# Patient Record
Sex: FEMALE | Race: WHITE | NOT HISPANIC OR LATINO | Employment: FULL TIME | URBAN - METROPOLITAN AREA
[De-identification: names, ages, dates, MRNs, and addresses within clinical notes are randomized per-mention and may not be internally consistent; named-entity substitution may affect disease eponyms.]

---

## 2017-01-05 RX ORDER — ATORVASTATIN CALCIUM 20 MG/1
TABLET, FILM COATED ORAL
Qty: 30 TABLET | Refills: 5 | Status: SHIPPED | OUTPATIENT
Start: 2017-01-05 | End: 2017-06-24 | Stop reason: SDUPTHER

## 2017-01-11 ENCOUNTER — PATIENT OUTREACH (OUTPATIENT)
Dept: ADMINISTRATIVE | Facility: HOSPITAL | Age: 57
End: 2017-01-11

## 2017-01-25 ENCOUNTER — OFFICE VISIT (OUTPATIENT)
Dept: FAMILY MEDICINE | Facility: CLINIC | Age: 57
End: 2017-01-25
Payer: COMMERCIAL

## 2017-01-25 VITALS
WEIGHT: 226.94 LBS | DIASTOLIC BLOOD PRESSURE: 70 MMHG | HEART RATE: 72 BPM | BODY MASS INDEX: 32.49 KG/M2 | TEMPERATURE: 99 F | SYSTOLIC BLOOD PRESSURE: 122 MMHG | HEIGHT: 70 IN

## 2017-01-25 DIAGNOSIS — E78.5 HYPERLIPIDEMIA, UNSPECIFIED HYPERLIPIDEMIA TYPE: ICD-10-CM

## 2017-01-25 DIAGNOSIS — F33.2 SEVERE EPISODE OF RECURRENT MAJOR DEPRESSIVE DISORDER, WITHOUT PSYCHOTIC FEATURES: ICD-10-CM

## 2017-01-25 DIAGNOSIS — F41.1 GAD (GENERALIZED ANXIETY DISORDER): Primary | ICD-10-CM

## 2017-01-25 PROCEDURE — 1159F MED LIST DOCD IN RCRD: CPT | Mod: S$GLB,,, | Performed by: FAMILY MEDICINE

## 2017-01-25 PROCEDURE — 99213 OFFICE O/P EST LOW 20 MIN: CPT | Mod: S$GLB,,, | Performed by: FAMILY MEDICINE

## 2017-01-25 PROCEDURE — 99999 PR PBB SHADOW E&M-EST. PATIENT-LVL III: CPT | Mod: PBBFAC,,, | Performed by: FAMILY MEDICINE

## 2017-01-25 RX ORDER — ESCITALOPRAM OXALATE 10 MG/1
10 TABLET ORAL DAILY
Qty: 30 TABLET | Refills: 1 | Status: SHIPPED | OUTPATIENT
Start: 2017-01-25 | End: 2017-03-03 | Stop reason: SDUPTHER

## 2017-01-25 NOTE — PROGRESS NOTES
"Subjective:       Patient ID: Malathi Martinez is a 56 y.o. female.    Chief Complaint: Knee Pain (pt is c/o knee pain x2 months. ) and medications (pt would like to discuss meds, zoloft and wellbutrin. Length of statin therapy need. )    HPI   Patient here today for review.  Doing ok on wellbutrin. Some tearfulness and anger since stopping the zoloft. Did have some discontinuation sx incl "zaps" which have largely resolved. Does feel as though she needs to be on something else for sx control. Still in pastoral counseling, open to SW visit, but no time right now. Sleep is preserved.  Weight gain has stabilized.  Cholesterol has improved on lipitor low dose.     Review of Systems   Constitutional: Positive for fatigue and unexpected weight change. Negative for activity change.   HENT: Negative for hearing loss, rhinorrhea and trouble swallowing.    Eyes: Negative for discharge and visual disturbance.   Respiratory: Negative for chest tightness and wheezing.    Cardiovascular: Negative for chest pain and palpitations.   Gastrointestinal: Negative for blood in stool, constipation, diarrhea and vomiting.   Endocrine: Positive for polyuria. Negative for polydipsia.   Genitourinary: Negative for difficulty urinating, dysuria, hematuria and menstrual problem.   Musculoskeletal: Positive for arthralgias (chronic R knee issues) and joint swelling.   Neurological: Negative for weakness and headaches.   Psychiatric/Behavioral: Positive for dysphoric mood. Negative for confusion.       Objective:      Physical Exam   Constitutional: She is oriented to person, place, and time. She appears well-developed and well-nourished. No distress.   HENT:   Head: Normocephalic and atraumatic.   Eyes: Conjunctivae are normal. Right eye exhibits no discharge. Left eye exhibits no discharge. No scleral icterus.   Neck: Normal range of motion. Neck supple.   Cardiovascular: Normal rate.    Pulmonary/Chest: Effort normal. No respiratory " distress.   Abdominal: Soft. She exhibits no distension.   Musculoskeletal: Normal range of motion. She exhibits no edema.   Neurological: She is alert and oriented to person, place, and time.   Skin: Skin is warm and dry. No rash noted.   Psychiatric: She has a normal mood and affect. Her behavior is normal.   Intermittently tearful.   Nursing note and vitals reviewed.        Hyperlipidemia, unspecified hyperlipidemia type  Cont lipitor.  JEAN CLAUDE (generalized anxiety disorder)  Severe episode of recurrent major depressive disorder, without psychotic features  Cont wellbutrin, add lexapro. Recheck 6 weeks.  -     escitalopram oxalate (LEXAPRO) 10 MG tablet; Take 1 tablet (10 mg total) by mouth once daily.  Dispense: 30 tablet; Refill: 1

## 2017-02-25 DIAGNOSIS — F43.21 GRIEF REACTION: ICD-10-CM

## 2017-02-25 DIAGNOSIS — F41.9 ANXIETY: ICD-10-CM

## 2017-02-25 DIAGNOSIS — F43.23 ADJUSTMENT DISORDER WITH MIXED ANXIETY AND DEPRESSED MOOD: ICD-10-CM

## 2017-03-01 RX ORDER — ALPRAZOLAM 0.25 MG/1
TABLET ORAL
Qty: 60 TABLET | Refills: 2 | Status: SHIPPED | OUTPATIENT
Start: 2017-03-01 | End: 2017-05-26 | Stop reason: SDUPTHER

## 2017-03-03 ENCOUNTER — PATIENT MESSAGE (OUTPATIENT)
Dept: FAMILY MEDICINE | Facility: CLINIC | Age: 57
End: 2017-03-03

## 2017-03-03 ENCOUNTER — OFFICE VISIT (OUTPATIENT)
Dept: FAMILY MEDICINE | Facility: CLINIC | Age: 57
End: 2017-03-03
Payer: COMMERCIAL

## 2017-03-03 VITALS
TEMPERATURE: 99 F | BODY MASS INDEX: 32.38 KG/M2 | WEIGHT: 226.19 LBS | HEIGHT: 70 IN | HEART RATE: 72 BPM | DIASTOLIC BLOOD PRESSURE: 70 MMHG | SYSTOLIC BLOOD PRESSURE: 122 MMHG

## 2017-03-03 DIAGNOSIS — M25.561 CHRONIC PAIN OF RIGHT KNEE: ICD-10-CM

## 2017-03-03 DIAGNOSIS — G89.29 CHRONIC PAIN OF RIGHT KNEE: ICD-10-CM

## 2017-03-03 DIAGNOSIS — F41.1 GAD (GENERALIZED ANXIETY DISORDER): Primary | ICD-10-CM

## 2017-03-03 PROCEDURE — 99999 PR PBB SHADOW E&M-EST. PATIENT-LVL III: CPT | Mod: PBBFAC,,, | Performed by: FAMILY MEDICINE

## 2017-03-03 PROCEDURE — 20610 DRAIN/INJ JOINT/BURSA W/O US: CPT | Mod: S$GLB,,, | Performed by: FAMILY MEDICINE

## 2017-03-03 PROCEDURE — 99213 OFFICE O/P EST LOW 20 MIN: CPT | Mod: 25,S$GLB,, | Performed by: FAMILY MEDICINE

## 2017-03-03 PROCEDURE — 1160F RVW MEDS BY RX/DR IN RCRD: CPT | Mod: S$GLB,,, | Performed by: FAMILY MEDICINE

## 2017-03-03 RX ORDER — TRIAMCINOLONE ACETONIDE 40 MG/ML
40 INJECTION, SUSPENSION INTRA-ARTICULAR; INTRAMUSCULAR
Status: DISCONTINUED | OUTPATIENT
Start: 2017-03-03 | End: 2017-03-03 | Stop reason: HOSPADM

## 2017-03-03 RX ORDER — ESCITALOPRAM OXALATE 10 MG/1
10 TABLET ORAL DAILY
Qty: 90 TABLET | Refills: 1 | Status: SHIPPED | OUTPATIENT
Start: 2017-03-03 | End: 2017-08-17 | Stop reason: SDUPTHER

## 2017-03-03 RX ADMIN — TRIAMCINOLONE ACETONIDE 40 MG: 40 INJECTION, SUSPENSION INTRA-ARTICULAR; INTRAMUSCULAR at 02:03

## 2017-03-03 NOTE — PROCEDURES
Large Joint Aspiration/Injection  Date/Time: 3/3/2017 2:09 PM  Performed by: SARAN LARA  Authorized by: SARAN LARA     Consent Done?:  Yes (Verbal)  Indications:  Pain and joint swelling  Procedure site marked: Yes    Timeout: Prior to procedure the correct patient, procedure, and site was verified    Prep: Patient was prepped and draped in usual sterile fashion    Needle size:  22 G  Medications:  40 mg triamcinolone acetonide 40 mg/mL  Aspirate amount (ml):  0  Patient tolerance:  Patient tolerated the procedure well with no immediate complications    Additional Comments: FERNANDA reviewed. Wound care post-procedure and alarm sx indicating risk for infection also discussed.

## 2017-03-03 NOTE — PROGRESS NOTES
Subjective:       Patient ID: Malathi Martinez is a 56 y.o. female.    Chief Complaint: Follow-up (lexapro, sweating and headaches and abdominal pain at first but have diminished.) and Knee Pain (right knee pain)    HPI   Patient here for med f/u.  Lexapro is doing ok. Initial se related to med use have improved. Not quite as effective as zoloft, but the weight gain has stopped! Sleep is hit or miss.   Ector a whisper in her ear that woke her from sleep. No paranoia or recurrence.  Ongoing issues with R knee pain. Intermittent swelling. Chronic pain. No imaging.    Review of Systems   Constitutional: Negative for activity change and unexpected weight change.   HENT: Negative for hearing loss, rhinorrhea and trouble swallowing.    Eyes: Negative for discharge and visual disturbance.   Respiratory: Negative for chest tightness and wheezing.    Cardiovascular: Negative for chest pain and palpitations.   Gastrointestinal: Negative for blood in stool, constipation, diarrhea and vomiting.   Endocrine: Negative for polydipsia and polyuria.   Genitourinary: Negative for difficulty urinating, dysuria, hematuria and menstrual problem.   Musculoskeletal: Positive for arthralgias and joint swelling.   Neurological: Negative for weakness and headaches.   Psychiatric/Behavioral: Negative for confusion and dysphoric mood.       Objective:      Physical Exam   Constitutional: She is oriented to person, place, and time. She appears well-developed and well-nourished. No distress.   HENT:   Head: Normocephalic and atraumatic.   Eyes: Conjunctivae are normal. Right eye exhibits no discharge. Left eye exhibits no discharge. No scleral icterus.   Neck: Normal range of motion. Neck supple.   Cardiovascular: Normal rate.    Pulmonary/Chest: Effort normal. No respiratory distress.   Abdominal: Soft. She exhibits no distension.   Musculoskeletal: Normal range of motion. She exhibits no edema.        Right knee: She exhibits no effusion  and no deformity. Tenderness found.   Neurological: She is alert and oriented to person, place, and time.   Skin: Skin is warm and dry. No rash noted.   Psychiatric: She has a normal mood and affect. Her behavior is normal.   Nursing note and vitals reviewed.        Chronic pain of right knee  -     X-ray Knee Ortho Right; Future; Expected date: 3/3/17  -     Ambulatory referral to Orthopedics  -     Large Joint Aspiration/Injection  -     triamcinolone acetonide injection 40 mg; Inject 40 mg into the articular space.  Update imaging. Likely will need MRI and ortho eval.  JEAN CLAUDE  -     escitalopram oxalate (LEXAPRO) 10 MG tablet; Take 1 tablet (10 mg total) by mouth once daily.  Dispense: 90 tablet; Refill: 1  Cont at current dose.

## 2017-03-03 NOTE — MR AVS SNAPSHOT
[facility]  [address]  [place] [address]  Phone: [phone]  Fax: [phone]                  Malathi Martinez   3/3/2017 1:20 PM   Office Visit    Description:  Female : 1960   Provider:  Comfort Dave MD   Department:  [facility]           Reason for Visit     Follow-up     Knee Pain           Diagnoses this Visit        Comments    Chronic pain of right knee    -  Primary            To Do List           Future Appointments        Provider Department Dept Phone    3/4/2017 8:30 AM [facility] XR2 [facility] [phone]    2017 1:20 PM Comfort Dave MD [facility] [phone]      Goals (5 Years of Data)     None       These Medications        Disp Refills Start End    escitalopram oxalate (LEXAPRO) 10 MG tablet 90 tablet 1 3/3/2017 3/3/2018    Take 1 tablet (10 mg total) by mouth once daily. - Oral    Pharmacy: [facility]/pharmacy #[id number] - [place], [place]  [address]  #: [phone]         [facility]     [facility] On Call Nurse Care Line -  Assistance  Registered nurses in the [facility] On Call Center provide clinical advisement, health education, appointment booking, and other advisory services.  Call for this free service at [phone].             Medications           Message regarding Medications     Verify the changes and/or additions to your medication regime listed below are the same as discussed with your clinician today.  If any of these changes or additions are incorrect, please notify your healthcare provider.        These medications were administered today        Dose Freq    triamcinolone acetonide injection 40 mg 40 mg     Sig: Inject 40 mg into the articular space.    Class: Normal    Route: Intra-articular           Verify that the below list of medications is an accurate representation of the medications you are currently taking.  If none reported, the list  "may be blank. If incorrect, please contact your healthcare provider. Carry this list with you in case of emergency.           Current Medications     albuterol 90 mcg/actuation inhaler Inhale 2 puffs into the lungs every 4 (four) hours as needed for Wheezing.    alprazolam (XANAX) 0.25 MG tablet TAKE 1 TABLET (0.25 MG TOTAL) BY MOUTH 2 (TWO) TIMES DAILY AS NEEDED FOR ANXIETY.    atorvastatin (LIPITOR) 20 MG tablet TAKE 1 TABLET (20 MG TOTAL) BY MOUTH ONCE DAILY.    buPROPion (WELLBUTRIN XL) 300 MG 24 hr tablet Take 1 tablet (300 mg total) by mouth once daily.    cholecalciferol, vitamin D3, 2,000 unit Cap Take 1 capsule by mouth once daily.    cyanocobalamin (VITAMIN B-12) 250 MCG tablet Take 250 mcg by mouth once daily.    escitalopram oxalate (LEXAPRO) 10 MG tablet Take 1 tablet (10 mg total) by mouth once daily.    furosemide (LASIX) 20 MG tablet Take 1 tablet (20 mg total) by mouth once daily.    indomethacin (INDOCIN) 25 MG capsule TAKE 1 CAPSULE (25 MG TOTAL) BY MOUTH 2 (TWO) TIMES DAILY WITH MEALS.    loratadine (CLARITIN) 10 mg tablet Take 10 mg by mouth once daily.           Clinical Reference Information           Your Vitals Were     BP Pulse Temp Height Weight Last Period    122/70 72 98.8 °F (37.1 °C) 5' 10" (1.778 m) 102.6 kg (226 lb 3.1 oz) 09/10/2012    BMI                32.46 kg/m2          Blood Pressure          Most Recent Value    BP  122/70      Allergies as of 3/3/2017     Penicillins      Immunizations Administered on Date of Encounter - 3/3/2017     None      Orders Placed During Today's Visit      Normal Orders This Visit    Ambulatory referral to Orthopedics     Large Joint Aspiration/Injection     Future Labs/Procedures Expected by Expires    X-ray Knee Ortho Right  3/3/2017 3/3/2018      Administrations This Visit     triamcinolone acetonide injection 40 mg     Admin Date Action Dose Route Administered By             03/03/2017 Given 40 mg Intra-articular Comfort Dave MD           "            Smoking Cessation     If you would like to quit smoking:   You may be eligible for free services if you are a [place] resident and started smoking cigarettes before September 1, 1988.  Call the Smoking Cessation Trust (SCT) toll free at (306) 441-4001 or (773) 200-9407.   Call 1-800-QUIT-NOW if you do not meet the above criteria.            Language Assistance Services     ATTENTION: Language assistance services are available, free of charge. Please call [phone].      ATENCIÓN: Si habla marla, tiene a britt disposición servicios gratuitos de asistencia lingüística. Llame al [phone].     CHÚ Ý: N?u b?n nói Ti?ng Vi?t, có các d?ch v? h? tr? ngôn ng? mi?n phí dành cho b?n. G?i s? [phone].         [facility] complies with applicable Federal civil rights laws and does not discriminate on the basis of race, color, national origin, age, disability, or sex.

## 2017-03-04 ENCOUNTER — HOSPITAL ENCOUNTER (OUTPATIENT)
Dept: RADIOLOGY | Facility: HOSPITAL | Age: 57
Discharge: HOME | End: 2017-03-04
Attending: FAMILY MEDICINE
Payer: COMMERCIAL

## 2017-03-04 DIAGNOSIS — M25.561 CHRONIC PAIN OF RIGHT KNEE: ICD-10-CM

## 2017-03-04 DIAGNOSIS — G89.29 CHRONIC PAIN OF RIGHT KNEE: ICD-10-CM

## 2017-03-04 PROCEDURE — 73562 X-RAY EXAM OF KNEE 3: CPT | Mod: 26,RT,, | Performed by: RADIOLOGY

## 2017-03-04 PROCEDURE — 73560 X-RAY EXAM OF KNEE 1 OR 2: CPT | Mod: TC,59,PO,LT

## 2017-03-04 PROCEDURE — 73560 X-RAY EXAM OF KNEE 1 OR 2: CPT | Mod: 26,59,LT, | Performed by: RADIOLOGY

## 2017-03-06 ENCOUNTER — PATIENT MESSAGE (OUTPATIENT)
Dept: FAMILY MEDICINE | Facility: CLINIC | Age: 57
End: 2017-03-06

## 2017-04-06 DIAGNOSIS — J40 BRONCHITIS: ICD-10-CM

## 2017-04-06 DIAGNOSIS — J44.1 CHRONIC OBSTRUCTIVE PULMONARY DISEASE WITH ACUTE EXACERBATION: ICD-10-CM

## 2017-04-06 RX ORDER — ALBUTEROL SULFATE 90 UG/1
AEROSOL, METERED RESPIRATORY (INHALATION)
Qty: 8.5 INHALER | Refills: 1 | Status: SHIPPED | OUTPATIENT
Start: 2017-04-06 | End: 2017-11-24 | Stop reason: SDUPTHER

## 2017-04-27 ENCOUNTER — PATIENT MESSAGE (OUTPATIENT)
Dept: FAMILY MEDICINE | Facility: CLINIC | Age: 57
End: 2017-04-27

## 2017-04-27 DIAGNOSIS — Z00.00 ROUTINE GENERAL MEDICAL EXAMINATION AT A HEALTH CARE FACILITY: Primary | ICD-10-CM

## 2017-05-08 ENCOUNTER — PATIENT MESSAGE (OUTPATIENT)
Dept: FAMILY MEDICINE | Facility: CLINIC | Age: 57
End: 2017-05-08

## 2017-05-11 ENCOUNTER — PATIENT MESSAGE (OUTPATIENT)
Dept: FAMILY MEDICINE | Facility: CLINIC | Age: 57
End: 2017-05-11

## 2017-05-12 ENCOUNTER — PATIENT OUTREACH (OUTPATIENT)
Dept: ADMINISTRATIVE | Facility: HOSPITAL | Age: 57
End: 2017-05-12

## 2017-05-12 ENCOUNTER — PATIENT MESSAGE (OUTPATIENT)
Dept: FAMILY MEDICINE | Facility: CLINIC | Age: 57
End: 2017-05-12

## 2017-05-13 ENCOUNTER — LAB VISIT (OUTPATIENT)
Dept: LAB | Facility: HOSPITAL | Age: 57
End: 2017-05-13
Attending: FAMILY MEDICINE
Payer: COMMERCIAL

## 2017-05-13 DIAGNOSIS — Z00.00 ROUTINE GENERAL MEDICAL EXAMINATION AT A HEALTH CARE FACILITY: ICD-10-CM

## 2017-05-13 LAB
ALBUMIN SERPL BCP-MCNC: 3.4 G/DL
ALP SERPL-CCNC: 83 U/L
ALT SERPL W/O P-5'-P-CCNC: 24 U/L
ANION GAP SERPL CALC-SCNC: 10 MMOL/L
AST SERPL-CCNC: 15 U/L
BILIRUB SERPL-MCNC: 0.4 MG/DL
BUN SERPL-MCNC: 7 MG/DL
CALCIUM SERPL-MCNC: 8.8 MG/DL
CHLORIDE SERPL-SCNC: 105 MMOL/L
CHOLEST/HDLC SERPL: 3.1 {RATIO}
CO2 SERPL-SCNC: 26 MMOL/L
CREAT SERPL-MCNC: 0.8 MG/DL
EST. GFR  (AFRICAN AMERICAN): >60 ML/MIN/1.73 M^2
EST. GFR  (NON AFRICAN AMERICAN): >60 ML/MIN/1.73 M^2
GLUCOSE SERPL-MCNC: 101 MG/DL
HDL/CHOLESTEROL RATIO: 32.4 %
HDLC SERPL-MCNC: 145 MG/DL
HDLC SERPL-MCNC: 47 MG/DL
LDLC SERPL CALC-MCNC: 80.4 MG/DL
NONHDLC SERPL-MCNC: 98 MG/DL
POTASSIUM SERPL-SCNC: 3.7 MMOL/L
PROT SERPL-MCNC: 6.9 G/DL
SODIUM SERPL-SCNC: 141 MMOL/L
TRIGL SERPL-MCNC: 88 MG/DL

## 2017-05-13 PROCEDURE — 80053 COMPREHEN METABOLIC PANEL: CPT

## 2017-05-13 PROCEDURE — 80061 LIPID PANEL: CPT

## 2017-05-13 PROCEDURE — 36415 COLL VENOUS BLD VENIPUNCTURE: CPT | Mod: PO

## 2017-05-13 PROCEDURE — 83036 HEMOGLOBIN GLYCOSYLATED A1C: CPT

## 2017-05-15 LAB
ESTIMATED AVG GLUCOSE: 143 MG/DL
HBA1C MFR BLD HPLC: 6.6 %

## 2017-05-22 DIAGNOSIS — M79.89 LEG SWELLING: ICD-10-CM

## 2017-05-22 RX ORDER — FUROSEMIDE 20 MG/1
20 TABLET ORAL DAILY
Qty: 30 TABLET | Refills: 5 | Status: SHIPPED | OUTPATIENT
Start: 2017-05-22 | End: 2017-11-12 | Stop reason: SDUPTHER

## 2017-05-23 DIAGNOSIS — F43.21 GRIEF REACTION: ICD-10-CM

## 2017-05-23 DIAGNOSIS — F43.23 ADJUSTMENT DISORDER WITH MIXED ANXIETY AND DEPRESSED MOOD: ICD-10-CM

## 2017-05-23 DIAGNOSIS — F41.9 ANXIETY: ICD-10-CM

## 2017-05-23 RX ORDER — BUPROPION HYDROCHLORIDE 300 MG/1
300 TABLET ORAL DAILY
Qty: 90 TABLET | Refills: 1 | Status: SHIPPED | OUTPATIENT
Start: 2017-05-23 | End: 2017-06-09

## 2017-05-26 ENCOUNTER — OFFICE VISIT (OUTPATIENT)
Dept: FAMILY MEDICINE | Facility: CLINIC | Age: 57
End: 2017-05-26
Payer: COMMERCIAL

## 2017-05-26 VITALS
WEIGHT: 229.81 LBS | SYSTOLIC BLOOD PRESSURE: 128 MMHG | BODY MASS INDEX: 32.9 KG/M2 | HEART RATE: 72 BPM | DIASTOLIC BLOOD PRESSURE: 70 MMHG | TEMPERATURE: 99 F | HEIGHT: 70 IN

## 2017-05-26 DIAGNOSIS — F41.9 ANXIETY: ICD-10-CM

## 2017-05-26 DIAGNOSIS — G89.29 CHRONIC PAIN OF RIGHT KNEE: ICD-10-CM

## 2017-05-26 DIAGNOSIS — E11.9 CONTROLLED TYPE 2 DIABETES MELLITUS WITHOUT COMPLICATION, WITHOUT LONG-TERM CURRENT USE OF INSULIN: Primary | ICD-10-CM

## 2017-05-26 DIAGNOSIS — F43.21 GRIEF REACTION: ICD-10-CM

## 2017-05-26 DIAGNOSIS — E66.9 OBESITY, UNSPECIFIED OBESITY SEVERITY, UNSPECIFIED OBESITY TYPE: ICD-10-CM

## 2017-05-26 DIAGNOSIS — M25.561 CHRONIC PAIN OF RIGHT KNEE: ICD-10-CM

## 2017-05-26 DIAGNOSIS — R35.0 URINARY FREQUENCY: ICD-10-CM

## 2017-05-26 DIAGNOSIS — F43.23 ADJUSTMENT DISORDER WITH MIXED ANXIETY AND DEPRESSED MOOD: ICD-10-CM

## 2017-05-26 LAB
BILIRUB UR QL STRIP: NEGATIVE
CLARITY UR REFRACT.AUTO: CLEAR
COLOR UR AUTO: COLORLESS
GLUCOSE UR QL STRIP: NEGATIVE
HGB UR QL STRIP: NEGATIVE
KETONES UR QL STRIP: NEGATIVE
LEUKOCYTE ESTERASE UR QL STRIP: NEGATIVE
NITRITE UR QL STRIP: NEGATIVE
PH UR STRIP: 6 [PH] (ref 5–8)
PROT UR QL STRIP: NEGATIVE
SP GR UR STRIP: 1 (ref 1–1.03)
URN SPEC COLLECT METH UR: ABNORMAL
UROBILINOGEN UR STRIP-ACNC: NEGATIVE EU/DL

## 2017-05-26 PROCEDURE — 3044F HG A1C LEVEL LT 7.0%: CPT | Mod: S$GLB,,, | Performed by: FAMILY MEDICINE

## 2017-05-26 PROCEDURE — 4010F ACE/ARB THERAPY RXD/TAKEN: CPT | Mod: S$GLB,,, | Performed by: FAMILY MEDICINE

## 2017-05-26 PROCEDURE — 81003 URINALYSIS AUTO W/O SCOPE: CPT

## 2017-05-26 PROCEDURE — 99214 OFFICE O/P EST MOD 30 MIN: CPT | Mod: S$GLB,,, | Performed by: FAMILY MEDICINE

## 2017-05-26 PROCEDURE — 99999 PR PBB SHADOW E&M-EST. PATIENT-LVL IV: CPT | Mod: PBBFAC,,, | Performed by: FAMILY MEDICINE

## 2017-05-26 RX ORDER — ALPRAZOLAM 0.25 MG/1
TABLET ORAL
Qty: 60 TABLET | Refills: 2 | Status: SHIPPED | OUTPATIENT
Start: 2017-05-26 | End: 2017-07-17 | Stop reason: SDUPTHER

## 2017-05-26 RX ORDER — LISINOPRIL 2.5 MG/1
2.5 TABLET ORAL DAILY
Qty: 90 TABLET | Refills: 1 | Status: SHIPPED | OUTPATIENT
Start: 2017-05-26 | End: 2017-11-18 | Stop reason: SDUPTHER

## 2017-05-26 RX ORDER — METFORMIN HYDROCHLORIDE 500 MG/1
500 TABLET, EXTENDED RELEASE ORAL 2 TIMES DAILY WITH MEALS
Qty: 180 TABLET | Refills: 0 | Status: SHIPPED | OUTPATIENT
Start: 2017-05-26 | End: 2017-08-16 | Stop reason: SDUPTHER

## 2017-05-26 NOTE — PROGRESS NOTES
Subjective:       Patient ID: Malathi Martinez is a 56 y.o. female.    Chief Complaint: Follow-up (pt is here for f/u on wellbutrin and lexapro. Pt states she is having confusion and tinnitus, unable to empty her bladder, causing accidents. constant yawning, itching. )    HPI   Patient here today for f/u.  She is having some issues she relates to the medications. Feels that she sees things in the corner of her eyes. Less focus/concentration. No OCD traits.   Feels exhausted, yawns constantly, feels that she is itching a lot.   Labs 5/2017. +DM2 new dx.  Weight gain continues.  Difficulty emptying bladder fully, some post-void incont.  R knee - continued pain - some weakness ass'd.    Review of Systems   Constitutional: Positive for unexpected weight change. Negative for activity change.   HENT: Negative for hearing loss, rhinorrhea and trouble swallowing.    Eyes: Negative for discharge and visual disturbance.   Respiratory: Negative for chest tightness and wheezing.    Cardiovascular: Negative for chest pain and palpitations.   Gastrointestinal: Positive for constipation. Negative for blood in stool, diarrhea and vomiting.   Endocrine: Positive for polyuria. Negative for polydipsia.   Genitourinary: Negative for difficulty urinating, dysuria, hematuria and menstrual problem.   Musculoskeletal: Positive for arthralgias (R knee pain). Negative for joint swelling.   Neurological: Positive for weakness and headaches.   Psychiatric/Behavioral: Positive for confusion and dysphoric mood.       Objective:      Physical Exam   Constitutional: She is oriented to person, place, and time. She appears well-developed and well-nourished. No distress.   HENT:   Head: Normocephalic and atraumatic.   Eyes: Conjunctivae are normal. Right eye exhibits no discharge. Left eye exhibits no discharge. No scleral icterus.   Neck: Normal range of motion. Neck supple.   Cardiovascular: Normal rate and regular rhythm.    Pulmonary/Chest:  Effort normal. No respiratory distress.   Abdominal: Soft. She exhibits no distension.   Musculoskeletal: Normal range of motion. She exhibits no edema.   Neurological: She is alert and oriented to person, place, and time.   Skin: Skin is warm and dry. No rash noted.   Psychiatric: She has a normal mood and affect. Her behavior is normal.   Worried about new DM2 dx.   Nursing note and vitals reviewed.        Controlled type 2 diabetes mellitus without complication, without long-term current use of insulin  -     Ambulatory referral to Endocrinology  -     Comprehensive metabolic panel; Future; Expected date: 05/26/2017  -     Hemoglobin A1c; Future; Expected date: 05/26/2017  Start metormin 1-2 daily. Dietary mods reviewed. Lisinopril at low dose. Offered session with Karina for teaching. Patient declined glucometer - has her dad's old one at home. Recheck lab 3 mos.  Anxiety  -     alprazolam (XANAX) 0.25 MG tablet; TAKE 1 TABLET (0.25 MG TOTAL) BY MOUTH 2 (TWO) TIMES DAILY AS NEEDED FOR ANXIETY.  Dispense: 60 tablet; Refill: 2  Adjustment disorder with mixed anxiety and depressed mood  -     alprazolam (XANAX) 0.25 MG tablet; TAKE 1 TABLET (0.25 MG TOTAL) BY MOUTH 2 (TWO) TIMES DAILY AS NEEDED FOR ANXIETY.  Dispense: 60 tablet; Refill: 2  Decrease wellbutrin to 150/day. Discussed establishing with psychiatry - she agrees - as we've struggled to find a good med regimen for her.  Grief reaction  -     alprazolam (XANAX) 0.25 MG tablet; TAKE 1 TABLET (0.25 MG TOTAL) BY MOUTH 2 (TWO) TIMES DAILY AS NEEDED FOR ANXIETY.  Dispense: 60 tablet; Refill: 2  Obesity, unspecified obesity severity, unspecified obesity type  -     Ambulatory consult to Bariatric Surgery  Goal weight reviewed as well as need for lifestyle modifications to incl caloric restriction, high protein/low carb, increased water intake, muscle-building exercises as well as cardio.    Urinary frequency  -     Urinalysis  If ua neg, we'll need to look at a  post-void and uro-gyn eval.  Chronic pain of right knee  -     MRI Lower Extremity Joint WO Cont Right; Future; Expected date: 05/26/2017  Update imaging, anticipate ortho f/u.  Other orders  -     metformin (GLUCOPHAGE-XR) 500 MG 24 hr tablet; Take 1 tablet (500 mg total) by mouth 2 (two) times daily with meals.  Dispense: 180 tablet; Refill: 0  -     lisinopril (PRINIVIL,ZESTRIL) 2.5 MG tablet; Take 1 tablet (2.5 mg total) by mouth once daily.  Dispense: 90 tablet; Refill: 1

## 2017-05-30 DIAGNOSIS — F41.9 ANXIETY: ICD-10-CM

## 2017-05-30 DIAGNOSIS — F43.21 GRIEF REACTION: ICD-10-CM

## 2017-05-30 DIAGNOSIS — F43.23 ADJUSTMENT DISORDER WITH MIXED ANXIETY AND DEPRESSED MOOD: ICD-10-CM

## 2017-05-30 RX ORDER — BUPROPION HYDROCHLORIDE 150 MG/1
150 TABLET ORAL DAILY
Qty: 30 TABLET | Refills: 5 | Status: SHIPPED | OUTPATIENT
Start: 2017-05-30 | End: 2017-11-17 | Stop reason: SDUPTHER

## 2017-06-01 ENCOUNTER — PATIENT MESSAGE (OUTPATIENT)
Dept: FAMILY MEDICINE | Facility: CLINIC | Age: 57
End: 2017-06-01

## 2017-06-03 ENCOUNTER — HOSPITAL ENCOUNTER (OUTPATIENT)
Dept: RADIOLOGY | Facility: HOSPITAL | Age: 57
Discharge: HOME | End: 2017-06-03
Attending: FAMILY MEDICINE
Payer: COMMERCIAL

## 2017-06-03 DIAGNOSIS — M25.561 CHRONIC PAIN OF RIGHT KNEE: ICD-10-CM

## 2017-06-03 DIAGNOSIS — G89.29 CHRONIC PAIN OF RIGHT KNEE: ICD-10-CM

## 2017-06-03 PROCEDURE — 73721 MRI JNT OF LWR EXTRE W/O DYE: CPT | Mod: 26,RT,, | Performed by: RADIOLOGY

## 2017-06-03 PROCEDURE — 73721 MRI JNT OF LWR EXTRE W/O DYE: CPT | Mod: TC,PO,RT

## 2017-06-05 ENCOUNTER — PATIENT MESSAGE (OUTPATIENT)
Dept: FAMILY MEDICINE | Facility: CLINIC | Age: 57
End: 2017-06-05

## 2017-06-09 ENCOUNTER — OFFICE VISIT (OUTPATIENT)
Dept: ENDOCRINOLOGY | Facility: CLINIC | Age: 57
End: 2017-06-09
Payer: COMMERCIAL

## 2017-06-09 ENCOUNTER — OFFICE VISIT (OUTPATIENT)
Dept: ORTHOPEDICS | Facility: CLINIC | Age: 57
End: 2017-06-09
Payer: COMMERCIAL

## 2017-06-09 VITALS — BODY MASS INDEX: 32.78 KG/M2 | WEIGHT: 229 LBS | HEIGHT: 70 IN

## 2017-06-09 VITALS
BODY MASS INDEX: 31.49 KG/M2 | HEART RATE: 72 BPM | WEIGHT: 219.94 LBS | HEIGHT: 70 IN | DIASTOLIC BLOOD PRESSURE: 62 MMHG | SYSTOLIC BLOOD PRESSURE: 140 MMHG

## 2017-06-09 DIAGNOSIS — F41.1 GAD (GENERALIZED ANXIETY DISORDER): ICD-10-CM

## 2017-06-09 DIAGNOSIS — J44.1 CHRONIC OBSTRUCTIVE PULMONARY DISEASE WITH ACUTE EXACERBATION: ICD-10-CM

## 2017-06-09 DIAGNOSIS — E78.5 HYPERLIPIDEMIA, UNSPECIFIED HYPERLIPIDEMIA TYPE: ICD-10-CM

## 2017-06-09 DIAGNOSIS — F33.2 SEVERE EPISODE OF RECURRENT MAJOR DEPRESSIVE DISORDER, WITHOUT PSYCHOTIC FEATURES: ICD-10-CM

## 2017-06-09 DIAGNOSIS — M25.561 RIGHT KNEE PAIN, UNSPECIFIED CHRONICITY: Primary | ICD-10-CM

## 2017-06-09 DIAGNOSIS — E11.9 TYPE 2 DIABETES MELLITUS WITHOUT COMPLICATION, WITHOUT LONG-TERM CURRENT USE OF INSULIN: Primary | ICD-10-CM

## 2017-06-09 DIAGNOSIS — E66.3 OVERWEIGHT: ICD-10-CM

## 2017-06-09 LAB — GLUCOSE SERPL-MCNC: 72 MG/DL (ref 70–110)

## 2017-06-09 PROCEDURE — 99204 OFFICE O/P NEW MOD 45 MIN: CPT | Mod: S$GLB,,, | Performed by: ORTHOPAEDIC SURGERY

## 2017-06-09 PROCEDURE — 99999 PR PBB SHADOW E&M-EST. PATIENT-LVL IV: CPT | Mod: PBBFAC,,, | Performed by: NURSE PRACTITIONER

## 2017-06-09 PROCEDURE — 99203 OFFICE O/P NEW LOW 30 MIN: CPT | Mod: S$GLB,,, | Performed by: NURSE PRACTITIONER

## 2017-06-09 PROCEDURE — 4010F ACE/ARB THERAPY RXD/TAKEN: CPT | Mod: S$GLB,,, | Performed by: NURSE PRACTITIONER

## 2017-06-09 PROCEDURE — 82948 REAGENT STRIP/BLOOD GLUCOSE: CPT | Mod: S$GLB,,, | Performed by: NURSE PRACTITIONER

## 2017-06-09 PROCEDURE — 99999 PR PBB SHADOW E&M-EST. PATIENT-LVL II: CPT | Mod: PBBFAC,,, | Performed by: ORTHOPAEDIC SURGERY

## 2017-06-09 PROCEDURE — 3044F HG A1C LEVEL LT 7.0%: CPT | Mod: S$GLB,,, | Performed by: NURSE PRACTITIONER

## 2017-06-09 RX ORDER — INSULIN PUMP SYRINGE, 3 ML
EACH MISCELLANEOUS
Qty: 1 EACH | Refills: 0 | Status: SHIPPED | OUTPATIENT
Start: 2017-06-09 | End: 2022-12-19

## 2017-06-09 RX ORDER — LANCETS
1 EACH MISCELLANEOUS DAILY
Qty: 50 EACH | Refills: 6 | Status: SHIPPED | OUTPATIENT
Start: 2017-06-09 | End: 2019-08-09 | Stop reason: SDUPTHER

## 2017-06-09 NOTE — LETTER
June 9, 2017      Comfort Dave MD  [address]  [facility] [address]           [facility]  [address]  [place] [address]  Phone: [phone]  Fax: [phone]          Patient: Malathi Martinez   MR Number: [id number]   YOB: 1960   Date of Visit: 6/9/2017       Dear Dr. Comfort Dave:    Thank you for referring Malathi Martinez to me for evaluation. Attached you will find relevant portions of my assessment and plan of care.    If you have questions, please do not hesitate to call me. I look forward to following Malathi Martinez along with you.    Sincerely,    ABI Bah,LEONORP-C    Enclosure  CC:  No Recipients    If you would like to receive this communication electronically, please contact [email or web address] or [phone] to request more information on [email or web address] Link access.    For providers and/or their staff who would like to refer a patient to [facility], please contact us through our one-stop-shop provider referral line, [facility], at [phone].    If you feel you have received this communication in error or would no longer like to receive these types of communications, please e-mail [email or web address]

## 2017-06-09 NOTE — PROGRESS NOTES
"CC: Ms. Malathi Martinez arrives today for management of Type 2 DM and review of chronic medical conditions, as listed in the Visit Diagnosis section of this encounter.       HPI: Ms. Malathi Martinez was diagnosed with Type 2 DM in May 2017 with A1c of 6.6%.  Initial treatment consisted of metformin. + FH of DM in father. Denies hospitalizations due to DM.     This is her first time being seen in endocrine.     She does not have a glucometer so is not checking BG readings.    Hypoglycemia: No    Missing Insulin/PO medication doses: No    Exercise: No, due to knee pain    Dietary Habits: Eats 3 meals/day. Snacks on peanuts. Recently cut out sodas. . She has made changes in her diet since being diagnosed with diabetes.      Last DM education appointment: never    She reports that her brother and father passed away around the same time and she is now caring for her mother. She has depression, for which she takes Lexapro and Wellbutrin.       CURRENT DIABETIC MEDS: metformin  mg BID  Glucometer type: n/a      Last Eye Exam: 2015  Last Podiatry Exam: n/a    REVIEW OF SYSTEMS  Constitutional: no c/o fatigue, weakness. + intentional 10 # weight loss.   Eyes: denies visual disturbances.  ENT: denies dysphagia, hearing loss  Cardiac: no palpitations or chest pain.  Respiratory: no dyspnea. + cough that she attributes to allergies  GI: no c/o abdominal pain or nausea. Denies h/o pancreatitis   : denies urinary frequency, burning, discharge, frequent UTIs  Skin: no lesions or rashes.   Musculoskeletal: denies difficulty mobilizing. + R knee pain  Neuro: no numbness, tingling, or parasthesias.  Endocrine: denies polyphagia, polydipsia, polyuria.      Personally reviewed Past Medical, Surgical, Social History.    Vital Signs  BP (!) 140/62   Pulse 72   Ht 5' 10" (1.778 m)   Wt 99.7 kg (219 lb 14.5 oz)   LMP 09/10/2012   BMI 31.55 kg/m²     Personally reviewed the below labs:    Hemoglobin A1C   Date " Value Ref Range Status   05/13/2017 6.6 (H) 4.5 - 6.2 % Final     Comment:     According to ADA guidelines, hemoglobin A1C <7.0% represents  optimal control in non-pregnant diabetic patients.  Different  metrics may apply to specific populations.   Standards of Medical Care in Diabetes - 2016.  For the purpose of screening for the presence of diabetes:  <5.7%     Consistent with the absence of diabetes  5.7-6.4%  Consistent with increasing risk for diabetes   (prediabetes)  >or=6.5%  Consistent with diabetes  Currently no consensus exists for use of hemoglobin A1C  for diagnosis of diabetes for children.     05/21/2016 6.1 4.5 - 6.2 % Final   09/05/2015 6.1 4.5 - 6.2 % Final       Chemistry        Component Value Date/Time     05/13/2017 0847    K 3.7 05/13/2017 0847     05/13/2017 0847    CO2 26 05/13/2017 0847    BUN 7 05/13/2017 0847    CREATININE 0.8 05/13/2017 0847     05/13/2017 0847        Component Value Date/Time    CALCIUM 8.8 05/13/2017 0847    ALKPHOS 83 05/13/2017 0847    AST 15 05/13/2017 0847    ALT 24 05/13/2017 0847    BILITOT 0.4 05/13/2017 0847          Lab Results   Component Value Date    CHOL 145 05/13/2017    CHOL 146 05/21/2016    CHOL 149 12/05/2015     Lab Results   Component Value Date    HDL 47 05/13/2017    HDL 46 05/21/2016    HDL 43 12/05/2015     Lab Results   Component Value Date    LDLCALC 80.4 05/13/2017    LDLCALC 81.4 05/21/2016    LDLCALC 81.6 12/05/2015     Lab Results   Component Value Date    TRIG 88 05/13/2017    TRIG 93 05/21/2016    TRIG 122 12/05/2015     Lab Results   Component Value Date    CHOLHDL 32.4 05/13/2017    CHOLHDL 31.5 05/21/2016    CHOLHDL 28.9 12/05/2015       No results found for: MICALBCREAT  Lab Results   Component Value Date    TSH 1.760 05/21/2016       CrCl cannot be calculated (Patient's most recent sCr result is older than the maximum 14 days allowed.).    Vit D, 25-Hydroxy   Date Value Ref Range Status   07/23/2016 27 (L) 30 - 96  ng/mL Final     Comment:     Vitamin D deficiency.........<10 ng/mL                              Vitamin D insufficiency......10-29 ng/mL       Vitamin D sufficiency........> or equal to 30 ng/mL  Vitamin D toxicity............>100 ng/mL           PHYSICAL EXAMINATION  Constitutional: Appears well, no distress  Neck: Supple, trachea midline; no thyromegaly or nodules.   Respiratory: CTA, even and unlabored.  Cardiovascular: RRR, no murmurs, no carotid bruits. DP pulses  2+ bilaterally; no edema.    GI: bowel sounds active, no hernia noted  Lymph: no cervical or supraclavicular lymphadenopathy  Skin: warm and dry; no lipohypertrophy, or acanthosis nigracans observed.  Psych: normal affect, pleasant mood, conversant  Musculoskeletal: steady gait, no clubbing, full ROM to all extremities  Neuro: DTR 2+ BUE/2+BLE.  Feet: appropriate footwear.      Assessment/Plan  1. Type 2 diabetes mellitus without complication, without long-term current use of insulin  -- Newly diagnosed DM  -- Tolerating metformin well. She is making positive changes to her diet.   -- continue metformin. This dose may be able to be decreased in the future.   -- recommended that she start checking her blood glucose once daily or every other day. Rx sent to pharmacy.     -- Discussed diagnosis of DM, A1c goals, progression of disease, long term complications and tx options.  Advised patient to check BG before activities, such as driving or exercise.   2. Hyperlipidemia, unspecified hyperlipidemia type  -- controlled, continue atorvastatin   3. Severe episode of recurrent major depressive disorder, without psychotic features  -- may have an effect on glucose levels  -- continue current meds   4. Overweight  -- increases insulin resistance  Body mass index is 31.55 kg/m².       FOLLOW UP  Return if symptoms worsen or fail to improve. May follow with her PCP and is welcome back at any time if diabetes becomes uncontrolled.   Patient instructed to bring BG  logs to each follow up   Patient encouraged to call for any BG/medication issues, concerns, or questions.

## 2017-06-09 NOTE — PROGRESS NOTES
Malathi Martinez is 56 years old, complaining of pain in her knee for about four   years' time.  Tylenol seem to help slightly.  She had a cortisone shot.  Last   injection November 2016.  The initial cortisone shot seemed to help.  The most   recent not so much.  Pain is 6/10 on the pain scale, aching pain.    Exam shows tenderness at the joint line.  No signs of infection.  No   instability.  Skin is intact.  Compartments are soft.    MRI showed degenerative changes.    ASSESSMENT:  Degenerative disease of the knee.    PLAN:  We will schedule her for Euflexxa series.            /ls [id number] blank(s)        PBB/PN  dd: 06/09/2017 11:45:33 (CDT)  td: 06/09/2017 14:56:24 (CDT)  Doc ID   #[id number]  Job ID #[id number]    CC:     Further History  Aching pain  Worse with activity  Relieved with rest  No other associated symptoms  No other radiation    Further Exam  Alert and oriented  Pleasant  Contralateral limb has appropriate range of motion for age and condition  Contralateral limb has appropriate strength for age and condition  Contralateral limb has appropriate stability  for age and condition  No adenopathy  Pulses are appropriate for current condition  Skin is intact        Chief Complaint    Chief Complaint   Patient presents with    Right Knee - Pain       HPI  Malathi Martinez is a 56 y.o.  female who presents with       Past Medical History  Past Medical History:   Diagnosis Date    Abnormal stress test     HLD (hyperlipidemia)     Mental disorder     SOB (shortness of breath)     Tobacco abuse 12/26/2012       Past Surgical History  Past Surgical History:   Procedure Laterality Date    APPENDECTOMY      TONSILLECTOMY         Medications  Current Outpatient Prescriptions   Medication Sig    alprazolam (XANAX) 0.25 MG tablet TAKE 1 TABLET (0.25 MG TOTAL) BY MOUTH 2 (TWO) TIMES DAILY AS NEEDED FOR ANXIETY.    atorvastatin (LIPITOR) 20 MG tablet TAKE 1 TABLET (20 MG TOTAL) BY MOUTH ONCE DAILY.     buPROPion (WELLBUTRIN XL) 150 MG TB24 tablet TAKE 1 TABLET (150 MG TOTAL) BY MOUTH ONCE DAILY.    buPROPion (WELLBUTRIN XL) 300 MG 24 hr tablet TAKE 1 TABLET (300 MG TOTAL) BY MOUTH ONCE DAILY.    cholecalciferol, vitamin D3, 2,000 unit Cap Take 1 capsule by mouth once daily.    cyanocobalamin (VITAMIN B-12) 250 MCG tablet Take 250 mcg by mouth once daily.    escitalopram oxalate (LEXAPRO) 10 MG tablet Take 1 tablet (10 mg total) by mouth once daily.    furosemide (LASIX) 20 MG tablet TAKE 1 TABLET (20 MG TOTAL) BY MOUTH ONCE DAILY.    indomethacin (INDOCIN) 25 MG capsule TAKE 1 CAPSULE (25 MG TOTAL) BY MOUTH 2 (TWO) TIMES DAILY WITH MEALS.    lisinopril (PRINIVIL,ZESTRIL) 2.5 MG tablet Take 1 tablet (2.5 mg total) by mouth once daily.    loratadine (CLARITIN) 10 mg tablet Take 10 mg by mouth once daily.    metformin (GLUCOPHAGE-XR) 500 MG 24 hr tablet Take 1 tablet (500 mg total) by mouth 2 (two) times daily with meals.    PROAIR HFA 90 mcg/actuation inhaler INHALE 2 PUFFS INTO THE LUNGS EVERY 4 (FOUR) HOURS AS NEEDED FOR WHEEZING.     No current facility-administered medications for this visit.        Allergies  Review of patient's allergies indicates:   Allergen Reactions    Penicillins        Family History  Family History   Problem Relation Age of Onset    Heart disease Mother      MVP    Cataracts Mother     Heart disease Father 65     MI and PVD    Heart disease Sister      mvp    Esophageal cancer Brother     No Known Problems Sister     No Known Problems Sister     Hyperlipidemia Paternal Grandmother        Social History  Social History     Social History    Marital status:      Spouse name: N/A    Number of children: N/A    Years of education: N/A     Occupational History    Not on file.     Social History Main Topics    Smoking status: Current Every Day Smoker     Packs/day: 1.00    Smokeless tobacco: Not on file    Alcohol use No    Drug use: No    Sexual activity: Yes      Partners: Male     Birth control/ protection: Post-menopausal     Other Topics Concern    Not on file     Social History Narrative    No narrative on file               Review of Systems     Constitutional: Negative    HENT: Negative  Eyes: Negative  Respiratory: Negative  Cardiovascular: Negative  Musculoskeletal: HPI  Skin: Negative  Neurological: Negative  Hematological: Negative  Endocrine: Negative                 Physical Exam    There were no vitals filed for this visit.  Body mass index is 32.86 kg/m².  Physical Examination:     General appearance -  well appearing, and in no distress  Mental status - awake  Neck - supple  Chest -  symmetric air entry  Heart - normal rate   Abdomen - soft      Assessment     1. Right knee pain, unspecified chronicity    2. Chronic obstructive pulmonary disease with acute exacerbation    3. Severe episode of recurrent major depressive disorder, without psychotic features    4. JEAN CLAUDE (generalized anxiety disorder)          Plan

## 2017-06-16 ENCOUNTER — PATIENT MESSAGE (OUTPATIENT)
Dept: FAMILY MEDICINE | Facility: CLINIC | Age: 57
End: 2017-06-16

## 2017-06-26 RX ORDER — ATORVASTATIN CALCIUM 20 MG/1
TABLET, FILM COATED ORAL
Qty: 30 TABLET | Refills: 5 | Status: SHIPPED | OUTPATIENT
Start: 2017-06-26 | End: 2017-12-11 | Stop reason: SDUPTHER

## 2017-06-30 ENCOUNTER — OFFICE VISIT (OUTPATIENT)
Dept: ORTHOPEDICS | Facility: CLINIC | Age: 57
End: 2017-06-30
Payer: COMMERCIAL

## 2017-06-30 VITALS — HEIGHT: 70 IN | BODY MASS INDEX: 31.35 KG/M2 | WEIGHT: 219 LBS

## 2017-06-30 DIAGNOSIS — M25.561 CHRONIC PAIN OF RIGHT KNEE: Primary | ICD-10-CM

## 2017-06-30 DIAGNOSIS — G89.29 CHRONIC PAIN OF RIGHT KNEE: Primary | ICD-10-CM

## 2017-06-30 DIAGNOSIS — M17.11 PRIMARY OSTEOARTHRITIS OF RIGHT KNEE: ICD-10-CM

## 2017-06-30 PROCEDURE — 99999 PR PBB SHADOW E&M-EST. PATIENT-LVL III: CPT | Mod: PBBFAC,,, | Performed by: ORTHOPAEDIC SURGERY

## 2017-06-30 PROCEDURE — 20610 DRAIN/INJ JOINT/BURSA W/O US: CPT | Mod: RT,S$GLB,, | Performed by: ORTHOPAEDIC SURGERY

## 2017-06-30 PROCEDURE — 99499 UNLISTED E&M SERVICE: CPT | Mod: S$GLB,,, | Performed by: ORTHOPAEDIC SURGERY

## 2017-06-30 RX ORDER — HYALURONATE SODIUM 20 MG/2 ML
20 SYRINGE (ML) INTRAARTICULAR
Status: DISCONTINUED | OUTPATIENT
Start: 2017-06-30 | End: 2017-06-30 | Stop reason: HOSPADM

## 2017-06-30 RX ADMIN — Medication 20 MG: at 01:06

## 2017-06-30 NOTE — PROCEDURES
Large Joint Aspiration/Injection  Date/Time: 6/30/2017 1:47 PM  Performed by: ANGEL VERNON  Authorized by: ANGEL VERNON.     Consent Done?:  Yes (Verbal)  Indications:  Pain  Timeout: Prior to procedure the correct patient, procedure, and site was verified      Location:  Knee  Site:  R knee  Prep: Patient was prepped and draped in usual sterile fashion    Ultrasonic Guidance for needle placement: No  Needle size:  22 G  Approach:  Anterolateral  Medications:  20 mg EUFLEXXA 10 mg/mL(mw 2.4 -3.6 million)  Patient tolerance:  Patient tolerated the procedure well with no immediate complications

## 2017-07-07 ENCOUNTER — OFFICE VISIT (OUTPATIENT)
Dept: ORTHOPEDICS | Facility: CLINIC | Age: 57
End: 2017-07-07
Payer: COMMERCIAL

## 2017-07-07 VITALS — HEIGHT: 70 IN | BODY MASS INDEX: 31.35 KG/M2 | WEIGHT: 219 LBS

## 2017-07-07 DIAGNOSIS — M17.11 OSTEOARTHRITIS OF RIGHT KNEE, UNSPECIFIED OSTEOARTHRITIS TYPE: Primary | ICD-10-CM

## 2017-07-07 PROCEDURE — 20610 DRAIN/INJ JOINT/BURSA W/O US: CPT | Mod: RT,S$GLB,, | Performed by: ORTHOPAEDIC SURGERY

## 2017-07-07 PROCEDURE — 99499 UNLISTED E&M SERVICE: CPT | Mod: S$GLB,,, | Performed by: ORTHOPAEDIC SURGERY

## 2017-07-07 PROCEDURE — 99999 PR PBB SHADOW E&M-EST. PATIENT-LVL III: CPT | Mod: PBBFAC,,, | Performed by: ORTHOPAEDIC SURGERY

## 2017-07-07 RX ORDER — HYALURONATE SODIUM 20 MG/2 ML
20 SYRINGE (ML) INTRAARTICULAR
Status: DISCONTINUED | OUTPATIENT
Start: 2017-07-07 | End: 2017-07-07 | Stop reason: HOSPADM

## 2017-07-07 RX ADMIN — Medication 20 MG: at 11:07

## 2017-07-07 NOTE — PROCEDURES
Large Joint Aspiration/Injection  Date/Time: 7/7/2017 11:35 AM  Performed by: ANGEL VERNON  Authorized by: ANGEL VERNON.     Consent Done?:  Yes (Verbal)  Indications:  Pain  Timeout: Prior to procedure the correct patient, procedure, and site was verified      Location:  Knee  Site:  R knee  Prep: Patient was prepped and draped in usual sterile fashion    Ultrasonic Guidance for needle placement: No  Needle size:  21 G  Approach:  Anterolateral  Medications:  20 mg EUFLEXXA 10 mg/mL(mw 2.4 -3.6 million)  Patient tolerance:  Patient tolerated the procedure well with no immediate complications

## 2017-07-11 ENCOUNTER — PATIENT MESSAGE (OUTPATIENT)
Dept: FAMILY MEDICINE | Facility: CLINIC | Age: 57
End: 2017-07-11

## 2017-07-14 ENCOUNTER — OFFICE VISIT (OUTPATIENT)
Dept: ORTHOPEDICS | Facility: CLINIC | Age: 57
End: 2017-07-14
Payer: COMMERCIAL

## 2017-07-14 VITALS — HEIGHT: 70 IN | BODY MASS INDEX: 31.35 KG/M2 | WEIGHT: 219 LBS

## 2017-07-14 DIAGNOSIS — M25.561 RIGHT KNEE PAIN, UNSPECIFIED CHRONICITY: Primary | ICD-10-CM

## 2017-07-14 DIAGNOSIS — M17.11 PRIMARY OSTEOARTHRITIS OF RIGHT KNEE: ICD-10-CM

## 2017-07-14 PROCEDURE — 20610 DRAIN/INJ JOINT/BURSA W/O US: CPT | Mod: RT,S$GLB,, | Performed by: ORTHOPAEDIC SURGERY

## 2017-07-14 PROCEDURE — 99499 UNLISTED E&M SERVICE: CPT | Mod: S$GLB,,, | Performed by: ORTHOPAEDIC SURGERY

## 2017-07-14 PROCEDURE — 99999 PR PBB SHADOW E&M-EST. PATIENT-LVL II: CPT | Mod: PBBFAC,,, | Performed by: ORTHOPAEDIC SURGERY

## 2017-07-14 RX ORDER — HYALURONATE SODIUM 20 MG/2 ML
20 SYRINGE (ML) INTRAARTICULAR
Status: DISCONTINUED | OUTPATIENT
Start: 2017-07-14 | End: 2017-07-14 | Stop reason: HOSPADM

## 2017-07-14 RX ADMIN — Medication 20 MG: at 01:07

## 2017-07-14 NOTE — PROCEDURES
Large Joint Aspiration/Injection  Date/Time: 7/14/2017 1:00 PM  Performed by: ANGEL VERNON  Authorized by: ANGEL VERNON.     Consent Done?:  Yes (Verbal)  Indications:  Pain  Timeout: Prior to procedure the correct patient, procedure, and site was verified      Location:  Knee  Site:  R knee  Prep: Patient was prepped and draped in usual sterile fashion    Ultrasonic Guidance for needle placement: No  Needle size:  21 G  Approach:  Anterolateral  Medications:  20 mg EUFLEXXA 10 mg/mL(mw 2.4 -3.6 million)  Patient tolerance:  Patient tolerated the procedure well with no immediate complications

## 2017-07-17 ENCOUNTER — OFFICE VISIT (OUTPATIENT)
Dept: FAMILY MEDICINE | Facility: CLINIC | Age: 57
End: 2017-07-17
Payer: COMMERCIAL

## 2017-07-17 VITALS
HEART RATE: 60 BPM | WEIGHT: 206.44 LBS | DIASTOLIC BLOOD PRESSURE: 68 MMHG | BODY MASS INDEX: 29.55 KG/M2 | TEMPERATURE: 99 F | SYSTOLIC BLOOD PRESSURE: 114 MMHG | HEIGHT: 70 IN

## 2017-07-17 DIAGNOSIS — F43.21 GRIEF REACTION: ICD-10-CM

## 2017-07-17 DIAGNOSIS — R53.83 FATIGUE, UNSPECIFIED TYPE: ICD-10-CM

## 2017-07-17 DIAGNOSIS — E11.9 DIABETES MELLITUS WITHOUT COMPLICATION: ICD-10-CM

## 2017-07-17 DIAGNOSIS — F41.9 ANXIETY: Primary | ICD-10-CM

## 2017-07-17 DIAGNOSIS — F43.23 ADJUSTMENT DISORDER WITH MIXED ANXIETY AND DEPRESSED MOOD: ICD-10-CM

## 2017-07-17 DIAGNOSIS — J06.9 VIRAL URI WITH COUGH: ICD-10-CM

## 2017-07-17 PROCEDURE — 99999 PR PBB SHADOW E&M-EST. PATIENT-LVL IV: CPT | Mod: PBBFAC,,, | Performed by: NURSE PRACTITIONER

## 2017-07-17 PROCEDURE — 3044F HG A1C LEVEL LT 7.0%: CPT | Mod: S$GLB,,, | Performed by: NURSE PRACTITIONER

## 2017-07-17 PROCEDURE — 4010F ACE/ARB THERAPY RXD/TAKEN: CPT | Mod: S$GLB,,, | Performed by: NURSE PRACTITIONER

## 2017-07-17 PROCEDURE — 99214 OFFICE O/P EST MOD 30 MIN: CPT | Mod: S$GLB,,, | Performed by: NURSE PRACTITIONER

## 2017-07-17 RX ORDER — ALPRAZOLAM 0.25 MG/1
TABLET ORAL
Qty: 60 TABLET | Refills: 2 | Status: SHIPPED | OUTPATIENT
Start: 2017-07-17 | End: 2017-11-24 | Stop reason: SDUPTHER

## 2017-07-17 NOTE — PROGRESS NOTES
Subjective:       Patient ID: Malathi Martinez is a 56 y.o. female.    Chief Complaint: follow-up    HPI     Patient presents to clinic for routine follow-up.   She is maintained on xanax 0.25 BID prn, Lexapro 10 mg daily, and Wellbutrin 150 mg daily.  was reviewed and is consistent.   DM - she is maintained on metformin 500 mg BID. She is tolerating well.     Review of Systems   Constitutional: Positive for fatigue. Negative for chills and fever.   HENT: Positive for sore throat. Negative for rhinorrhea and sinus pressure.    Respiratory: Positive for cough (dry). Negative for shortness of breath.    Cardiovascular: Negative for chest pain and palpitations.   Gastrointestinal: Negative for nausea and vomiting.   Endocrine: Positive for polyuria. Negative for polydipsia and polyphagia.   Skin: Negative for pallor and wound.   Neurological: Negative for dizziness, tremors, seizures, speech difficulty, weakness and headaches.   Psychiatric/Behavioral: Negative for confusion. The patient is nervous/anxious.        Objective:      Physical Exam   Constitutional: She is oriented to person, place, and time. She appears well-developed and well-nourished.   HENT:   Head: Normocephalic and atraumatic.   Right Ear: External ear normal.   Left Ear: External ear normal.   Nose: Nose normal.   Mouth/Throat: Posterior oropharyngeal erythema present.   Eyes: Pupils are equal, round, and reactive to light. Right eye exhibits no discharge. Left eye exhibits no discharge.   Neck: Normal range of motion. Neck supple.   Cardiovascular: Normal rate, regular rhythm and normal heart sounds.    No murmur heard.  Pulmonary/Chest: Effort normal and breath sounds normal. No respiratory distress. She has no wheezes. She has no rales.   Musculoskeletal: Normal range of motion. She exhibits no edema, tenderness or deformity.   Neurological: She is alert and oriented to person, place, and time. No cranial nerve deficit.   Skin: Skin is  warm and dry.   Psychiatric: She has a normal mood and affect. Her behavior is normal.   Nursing note and vitals reviewed.      Assessment:       1. Anxiety    2. Viral URI with cough    3. Adjustment disorder with mixed anxiety and depressed mood    4. Grief reaction    5. Diabetes mellitus without complication    6. Fatigue, unspecified type        Plan:   Malathi was seen today for annual exam.    Diagnoses and all orders for this visit:    Anxiety  -     alprazolam (XANAX) 0.25 MG tablet; TAKE 1 TABLET (0.25 MG TOTAL) BY MOUTH 2 (TWO) TIMES DAILY AS NEEDED FOR ANXIETY.  Side effects and precautions of medication use reviewed with patient, expressed understanding. No questions or concerns.  Self-care, sx monitoring, and counseling reviewed. Patient receptive to discussion.     Viral URI with cough  Expectant management reviewed: increase fluid intake, otc analgesics prn, mucinex and antihistamines for sx relief. Call if sx persist or worsen.     Adjustment disorder with mixed anxiety and depressed mood  -     alprazolam (XANAX) 0.25 MG tablet; TAKE 1 TABLET (0.25 MG TOTAL) BY MOUTH 2 (TWO) TIMES DAILY AS NEEDED FOR ANXIETY.    Grief reaction  -     alprazolam (XANAX) 0.25 MG tablet; TAKE 1 TABLET (0.25 MG TOTAL) BY MOUTH 2 (TWO) TIMES DAILY AS NEEDED FOR ANXIETY.    Diabetes mellitus without complication  -     Comprehensive metabolic panel; Future  -     Hemoglobin A1c; Future  Repeat labs in August. RTC in 3 months.     Fatigue, unspecified type  -     CBC auto differential; Future  -     TSH; Future  -     Vitamin D; Future  Update labs. Consider sleep study.

## 2017-07-18 ENCOUNTER — TELEPHONE (OUTPATIENT)
Dept: ADMINISTRATIVE | Facility: HOSPITAL | Age: 57
End: 2017-07-18

## 2017-07-18 NOTE — TELEPHONE ENCOUNTER
Member states her apt with her PCP has been cancelled and she is scheduled to see the NP on 7/17/17 because the DrDayron will be out of the office that day.  Member states she was dx in 5/2017 with diabetes and started on rx of Metformin.  Member states she is compliant with her medication and was asked to monitor her BG once daily & reports BG readings b/w .  Member encouraged to bring a log of her readings to her next o/v.  Member states she has lost 15 lbs. since her o/v in 5/2017 by improving her diet.  Member has rec'd 2 injections in her R knee and is scheduled for a 3rd injection on 7/14/17.  Member hopes this final injection will help resolve her knee pain so she can start walking.  Member is a smoker and is trying to quit (7/11)

## 2017-07-25 ENCOUNTER — PATIENT MESSAGE (OUTPATIENT)
Dept: FAMILY MEDICINE | Facility: CLINIC | Age: 57
End: 2017-07-25

## 2017-07-25 DIAGNOSIS — E11.9 TYPE 2 DIABETES MELLITUS WITHOUT COMPLICATION, WITHOUT LONG-TERM CURRENT USE OF INSULIN: Primary | ICD-10-CM

## 2017-07-25 NOTE — TELEPHONE ENCOUNTER
Order entered as clinic collect, should be home collect. Resubmitted correctly. Msg sent to pt that this has been scheduled.

## 2017-08-03 ENCOUNTER — PATIENT MESSAGE (OUTPATIENT)
Dept: FAMILY MEDICINE | Facility: CLINIC | Age: 57
End: 2017-08-03

## 2017-08-11 ENCOUNTER — HOSPITAL ENCOUNTER (OUTPATIENT)
Dept: RADIOLOGY | Facility: HOSPITAL | Age: 57
Discharge: HOME | End: 2017-08-11
Attending: FAMILY MEDICINE
Payer: COMMERCIAL

## 2017-08-11 VITALS — HEIGHT: 70 IN | BODY MASS INDEX: 29.54 KG/M2 | WEIGHT: 206.38 LBS

## 2017-08-11 DIAGNOSIS — Z12.31 OTHER SCREENING MAMMOGRAM: ICD-10-CM

## 2017-08-11 PROCEDURE — 77063 BREAST TOMOSYNTHESIS BI: CPT | Mod: 26,,, | Performed by: RADIOLOGY

## 2017-08-11 PROCEDURE — 77067 SCR MAMMO BI INCL CAD: CPT | Mod: 26,,, | Performed by: RADIOLOGY

## 2017-08-11 PROCEDURE — 77067 SCR MAMMO BI INCL CAD: CPT | Mod: TC

## 2017-08-16 RX ORDER — METFORMIN HYDROCHLORIDE 500 MG/1
TABLET, EXTENDED RELEASE ORAL
Qty: 180 TABLET | Refills: 0 | Status: SHIPPED | OUTPATIENT
Start: 2017-08-16 | End: 2017-11-24 | Stop reason: SDUPTHER

## 2017-08-18 ENCOUNTER — PATIENT MESSAGE (OUTPATIENT)
Dept: FAMILY MEDICINE | Facility: CLINIC | Age: 57
End: 2017-08-18

## 2017-08-18 DIAGNOSIS — Z12.11 COLON CANCER SCREENING: ICD-10-CM

## 2017-08-18 RX ORDER — ESCITALOPRAM OXALATE 10 MG/1
10 TABLET ORAL DAILY
Qty: 90 TABLET | Refills: 1 | Status: SHIPPED | OUTPATIENT
Start: 2017-08-18 | End: 2018-03-01 | Stop reason: SDUPTHER

## 2017-08-19 ENCOUNTER — LAB VISIT (OUTPATIENT)
Dept: LAB | Facility: HOSPITAL | Age: 57
End: 2017-08-19
Attending: FAMILY MEDICINE
Payer: COMMERCIAL

## 2017-08-19 DIAGNOSIS — E11.9 TYPE 2 DIABETES MELLITUS WITHOUT COMPLICATION, WITHOUT LONG-TERM CURRENT USE OF INSULIN: ICD-10-CM

## 2017-08-19 LAB
CREAT UR-MCNC: 59 MG/DL
MICROALBUMIN UR DL<=1MG/L-MCNC: <2.5 UG/ML
MICROALBUMIN/CREATININE RATIO: NORMAL UG/MG

## 2017-08-19 PROCEDURE — 82570 ASSAY OF URINE CREATININE: CPT

## 2017-08-25 ENCOUNTER — PATIENT MESSAGE (OUTPATIENT)
Dept: FAMILY MEDICINE | Facility: CLINIC | Age: 57
End: 2017-08-25

## 2017-10-10 ENCOUNTER — TELEPHONE (OUTPATIENT)
Dept: FAMILY MEDICINE | Facility: CLINIC | Age: 57
End: 2017-10-10

## 2017-10-10 NOTE — TELEPHONE ENCOUNTER
----- Message from Betty Odonnell sent at 10/10/2017  2:52 PM CDT -----  Contact: self  Patient called regarding missed call. Wants to keep her appt on 11/24 if possible. Please contact [phone] (home)

## 2017-10-11 ENCOUNTER — OFFICE VISIT (OUTPATIENT)
Dept: PODIATRY | Facility: CLINIC | Age: 57
End: 2017-10-11
Payer: COMMERCIAL

## 2017-10-11 VITALS — BODY MASS INDEX: 26.48 KG/M2 | HEIGHT: 70 IN | WEIGHT: 185 LBS

## 2017-10-11 DIAGNOSIS — R20.2 PARESTHESIA OF FOOT, BILATERAL: ICD-10-CM

## 2017-10-11 DIAGNOSIS — E11.49 TYPE II DIABETES MELLITUS WITH NEUROLOGICAL MANIFESTATIONS: Primary | ICD-10-CM

## 2017-10-11 DIAGNOSIS — L60.0 INGROWN NAIL: ICD-10-CM

## 2017-10-11 PROCEDURE — 99204 OFFICE O/P NEW MOD 45 MIN: CPT | Mod: S$GLB,,, | Performed by: PODIATRIST

## 2017-10-11 PROCEDURE — 99999 PR PBB SHADOW E&M-EST. PATIENT-LVL II: CPT | Mod: PBBFAC,,, | Performed by: PODIATRIST

## 2017-10-12 NOTE — PROGRESS NOTES
"Subjective:      Patient ID: Malathi Martinez is a 57 y.o. female.    Chief Complaint: Diabetes Mellitus ([place] 7/17/17 A1c 8/19/17 5.5); Diabetic Foot Exam; Callouses; and Nail Problem (possible ingrown rt great toe)    Malathi is a 57 y.o. female who presents to the clinic for routine evaluation and treatment of diabetic feet. Malathi has a past medical history of Abnormal stress test; HLD (hyperlipidemia); Mental disorder; SOB (shortness of breath); and Tobacco abuse (12/26/2012). Patient's chief complaint consists of "electric shocks" that occur within bilateral foot.  Symptoms have been present > 1 month.  Relates being sporadic in nature.  Currently rates pain as a 0/10.  Has not attempted to self treat.  Also, relates having a possible ingrown toenail involving the Rt. Hallux.  Has not attempted to self treat.  Denies any additional pedal complaints.       PCP: Comfort Dave MD    Date Last Seen by PCP: 7/17/17    Hemoglobin A1C   Date Value Ref Range Status   08/19/2017 5.5 4.0 - 5.6 % Final     Comment:     According to ADA guidelines, hemoglobin A1c <7.0% represents  optimal control in non-pregnant diabetic patients. Different  metrics may apply to specific patient populations.   Standards of Medical Care in Diabetes-2016.  For the purpose of screening for the presence of diabetes:  <5.7%     Consistent with the absence of diabetes  5.7-6.4%  Consistent with increasing risk for diabetes   (prediabetes)  >or=6.5%  Consistent with diabetes  Currently, no consensus exists for use of hemoglobin A1c  for diagnosis of diabetes for children.  This Hemoglobin A1c assay has significant interference with fetal   hemoglobin   (HbF). The results are invalid for patients with abnormal amounts of   HbF,   including those with known Hereditary Persistence   of Fetal Hemoglobin. Heterozygous hemoglobin variants (HbAS, HbAC,   HbAD, HbAE, HbA2) do not significantly interfere with this assay;   however, presence of " multiple variants in a sample may impact the %   interference.     05/13/2017 6.6 (H) 4.5 - 6.2 % Final     Comment:     According to ADA guidelines, hemoglobin A1C <7.0% represents  optimal control in non-pregnant diabetic patients.  Different  metrics may apply to specific populations.   Standards of Medical Care in Diabetes - 2016.  For the purpose of screening for the presence of diabetes:  <5.7%     Consistent with the absence of diabetes  5.7-6.4%  Consistent with increasing risk for diabetes   (prediabetes)  >or=6.5%  Consistent with diabetes  Currently no consensus exists for use of hemoglobin A1C  for diagnosis of diabetes for children.     05/21/2016 6.1 4.5 - 6.2 % Final           Past Medical History:   Diagnosis Date    Abnormal stress test     HLD (hyperlipidemia)     Mental disorder     SOB (shortness of breath)     Tobacco abuse 12/26/2012       Past Surgical History:   Procedure Laterality Date    APPENDECTOMY      TONSILLECTOMY         Family History   Problem Relation Age of Onset    Heart disease Mother      MVP    Cataracts Mother     Heart disease Father 65     MI and PVD    Heart disease Sister      mvp    Esophageal cancer Brother     No Known Problems Sister     No Known Problems Sister     Hyperlipidemia Paternal Grandmother        Social History     Social History    Marital status:      Spouse name: N/A    Number of children: N/A    Years of education: N/A     Social History Main Topics    Smoking status: Current Every Day Smoker     Packs/day: 1.00    Smokeless tobacco: Never Used    Alcohol use No    Drug use: No    Sexual activity: Yes     Partners: Male     Birth control/ protection: Post-menopausal     Other Topics Concern    None     Social History Narrative    None       Current Outpatient Prescriptions   Medication Sig Dispense Refill    alprazolam (XANAX) 0.25 MG tablet TAKE 1 TABLET (0.25 MG TOTAL) BY MOUTH 2 (TWO) TIMES DAILY AS NEEDED FOR  ANXIETY. 60 tablet 2    atorvastatin (LIPITOR) 20 MG tablet TAKE 1 TABLET (20 MG TOTAL) BY MOUTH ONCE DAILY. 30 tablet 5    buPROPion (WELLBUTRIN XL) 150 MG TB24 tablet TAKE 1 TABLET (150 MG TOTAL) BY MOUTH ONCE DAILY. 30 tablet 5    cholecalciferol, vitamin D3, 2,000 unit Cap Take 1 capsule by mouth once daily.      cyanocobalamin (VITAMIN B-12) 250 MCG tablet Take 250 mcg by mouth once daily.      escitalopram oxalate (LEXAPRO) 10 MG tablet TAKE 1 TABLET (10 MG TOTAL) BY MOUTH ONCE DAILY. 90 tablet 1    furosemide (LASIX) 20 MG tablet TAKE 1 TABLET (20 MG TOTAL) BY MOUTH ONCE DAILY. 30 tablet 5    indomethacin (INDOCIN) 25 MG capsule TAKE 1 CAPSULE (25 MG TOTAL) BY MOUTH 2 (TWO) TIMES DAILY WITH MEALS. 60 capsule 0    lisinopril (PRINIVIL,ZESTRIL) 2.5 MG tablet Take 1 tablet (2.5 mg total) by mouth once daily. 90 tablet 1    loratadine (CLARITIN) 10 mg tablet Take 10 mg by mouth once daily.      metformin (GLUCOPHAGE-XR) 500 MG 24 hr tablet TAKE 1 TABLET BY MOUTH 2 TIMES DAILY WITH MEALS. 180 tablet 0    PROAIR HFA 90 mcg/actuation inhaler INHALE 2 PUFFS INTO THE LUNGS EVERY 4 (FOUR) HOURS AS NEEDED FOR WHEEZING. 8.5 Inhaler 1    blood sugar diagnostic Strp 1 strip by Misc.(Non-Drug; Combo Route) route once daily. 50 each 6    blood-glucose meter kit Use as instructed 1 each 0    lancets Misc 1 each by Misc.(Non-Drug; Combo Route) route once daily. 50 each 6     No current facility-administered medications for this visit.        Review of patient's allergies indicates:   Allergen Reactions    Penicillins          Review of Systems   Constitution: Negative for chills and fever.   Cardiovascular: Negative for claudication and leg swelling.   Skin: Positive for nail changes.   Musculoskeletal: Negative for joint pain and joint swelling.           Objective:      Physical Exam   Constitutional: She is oriented to person, place, and time. She appears well-developed and well-nourished. No distress.    Cardiovascular:   Pulses:       Dorsalis pedis pulses are 2+ on the right side, and 2+ on the left side.        Posterior tibial pulses are 2+ on the right side, and 2+ on the left side.   CFT <3 seconds bilateral.  Pedal hair growth present bilateral.   No varicosities noted bilateral.  No bilateral lower extremity edema.  Toes are warm to touch bilateral.     Musculoskeletal: She exhibits no edema or tenderness.   Muscle strength 5/5 in all muscle groups bilateral.  No tenderness nor crepitation with ROM of foot/ankle joints bilateral.  No tenderness with palpation of bilateral foot and ankle.  Bilateral pes planus foot type.  Bilateral semi-reducible contracture of toes 2-5.   Neurological: She is alert and oriented to person, place, and time. She has normal strength. No sensory deficit.   Protective sensation per Mora-Gregorio monofilament intact bilateral.    Vibratory sensation intact bilateral.    Light touch intact bilateral.    (-) Tinel sign bilateral.     Skin: Skin is warm, dry and intact. Capillary refill takes less than 2 seconds. No abrasion, no burn, no ecchymosis, no laceration, no lesion and no rash noted. She is not diaphoretic. No erythema. No pallor.   Pedal skin has normal turgor, temperature, and texture bilateral.  Incurvation noted to the medial and lateral border of the Rt. Hallux nail plate.  No adjacent sign of infection noted.  Remaining toenails x 9 appear normotrophic. Examination of the skin reveals no evidence of significant maceration, rashes, open lesions, suspicious appearing nevi or other concerning lesions.              Assessment:       Encounter Diagnoses   Name Primary?    Type II diabetes mellitus with neurological manifestations Yes    Ingrown nail     Paresthesia of foot, bilateral          Plan:       Malathi was seen today for diabetes mellitus, diabetic foot exam, callouses and nail problem.    Diagnoses and all orders for this visit:    Type II diabetes mellitus  with neurological manifestations    Ingrown nail    Paresthesia of foot, bilateral      I counseled the patient on her conditions, their implications and medical management.    Given both verbal and written information regarding alpha lipoic acid and B-complex vitamins.  Take as instructed in clinic.    Shoe inspection. Diabetic Foot Education. Patient reminded of the importance of good nutrition and blood sugar control to help prevent podiatric complications of diabetes. Patient instructed on proper foot hygeine. We discussed wearing proper shoe gear, daily foot inspections, never walking without protective shoe gear, never putting sharp instruments to feet    With patient's permission, a sterile nipper was used to trim a wedge of of the offending borders of the Rt. Hallux nail plate without incident.  Patient relates relief following the procedure. She will continue to monitor the areas daily, inspect her feet, wear protective shoe gear when ambulatory, moisturizer to maintain skin integrity and follow in this office in approximately 6 months, sooner p.r.n.      Return in about 6 months (around 4/11/2018).    Tonio Melo DPM

## 2017-10-20 ENCOUNTER — PATIENT MESSAGE (OUTPATIENT)
Dept: FAMILY MEDICINE | Facility: CLINIC | Age: 57
End: 2017-10-20

## 2017-11-09 ENCOUNTER — PATIENT MESSAGE (OUTPATIENT)
Dept: ADMINISTRATIVE | Facility: HOSPITAL | Age: 57
End: 2017-11-09

## 2017-11-09 ENCOUNTER — PATIENT OUTREACH (OUTPATIENT)
Dept: ADMINISTRATIVE | Facility: HOSPITAL | Age: 57
End: 2017-11-09

## 2017-11-09 ENCOUNTER — TELEPHONE (OUTPATIENT)
Dept: ADMINISTRATIVE | Facility: HOSPITAL | Age: 57
End: 2017-11-09

## 2017-11-09 DIAGNOSIS — E11.00 TYPE 2 DIABETES MELLITUS WITH HYPEROSMOLARITY WITHOUT COMA, UNSPECIFIED LONG TERM INSULIN USE STATUS: Primary | ICD-10-CM

## 2017-11-09 NOTE — LETTER
November 9, 2017    [facility]  [place] [facility]  [address]  [facility] [address]  Phone: [phone] November 9, 2017     Patient: Malathi Martinez    YOB: 1960   Date of Visit: 11/9/2017       To Whom It May Concern:    We are seeing Malathi Martinez in the clinic at [facility].  Comfort Dave MD is their PCP.  She has an outstanding lab/procedure at the time we reviewed their chart.  To help with our Health Maintenance records will you please supply the following report(s):     []  Mammogram                                                []  Colonoscopy   []  Pap Smear                                                 []  Outside Lab Results   []  Dexa scans                                                      [x]  Eye Exam  (Done in the past year - any diagnosis of retinopathy?)   []  Foot Exam                                                     [] Other___________   []  Outside Immunizations                             Please Fax to [facility] at .    Thank you for your help.  If my Care Coordinator can be of any assistance, you can call ROMERO Estrada at [phone].     If you have any questions or concerns, please don't hesitate to call.    Sincerely,        Comfort Dave MD

## 2017-11-09 NOTE — PROGRESS NOTES
Pre-visit Health Maintenance Review    Patient due for diabetic eye exam.  Called to schedule retinal camera appointment in [place] if not already done outside of [facility].    Upcoming Primary Care visit: Elder 11/24 7:20am

## 2017-11-09 NOTE — LETTER
November 14, 2017    [facility]  [place] [facility]  [address]  [facility] [address]  Phone: [phone] November 14, 2017     Patient: Malathi Martinez    YOB: 1960   Date of Visit: 11/9/2017       To Whom It May Concern:    We are seeing Malathi Martinez in the clinic at [facility].  Comfort Dave MD is their PCP.  She has an outstanding lab/procedure at the time we reviewed their chart.  To help with our Health Maintenance records will you please supply the following report(s):     []  Mammogram                                                []  Colonoscopy   []  Pap Smear                                                 []  Outside Lab Results   []  Dexa scans                                                      [x]  Eye Exam  (Done in the past year - any diagnosis of retinopathy?)   []  Foot Exam                                                     [] Other___________   []  Outside Immunizations                             Please Fax to [facility] at .    Thank you for your help.  If my Care Coordinator can be of any assistance, you can call ROMERO Estrada at [phone].     This is the second attempt to acquire these records.    If you have any questions or concerns, please don't hesitate to call.    Sincerely,        Comfort Dave MD

## 2017-11-12 DIAGNOSIS — M79.89 LEG SWELLING: ICD-10-CM

## 2017-11-13 RX ORDER — FUROSEMIDE 20 MG/1
20 TABLET ORAL DAILY
Qty: 30 TABLET | Refills: 5 | Status: SHIPPED | OUTPATIENT
Start: 2017-11-13 | End: 2018-07-14 | Stop reason: SDUPTHER

## 2017-11-17 DIAGNOSIS — F43.21 GRIEF REACTION: ICD-10-CM

## 2017-11-17 DIAGNOSIS — F41.9 ANXIETY: ICD-10-CM

## 2017-11-17 DIAGNOSIS — F43.23 ADJUSTMENT DISORDER WITH MIXED ANXIETY AND DEPRESSED MOOD: ICD-10-CM

## 2017-11-17 RX ORDER — BUPROPION HYDROCHLORIDE 150 MG/1
150 TABLET ORAL DAILY
Qty: 30 TABLET | Refills: 5 | Status: SHIPPED | OUTPATIENT
Start: 2017-11-17 | End: 2018-05-05 | Stop reason: SDUPTHER

## 2017-11-19 ENCOUNTER — PATIENT MESSAGE (OUTPATIENT)
Dept: FAMILY MEDICINE | Facility: CLINIC | Age: 57
End: 2017-11-19

## 2017-11-20 RX ORDER — LISINOPRIL 2.5 MG/1
TABLET ORAL
Qty: 90 TABLET | Refills: 1 | Status: SHIPPED | OUTPATIENT
Start: 2017-11-20 | End: 2018-05-13 | Stop reason: SDUPTHER

## 2017-11-24 ENCOUNTER — TELEPHONE (OUTPATIENT)
Dept: DERMATOLOGY | Facility: CLINIC | Age: 57
End: 2017-11-24

## 2017-11-24 ENCOUNTER — OFFICE VISIT (OUTPATIENT)
Dept: FAMILY MEDICINE | Facility: CLINIC | Age: 57
End: 2017-11-24
Payer: COMMERCIAL

## 2017-11-24 ENCOUNTER — LAB VISIT (OUTPATIENT)
Dept: LAB | Facility: HOSPITAL | Age: 57
End: 2017-11-24
Attending: FAMILY MEDICINE
Payer: COMMERCIAL

## 2017-11-24 VITALS
HEART RATE: 71 BPM | RESPIRATION RATE: 16 BRPM | TEMPERATURE: 98 F | HEIGHT: 70 IN | DIASTOLIC BLOOD PRESSURE: 66 MMHG | WEIGHT: 182.44 LBS | SYSTOLIC BLOOD PRESSURE: 128 MMHG | OXYGEN SATURATION: 97 % | BODY MASS INDEX: 26.12 KG/M2

## 2017-11-24 DIAGNOSIS — R53.83 FATIGUE, UNSPECIFIED TYPE: Primary | ICD-10-CM

## 2017-11-24 DIAGNOSIS — Z12.11 SPECIAL SCREENING FOR MALIGNANT NEOPLASMS, COLON: ICD-10-CM

## 2017-11-24 DIAGNOSIS — R53.83 FATIGUE, UNSPECIFIED TYPE: ICD-10-CM

## 2017-11-24 DIAGNOSIS — D48.9 NEOPLASM, UNCERTAIN WHETHER BENIGN OR MALIGNANT: ICD-10-CM

## 2017-11-24 DIAGNOSIS — Z72.0 TOBACCO ABUSE: ICD-10-CM

## 2017-11-24 DIAGNOSIS — F41.9 ANXIETY: ICD-10-CM

## 2017-11-24 DIAGNOSIS — J44.9 CHRONIC OBSTRUCTIVE PULMONARY DISEASE, UNSPECIFIED COPD TYPE: ICD-10-CM

## 2017-11-24 DIAGNOSIS — F33.41 RECURRENT MAJOR DEPRESSIVE DISORDER, IN PARTIAL REMISSION: ICD-10-CM

## 2017-11-24 DIAGNOSIS — R73.09 ELEVATED HEMOGLOBIN A1C: ICD-10-CM

## 2017-11-24 LAB — A1AT SERPL-MCNC: 155 MG/DL

## 2017-11-24 PROCEDURE — 99999 PR PBB SHADOW E&M-EST. PATIENT-LVL IV: CPT | Mod: PBBFAC,,, | Performed by: FAMILY MEDICINE

## 2017-11-24 PROCEDURE — 99214 OFFICE O/P EST MOD 30 MIN: CPT | Mod: 25,S$GLB,, | Performed by: FAMILY MEDICINE

## 2017-11-24 PROCEDURE — 82103 ALPHA-1-ANTITRYPSIN TOTAL: CPT

## 2017-11-24 PROCEDURE — 90471 IMMUNIZATION ADMIN: CPT | Mod: S$GLB,,, | Performed by: FAMILY MEDICINE

## 2017-11-24 PROCEDURE — 90686 IIV4 VACC NO PRSV 0.5 ML IM: CPT | Mod: S$GLB,,, | Performed by: FAMILY MEDICINE

## 2017-11-24 PROCEDURE — 36415 COLL VENOUS BLD VENIPUNCTURE: CPT | Mod: PN

## 2017-11-24 RX ORDER — ALPRAZOLAM 0.25 MG/1
TABLET ORAL
Qty: 60 TABLET | Refills: 2 | Status: SHIPPED | OUTPATIENT
Start: 2017-11-24 | End: 2018-02-16 | Stop reason: SDUPTHER

## 2017-11-24 RX ORDER — METFORMIN HYDROCHLORIDE 500 MG/1
500 TABLET, EXTENDED RELEASE ORAL 2 TIMES DAILY WITH MEALS
Qty: 180 TABLET | Refills: 1 | Status: SHIPPED | OUTPATIENT
Start: 2017-11-24 | End: 2018-05-14 | Stop reason: SDUPTHER

## 2017-11-24 RX ORDER — ALBUTEROL SULFATE 90 UG/1
AEROSOL, METERED RESPIRATORY (INHALATION)
Qty: 8.5 INHALER | Refills: 3 | Status: SHIPPED | OUTPATIENT
Start: 2017-11-24 | End: 2019-12-30 | Stop reason: SDUPTHER

## 2017-11-24 NOTE — TELEPHONE ENCOUNTER
Patient requesting appt with derm in [facility].  Informed no derm at this time.  Offered main campus or [place].  Will call to schedule.

## 2017-11-24 NOTE — PROGRESS NOTES
Subjective:       Patient ID: Malathi Martinez is a 57 y.o. female.    Chief Complaint: Ear Injury (left ear pain and fullness); Diabetes; Follow-up (skin issues); and Medication Refill    HPI   Patient here today for f/u.  Weight loss 40# since 5/2017 thru dietary modifications. Very regimented. Discussed goal weight loss to ~170lbs.   Reports significant fatigue. Sleeping ~7h, very early morning awakening. Nods off occ during the day. No snoring. Wakes up to go to the bathroom.   CBC with nl h/h. MCV dipping slightly over time.   FBS on glucometer <120 with 1 at 143. No reported hypoglycemia.  Labs 8/2017 rev. Eye exam 9/2017 neg retinopathy.  Hand used in smoking with mild tremor, discussed mechanics, nerve compression. Sensation intact.  Occ notices difficulty in word-finding. They are bilingual in the home, and can occur with either english or Pashto. Causes some anxiety.    Review of Systems   Constitutional: Positive for fatigue.   HENT: Negative for congestion and postnasal drip.    Respiratory: Negative for cough and shortness of breath.    Cardiovascular: Negative for leg swelling.   Gastrointestinal: Negative for abdominal pain and constipation.   Endocrine: Negative for polydipsia and polyphagia.   Genitourinary: Negative for difficulty urinating and dysuria.   Skin: Negative for pallor.   Neurological: Positive for tremors (R hand dominant, smoking hand, mild), speech difficulty and headaches. Negative for dizziness, seizures and weakness.   Psychiatric/Behavioral: Positive for confusion. The patient is nervous/anxious.        Objective:      Physical Exam   Constitutional: She is oriented to person, place, and time. She appears well-developed and well-nourished. No distress.   HENT:   Head: Normocephalic and atraumatic.       Eyes: Conjunctivae are normal. Right eye exhibits no discharge. Left eye exhibits no discharge. No scleral icterus.   Neck: Normal range of motion. Neck supple.    Cardiovascular: Normal rate and regular rhythm.    Pulmonary/Chest: Effort normal and breath sounds normal. No respiratory distress.   Abdominal: Soft. She exhibits no distension.   Musculoskeletal: Normal range of motion. She exhibits no edema.   Neurological: She is alert and oriented to person, place, and time.   Skin: Skin is warm and dry. No rash noted.   Psychiatric: She has a normal mood and affect. Her behavior is normal.   Nursing note and vitals reviewed.        Fatigue, unspecified type  -     CBC auto differential; Future; Expected date: 11/24/2017  -     Iron and TIBC; Future; Expected date: 01/05/2018  -     Vitamin D; Future; Expected date: 11/24/2017  -     Vitamin B12; Future; Expected date: 11/24/2017  -     ALPHA-1-ANTITRYPSIN; Future; Expected date: 11/24/2017  -     Ambulatory consult to Sleep Disorders  Update labs in 3 months.   Sleep consult for review.  Neoplasm, uncertain whether benign or malignant  -     Ambulatory referral to Dermatology  Needs review. Overdue for skin check and has had several bccs removed from her face over the years.  Elevated hemoglobin A1c  -     Basic metabolic panel; Future; Expected date: 11/24/2017  -     Hemoglobin A1c; Future; Expected date: 11/24/2017  -     Lipid panel; Future; Expected date: 11/24/2017  Continue diet and lifestyle mods, metformin.  Tobacco abuse  -     ALPHA-1-ANTITRYPSIN; Future; Expected date: 11/24/2017  Counseling of tobacco cessation in office. Patient aware of risks of tobacco abuse and benefits of cessation. Review of goals, quit date, behavior modification. We reviewed adjuncts to cessation incl gum, patch and SNRI's such as wellbutrin and chantix.   Special screening for malignant neoplasms, colon  -     Fecal Immunochemical Test (iFOBT); Future; Expected date: 11/24/2017  Chronic obstructive pulmonary disease, unspecified COPD type  -     albuterol (PROAIR HFA) 90 mcg/actuation inhaler; INHALE 2 PUFFS INTO THE LUNGS EVERY 4  (FOUR) HOURS AS NEEDED FOR WHEEZING.  Dispense: 8.5 Inhaler; Refill: 3  Stable.  Anxiety  -     ALPRAZolam (XANAX) 0.25 MG tablet; TAKE 1 TABLET (0.25 MG TOTAL) BY MOUTH 2 (TWO) TIMES DAILY AS NEEDED FOR ANXIETY.  Dispense: 60 tablet; Refill: 2  Side effects and precautions of medication use reviewed with patient, expressed understanding. No questions or concerns.   Recurrent major depressive disorder, in partial remission  Stable, cont meds.  Other orders  -     metFORMIN (GLUCOPHAGE-XR) 500 MG 24 hr tablet; Take 1 tablet (500 mg total) by mouth 2 (two) times daily with meals.  Dispense: 180 tablet; Refill: 1  -     Influenza - Quadrivalent (3 years & older) (PF)

## 2017-11-24 NOTE — TELEPHONE ENCOUNTER
----- Message from Jahaira Wilson sent at 11/24/2017 10:55 AM CST -----  Contact: Self  Pt is calling to be scheduled for an appt for tumors on her arm/face from a referral in [facility].   was unable to make the appt due to an exhausted template.    She can be reached at [phone].    Thank you.

## 2017-11-26 ENCOUNTER — PATIENT MESSAGE (OUTPATIENT)
Dept: FAMILY MEDICINE | Facility: CLINIC | Age: 57
End: 2017-11-26

## 2017-11-27 ENCOUNTER — CLINICAL SUPPORT (OUTPATIENT)
Dept: FAMILY MEDICINE | Facility: CLINIC | Age: 57
End: 2017-11-27
Payer: COMMERCIAL

## 2017-11-27 PROCEDURE — 99499 UNLISTED E&M SERVICE: CPT | Mod: S$GLB,,, | Performed by: FAMILY MEDICINE

## 2017-11-27 NOTE — PROGRESS NOTES
2 pt identifiers used.  Pt came in to clinic c/o pain to left deltoid, warmness, and swelling.  This nurse noted no swelling to site, nor warmness to touch.  There was a a small area of bruising to site.  Advised pt to use warm compress, OTC pain relievers such as tylenol or advil as directed for pain.  advised pt to call or come back to clinc if s/s worsened or returned.  Pt verbalized understanding. Pt states it feels better than it did on Friday but she was just concerned and wanted to have it looked at.  She stated it feels like its improving everyday.

## 2017-12-01 RX ORDER — INDOMETHACIN 25 MG/1
CAPSULE ORAL
Qty: 60 CAPSULE | Refills: 0 | Status: SHIPPED | OUTPATIENT
Start: 2017-12-01 | End: 2018-01-21

## 2017-12-05 ENCOUNTER — OFFICE VISIT (OUTPATIENT)
Dept: DERMATOLOGY | Facility: CLINIC | Age: 57
End: 2017-12-05
Payer: COMMERCIAL

## 2017-12-05 VITALS — WEIGHT: 182 LBS | BODY MASS INDEX: 26.11 KG/M2

## 2017-12-05 DIAGNOSIS — D22.9 NEVUS OF MULTIPLE SITES: ICD-10-CM

## 2017-12-05 DIAGNOSIS — H61.002 CHONDRODERMATITIS NODULARIS HELICIS OF LEFT EAR: Primary | ICD-10-CM

## 2017-12-05 DIAGNOSIS — D18.00 ANGIOMA: ICD-10-CM

## 2017-12-05 DIAGNOSIS — L82.1 SEBORRHEIC KERATOSES: ICD-10-CM

## 2017-12-05 DIAGNOSIS — I78.1 TELANGIECTASIAS: ICD-10-CM

## 2017-12-05 PROCEDURE — 99202 OFFICE O/P NEW SF 15 MIN: CPT | Mod: S$GLB,,, | Performed by: DERMATOLOGY

## 2017-12-05 PROCEDURE — 99999 PR PBB SHADOW E&M-EST. PATIENT-LVL III: CPT | Mod: PBBFAC,,, | Performed by: DERMATOLOGY

## 2017-12-05 NOTE — LETTER
December 5, 2017      Comfort Dave MD  [address]  [place] [address]           [place] [facility]  [address]  [place] [address]  Phone: [phone]  Fax: [phone]          Patient: Malathi Martinez   MR Number: [id number]   YOB: 1960   Date of Visit: 12/5/2017       Dear Dr. Comfort Dave:    Thank you for referring Malathi Martinez to me for evaluation. Attached you will find relevant portions of my assessment and plan of care.    If you have questions, please do not hesitate to call me. I look forward to following Malathi Martinez along with you.    Sincerely,    Anne Marie Glover MD    Enclosure  CC:  No Recipients    If you would like to receive this communication electronically, please contact [email or web address] or [phone] to request more information on [employer] Link access.    For providers and/or their staff who would like to refer a patient to [facility], please contact us through our one-stop-shop provider referral line, [facility], at [phone].    If you feel you have received this communication in error or would no longer like to receive these types of communications, please e-mail [email or web address]

## 2017-12-05 NOTE — PROGRESS NOTES
Subjective:       Patient ID:  Malathi Martinez is a 57 y.o. female who presents for   Chief Complaint   Patient presents with    Spot     nose, left ear      History of Present Illness: The patient presents with chief complaint of spot.  Location: left ear  Duration: months  Signs/Symptoms: sore     Prior treatments: none          Review of Systems   Constitutional: Negative for fever.   Skin: Negative for itching and rash.   Hematologic/Lymphatic: Does not bruise/bleed easily.        Objective:    Physical Exam   Constitutional: She appears well-developed and well-nourished. No distress.   Neurological: She is alert and oriented to person, place, and time. She is not disoriented.   Psychiatric: She has a normal mood and affect.   Skin:   Areas Examined (abnormalities noted in diagram):   Head / Face Inspection Performed  Neck Inspection Performed  Chest / Axilla Inspection Performed  Back Inspection Performed  RUE Inspected  LUE Inspection Performed                   Diagram Legend     Erythematous scaling macule/papule c/w actinic keratosis       Vascular papule c/w angioma      Pigmented verrucoid papule/plaque c/w seborrheic keratosis      Yellow umbilicated papule c/w sebaceous hyperplasia      Irregularly shaped tan macule c/w lentigo     1-2 mm smooth white papules consistent with Milia      Movable subcutaneous cyst with punctum c/w epidermal inclusion cyst      Subcutaneous movable cyst c/w pilar cyst      Firm pink to brown papule c/w dermatofibroma      Pedunculated fleshy papule(s) c/w skin tag(s)      Evenly pigmented macule c/w junctional nevus     Mildly variegated pigmented, slightly irregular-bordered macule c/w mildly atypical nevus      Flesh colored to evenly pigmented papule c/w intradermal nevus       Pink pearly papule/plaque c/w basal cell carcinoma      Erythematous hyperkeratotic cursted plaque c/w SCC      Surgical scar with no sign of skin cancer recurrence      Open and closed  "comedones      Inflammatory papules and pustules      Verrucoid papule consistent consistent with wart     Erythematous eczematous patches and plaques     Dystrophic onycholytic nail with subungual debris c/w onychomycosis     Umbilicated papule    Erythematous-base heme-crusted tan verrucoid plaque consistent with inflamed seborrheic keratosis     Erythematous Silvery Scaling Plaque c/w Psoriasis     See annotation      Assessment / Plan:        Chondrodermatitis nodularis helicis of left ear  Keep pressure off of ear    Seborrheic keratoses  The "ABCD" rules to observe pigmented lesions were reviewed.  Brochure provided      Nevus of multiple sites  The "ABCD" rules to observe pigmented lesions were reviewed.      Angioma  reassurance      Telangiectasias  reassurance               No Follow-up on file.  "

## 2017-12-09 ENCOUNTER — OFFICE VISIT (OUTPATIENT)
Dept: URGENT CARE | Facility: CLINIC | Age: 57
End: 2017-12-09
Payer: COMMERCIAL

## 2017-12-09 VITALS
WEIGHT: 185 LBS | RESPIRATION RATE: 16 BRPM | HEIGHT: 70 IN | OXYGEN SATURATION: 98 % | DIASTOLIC BLOOD PRESSURE: 62 MMHG | BODY MASS INDEX: 26.48 KG/M2 | TEMPERATURE: 98 F | HEART RATE: 65 BPM | SYSTOLIC BLOOD PRESSURE: 118 MMHG

## 2017-12-09 DIAGNOSIS — J32.4 PANSINUSITIS, UNSPECIFIED CHRONICITY: Primary | ICD-10-CM

## 2017-12-09 DIAGNOSIS — J20.9 BRONCHITIS, ACUTE, WITH BRONCHOSPASM: ICD-10-CM

## 2017-12-09 DIAGNOSIS — R68.83 CHILLS: ICD-10-CM

## 2017-12-09 LAB
CTP QC/QA: YES
FLUAV AG NPH QL: NEGATIVE
FLUBV AG NPH QL: NEGATIVE

## 2017-12-09 PROCEDURE — 87804 INFLUENZA ASSAY W/OPTIC: CPT | Mod: QW,S$GLB,, | Performed by: INTERNAL MEDICINE

## 2017-12-09 PROCEDURE — 96372 THER/PROPH/DIAG INJ SC/IM: CPT | Mod: S$GLB,,, | Performed by: INTERNAL MEDICINE

## 2017-12-09 PROCEDURE — 99213 OFFICE O/P EST LOW 20 MIN: CPT | Mod: 25,S$GLB,, | Performed by: INTERNAL MEDICINE

## 2017-12-09 RX ORDER — AZITHROMYCIN 500 MG/1
500 TABLET, FILM COATED ORAL DAILY
Qty: 3 TABLET | Refills: 0 | Status: SHIPPED | OUTPATIENT
Start: 2017-12-09 | End: 2017-12-12

## 2017-12-09 RX ORDER — DEXAMETHASONE SODIUM PHOSPHATE 100 MG/10ML
10 INJECTION INTRAMUSCULAR; INTRAVENOUS
Status: COMPLETED | OUTPATIENT
Start: 2017-12-09 | End: 2017-12-09

## 2017-12-09 RX ORDER — MOMETASONE FUROATE 50 UG/1
1 SPRAY, METERED NASAL 2 TIMES DAILY
Qty: 17 G | Refills: 1 | Status: SHIPPED | OUTPATIENT
Start: 2017-12-09 | End: 2019-01-09

## 2017-12-09 RX ORDER — METHYLPREDNISOLONE 4 MG/1
TABLET ORAL
Qty: 1 PACKAGE | Refills: 0 | Status: SHIPPED | OUTPATIENT
Start: 2017-12-09 | End: 2018-01-21

## 2017-12-09 RX ADMIN — DEXAMETHASONE SODIUM PHOSPHATE 10 MG: 100 INJECTION INTRAMUSCULAR; INTRAVENOUS at 01:12

## 2017-12-09 NOTE — PATIENT INSTRUCTIONS
Bronchitis, Antibiotic Treatment (Adult)    Bronchitis is an infection of the air passages (bronchial tubes) in your lungs. It often occurs when you have a cold. This illness is contagious during the first few days and is spread through the air by coughing and sneezing, or by direct contact (touching the sick person and then touching your own eyes, nose, or mouth).  Symptoms of bronchitis include cough with mucus (phlegm) and low-grade fever. Bronchitis usually lasts 7 to 14 days. Mild cases can be treated with simple home remedies. More severe infection is treated with an antibiotic.  Home care  Follow these guidelines when caring for yourself at home:  · If your symptoms are severe, rest at home for the first 2 to 3 days. When you go back to your usual activities, don't let yourself get too tired.  · Do not smoke. Also avoid being exposed to secondhand smoke.  · You may use over-the-counter medicines to control fever or pain, unless another medicine was prescribed. (Note: If you have chronic liver or kidney disease or have ever had a stomach ulcer or gastrointestinal bleeding, talk with your healthcare provider before using these medicines. Also talk to your provider if you are taking medicine to prevent blood clots.) Aspirin should never be given to anyone younger than 18 years of age who is ill with a viral infection or fever. It may cause severe liver or brain damage.  · Your appetite may be poor, so a light diet is fine. Avoid dehydration by drinking 6 to 8 glasses of fluids per day (such as water, soft drinks, sports drinks, juices, tea, or soup). Extra fluids will help loosen secretions in the nose and lungs.  · Over-the-counter cough, cold, and sore-throat medicines will not shorten the length of the illness, but they may be helpful to reduce symptoms. (Note: Do not use decongestants if you have high blood pressure.)  · Finish all antibiotic medicine. Do this even if you are feeling better after only a  few days.  Follow-up care  Follow up with your healthcare provider, or as advised. If you had an X-ray or ECG (electrocardiogram), a specialist will review it. You will be notified of any new findings that may affect your care.  Note: If you are age 65 or older, or if you have a chronic lung disease or condition that affects your immune system, or you smoke, talk to your healthcare provider about having pneumococcal vaccinations and a yearly influenza vaccination (flu shot).  When to seek medical advice  Call your healthcare provider right away if any of these occur:  · Fever of 100.4°F (38°C) or higher  · Coughing up increased amounts of colored sputum  · Weakness, drowsiness, headache, facial pain, ear pain, or a stiff neck  Call 911, or get immediate medical care  Contact emergency services right away if any of these occur.  · Coughing up blood  · Worsening weakness, drowsiness, headache, or stiff neck  · Trouble breathing, wheezing, or pain with breathing        Treating Chronic Sinusitis    The sinuses are hollow areas formed by the bones of the face. Sinuses make and drain mucus. This keeps the nasal passages clean and moist. When the sinuses become swollen (inflamed) or infected, the condition is called sinusitis. Symptoms may include:  · Thick, discolored drainage from the nose  · Nasal congestion  · Pain and pressure around the eyes, nose, cheeks, or forehead  · Headache  · Cough  · Thick mucus draining down the back of the throat (postnasal drainage)  · Fever  · Loss of smell  With chronic sinusitis, the symptoms last more than 12 weeks.     Ongoing prevention  Its important to treat the cause of a sinus problem. If you have allergies, talk with your doctor about treatment. Or ask about getting an evaluation by an allergy specialist. If youre exposed to nasal irritants such as sawdust, use a filter mask. If you smoke, ask your doctor for help with quitting. Smoke irritates the sinuses and can make your  sinus problem worse. If you live with smokers, ask them to consider quitting or only smoking outdoors.   Medicine  Medicines for sinusitis may include:  · Antibiotic medicines. You may need to take antibiotic medicine for a longer period. If bacteria aren't the cause, antibiotics won't help.  · Inhaled corticosteroid medicine. Nasal sprays or drops with steroids are often prescribed.  · Other medicines. You may need to take nasal sprays with antihistamines and decongestants, or saltwater (saline) sprays or drops. Your provider may also prescribe mucolytics or expectorants to loosen and clear mucus.  · Allergy shots (immunotherapy). If you have nasal allergies, shots may help reduce your sensitivity to allergens such as pollen, dust mites, or mold.   If your symptoms still do not get better, you may need more testing. This may include a CT scan of the sinuses.  Surgery  If other treatments dont solve the problem, you may need surgery. The type of surgery depends on what is causing your sinusitis. It also depends on which sinuses are involved. Your doctor will tell you more about your options. The types of surgery include:  · Endoscopic surgery. This is often used to clear blockages. The sinuses can then heal on their own. During the surgery, the doctor uses a thin, lighted tube (endoscope). The doctor puts the endoscope into your nose to see into the sinuses. This surgery can be done without incisions on the face.  · Open surgery. This is often used to clean out a sinus lining that is very damaged. It lets the doctor reach areas that an endoscope may not reach.        Start the Medrol dose pack tomorrow

## 2017-12-09 NOTE — PROGRESS NOTES
"Subjective:       Patient ID: Malathi Martinez is a 57 y.o. female.    Vitals:  height is 5' 10" (1.778 m) and weight is 83.9 kg (185 lb). Her temperature is 97.8 °F (36.6 °C). Her blood pressure is 118/62 and her pulse is 65. Her respiration is 16 and oxygen saturation is 98%.     Chief Complaint: Chest Congestion (burning in chest/cough since thursday )    Sinus Problem   This is a new problem. The current episode started in the past 7 days. The problem has been gradually worsening since onset. There has been no fever. Associated symptoms include chills, congestion, coughing, ear pain, shortness of breath, sinus pressure, sneezing and a sore throat. Past treatments include nothing. The treatment provided no relief.     Review of Systems   Constitution: Positive for chills. Negative for fever.   HENT: Positive for congestion, ear pain, sinus pressure, sneezing and sore throat.    Respiratory: Positive for cough, shortness of breath and sputum production.    Skin: Negative for rash.   Musculoskeletal: Negative for joint pain and joint swelling.       Objective:      Physical Exam   Constitutional: She appears well-developed and well-nourished. No distress.   HENT:   Left Ear: A middle ear effusion is present.   Nose: Right sinus exhibits maxillary sinus tenderness and frontal sinus tenderness. Left sinus exhibits maxillary sinus tenderness and frontal sinus tenderness.   Mouth/Throat: Posterior oropharyngeal erythema present.   Eyes: Conjunctivae are normal. Pupils are equal, round, and reactive to light.   Neck: Normal range of motion.   Cardiovascular: Normal rate, regular rhythm and normal heart sounds.    Pulmonary/Chest: Effort normal. She has wheezes.   Abdominal: Soft.   Lymphadenopathy:     She has no cervical adenopathy.   Skin: No rash noted.   Nursing note and vitals reviewed.      Assessment:       1. Pansinusitis, unspecified chronicity    2. Bronchitis, acute, with bronchospasm    3. Chills      "   Plan:         Pansinusitis, unspecified chronicity  -     azithromycin (ZITHROMAX) 500 MG tablet; Take 1 tablet (500 mg total) by mouth once daily. Take 1 tablet daily for 3 days.  Dispense: 3 tablet; Refill: 0  -     methylPREDNISolone (MEDROL DOSEPACK) 4 mg tablet; Take all pills on each row once daily  Dispense: 1 Package; Refill: 0  -     mometasone (NASONEX) 50 mcg/actuation nasal spray; 1 spray by Nasal route 2 (two) times daily.  Dispense: 17 g; Refill: 1    Bronchitis, acute, with bronchospasm  -     dexamethasone injection 10 mg; Inject 1 mL (10 mg total) into the muscle one time.  -     azithromycin (ZITHROMAX) 500 MG tablet; Take 1 tablet (500 mg total) by mouth once daily. Take 1 tablet daily for 3 days.  Dispense: 3 tablet; Refill: 0    Chills  -     POCT Influenza A/B

## 2017-12-10 ENCOUNTER — PATIENT MESSAGE (OUTPATIENT)
Dept: FAMILY MEDICINE | Facility: CLINIC | Age: 57
End: 2017-12-10

## 2017-12-11 RX ORDER — ATORVASTATIN CALCIUM 20 MG/1
TABLET, FILM COATED ORAL
Qty: 90 TABLET | Refills: 1 | Status: SHIPPED | OUTPATIENT
Start: 2017-12-11 | End: 2018-06-11 | Stop reason: SDUPTHER

## 2017-12-12 ENCOUNTER — TELEPHONE (OUTPATIENT)
Dept: URGENT CARE | Facility: CLINIC | Age: 57
End: 2017-12-12

## 2017-12-24 ENCOUNTER — PATIENT MESSAGE (OUTPATIENT)
Dept: FAMILY MEDICINE | Facility: CLINIC | Age: 57
End: 2017-12-24

## 2018-01-21 ENCOUNTER — OFFICE VISIT (OUTPATIENT)
Dept: URGENT CARE | Facility: CLINIC | Age: 58
End: 2018-01-21
Payer: COMMERCIAL

## 2018-01-21 VITALS
TEMPERATURE: 98 F | SYSTOLIC BLOOD PRESSURE: 136 MMHG | RESPIRATION RATE: 18 BRPM | HEART RATE: 61 BPM | BODY MASS INDEX: 25.91 KG/M2 | DIASTOLIC BLOOD PRESSURE: 66 MMHG | HEIGHT: 70 IN | WEIGHT: 181 LBS | OXYGEN SATURATION: 98 %

## 2018-01-21 DIAGNOSIS — M54.5 ACUTE BILATERAL LOW BACK PAIN, WITH SCIATICA PRESENCE UNSPECIFIED: Primary | ICD-10-CM

## 2018-01-21 PROCEDURE — 96372 THER/PROPH/DIAG INJ SC/IM: CPT | Mod: S$GLB,,, | Performed by: INTERNAL MEDICINE

## 2018-01-21 PROCEDURE — 99213 OFFICE O/P EST LOW 20 MIN: CPT | Mod: 25,S$GLB,, | Performed by: INTERNAL MEDICINE

## 2018-01-21 RX ORDER — METHYLPREDNISOLONE 4 MG/1
TABLET ORAL
Qty: 1 PACKAGE | Refills: 0 | Status: SHIPPED | OUTPATIENT
Start: 2018-01-21 | End: 2018-01-21

## 2018-01-21 RX ORDER — KETOROLAC TROMETHAMINE 30 MG/ML
60 INJECTION, SOLUTION INTRAMUSCULAR; INTRAVENOUS
Status: COMPLETED | OUTPATIENT
Start: 2018-01-21 | End: 2018-01-21

## 2018-01-21 RX ORDER — DEXAMETHASONE SODIUM PHOSPHATE 100 MG/10ML
10 INJECTION INTRAMUSCULAR; INTRAVENOUS
Status: DISCONTINUED | OUTPATIENT
Start: 2018-01-21 | End: 2018-01-21

## 2018-01-21 RX ORDER — CYCLOBENZAPRINE HCL 5 MG
5 TABLET ORAL 3 TIMES DAILY PRN
Qty: 30 TABLET | Refills: 0 | Status: SHIPPED | OUTPATIENT
Start: 2018-01-21 | End: 2018-01-31

## 2018-01-21 RX ORDER — DEXAMETHASONE SODIUM PHOSPHATE 100 MG/10ML
10 INJECTION INTRAMUSCULAR; INTRAVENOUS
Status: COMPLETED | OUTPATIENT
Start: 2018-01-21 | End: 2018-01-21

## 2018-01-21 RX ORDER — KETOROLAC TROMETHAMINE 10 MG/1
10 TABLET, FILM COATED ORAL EVERY 6 HOURS
Qty: 20 TABLET | Refills: 0 | Status: SHIPPED | OUTPATIENT
Start: 2018-01-21 | End: 2018-03-13

## 2018-01-21 RX ADMIN — DEXAMETHASONE SODIUM PHOSPHATE 10 MG: 100 INJECTION INTRAMUSCULAR; INTRAVENOUS at 09:01

## 2018-01-21 RX ADMIN — KETOROLAC TROMETHAMINE 60 MG: 30 INJECTION, SOLUTION INTRAMUSCULAR; INTRAVENOUS at 09:01

## 2018-01-21 NOTE — PROGRESS NOTES
"Subjective:       Patient ID: Malathi Martinez is a 57 y.o. female.    Vitals:  height is 5' 10" (1.778 m) and weight is 82.1 kg (181 lb). Her oral temperature is 97.9 °F (36.6 °C). Her blood pressure is 136/66 and her pulse is 61. Her respiration is 18 and oxygen saturation is 98%.     Chief Complaint: Back Pain (Constant right lower back pain radiating to the left side for 2 weeks )    She has been under a lot of stress; her mother  3 weeks ago.  LBP, initially left side now extending to the right.  No relief with tylenol and heat.  She has a past history of back pain but not for many years.      Back Pain   This is a new problem. The current episode started 1 to 4 weeks ago. The problem occurs constantly. The problem has been gradually worsening since onset. The pain is present in the lumbar spine. The quality of the pain is described as stabbing. The pain is at a severity of 8/10. The pain is moderate. The pain is the same all the time. The symptoms are aggravated by sitting and standing. Pertinent negatives include no abdominal pain, bladder incontinence, bowel incontinence, dysuria or numbness. She has tried analgesics for the symptoms. The treatment provided no relief.     Review of Systems   Constitution: Negative for malaise/fatigue.   Skin: Negative for color change and rash.   Musculoskeletal: Positive for back pain and muscle cramps. Negative for joint pain, muscle weakness and stiffness.   Gastrointestinal: Negative for abdominal pain and bowel incontinence.   Genitourinary: Negative for bladder incontinence, dysuria, hematuria and urgency.   Neurological: Negative for disturbances in coordination and numbness.       Objective:      Physical Exam   Constitutional: She is oriented to person, place, and time. Vital signs are normal. She appears well-developed and well-nourished. She is active and cooperative. She appears distressed.   HENT:   Head: Normocephalic.   Mouth/Throat: Mucous membranes " are normal.   Eyes: Conjunctivae and lids are normal. Pupils are equal, round, and reactive to light.   Neck: Trachea normal, normal range of motion, full passive range of motion without pain and phonation normal. Neck supple.   Cardiovascular: Normal rate, regular rhythm, normal heart sounds, intact distal pulses and normal pulses.    Pulmonary/Chest: Effort normal and breath sounds normal.   Abdominal: Soft. Normal appearance and bowel sounds are normal. She exhibits no abdominal bruit, no pulsatile midline mass and no mass.   Musculoskeletal: She exhibits tenderness. She exhibits no edema or deformity.   L spine:  Mod TTP at left para lumbar muscles, SI joint and buttock; mod left muscle spasm.  Neg SLR   Neurological: She is alert and oriented to person, place, and time. She has normal strength and normal reflexes. No cranial nerve deficit or sensory deficit.   Skin: Skin is warm, dry and intact.   Psychiatric: She has a normal mood and affect. Her speech is normal. Cognition and memory are normal.   Nursing note and vitals reviewed.      Assessment:       1. Acute bilateral low back pain, with sciatica presence unspecified        Plan:         Acute bilateral low back pain, with sciatica presence unspecified  -     ketorolac injection 60 mg; Inject 2 mLs (60 mg total) into the muscle one time.  -     Discontinue: methylPREDNISolone (MEDROL DOSEPACK) 4 mg tablet; Take all pills on each row once daily  Dispense: 1 Package; Refill: 0  -     ketorolac (TORADOL) 10 mg tablet; Take 1 tablet (10 mg total) by mouth every 6 (six) hours.  Dispense: 20 tablet; Refill: 0  -     cyclobenzaprine (FLEXERIL) 5 MG tablet; Take 1 tablet (5 mg total) by mouth 3 (three) times daily as needed for Muscle spasms.  Dispense: 30 tablet; Refill: 0  -     dexamethasone injection 10 mg; Inject 1 mL (10 mg total) into the muscle one time.    Other orders  -     Discontinue: dexamethasone injection 10 mg; Inject 1 mL (10 mg total) into the  muscle one time.  -     Discontinue: methylPREDNISolone (MEDROL DOSEPACK) 4 mg tablet; Take all pills on each row once daily  Dispense: 1 Package; Refill: 0  -     Discontinue: methylPREDNISolone (MEDROL DOSEPACK) 4 mg tablet; Take all pills on each row once daily  Dispense: 1 Package; Refill: 0

## 2018-01-21 NOTE — PATIENT INSTRUCTIONS
[facility]  Back Pain (Acute or Chronic)    Back pain is one of the most common problems. The good news is that most people feel better in 1 to 2 weeks, and most of the rest in 1 to 2 months. Most people can remain active.  People experience and describe pain differently; not everyone is the same.  · The pain can be sharp, stabbing, shooting, aching, cramping or burning.  · Movement, standing, bending, lifting, sitting, or walking may worsen pain.  · It can be localized to one spot or area, or it can be more generalized.  · It can spread or radiate upwards, to the front, or go down your arms or legs (sciatica).  · It can cause muscle spasm.  Most of the time, mechanical problems with the muscles or spine cause the pain. Mechanical problems are usually caused by an injury to the muscles or ligaments. While illness can cause back pain, it is usually not caused by a serious illness. Mechanical problems include:   · Physical activity such as sports, exercise, work, or normal activity  · Overexertion, lifting, pushing, pulling incorrectly or too aggressively  · Sudden twisting, bending, or stretching from an accident, or accidental movement  · Poor posture  · Stretching or moving wrong, without noticing pain at the time  · Poor coordination, lack of regular exercise (check with your doctor about this)  · Spinal disc disease or arthritis  · Stress  Pain can also be related to pregnancy, or illness like appendicitis, bladder or kidney infections, pelvic infections, and many other things.  Acute back pain usually gets better in 1 to 2 weeks. Back pain related to disk disease, arthritis in the spinal joints or spinal stenosis (narrowing of the spinal canal) can become chronic and last for months or years.  Unless you had a physical injury (for example, a car accident or fall) X-rays are usually not needed for the initial evaluation of back pain. If pain continues and does not respond to medical treatment, X-rays and other tests may  be needed.  Home care  Try these home care recommendations:  · When in bed, try to find a position of comfort. A firm mattress is best. Try lying flat on your back with pillows under your knees. You can also try lying on your side with your knees bent up towards your chest and a pillow between your knees.  · At first, do not try to stretch out the sore spots. If there is a strain, it is not like the good soreness you get after exercising without an injury. In this case, stretching may make it worse.  · Avoid prolong sitting, long car rides, or travel. This puts more stress on the lower back than standing or walking.  · During the first 24 to 72 hours after an acute injury or flare up of chronic back pain, apply an ice pack to the painful area for 20 minutes and then remove it for 20 minutes. Do this over a period of 60 to 90 minutes or several times a day. This will reduce swelling and pain. Wrap the ice pack in a thin towel or plastic to protect your skin.  · You can start with ice, then switch to heat. Heat (hot shower, hot bath, or heating pad) reduces pain and works well for muscle spasms. Heat can be applied to the painful area for 20 minutes then remove it for 20 minutes. Do this over a period of 60 to 90 minutes or several times a day. Do not sleep on a heating pad. It can lead to skin burns or tissue damage.  · You can alternate ice and heat therapy. Talk with your doctor about the best treatment for your back pain.  · Therapeutic massage can help relax the back muscles without stretching them.  · Be aware of safe lifting methods and do not lift anything without stretching first.  Medicines  Talk to your doctor before using medicine, especially if you have other medical problems or are taking other medicines.  · You may use over-the-counter medicine as directed on the bottle to control pain, unless another pain medicine was prescribed. If you have chronic conditions like diabetes, liver or kidney disease,  stomach ulcers, or gastrointestinal bleeding, or are taking blood thinners, talk to your doctor before taking any medicine.  · Be careful if you are given a prescription medicines, narcotics, or medicine for muscle spasms. They can cause drowsiness, affect your coordination, reflexes, and judgement. Do not drive or operate heavy machinery.  Follow-up care  Follow up with your healthcare provider, or as advised.   A radiologist will review any X-rays that were taken. Your provide will notify you of any new findings that may affect your care.  Call 911  Call emergency services if any of the following occur:  · Trouble breathing  · Confusion  · Very drowsy or trouble awakening  · Fainting or loss of consciousness  · Rapid or very slow heart rate  · Loss of bowel or bladder control  When to seek medical advice  Call your healthcare provider right away if any of these occur:   · Pain becomes worse or spreads to your legs  · Weakness or numbness in one or both legs  · Numbness in the groin or genital area

## 2018-02-10 ENCOUNTER — LAB VISIT (OUTPATIENT)
Dept: LAB | Facility: HOSPITAL | Age: 58
End: 2018-02-10
Attending: FAMILY MEDICINE
Payer: COMMERCIAL

## 2018-02-10 ENCOUNTER — PATIENT MESSAGE (OUTPATIENT)
Dept: FAMILY MEDICINE | Facility: CLINIC | Age: 58
End: 2018-02-10

## 2018-02-10 DIAGNOSIS — R53.83 FATIGUE, UNSPECIFIED TYPE: ICD-10-CM

## 2018-02-10 DIAGNOSIS — R73.09 ELEVATED HEMOGLOBIN A1C: ICD-10-CM

## 2018-02-10 LAB
25(OH)D3+25(OH)D2 SERPL-MCNC: 38 NG/ML
ANION GAP SERPL CALC-SCNC: 9 MMOL/L
BASOPHILS # BLD AUTO: 0.01 K/UL
BASOPHILS NFR BLD: 0.2 %
BUN SERPL-MCNC: 13 MG/DL
CALCIUM SERPL-MCNC: 9.9 MG/DL
CHLORIDE SERPL-SCNC: 101 MMOL/L
CHOLEST SERPL-MCNC: 155 MG/DL
CHOLEST/HDLC SERPL: 3.2 {RATIO}
CO2 SERPL-SCNC: 27 MMOL/L
CREAT SERPL-MCNC: 0.8 MG/DL
DIFFERENTIAL METHOD: NORMAL
EOSINOPHIL # BLD AUTO: 0.1 K/UL
EOSINOPHIL NFR BLD: 0.9 %
ERYTHROCYTE [DISTWIDTH] IN BLOOD BY AUTOMATED COUNT: 13.9 %
EST. GFR  (AFRICAN AMERICAN): >60 ML/MIN/1.73 M^2
EST. GFR  (NON AFRICAN AMERICAN): >60 ML/MIN/1.73 M^2
ESTIMATED AVG GLUCOSE: 105 MG/DL
GLUCOSE SERPL-MCNC: 86 MG/DL
HBA1C MFR BLD HPLC: 5.3 %
HCT VFR BLD AUTO: 43.4 %
HDLC SERPL-MCNC: 49 MG/DL
HDLC SERPL: 31.6 %
HGB BLD-MCNC: 14 G/DL
IMM GRANULOCYTES # BLD AUTO: 0.01 K/UL
IMM GRANULOCYTES NFR BLD AUTO: 0.2 %
IRON SERPL-MCNC: 85 UG/DL
LDLC SERPL CALC-MCNC: 87.8 MG/DL
LYMPHOCYTES # BLD AUTO: 2.2 K/UL
LYMPHOCYTES NFR BLD: 33.1 %
MCH RBC QN AUTO: 28.2 PG
MCHC RBC AUTO-ENTMCNC: 32.3 G/DL
MCV RBC AUTO: 87 FL
MONOCYTES # BLD AUTO: 0.5 K/UL
MONOCYTES NFR BLD: 7.8 %
NEUTROPHILS # BLD AUTO: 3.8 K/UL
NEUTROPHILS NFR BLD: 57.8 %
NONHDLC SERPL-MCNC: 106 MG/DL
NRBC BLD-RTO: 0 /100 WBC
PLATELET # BLD AUTO: 282 K/UL
PMV BLD AUTO: 11 FL
POTASSIUM SERPL-SCNC: 4.3 MMOL/L
RBC # BLD AUTO: 4.97 M/UL
SATURATED IRON: 19 %
SODIUM SERPL-SCNC: 137 MMOL/L
TOTAL IRON BINDING CAPACITY: 450 UG/DL
TRANSFERRIN SERPL-MCNC: 304 MG/DL
TRIGL SERPL-MCNC: 91 MG/DL
VIT B12 SERPL-MCNC: 1797 PG/ML
WBC # BLD AUTO: 6.55 K/UL

## 2018-02-10 PROCEDURE — 82306 VITAMIN D 25 HYDROXY: CPT

## 2018-02-10 PROCEDURE — 36415 COLL VENOUS BLD VENIPUNCTURE: CPT | Mod: PO

## 2018-02-10 PROCEDURE — 80061 LIPID PANEL: CPT

## 2018-02-10 PROCEDURE — 83540 ASSAY OF IRON: CPT

## 2018-02-10 PROCEDURE — 85025 COMPLETE CBC W/AUTO DIFF WBC: CPT

## 2018-02-10 PROCEDURE — 83036 HEMOGLOBIN GLYCOSYLATED A1C: CPT

## 2018-02-10 PROCEDURE — 80048 BASIC METABOLIC PNL TOTAL CA: CPT

## 2018-02-10 PROCEDURE — 82607 VITAMIN B-12: CPT

## 2018-02-15 DIAGNOSIS — F43.21 GRIEF REACTION: ICD-10-CM

## 2018-02-15 DIAGNOSIS — F41.9 ANXIETY: ICD-10-CM

## 2018-02-15 DIAGNOSIS — F43.23 ADJUSTMENT DISORDER WITH MIXED ANXIETY AND DEPRESSED MOOD: ICD-10-CM

## 2018-02-16 ENCOUNTER — LAB VISIT (OUTPATIENT)
Dept: LAB | Facility: HOSPITAL | Age: 58
End: 2018-02-16
Attending: FAMILY MEDICINE
Payer: COMMERCIAL

## 2018-02-16 ENCOUNTER — PATIENT OUTREACH (OUTPATIENT)
Dept: ADMINISTRATIVE | Facility: HOSPITAL | Age: 58
End: 2018-02-16

## 2018-02-16 DIAGNOSIS — Z12.11 SPECIAL SCREENING FOR MALIGNANT NEOPLASMS, COLON: ICD-10-CM

## 2018-02-16 LAB — HEMOCCULT STL QL IA: NEGATIVE

## 2018-02-16 PROCEDURE — 82274 ASSAY TEST FOR BLOOD FECAL: CPT

## 2018-02-16 RX ORDER — ALPRAZOLAM 0.25 MG/1
TABLET ORAL
Qty: 60 TABLET | Refills: 0 | Status: SHIPPED | OUTPATIENT
Start: 2018-02-16 | End: 2018-03-05 | Stop reason: SDUPTHER

## 2018-03-01 RX ORDER — ESCITALOPRAM OXALATE 10 MG/1
10 TABLET ORAL DAILY
Qty: 90 TABLET | Refills: 1 | Status: SHIPPED | OUTPATIENT
Start: 2018-03-01 | End: 2018-03-02 | Stop reason: SDUPTHER

## 2018-03-02 ENCOUNTER — OFFICE VISIT (OUTPATIENT)
Dept: FAMILY MEDICINE | Facility: CLINIC | Age: 58
End: 2018-03-02
Payer: COMMERCIAL

## 2018-03-02 VITALS
HEIGHT: 70 IN | WEIGHT: 177.25 LBS | DIASTOLIC BLOOD PRESSURE: 68 MMHG | SYSTOLIC BLOOD PRESSURE: 104 MMHG | HEART RATE: 60 BPM | TEMPERATURE: 99 F | RESPIRATION RATE: 16 BRPM | BODY MASS INDEX: 25.38 KG/M2

## 2018-03-02 DIAGNOSIS — F33.41 RECURRENT MAJOR DEPRESSIVE DISORDER, IN PARTIAL REMISSION: Primary | ICD-10-CM

## 2018-03-02 DIAGNOSIS — Z12.9 SCREENING FOR CANCER: ICD-10-CM

## 2018-03-02 DIAGNOSIS — R13.10 DYSPHAGIA, UNSPECIFIED TYPE: ICD-10-CM

## 2018-03-02 DIAGNOSIS — E11.9 TYPE 2 DIABETES MELLITUS WITHOUT COMPLICATION, WITHOUT LONG-TERM CURRENT USE OF INSULIN: ICD-10-CM

## 2018-03-02 DIAGNOSIS — F43.21 GRIEF REACTION: ICD-10-CM

## 2018-03-02 DIAGNOSIS — R74.8 ELEVATED VITAMIN B12 LEVEL: ICD-10-CM

## 2018-03-02 PROCEDURE — 99999 PR PBB SHADOW E&M-EST. PATIENT-LVL III: CPT | Mod: PBBFAC,,, | Performed by: FAMILY MEDICINE

## 2018-03-02 PROCEDURE — 99214 OFFICE O/P EST MOD 30 MIN: CPT | Mod: S$GLB,,, | Performed by: FAMILY MEDICINE

## 2018-03-02 RX ORDER — CYCLOBENZAPRINE HCL 5 MG
5 TABLET ORAL 3 TIMES DAILY PRN
Qty: 30 TABLET | Refills: 0 | Status: SHIPPED | OUTPATIENT
Start: 2018-03-02 | End: 2018-03-13

## 2018-03-02 RX ORDER — ESCITALOPRAM OXALATE 20 MG/1
20 TABLET ORAL DAILY
Qty: 90 TABLET | Refills: 1 | Status: SHIPPED | OUTPATIENT
Start: 2018-03-02 | End: 2018-04-09 | Stop reason: SDUPTHER

## 2018-03-02 NOTE — PROGRESS NOTES
Subjective:       Patient ID: Malathi Martinez is a 57 y.o. female.    Chief Complaint: Follow-up (lab review ) and Follow-up (review meds)    HPI   Patient in the office for f/u.  Since our LOV, her mom passed away unexpectedly. Although there were chronic health issues, she had been doing relatively well prior.   Pt unfortunately, was transporting her mom when she lost consciousness, had to make advanced care decisions, and saw her mom being resuscitated.  She is naturally going thru the grief process. Feels she is ok. Wants to be left alone, feels lonely. Has not gone back to counseling with her . Taking xanax twice daily to get thru her day and rest - it works well. Appetite is ok, lost some weight.   Labs 2/2018 rev. She was not on consistent b12 suppl at the time.    Review of Systems   Constitutional: Negative for activity change and unexpected weight change.   HENT: Positive for hearing loss and trouble swallowing (a few episodes in the past year). Negative for rhinorrhea.    Eyes: Positive for discharge and visual disturbance.   Respiratory: Negative for chest tightness and wheezing.    Cardiovascular: Positive for palpitations. Negative for chest pain.   Gastrointestinal: Negative for blood in stool, constipation, diarrhea and vomiting.   Endocrine: Negative for polydipsia and polyuria.   Genitourinary: Negative for difficulty urinating, dysuria, hematuria and menstrual problem.   Musculoskeletal: Positive for back pain (spasms, saw urgent care, improved with muscle relaxer) and neck pain. Negative for arthralgias and joint swelling.   Neurological: Positive for weakness and headaches.   Psychiatric/Behavioral: Positive for confusion and dysphoric mood.       Objective:      Physical Exam   Constitutional: She is oriented to person, place, and time. She appears well-developed and well-nourished. No distress.   HENT:   Head: Normocephalic and atraumatic.   Eyes: Conjunctivae are normal. Right  eye exhibits no discharge. Left eye exhibits no discharge. No scleral icterus.   Neck: Normal range of motion. Neck supple.   Cardiovascular: Normal rate.    Pulmonary/Chest: Effort normal. No respiratory distress.   Abdominal: Soft. She exhibits no distension.   Musculoskeletal: Normal range of motion. She exhibits no edema.   Neurological: She is alert and oriented to person, place, and time.   Skin: Skin is warm and dry. No rash noted.   Psychiatric: Her speech is normal. Judgment and thought content normal. She is withdrawn. Cognition and memory are normal. She exhibits a depressed mood.   Nursing note and vitals reviewed.        Recurrent major depressive disorder, in partial remission  -     escitalopram oxalate (LEXAPRO) 20 MG tablet; Take 1 tablet (20 mg total) by mouth once daily.  Dispense: 90 tablet; Refill: 1  Increase to 20mg daily. Recheck 1 mo. Encouraged her to resume counseling with her .  Type 2 diabetes mellitus without complication, without long-term current use of insulin  BS significantly improved. Doing well.  Screening for cancer  -     CT Chest Lung Screening Low Dose; Future; Expected date: 03/02/2018  Dysphagia, unspecified type  -     Case request GI: ESOPHAGOGASTRODUODENOSCOPY (EGD)  For review.  Grief reaction  -     escitalopram oxalate (LEXAPRO) 20 MG tablet; Take 1 tablet (20 mg total) by mouth once daily.  Dispense: 90 tablet; Refill: 1  As above.  Elevated vitamin B12 level  -     Vitamin B12; Future; Expected date: 03/02/2018  Repeat lab 1mo.  Other orders  -     cyclobenzaprine (FLEXERIL) 5 MG tablet; Take 1 tablet (5 mg total) by mouth 3 (three) times daily as needed for Muscle spasms.  Dispense: 30 tablet; Refill: 0

## 2018-03-05 DIAGNOSIS — F43.23 ADJUSTMENT DISORDER WITH MIXED ANXIETY AND DEPRESSED MOOD: ICD-10-CM

## 2018-03-05 DIAGNOSIS — F41.9 ANXIETY: ICD-10-CM

## 2018-03-05 DIAGNOSIS — F43.21 GRIEF REACTION: ICD-10-CM

## 2018-03-06 RX ORDER — ALPRAZOLAM 0.25 MG/1
TABLET ORAL
Qty: 60 TABLET | Refills: 2 | Status: SHIPPED | OUTPATIENT
Start: 2018-03-06 | End: 2018-05-25 | Stop reason: SDUPTHER

## 2018-03-12 ENCOUNTER — PATIENT MESSAGE (OUTPATIENT)
Dept: FAMILY MEDICINE | Facility: CLINIC | Age: 58
End: 2018-03-12

## 2018-03-12 ENCOUNTER — TELEPHONE (OUTPATIENT)
Dept: FAMILY MEDICINE | Facility: CLINIC | Age: 58
End: 2018-03-12

## 2018-03-12 ENCOUNTER — LAB VISIT (OUTPATIENT)
Dept: LAB | Facility: HOSPITAL | Age: 58
End: 2018-03-12
Attending: FAMILY MEDICINE
Payer: COMMERCIAL

## 2018-03-12 ENCOUNTER — HOSPITAL ENCOUNTER (OUTPATIENT)
Dept: RADIOLOGY | Facility: HOSPITAL | Age: 58
Discharge: HOME | End: 2018-03-12
Attending: FAMILY MEDICINE

## 2018-03-12 DIAGNOSIS — R74.8 ELEVATED VITAMIN B12 LEVEL: ICD-10-CM

## 2018-03-12 DIAGNOSIS — Z72.0 TOBACCO ABUSE: ICD-10-CM

## 2018-03-12 DIAGNOSIS — Z12.9 SCREENING FOR CANCER: ICD-10-CM

## 2018-03-12 DIAGNOSIS — R91.1 LUNG NODULE: Primary | ICD-10-CM

## 2018-03-12 LAB — VIT B12 SERPL-MCNC: 1431 PG/ML

## 2018-03-12 PROCEDURE — 82607 VITAMIN B-12: CPT

## 2018-03-12 PROCEDURE — 36415 COLL VENOUS BLD VENIPUNCTURE: CPT | Mod: PO

## 2018-03-12 PROCEDURE — 76497 UNLISTED CT PROCEDURE: CPT | Mod: TC,PO

## 2018-03-12 NOTE — TELEPHONE ENCOUNTER
Spoke to pt and advised of ct results.Pt verbalized understanding. Please revise referral for Dr Villeda at [facility].

## 2018-03-12 NOTE — TELEPHONE ENCOUNTER
Lung nodule noted on imaging. Further information not available based on imaging. Needs f/u with pulm to discuss monitoring schedule.

## 2018-03-13 ENCOUNTER — TELEPHONE (OUTPATIENT)
Dept: GASTROENTEROLOGY | Facility: CLINIC | Age: 58
End: 2018-03-13

## 2018-03-13 NOTE — TELEPHONE ENCOUNTER
----- Message from Jacqueline Lemon sent at 3/12/2018  4:52 PM CDT -----  Contact: [phone]  Patient is requesting a call back from the nurse.    Please call the patient upon request at phone number [phone].

## 2018-03-13 NOTE — TELEPHONE ENCOUNTER
----- Message from Yin Tirado sent at 3/13/2018 12:04 PM CDT -----    Calling  To  Speak to  Leana // please call  Back [phone]  Until 1.00

## 2018-03-13 NOTE — TELEPHONE ENCOUNTER
----- Message from Bishop La sent at 3/13/2018 12:19 PM CDT -----  Contact: self  [phone]  Patient returning the nurse phone call.. Thanks!

## 2018-03-13 NOTE — TELEPHONE ENCOUNTER
Returned pt call left message to call back.  Also sent Em[facility] message. Will address if/when pt responds.

## 2018-03-13 NOTE — TELEPHONE ENCOUNTER
----- Message from Sabrina Zelaya sent at 3/13/2018  1:10 PM CDT -----  Contact: self  Patient is returning Nimisha's call regarding appointment for a procedure.  Please call patient at [phone]  Ext [phone].  Thanks!

## 2018-03-13 NOTE — TELEPHONE ENCOUNTER
Pt has been scheduled for EGD on 3/14. Reviewed instructions in detail with pt. She verbalized understanding.

## 2018-03-14 ENCOUNTER — ANESTHESIA (OUTPATIENT)
Dept: ENDOSCOPY | Facility: HOSPITAL | Age: 58
End: 2018-03-14
Payer: COMMERCIAL

## 2018-03-14 ENCOUNTER — SURGERY (OUTPATIENT)
Age: 58
End: 2018-03-14

## 2018-03-14 ENCOUNTER — ANESTHESIA EVENT (OUTPATIENT)
Dept: ENDOSCOPY | Facility: HOSPITAL | Age: 58
End: 2018-03-14
Payer: COMMERCIAL

## 2018-03-14 ENCOUNTER — HOSPITAL ENCOUNTER (OUTPATIENT)
Facility: HOSPITAL | Age: 58
Discharge: HOME | End: 2018-03-14
Attending: INTERNAL MEDICINE | Admitting: INTERNAL MEDICINE
Payer: COMMERCIAL

## 2018-03-14 VITALS
WEIGHT: 177 LBS | TEMPERATURE: 98 F | RESPIRATION RATE: 16 BRPM | OXYGEN SATURATION: 98 % | HEIGHT: 70 IN | SYSTOLIC BLOOD PRESSURE: 120 MMHG | HEART RATE: 66 BPM | BODY MASS INDEX: 25.34 KG/M2 | DIASTOLIC BLOOD PRESSURE: 67 MMHG

## 2018-03-14 DIAGNOSIS — R13.10 DYSPHAGIA: ICD-10-CM

## 2018-03-14 LAB — GLUCOSE SERPL-MCNC: 100 MG/DL (ref 70–110)

## 2018-03-14 PROCEDURE — 82962 GLUCOSE BLOOD TEST: CPT | Mod: PO | Performed by: INTERNAL MEDICINE

## 2018-03-14 PROCEDURE — 37000009 HC ANESTHESIA EA ADD 15 MINS: Mod: PO | Performed by: INTERNAL MEDICINE

## 2018-03-14 PROCEDURE — 43248 EGD GUIDE WIRE INSERTION: CPT | Mod: ,,, | Performed by: INTERNAL MEDICINE

## 2018-03-14 PROCEDURE — 43248 EGD GUIDE WIRE INSERTION: CPT | Mod: PO | Performed by: INTERNAL MEDICINE

## 2018-03-14 PROCEDURE — 25000003 PHARM REV CODE 250: Mod: PO | Performed by: NURSE ANESTHETIST, CERTIFIED REGISTERED

## 2018-03-14 PROCEDURE — D9220A PRA ANESTHESIA: Mod: CRNA,,, | Performed by: NURSE ANESTHETIST, CERTIFIED REGISTERED

## 2018-03-14 PROCEDURE — 63600175 PHARM REV CODE 636 W HCPCS: Mod: PO | Performed by: NURSE ANESTHETIST, CERTIFIED REGISTERED

## 2018-03-14 PROCEDURE — D9220A PRA ANESTHESIA: Mod: ANES,,, | Performed by: ANESTHESIOLOGY

## 2018-03-14 PROCEDURE — 37000008 HC ANESTHESIA 1ST 15 MINUTES: Mod: PO | Performed by: INTERNAL MEDICINE

## 2018-03-14 RX ORDER — LIDOCAINE HCL/PF 100 MG/5ML
SYRINGE (ML) INTRAVENOUS
Status: DISCONTINUED | OUTPATIENT
Start: 2018-03-14 | End: 2018-03-14

## 2018-03-14 RX ORDER — FENTANYL CITRATE 50 UG/ML
INJECTION, SOLUTION INTRAMUSCULAR; INTRAVENOUS
Status: DISCONTINUED | OUTPATIENT
Start: 2018-03-14 | End: 2018-03-14

## 2018-03-14 RX ORDER — GLYCOPYRROLATE 0.2 MG/ML
INJECTION INTRAMUSCULAR; INTRAVENOUS
Status: DISCONTINUED | OUTPATIENT
Start: 2018-03-14 | End: 2018-03-14

## 2018-03-14 RX ORDER — SODIUM CHLORIDE, SODIUM LACTATE, POTASSIUM CHLORIDE, CALCIUM CHLORIDE 600; 310; 30; 20 MG/100ML; MG/100ML; MG/100ML; MG/100ML
INJECTION, SOLUTION INTRAVENOUS CONTINUOUS
Status: DISCONTINUED | OUTPATIENT
Start: 2018-03-14 | End: 2018-03-14 | Stop reason: HOSPADM

## 2018-03-14 RX ORDER — PROPOFOL 10 MG/ML
VIAL (ML) INTRAVENOUS
Status: DISCONTINUED | OUTPATIENT
Start: 2018-03-14 | End: 2018-03-14

## 2018-03-14 RX ADMIN — GLYCOPYRROLATE 0.2 MG: 0.2 INJECTION, SOLUTION INTRAMUSCULAR; INTRAVENOUS at 08:03

## 2018-03-14 RX ADMIN — PROPOFOL 20 MG: 10 INJECTION, EMULSION INTRAVENOUS at 08:03

## 2018-03-14 RX ADMIN — LIDOCAINE HYDROCHLORIDE 50 MG: 20 INJECTION, SOLUTION INTRAVENOUS at 08:03

## 2018-03-14 RX ADMIN — FENTANYL CITRATE 50 MCG: 50 INJECTION, SOLUTION INTRAMUSCULAR; INTRAVENOUS at 08:03

## 2018-03-14 RX ADMIN — SODIUM CHLORIDE, SODIUM LACTATE, POTASSIUM CHLORIDE, CALCIUM CHLORIDE: 600; 310; 30; 20 INJECTION, SOLUTION INTRAVENOUS at 08:03

## 2018-03-14 RX ADMIN — PROPOFOL 100 MG: 10 INJECTION, EMULSION INTRAVENOUS at 08:03

## 2018-03-14 NOTE — ANESTHESIA POSTPROCEDURE EVALUATION
"Anesthesia Post Evaluation    Patient: Malathi Martinez    Procedure(s) Performed: Procedure(s) (LRB):  ESOPHAGOGASTRODUODENOSCOPY (EGD) (N/A)    Final Anesthesia Type: general  Patient location during evaluation: PACU  Patient participation: Yes- Able to Participate  Level of consciousness: awake and alert  Post-procedure vital signs: reviewed and stable  Pain management: adequate  Airway patency: patent  PONV status at discharge: No PONV  Anesthetic complications: no      Cardiovascular status: blood pressure returned to baseline and hemodynamically stable  Respiratory status: unassisted  Hydration status: euvolemic  Follow-up not needed.        Visit Vitals  BP (!) 103/55 (BP Location: Left arm, Patient Position: Lying)   Pulse 63   Temp 36.6 °C (97.8 °F) (Skin)   Resp 16   Ht 5' 10" (1.778 m)   Wt 80.3 kg (177 lb)   LMP 09/10/2012   SpO2 98%   Breastfeeding? No   BMI 25.40 kg/m²       Pain/Paulo Score: Pain Assessment Performed: Yes (3/14/2018  8:33 AM)  Presence of Pain: non-verbal indicators absent (3/14/2018  8:33 AM)  Paulo Score: 6 (3/14/2018  8:33 AM)      "

## 2018-03-14 NOTE — DISCHARGE SUMMARY
Discharge Note  Short Stay      SUMMARY     Admit Date: 3/14/2018    Attending Physician: Lai Cardoso MD     Discharge Physician: Lai Cardoso MD    Discharge Date: 3/14/2018 8:33 AM    Final Diagnosis: Dysphagia, unspecified type [R13.10]    Disposition: HOME OR SELF CARE    Patient Instructions:   Current Discharge Medication List      START taking these medications    Details   ranitidine (ZANTAC) 300 MG tablet Take 1 tablet (300 mg total) by mouth every evening.  Qty: 30 tablet, Refills: 2         CONTINUE these medications which have NOT CHANGED    Details   ALPRAZolam (XANAX) 0.25 MG tablet TAKE 1 TABLET TWICE A DAY AS NEEDED FOR ANXIETY  Qty: 60 tablet, Refills: 2    Comments: Not to exceed 3 additional fills before 05/24/2018  Associated Diagnoses: Anxiety; Adjustment disorder with mixed anxiety and depressed mood; Grief reaction      atorvastatin (LIPITOR) 20 MG tablet TAKE 1 TABLET (20 MG TOTAL) BY MOUTH ONCE DAILY.  Qty: 90 tablet, Refills: 1      buPROPion (WELLBUTRIN XL) 150 MG TB24 tablet TAKE 1 TABLET (150 MG TOTAL) BY MOUTH ONCE DAILY.  Qty: 30 tablet, Refills: 5    Associated Diagnoses: Anxiety; Adjustment disorder with mixed anxiety and depressed mood; Grief reaction      cholecalciferol, vitamin D3, 2,000 unit Cap Take 1 capsule by mouth once daily.      escitalopram oxalate (LEXAPRO) 20 MG tablet Take 1 tablet (20 mg total) by mouth once daily.  Qty: 90 tablet, Refills: 1    Associated Diagnoses: Recurrent major depressive disorder, in partial remission; Grief reaction      furosemide (LASIX) 20 MG tablet TAKE 1 TABLET (20 MG TOTAL) BY MOUTH ONCE DAILY.  Qty: 30 tablet, Refills: 5    Associated Diagnoses: Leg swelling      lisinopril (PRINIVIL,ZESTRIL) 2.5 MG tablet TAKE 1 TABLET BY MOUTH ONCE DAILY.  Qty: 90 tablet, Refills: 1      loratadine (CLARITIN) 10 mg tablet Take 10 mg by mouth once daily.      metFORMIN (GLUCOPHAGE-XR) 500 MG 24 hr tablet Take 1 tablet (500 mg total) by  mouth 2 (two) times daily with meals.  Qty: 180 tablet, Refills: 1      albuterol (PROAIR HFA) 90 mcg/actuation inhaler INHALE 2 PUFFS INTO THE LUNGS EVERY 4 (FOUR) HOURS AS NEEDED FOR WHEEZING.  Qty: 8.5 Inhaler, Refills: 3    Associated Diagnoses: Chronic obstructive pulmonary disease, unspecified COPD type      blood sugar diagnostic Strp 1 strip by Misc.(Non-Drug; Combo Route) route once daily.  Qty: 50 each, Refills: 6    Associated Diagnoses: Type 2 diabetes mellitus without complication, without long-term current use of insulin      blood-glucose meter kit Use as instructed  Qty: 1 each, Refills: 0    Associated Diagnoses: Type 2 diabetes mellitus without complication, without long-term current use of insulin      lancets Misc 1 each by Misc.(Non-Drug; Combo Route) route once daily.  Qty: 50 each, Refills: 6    Associated Diagnoses: Type 2 diabetes mellitus without complication, without long-term current use of insulin      mometasone (NASONEX) 50 mcg/actuation nasal spray 1 spray by Nasal route 2 (two) times daily.  Qty: 17 g, Refills: 1    Associated Diagnoses: Pansinusitis, unspecified chronicity         STOP taking these medications       cyclobenzaprine (FLEXERIL) 5 MG tablet Comments:   Reason for Stopping:               Discharge Procedure Orders (must include Diet, Follow-up, Activity)    Follow Up:  Follow up with PCP as previously scheduled  Resume routine diet.  Activity as tolerated.    No driving day of procedure.

## 2018-03-14 NOTE — TRANSFER OF CARE
"Anesthesia Transfer of Care Note    Patient: Malathi Martinez    Procedure(s) Performed: Procedure(s) (LRB):  ESOPHAGOGASTRODUODENOSCOPY (EGD) (N/A)    Patient location: PACU    Anesthesia Type: general    Transport from OR: Transported from OR on room air with adequate spontaneous ventilation    Post pain: adequate analgesia    Post assessment: no apparent anesthetic complications and tolerated procedure well    Post vital signs: stable    Level of consciousness: awake and sedated    Nausea/Vomiting: no nausea/vomiting    Complications: none    Transfer of care protocol was followed      Last vitals:   Visit Vitals  BP (!) 146/67 (BP Location: Right arm, Patient Position: Lying)   Pulse 60   Temp 36.4 °C (97.5 °F) (Skin)   Resp 16   Ht 5' 10" (1.778 m)   Wt 80.3 kg (177 lb)   LMP 09/10/2012   SpO2 99%   Breastfeeding? No   BMI 25.40 kg/m²     "

## 2018-03-14 NOTE — DISCHARGE INSTRUCTIONS
Recovery After Procedural Sedation (Adult)  You have been given medicine by vein to make you sleep during your surgery. This may have included both a pain medicine and sleeping medicine. Most of the effects have worn off. But you may still have some drowsiness for the next 6 to 8 hours.  Home care  Follow these guidelines when you get home:  · For the next 8 hours, you should be watched by a responsible adult. This person should make sure your condition is not getting worse.  · Don't drink any alcohol for the next 24 hours.  · Don't drive, operate dangerous machinery, or make important business or personal decisions during the next 24 hours.  Note: Your healthcare provider may tell you not to take any medicine by mouth for pain or sleep in the next 4 hours. These medicines may react with the medicines you were given in the hospital. This could cause a much stronger response than usual.  Follow-up care  Follow up with your healthcare provider if you are not alert and back to your usual level of activity within 12 hours.  When to seek medical advice  Call your healthcare provider right away if any of these occur:  · Drowsiness gets worse  · Weakness or dizziness gets worse  · Repeated vomiting  · You can't be awakened   Date Last Reviewed: 10/18/2016  © 2091-6254 [employer]. [address] [place] [address], [place] [address]. All rights reserved. This information is not intended as a substitute for professional medical care. Always follow your healthcare professional's instructions.        Esophageal Dilation     A balloon dilator may be used to widen a stricture in the esophagus.   An esophageal dilation is a procedure used to widen a narrowed section of your esophagus. This is the tube that leads from your throat to your stomach. Narrowing (stricture) of the esophagus can cause problems. These include trouble swallowing. This sheet explains what to expect with esophageal dilation.  Why esophageal dilation is  needed  Several problems can be treated with esophageal dilation. They include:  · Peptic stricture. This is caused by reflux esophagitis. With this problem, the esophagus is irritated by acid reflux (heartburn). This occurs when acid from your stomach flows back up into the esophagus. Stomach acid damages the lining of the esophagus. This leads to a buildup of scar tissue. As a result, the esophagus is narrowed.  · Schatzkis ring. This is an abnormal ring of tissue. It forms where the esophagus meets the stomach. It can cause trouble swallowing. It can also cause food to get stuck in the esophagus. The cause of this condition is not known.  · Achalasia. This condition stops food and liquids from moving into your stomach from the esophagus. It affects the lower esophageal sphincter (LES). The LES is a muscular ring that opens (relaxes) when you swallow. With achalasia, the LES does not relax. This condition can also cause problems with peristalsis. This is the normal muscular action of the esophagus that moves food into the stomach.  · Eosinophilic esophagitis. This is a redness and swelling (inflammation) in the esophagus. It is caused by an environmental trigger such as a food allergy. It can lead to pain, trouble swallowing, and strictures.  · Less common causes of stricture. Other causes of stricture include radiation treatment and cancer.  Before you have esophageal dilation  · Tell your provider about any medicines you take. This includes prescription medicines, over-the-counter medicines, herbs, vitamins, and other supplements. Be sure to mention aspirin or any blood thinners youre taking.  · Let your provider know if you need to take antibiotics before dental procedures. You may need to take them before esophageal dilation as well.  · Tell your provider about any health conditions you have, such as heart or lung disease. Also mention any allergies to medicines.  · Youll need to have an empty stomach for  the procedure. Follow your providers instructions for not eating and drinking before the procedure.  · Arrange to have a family member or friend drive you home after the procedure.  During the procedure  · You may be given local anesthesia to numb your throat. Youll also likely be given medicine to relax you. The procedure takes about 15 minutes. It does not cause trouble breathing.  · A tube called an endoscope (scope) is used. This is a narrow tube with a tiny light and camera at the end. The scope is inserted through your mouth and into your esophagus. It lets your provider see inside your esophagus. To help guide your provider, an imaging method called fluoroscopy may also be used. This creates a moving X-ray image on a computer screen.   · Next, special tiny tools are carefully guided through your mouth and down into the esophagus. They widen the stricture and are then removed. Different types of instruments are used. The type used depends on the size and cause of the stricture. Types include:  ¨ Balloon dilator. A tiny empty balloon is put into the stricture using an endoscope. The balloon is slowly filled with air. The air is removed from the balloon when the stricture is widened to the right size. Balloon dilators are used to treat many types of strictures.  ¨ Guided wire dilator. A thin wire is eased down the esophagus. A small tube thats wider on one end is guided down the wire. It is put into the stricture to stretch it. These dilators are used to treat all kinds of strictures.  ¨ Bougies. These are weighted, cone-shaped tubes. Starting with smaller cones, your provider uses increasingly larger cones until the stricture is stretched the right amount. Bougies are often used to treat strictures that are simple (short, straight, and not very narrow).  After the procedure  · Youll be watched closely until your provider says youre ready to go home. Youll need to have a friend or family member drive you  home.  · You may have a sore throat for the rest of the day.  · You may have pain behind your breastbone for a short time afterwards.  · You can start drinking fluids again after the numbness in your throat goes away. You can resume eating the same day or the next day.  Risks and possible complications  Risks and possible complications for esophageal dilation include:  · Infection  · A tear or hole in the esophagus lining, causing bleeding and possibly needing surgery to fix  · Risks of anesthesia  Follow-up  You may need to have the procedure repeated one or more times. This depends on the cause and extent of the narrowing. Repeat procedures can allow the dilation to take place more slowly. This reduces the risks of the procedure.  If your stricture was caused by reflux esophagitis, youll likely need to take medicine to treat that condition. Your provider will tell you more.  When to call your provider  Call your healthcare provider right away if you have any of the following after the procedure:  · Fever of 100.4°F (38.0°C)  · Chest pain  · Trouble swallowing  · Vomiting blood or material that looks like coffee grounds  · Bleeding  · Black, tarry, or bloody stools   Date Last Reviewed: 7/1/2016  © 7445-0734 [employer]. [address] [place] [address], [place] [address]. All rights reserved. This information is not intended as a substitute for professional medical care. Always follow your healthcare professional's instructions.

## 2018-03-14 NOTE — ANESTHESIA PREPROCEDURE EVALUATION
03/14/2018  Malathi Martinez is a 57 y.o., female.    Anesthesia Evaluation    I have reviewed the Patient Summary Reports.    I have reviewed the Nursing Notes.      Review of Systems  Anesthesia Hx:  No problems with previous Anesthesia    Cardiovascular:   Hypertension, well controlled    Pulmonary:   COPD, mild    Endocrine:   Diabetes, well controlled        Physical Exam  General:  Well nourished                 Anesthesia Plan  Type of Anesthesia, risks & benefits discussed:  Anesthesia Type:  general  Patient's Preference:   Intra-op Monitoring Plan:   Intra-op Monitoring Plan Comments:   Post Op Pain Control Plan:   Post Op Pain Control Plan Comments:   Induction:   IV  Beta Blocker:  Patient is not currently on a Beta-Blocker (No further documentation required).       Informed Consent: Patient understands risks and agrees with Anesthesia plan.  Questions answered. Anesthesia consent signed with patient.  ASA Score: 2     Day of Surgery Review of History & Physical:    H&P update referred to the surgeon.         Ready For Surgery From Anesthesia Perspective.

## 2018-03-14 NOTE — H&P
History & Physical - Short Stay  Gastroenterology      SUBJECTIVE:     Procedure: EGD    Chief Complaint/Indication for Procedure: Dysphagia    PTA Medications   Medication Sig    ALPRAZolam (XANAX) 0.25 MG tablet TAKE 1 TABLET TWICE A DAY AS NEEDED FOR ANXIETY    atorvastatin (LIPITOR) 20 MG tablet TAKE 1 TABLET (20 MG TOTAL) BY MOUTH ONCE DAILY.    buPROPion (WELLBUTRIN XL) 150 MG TB24 tablet TAKE 1 TABLET (150 MG TOTAL) BY MOUTH ONCE DAILY.    cholecalciferol, vitamin D3, 2,000 unit Cap Take 1 capsule by mouth once daily.    [] cyclobenzaprine (FLEXERIL) 5 MG tablet Take 1 tablet (5 mg total) by mouth 3 (three) times daily as needed for Muscle spasms.    escitalopram oxalate (LEXAPRO) 20 MG tablet Take 1 tablet (20 mg total) by mouth once daily.    furosemide (LASIX) 20 MG tablet TAKE 1 TABLET (20 MG TOTAL) BY MOUTH ONCE DAILY.    lisinopril (PRINIVIL,ZESTRIL) 2.5 MG tablet TAKE 1 TABLET BY MOUTH ONCE DAILY.    loratadine (CLARITIN) 10 mg tablet Take 10 mg by mouth once daily.    metFORMIN (GLUCOPHAGE-XR) 500 MG 24 hr tablet Take 1 tablet (500 mg total) by mouth 2 (two) times daily with meals.    albuterol (PROAIR HFA) 90 mcg/actuation inhaler INHALE 2 PUFFS INTO THE LUNGS EVERY 4 (FOUR) HOURS AS NEEDED FOR WHEEZING.    blood sugar diagnostic Strp 1 strip by Misc.(Non-Drug; Combo Route) route once daily.    blood-glucose meter kit Use as instructed    lancets Misc 1 each by Misc.(Non-Drug; Combo Route) route once daily.    mometasone (NASONEX) 50 mcg/actuation nasal spray 1 spray by Nasal route 2 (two) times daily.       Review of patient's allergies indicates:   Allergen Reactions    Penicillins Hives        Past Medical History:   Diagnosis Date    Abnormal stress test     Bronchitis     Diabetes mellitus, type 2     HLD (hyperlipidemia)     Hypertension     Mental disorder     SOB (shortness of breath)     resolved    Tobacco abuse 2012     Past Surgical History:    Procedure Laterality Date    APPENDECTOMY      TONSILLECTOMY       Family History   Problem Relation Age of Onset    Heart disease Mother      MVP    Cataracts Mother     Heart disease Father 65     MI and PVD    Throat cancer Father     Heart disease Sister      mvp    Esophageal cancer Brother     No Known Problems Sister     No Known Problems Sister     Hyperlipidemia Paternal Grandmother      Social History   Substance Use Topics    Smoking status: Current Every Day Smoker     Packs/day: 1.00    Smokeless tobacco: Never Used    Alcohol use No         OBJECTIVE:     Vital Signs (Most Recent)       Physical Exam:                                                       GENERAL:  Comfortable, in no acute distress.                                 HEENT EXAM:  Nonicteric.  No adenopathy.  Oropharynx is clear.               NECK:  Supple.                                                               LUNGS:  Clear.                                                               CARDIAC:  Regular rate and rhythm.  S1, S2.  No murmur.                      ABDOMEN:  Soft, positive bowel sounds, nontender.  No hepatosplenomegaly or masses.  No rebound or guarding.                                             EXTREMITIES:  No edema.     MENTAL STATUS:  Normal, alert and oriented.      ASSESSMENT/PLAN:     Assessment: Dysphagia    Plan: EGD    Anesthesia Plan: General    ASA Grade: ASA 2 - Patient with mild systemic disease with no functional limitations    MALLAMPATI SCORE:  I (soft palate, uvula, fauces, and tonsillar pillars visible)

## 2018-04-09 ENCOUNTER — OFFICE VISIT (OUTPATIENT)
Dept: FAMILY MEDICINE | Facility: CLINIC | Age: 58
End: 2018-04-09
Payer: COMMERCIAL

## 2018-04-09 VITALS — BODY MASS INDEX: 25.62 KG/M2 | HEIGHT: 70 IN | WEIGHT: 179 LBS | RESPIRATION RATE: 16 BRPM

## 2018-04-09 DIAGNOSIS — F43.21 GRIEF REACTION: ICD-10-CM

## 2018-04-09 DIAGNOSIS — F33.41 RECURRENT MAJOR DEPRESSIVE DISORDER, IN PARTIAL REMISSION: ICD-10-CM

## 2018-04-09 DIAGNOSIS — R91.1 PULMONARY NODULE: Primary | ICD-10-CM

## 2018-04-09 DIAGNOSIS — K21.9 GASTROESOPHAGEAL REFLUX DISEASE, ESOPHAGITIS PRESENCE NOT SPECIFIED: ICD-10-CM

## 2018-04-09 PROCEDURE — 99999 PR PBB SHADOW E&M-EST. PATIENT-LVL II: CPT | Mod: PBBFAC,,, | Performed by: FAMILY MEDICINE

## 2018-04-09 PROCEDURE — 99214 OFFICE O/P EST MOD 30 MIN: CPT | Mod: S$GLB,,, | Performed by: FAMILY MEDICINE

## 2018-04-09 RX ORDER — OMEPRAZOLE 20 MG/1
20 CAPSULE, DELAYED RELEASE ORAL DAILY
Qty: 90 CAPSULE | Refills: 1 | Status: SHIPPED | OUTPATIENT
Start: 2018-04-09 | End: 2018-08-31 | Stop reason: SDUPTHER

## 2018-04-09 RX ORDER — ESCITALOPRAM OXALATE 10 MG/1
10 TABLET ORAL DAILY
Qty: 90 TABLET | Refills: 1 | Status: SHIPPED | OUTPATIENT
Start: 2018-04-09 | End: 2018-08-31 | Stop reason: SDUPTHER

## 2018-04-09 NOTE — PROGRESS NOTES
Subjective:       Patient ID: Malathi Martinez is a 57 y.o. female.    Chief Complaint: Follow-up (1 mo f/u depression) and Medication Problem (pt states she cannot take Lexapro 20mg )    HPI  Patient in the office for f/u.  PMH sig for DM2 controlled,  Copd, pulm nodule, mdd/miky, tob abuse.  At LOV, we tried to increase lexapro dose to 20mg daily for maintenance of her sadness and grieving, frequent tearfulness after the passing of her mother earlier this year. She did not tolerate the 20mg dose - it made her angry - and thus resumed the 10mg. Doing ok at this point.  Prev back discomfort has resolved. Saw UC at the time for muscle relaxer and nsaids.  pulm nodule reviewed in pulm/[facility]. He was not worried about the lesion, and scheduled a repeat scan at 3mos.  Requests refill of prilosec for continuing gerd sx not controlled on zantac.    Review of Systems   Constitutional: Negative for activity change and unexpected weight change.   HENT: Positive for trouble swallowing. Negative for hearing loss and rhinorrhea.    Eyes: Negative for discharge and visual disturbance.   Respiratory: Negative for chest tightness and wheezing.    Cardiovascular: Negative for chest pain and palpitations.   Gastrointestinal: Negative for blood in stool, constipation, diarrhea and vomiting.   Endocrine: Negative for polydipsia and polyuria.   Genitourinary: Negative for difficulty urinating, dysuria, hematuria and menstrual problem.   Musculoskeletal: Negative for arthralgias, joint swelling and neck pain.   Neurological: Positive for headaches (improved). Negative for weakness.   Psychiatric/Behavioral: Positive for dysphoric mood. Negative for confusion and sleep disturbance (doing ok).       Objective:      Physical Exam   Constitutional: She is oriented to person, place, and time. She appears well-developed and well-nourished. No distress.   HENT:   Head: Normocephalic and atraumatic.   Eyes: Conjunctivae are normal. Right  eye exhibits no discharge. Left eye exhibits no discharge. No scleral icterus.   Neck: Normal range of motion. Neck supple.   Cardiovascular: Normal rate.    Pulmonary/Chest: Effort normal. No respiratory distress.   Abdominal: Soft. She exhibits no distension.   Musculoskeletal: Normal range of motion. She exhibits no edema.   Neurological: She is alert and oriented to person, place, and time.   Skin: Skin is warm and dry. No rash noted.   Psychiatric: She has a normal mood and affect. Her behavior is normal.   Nursing note and vitals reviewed.        Recurrent major depressive disorder, in partial remission  -     escitalopram oxalate (LEXAPRO) 10 MG tablet; Take 1 tablet (10 mg total) by mouth once daily.  Dispense: 90 tablet; Refill: 1  Cont current dose.   Recommend counseling with her .   Discussed possible appt with psychiatry if we can't get a good regimen for her.  Grief reaction  -     escitalopram oxalate (LEXAPRO) 10 MG tablet; Take 1 tablet (10 mg total) by mouth once daily.  Dispense: 90 tablet; Refill: 1  GERD  -     omeprazole (PRILOSEC) 20 MG capsule; Take 1 capsule (20 mg total) by mouth once daily.  Dispense: 90 capsule; Refill: 1  Cont ppi/h2.

## 2018-04-10 ENCOUNTER — TELEPHONE (OUTPATIENT)
Dept: FAMILY MEDICINE | Facility: CLINIC | Age: 58
End: 2018-04-10

## 2018-04-28 ENCOUNTER — OFFICE VISIT (OUTPATIENT)
Dept: URGENT CARE | Facility: CLINIC | Age: 58
End: 2018-04-28
Payer: COMMERCIAL

## 2018-04-28 VITALS
HEIGHT: 70 IN | BODY MASS INDEX: 25.62 KG/M2 | OXYGEN SATURATION: 97 % | SYSTOLIC BLOOD PRESSURE: 124 MMHG | DIASTOLIC BLOOD PRESSURE: 73 MMHG | WEIGHT: 179 LBS | HEART RATE: 66 BPM | TEMPERATURE: 98 F

## 2018-04-28 DIAGNOSIS — R05.9 COUGH: Primary | ICD-10-CM

## 2018-04-28 PROCEDURE — 99214 OFFICE O/P EST MOD 30 MIN: CPT | Mod: S$GLB,,, | Performed by: PHYSICIAN ASSISTANT

## 2018-04-28 RX ORDER — INDOMETHACIN 25 MG/1
CAPSULE ORAL
COMMUNITY
End: 2018-05-25 | Stop reason: SDUPTHER

## 2018-04-28 RX ORDER — CYCLOBENZAPRINE HCL 5 MG
TABLET ORAL
COMMUNITY
End: 2018-05-25 | Stop reason: SDUPTHER

## 2018-04-28 RX ORDER — BENZONATATE 100 MG/1
100 CAPSULE ORAL EVERY 6 HOURS PRN
Qty: 30 CAPSULE | Refills: 1 | Status: SHIPPED | OUTPATIENT
Start: 2018-04-28 | End: 2018-05-25

## 2018-04-28 NOTE — PROGRESS NOTES
"Subjective:       Patient ID: Malathi Martinez is a 57 y.o. female.    Vitals:  height is 5' 10" (1.778 m) and weight is 81.2 kg (179 lb). Her oral temperature is 98.2 °F (36.8 °C). Her blood pressure is 124/73 and her pulse is 66. Her oxygen saturation is 97%.     Chief Complaint: Nasal Congestion    Cough   This is a new problem. The current episode started yesterday. The problem has been gradually worsening. The problem occurs every few minutes. Associated symptoms include chills, ear pain (feel clogged for 1 month) and nasal congestion. Pertinent negatives include no chest pain, eye redness, fever, headaches, myalgias, postnasal drip, sore throat, shortness of breath or wheezing. She has tried nothing for the symptoms. The treatment provided no relief.     Review of Systems   Constitution: Positive for chills and diaphoresis. Negative for fever and malaise/fatigue.   HENT: Positive for ear pain (feel clogged for 1 month). Negative for congestion, hoarse voice, postnasal drip and sore throat.    Eyes: Negative for discharge and redness.   Cardiovascular: Negative for chest pain, dyspnea on exertion and leg swelling.   Respiratory: Positive for cough (hurts to cough, wet). Negative for shortness of breath, sputum production and wheezing.    Musculoskeletal: Negative for myalgias.   Gastrointestinal: Negative for abdominal pain and nausea.   Neurological: Negative for headaches.       Objective:      Physical Exam   Constitutional: She is oriented to person, place, and time. She appears well-developed and well-nourished. She is cooperative.  Non-toxic appearance. She does not appear ill. No distress.   HENT:   Head: Normocephalic and atraumatic.   Right Ear: Hearing, tympanic membrane, external ear and ear canal normal.   Left Ear: Hearing, external ear and ear canal normal. Tympanic membrane is not erythematous and not bulging. A middle ear effusion is present.   Nose: Nose normal. No mucosal edema, " rhinorrhea or nasal deformity. No epistaxis. Right sinus exhibits no maxillary sinus tenderness and no frontal sinus tenderness. Left sinus exhibits no maxillary sinus tenderness and no frontal sinus tenderness.   Mouth/Throat: Uvula is midline, oropharynx is clear and moist and mucous membranes are normal. No trismus in the jaw. Normal dentition. No uvula swelling. No posterior oropharyngeal erythema.   Eyes: Conjunctivae and lids are normal. No scleral icterus.   Sclera clear bilat   Neck: Trachea normal, full passive range of motion without pain and phonation normal. Neck supple.   Cardiovascular: Normal rate, regular rhythm, normal heart sounds, intact distal pulses and normal pulses.    Pulmonary/Chest: Effort normal. No respiratory distress. She has wheezes in the right middle field and the right lower field.   Abdominal: Soft. Normal appearance and bowel sounds are normal. She exhibits no distension. There is no tenderness.   Musculoskeletal: Normal range of motion. She exhibits no edema or deformity.   Neurological: She is alert and oriented to person, place, and time. She exhibits normal muscle tone. Coordination normal.   Skin: Skin is warm, dry and intact. She is not diaphoretic. No pallor.   Psychiatric: She has a normal mood and affect. Her speech is normal and behavior is normal. Judgment and thought content normal. Cognition and memory are normal.   Nursing note and vitals reviewed.      Assessment:       1. Cough        Plan:         Cough  -     X-Ray Chest PA And Lateral; Future; Expected date: 04/28/2018    Other orders  -     benzonatate (TESSALON PERLES) 100 MG capsule; Take 1 capsule (100 mg total) by mouth every 6 (six) hours as needed for Cough.  Dispense: 30 capsule; Refill: 1      Patient has inhaler and Flonase at home she is not using. Chest xray negative for pneumonia. Cough present two days. Discussed differential. Discussed smoking cessation and that symptoms will not improved with  continued use of cigarettes.  I discussed with the patient the importance of follow up if their symptoms have not resolved in 1-2 week's time. Patient verbalized understanding and will RTC or go to the nearest ER if symptoms persist or worsen.

## 2018-04-30 ENCOUNTER — TELEPHONE (OUTPATIENT)
Dept: URGENT CARE | Facility: CLINIC | Age: 58
End: 2018-04-30

## 2018-05-02 ENCOUNTER — PATIENT MESSAGE (OUTPATIENT)
Dept: FAMILY MEDICINE | Facility: CLINIC | Age: 58
End: 2018-05-02

## 2018-05-04 ENCOUNTER — OFFICE VISIT (OUTPATIENT)
Dept: URGENT CARE | Facility: CLINIC | Age: 58
End: 2018-05-04
Payer: COMMERCIAL

## 2018-05-04 VITALS
RESPIRATION RATE: 14 BRPM | TEMPERATURE: 98 F | HEART RATE: 62 BPM | OXYGEN SATURATION: 99 % | DIASTOLIC BLOOD PRESSURE: 71 MMHG | SYSTOLIC BLOOD PRESSURE: 133 MMHG

## 2018-05-04 DIAGNOSIS — J20.9 ACUTE BRONCHITIS, UNSPECIFIED ORGANISM: Primary | ICD-10-CM

## 2018-05-04 DIAGNOSIS — J32.4 PANSINUSITIS, UNSPECIFIED CHRONICITY: ICD-10-CM

## 2018-05-04 PROCEDURE — 99213 OFFICE O/P EST LOW 20 MIN: CPT | Mod: 25,S$GLB,, | Performed by: INTERNAL MEDICINE

## 2018-05-04 PROCEDURE — 96372 THER/PROPH/DIAG INJ SC/IM: CPT | Mod: S$GLB,,, | Performed by: INTERNAL MEDICINE

## 2018-05-04 RX ORDER — METHYLPREDNISOLONE 4 MG/1
TABLET ORAL
Qty: 1 PACKAGE | Refills: 0 | Status: SHIPPED | OUTPATIENT
Start: 2018-05-04 | End: 2018-08-31

## 2018-05-04 RX ORDER — METFORMIN HYDROCHLORIDE 500 MG/1
TABLET ORAL
COMMUNITY
End: 2018-08-31 | Stop reason: SDUPTHER

## 2018-05-04 RX ORDER — ALBUTEROL SULFATE 90 UG/1
AEROSOL, METERED RESPIRATORY (INHALATION)
COMMUNITY
End: 2018-08-31 | Stop reason: SDUPTHER

## 2018-05-04 RX ORDER — AZITHROMYCIN 500 MG/1
500 TABLET, FILM COATED ORAL DAILY
Qty: 5 TABLET | Refills: 0 | Status: SHIPPED | OUTPATIENT
Start: 2018-05-04 | End: 2018-05-09

## 2018-05-04 RX ORDER — DEXAMETHASONE SODIUM PHOSPHATE 100 MG/10ML
10 INJECTION INTRAMUSCULAR; INTRAVENOUS
Status: COMPLETED | OUTPATIENT
Start: 2018-05-04 | End: 2018-05-04

## 2018-05-04 RX ORDER — HYDROCODONE POLISTIREX AND CHLORPHENIRAMINE POLISTIREX 10; 8 MG/5ML; MG/5ML
5 SUSPENSION, EXTENDED RELEASE ORAL EVERY 12 HOURS PRN
Qty: 50 ML | Refills: 0 | Status: SHIPPED | OUTPATIENT
Start: 2018-05-04 | End: 2018-05-25 | Stop reason: SDUPTHER

## 2018-05-04 RX ADMIN — DEXAMETHASONE SODIUM PHOSPHATE 10 MG: 100 INJECTION INTRAMUSCULAR; INTRAVENOUS at 09:05

## 2018-05-04 NOTE — PROGRESS NOTES
Subjective:       Patient ID: Malathi Martinez is a 57 y.o. female.    Vitals:  oral temperature is 97.7 °F (36.5 °C). Her blood pressure is 133/71 and her pulse is 62. Her respiration is 14 and oxygen saturation is 99%.     Chief Complaint: Cough    Pt c/o productive cough, 1 week, nasal congestion, post nasal drip, sore throat, right ear pain, stated she was seen last week, prescribed tessalon pearls with no relief,       Cough   This is a new problem. The current episode started in the past 7 days. The problem has been unchanged. The problem occurs constantly. The cough is productive of sputum. Associated symptoms include ear pain, nasal congestion, postnasal drip and a sore throat. Pertinent negatives include no chest pain, chills, eye redness, fever, headaches, myalgias, shortness of breath or wheezing.     Review of Systems   Constitution: Negative for chills, fever and malaise/fatigue.   HENT: Positive for congestion, ear pain, postnasal drip and sore throat. Negative for hoarse voice.    Eyes: Negative for discharge and redness.   Cardiovascular: Negative for chest pain, dyspnea on exertion and leg swelling.   Respiratory: Positive for cough and sputum production. Negative for shortness of breath and wheezing.    Musculoskeletal: Negative for myalgias.   Gastrointestinal: Negative for abdominal pain and nausea.   Neurological: Negative for headaches.       Objective:      Physical Exam   Constitutional: She is oriented to person, place, and time. She appears well-developed and well-nourished. She is cooperative.  Non-toxic appearance. She does not appear ill. No distress.   HENT:   Head: Normocephalic.   Right Ear: Hearing, external ear and ear canal normal. A middle ear effusion is present.   Left Ear: Hearing and ear canal normal. A middle ear effusion is present.   Nose: No mucosal edema or rhinorrhea. Right sinus exhibits maxillary sinus tenderness and frontal sinus tenderness. Left sinus exhibits  maxillary sinus tenderness and frontal sinus tenderness.   Mouth/Throat: Uvula is midline and mucous membranes are normal. Normal dentition. No posterior oropharyngeal erythema.   Eyes: Conjunctivae and lids are normal.   Neck: Trachea normal, full passive range of motion without pain and phonation normal. Neck supple.   Cardiovascular: Normal rate, regular rhythm, normal heart sounds and normal pulses.    Pulmonary/Chest: Effort normal and breath sounds normal. No respiratory distress. She has no wheezes.   Abdominal: Soft. Normal appearance.   Lymphadenopathy:     She has no cervical adenopathy.   Neurological: She is alert and oriented to person, place, and time. She exhibits normal muscle tone. Coordination normal.   Skin: Skin is warm, dry and intact.   Psychiatric: She has a normal mood and affect. Her speech is normal. Cognition and memory are normal.   Nursing note and vitals reviewed.      Assessment:       1. Acute bronchitis, unspecified organism    2. Pansinusitis, unspecified chronicity        Plan:         Acute bronchitis, unspecified organism  -     dexamethasone injection 10 mg; Inject 1 mL (10 mg total) into the muscle one time.  -     methylPREDNISolone (MEDROL DOSEPACK) 4 mg tablet; Take all pills on each row once daily  Dispense: 1 Package; Refill: 0  -     azithromycin (ZITHROMAX) 500 MG tablet; Take 1 tablet (500 mg total) by mouth once daily.  Dispense: 5 tablet; Refill: 0  -     hydrocodone-chlorpheniramine (TUSSIONEX) 10-8 mg/5 mL suspension; Take 5 mLs by mouth every 12 (twelve) hours as needed for Cough.  Dispense: 50 mL; Refill: 0    Pansinusitis, unspecified chronicity  -     methylPREDNISolone (MEDROL DOSEPACK) 4 mg tablet; Take all pills on each row once daily  Dispense: 1 Package; Refill: 0  -     azithromycin (ZITHROMAX) 500 MG tablet; Take 1 tablet (500 mg total) by mouth once daily.  Dispense: 5 tablet; Refill: 0

## 2018-05-04 NOTE — PATIENT INSTRUCTIONS
Bronchitis, Antibiotic Treatment (Adult)    Bronchitis is an infection of the air passages (bronchial tubes) in your lungs. It often occurs when you have a cold. This illness is contagious during the first few days and is spread through the air by coughing and sneezing, or by direct contact (touching the sick person and then touching your own eyes, nose, or mouth).  Symptoms of bronchitis include cough with mucus (phlegm) and low-grade fever. Bronchitis usually lasts 7 to 14 days. Mild cases can be treated with simple home remedies. More severe infection is treated with an antibiotic.  Home care  Follow these guidelines when caring for yourself at home:  · If your symptoms are severe, rest at home for the first 2 to 3 days. When you go back to your usual activities, don't let yourself get too tired.  · Do not smoke. Also avoid being exposed to secondhand smoke.  · You may use over-the-counter medicines to control fever or pain, unless another medicine was prescribed. (Note: If you have chronic liver or kidney disease or have ever had a stomach ulcer or gastrointestinal bleeding, talk with your healthcare provider before using these medicines. Also talk to your provider if you are taking medicine to prevent blood clots.) Aspirin should never be given to anyone younger than 18 years of age who is ill with a viral infection or fever. It may cause severe liver or brain damage.  · Your appetite may be poor, so a light diet is fine. Avoid dehydration by drinking 6 to 8 glasses of fluids per day (such as water, soft drinks, sports drinks, juices, tea, or soup). Extra fluids will help loosen secretions in the nose and lungs.  · Over-the-counter cough, cold, and sore-throat medicines will not shorten the length of the illness, but they may be helpful to reduce symptoms. (Note: Do not use decongestants if you have high blood pressure.)  · Finish all antibiotic medicine. Do this even if you are feeling better after only a  few days.  Follow-up care  Follow up with your healthcare provider, or as advised. If you had an X-ray or ECG (electrocardiogram), a specialist will review it. You will be notified of any new findings that may affect your care.  Note: If you are age 65 or older, or if you have a chronic lung disease or condition that affects your immune system, or you smoke, talk to your healthcare provider about having pneumococcal vaccinations and a yearly influenza vaccination (flu shot).  When to seek medical advice  Call your healthcare provider right away if any of these occur:  · Fever of 100.4°F (38°C) or higher  · Coughing up increased amounts of colored sputum  · Weakness, drowsiness, headache, facial pain, ear pain, or a stiff neck  Call 911, or get immediate medical care  Contact emergency services right away if any of these occur.  · Coughing up blood  · Worsening weakness, drowsiness, headache, or stiff neck  · Trouble breathing, wheezing, or pain with breathing

## 2018-05-05 DIAGNOSIS — F41.9 ANXIETY: ICD-10-CM

## 2018-05-05 DIAGNOSIS — F43.23 ADJUSTMENT DISORDER WITH MIXED ANXIETY AND DEPRESSED MOOD: ICD-10-CM

## 2018-05-05 DIAGNOSIS — F43.21 GRIEF REACTION: ICD-10-CM

## 2018-05-07 ENCOUNTER — TELEPHONE (OUTPATIENT)
Dept: URGENT CARE | Facility: CLINIC | Age: 58
End: 2018-05-07

## 2018-05-07 RX ORDER — BUPROPION HYDROCHLORIDE 150 MG/1
150 TABLET ORAL DAILY
Qty: 30 TABLET | Refills: 5 | Status: SHIPPED | OUTPATIENT
Start: 2018-05-07 | End: 2018-08-31 | Stop reason: SDUPTHER

## 2018-05-09 ENCOUNTER — TELEPHONE (OUTPATIENT)
Dept: FAMILY MEDICINE | Facility: CLINIC | Age: 58
End: 2018-05-09

## 2018-05-09 NOTE — TELEPHONE ENCOUNTER
LXEII    Recd fax from [facility] re: pt cancelled appt referred by Dr Dave for abnormal CTon 05/09/2018.  She has not rescheduled.

## 2018-05-14 RX ORDER — METFORMIN HYDROCHLORIDE 500 MG/1
TABLET, EXTENDED RELEASE ORAL
Qty: 180 TABLET | Refills: 1 | Status: SHIPPED | OUTPATIENT
Start: 2018-05-14 | End: 2018-10-19 | Stop reason: SDUPTHER

## 2018-05-14 RX ORDER — LISINOPRIL 2.5 MG/1
TABLET ORAL
Qty: 90 TABLET | Refills: 1 | Status: SHIPPED | OUTPATIENT
Start: 2018-05-14 | End: 2018-10-19 | Stop reason: SDUPTHER

## 2018-05-25 ENCOUNTER — OFFICE VISIT (OUTPATIENT)
Dept: FAMILY MEDICINE | Facility: CLINIC | Age: 58
End: 2018-05-25
Payer: COMMERCIAL

## 2018-05-25 VITALS
DIASTOLIC BLOOD PRESSURE: 64 MMHG | OXYGEN SATURATION: 98 % | HEART RATE: 60 BPM | SYSTOLIC BLOOD PRESSURE: 128 MMHG | WEIGHT: 174.19 LBS | HEIGHT: 70 IN | BODY MASS INDEX: 24.94 KG/M2 | RESPIRATION RATE: 16 BRPM | TEMPERATURE: 99 F

## 2018-05-25 DIAGNOSIS — F43.23 ADJUSTMENT DISORDER WITH MIXED ANXIETY AND DEPRESSED MOOD: ICD-10-CM

## 2018-05-25 DIAGNOSIS — F33.41 RECURRENT MAJOR DEPRESSIVE DISORDER, IN PARTIAL REMISSION: Primary | ICD-10-CM

## 2018-05-25 DIAGNOSIS — J20.9 ACUTE BRONCHITIS, UNSPECIFIED ORGANISM: ICD-10-CM

## 2018-05-25 DIAGNOSIS — M25.511 RIGHT SHOULDER PAIN, UNSPECIFIED CHRONICITY: ICD-10-CM

## 2018-05-25 DIAGNOSIS — F43.21 GRIEF REACTION: ICD-10-CM

## 2018-05-25 DIAGNOSIS — F41.9 ANXIETY: ICD-10-CM

## 2018-05-25 PROCEDURE — 3008F BODY MASS INDEX DOCD: CPT | Mod: CPTII,S$GLB,, | Performed by: FAMILY MEDICINE

## 2018-05-25 PROCEDURE — 99214 OFFICE O/P EST MOD 30 MIN: CPT | Mod: S$GLB,,, | Performed by: FAMILY MEDICINE

## 2018-05-25 PROCEDURE — 99999 PR PBB SHADOW E&M-EST. PATIENT-LVL IV: CPT | Mod: PBBFAC,,, | Performed by: FAMILY MEDICINE

## 2018-05-25 RX ORDER — HYDROCODONE POLISTIREX AND CHLORPHENIRAMINE POLISTIREX 10; 8 MG/5ML; MG/5ML
5 SUSPENSION, EXTENDED RELEASE ORAL EVERY 12 HOURS PRN
Qty: 115 ML | Refills: 0 | Status: SHIPPED | OUTPATIENT
Start: 2018-05-25 | End: 2018-06-07 | Stop reason: SDUPTHER

## 2018-05-25 RX ORDER — ALPRAZOLAM 0.25 MG/1
TABLET ORAL
Qty: 60 TABLET | Refills: 2 | Status: SHIPPED | OUTPATIENT
Start: 2018-05-25 | End: 2018-08-31 | Stop reason: SDUPTHER

## 2018-05-25 RX ORDER — INDOMETHACIN 25 MG/1
25 CAPSULE ORAL 2 TIMES DAILY WITH MEALS
Qty: 30 CAPSULE | Refills: 2 | Status: SHIPPED | OUTPATIENT
Start: 2018-05-25 | End: 2018-12-20 | Stop reason: SDUPTHER

## 2018-05-25 RX ORDER — CYCLOBENZAPRINE HCL 5 MG
5 TABLET ORAL NIGHTLY PRN
Qty: 30 TABLET | Refills: 2 | Status: SHIPPED | OUTPATIENT
Start: 2018-05-25 | End: 2018-10-24 | Stop reason: SDUPTHER

## 2018-05-25 NOTE — PROGRESS NOTES
Subjective:       Patient ID: Malathi Martinez is a 57 y.o. female.    Chief Complaint: Follow-up    HPI  Patient in the office for f/u.  Past Medical History:   Diagnosis Date    Abnormal stress test     Bronchitis     Diabetes mellitus, type 2     HLD (hyperlipidemia)     Hx of endoscopy     Hypertension     Mental disorder     SOB (shortness of breath)     resolved    Tobacco abuse 12/26/2012     Was seen in  4/28 and 5/4 for bronchitis. Notes continued cough from her sx. At 2nd visit, was given abx, steroid inj, prednisone rx.   5# weight loss since LOV. Working very hard to maintain her diet, ensure BS control.   Notes good and bad days. Some stress that they are trying to sell her mom's house. Still doing counseling with the . Finds that her mood is slightly better from 4/2018.     Review of Systems   Constitutional: Positive for chills. Negative for fever.   HENT: Positive for postnasal drip and rhinorrhea. Negative for sore throat.    Respiratory: Positive for cough, shortness of breath and wheezing.    Cardiovascular: Negative for chest pain.   Musculoskeletal: Negative for myalgias.   Neurological: Positive for headaches (attributed to coughing fits).   Psychiatric/Behavioral: Positive for dysphoric mood. Negative for sleep disturbance.       Objective:      Physical Exam   Constitutional: She is oriented to person, place, and time. She appears well-developed and well-nourished. She is cooperative.  Non-toxic appearance. She does not appear ill. No distress.   HENT:   Head: Normocephalic.   Right Ear: Hearing, external ear and ear canal normal. A middle ear effusion is present.   Left Ear: Hearing and ear canal normal. A middle ear effusion is present.   Nose: No mucosal edema or rhinorrhea. Right sinus exhibits no maxillary sinus tenderness and no frontal sinus tenderness. Left sinus exhibits no maxillary sinus tenderness and no frontal sinus tenderness.   Mouth/Throat: Uvula is  midline and mucous membranes are normal. Normal dentition. No posterior oropharyngeal erythema.   Eyes: Conjunctivae and lids are normal.   Neck: Trachea normal, full passive range of motion without pain and phonation normal. Neck supple.   Cardiovascular: Normal rate, regular rhythm, normal heart sounds and normal pulses.    Pulmonary/Chest: Effort normal and breath sounds normal. No respiratory distress. She has no wheezes.   Abdominal: Soft. Normal appearance.   Lymphadenopathy:     She has no cervical adenopathy.   Neurological: She is alert and oriented to person, place, and time. She exhibits normal muscle tone. Coordination normal.   Skin: Skin is warm, dry and intact.   Psychiatric: She has a normal mood and affect. Her speech is normal. Cognition and memory are normal.   Nursing note and vitals reviewed.        Bronchitis  Improved. Add mucinex otc. Albuterol prn.   Recurrent major depressive disorder, in partial remission  Stable overall. Cont current regimen.  Acute bronchitis, unspecified organism  -     hydrocodone-chlorpheniramine (TUSSIONEX) 10-8 mg/5 mL suspension; Take 5 mLs by mouth every 12 (twelve) hours as needed for Cough.  Dispense: 115 mL; Refill: 0  Prn.  Anxiety  -     ALPRAZolam (XANAX) 0.25 MG tablet; TAKE 1 TABLET TWICE A DAY AS NEEDED FOR ANXIETY  Dispense: 60 tablet; Refill: 2  Controlled, cont.  Adjustment disorder with mixed anxiety and depressed mood  -     ALPRAZolam (XANAX) 0.25 MG tablet; TAKE 1 TABLET TWICE A DAY AS NEEDED FOR ANXIETY  Dispense: 60 tablet; Refill: 2  Grief reaction  -     ALPRAZolam (XANAX) 0.25 MG tablet; TAKE 1 TABLET TWICE A DAY AS NEEDED FOR ANXIETY  Dispense: 60 tablet; Refill: 2  Shoulder pain, R  -     cyclobenzaprine (FLEXERIL) 5 MG tablet; Take 1 tablet (5 mg total) by mouth nightly as needed for Muscle spasms.  Dispense: 30 tablet; Refill: 2  -     indomethacin (INDOCIN) 25 MG capsule; Take 1 capsule (25 mg total) by mouth 2 (two) times daily with  meals.  Dispense: 30 capsule; Refill: 2  Side effects and precautions of medication use reviewed with patient, expressed understanding. No questions or concerns.

## 2018-06-07 DIAGNOSIS — J20.9 ACUTE BRONCHITIS, UNSPECIFIED ORGANISM: ICD-10-CM

## 2018-06-07 RX ORDER — HYDROCODONE POLISTIREX AND CHLORPHENIRAMINE POLISTIREX 10; 8 MG/5ML; MG/5ML
5 SUSPENSION, EXTENDED RELEASE ORAL EVERY 12 HOURS PRN
Qty: 115 ML | Refills: 0 | Status: SHIPPED | OUTPATIENT
Start: 2018-06-07 | End: 2018-10-19 | Stop reason: ALTCHOICE

## 2018-06-07 NOTE — TELEPHONE ENCOUNTER
Attempted to contact pt, left message to call office. Wanted to inform that rx was approved and Dr Beltran will review CT scan tomorrow.

## 2018-06-07 NOTE — TELEPHONE ENCOUNTER
Returned pt call back, no answer, left voicemail to call our office. Want to inform her medication was approved.

## 2018-06-07 NOTE — TELEPHONE ENCOUNTER
----- Message from Malathi Gonzalez sent at 6/7/2018  2:46 PM CDT -----  Type:  Patient Returning Call    Who Called:  Patient   Who Left Message for Patient:  Brenna Villar  Does the patient know what this is regarding?:  ?  Best Call Back Number:  [phone]  Additional Information:      Thank you

## 2018-06-08 ENCOUNTER — HOSPITAL ENCOUNTER (OUTPATIENT)
Dept: RADIOLOGY | Facility: HOSPITAL | Age: 58
Discharge: HOME | End: 2018-06-08
Attending: INTERNAL MEDICINE
Payer: COMMERCIAL

## 2018-06-08 DIAGNOSIS — R93.89 ABNORMAL RADIOLOGICAL FINDINGS IN SKIN AND SUBCUTANEOUS TISSUE: ICD-10-CM

## 2018-06-08 PROCEDURE — 71250 CT THORAX DX C-: CPT | Mod: TC,PO

## 2018-06-08 PROCEDURE — 71250 CT THORAX DX C-: CPT | Mod: 26,,, | Performed by: RADIOLOGY

## 2018-06-10 ENCOUNTER — PATIENT MESSAGE (OUTPATIENT)
Dept: FAMILY MEDICINE | Facility: CLINIC | Age: 58
End: 2018-06-10

## 2018-06-11 RX ORDER — ATORVASTATIN CALCIUM 20 MG/1
TABLET, FILM COATED ORAL
Qty: 90 TABLET | Refills: 1 | Status: SHIPPED | OUTPATIENT
Start: 2018-06-11 | End: 2018-11-22 | Stop reason: SDUPTHER

## 2018-07-03 ENCOUNTER — PATIENT MESSAGE (OUTPATIENT)
Dept: FAMILY MEDICINE | Facility: CLINIC | Age: 58
End: 2018-07-03

## 2018-07-03 RX ORDER — VARENICLINE TARTRATE 0.5 (11)-1
KIT ORAL
Qty: 1 PACKAGE | Refills: 0 | Status: SHIPPED | OUTPATIENT
Start: 2018-07-03 | End: 2018-08-02 | Stop reason: ALTCHOICE

## 2018-07-14 DIAGNOSIS — M79.89 LEG SWELLING: ICD-10-CM

## 2018-07-16 RX ORDER — FUROSEMIDE 20 MG/1
20 TABLET ORAL DAILY
Qty: 90 TABLET | Refills: 1 | Status: SHIPPED | OUTPATIENT
Start: 2018-07-16 | End: 2019-01-08 | Stop reason: SDUPTHER

## 2018-07-19 ENCOUNTER — TELEPHONE (OUTPATIENT)
Dept: FAMILY MEDICINE | Facility: CLINIC | Age: 58
End: 2018-07-19

## 2018-07-19 ENCOUNTER — PATIENT MESSAGE (OUTPATIENT)
Dept: FAMILY MEDICINE | Facility: CLINIC | Age: 58
End: 2018-07-19

## 2018-07-19 DIAGNOSIS — Z00.00 ROUTINE HEALTH MAINTENANCE: Primary | ICD-10-CM

## 2018-07-19 NOTE — TELEPHONE ENCOUNTER
Please see my chart message and advise if any labs are needed for next ov for 8/31/18.    Hemoglobin A1C 2/10/18  Microalbumin/creatinine 8/19/17

## 2018-08-02 RX ORDER — VARENICLINE TARTRATE 0.5 (11)-1
KIT ORAL
Refills: 0 | OUTPATIENT
Start: 2018-08-02

## 2018-08-02 RX ORDER — VARENICLINE TARTRATE 1 MG/1
1 TABLET, FILM COATED ORAL 2 TIMES DAILY
Qty: 60 TABLET | Refills: 3 | Status: SHIPPED | OUTPATIENT
Start: 2018-08-02 | End: 2018-10-16

## 2018-08-02 NOTE — TELEPHONE ENCOUNTER
----- Message from Jacqueline Lemon sent at 8/2/2018 11:28 AM CDT -----  Contact: Vicki collado/ corry ph#[phone]  Vicki collado/ corry ph#[phone]    Patient requesting a refill on chantix.    Patient will be using   [id number] - [place] - [address] [address]  [address] [place] [address]  Phone: [phone] Fax: [phone]

## 2018-08-04 ENCOUNTER — LAB VISIT (OUTPATIENT)
Dept: LAB | Facility: HOSPITAL | Age: 58
End: 2018-08-04
Attending: FAMILY MEDICINE
Payer: COMMERCIAL

## 2018-08-04 DIAGNOSIS — Z00.00 ROUTINE HEALTH MAINTENANCE: ICD-10-CM

## 2018-08-04 LAB
ALBUMIN SERPL BCP-MCNC: 4 G/DL
ALP SERPL-CCNC: 67 U/L
ALT SERPL W/O P-5'-P-CCNC: 14 U/L
ANION GAP SERPL CALC-SCNC: 9 MMOL/L
AST SERPL-CCNC: 13 U/L
BILIRUB SERPL-MCNC: 0.4 MG/DL
BUN SERPL-MCNC: 13 MG/DL
CALCIUM SERPL-MCNC: 9.5 MG/DL
CHLORIDE SERPL-SCNC: 101 MMOL/L
CO2 SERPL-SCNC: 28 MMOL/L
CREAT SERPL-MCNC: 0.8 MG/DL
EST. GFR  (AFRICAN AMERICAN): >60 ML/MIN/1.73 M^2
EST. GFR  (NON AFRICAN AMERICAN): >60 ML/MIN/1.73 M^2
ESTIMATED AVG GLUCOSE: 108 MG/DL
GLUCOSE SERPL-MCNC: 91 MG/DL
HBA1C MFR BLD HPLC: 5.4 %
MAGNESIUM SERPL-MCNC: 2 MG/DL
POTASSIUM SERPL-SCNC: 3.7 MMOL/L
PROT SERPL-MCNC: 7.3 G/DL
SODIUM SERPL-SCNC: 138 MMOL/L

## 2018-08-04 PROCEDURE — 80053 COMPREHEN METABOLIC PANEL: CPT

## 2018-08-04 PROCEDURE — 83735 ASSAY OF MAGNESIUM: CPT

## 2018-08-04 PROCEDURE — 36415 COLL VENOUS BLD VENIPUNCTURE: CPT | Mod: PO

## 2018-08-04 PROCEDURE — 83036 HEMOGLOBIN GLYCOSYLATED A1C: CPT

## 2018-08-17 ENCOUNTER — PATIENT OUTREACH (OUTPATIENT)
Dept: ADMINISTRATIVE | Facility: HOSPITAL | Age: 58
End: 2018-08-17

## 2018-08-17 DIAGNOSIS — F43.21 GRIEF REACTION: ICD-10-CM

## 2018-08-17 DIAGNOSIS — F33.41 RECURRENT MAJOR DEPRESSIVE DISORDER, IN PARTIAL REMISSION: ICD-10-CM

## 2018-08-17 RX ORDER — ESCITALOPRAM OXALATE 20 MG/1
20 TABLET ORAL DAILY
Qty: 90 TABLET | Refills: 1 | Status: SHIPPED | OUTPATIENT
Start: 2018-08-17 | End: 2018-10-16

## 2018-08-18 ENCOUNTER — PATIENT MESSAGE (OUTPATIENT)
Dept: FAMILY MEDICINE | Facility: CLINIC | Age: 58
End: 2018-08-18

## 2018-08-22 ENCOUNTER — PATIENT MESSAGE (OUTPATIENT)
Dept: FAMILY MEDICINE | Facility: CLINIC | Age: 58
End: 2018-08-22

## 2018-08-31 ENCOUNTER — LAB VISIT (OUTPATIENT)
Dept: LAB | Facility: HOSPITAL | Age: 58
End: 2018-08-31
Attending: FAMILY MEDICINE
Payer: COMMERCIAL

## 2018-08-31 ENCOUNTER — OFFICE VISIT (OUTPATIENT)
Dept: FAMILY MEDICINE | Facility: CLINIC | Age: 58
End: 2018-08-31
Payer: COMMERCIAL

## 2018-08-31 VITALS
HEART RATE: 64 BPM | TEMPERATURE: 99 F | WEIGHT: 177.81 LBS | BODY MASS INDEX: 25.45 KG/M2 | HEIGHT: 70 IN | DIASTOLIC BLOOD PRESSURE: 58 MMHG | RESPIRATION RATE: 16 BRPM | SYSTOLIC BLOOD PRESSURE: 110 MMHG

## 2018-08-31 DIAGNOSIS — K59.00 CONSTIPATION, UNSPECIFIED CONSTIPATION TYPE: ICD-10-CM

## 2018-08-31 DIAGNOSIS — F43.23 ADJUSTMENT DISORDER WITH MIXED ANXIETY AND DEPRESSED MOOD: ICD-10-CM

## 2018-08-31 DIAGNOSIS — F43.21 GRIEF REACTION: ICD-10-CM

## 2018-08-31 DIAGNOSIS — R10.30 ABDOMINAL PAIN, LOWER: Primary | ICD-10-CM

## 2018-08-31 DIAGNOSIS — Z72.0 TOBACCO ABUSE: ICD-10-CM

## 2018-08-31 DIAGNOSIS — R20.2 PARESTHESIAS: ICD-10-CM

## 2018-08-31 DIAGNOSIS — F33.41 RECURRENT MAJOR DEPRESSIVE DISORDER, IN PARTIAL REMISSION: ICD-10-CM

## 2018-08-31 DIAGNOSIS — R07.9 CHEST PAIN, UNSPECIFIED TYPE: ICD-10-CM

## 2018-08-31 LAB
BILIRUB UR QL STRIP: NEGATIVE
CLARITY UR REFRACT.AUTO: CLEAR
COLOR UR AUTO: NORMAL
GLUCOSE UR QL STRIP: NEGATIVE
HGB UR QL STRIP: NEGATIVE
KETONES UR QL STRIP: NEGATIVE
LEUKOCYTE ESTERASE UR QL STRIP: NEGATIVE
NITRITE UR QL STRIP: NEGATIVE
PH UR STRIP: 7 [PH] (ref 5–8)
PROT UR QL STRIP: NEGATIVE
SP GR UR STRIP: 1 (ref 1–1.03)
URN SPEC COLLECT METH UR: NORMAL
UROBILINOGEN UR STRIP-ACNC: NEGATIVE EU/DL

## 2018-08-31 PROCEDURE — 93005 ELECTROCARDIOGRAM TRACING: CPT | Mod: S$GLB,,, | Performed by: FAMILY MEDICINE

## 2018-08-31 PROCEDURE — 99999 PR PBB SHADOW E&M-EST. PATIENT-LVL V: CPT | Mod: PBBFAC,,, | Performed by: FAMILY MEDICINE

## 2018-08-31 PROCEDURE — 99214 OFFICE O/P EST MOD 30 MIN: CPT | Mod: S$GLB,,, | Performed by: FAMILY MEDICINE

## 2018-08-31 PROCEDURE — 93010 ELECTROCARDIOGRAM REPORT: CPT | Mod: S$GLB,,, | Performed by: INTERNAL MEDICINE

## 2018-08-31 PROCEDURE — 3008F BODY MASS INDEX DOCD: CPT | Mod: CPTII,S$GLB,, | Performed by: FAMILY MEDICINE

## 2018-08-31 PROCEDURE — 81003 URINALYSIS AUTO W/O SCOPE: CPT

## 2018-08-31 RX ORDER — ALPRAZOLAM 0.25 MG/1
TABLET ORAL
Qty: 60 TABLET | Refills: 2 | Status: SHIPPED | OUTPATIENT
Start: 2018-08-31 | End: 2018-10-19 | Stop reason: SDUPTHER

## 2018-08-31 RX ORDER — ESCITALOPRAM OXALATE 10 MG/1
10 TABLET ORAL DAILY
Qty: 90 TABLET | Refills: 1 | Status: SHIPPED | OUTPATIENT
Start: 2018-08-31 | End: 2018-10-19 | Stop reason: SDUPTHER

## 2018-08-31 RX ORDER — BUPROPION HYDROCHLORIDE 150 MG/1
150 TABLET ORAL DAILY
Qty: 90 TABLET | Refills: 1 | Status: SHIPPED | OUTPATIENT
Start: 2018-08-31 | End: 2019-04-12 | Stop reason: SDUPTHER

## 2018-08-31 RX ORDER — OMEPRAZOLE 40 MG/1
40 CAPSULE, DELAYED RELEASE ORAL DAILY
Qty: 90 CAPSULE | Refills: 1 | Status: SHIPPED | OUTPATIENT
Start: 2018-08-31 | End: 2019-02-17 | Stop reason: SDUPTHER

## 2018-08-31 NOTE — PROGRESS NOTES
Subjective:       Patient ID: Malathi Martinez is a 58 y.o. female.    Chief Complaint: Follow-up (3 mo)    HPI  Patient in the office for f/u.  She notes that last month, she had some am chills that resolves after 2 weeks.  Also, had a week constant pins and needles sensation in her arms (down to back of hands) and face that also resolved after a week.   Will need new pulmonologist next year for f/u appt as she got a letter that [facility] is leaving.  Still emotionally struggling after her mom's passing. They just sold her house this past week. Feels as though the medications are certainly helping, and concerned that increased dose would increase side effects rather than sx improvement.   Open to psych/sw.  chantix not very effective as yet, on ~1mo.    Review of Systems   Constitutional: Negative for activity change and unexpected weight change.   HENT: Positive for rhinorrhea and trouble swallowing (sporadic). Negative for hearing loss.    Eyes: Positive for discharge. Negative for visual disturbance.   Respiratory: Positive for wheezing (resolved of late). Negative for chest tightness.    Cardiovascular: Positive for chest pain (sharp, intermittent to L chest wall) and palpitations (during cp).        Occ episodes exclusive to panic/stress   Gastrointestinal: Positive for constipation. Negative for blood in stool, diarrhea and vomiting.   Endocrine: Negative for polydipsia and polyuria.   Genitourinary: Negative for difficulty urinating, dysuria, hematuria and menstrual problem.   Musculoskeletal: Positive for arthralgias and neck pain. Negative for joint swelling.   Neurological: Negative for weakness and headaches.   Psychiatric/Behavioral: Positive for dysphoric mood. Negative for confusion.       Objective:      Physical Exam   Constitutional: She is oriented to person, place, and time. She appears well-developed and well-nourished. No distress.   Smells of tobacco.   HENT:   Head: Normocephalic and  atraumatic.   Right Ear: External ear normal.   Left Ear: External ear normal.   Nose: Nose normal.   Mouth/Throat: Oropharynx is clear and moist. No oropharyngeal exudate.   Eyes: Conjunctivae and EOM are normal. Pupils are equal, round, and reactive to light.   Neck: Normal range of motion. Neck supple. No thyromegaly present.   Cardiovascular: Normal rate and regular rhythm.   Pulmonary/Chest: Effort normal and breath sounds normal. No respiratory distress. She has no wheezes.   Abdominal: Soft. She exhibits no distension.   Musculoskeletal: Normal range of motion. She exhibits no edema.   Lymphadenopathy:     She has no cervical adenopathy.   Neurological: She is alert and oriented to person, place, and time. No cranial nerve deficit.   Skin: Skin is warm and dry.   Psychiatric: Her speech is normal and behavior is normal. She exhibits a depressed mood.   Tearful, good eye contact, calm and cooperative   Nursing note and vitals reviewed.        Abdominal pain, lower  -     US Abdomen Complete; Future; Expected date: 08/31/2018  -     US Pelvis Limited Non OB; Future; Expected date: 08/31/2018  Likely related to uncontrolled gerd and change in bowel habits.  Fit kit 2/2018 neg.  Reviewed miralax daily.  Increase ppi dose.  Adjustment disorder with mixed anxiety and depressed mood  -     ALPRAZolam (XANAX) 0.25 MG tablet; TAKE 1 TABLET TWICE A DAY AS NEEDED FOR ANXIETY  Dispense: 60 tablet; Refill: 2  -     buPROPion (WELLBUTRIN XL) 150 MG TB24 tablet; Take 1 tablet (150 mg total) by mouth once daily.  Dispense: 90 tablet; Refill: 1  Cont regimen. Referral to psych/sw.  Grief reaction  -     ALPRAZolam (XANAX) 0.25 MG tablet; TAKE 1 TABLET TWICE A DAY AS NEEDED FOR ANXIETY  Dispense: 60 tablet; Refill: 2  -     escitalopram oxalate (LEXAPRO) 10 MG tablet; Take 1 tablet (10 mg total) by mouth once daily.  Dispense: 90 tablet; Refill: 1  -     buPROPion (WELLBUTRIN XL) 150 MG TB24 tablet; Take 1 tablet (150 mg  total) by mouth once daily.  Dispense: 90 tablet; Refill: 1  Recurrent major depressive disorder, in partial remission  -     escitalopram oxalate (LEXAPRO) 10 MG tablet; Take 1 tablet (10 mg total) by mouth once daily.  Dispense: 90 tablet; Refill: 1  Constipation, unspecified constipation type  -     Urinalysis; Future  As above.  Chest pain, unspecified type  -     IN OFFICE EKG 12-LEAD (to Muse)  Update EKG and pending report will schedule f/u in cards.   Tobacco abuse  Cont chantix.  Paresthesias  Likely related to cervical radiculopathy. To let me know if it recurs for imaging/f/u. Reviewed no stroke signs at time of sx.   DM2  Controlled. Pt interested in digital BS meter. Reviewed.  -     Diabetes Digital Medicine (DDMP) Enrollment Order  -     omeprazole (PRILOSEC) 40 MG capsule; Take 1 capsule (40 mg total) by mouth once daily.  Dispense: 90 capsule; Refill: 1

## 2018-09-11 ENCOUNTER — PATIENT MESSAGE (OUTPATIENT)
Dept: ADMINISTRATIVE | Facility: OTHER | Age: 58
End: 2018-09-11

## 2018-09-11 DIAGNOSIS — E11.9 TYPE 2 DIABETES MELLITUS: ICD-10-CM

## 2018-09-14 ENCOUNTER — HOSPITAL ENCOUNTER (OUTPATIENT)
Dept: RADIOLOGY | Facility: HOSPITAL | Age: 58
Discharge: HOME | End: 2018-09-14
Attending: FAMILY MEDICINE
Payer: COMMERCIAL

## 2018-09-14 ENCOUNTER — TELEPHONE (OUTPATIENT)
Dept: FAMILY MEDICINE | Facility: CLINIC | Age: 58
End: 2018-09-14

## 2018-09-14 DIAGNOSIS — R10.30 ABDOMINAL PAIN, LOWER: ICD-10-CM

## 2018-09-14 DIAGNOSIS — R93.89 ABNORMAL PELVIC ULTRASOUND: ICD-10-CM

## 2018-09-14 DIAGNOSIS — R93.5 ABNORMAL US (ULTRASOUND) OF ABDOMEN: Primary | ICD-10-CM

## 2018-09-14 PROCEDURE — 76830 TRANSVAGINAL US NON-OB: CPT | Mod: TC,PO

## 2018-09-14 PROCEDURE — 76830 TRANSVAGINAL US NON-OB: CPT | Mod: 26,,, | Performed by: RADIOLOGY

## 2018-09-14 PROCEDURE — 76700 US EXAM ABDOM COMPLETE: CPT | Mod: TC,PO

## 2018-09-14 PROCEDURE — 76700 US EXAM ABDOM COMPLETE: CPT | Mod: 26,,, | Performed by: RADIOLOGY

## 2018-09-14 PROCEDURE — 76856 US EXAM PELVIC COMPLETE: CPT | Mod: 26,,, | Performed by: RADIOLOGY

## 2018-09-14 PROCEDURE — 76856 US EXAM PELVIC COMPLETE: CPT | Mod: TC,PO

## 2018-09-14 NOTE — TELEPHONE ENCOUNTER
Imaging reviewed with radiology.  Call placed to patient for results review.  Possible mass on left kidney, right ovary. Additional imaging indicated.   Pt expressed understanding.  Please schedule at convenience.

## 2018-09-18 DIAGNOSIS — R93.5 ABNORMAL US (ULTRASOUND) OF ABDOMEN: Primary | ICD-10-CM

## 2018-09-19 ENCOUNTER — PATIENT MESSAGE (OUTPATIENT)
Dept: GASTROENTEROLOGY | Facility: CLINIC | Age: 58
End: 2018-09-19

## 2018-09-19 ENCOUNTER — PATIENT MESSAGE (OUTPATIENT)
Dept: FAMILY MEDICINE | Facility: CLINIC | Age: 58
End: 2018-09-19

## 2018-09-19 ENCOUNTER — HOSPITAL ENCOUNTER (OUTPATIENT)
Dept: RADIOLOGY | Facility: HOSPITAL | Age: 58
Discharge: HOME | End: 2018-09-19
Attending: FAMILY MEDICINE
Payer: COMMERCIAL

## 2018-09-19 DIAGNOSIS — R93.89 ABNORMAL PELVIC ULTRASOUND: ICD-10-CM

## 2018-09-19 DIAGNOSIS — K29.70 GASTRITIS, PRESENCE OF BLEEDING UNSPECIFIED, UNSPECIFIED CHRONICITY, UNSPECIFIED GASTRITIS TYPE: ICD-10-CM

## 2018-09-19 DIAGNOSIS — D25.9 UTERINE LEIOMYOMA, UNSPECIFIED LOCATION: Primary | ICD-10-CM

## 2018-09-19 DIAGNOSIS — K56.1 SMALL BOWEL INTUSSUSCEPTION: ICD-10-CM

## 2018-09-19 DIAGNOSIS — K76.89 HEPATIC CYST: ICD-10-CM

## 2018-09-19 DIAGNOSIS — R93.5 ABNORMAL US (ULTRASOUND) OF ABDOMEN: ICD-10-CM

## 2018-09-19 PROCEDURE — 25500020 PHARM REV CODE 255: Mod: PO | Performed by: FAMILY MEDICINE

## 2018-09-19 PROCEDURE — 74178 CT ABD&PLV WO CNTR FLWD CNTR: CPT | Mod: 26,,, | Performed by: RADIOLOGY

## 2018-09-19 PROCEDURE — 74178 CT ABD&PLV WO CNTR FLWD CNTR: CPT | Mod: TC,PO

## 2018-09-19 RX ADMIN — IOHEXOL 75 ML: 350 INJECTION, SOLUTION INTRAVENOUS at 10:09

## 2018-09-19 RX ADMIN — IOHEXOL 30 ML: 350 INJECTION, SOLUTION INTRAVENOUS at 10:09

## 2018-09-19 NOTE — TELEPHONE ENCOUNTER
Results reviewed with pt by phone. She expressed understanding.  Will plan to f/u with GI/[facility] regarding stomach and small bowel findings.  Will plan to f/u with gyn/mokry regarding the uterine fibroid.  Referrals updated.  Repeat us in 1 year.

## 2018-09-20 ENCOUNTER — PATIENT MESSAGE (OUTPATIENT)
Dept: FAMILY MEDICINE | Facility: CLINIC | Age: 58
End: 2018-09-20

## 2018-09-25 ENCOUNTER — PATIENT OUTREACH (OUTPATIENT)
Dept: OTHER | Facility: OTHER | Age: 58
End: 2018-09-25

## 2018-09-25 ENCOUNTER — TELEPHONE (OUTPATIENT)
Dept: ADMINISTRATIVE | Facility: HOSPITAL | Age: 58
End: 2018-09-25

## 2018-09-25 NOTE — LETTER
Janel Ledbetter, PharmD  [address] [facility] [place] [address]     Dear Malathi Martinez,    Welcome to [facility] Digital Medicine [facility]!           My name is Janel Ledbetter, PharmD, and I am your dedicated Diabetes Digital Medicine clinician. As an expert in medication management, I will help ensure that the medications you are taking continue to provide the intended benefits and help you reach your diabetes goals.        I am Tonio Chu, and I will be your health  for the duration of the program. My job is to help you identify lifestyle changes to improve your diabetes control. We will talk about nutrition, exercise, and other ways you may be able to adjust your current habits to better your health.     Additionally, we will help ensure you are completing the tests and screenings that are necessary to help manage your diabetes.    Together, we will work to improve your overall health and encourage you to meet your goals for a healthier lifestyle.     What we expect from YOU:    If you test your blood sugar at home, you should take home measurements according to the frequency your physician and Digital Medicine care team specifies.   Be available to receive phone calls or [facility] messages when appropriate from your care team.   Complete routine tests and screenings (such as Hemoglobin A1c testing, eye exams, and Nephropathy assessment). Dont worry, we will help keep you on track!    What you should expect from your Digital Medicine Care Team:   We will provide you with education about diabetes, including lifestyle changes that could help you manage your diabetes.   We will adjust your current medications, if needed, and continue monitoring your progress in the long term.   We will provide you and your physician with monthly progress reports after you have been in the program for more than 30 days.   We will send you reminders through [facility] and text messages to help  ensure you do not miss any testing deadlines.    We want to make you get important tests and screenings done regularly to assure that your health needs are met. We have put a new system in place, called CareTouch that will help us improve how we monitor and reach out to you about the following lab tests that you will need to help manage your diabetes.    Hemoglobin A1c testing (Frequency: Every 3 to 6 months, dependent on A1c goal).   Nephropathy Assessment, generally urine micro albumin testing (Frequency: Yearly)   Eye exam through a quick 30-minute Eye Photo Exam (Frequency: 1-2 Years, depending on result)  When necessary you can come in to one of the labs on the attached page any weekday between 10:30 am and 4:00 pm to have your tests done prior to their due date. Tell the  you received a CareTouch letter, or just look for the CareTouch sign.    You will be able to reach me by phone at [phone] or through your [facility] account by clicking my name under Care Team on the right side of the home screen.    I look forward to working with you to help manage your Diabetes!    Sincerely,    Janel Ledbetter, PharmD  Your personal clinician    Please visit [email or web address] to learn more about diabetes, especially ways to take control of your health.                                                    Malathi Martinez  [address] [place] [id number]

## 2018-09-25 NOTE — PROGRESS NOTES
Last 6 Patient Entered Readings                                          Most Recent A1c: 5.4% on 8/4/2018  (Goal: 7%)     Recent Readings 9/25/2018 9/22/2018 9/21/2018 9/20/2018 9/19/2018    Blood Glucose (mg/dL) 60 86 99 95 107          Digital Medicine: Health  Introduction Call     9/25: Left voicemail and requested call back in order to complete digital medicine health  introduction call.   Will address low BG readings, <70 mg/dl, on 9/25 (60 mg/dl) and 9/17 (69 mg/dl)

## 2018-09-28 ENCOUNTER — HOSPITAL ENCOUNTER (OUTPATIENT)
Dept: RADIOLOGY | Facility: HOSPITAL | Age: 58
Discharge: HOME | End: 2018-09-28
Attending: FAMILY MEDICINE
Payer: COMMERCIAL

## 2018-09-28 ENCOUNTER — PATIENT OUTREACH (OUTPATIENT)
Dept: OTHER | Facility: OTHER | Age: 58
End: 2018-09-28

## 2018-09-28 ENCOUNTER — OFFICE VISIT (OUTPATIENT)
Dept: OBSTETRICS AND GYNECOLOGY | Facility: CLINIC | Age: 58
End: 2018-09-28
Payer: COMMERCIAL

## 2018-09-28 VITALS — WEIGHT: 181 LBS | DIASTOLIC BLOOD PRESSURE: 66 MMHG | BODY MASS INDEX: 25.97 KG/M2 | SYSTOLIC BLOOD PRESSURE: 130 MMHG

## 2018-09-28 DIAGNOSIS — D25.9 UTERINE LEIOMYOMA, UNSPECIFIED LOCATION: Primary | ICD-10-CM

## 2018-09-28 DIAGNOSIS — R93.5 ABNORMAL CT SCAN, PELVIS: ICD-10-CM

## 2018-09-28 DIAGNOSIS — E11.9 TYPE 2 DIABETES MELLITUS WITHOUT COMPLICATION, WITHOUT LONG-TERM CURRENT USE OF INSULIN: Primary | ICD-10-CM

## 2018-09-28 DIAGNOSIS — K76.89 HEPATIC CYST: ICD-10-CM

## 2018-09-28 PROCEDURE — 99999 PR PBB SHADOW E&M-EST. PATIENT-LVL IV: CPT | Mod: PBBFAC,,, | Performed by: OBSTETRICS & GYNECOLOGY

## 2018-09-28 PROCEDURE — 76700 US EXAM ABDOM COMPLETE: CPT | Mod: 26,,, | Performed by: RADIOLOGY

## 2018-09-28 PROCEDURE — 99213 OFFICE O/P EST LOW 20 MIN: CPT | Mod: S$GLB,,, | Performed by: OBSTETRICS & GYNECOLOGY

## 2018-09-28 PROCEDURE — 76700 US EXAM ABDOM COMPLETE: CPT | Mod: TC,PO

## 2018-09-28 PROCEDURE — 3008F BODY MASS INDEX DOCD: CPT | Mod: CPTII,S$GLB,, | Performed by: OBSTETRICS & GYNECOLOGY

## 2018-09-28 NOTE — PROGRESS NOTES
Last 6 Patient Entered Readings                                          Most Recent A1c: 5.4% on 8/4/2018  (Goal: 7%)     Recent Readings 9/28/2018 9/27/2018 9/26/2018 9/25/2018 9/22/2018    Blood Glucose (mg/dL) 82 115 67 60 86        Digital Medicine: Health  Introduction Call     9/28: Left voicemail and requested call back in order to complete digital medicine health  introduction call.   Will address low BG readings, <70 mg/dl, on 9/26 (67 mg/dl), 9/25 (60 mg/dl), and 9/17 (69 mg/dl).  Will also have Digital Medicine Pharmacist try to reach out.  Will send [facility] message.

## 2018-09-28 NOTE — PROGRESS NOTES
Last 6 Patient Entered Readings                                        Most Recent A1c: 5.4% on 8/4/2018  (Goal: 7%)     Recent Readings 9/28/2018 9/27/2018 9/26/2018 9/25/2018 9/22/2018    Blood Glucose (mg/dL) 82 115 67 60 86          Chart reviewed for patient recently enrolled in Diabetes [facility]. Health  has made multiple attempts to contact patient. Pertinent PMH includes Type 2 DM, COPD, and tobacco abuse. While metformin itself does not cause hypoglycemia, if low readings are accurate, would consider decreasing dose if patient has made significant lifestyle changes. Plan to review depression questionnaire.

## 2018-10-01 ENCOUNTER — PATIENT MESSAGE (OUTPATIENT)
Dept: FAMILY MEDICINE | Facility: CLINIC | Age: 58
End: 2018-10-01

## 2018-10-01 DIAGNOSIS — K82.4 GALLBLADDER POLYP: ICD-10-CM

## 2018-10-01 DIAGNOSIS — K76.89 HEPATIC CYST: Primary | ICD-10-CM

## 2018-10-01 DIAGNOSIS — Q63.9 RENAL STRUCTURAL ABNORMALITY: ICD-10-CM

## 2018-10-01 NOTE — PROGRESS NOTES
"Last 6 Patient Entered Readings                                          Most Recent A1c: 5.4% on 8/4/2018  (Goal: 7%)     Recent Readings 10/1/2018 9/28/2018 9/27/2018 9/26/2018 9/25/2018    Blood Glucose (mg/dL) 105 82 115 67 60        10/1: Patient called back and left VM.  I returned phone call, left VM.  Pt call back.      Digital Medicine: Health  Introduction    Ms. Malathi Martinez is a 58 y.o. female who is newly enrolled in the Digital Medicine Diabetes Program.     Patient prefers a 90 days supply      DIABETES:  Explained the patients A1C goal is less than or equal to 7%.   Patient is meeting the A1C goal already.   Instructed patient not to allow anyone else to use phone and glucometer.     Reviewed general SMBG goals (FPG <  mg/dL, 1-h ppg < 180 mg/dL, 2-h ppg < 160 mg/dL, Bedtime < 150 mg/dL).   Expected SMBG monitoring schedule: Pt states "she tests first thing in the morning."    Addressed low BG readings, <70 md/dl, on 9/26 (67 mg/dl), 9/25 (60 mg/dl), and 9/17 (69 md/dl) and pt reports she did experience s/s of Hypoglycemia.  I reviewed proper treatment for s/s of reading below <70 mg/dl and pt verbally understood.  Pt states that she normally does not have low readings like that, I advised pt she may need to snack on 15g carbs before going to bed. Pt acknowledged.      Lifestyle Assessment:    Current Dietary Habits(i.e. low sodium, food labels, dining out): Pt reports trying to eat more fresh proteins, veggies, and fruits.  I advised pt not to skip meals and also advised pt to make sure she is getting enough carbs in through out the whole day, 30-45g for meals and 15g for snacks. Will send resources via e-mail.     Exercise: Pt reports "walking her dog, and walking around her neighborhood."  Pt states she wants to start walking more and getting back into biking.     Alcohol/Tobacco: Pt reports smoking. Pt states "she would like to quit and chantix is not working." Pt was " interested in smoking cessation program materials, will get those together and send to her e-mail.     Medication Adherence: has been compliant with the medicaiton regimen  Other goals: Will discuss next call.    Reviewed the importance of self-monitoring, medication adherence, and that the health  can be used as a resource for lifestyle modifications to help reduce or maintain a healthy lifestyle.  Reviewed that the Digital Medicine team is not available for emergencies and instructed the patient to call 911 or [facility] ([phone] or [phone]) if one arises.  Patient and I agreed that she will continue to monitor blood glucose and sodium intake and continue to remain adherent to medications.   I will plan to follow-up with the patient in 4 weeks.     Tonio MURILLO Digital Medicine Health   [phone]

## 2018-10-03 ENCOUNTER — PATIENT MESSAGE (OUTPATIENT)
Dept: FAMILY MEDICINE | Facility: CLINIC | Age: 58
End: 2018-10-03

## 2018-10-04 RX ORDER — OMEPRAZOLE 20 MG/1
20 CAPSULE, DELAYED RELEASE ORAL DAILY
Qty: 90 CAPSULE | Refills: 1 | Status: SHIPPED | OUTPATIENT
Start: 2018-10-04 | End: 2018-10-19 | Stop reason: SDUPTHER

## 2018-10-04 NOTE — PROGRESS NOTES
Last 6 Patient Entered Readings                                          Most Recent A1c: 5.4% on 8/4/2018  (Goal: 7%)     Recent Readings 10/4/2018 10/3/2018 10/2/2018 10/1/2018 9/28/2018    Blood Glucose (mg/dL) 60 131 121 105 82          Attempted to contact patient, who was unavailalble. Message left. Plan to decrease metformin dose from 500 BID to 500 QD due to hypoglycemia early in the morning. Patient's A1c is well controlled at 5.4%. Patient checks fasting daily, can recommend reducing daily checks - can try checking 2 fastings per week, and 2 post prandials per week in order to get info on prandial levels if patient want's to reduce frequency of checking.    10/9/18: attempted to contact patient, left message.

## 2018-10-09 NOTE — PROGRESS NOTES
Last 6 Patient Entered Readings                                          Most Recent A1c: 5.4% on 8/4/2018  (Goal: 7%)     Recent Readings 10/8/2018 10/5/2018 10/4/2018 10/3/2018 10/2/2018    Blood Glucose (mg/dL) 137 107 60 131 121          Attempted to contact patient, who was unavailalble. Message left. Plan to decrease metformin dose from 500 BID to 500 QD due to hypoglycemia early in the morning. Patient's A1c is well controlled at 5.4%. Patient checks fasting daily, can recommend reducing daily checks - can try checking 2 fastings per week, and 2 post prandials per week in order to get info on prandial levels if patient want's to reduce frequency of checking. Follow up in 1 week.

## 2018-10-16 NOTE — PROGRESS NOTES
Last 6 Patient Entered Readings                                          Most Recent A1c: 5.4% on 8/4/2018  (Goal: 7%)     Recent Readings 10/16/2018 10/15/2018 10/13/2018 10/12/2018 10/11/2018    Blood Glucose (mg/dL) 128 150 100 105 96          Ms. Malathi Martinez is a 58 y.o. female who is newly enrolled in the [facility].     The following information was reviewed/updated:  Preferred pharmacy   [id number] - [place] - [address]  [address]  [place] [address]  Phone: [phone] Fax: [phone]      Patient prefers a 90 days supply    Review of patient's allergies indicates:   Allergen Reactions    Penicillins Hives     Current Outpatient Medications on File Prior to Visit   Medication Sig Dispense Refill    albuterol (PROAIR HFA) 90 mcg/actuation inhaler INHALE 2 PUFFS INTO THE LUNGS EVERY 4 (FOUR) HOURS AS NEEDED FOR WHEEZING. 8.5 Inhaler 3    ALPRAZolam (XANAX) 0.25 MG tablet TAKE 1 TABLET TWICE A DAY AS NEEDED FOR ANXIETY 60 tablet 2    atorvastatin (LIPITOR) 20 MG tablet TAKE 1 TABLET BY MOUTH ONCE DAILY. 90 tablet 1    blood sugar diagnostic (CONTOUR TEST STRIPS MISC) Contour Test Strips      blood sugar diagnostic Strp 1 strip by Misc.(Non-Drug; Combo Route) route once daily. 50 each 6    blood-glucose meter kit Use as instructed 1 each 0    buPROPion (WELLBUTRIN XL) 150 MG TB24 tablet Take 1 tablet (150 mg total) by mouth once daily. 90 tablet 1    cholecalciferol, vitamin D3, 2,000 unit Cap Take 1 capsule by mouth once daily.      cyclobenzaprine (FLEXERIL) 5 MG tablet Take 1 tablet (5 mg total) by mouth nightly as needed for Muscle spasms. 30 tablet 2    escitalopram oxalate (LEXAPRO) 10 MG tablet Take 1 tablet (10 mg total) by mouth once daily. 90 tablet 1    escitalopram oxalate (LEXAPRO) 20 MG tablet TAKE 1 TABLET (20 MG TOTAL) BY MOUTH ONCE DAILY. 90 tablet 1    furosemide (LASIX) 20 MG tablet TAKE 1 TABLET (20 MG TOTAL) BY  MOUTH ONCE DAILY. 90 tablet 1    hydrocodone-chlorpheniramine (TUSSIONEX) 10-8 mg/5 mL suspension Take 5 mLs by mouth every 12 (twelve) hours as needed for Cough. 115 mL 0    indomethacin (INDOCIN) 25 MG capsule Take 1 capsule (25 mg total) by mouth 2 (two) times daily with meals. 30 capsule 2    lancets (MICROLET LANCET MISC) Microlet Lancet      lancets Misc 1 each by Misc.(Non-Drug; Combo Route) route once daily. 50 each 6    lisinopril (PRINIVIL,ZESTRIL) 2.5 MG tablet TAKE 1 TABLET BY MOUTH ONCE DAILY. 90 tablet 1    loratadine (CLARITIN) 10 mg tablet Take 10 mg by mouth once daily.      metFORMIN (GLUCOPHAGE-XR) 500 MG 24 hr tablet TAKE 1 TABLET BY MOUTH 2 TIMES DAILY WITH MEALS. 180 tablet 1    mometasone (NASONEX) 50 mcg/actuation nasal spray 1 spray by Nasal route 2 (two) times daily. 17 g 1    omeprazole (PRILOSEC) 40 MG capsule Take 1 capsule (40 mg total) by mouth once daily. 90 capsule 1    ranitidine (ZANTAC) 300 MG tablet TAKE 1 TABLET BY MOUTH IN THE EVENING 30 tablet 2    varenicline (CHANTIX) 1 mg Tab Take 1 tablet (1 mg total) by mouth 2 (two) times daily. 60 tablet 3     No current facility-administered medications on file prior to visit.        IF DEPRESSED    Responses to the depression screening suggest patient is having depressive symptoms. Patient is followed for this and is not interested in referral.       IF APRIL screen positive - not indicated      DIABETES  Reviewed non-pharmacologic therapies recommended for diabetes patients:  1. Nutrition - Encouraged patient to maintain optimal weight, implement calorie restriction (if BMI is increased), and benefits of plant-based diet.  2. Physical Activity - Recommended patient engage in strength training and 150 minutes per week of moderate exertion as tolerated.  3. Sleep - Encouraged patient to implement basic sleep hygiene and strive for about 7 hours of sleep per night.  4. Alcohol and tobacco use - Discussed with patient the  maximum recommended number of 1 drink(s) per day for men/women and no tobacco products.    Explained the patients A1C goal is less than or equal to 7%    Patient is meeting the A1C goal already.   When asked what the patient thinks is causing BG to be elevated, she states: stress (sister recently broke her leg)    Reviewed general SMBG goals (FPG <  mg/dL, 1-h ppg < 180 mg/dL, 2-h ppg < 160 mg/dL, Bedtime < 150 mg/dL).   Expected SMBG monitoring schedule:    Health maintenance is not up to date. Pt states that she will get flu shot this week.    Is the patient on ACE inhibitor or angiotensin II receptor blocker? Yes lisinopril (Prinivil)     Is the patient on a STATIN? Yes    Diabetes Medications             metFORMIN (GLUCOPHAGE-XR) 500 MG 24 hr tablet TAKE 1 TABLET BY MOUTH 2 TIMES DAILY WITH MEALS.          Patient denies s/s or episodes of hypoglycemia (weakness, dizziness, hunger, shakiness, nausea, headache, heart palpitations, sweating, fatigue, anxiety, etc.)  Reviewed treatment of hypoglycemia (15/15 rule).  History of hypoglycemia:     Patient denies s/s of hyperglycemia (headache, increased thirst, increased urination, fatigue, blurred vision)     Last 6 Patient Entered Readings                                          Most Recent A1c: 5.4% on 8/4/2018  (Goal: 7%)     Recent Readings 10/16/2018 10/15/2018 10/13/2018 10/12/2018 10/11/2018    Blood Glucose (mg/dL) 128 150 100 105 96           Instructed patient not to allow anyone else to use phone and BP cuff or glucometer.   I'm not available for emergencies. Patient will call [facility] on[facility] ([phone] or [phone]) or [phone] if needed.     Patient and I agreed that he will continue to monitor blood pressure, blood glucose, and sodium intake and continue to remain adherent to medications.       58 yof w/ DM. Last A1c 5.4% in 8/2018. A1c prior 5.3% in 2/2018. UACR = 19 in 8/2018. Pt's FBG are mostly in goal. However, pt does have several  instances of FBG being in the 60s without any inciting reason. Discussed possibility of decreasing metformin XR dose from 500 BID to 500 QD, although it does not typically cause hypoglycemia. Pt denies symptoms of hypoglycemia and wishes to remain on usual metformin dose. Educated on s/sx of hypoglycemia and appropriate actions to take. Patient currently checks FBG daily. Suggested that she could potentially check less frequently since she is controlled, and to consider alternating days where she checks between FBG and PPG.  I will plan to follow-up with the patient in 3 weeks.   Emailed patient link to [facility] webpages and my contact information in case she has any questions.

## 2018-10-18 NOTE — PROGRESS NOTES
Last 6 Patient Entered Readings                                        Most Recent A1c: 5.4% on 8/4/2018  (Goal: 7%)     Recent Readings 10/17/2018 10/16/2018 10/15/2018 10/13/2018 10/12/2018    Blood Glucose (mg/dL) 74 128 150 100 105          I agree with the resident's assessment and plan for this patient. Monitor closely for hypoglycemia.

## 2018-10-19 ENCOUNTER — OFFICE VISIT (OUTPATIENT)
Dept: FAMILY MEDICINE | Facility: CLINIC | Age: 58
End: 2018-10-19
Payer: COMMERCIAL

## 2018-10-19 VITALS
SYSTOLIC BLOOD PRESSURE: 112 MMHG | OXYGEN SATURATION: 98 % | WEIGHT: 181.75 LBS | DIASTOLIC BLOOD PRESSURE: 60 MMHG | BODY MASS INDEX: 26.02 KG/M2 | HEART RATE: 68 BPM | TEMPERATURE: 98 F | HEIGHT: 70 IN

## 2018-10-19 DIAGNOSIS — F33.41 RECURRENT MAJOR DEPRESSIVE DISORDER, IN PARTIAL REMISSION: ICD-10-CM

## 2018-10-19 DIAGNOSIS — R93.89 ABNORMAL PELVIC ULTRASOUND: ICD-10-CM

## 2018-10-19 DIAGNOSIS — F43.23 ADJUSTMENT DISORDER WITH MIXED ANXIETY AND DEPRESSED MOOD: ICD-10-CM

## 2018-10-19 DIAGNOSIS — E11.9 TYPE 2 DIABETES MELLITUS WITHOUT COMPLICATION, WITHOUT LONG-TERM CURRENT USE OF INSULIN: ICD-10-CM

## 2018-10-19 DIAGNOSIS — F41.1 GAD (GENERALIZED ANXIETY DISORDER): Primary | ICD-10-CM

## 2018-10-19 DIAGNOSIS — R93.5 ABNORMAL CT OF THE ABDOMEN: ICD-10-CM

## 2018-10-19 DIAGNOSIS — F43.21 GRIEF REACTION: ICD-10-CM

## 2018-10-19 PROCEDURE — 90471 IMMUNIZATION ADMIN: CPT | Mod: S$GLB,,, | Performed by: FAMILY MEDICINE

## 2018-10-19 PROCEDURE — 90686 IIV4 VACC NO PRSV 0.5 ML IM: CPT | Mod: S$GLB,,, | Performed by: FAMILY MEDICINE

## 2018-10-19 PROCEDURE — 99999 PR PBB SHADOW E&M-EST. PATIENT-LVL IV: CPT | Mod: PBBFAC,,, | Performed by: FAMILY MEDICINE

## 2018-10-19 PROCEDURE — 3044F HG A1C LEVEL LT 7.0%: CPT | Mod: CPTII,S$GLB,, | Performed by: FAMILY MEDICINE

## 2018-10-19 PROCEDURE — 99214 OFFICE O/P EST MOD 30 MIN: CPT | Mod: 25,S$GLB,, | Performed by: FAMILY MEDICINE

## 2018-10-19 PROCEDURE — 3008F BODY MASS INDEX DOCD: CPT | Mod: CPTII,S$GLB,, | Performed by: FAMILY MEDICINE

## 2018-10-19 RX ORDER — ESCITALOPRAM OXALATE 10 MG/1
15 TABLET ORAL DAILY
Qty: 135 TABLET | Refills: 1 | Status: SHIPPED | OUTPATIENT
Start: 2018-10-19 | End: 2019-08-09 | Stop reason: DRUGHIGH

## 2018-10-19 RX ORDER — DESLORATADINE 5 MG/1
5 TABLET ORAL DAILY
Qty: 90 TABLET | Refills: 3 | Status: SHIPPED | OUTPATIENT
Start: 2018-10-19 | End: 2019-11-11 | Stop reason: SDUPTHER

## 2018-10-19 RX ORDER — LISINOPRIL 2.5 MG/1
2.5 TABLET ORAL DAILY
Qty: 90 TABLET | Refills: 1 | Status: SHIPPED | OUTPATIENT
Start: 2018-10-19 | End: 2019-11-08 | Stop reason: SDUPTHER

## 2018-10-19 RX ORDER — METFORMIN HYDROCHLORIDE 500 MG/1
500 TABLET, EXTENDED RELEASE ORAL 2 TIMES DAILY WITH MEALS
Qty: 180 TABLET | Refills: 1 | Status: SHIPPED | OUTPATIENT
Start: 2018-10-19 | End: 2019-05-03 | Stop reason: SDUPTHER

## 2018-10-19 RX ORDER — ALPRAZOLAM 0.25 MG/1
TABLET ORAL
Qty: 60 TABLET | Refills: 2 | Status: SHIPPED | OUTPATIENT
Start: 2018-10-19 | End: 2019-01-25 | Stop reason: SDUPTHER

## 2018-10-19 NOTE — PROGRESS NOTES
Subjective:       Patient ID: Malathi Martinez is a 58 y.o. female.    Chief Complaint: Results and Medication Refill      Malathi Martinez is in the office for results and med refill.    HPI  No updates to medical hx.  Past Medical History:   Diagnosis Date    Abnormal stress test     Bronchitis     Diabetes mellitus, type 2     HLD (hyperlipidemia)     Hx of endoscopy     Hypertension     Mental disorder     Pulmonary nodule 4/9/2018    SOB (shortness of breath)     resolved    Tobacco abuse 12/26/2012     Overall, mood is a little better than LOV. Generalized anxiety is doing ok. She reports occ panic episodes with sensation of palpitations and feeling the need to run. She does take 1/2 xanax prn which seems to help. Occurs 2x/month. No change in severity. Slight increase in frequency over the last 3mos. In part, attributed to worries re: recent imaging.  She is not sad/crying all the time anymore. She is finding she can enjoy things, laugh/smile. Sleep is preserved with xanax, otherwise she wakes up every 5 mins.   Has a final f/u with pulm/knower later this month.   US abd/pelvis 2018 rev. CT abd/pelvis 2018.    Had f/u with gyn/mokry in interim. Watchful waiting.  Reflux type sx and increased belching. Food occ gets stuck with meals. occ bloating with BMs. Maintained on prilosec 40am with [id number]. egd and cscope up to date. appt with gi/guarisco this month.   Taking indomethacin once daily for her knees. It does keep pain at bay. She finds that she is noticing more discomfort    Current Outpatient Medications:     albuterol (PROAIR HFA) 90 mcg/actuation inhaler, INHALE 2 PUFFS INTO THE LUNGS EVERY 4 (FOUR) HOURS AS NEEDED FOR WHEEZING., Disp: 8.5 Inhaler, Rfl: 3    ALPRAZolam (XANAX) 0.25 MG tablet, TAKE 1 TABLET TWICE A DAY AS NEEDED FOR ANXIETY, Disp: 60 tablet, Rfl: 2    atorvastatin (LIPITOR) 20 MG tablet, TAKE 1 TABLET BY MOUTH ONCE DAILY., Disp: 90 tablet, Rfl: 1    blood  sugar diagnostic (CONTOUR TEST STRIPS MISC), Contour Test Strips, Disp: , Rfl:     blood sugar diagnostic Strp, 1 strip by Misc.(Non-Drug; Combo Route) route once daily., Disp: 50 each, Rfl: 6    blood-glucose meter kit, Use as instructed, Disp: 1 each, Rfl: 0    buPROPion (WELLBUTRIN XL) 150 MG TB24 tablet, Take 1 tablet (150 mg total) by mouth once daily., Disp: 90 tablet, Rfl: 1    cholecalciferol, vitamin D3, 2,000 unit Cap, Take 1 capsule by mouth once daily., Disp: , Rfl:     cyclobenzaprine (FLEXERIL) 5 MG tablet, Take 1 tablet (5 mg total) by mouth nightly as needed for Muscle spasms., Disp: 30 tablet, Rfl: 2    escitalopram oxalate (LEXAPRO) 10 MG tablet, Take 1 tablet (10 mg total) by mouth once daily., Disp: 90 tablet, Rfl: 1    furosemide (LASIX) 20 MG tablet, TAKE 1 TABLET (20 MG TOTAL) BY MOUTH ONCE DAILY., Disp: 90 tablet, Rfl: 1    indomethacin (INDOCIN) 25 MG capsule, Take 1 capsule (25 mg total) by mouth 2 (two) times daily with meals., Disp: 30 capsule, Rfl: 2    lancets (MICROLET LANCET MISC), Microlet Lancet, Disp: , Rfl:     lancets Misc, 1 each by Misc.(Non-Drug; Combo Route) route once daily., Disp: 50 each, Rfl: 6    lisinopril (PRINIVIL,ZESTRIL) 2.5 MG tablet, TAKE 1 TABLET BY MOUTH ONCE DAILY., Disp: 90 tablet, Rfl: 1    loratadine (CLARITIN) 10 mg tablet, Take 10 mg by mouth once daily., Disp: , Rfl:     metFORMIN (GLUCOPHAGE-XR) 500 MG 24 hr tablet, TAKE 1 TABLET BY MOUTH 2 TIMES DAILY WITH MEALS., Disp: 180 tablet, Rfl: 1    omeprazole (PRILOSEC) 40 MG capsule, Take 1 capsule (40 mg total) by mouth once daily., Disp: 90 capsule, Rfl: 1    ranitidine (ZANTAC) 300 MG tablet, TAKE 1 TABLET BY MOUTH IN THE EVENING, Disp: 30 tablet, Rfl: 2    mometasone (NASONEX) 50 mcg/actuation nasal spray, 1 spray by Nasal route 2 (two) times daily., Disp: 17 g, Rfl: 1    The 10-year ASCVD risk score (Beckialycia ANAYA Jr., et al., 2013) is: 8.3%    Values used to calculate the score:      Age: 58  years      Sex: Female      Is Non- : No      Diabetic: Yes      Tobacco smoker: Yes      Systolic Blood Pressure: 112 mmHg      Is BP treated: No      HDL Cholesterol: 49 mg/dL      Total Cholesterol: 155 mg/dL     Lab Results   Component Value Date    HGBA1C 5.4 08/04/2018    HGBA1C 5.3 02/10/2018    HGBA1C 5.5 08/19/2017     Lab Results   Component Value Date    LDLCALC 87.8 02/10/2018    CREATININE 0.8 09/19/2018   Labs 8/2018 rev.    Review of Systems   Constitutional: Positive for unexpected weight change (increased 3.5# since LOV). Negative for activity change.   HENT: Positive for rhinorrhea. Negative for hearing loss and trouble swallowing.    Eyes: Negative for discharge and visual disturbance.   Respiratory: Negative for chest tightness and wheezing.    Cardiovascular: Positive for palpitations. Negative for chest pain.   Gastrointestinal: Positive for constipation. Negative for blood in stool, diarrhea and vomiting.   Endocrine: Negative for polydipsia and polyuria.   Genitourinary: Negative for difficulty urinating, dysuria, hematuria and menstrual problem.   Musculoskeletal: Negative for arthralgias, joint swelling and neck pain.   Neurological: Positive for headaches. Negative for weakness.   Psychiatric/Behavioral: Positive for dysphoric mood. Negative for confusion and sleep disturbance.       Objective:      Physical Exam   Constitutional: She is oriented to person, place, and time. She appears well-developed and well-nourished. No distress.   HENT:   Head: Normocephalic and atraumatic.   Right Ear: External ear normal.   Left Ear: External ear normal.   Nose: Nose normal.   Mouth/Throat: Oropharynx is clear and moist. No oropharyngeal exudate.   Eyes: Conjunctivae and EOM are normal. Pupils are equal, round, and reactive to light.   Neck: Normal range of motion. Neck supple. No thyromegaly present.   Cardiovascular: Normal rate and regular rhythm.   Pulmonary/Chest: Effort  normal and breath sounds normal. No respiratory distress. She has no wheezes.   Abdominal: Soft. She exhibits no distension. There is tenderness (rlq).   Musculoskeletal: Normal range of motion. She exhibits no edema.   Lymphadenopathy:     She has no cervical adenopathy.   Neurological: She is alert and oriented to person, place, and time. No cranial nerve deficit.   Skin: Skin is warm and dry.   Psychiatric: Her speech is normal and behavior is normal. Her mood appears not anxious. She does not exhibit a depressed mood.   Tearful, good eye contact, calm and cooperative   Nursing note and vitals reviewed.      Screening recommendations appropriate to age and health status were reviewed.    Abnormal pelvic ultrasound  -     US Abdomen Complete; Future; Expected date: 02/19/2019  -     US Pelvis Complete Non OB; Future; Expected date: 02/19/2019  Pt was seen by GI. Currently just watching. Will update imaging in 4-6mos. Pt aware to call gyn for any new sx, bleeding.  Recurrent major depressive disorder, in partial remission  -     escitalopram oxalate (LEXAPRO) 10 MG tablet; Take 1.5 tablets (15 mg total) by mouth once daily.  Dispense: 135 tablet; Refill: 1  Increase dose slightly to 15mg daily. rx updated.  We have previously discussed psychiatry f/u if she would like to pursue this, but she feels that she is improving, and doing ok currently.  Grief reaction  -     escitalopram oxalate (LEXAPRO) 10 MG tablet; Take 1.5 tablets (15 mg total) by mouth once daily.  Dispense: 135 tablet; Refill: 1  -     ALPRAZolam (XANAX) 0.25 MG tablet; TAKE 1 TABLET TWICE A DAY AS NEEDED FOR ANXIETY  Dispense: 60 tablet; Refill: 2  Adjustment disorder with mixed anxiety and depressed mood  -     ALPRAZolam (XANAX) 0.25 MG tablet; TAKE 1 TABLET TWICE A DAY AS NEEDED FOR ANXIETY  Dispense: 60 tablet; Refill: 2  Side effects and precautions of medication use reviewed with patient, expressed understanding. No questions or concerns.    Abnormal CT of the abdomen  -     US Abdomen Complete; Future; Expected date: 02/19/2019  -     US Pelvis Complete Non OB; Future; Expected date: 02/19/2019  Update us in 4-6mos for monitoring of ectasia.  AR  -     desloratadine (CLARINEX) 5 mg tablet; Take 1 tablet (5 mg total) by mouth once daily.  Dispense: 90 tablet; Refill: 3  Cont nasal spray.  DM2  -     metFORMIN (GLUCOPHAGE-XR) 500 MG 24 hr tablet; Take 1 tablet (500 mg total) by mouth 2 (two) times daily with meals.  Dispense: 180 tablet; Refill: 1  Controlled, cont regimen.  -     lisinopril (PRINIVIL,ZESTRIL) 2.5 MG tablet; Take 1 tablet (2.5 mg total) by mouth once daily.  Dispense: 90 tablet; Refill: 1  -     Influenza - Quadrivalent (3 years & older) (PF)

## 2018-10-24 ENCOUNTER — OFFICE VISIT (OUTPATIENT)
Dept: GASTROENTEROLOGY | Facility: CLINIC | Age: 58
End: 2018-10-24
Payer: COMMERCIAL

## 2018-10-24 VITALS — HEIGHT: 70 IN | BODY MASS INDEX: 26.51 KG/M2 | WEIGHT: 185.19 LBS

## 2018-10-24 DIAGNOSIS — K58.2 IRRITABLE BOWEL SYNDROME WITH BOTH CONSTIPATION AND DIARRHEA: Primary | ICD-10-CM

## 2018-10-24 DIAGNOSIS — K76.89 HEPATIC CYST: ICD-10-CM

## 2018-10-24 DIAGNOSIS — K82.4 GALLBLADDER POLYP: ICD-10-CM

## 2018-10-24 DIAGNOSIS — K21.9 GASTROESOPHAGEAL REFLUX DISEASE, ESOPHAGITIS PRESENCE NOT SPECIFIED: ICD-10-CM

## 2018-10-24 PROCEDURE — 99214 OFFICE O/P EST MOD 30 MIN: CPT | Mod: S$GLB,,, | Performed by: INTERNAL MEDICINE

## 2018-10-24 PROCEDURE — 3008F BODY MASS INDEX DOCD: CPT | Mod: CPTII,S$GLB,, | Performed by: INTERNAL MEDICINE

## 2018-10-24 PROCEDURE — 99999 PR PBB SHADOW E&M-EST. PATIENT-LVL II: CPT | Mod: PBBFAC,,, | Performed by: INTERNAL MEDICINE

## 2018-10-24 RX ORDER — CYCLOBENZAPRINE HCL 5 MG
5 TABLET ORAL NIGHTLY PRN
Qty: 30 TABLET | Refills: 0 | Status: SHIPPED | OUTPATIENT
Start: 2018-10-24 | End: 2018-11-22 | Stop reason: SDUPTHER

## 2018-10-24 NOTE — PROGRESS NOTES
Pt presents for evaluation abnormal CT scan. Pt c/o abd bloating, chronic alternating diarrhea and constipation. No bleeding. No N/V. No fever or jaundice. Appetite good, weight increasing (lost weight prior on diabetic diet). No significant abd pain. Cologuard negative. EGD in 3/2018 dilation LE ring (normal stomach). CT scan notable for suspected liver cysts, GB polyp, thickening stomach wall, and transient small bowel intussuseption without obstruction. U/S liver cysts, small GB polyp. Recent LFT's normal. No prior C-scope.    REVIEW OF SYSTEMS:   Constitutional: Negative for fever, appetite change and unexpected weight change.  HENT: Negative for sore throat and trouble swallowing.  Eyes: Negative for visual disturbance.  Respiratory: Negative for chest tightness, shortness of breath and wheezing.  Cardiovascular: Negative for chest pain.  Gastrointestinal:  as per HPI  Genitourinary: Negative for dysuria, frequency and hematuria.  Musculoskeletal: Negative for myalgias, joint swelling and arthralgias.  Skin: Negative for color change and rash.   Neurological: Negative for syncope, weakness and headaches.    PHYSICAL EXAMINATION:                                                        GENERAL:  Comfortable, in no acute distress.      SKIN: Non-jaundiced.                             HEENT EXAM:  Nonicteric.  No adenopathy.  Oropharynx is clear.               NECK:  Supple.                                                               LUNGS:  Clear.                                                               CARDIAC:  Regular rate and rhythm.  S1, S2.  No murmur.                      ABDOMEN:  Soft, positive bowel sounds, nontender.  No hepatosplenomegaly or masses.  No rebound or guarding.                                             EXTREMITIES:  No edema.       IMP: 1. IBS          2. Hepatic cysts - asymptomatic          3. GB polyp - asymptomatic          4. Intussusception - aysmptomatic (appears transient  without obstruction)          5. GERD          6. Gastric wall thickening - normal recent EGD (suspect related to under distension during CT).          PLAN: 1. Repeat U/S as scheduled, then yearly.             2. C-scope

## 2018-10-29 RX ORDER — LISINOPRIL 2.5 MG/1
TABLET ORAL
Qty: 90 TABLET | Refills: 1 | Status: SHIPPED | OUTPATIENT
Start: 2018-10-29 | End: 2019-01-17 | Stop reason: SDUPTHER

## 2018-10-30 ENCOUNTER — PATIENT OUTREACH (OUTPATIENT)
Dept: OTHER | Facility: OTHER | Age: 58
End: 2018-10-30

## 2018-10-30 NOTE — PROGRESS NOTES
Last 6 Patient Entered Readings                                          Most Recent A1c: 5.4% on 8/4/2018  (Goal: 7%)     Recent Readings 10/24/2018 10/17/2018 10/16/2018 10/15/2018 10/13/2018    Blood Glucose (mg/dL) 118 74 128 150 100            Digital Medicine: Health  Follow Up    10/30: Left voicemail to follow up with Ms. Malathi Martinez.

## 2018-11-05 NOTE — PROGRESS NOTES
Last 6 Patient Entered Readings                                          Most Recent A1c: 5.4% on 8/4/2018  (Goal: 7%)     Recent Readings 11/2/2018 10/24/2018 10/17/2018 10/16/2018 10/15/2018    Blood Glucose (mg/dL) 165 118 74 128 150            Digital Medicine: Health  Follow Up    11/5: Left voicemail to follow up with Ms. Malathi Martinez.  Will send [employer] message.

## 2018-11-08 RX ORDER — VARENICLINE TARTRATE 0.5 (11)-1
KIT ORAL
Qty: 60 TABLET | Refills: 1 | Status: SHIPPED | OUTPATIENT
Start: 2018-11-08 | End: 2019-01-17 | Stop reason: SDUPTHER

## 2018-11-12 NOTE — PROGRESS NOTES
"Last 6 Patient Entered Readings                                          Most Recent A1c: 5.4% on 8/4/2018  (Goal: 7%)     Recent Readings 11/12/2018 11/6/2018 11/2/2018 10/24/2018 10/17/2018    Blood Glucose (mg/dL) 98 105 165 118 74        Digital Medicine: Health  Follow Up    Lifestyle Modifications:    1.Dietary Modifications (Sodium intake <2,000mg/day, food labels, dining out):   Pt reports no changes in diet and "trying to still eat the good stuff". Pt states it will be "harder" with Thanksgiving and [place] coming up.  I encouraged pt that she controls what she eats and no one else.  I encouraged pt that she could enjoy some food in moderation.  Pt verbally understood.     2.Physical Activity:   Pt states "she is walking more".  Pt states she needs something to track her steps.  I instructed pt that her phone could track them and just make sure that it is on her person.  Pt verbally understood. Will follow up.     3.Medication Therapy:   Patient has been compliant with the medication regimen.    4.Patient has the following medication side effects/concerns: None  (Frequency/Alleviating factors/Precipitating factors, etc.)     Follow up with Ms. Servin Vinod Martinez completed. No further questions or concerns. Will continue to follow up to achieve health goals.        "

## 2018-11-14 NOTE — PROGRESS NOTES
Last 6 Patient Entered Readings                                          Most Recent A1c: 5.4% on 8/4/2018  (Goal: 7%)     Recent Readings 11/14/2018 11/12/2018 11/6/2018 11/2/2018 10/24/2018    Blood Glucose (mg/dL) 157 98 105 165 118          Health  spoke with patient this week. Discuss occasional elevated early morning BG on follow up.

## 2018-11-23 ENCOUNTER — OFFICE VISIT (OUTPATIENT)
Dept: PODIATRY | Facility: CLINIC | Age: 58
End: 2018-11-23
Payer: COMMERCIAL

## 2018-11-23 VITALS
DIASTOLIC BLOOD PRESSURE: 77 MMHG | WEIGHT: 185.19 LBS | BODY MASS INDEX: 26.51 KG/M2 | HEART RATE: 70 BPM | SYSTOLIC BLOOD PRESSURE: 126 MMHG | HEIGHT: 70 IN

## 2018-11-23 DIAGNOSIS — E11.9 TYPE 2 DIABETES MELLITUS WITHOUT COMPLICATION, WITHOUT LONG-TERM CURRENT USE OF INSULIN: Primary | ICD-10-CM

## 2018-11-23 DIAGNOSIS — L84 CORN OR CALLUS: ICD-10-CM

## 2018-11-23 PROCEDURE — 99999 PR PBB SHADOW E&M-EST. PATIENT-LVL V: CPT | Mod: PBBFAC,,, | Performed by: PODIATRIST

## 2018-11-23 PROCEDURE — 3044F HG A1C LEVEL LT 7.0%: CPT | Mod: CPTII,S$GLB,, | Performed by: PODIATRIST

## 2018-11-23 PROCEDURE — 99213 OFFICE O/P EST LOW 20 MIN: CPT | Mod: S$GLB,,, | Performed by: PODIATRIST

## 2018-11-23 PROCEDURE — 3008F BODY MASS INDEX DOCD: CPT | Mod: CPTII,S$GLB,, | Performed by: PODIATRIST

## 2018-11-23 RX ORDER — ATORVASTATIN CALCIUM 20 MG/1
TABLET, FILM COATED ORAL
Qty: 90 TABLET | Refills: 1 | Status: SHIPPED | OUTPATIENT
Start: 2018-11-23 | End: 2019-05-03 | Stop reason: SDUPTHER

## 2018-11-23 RX ORDER — CYCLOBENZAPRINE HCL 5 MG
5 TABLET ORAL NIGHTLY PRN
Qty: 30 TABLET | Refills: 0 | Status: SHIPPED | OUTPATIENT
Start: 2018-11-23 | End: 2018-12-20 | Stop reason: SDUPTHER

## 2018-11-23 NOTE — PROGRESS NOTES
Subjective:      Patient ID: Malathi Martinez is a 58 y.o. female.    Chief Complaint: Diabetic Foot Exam (PCP-Dr.Elder-10/19/2018) and Diabetes Mellitus (A1c-5.4-08/04/2018)    Malathi is a 58 y.o. female who presents to the clinic for routine evaluation and treatment of diabetic feet. Malathi has a past medical history of Abnormal stress test, Bronchitis, Diabetes mellitus, type 2, HLD (hyperlipidemia), endoscopy, Hypertension, Mental disorder, Pulmonary nodule (4/9/2018), SOB (shortness of breath), and Tobacco abuse (12/26/2012). Patient denies any major complaints with today's exam.  Relates that prior neuropathy pain has resolved since our last encounter.  Notes tight control over her blood glucose.   Denies any additional pedal complaints.       PCP: Comfort Dave MD    Date Last Seen by PCP: 10/19/18    Hemoglobin A1C   Date Value Ref Range Status   08/04/2018 5.4 4.0 - 5.6 % Final     Comment:     ADA Screening Guidelines:  5.7-6.4%  Consistent with prediabetes  >or=6.5%  Consistent with diabetes  High levels of fetal hemoglobin interfere with the HbA1C  assay. Heterozygous hemoglobin variants (HbS, HgC, etc)do  not significantly interfere with this assay.   However, presence of multiple variants may affect accuracy.     02/10/2018 5.3 4.0 - 5.6 % Final     Comment:     According to ADA guidelines, hemoglobin A1c <7.0% represents  optimal control in non-pregnant diabetic patients. Different  metrics may apply to specific patient populations.   Standards of Medical Care in Diabetes-2016.  For the purpose of screening for the presence of diabetes:  <5.7%     Consistent with the absence of diabetes  5.7-6.4%  Consistent with increasing risk for diabetes   (prediabetes)  >or=6.5%  Consistent with diabetes  Currently, no consensus exists for use of hemoglobin A1c  for diagnosis of diabetes for children.  This Hemoglobin A1c assay has significant interference with fetal   hemoglobin   (HbF). The results are  invalid for patients with abnormal amounts of   HbF,   including those with known Hereditary Persistence   of Fetal Hemoglobin. Heterozygous hemoglobin variants (HbAS, HbAC,   HbAD, HbAE, HbA2) do not significantly interfere with this assay;   however, presence of multiple variants in a sample may impact the %   interference.     08/19/2017 5.5 4.0 - 5.6 % Final     Comment:     According to ADA guidelines, hemoglobin A1c <7.0% represents  optimal control in non-pregnant diabetic patients. Different  metrics may apply to specific patient populations.   Standards of Medical Care in Diabetes-2016.  For the purpose of screening for the presence of diabetes:  <5.7%     Consistent with the absence of diabetes  5.7-6.4%  Consistent with increasing risk for diabetes   (prediabetes)  >or=6.5%  Consistent with diabetes  Currently, no consensus exists for use of hemoglobin A1c  for diagnosis of diabetes for children.  This Hemoglobin A1c assay has significant interference with fetal   hemoglobin   (HbF). The results are invalid for patients with abnormal amounts of   HbF,   including those with known Hereditary Persistence   of Fetal Hemoglobin. Heterozygous hemoglobin variants (HbAS, HbAC,   HbAD, HbAE, HbA2) do not significantly interfere with this assay;   however, presence of multiple variants in a sample may impact the %   interference.             Past Medical History:   Diagnosis Date    Abnormal stress test     Bronchitis     Diabetes mellitus, type 2     HLD (hyperlipidemia)     Hx of endoscopy     Hypertension     Mental disorder     Pulmonary nodule 4/9/2018    SOB (shortness of breath)     resolved    Tobacco abuse 12/26/2012       Past Surgical History:   Procedure Laterality Date    APPENDECTOMY      ESOPHAGOGASTRODUODENOSCOPY (EGD) N/A 3/14/2018    Per[facility]oso [facility] ENDO    HEART CATH-LEFT N/A 7/1/2016    [facility]     TONSILLECTOMY         Family History   Problem Relation Age of Onset    Heart disease Mother         MVP    Cataracts Mother     Heart disease Father 65        MI and PVD    Throat cancer Father     Heart disease Sister         mvp    Esophageal cancer Brother     No Known Problems Sister     No Known Problems Sister     Hyperlipidemia Paternal Grandmother        Social History     Socioeconomic History    Marital status:      Spouse name: None    Number of children: None    Years of education: None    Highest education level: None   Social Needs    Financial resource strain: None    Food insecurity - worry: None    Food insecurity - inability: None    Transportation needs - medical: None    Transportation needs - non-medical: None   Occupational History    None   Tobacco Use    Smoking status: Current Every Day Smoker     Packs/day: 1.00    Smokeless tobacco: Never Used   Substance and Sexual Activity    Alcohol use: No    Drug use: No    Sexual activity: Yes     Partners: Male     Birth control/protection: Post-menopausal   Other Topics Concern    Are you pregnant or think you may be? Not Asked    Breast-feeding Not Asked   Social History Narrative    None       Current Outpatient Medications   Medication Sig Dispense Refill    albuterol (PROAIR HFA) 90 mcg/actuation inhaler INHALE 2 PUFFS INTO THE LUNGS EVERY 4 (FOUR) HOURS AS NEEDED FOR WHEEZING. 8.5 Inhaler 3    ALPRAZolam (XANAX) 0.25 MG tablet TAKE 1 TABLET TWICE A DAY AS NEEDED FOR ANXIETY 60 tablet 2    blood sugar diagnostic (CONTOUR TEST STRIPS MISC) Contour Test Strips      blood sugar diagnostic Strp 1 strip by Misc.(Non-Drug; Combo Route) route once daily. 50 each 6    blood-glucose meter kit Use as instructed 1 each 0    buPROPion (WELLBUTRIN XL) 150 MG TB24 tablet Take 1 tablet (150 mg total) by mouth once daily. 90 tablet 1    cholecalciferol, vitamin D3, 2,000 unit Cap Take 1 capsule by mouth once daily.       cyclobenzaprine (FLEXERIL) 5 MG tablet TAKE 1 TABLET (5 MG TOTAL) BY MOUTH NIGHTLY AS NEEDED FOR MUSCLE SPASMS. 30 tablet 0    desloratadine (CLARINEX) 5 mg tablet Take 1 tablet (5 mg total) by mouth once daily. 90 tablet 3    escitalopram oxalate (LEXAPRO) 10 MG tablet Take 1.5 tablets (15 mg total) by mouth once daily. 135 tablet 1    furosemide (LASIX) 20 MG tablet TAKE 1 TABLET (20 MG TOTAL) BY MOUTH ONCE DAILY. 90 tablet 1    indomethacin (INDOCIN) 25 MG capsule Take 1 capsule (25 mg total) by mouth 2 (two) times daily with meals. 30 capsule 2    lancets (MICROLET LANCET MISC) Microlet Lancet      lancets Misc 1 each by Misc.(Non-Drug; Combo Route) route once daily. 50 each 6    lisinopril (PRINIVIL,ZESTRIL) 2.5 MG tablet Take 1 tablet (2.5 mg total) by mouth once daily. 90 tablet 1    lisinopril (PRINIVIL,ZESTRIL) 2.5 MG tablet TAKE 1 TABLET BY MOUTH ONCE DAILY. 90 tablet 1    metFORMIN (GLUCOPHAGE-XR) 500 MG 24 hr tablet Take 1 tablet (500 mg total) by mouth 2 (two) times daily with meals. 180 tablet 1    mometasone (NASONEX) 50 mcg/actuation nasal spray 1 spray by Nasal route 2 (two) times daily. 17 g 1    omeprazole (PRILOSEC) 40 MG capsule Take 1 capsule (40 mg total) by mouth once daily. 90 capsule 1    ranitidine (ZANTAC) 300 MG tablet TAKE 1 TABLET BY MOUTH IN THE EVENING 30 tablet 2    varenicline (CHANTIX STARTING MONTH BOX) 0.5 mg (11)- 1 mg (42) tablet TAKE AS DIRECTED ON PACKAGE 60 tablet 1    atorvastatin (LIPITOR) 20 MG tablet TAKE 1 TABLET BY MOUTH ONCE DAILY. 90 tablet 1     No current facility-administered medications for this visit.        Review of patient's allergies indicates:   Allergen Reactions    Penicillins          Review of Systems   Constitution: Negative for chills and fever.   Cardiovascular: Negative for claudication and leg swelling.   Skin: Negative for nail changes.   Musculoskeletal: Negative for joint pain and joint swelling.   Neurological: Negative for  numbness and paresthesias.   Psychiatric/Behavioral: Negative for altered mental status.           Objective:      Physical Exam   Constitutional: She is oriented to person, place, and time. She appears well-developed and well-nourished. No distress.   Cardiovascular:   Pulses:       Dorsalis pedis pulses are 2+ on the right side, and 2+ on the left side.        Posterior tibial pulses are 2+ on the right side, and 2+ on the left side.   CFT <3 seconds bilateral.  Pedal hair growth present bilateral.   No varicosities noted bilateral.  No bilateral lower extremity edema.  Toes are warm to touch bilateral.     Musculoskeletal: She exhibits no edema or tenderness.   Muscle strength 5/5 in all muscle groups bilateral.  No tenderness nor crepitation with ROM of foot/ankle joints bilateral.  No tenderness with palpation of bilateral foot and ankle.  Bilateral pes planus foot type.  Bilateral semi-reducible contracture of toes 2-5.   Neurological: She is alert and oriented to person, place, and time. She has normal strength. No sensory deficit.   Protective sensation per [place] monofilament intact bilateral.    Vibratory sensation intact bilateral.    Light touch intact bilateral.       Skin: Skin is warm, dry and intact. Capillary refill takes less than 2 seconds. Lesion noted. No abrasion, no burn, no ecchymosis, no laceration and no rash noted. She is not diaphoretic. No erythema. No pallor.   Pedal skin has normal turgor, temperature, and texture bilateral. Toenails x 10 appear normotrophic.  Light hyperkeratotic build up noted to the Rt. Sub 5th met head.  Examination of the skin reveals no evidence of significant maceration, rashes, open lesions, suspicious appearing nevi or other concerning lesions.              Assessment:       Encounter Diagnoses   Name Primary?    Type 2 diabetes mellitus without complication, without long-term current use of insulin Yes    Corn or callus          Plan:       Malathi  was seen today for diabetic foot exam and diabetes mellitus.    Diagnoses and all orders for this visit:    Type 2 diabetes mellitus without complication, without long-term current use of insulin    Corn or callus      I counseled the patient on her conditions, their implications and medical management.    Shoe inspection. Diabetic Foot Education. Patient reminded of the importance of good nutrition and blood sugar control to help prevent podiatric complications of diabetes. Patient instructed on proper foot hygeine. We discussed wearing proper shoe gear, daily foot inspections, never walking without protective shoe gear, never putting sharp instruments to feet    Advised to regularly apply moisturizer to the site of callus build up daily.    Patient instructed to inspect her feet, wear protective shoe gear when ambulatory, moisturizer to maintain skin integrity and follow in this office in approximately 12 months, sooner p.r.n.    Follow-up in about 1 year (around 11/23/2019).    Tonio Melo DPM

## 2018-11-27 ENCOUNTER — PATIENT OUTREACH (OUTPATIENT)
Dept: OTHER | Facility: OTHER | Age: 58
End: 2018-11-27

## 2018-11-27 DIAGNOSIS — E11.9 TYPE 2 DIABETES MELLITUS WITHOUT COMPLICATION, WITHOUT LONG-TERM CURRENT USE OF INSULIN: Primary | ICD-10-CM

## 2018-12-10 ENCOUNTER — PATIENT OUTREACH (OUTPATIENT)
Dept: OTHER | Facility: OTHER | Age: 58
End: 2018-12-10

## 2018-12-10 NOTE — PROGRESS NOTES
Last 6 Patient Entered Readings                                          Most Recent A1c: 5.4% on 8/4/2018  (Goal: 7%)     Recent Readings 12/5/2018 11/14/2018 11/12/2018 11/6/2018 11/2/2018    Blood Glucose (mg/dL) 185 157 98 105 165          Digital Medicine: Health  Follow Up    12/10: Left voicemail to follow up with Ms. Malathi Martinez.

## 2018-12-17 NOTE — PROGRESS NOTES
Last 6 Patient Entered Readings                                          Most Recent A1c: 5.4% on 8/4/2018  (Goal: 7%)     Recent Readings 12/5/2018 11/14/2018 11/12/2018 11/6/2018 11/2/2018    Blood Glucose (mg/dL) 185 157 98 105 165            Digital Medicine: Health  Follow Up    12/17: Left voicemail to follow up with Ms. Malathi Martinez.  Asked about lack of BG readings.   Sending [place] message.

## 2018-12-20 ENCOUNTER — TELEPHONE (OUTPATIENT)
Dept: FAMILY MEDICINE | Facility: CLINIC | Age: 58
End: 2018-12-20

## 2018-12-20 RX ORDER — INDOMETHACIN 25 MG/1
25 CAPSULE ORAL 2 TIMES DAILY WITH MEALS
Qty: 30 CAPSULE | Refills: 2 | Status: SHIPPED | OUTPATIENT
Start: 2018-12-20 | End: 2019-05-03 | Stop reason: SDUPTHER

## 2018-12-20 RX ORDER — CYCLOBENZAPRINE HCL 5 MG
5 TABLET ORAL NIGHTLY PRN
Qty: 90 TABLET | Refills: 1 | Status: SHIPPED | OUTPATIENT
Start: 2018-12-20 | End: 2019-05-03 | Stop reason: SDUPTHER

## 2018-12-21 NOTE — TELEPHONE ENCOUNTER
----- Message from Nataly Uriarte sent at 12/21/2018  1:39 PM CST -----  Contact: Patient  Type:  Patient Returning Call    Who Called:  Patient  Who Left Message for Patient:  Dom  Does the patient know what this is regarding?:    Best Call Back Number:    Additional Information:

## 2018-12-21 NOTE — TELEPHONE ENCOUNTER
----- Message from Bishop La sent at 12/21/2018 12:48 PM CST -----  Type:  Patient Returning Call    Who Called:  Self   Who Left Message for Patient:  Sunni   Does the patient know what this is regarding?:  NA   Best Call Back Number:  [phone]  Additional Information:

## 2018-12-27 RX ORDER — VARENICLINE TARTRATE 1 MG/1
1 TABLET, FILM COATED ORAL 2 TIMES DAILY
Qty: 60 TABLET | Refills: 1 | Status: SHIPPED | OUTPATIENT
Start: 2018-12-27 | End: 2019-05-03

## 2018-12-27 NOTE — PROGRESS NOTES
"Last 6 Patient Entered Readings                                          Most Recent A1c: 5.4% on 8/4/2018  (Goal: 7%)     Recent Readings 12/26/2018 12/25/2018 12/23/2018 12/21/2018 12/19/2018    Blood Glucose (mg/dL) 112 115 107 104 100        Digital Medicine: Health  Follow Up    Encounter was brief  BG readings are controlled.  Pt states "she has been working hard". Patient denies needing any help with diet or exercise at this time.     Lifestyle Modifications:    1.Dietary Modifications (Sodium intake <2,000mg/day, food labels, dining out):   Deferred    2.Physical Activity:   Patient reports getting a FitBit and is trying to figure out how to get the karli working.  Patient states "give her a few days" and she will be up and running tracking her steps.      3.Medication Therapy:   Patient has been compliant with the medication regimen.    4.Patient has the following medication side effects/concerns: None  (Frequency/Alleviating factors/Precipitating factors, etc.)     Follow up with Ms. Malathi Martinez completed. No further questions or concerns. Will continue to follow up to achieve health goals.      "

## 2019-01-08 ENCOUNTER — PATIENT OUTREACH (OUTPATIENT)
Dept: OTHER | Facility: OTHER | Age: 59
End: 2019-01-08

## 2019-01-08 DIAGNOSIS — M79.89 LEG SWELLING: ICD-10-CM

## 2019-01-08 NOTE — PROGRESS NOTES
Last 6 Patient Entered Readings                                       Most Recent A1c: 5.4% on 8/4/2018  (Goal: 7%)     Recent Readings 1/6/2019 1/6/2019 1/4/2019 1/3/2019 1/2/2019    Blood Glucose (mg/dL) 101 22 115 109 94          Left voicemail requesting call back. Confirm if 1/6 hypoglycemic reading accurate or an error. She has PCP appointment 1/25. Recommend obtaining a1c at that time.

## 2019-01-09 ENCOUNTER — OFFICE VISIT (OUTPATIENT)
Dept: URGENT CARE | Facility: CLINIC | Age: 59
End: 2019-01-09
Payer: COMMERCIAL

## 2019-01-09 VITALS
BODY MASS INDEX: 26.48 KG/M2 | DIASTOLIC BLOOD PRESSURE: 64 MMHG | OXYGEN SATURATION: 98 % | WEIGHT: 185 LBS | SYSTOLIC BLOOD PRESSURE: 145 MMHG | TEMPERATURE: 98 F | HEART RATE: 74 BPM | RESPIRATION RATE: 16 BRPM | HEIGHT: 70 IN

## 2019-01-09 DIAGNOSIS — J06.9 VIRAL URI WITH COUGH: Primary | ICD-10-CM

## 2019-01-09 PROCEDURE — 3008F PR BODY MASS INDEX (BMI) DOCUMENTED: ICD-10-PCS | Mod: CPTII,S$GLB,, | Performed by: PHYSICIAN ASSISTANT

## 2019-01-09 PROCEDURE — 99214 PR OFFICE/OUTPT VISIT, EST, LEVL IV, 30-39 MIN: ICD-10-PCS | Mod: S$GLB,,, | Performed by: PHYSICIAN ASSISTANT

## 2019-01-09 PROCEDURE — 3008F BODY MASS INDEX DOCD: CPT | Mod: CPTII,S$GLB,, | Performed by: PHYSICIAN ASSISTANT

## 2019-01-09 PROCEDURE — 99214 OFFICE O/P EST MOD 30 MIN: CPT | Mod: S$GLB,,, | Performed by: PHYSICIAN ASSISTANT

## 2019-01-09 RX ORDER — CODEINE PHOSPHATE AND GUAIFENESIN 10; 100 MG/5ML; MG/5ML
5 SOLUTION ORAL 3 TIMES DAILY PRN
Qty: 236 ML | Refills: 0 | Status: SHIPPED | OUTPATIENT
Start: 2019-01-09 | End: 2019-01-19

## 2019-01-09 RX ORDER — FUROSEMIDE 20 MG/1
TABLET ORAL
Qty: 90 TABLET | Refills: 1 | Status: SHIPPED | OUTPATIENT
Start: 2019-01-09 | End: 2019-08-09 | Stop reason: SDUPTHER

## 2019-01-09 RX ORDER — BENZONATATE 100 MG/1
100 CAPSULE ORAL EVERY 6 HOURS PRN
Qty: 60 CAPSULE | Refills: 1 | Status: SHIPPED | OUTPATIENT
Start: 2019-01-09 | End: 2019-05-03

## 2019-01-09 RX ORDER — FLUTICASONE PROPIONATE 50 MCG
2 SPRAY, SUSPENSION (ML) NASAL DAILY
Qty: 1 BOTTLE | Refills: 0 | Status: SHIPPED | OUTPATIENT
Start: 2019-01-09 | End: 2019-05-23

## 2019-01-09 NOTE — PROGRESS NOTES
"Subjective:       Patient ID: Malathi Martinez is a 58 y.o. female.    Vitals:  height is 5' 10" (1.778 m) and weight is 83.9 kg (185 lb). Her temperature is 98 °F (36.7 °C). Her blood pressure is 145/64 (abnormal) and her pulse is 74. Her respiration is 16 and oxygen saturation is 98%.     Chief Complaint: Sinus Problem    Sinus Problem   This is a new problem. The current episode started in the past 7 days. The problem is unchanged. Associated symptoms include congestion, coughing and sinus pressure. Pertinent negatives include no chills, diaphoresis, ear pain, shortness of breath or sore throat.       Constitution: Negative for chills, sweating, fatigue and fever.   HENT: Positive for congestion, postnasal drip and sinus pressure. Negative for ear pain, sinus pain, sore throat and voice change.    Neck: Negative for painful lymph nodes.   Eyes: Negative for eye redness.   Respiratory: Positive for cough and sputum production. Negative for chest tightness, bloody sputum, COPD, shortness of breath, stridor, wheezing and asthma.    Gastrointestinal: Negative for nausea and vomiting.   Musculoskeletal: Negative for muscle ache.   Skin: Negative for rash.   Allergic/Immunologic: Positive for seasonal allergies. Negative for asthma.   Hematologic/Lymphatic: Negative for swollen lymph nodes.       Objective:      Physical Exam   Constitutional: She is oriented to person, place, and time. She appears well-developed and well-nourished. She is cooperative.  Non-toxic appearance. She does not appear ill. No distress.   HENT:   Head: Normocephalic and atraumatic.   Right Ear: Hearing, external ear and ear canal normal. A middle ear effusion (Clear) is present.   Left Ear: Hearing, external ear and ear canal normal. A middle ear effusion (Clear) is present.   Nose: Nose normal. No mucosal edema, rhinorrhea or nasal deformity. No epistaxis. Right sinus exhibits no maxillary sinus tenderness and no frontal sinus " tenderness. Left sinus exhibits no maxillary sinus tenderness and no frontal sinus tenderness.   Mouth/Throat: Uvula is midline and mucous membranes are normal. No trismus in the jaw. Normal dentition. No uvula swelling. Posterior oropharyngeal erythema (Cobblestoning) present.   Eyes: Conjunctivae and lids are normal. No scleral icterus.   Sclera clear bilat   Neck: Trachea normal, full passive range of motion without pain and phonation normal. Neck supple.   Cardiovascular: Normal rate, regular rhythm, normal heart sounds, intact distal pulses and normal pulses.   Pulmonary/Chest: Effort normal and breath sounds normal. No respiratory distress.   Abdominal: Soft. Normal appearance and bowel sounds are normal. She exhibits no distension. There is no tenderness.   Musculoskeletal: Normal range of motion. She exhibits no edema or deformity.   Neurological: She is alert and oriented to person, place, and time. She exhibits normal muscle tone. Coordination normal.   Skin: Skin is warm, dry and intact. She is not diaphoretic. No pallor.   Psychiatric: She has a normal mood and affect. Her speech is normal and behavior is normal. Judgment and thought content normal. Cognition and memory are normal.   Nursing note and vitals reviewed.      Assessment:       1. Viral URI with cough        Plan:         Viral URI with cough    Other orders  -     benzonatate (TESSALON PERLES) 100 MG capsule; Take 1 capsule (100 mg total) by mouth every 6 (six) hours as needed.  Dispense: 60 capsule; Refill: 1  -     fluticasone (FLONASE ALLERGY RELIEF) 50 mcg/actuation nasal spray; 2 sprays (100 mcg total) by Each Nare route once daily.  Dispense: 1 Bottle; Refill: 0  -     guaifenesin-codeine 100-10 mg/5 ml (TUSSI-ORGANIDIN NR)  mg/5 mL syrup; Take 5 mLs by mouth 3 (three) times daily as needed for Cough.  Dispense: 236 mL; Refill: 0    Symptomatic treatment:    Alternate Tylenol and Ibuprofen every 3 hrs  salt water gargles to  soothe throat  Honey/lemon water to soothe throat  Cold-eeze helps to reduce the duration of URI symptoms  Elderberry to reduce duration of URI symptoms  Cepachol helps to numb the discomfort in throat  Nasal saline spray reduces inflammation and dryness  Warm face compresses/hot showers as often as you can to open sinuses   Vicks vapor rub at night  Flonase OTC or Nasacort OTC  Simple foods like chicken noodle soup help hydrate  Delsym helps with coughing at night  Zyrtec/Claritin during the day and Benadryl at night may help if allergy component   Zantac will help if there is reflux from the post nasal drip  Rest as much as you can  Your symptoms will likely last 7-10 days, maybe longer depending on how it affects your body.  You are contagious 7-10, so minimize contact with others to reduce the spread to others and stay home from work or school as we discussed. Dehydration is preventable but is one of the main reasons why you will feel so badly. Drink pedialyte, gatorade or propel. Stay hydrated.  Antibiotics are not needed unless a complication(such as Otitis Media, Bacterial sinus infection or pneumonia). Taking antibiotics for Flu/Cold is not supported by evidencebased medicine and can expose you to unnecessary side effects of the medication, such as anaphylaxis.   If you experience any:  Chest pain, shortness of breath, wheezing or difficulty breathing  Severe headache, face, neck or ear pain  New rash  Fever over 101.5º F (38.6 C) for more than three days  Confusion, behavior change or seizure  Severe weakness or dizziness  Go to ER

## 2019-01-10 ENCOUNTER — PATIENT MESSAGE (OUTPATIENT)
Dept: FAMILY MEDICINE | Facility: CLINIC | Age: 59
End: 2019-01-10

## 2019-01-10 DIAGNOSIS — M25.561 ACUTE PAIN OF RIGHT KNEE: Primary | ICD-10-CM

## 2019-01-11 ENCOUNTER — HOSPITAL ENCOUNTER (OUTPATIENT)
Dept: RADIOLOGY | Facility: HOSPITAL | Age: 59
Discharge: HOME | End: 2019-01-11
Attending: FAMILY MEDICINE
Payer: COMMERCIAL

## 2019-01-11 DIAGNOSIS — R93.89 ABNORMAL PELVIC ULTRASOUND: ICD-10-CM

## 2019-01-11 DIAGNOSIS — K82.4 GALLBLADDER POLYP: ICD-10-CM

## 2019-01-11 DIAGNOSIS — K76.89 HEPATIC CYST: ICD-10-CM

## 2019-01-11 DIAGNOSIS — R93.5 ABNORMAL CT OF THE ABDOMEN: ICD-10-CM

## 2019-01-11 DIAGNOSIS — Q63.9 RENAL STRUCTURAL ABNORMALITY: ICD-10-CM

## 2019-01-11 DIAGNOSIS — M25.561 ACUTE PAIN OF RIGHT KNEE: ICD-10-CM

## 2019-01-11 DIAGNOSIS — R93.89 ABNORMAL PELVIC ULTRASOUND: Primary | ICD-10-CM

## 2019-01-11 PROCEDURE — 73562 X-RAY EXAM OF KNEE 3: CPT | Mod: 26,RT,, | Performed by: RADIOLOGY

## 2019-01-11 PROCEDURE — 76856 US EXAM PELVIC COMPLETE: CPT | Mod: 26,,, | Performed by: RADIOLOGY

## 2019-01-11 PROCEDURE — 73560 XR KNEE ORTHO RIGHT: ICD-10-PCS | Mod: 26,59,LT, | Performed by: RADIOLOGY

## 2019-01-11 PROCEDURE — 76830 US PELVIS COMP WITH TRANSVAG NON-OB (XPD): ICD-10-PCS | Mod: 26,,, | Performed by: RADIOLOGY

## 2019-01-11 PROCEDURE — 76700 US ABDOMEN COMPLETE: ICD-10-PCS | Mod: 26,,, | Performed by: RADIOLOGY

## 2019-01-11 PROCEDURE — 73562 XR KNEE ORTHO RIGHT: ICD-10-PCS | Mod: 26,RT,, | Performed by: RADIOLOGY

## 2019-01-11 PROCEDURE — 76700 US EXAM ABDOM COMPLETE: CPT | Mod: 26,,, | Performed by: RADIOLOGY

## 2019-01-11 PROCEDURE — 76830 TRANSVAGINAL US NON-OB: CPT | Mod: 26,,, | Performed by: RADIOLOGY

## 2019-01-11 PROCEDURE — 76856 US PELVIS COMP WITH TRANSVAG NON-OB (XPD): ICD-10-PCS | Mod: 26,,, | Performed by: RADIOLOGY

## 2019-01-11 PROCEDURE — 73560 X-RAY EXAM OF KNEE 1 OR 2: CPT | Mod: 26,59,LT, | Performed by: RADIOLOGY

## 2019-01-11 PROCEDURE — 76830 TRANSVAGINAL US NON-OB: CPT | Mod: TC,PO

## 2019-01-11 PROCEDURE — 73560 X-RAY EXAM OF KNEE 1 OR 2: CPT | Mod: TC,FY,PO,RT

## 2019-01-11 PROCEDURE — 76700 US EXAM ABDOM COMPLETE: CPT | Mod: TC,PO

## 2019-01-18 ENCOUNTER — ANESTHESIA (OUTPATIENT)
Dept: ENDOSCOPY | Facility: HOSPITAL | Age: 59
End: 2019-01-18
Payer: COMMERCIAL

## 2019-01-18 ENCOUNTER — ANESTHESIA EVENT (OUTPATIENT)
Dept: ENDOSCOPY | Facility: HOSPITAL | Age: 59
End: 2019-01-18
Payer: COMMERCIAL

## 2019-01-18 ENCOUNTER — HOSPITAL ENCOUNTER (OUTPATIENT)
Facility: HOSPITAL | Age: 59
Discharge: HOME | End: 2019-01-18
Attending: INTERNAL MEDICINE | Admitting: INTERNAL MEDICINE
Payer: COMMERCIAL

## 2019-01-18 VITALS
HEIGHT: 70 IN | RESPIRATION RATE: 16 BRPM | WEIGHT: 185 LBS | SYSTOLIC BLOOD PRESSURE: 128 MMHG | BODY MASS INDEX: 26.48 KG/M2 | TEMPERATURE: 98 F | DIASTOLIC BLOOD PRESSURE: 74 MMHG | HEART RATE: 70 BPM | OXYGEN SATURATION: 99 %

## 2019-01-18 DIAGNOSIS — Z12.11 COLON CANCER SCREENING: ICD-10-CM

## 2019-01-18 LAB — GLUCOSE SERPL-MCNC: 98 MG/DL (ref 70–110)

## 2019-01-18 PROCEDURE — D9220A PRA ANESTHESIA: ICD-10-PCS | Mod: 33,ANES,, | Performed by: ANESTHESIOLOGY

## 2019-01-18 PROCEDURE — 45385 COLONOSCOPY W/LESION REMOVAL: CPT | Mod: 33,,, | Performed by: INTERNAL MEDICINE

## 2019-01-18 PROCEDURE — 45385 PR COLONOSCOPY,REMV LESN,SNARE: ICD-10-PCS | Mod: 33,,, | Performed by: INTERNAL MEDICINE

## 2019-01-18 PROCEDURE — D9220A PRA ANESTHESIA: ICD-10-PCS | Mod: 33,CRNA,, | Performed by: NURSE ANESTHETIST, CERTIFIED REGISTERED

## 2019-01-18 PROCEDURE — 45385 COLONOSCOPY W/LESION REMOVAL: CPT | Mod: PO | Performed by: INTERNAL MEDICINE

## 2019-01-18 PROCEDURE — D9220A PRA ANESTHESIA: Mod: 33,CRNA,, | Performed by: NURSE ANESTHETIST, CERTIFIED REGISTERED

## 2019-01-18 PROCEDURE — 88305 TISSUE EXAM BY PATHOLOGIST: CPT | Performed by: PATHOLOGY

## 2019-01-18 PROCEDURE — 88305 TISSUE SPECIMEN TO PATHOLOGY - SURGERY: ICD-10-PCS | Mod: 26,,, | Performed by: PATHOLOGY

## 2019-01-18 PROCEDURE — 37000008 HC ANESTHESIA 1ST 15 MINUTES: Mod: PO | Performed by: INTERNAL MEDICINE

## 2019-01-18 PROCEDURE — 27201089 HC SNARE, DISP (ANY): Mod: PO | Performed by: INTERNAL MEDICINE

## 2019-01-18 PROCEDURE — 25000003 PHARM REV CODE 250: Mod: PO | Performed by: INTERNAL MEDICINE

## 2019-01-18 PROCEDURE — 88305 TISSUE EXAM BY PATHOLOGIST: CPT | Mod: 26,,, | Performed by: PATHOLOGY

## 2019-01-18 PROCEDURE — 63600175 PHARM REV CODE 636 W HCPCS: Mod: PO | Performed by: NURSE ANESTHETIST, CERTIFIED REGISTERED

## 2019-01-18 PROCEDURE — D9220A PRA ANESTHESIA: Mod: 33,ANES,, | Performed by: ANESTHESIOLOGY

## 2019-01-18 PROCEDURE — 37000009 HC ANESTHESIA EA ADD 15 MINS: Mod: PO | Performed by: INTERNAL MEDICINE

## 2019-01-18 RX ORDER — PROPOFOL 10 MG/ML
VIAL (ML) INTRAVENOUS
Status: DISCONTINUED | OUTPATIENT
Start: 2019-01-18 | End: 2019-01-18

## 2019-01-18 RX ORDER — LIDOCAINE HCL/PF 100 MG/5ML
SYRINGE (ML) INTRAVENOUS
Status: DISCONTINUED | OUTPATIENT
Start: 2019-01-18 | End: 2019-01-18

## 2019-01-18 RX ORDER — SODIUM CHLORIDE 0.9 % (FLUSH) 0.9 %
3 SYRINGE (ML) INJECTION
Status: DISCONTINUED | OUTPATIENT
Start: 2019-01-18 | End: 2019-01-18

## 2019-01-18 RX ORDER — SODIUM CHLORIDE, SODIUM LACTATE, POTASSIUM CHLORIDE, CALCIUM CHLORIDE 600; 310; 30; 20 MG/100ML; MG/100ML; MG/100ML; MG/100ML
INJECTION, SOLUTION INTRAVENOUS CONTINUOUS
Status: DISCONTINUED | OUTPATIENT
Start: 2019-01-18 | End: 2019-01-18 | Stop reason: HOSPADM

## 2019-01-18 RX ADMIN — SODIUM CHLORIDE, SODIUM LACTATE, POTASSIUM CHLORIDE, AND CALCIUM CHLORIDE: .6; .31; .03; .02 INJECTION, SOLUTION INTRAVENOUS at 07:01

## 2019-01-18 RX ADMIN — LIDOCAINE HYDROCHLORIDE 75 MG: 20 INJECTION, SOLUTION INTRAVENOUS at 07:01

## 2019-01-18 RX ADMIN — PROPOFOL 30 MG: 10 INJECTION, EMULSION INTRAVENOUS at 07:01

## 2019-01-18 RX ADMIN — PROPOFOL 120 MG: 10 INJECTION, EMULSION INTRAVENOUS at 07:01

## 2019-01-18 RX ADMIN — PROPOFOL 20 MG: 10 INJECTION, EMULSION INTRAVENOUS at 07:01

## 2019-01-18 RX ADMIN — PROPOFOL 40 MG: 10 INJECTION, EMULSION INTRAVENOUS at 07:01

## 2019-01-18 NOTE — [employer] PATIENT INSTRUCTIONS
Discharge Summary/Instructions after an Endoscopic Procedure  Patient Name: Malathi Martinez  Patient MRN: [id number]  Patient YOB: 1960 Friday, January 18, 2019  Lai Cardoso MD  RESTRICTIONS:  During your procedure today, you received medications for sedation.  These   medications may affect your judgment, balance and coordination.  Therefore,   for 24 hours, you have the following restrictions:   - DO NOT drive a car, operate machinery, make legal/financial decisions,   sign important papers or drink alcohol.    ACTIVITY:  Today: no heavy lifting, straining or running due to procedural   sedation/anesthesia.  The following day: return to full activity including work.  DIET:  Eat and drink normally unless instructed otherwise.     TREATMENT FOR COMMON SIDE EFFECTS:  - Mild abdominal pain, nausea, belching, bloating or excessive gas:  rest,   eat lightly and use a heating pad.  - Sore Throat: treat with throat lozenges and/or gargle with warm salt   water.  - Because air was used during the procedure, expelling large amounts of air   from your rectum or belching is normal.  - If a bowel prep was taken, you may not have a bowel movement for 1-3 days.    This is normal.  SYMPTOMS TO WATCH FOR AND REPORT TO YOUR PHYSICIAN:  1. Abdominal pain or bloating, other than gas cramps.  2. Chest pain.  3. Back pain.  4. Signs of infection such as: chills or fever occurring within 24 hours   after the procedure.  5. Rectal bleeding, which would show as bright red, maroon, or black stools.   (A tablespoon of blood from the rectum is not serious, especially if   hemorrhoids are present.)  6. Vomiting.  7. Weakness or dizziness.  GO DIRECTLY TO THE NEAREST EMERGENCY ROOM IF YOU HAVE ANY OF THE FOLLOWING:      Difficulty breathing              Chills and/or fever over 101 F   Persistent vomiting and/or vomiting blood   Severe abdominal pain   Severe chest pain   Black, tarry stools   Bleeding- more than one  tablespoon   Any other symptom or condition that you feel may need urgent attention  Your doctor recommends these additional instructions:  If any biopsies were taken, your doctors clinic will contact you in 1 to 2   weeks with any results.  We are waiting for your pathology results.   Your physician has recommended a repeat colonoscopy in five years for   surveillance based on pathology results.   You are being discharged to home.  For questions, problems or results please call your physician - Lai Cardoso MD at Work: [phone].  EMERGENCY PHONE NUMBER: [phone], LAB RESULTS: [phone]  IF A COMPLICATION OR EMERGENCY SITUATION ARISES AND YOU ARE UNABLE TO REACH   YOUR PHYSICIAN - GO DIRECTLY TO THE EMERGENCY ROOM.  ___________________________________________  Nurse Signature  ___________________________________________  Patient/Designated Responsible Party Signature  aLi Cardoso MD  1/18/2019 7:59:23 AM  This report has been verified and signed electronically.  [employer]

## 2019-01-18 NOTE — ANESTHESIA PREPROCEDURE EVALUATION
01/18/2019  Malathi Martinez is a 58 y.o., female.    Anesthesia Evaluation    I have reviewed the Patient Summary Reports.    I have reviewed the Nursing Notes.      Review of Systems  Anesthesia Hx:  No problems with previous Anesthesia    Cardiovascular:   Hypertension, well controlled    Pulmonary:   COPD, mild Asthma asymptomatic    Hepatic/GI:   GERD, well controlled    Endocrine:   Diabetes, well controlled, type 2        Physical Exam  General:  Obesity    Airway/Jaw/Neck:  Airway Findings: Mallampati: II                Anesthesia Plan  Type of Anesthesia, risks & benefits discussed:  Anesthesia Type:  general  Patient's Preference:   Intra-op Monitoring Plan:   Intra-op Monitoring Plan Comments:   Post Op Pain Control Plan:   Post Op Pain Control Plan Comments:   Induction:   IV  Beta Blocker:  Patient is not currently on a Beta-Blocker (No further documentation required).       Informed Consent: Patient understands risks and agrees with Anesthesia plan.  Questions answered. Anesthesia consent signed with patient.  ASA Score: 2     Day of Surgery Review of History & Physical:    H&P update referred to the surgeon.         Ready For Surgery From Anesthesia Perspective.

## 2019-01-18 NOTE — TRANSFER OF CARE
"Anesthesia Transfer of Care Note    Patient: Malathi Martinez    Procedure(s) Performed: Procedure(s) (LRB):  COLONOSCOPY (N/A)    Patient location: PACU    Anesthesia Type: general    Transport from OR: Transported from OR on room air with adequate spontaneous ventilation    Post pain: adequate analgesia    Post assessment: no apparent anesthetic complications    Post vital signs: stable    Level of consciousness: awake and sedated    Nausea/Vomiting: no nausea/vomiting    Complications: none    Transfer of care protocol was followed      Last vitals:   Visit Vitals  BP (!) 145/66 (BP Location: Right arm, Patient Position: Lying)   Pulse 64   Temp 36.5 °C (97.7 °F) (Skin)   Resp 15   Ht 5' 10" (1.778 m)   Wt 83.9 kg (185 lb)   LMP 09/10/2012   SpO2 99%   Breastfeeding? No   BMI 26.54 kg/m²     "

## 2019-01-18 NOTE — DISCHARGE SUMMARY
Discharge Note  Short Stay      SUMMARY     Admit Date: 1/18/2019    Attending Physician: Lai Cardoso MD     Discharge Physician: Lai Cardoso MD    Discharge Date: 1/18/2019 8:00 AM    Final Diagnosis: Screen for colon cancer [Z12.11]    Disposition: HOME OR SELF CARE    Patient Instructions:   Current Discharge Medication List      CONTINUE these medications which have NOT CHANGED    Details   ALPRAZolam (XANAX) 0.25 MG tablet TAKE 1 TABLET TWICE A DAY AS NEEDED FOR ANXIETY  Qty: 60 tablet, Refills: 2    Comments: Not to exceed 3 additional fills before 05/24/2018  Associated Diagnoses: Adjustment disorder with mixed anxiety and depressed mood; Grief reaction      atorvastatin (LIPITOR) 20 MG tablet TAKE 1 TABLET BY MOUTH ONCE DAILY.  Qty: 90 tablet, Refills: 1      benzonatate (TESSALON PERLES) 100 MG capsule Take 1 capsule (100 mg total) by mouth every 6 (six) hours as needed.  Qty: 60 capsule, Refills: 1      !! blood sugar diagnostic (CONTOUR TEST STRIPS MISC) Contour Test Strips      !! blood sugar diagnostic Strp 1 strip by Misc.(Non-Drug; Combo Route) route once daily.  Qty: 50 each, Refills: 6    Associated Diagnoses: Type 2 diabetes mellitus without complication, without long-term current use of insulin      blood-glucose meter kit Use as instructed  Qty: 1 each, Refills: 0    Associated Diagnoses: Type 2 diabetes mellitus without complication, without long-term current use of insulin      buPROPion (WELLBUTRIN XL) 150 MG TB24 tablet Take 1 tablet (150 mg total) by mouth once daily.  Qty: 90 tablet, Refills: 1    Associated Diagnoses: Adjustment disorder with mixed anxiety and depressed mood; Grief reaction      cholecalciferol, vitamin D3, 2,000 unit Cap Take 1 capsule by mouth once daily.      cyclobenzaprine (FLEXERIL) 5 MG tablet TAKE 1 TABLET (5 MG TOTAL) BY MOUTH NIGHTLY AS NEEDED FOR MUSCLE SPASMS.  Qty: 90 tablet, Refills: 1      desloratadine (CLARINEX) 5 mg tablet Take 1 tablet (5  mg total) by mouth once daily.  Qty: 90 tablet, Refills: 3      escitalopram oxalate (LEXAPRO) 10 MG tablet Take 1.5 tablets (15 mg total) by mouth once daily.  Qty: 135 tablet, Refills: 1    Associated Diagnoses: Recurrent major depressive disorder, in partial remission; Grief reaction      fluticasone (FLONASE ALLERGY RELIEF) 50 mcg/actuation nasal spray 2 sprays (100 mcg total) by Each Nare route once daily.  Qty: 1 Bottle, Refills: 0      furosemide (LASIX) 20 MG tablet TAKE 1 TABLET BY MOUTH EVERY DAY  Qty: 90 tablet, Refills: 1    Associated Diagnoses: Leg swelling      guaifenesin-codeine 100-10 mg/5 ml (TUSSI-ORGANIDIN NR)  mg/5 mL syrup Take 5 mLs by mouth 3 (three) times daily as needed for Cough.  Qty: 236 mL, Refills: 0      indomethacin (INDOCIN) 25 MG capsule TAKE 1 CAPSULE (25 MG TOTAL) BY MOUTH 2 (TWO) TIMES DAILY WITH MEALS.  Qty: 30 capsule, Refills: 2      !! lancets (MICROLET LANCET MISC) Microlet Lancet      lisinopril (PRINIVIL,ZESTRIL) 2.5 MG tablet Take 1 tablet (2.5 mg total) by mouth once daily.  Qty: 90 tablet, Refills: 1      metFORMIN (GLUCOPHAGE-XR) 500 MG 24 hr tablet Take 1 tablet (500 mg total) by mouth 2 (two) times daily with meals.  Qty: 180 tablet, Refills: 1      omeprazole (PRILOSEC) 40 MG capsule Take 1 capsule (40 mg total) by mouth once daily.  Qty: 90 capsule, Refills: 1      ranitidine (ZANTAC) 300 MG tablet TAKE 1 TABLET BY MOUTH IN THE EVENING  Qty: 30 tablet, Refills: 2      albuterol (PROAIR HFA) 90 mcg/actuation inhaler INHALE 2 PUFFS INTO THE LUNGS EVERY 4 (FOUR) HOURS AS NEEDED FOR WHEEZING.  Qty: 8.5 Inhaler, Refills: 3    Associated Diagnoses: Chronic obstructive pulmonary disease, unspecified COPD type      !! lancets Misc 1 each by Misc.(Non-Drug; Combo Route) route once daily.  Qty: 50 each, Refills: 6    Associated Diagnoses: Type 2 diabetes mellitus without complication, without long-term current use of insulin      varenicline (CHANTIX CONTINUING MONTH  BOX) 1 mg Tab Take 1 tablet (1 mg total) by mouth 2 (two) times daily.  Qty: 60 tablet, Refills: 1       !! - Potential duplicate medications found. Please discuss with provider.          Discharge Procedure Orders (must include Diet, Follow-up, Activity)    Follow Up:  Follow up with PCP as previously scheduled  Resume routine diet.  Activity as tolerated.    No driving day of procedure.

## 2019-01-18 NOTE — ANESTHESIA POSTPROCEDURE EVALUATION
"Anesthesia Post Evaluation    Patient: Malathi Martinez    Procedure(s) Performed: Procedure(s) (LRB):  COLONOSCOPY (N/A)    Final Anesthesia Type: general  Patient location during evaluation: PACU  Patient participation: Yes- Able to Participate  Level of consciousness: awake and alert  Post-procedure vital signs: reviewed and stable  Pain management: adequate  Airway patency: patent  PONV status at discharge: No PONV  Anesthetic complications: no      Cardiovascular status: blood pressure returned to baseline and hemodynamically stable  Respiratory status: unassisted  Hydration status: euvolemic  Follow-up not needed.        Visit Vitals  /74 (BP Location: Left arm, Patient Position: Lying)   Pulse 68   Temp 36.4 °C (97.5 °F) (Skin)   Resp 16   Ht 5' 10" (1.778 m)   Wt 83.9 kg (185 lb)   LMP 09/10/2012   SpO2 98%   Breastfeeding? No   BMI 26.54 kg/m²       Pain/Paulo Score: Paulo Score: 9 (1/18/2019  7:58 AM)        "

## 2019-01-18 NOTE — H&P
History & Physical - Short Stay  Gastroenterology      SUBJECTIVE:     Procedure: Colonoscopy    Chief Complaint/Indication for Procedure: Screening    PTA Medications   Medication Sig    ALPRAZolam (XANAX) 0.25 MG tablet TAKE 1 TABLET TWICE A DAY AS NEEDED FOR ANXIETY    atorvastatin (LIPITOR) 20 MG tablet TAKE 1 TABLET BY MOUTH ONCE DAILY.    benzonatate (TESSALON PERLES) 100 MG capsule Take 1 capsule (100 mg total) by mouth every 6 (six) hours as needed.    blood sugar diagnostic (CONTOUR TEST STRIPS MISC) Contour Test Strips    blood sugar diagnostic Strp 1 strip by Misc.(Non-Drug; Combo Route) route once daily.    blood-glucose meter kit Use as instructed    buPROPion (WELLBUTRIN XL) 150 MG TB24 tablet Take 1 tablet (150 mg total) by mouth once daily.    cholecalciferol, vitamin D3, 2,000 unit Cap Take 1 capsule by mouth once daily.    cyclobenzaprine (FLEXERIL) 5 MG tablet TAKE 1 TABLET (5 MG TOTAL) BY MOUTH NIGHTLY AS NEEDED FOR MUSCLE SPASMS.    desloratadine (CLARINEX) 5 mg tablet Take 1 tablet (5 mg total) by mouth once daily.    escitalopram oxalate (LEXAPRO) 10 MG tablet Take 1.5 tablets (15 mg total) by mouth once daily.    fluticasone (FLONASE ALLERGY RELIEF) 50 mcg/actuation nasal spray 2 sprays (100 mcg total) by Each Nare route once daily.    furosemide (LASIX) 20 MG tablet TAKE 1 TABLET BY MOUTH EVERY DAY    guaifenesin-codeine 100-10 mg/5 ml (TUSSI-ORGANIDIN NR)  mg/5 mL syrup Take 5 mLs by mouth 3 (three) times daily as needed for Cough.    indomethacin (INDOCIN) 25 MG capsule TAKE 1 CAPSULE (25 MG TOTAL) BY MOUTH 2 (TWO) TIMES DAILY WITH MEALS.    lancets (MICROLET LANCET MISC) Microlet Lancet    lisinopril (PRINIVIL,ZESTRIL) 2.5 MG tablet Take 1 tablet (2.5 mg total) by mouth once daily.    metFORMIN (GLUCOPHAGE-XR) 500 MG 24 hr tablet Take 1 tablet (500 mg total) by mouth 2 (two) times daily with meals.    omeprazole (PRILOSEC) 40 MG capsule Take 1 capsule (40 mg  total) by mouth once daily.    ranitidine (ZANTAC) 300 MG tablet TAKE 1 TABLET BY MOUTH IN THE EVENING    albuterol (PROAIR HFA) 90 mcg/actuation inhaler INHALE 2 PUFFS INTO THE LUNGS EVERY 4 (FOUR) HOURS AS NEEDED FOR WHEEZING.    lancets Misc 1 each by Misc.(Non-Drug; Combo Route) route once daily.    varenicline (CHANTIX CONTINUING MONTH BOX) 1 mg Tab Take 1 tablet (1 mg total) by mouth 2 (two) times daily.       Review of patient's allergies indicates:   Allergen Reactions    Penicillins Hives        Past Medical History:   Diagnosis Date    Abnormal stress test     Bronchitis     COPD (chronic obstructive pulmonary disease)     Diabetes mellitus, type 2     HLD (hyperlipidemia)     Hx of endoscopy     Hypertension     Mental disorder     Pulmonary nodule 4/9/2018    SOB (shortness of breath)     resolved    Tobacco abuse 12/26/2012     Past Surgical History:   Procedure Laterality Date    APPENDECTOMY      ESOPHAGOGASTRODUODENOSCOPY (EGD) N/A 3/14/2018    [facility]    HEART CATH-LEFT N/A 7/1/2016    [facility]    TONSILLECTOMY       Family History   Problem Relation Age of Onset    Heart disease Mother         MVP    Cataracts Mother     Heart disease Father 65        MI and PVD    Throat cancer Father     Heart disease Sister         mvp    Esophageal cancer Brother     No Known Problems Sister     No Known Problems Sister     Hyperlipidemia Paternal Grandmother      Social History     Tobacco Use    Smoking status: Current Every Day Smoker     Packs/day: 1.00    Smokeless tobacco: Never Used   Substance Use Topics    Alcohol use: No    Drug use: No         OBJECTIVE:     Vital Signs (Most Recent)  Temp: 97.7 °F (36.5 °C) (01/18/19 0709)  Pulse: 64 (01/18/19 0709)  Resp: 15 (01/18/19 0709)  BP: (!) 145/66 (01/18/19 0709)  SpO2: 99 % (01/18/19 0709)    Physical Exam                                                         GENERAL:  Comfortable, in no acute distress.                                 HEENT EXAM:  Nonicteric.  No adenopathy.  Oropharynx is clear.               NECK:  Supple.                                                               LUNGS:  Clear.                                                               CARDIAC:  Regular rate and rhythm.  S1, S2.  No murmur.                      ABDOMEN:  Soft, positive bowel sounds, nontender.  No hepatosplenomegaly or masses.  No rebound or guarding.                                             EXTREMITIES:  No edema.     MENTAL STATUS:  Normal, alert and oriented.      ASSESSMENT/PLAN:     Assessment: Colorectal cancer screening    Plan: Colonoscopy    Anesthesia Plan: General    ASA Grade: ASA 2 - Patient with mild systemic disease with no functional limitations    MALLAMPATI SCORE:  I (soft palate, uvula, fauces, and tonsillar pillars visible)

## 2019-01-18 NOTE — DISCHARGE INSTRUCTIONS
Recovery After Procedural Sedation (Adult)  You have been given medicine by vein to make you sleep during your surgery. This may have included both a pain medicine and sleeping medicine. Most of the effects have worn off. But you may still have some drowsiness for the next 6 to 8 hours.  Home care  Follow these guidelines when you get home:  · For the next 8 hours, you should be watched by a responsible adult. This person should make sure your condition is not getting worse.  · Don't drink any alcohol for the next 24 hours.  · Don't drive, operate dangerous machinery, or make important business or personal decisions during the next 24 hours.  Note: Your healthcare provider may tell you not to take any medicine by mouth for pain or sleep in the next 4 hours. These medicines may react with the medicines you were given in the hospital. This could cause a much stronger response than usual.  Follow-up care  Follow up with your healthcare provider if you are not alert and back to your usual level of activity within 12 hours.  When to seek medical advice  Call your healthcare provider right away if any of these occur:  · Drowsiness gets worse  · Weakness or dizziness gets worse  · Repeated vomiting  · You can't be awakened   Date Last Reviewed: 10/18/2016  © 9818-3326 [employer]. [address] [place] [address], [place] [address]. All rights reserved. This information is not intended as a substitute for professional medical care. Always follow your healthcare professional's instructions.      Understanding Colon and Rectal Polyps    The colon (also called the large intestine) is a muscular tube that forms the last part of the digestive tract. It absorbs water and stores food waste. The colon is about 4 to 6 feet long. The rectum is the last 6 inches of the colon. The colon and rectum have a smooth lining composed of millions of cells. Changes in these cells can lead to growths in the colon that can become cancerous  and should be removed. Multiple tests are available to screen for colon cancer, but the colonoscopy is the most recommended test. During colonoscopy, these polyps can be removed. How often you need this test depends on many things including your condition, your family history, symptoms, and what the findings were at the previous colonoscopy.   When the colon lining changes  Changes that happen in the cells that line the colon or rectum can lead to growths called polyps. Over a period of years, polyps can turn cancerous. Removing polyps early may prevent cancer from ever forming.  Polyps  Polyps are fleshy clumps of tissue that form on the lining of the colon or rectum. Small polyps are usually benign (not cancerous). However, over time, cells in a polyp can change and become cancerous. Certain types of polyps known as adenomatous polyps are premalignant. The risk for invasive cancer increases with the size of the polyp and certain cell and gene features. This means that they can become cancerous if they're not removed. Hyperplastic polyps are benign. They can grow quite large and not turn cancerous.   Cancer  Almost all colorectal cancers start when polyp cells begin growing abnormally. As a cancerous tumor grows, it may involve more and more of the colon or rectum. In time, cancer can also grow beyond the colon or rectum and spread to nearby organs or to glands called lymph nodes. The cells can also travel to other parts of the body. This is known as metastasis. The earlier a cancerous tumor is removed, the better the chance of preventing its spread.    Date Last Reviewed: 8/1/2016  © 3276-6418 [employer]. [address] [place] [address], [place] [address]. All rights reserved. This information is not intended as a substitute for professional medical care. Always follow your healthcare professional's instructions.

## 2019-01-22 NOTE — PROGRESS NOTES
Last 6 Patient Entered Readings                                       Most Recent A1c: 5.4% on 8/4/2018  (Goal: 8%)     Recent Readings 1/22/2019 1/21/2019 1/21/2019 1/16/2019 1/16/2019    Blood Glucose (mg/dL) 98 98 115 132 124          Left voicemail. Sent [facility] message about 1/6 BG reading and a1c scheduled 1/25.

## 2019-01-23 NOTE — PROGRESS NOTES
Last 6 Patient Entered Readings                                      Most Recent A1c: 5.4% on 8/4/2018  (Goal: 8%)     Recent Readings 1/23/2019 1/22/2019 1/21/2019 1/21/2019 1/16/2019    Blood Glucose (mg/dL) 63 98 98 115 132          Patient states BG of 22 mg/dL 1/6 was an error. Removed reading.  She states she will have A1c done 1/25.    Sent follow up message to assess BG of 63 mg/dL 1/23.  Patient responded, states low BG 1/23 was in error due to sample size. Removed reading from chart.

## 2019-01-25 ENCOUNTER — LAB VISIT (OUTPATIENT)
Dept: LAB | Facility: HOSPITAL | Age: 59
End: 2019-01-25
Attending: FAMILY MEDICINE
Payer: COMMERCIAL

## 2019-01-25 ENCOUNTER — OFFICE VISIT (OUTPATIENT)
Dept: FAMILY MEDICINE | Facility: CLINIC | Age: 59
End: 2019-01-25
Payer: COMMERCIAL

## 2019-01-25 VITALS
SYSTOLIC BLOOD PRESSURE: 118 MMHG | OXYGEN SATURATION: 98 % | BODY MASS INDEX: 26.45 KG/M2 | DIASTOLIC BLOOD PRESSURE: 60 MMHG | WEIGHT: 184.75 LBS | HEIGHT: 70 IN | RESPIRATION RATE: 18 BRPM | HEART RATE: 63 BPM | TEMPERATURE: 98 F

## 2019-01-25 DIAGNOSIS — J44.9 CHRONIC OBSTRUCTIVE PULMONARY DISEASE, UNSPECIFIED COPD TYPE: ICD-10-CM

## 2019-01-25 DIAGNOSIS — E11.9 TYPE 2 DIABETES MELLITUS WITHOUT COMPLICATION, WITHOUT LONG-TERM CURRENT USE OF INSULIN: ICD-10-CM

## 2019-01-25 DIAGNOSIS — F43.21 GRIEF REACTION: ICD-10-CM

## 2019-01-25 DIAGNOSIS — R91.1 PULMONARY NODULE: ICD-10-CM

## 2019-01-25 DIAGNOSIS — E66.3 OVERWEIGHT: ICD-10-CM

## 2019-01-25 DIAGNOSIS — K21.9 GASTROESOPHAGEAL REFLUX DISEASE, ESOPHAGITIS PRESENCE NOT SPECIFIED: ICD-10-CM

## 2019-01-25 DIAGNOSIS — F33.41 RECURRENT MAJOR DEPRESSIVE DISORDER, IN PARTIAL REMISSION: Primary | ICD-10-CM

## 2019-01-25 DIAGNOSIS — D21.9 FIBROID: ICD-10-CM

## 2019-01-25 PROBLEM — R13.10 DYSPHAGIA: Status: RESOLVED | Noted: 2018-03-14 | Resolved: 2019-01-25

## 2019-01-25 LAB
ESTIMATED AVG GLUCOSE: 117 MG/DL
HBA1C MFR BLD HPLC: 5.7 %

## 2019-01-25 PROCEDURE — 3008F PR BODY MASS INDEX (BMI) DOCUMENTED: ICD-10-PCS | Mod: CPTII,S$GLB,, | Performed by: FAMILY MEDICINE

## 2019-01-25 PROCEDURE — 99999 PR PBB SHADOW E&M-EST. PATIENT-LVL IV: ICD-10-PCS | Mod: PBBFAC,,, | Performed by: FAMILY MEDICINE

## 2019-01-25 PROCEDURE — 3044F HG A1C LEVEL LT 7.0%: CPT | Mod: CPTII,S$GLB,, | Performed by: FAMILY MEDICINE

## 2019-01-25 PROCEDURE — 3044F PR MOST RECENT HEMOGLOBIN A1C LEVEL <7.0%: ICD-10-PCS | Mod: CPTII,S$GLB,, | Performed by: FAMILY MEDICINE

## 2019-01-25 PROCEDURE — 3008F BODY MASS INDEX DOCD: CPT | Mod: CPTII,S$GLB,, | Performed by: FAMILY MEDICINE

## 2019-01-25 PROCEDURE — 83036 HEMOGLOBIN GLYCOSYLATED A1C: CPT

## 2019-01-25 PROCEDURE — 99999 PR PBB SHADOW E&M-EST. PATIENT-LVL IV: CPT | Mod: PBBFAC,,, | Performed by: FAMILY MEDICINE

## 2019-01-25 PROCEDURE — 36415 COLL VENOUS BLD VENIPUNCTURE: CPT | Mod: PN

## 2019-01-25 PROCEDURE — 99214 OFFICE O/P EST MOD 30 MIN: CPT | Mod: S$GLB,,, | Performed by: FAMILY MEDICINE

## 2019-01-25 PROCEDURE — 99214 PR OFFICE/OUTPT VISIT, EST, LEVL IV, 30-39 MIN: ICD-10-PCS | Mod: S$GLB,,, | Performed by: FAMILY MEDICINE

## 2019-01-25 RX ORDER — ALPRAZOLAM 0.25 MG/1
TABLET ORAL
Qty: 60 TABLET | Refills: 2 | Status: SHIPPED | OUTPATIENT
Start: 2019-01-25 | End: 2019-05-03 | Stop reason: SDUPTHER

## 2019-01-25 NOTE — PROGRESS NOTES
Subjective:       Patient ID: Malathi Martinez is a 58 y.o. female.    Chief Complaint: Follow-up (3-month follow up )    HPI  Patient in the office for f/u and review.    Overall, mood is a little better than LOV. Maintained on lexapro 15, wellbutrin 150, xanax prn. Finds that she's not constantly upset. Xanax prn, typically to let her rest. No new sx.    Sleep is generally preserved, may take 1/2 tablet of xanax to let her rest if having issues.   Tripped over the dog and landed on her L shoulder and chest on the nighttable. Landed on the bad knee. Was not checked out at the time. No chest pain with deep breathing. L shoulder is doing ok.    Knee issues are ongoing. meloxicam prn helps. She wants to watch it for now, declines pt and ortho.   Plans to f/u with new pulm this spring.  Intermittent reflux sx. prilosec daily with zantac.    Review of Systems:  Review of Systems   Constitutional: Positive for activity change (more active, walking), fatigue (not out of the ordinary) and unexpected weight change.   HENT: Positive for rhinorrhea. Negative for hearing loss and trouble swallowing.    Eyes: Positive for discharge. Negative for visual disturbance.   Respiratory: Positive for cough (post-uri) and wheezing (with lingering cough). Negative for chest tightness.    Cardiovascular: Positive for chest pain and palpitations.        More related to panic, less frequent   Gastrointestinal: Positive for constipation, diarrhea and vomiting (1 episode 3 weeks ago, no known triggers, did not recur). Negative for blood in stool.   Endocrine: Negative for polydipsia and polyuria.   Genitourinary: Negative for difficulty urinating, dysuria, hematuria and menstrual problem.   Musculoskeletal: Positive for arthralgias, joint swelling and neck pain.        R knee, post fall - see hpi.  R elbow, post fall. occ pain, no limit to activities or ROM. No weakness or sensory change.   Neurological: Positive for headaches (rare).  "Negative for weakness.   Psychiatric/Behavioral: Positive for dysphoric mood (see hpi). Negative for sleep disturbance.       Objective:     Vitals:    01/25/19 1234   BP: 118/60   Pulse: 63   Resp: 18   Temp: 98.1 °F (36.7 °C)   TempSrc: Oral   SpO2: 98%   Weight: 83.8 kg (184 lb 11.9 oz)   Height: 5' 10" (1.778 m)   weight up 3#       Physical Exam   Constitutional: She is oriented to person, place, and time. She appears well-developed and well-nourished. No distress.   HENT:   Head: Normocephalic and atraumatic.   Eyes: Conjunctivae are normal. Right eye exhibits no discharge. Left eye exhibits no discharge. No scleral icterus.   Neck: Normal range of motion. Neck supple.   Cardiovascular: Normal rate.   Pulmonary/Chest: Effort normal and breath sounds normal. No respiratory distress. She has no wheezes.       Abdominal: Soft. She exhibits no distension.   Musculoskeletal: She exhibits no edema.        Right knee: She exhibits swelling. She exhibits normal range of motion (pop sensation on extension) and no effusion. Tenderness found. MCL, LCL and patellar tendon tenderness noted.   Lymphadenopathy:     She has no cervical adenopathy.   Neurological: She is alert and oriented to person, place, and time.   Skin: Skin is warm and dry. No rash noted.   Psychiatric: She has a normal mood and affect. Her behavior is normal.   Nursing note and vitals reviewed.        Assessment & Plan:  Recurrent major depressive disorder, in partial remission  Cont wellbutrin/lexapro with xanax prn. Side effects and precautions of medication use reviewed with patient, expressed understanding. No questions or concerns.   Grief reaction  -     ALPRAZolam (XANAX) 0.25 MG tablet; TAKE 1 TABLET TWICE A DAY AS NEEDED FOR ANXIETY  Dispense: 60 tablet; Refill: 2  Pt doing relatively well. Cont regimen.  Overweight  Encouraged continued efforts at weight loss to goal (160-170lbs) with exercise, dietary mods.  Chronic obstructive pulmonary " disease, unspecified COPD type  Re-establish with pulm.  Chest ct repeat due later this year. Cont albuterol prn.   Pulmonary nodule  As above, chest ct repeat due later this year.  Gastroesophageal reflux disease, esophagitis presence not specified  Suboptimal. Cont prilosec 40, zantac 150-300. egd 2018 neg acute findings. Gi consult for persistent sx.  Type 2 diabetes mellitus without complication, without long-term current use of insulin  Prev controlled. Update a1c today.  Fibroid  Imaging reassuring. Repeat due late 2019.

## 2019-01-28 NOTE — PROGRESS NOTES
Last 6 Patient Entered Readings                                       Most Recent A1c: 5.7% on 1/25/2019  (Goal: 7%)     Recent Readings 1/25/2019 1/23/2019 1/22/2019 1/21/2019 1/21/2019    Blood Glucose (mg/dL) 93 (No Data)  98 98 115          A1c remains at goal.

## 2019-02-01 ENCOUNTER — TELEPHONE (OUTPATIENT)
Dept: ENDOSCOPY | Facility: HOSPITAL | Age: 59
End: 2019-02-01

## 2019-02-04 ENCOUNTER — OFFICE VISIT (OUTPATIENT)
Dept: OPTOMETRY | Facility: CLINIC | Age: 59
End: 2019-02-04
Payer: COMMERCIAL

## 2019-02-04 DIAGNOSIS — Z13.5 GLAUCOMA SCREENING: ICD-10-CM

## 2019-02-04 DIAGNOSIS — H11.153 PINGUECULA, BILATERAL: ICD-10-CM

## 2019-02-04 DIAGNOSIS — H43.393 VITREOUS FLOATERS, BILATERAL: ICD-10-CM

## 2019-02-04 DIAGNOSIS — E11.9 DIABETES MELLITUS TYPE 2 WITHOUT RETINOPATHY: Primary | ICD-10-CM

## 2019-02-04 PROCEDURE — 92004 COMPRE OPH EXAM NEW PT 1/>: CPT | Mod: S$GLB,,, | Performed by: OPTOMETRIST

## 2019-02-04 PROCEDURE — 99999 PR PBB SHADOW E&M-EST. PATIENT-LVL III: CPT | Mod: PBBFAC,,, | Performed by: OPTOMETRIST

## 2019-02-04 PROCEDURE — 92004 PR EYE EXAM, NEW PATIENT,COMPREHESV: ICD-10-PCS | Mod: S$GLB,,, | Performed by: OPTOMETRIST

## 2019-02-04 PROCEDURE — 99999 PR PBB SHADOW E&M-EST. PATIENT-LVL III: ICD-10-PCS | Mod: PBBFAC,,, | Performed by: OPTOMETRIST

## 2019-02-04 NOTE — PROGRESS NOTES
HPI     Diabetic Eye Exam      Additional comments: BSL controlled  -- pt ref by Dr. Dave for DM exam   --- DFE / IOP only // pt had vision exam x 3 mos ago ([facility])              Comments     Hemoglobin A1C       Date                     Value               Ref Range             Status                01/25/2019               5.7 (H)             4.0 - 5.6 %           Final              Comment:    ADA Screening Guidelines:  5.7-6.4%  Consistent with   prediabetes  >or=6.5%  Consistent with diabetes  High levels of fetal   hemoglobin interfere with the HbA1C  assay. Heterozygous hemoglobin   variants (HbS, HgC, etc)do  not significantly interfere with this assay.     However, presence of multiple variants may affect accuracy.         08/04/2018               5.4                 4.0 - 5.6 %           Final              Comment:    ADA Screening Guidelines:  5.7-6.4%  Consistent with   prediabetes  >or=6.5%  Consistent with diabetes  High levels of fetal   hemoglobin interfere with the HbA1C  assay. Heterozygous hemoglobin   variants (HbS, HgC, etc)do  not significantly interfere with this assay.     However, presence of multiple variants may affect accuracy.         02/10/2018               5.3                 4.0 - 5.6 %           Final              Comment:    According to ADA guidelines, hemoglobin A1c <7.0% represents  optimal   control in non-pregnant diabetic patients. Different  metrics may apply to   specific patient populations.   Standards of Medical Care in   Diabetes-2016.  For the purpose of screening for the presence of   diabetes:  <5.7%     Consistent with the absence of diabetes  5.7-6.4%    Consistent with increasing risk for diabetes   (prediabetes)  >or=6.5%    Consistent with diabetes  Currently, no consensus exists for use of   hemoglobin A1c  for diagnosis of diabetes for children.  This Hemoglobin   A1c assay has significant interference with fetal   hemoglobin   (HbF).   The results  are invalid for patients with abnormal amounts of   HbF,     including those with known Hereditary Persistence   of Fetal Hemoglobin.   Heterozygous hemoglobin variants (HbAS, HbAC,   HbAD, HbAE, HbA2) do not   significantly interfere with this assay;   however, presence of multiple   variants in a sample may impact the %   interference.    ----------            Last edited by Genet Cortes on 2/4/2019  3:34 PM. (History)        ROS     Positive for: Eyes    Negative for: Constitutional, Gastrointestinal, Neurological, Skin,   Genitourinary, Musculoskeletal, HENT, Endocrine, Cardiovascular,   Respiratory, Psychiatric, Allergic/Imm, Heme/Lymph    Last edited by WILD Raymond, OD on 2/4/2019  4:02 PM. (History)        Assessment /Plan     For exam results, see Encounter Report.    Diabetes mellitus type 2 without retinopathy    Vitreous floaters, bilateral    Pinguecula, bilateral    Glaucoma screening      1. No ret/ no csme, gave info, control glucose, annual DFE  2. RD precautions given  3. Mild OU, not elevated or inflammed  Discussed and reassured, can consider mild steroid if needed  Sun protection / ATs suggestions given  4. Not suspect    No refraction    Discussed and educated patient on current findings /plan.  RTC 1 year, prn if any changes / issues

## 2019-02-07 ENCOUNTER — PATIENT OUTREACH (OUTPATIENT)
Dept: OTHER | Facility: OTHER | Age: 59
End: 2019-02-07

## 2019-02-07 NOTE — PROGRESS NOTES
Last 6 Patient Entered Readings                                          Most Recent A1c: 5.7% on 1/25/2019  (Goal: 7%)     Recent Readings 2/7/2019 2/7/2019 2/5/2019 2/2/2019 1/31/2019    Blood Glucose (mg/dL) 188 167 146 105 114          Digital Medicine: Health  Follow Up    2/7: Left voicemail to follow up with Ms. Malathi Martinez.

## 2019-02-08 NOTE — PROGRESS NOTES
Last 6 Patient Entered Readings                                          Most Recent A1c: 5.7% on 1/25/2019  (Goal: 7%)     Recent Readings 2/8/2019 2/7/2019 2/7/2019 2/5/2019 2/2/2019    Blood Glucose (mg/dL) 132 188 167 146 105          Digital Medicine: Health  Follow Up    2/8: Patient called back and left VM.  Returned phone call and left VM.   Patient called back      Digital Medicine: Health  Follow Up    Patient states she will start taking some reading 2 hour PPG.   Most BG readings are at all goal.      Lifestyle Modifications:    1.Dietary Modifications (Sodium intake <2,000mg/day, food labels, dining out):   Reviewed carbohydrate timing and portioning with patient.  Patient verbally understood.     2.Physical Activity:   Patient states she is actively walking more through out the day.  Patient states she is making sure she walks more.     3.Medication Therapy:   Patient has been compliant with the medication regimen.    4.Patient has the following medication side effects/concerns: None  (Frequency/Alleviating factors/Precipitating factors, etc.)     Follow up with Ms. Malathi Martinez completed. No further questions or concerns. Will continue to follow up to achieve health goals.

## 2019-02-14 ENCOUNTER — PATIENT MESSAGE (OUTPATIENT)
Dept: FAMILY MEDICINE | Facility: CLINIC | Age: 59
End: 2019-02-14

## 2019-02-14 DIAGNOSIS — E11.9 TYPE 2 DIABETES MELLITUS WITHOUT COMPLICATION, WITHOUT LONG-TERM CURRENT USE OF INSULIN: ICD-10-CM

## 2019-02-14 DIAGNOSIS — E78.5 HYPERLIPIDEMIA, UNSPECIFIED HYPERLIPIDEMIA TYPE: Primary | ICD-10-CM

## 2019-02-14 DIAGNOSIS — E66.3 OVERWEIGHT: ICD-10-CM

## 2019-02-17 DIAGNOSIS — K21.9 GASTROESOPHAGEAL REFLUX DISEASE, ESOPHAGITIS PRESENCE NOT SPECIFIED: Primary | ICD-10-CM

## 2019-02-18 RX ORDER — OMEPRAZOLE 40 MG/1
CAPSULE, DELAYED RELEASE ORAL
Qty: 90 CAPSULE | Refills: 1 | Status: SHIPPED | OUTPATIENT
Start: 2019-02-18 | End: 2019-08-09 | Stop reason: SDUPTHER

## 2019-02-18 NOTE — PROGRESS NOTES
Refill Authorization Note     is requesting a refill authorization.    Brief assessment and rationale for refill: ROUTE; op  Name and strength of medication: omeprazole (PRILOSEC) 40 MG capsule            Medication reconciliation completed: No                         Comments: APPOINTMENTS (past 12 m or future  authorizing provider)  LAST VISIT DATE  Comfort Dave MD 1/25/2019         NEXT VISIT DATE  Comfort Dave MD 5/3/2019

## 2019-02-19 NOTE — PROGRESS NOTES
Last 6 Patient Entered Readings                                     Most Recent A1c: 5.7% on 1/25/2019  (Goal: 7%)     Recent Readings 2/17/2019 2/16/2019 2/15/2019 2/14/2019 2/12/2019    Blood Glucose (mg/dL) 96 167 195 190 106          HPI:  Called patient to follow up regarding the DDMP (Diabetes Digital Medicine Program). She relates higher BG readings 2/14-15 to increased stress and diet changes.  Patient endorses adherence to medication regimen. Patient denies hypoglycemic s/sx (dizziness, extreme hunger, headaches, confusion, trouble concentrating, sweating, shaking, blurred vision, personality changes); patient denies hyperglycemic s/sx (increased thirst, increased urination, headaches, trouble concentrating, blurred vision, fatigue).      Assessment:  Reviewed recent readings. Per AACE/ACE guidelines (goal A1C < 7%), DM is well controlled. Majority of SMBGs at goal.     Plan:  Continue current medication regimen. Encouraged patient to incorporate more 2hpp readings into monitoring.  I will continue to monitor regularly and will follow-up in 6 to 8  weeks, sooner if blood sugar begins to trend upward or downward.     Current Medication Regimen:  Diabetes Medications             metFORMIN (GLUCOPHAGE-XR) 500 MG 24 hr tablet Take 1 tablet (500 mg total) by mouth 2 (two) times daily with meals.                             Patient has my contact information and knows to call with any concerns or clinical changes.

## 2019-03-21 ENCOUNTER — HOSPITAL ENCOUNTER (OUTPATIENT)
Dept: RADIOLOGY | Facility: HOSPITAL | Age: 59
Discharge: HOME | End: 2019-03-21
Attending: INTERNAL MEDICINE
Payer: COMMERCIAL

## 2019-03-21 DIAGNOSIS — J44.89 OBSTRUCTIVE CHRONIC BRONCHITIS WITHOUT EXACERBATION: ICD-10-CM

## 2019-03-21 PROCEDURE — 71046 XR CHEST PA AND LATERAL: ICD-10-PCS | Mod: 26,,, | Performed by: RADIOLOGY

## 2019-03-21 PROCEDURE — 71046 X-RAY EXAM CHEST 2 VIEWS: CPT | Mod: 26,,, | Performed by: RADIOLOGY

## 2019-03-21 PROCEDURE — 71046 X-RAY EXAM CHEST 2 VIEWS: CPT | Mod: TC,FY,PO

## 2019-03-22 ENCOUNTER — PATIENT OUTREACH (OUTPATIENT)
Dept: OTHER | Facility: OTHER | Age: 59
End: 2019-03-22

## 2019-03-22 NOTE — PROGRESS NOTES
Last 6 Patient Entered Readings                                          Most Recent A1c: 5.7% on 1/25/2019  (Goal: 7%)     Recent Readings 3/15/2019 3/11/2019 3/7/2019 3/7/2019 3/6/2019    Blood Glucose (mg/dL) 126 77 80 81 104          Digital Medicine: Health  Follow Up    3/22: Left voicemail to follow up with Ms. Malathi Martinez.

## 2019-03-23 ENCOUNTER — PATIENT MESSAGE (OUTPATIENT)
Dept: ADMINISTRATIVE | Facility: OTHER | Age: 59
End: 2019-03-23

## 2019-03-28 ENCOUNTER — HOSPITAL ENCOUNTER (OUTPATIENT)
Dept: RADIOLOGY | Facility: HOSPITAL | Age: 59
Discharge: HOME | End: 2019-03-28
Attending: INTERNAL MEDICINE
Payer: COMMERCIAL

## 2019-03-28 DIAGNOSIS — F17.200 TOBACCO USE DISORDER: ICD-10-CM

## 2019-03-28 DIAGNOSIS — J44.89 OBSTRUCTIVE CHRONIC BRONCHITIS WITHOUT EXACERBATION: ICD-10-CM

## 2019-03-28 DIAGNOSIS — R91.8 LUNG MASS: ICD-10-CM

## 2019-03-28 PROCEDURE — 71250 CT THORAX DX C-: CPT | Mod: 26,,, | Performed by: RADIOLOGY

## 2019-03-28 PROCEDURE — 71250 CT CHEST WITHOUT CONTRAST: ICD-10-PCS | Mod: 26,,, | Performed by: RADIOLOGY

## 2019-03-28 PROCEDURE — 71250 CT THORAX DX C-: CPT | Mod: TC,PO

## 2019-03-30 ENCOUNTER — PATIENT MESSAGE (OUTPATIENT)
Dept: FAMILY MEDICINE | Facility: CLINIC | Age: 59
End: 2019-03-30

## 2019-03-30 DIAGNOSIS — I72.2 RENAL ARTERIAL ANEURYSM: Primary | ICD-10-CM

## 2019-04-01 PROBLEM — I72.2 RENAL ARTERIAL ANEURYSM: Status: ACTIVE | Noted: 2019-04-01

## 2019-04-03 ENCOUNTER — PATIENT MESSAGE (OUTPATIENT)
Dept: FAMILY MEDICINE | Facility: CLINIC | Age: 59
End: 2019-04-03

## 2019-04-04 NOTE — PROGRESS NOTES
Last 6 Patient Entered Readings                                          Most Recent A1c: 5.7% on 1/25/2019  (Goal: 7%)     Recent Readings 3/24/2019 3/15/2019 3/11/2019 3/7/2019 3/7/2019    Blood Glucose (mg/dL) 152 126 77 80 81            Digital Medicine: Health  Follow Up    4/4: Left voicemail to follow up with Ms. Malathi Martinez.  Will address lack of regularly submitted readings.  No readings since 3/24.  Sending [employer] message.

## 2019-04-05 NOTE — PROGRESS NOTES
Last 6 Patient Entered Readings                                          Most Recent A1c: 5.7% on 2019  (Goal: 7%)     Recent Readings 3/24/2019 3/15/2019 3/11/2019 3/7/2019 3/7/2019    Blood Glucose (mg/dL) 152 126 77 80 81        : Patient returned call.  Patient reports she has not been taking reading because her strips  and has to get more.  Patient reports she is picking some up and will start submitting readings once again.  Patient denies needing anything at this time.  Will follow up in 3-4 weeks to discuss readings.

## 2019-04-08 ENCOUNTER — PATIENT MESSAGE (OUTPATIENT)
Dept: OTHER | Facility: OTHER | Age: 59
End: 2019-04-08

## 2019-04-12 DIAGNOSIS — F43.21 GRIEF REACTION: ICD-10-CM

## 2019-04-12 DIAGNOSIS — F43.23 ADJUSTMENT DISORDER WITH MIXED ANXIETY AND DEPRESSED MOOD: ICD-10-CM

## 2019-04-12 RX ORDER — BUPROPION HYDROCHLORIDE 150 MG/1
TABLET ORAL
Qty: 90 TABLET | Refills: 1 | Status: SHIPPED | OUTPATIENT
Start: 2019-04-12 | End: 2019-11-08 | Stop reason: SDUPTHER

## 2019-04-24 ENCOUNTER — PATIENT OUTREACH (OUTPATIENT)
Dept: OTHER | Facility: OTHER | Age: 59
End: 2019-04-24

## 2019-04-24 NOTE — PROGRESS NOTES
Last 6 Patient Entered Readings                                          Most Recent A1c: 5.7% on 1/25/2019  (Goal: 7%)     Recent Readings 4/24/2019 4/23/2019 4/22/2019 4/20/2019 4/18/2019    Blood Glucose (mg/dL) 59 98 126 114 90        4/24: Called patient to address low BG reading of 59 mg/dl.  Left VM.  Patient called back and left VM.  Returned call and left VM.

## 2019-04-27 ENCOUNTER — LAB VISIT (OUTPATIENT)
Dept: LAB | Facility: HOSPITAL | Age: 59
End: 2019-04-27
Attending: FAMILY MEDICINE
Payer: COMMERCIAL

## 2019-04-27 DIAGNOSIS — E78.5 HYPERLIPIDEMIA, UNSPECIFIED HYPERLIPIDEMIA TYPE: ICD-10-CM

## 2019-04-27 DIAGNOSIS — E66.3 OVERWEIGHT: ICD-10-CM

## 2019-04-27 DIAGNOSIS — E11.9 TYPE 2 DIABETES MELLITUS WITHOUT COMPLICATION, WITHOUT LONG-TERM CURRENT USE OF INSULIN: ICD-10-CM

## 2019-04-27 LAB
25(OH)D3+25(OH)D2 SERPL-MCNC: 45 NG/ML (ref 30–96)
ALBUMIN SERPL BCP-MCNC: 3.7 G/DL (ref 3.5–5.2)
ALP SERPL-CCNC: 76 U/L (ref 55–135)
ALT SERPL W/O P-5'-P-CCNC: 16 U/L (ref 10–44)
ANION GAP SERPL CALC-SCNC: 9 MMOL/L (ref 8–16)
AST SERPL-CCNC: 13 U/L (ref 10–40)
BASOPHILS # BLD AUTO: 0.02 K/UL (ref 0–0.2)
BASOPHILS NFR BLD: 0.3 % (ref 0–1.9)
BILIRUB SERPL-MCNC: 0.4 MG/DL (ref 0.1–1)
BUN SERPL-MCNC: 9 MG/DL (ref 6–20)
CALCIUM SERPL-MCNC: 9.6 MG/DL (ref 8.7–10.5)
CHLORIDE SERPL-SCNC: 103 MMOL/L (ref 95–110)
CHOLEST SERPL-MCNC: 156 MG/DL (ref 120–199)
CHOLEST/HDLC SERPL: 3.3 {RATIO} (ref 2–5)
CO2 SERPL-SCNC: 27 MMOL/L (ref 23–29)
CREAT SERPL-MCNC: 0.7 MG/DL (ref 0.5–1.4)
DIFFERENTIAL METHOD: NORMAL
EOSINOPHIL # BLD AUTO: 0.1 K/UL (ref 0–0.5)
EOSINOPHIL NFR BLD: 0.9 % (ref 0–8)
ERYTHROCYTE [DISTWIDTH] IN BLOOD BY AUTOMATED COUNT: 13.6 % (ref 11.5–14.5)
EST. GFR  (AFRICAN AMERICAN): >60 ML/MIN/1.73 M^2
EST. GFR  (NON AFRICAN AMERICAN): >60 ML/MIN/1.73 M^2
ESTIMATED AVG GLUCOSE: 114 MG/DL (ref 68–131)
GLUCOSE SERPL-MCNC: 91 MG/DL (ref 70–110)
HBA1C MFR BLD HPLC: 5.6 % (ref 4–5.6)
HCT VFR BLD AUTO: 43.5 % (ref 37–48.5)
HDLC SERPL-MCNC: 47 MG/DL (ref 40–75)
HDLC SERPL: 30.1 % (ref 20–50)
HGB BLD-MCNC: 14 G/DL (ref 12–16)
IMM GRANULOCYTES # BLD AUTO: 0.01 K/UL (ref 0–0.04)
IMM GRANULOCYTES NFR BLD AUTO: 0.2 % (ref 0–0.5)
LDLC SERPL CALC-MCNC: 90.2 MG/DL (ref 63–159)
LYMPHOCYTES # BLD AUTO: 2.2 K/UL (ref 1–4.8)
LYMPHOCYTES NFR BLD: 34.4 % (ref 18–48)
MCH RBC QN AUTO: 28.2 PG (ref 27–31)
MCHC RBC AUTO-ENTMCNC: 32.2 G/DL (ref 32–36)
MCV RBC AUTO: 88 FL (ref 82–98)
MONOCYTES # BLD AUTO: 0.5 K/UL (ref 0.3–1)
MONOCYTES NFR BLD: 8 % (ref 4–15)
NEUTROPHILS # BLD AUTO: 3.7 K/UL (ref 1.8–7.7)
NEUTROPHILS NFR BLD: 56.2 % (ref 38–73)
NONHDLC SERPL-MCNC: 109 MG/DL
NRBC BLD-RTO: 0 /100 WBC
PLATELET # BLD AUTO: 274 K/UL (ref 150–350)
PMV BLD AUTO: 11.2 FL (ref 9.2–12.9)
POTASSIUM SERPL-SCNC: 3.8 MMOL/L (ref 3.5–5.1)
PROT SERPL-MCNC: 7 G/DL (ref 6–8.4)
RBC # BLD AUTO: 4.96 M/UL (ref 4–5.4)
SODIUM SERPL-SCNC: 139 MMOL/L (ref 136–145)
TRIGL SERPL-MCNC: 94 MG/DL (ref 30–150)
TSH SERPL DL<=0.005 MIU/L-ACNC: 1.63 UIU/ML (ref 0.4–4)
WBC # BLD AUTO: 6.49 K/UL (ref 3.9–12.7)

## 2019-04-27 PROCEDURE — 80053 COMPREHEN METABOLIC PANEL: CPT

## 2019-04-27 PROCEDURE — 82306 VITAMIN D 25 HYDROXY: CPT

## 2019-04-27 PROCEDURE — 85025 COMPLETE CBC W/AUTO DIFF WBC: CPT

## 2019-04-27 PROCEDURE — 84443 ASSAY THYROID STIM HORMONE: CPT

## 2019-04-27 PROCEDURE — 83036 HEMOGLOBIN GLYCOSYLATED A1C: CPT

## 2019-04-27 PROCEDURE — 36415 COLL VENOUS BLD VENIPUNCTURE: CPT | Mod: PO

## 2019-04-27 PROCEDURE — 80061 LIPID PANEL: CPT

## 2019-05-03 ENCOUNTER — PATIENT MESSAGE (OUTPATIENT)
Dept: FAMILY MEDICINE | Facility: CLINIC | Age: 59
End: 2019-05-03

## 2019-05-03 ENCOUNTER — OFFICE VISIT (OUTPATIENT)
Dept: FAMILY MEDICINE | Facility: CLINIC | Age: 59
End: 2019-05-03
Payer: COMMERCIAL

## 2019-05-03 VITALS
SYSTOLIC BLOOD PRESSURE: 116 MMHG | HEART RATE: 70 BPM | OXYGEN SATURATION: 97 % | RESPIRATION RATE: 18 BRPM | HEIGHT: 70 IN | WEIGHT: 188.63 LBS | DIASTOLIC BLOOD PRESSURE: 68 MMHG | BODY MASS INDEX: 27 KG/M2 | TEMPERATURE: 99 F

## 2019-05-03 DIAGNOSIS — I72.2 RENAL ARTERIAL ANEURYSM: ICD-10-CM

## 2019-05-03 DIAGNOSIS — R94.39 ABNORMAL STRESS TEST: ICD-10-CM

## 2019-05-03 DIAGNOSIS — S43.82XA: ICD-10-CM

## 2019-05-03 DIAGNOSIS — E11.9 TYPE 2 DIABETES MELLITUS WITHOUT COMPLICATION, WITHOUT LONG-TERM CURRENT USE OF INSULIN: ICD-10-CM

## 2019-05-03 DIAGNOSIS — F33.41 RECURRENT MAJOR DEPRESSIVE DISORDER, IN PARTIAL REMISSION: ICD-10-CM

## 2019-05-03 DIAGNOSIS — R91.1 PULMONARY NODULE: ICD-10-CM

## 2019-05-03 DIAGNOSIS — F43.21 GRIEF REACTION: ICD-10-CM

## 2019-05-03 DIAGNOSIS — Z12.11 SPECIAL SCREENING FOR MALIGNANT NEOPLASMS, COLON: ICD-10-CM

## 2019-05-03 DIAGNOSIS — K59.00 CONSTIPATION, UNSPECIFIED CONSTIPATION TYPE: ICD-10-CM

## 2019-05-03 DIAGNOSIS — Z23 IMMUNIZATION DUE: Primary | ICD-10-CM

## 2019-05-03 DIAGNOSIS — Z72.0 TOBACCO ABUSE: ICD-10-CM

## 2019-05-03 DIAGNOSIS — R07.2 PRECORDIAL PAIN: ICD-10-CM

## 2019-05-03 PROCEDURE — 90670 PNEUMOCOCCAL CONJUGATE VACCINE 13-VALENT LESS THAN 5YO & GREATER THAN: ICD-10-PCS | Mod: S$GLB,,, | Performed by: FAMILY MEDICINE

## 2019-05-03 PROCEDURE — 99214 OFFICE O/P EST MOD 30 MIN: CPT | Mod: 25,S$GLB,, | Performed by: FAMILY MEDICINE

## 2019-05-03 PROCEDURE — 90670 PCV13 VACCINE IM: CPT | Mod: S$GLB,,, | Performed by: FAMILY MEDICINE

## 2019-05-03 PROCEDURE — 3044F HG A1C LEVEL LT 7.0%: CPT | Mod: CPTII,S$GLB,, | Performed by: FAMILY MEDICINE

## 2019-05-03 PROCEDURE — 99999 PR PBB SHADOW E&M-EST. PATIENT-LVL V: ICD-10-PCS | Mod: PBBFAC,,, | Performed by: FAMILY MEDICINE

## 2019-05-03 PROCEDURE — 99214 PR OFFICE/OUTPT VISIT, EST, LEVL IV, 30-39 MIN: ICD-10-PCS | Mod: 25,S$GLB,, | Performed by: FAMILY MEDICINE

## 2019-05-03 PROCEDURE — 3008F PR BODY MASS INDEX (BMI) DOCUMENTED: ICD-10-PCS | Mod: CPTII,S$GLB,, | Performed by: FAMILY MEDICINE

## 2019-05-03 PROCEDURE — 90471 IMMUNIZATION ADMIN: CPT | Mod: S$GLB,,, | Performed by: FAMILY MEDICINE

## 2019-05-03 PROCEDURE — 3044F PR MOST RECENT HEMOGLOBIN A1C LEVEL <7.0%: ICD-10-PCS | Mod: CPTII,S$GLB,, | Performed by: FAMILY MEDICINE

## 2019-05-03 PROCEDURE — 90471 PNEUMOCOCCAL CONJUGATE VACCINE 13-VALENT LESS THAN 5YO & GREATER THAN: ICD-10-PCS | Mod: S$GLB,,, | Performed by: FAMILY MEDICINE

## 2019-05-03 PROCEDURE — 99999 PR PBB SHADOW E&M-EST. PATIENT-LVL V: CPT | Mod: PBBFAC,,, | Performed by: FAMILY MEDICINE

## 2019-05-03 PROCEDURE — 3008F BODY MASS INDEX DOCD: CPT | Mod: CPTII,S$GLB,, | Performed by: FAMILY MEDICINE

## 2019-05-03 RX ORDER — ATORVASTATIN CALCIUM 20 MG/1
20 TABLET, FILM COATED ORAL DAILY
Qty: 90 TABLET | Refills: 3 | Status: SHIPPED | OUTPATIENT
Start: 2019-05-03 | End: 2020-05-11 | Stop reason: SDUPTHER

## 2019-05-03 RX ORDER — CYCLOBENZAPRINE HCL 5 MG
5 TABLET ORAL NIGHTLY PRN
Qty: 90 TABLET | Refills: 1 | Status: SHIPPED | OUTPATIENT
Start: 2019-05-03 | End: 2019-08-09 | Stop reason: SDUPTHER

## 2019-05-03 RX ORDER — ALPRAZOLAM 0.25 MG/1
TABLET ORAL
Qty: 60 TABLET | Refills: 2 | Status: SHIPPED | OUTPATIENT
Start: 2019-05-03 | End: 2019-08-09 | Stop reason: SDUPTHER

## 2019-05-03 RX ORDER — METFORMIN HYDROCHLORIDE 500 MG/1
500 TABLET, EXTENDED RELEASE ORAL 2 TIMES DAILY WITH MEALS
Qty: 180 TABLET | Refills: 1 | Status: SHIPPED | OUTPATIENT
Start: 2019-05-03 | End: 2019-10-29 | Stop reason: SDUPTHER

## 2019-05-03 RX ORDER — ESCITALOPRAM OXALATE 10 MG/1
15 TABLET ORAL DAILY
Qty: 135 TABLET | Refills: 1 | Status: CANCELLED | OUTPATIENT
Start: 2019-05-03 | End: 2020-05-02

## 2019-05-03 RX ORDER — INDOMETHACIN 25 MG/1
25 CAPSULE ORAL 2 TIMES DAILY WITH MEALS
Qty: 30 CAPSULE | Refills: 2 | Status: SHIPPED | OUTPATIENT
Start: 2019-05-03 | End: 2020-05-11 | Stop reason: SDUPTHER

## 2019-05-03 NOTE — PATIENT INSTRUCTIONS
1. Water  2. Miralax: 1/2-1 capful EVERY DAY with goal of soft, formed stool daily.   3. Stool softeners - 1-3 tablets of docusate as needed.   Increase fruits and vegetables  4. Walking

## 2019-05-03 NOTE — PROGRESS NOTES
"Subjective:       Patient ID: Malathi Martinez is a 58 y.o. female.    Chief Complaint: Follow-up    HPI  Patient in the office for f/u and review.   Saw pulm/leelee yesterday. Decided against bx for now, but will plan to repeat scanning in 6mos. Still smoking.  Ongoing mood concerns. She misses her parents a lot. Prior to Easter, she did feel like she was doing better. She notes that at work, they feel like she is much better. She is able to enjoy conversations and has lighthearted moments.   Discussed importance and need for tobacco cessation.     Has occ palpitations. Had an episode where she was sitting, and felt short of breath, felt like she was falling forward. She was morro by the motion, and seemed to "wake up" fully. No precipitating factors, no recent illnesses. Regular mealtimes.    Recalls 1.5mos ago, had episode of near-panic that she was able to stop. No recurrence. No significant palpitations to report either.    Walking 1.5mi daily. No cp or exertional sx.  Reviewed prev cardiac studies. Had +stress prev with f/u cath which showed normal coronary circulation.     Tweaked a muscle in her L shoulder, 1mo. No injuries preceding. Not improving with indocin and flexeril.      Review of Systems:  Review of Systems   Constitutional: Positive for activity change and unexpected weight change (gaining).   HENT: Positive for rhinorrhea. Negative for congestion.    Eyes: Positive for discharge.   Respiratory: Positive for chest tightness. Negative for wheezing.    Cardiovascular: Positive for chest pain and palpitations.   Gastrointestinal: Positive for constipation (on/off). Negative for blood in stool, diarrhea and vomiting.   Endocrine: Negative for polydipsia and polyuria.   Genitourinary: Negative for difficulty urinating, dysuria, hematuria and menstrual problem.   Musculoskeletal: Positive for arthralgias and neck pain. Negative for joint swelling.   Skin: Negative for color change and rash. " "  Neurological: Positive for headaches (occ, but not very often). Negative for weakness.   Psychiatric/Behavioral: Positive for dysphoric mood. Negative for confusion and sleep disturbance. The patient is nervous/anxious.        Objective:     Vitals:    05/03/19 1224   BP: 116/68   BP Location: Left arm   Patient Position: Sitting   BP Method: X-Large (Manual)   Pulse: 70   Resp: 18   Temp: 98.7 °F (37.1 °C)   TempSrc: Oral   SpO2: 97%   Weight: 85.6 kg (188 lb 9.7 oz)   Height: 5' 10" (1.778 m)          Physical Exam   Constitutional: She is oriented to person, place, and time. She appears well-developed and well-nourished. She is cooperative.  Non-toxic appearance. She does not appear ill. No distress.   HENT:   Head: Normocephalic and atraumatic.   Right Ear: Hearing, external ear and ear canal normal. A middle ear effusion is present.   Left Ear: Hearing and ear canal normal. A middle ear effusion is present.   Nose: No mucosal edema or rhinorrhea. Right sinus exhibits no maxillary sinus tenderness and no frontal sinus tenderness. Left sinus exhibits no maxillary sinus tenderness and no frontal sinus tenderness.   Mouth/Throat: Uvula is midline, oropharynx is clear and moist and mucous membranes are normal. Normal dentition. No posterior oropharyngeal erythema.   Eyes: Conjunctivae and lids are normal. Right eye exhibits no discharge. Left eye exhibits no discharge. No scleral icterus.   Neck: Trachea normal, normal range of motion, full passive range of motion without pain and phonation normal. Neck supple.   Cardiovascular: Normal rate, regular rhythm, normal heart sounds and normal pulses.   Pulmonary/Chest: Effort normal and breath sounds normal. No respiratory distress. She has no wheezes.   Abdominal: Soft. Normal appearance. She exhibits no distension.   Musculoskeletal: Normal range of motion. She exhibits no edema.   Lymphadenopathy:     She has no cervical adenopathy.   Neurological: She is alert and " oriented to person, place, and time. She exhibits normal muscle tone. Coordination normal.   Skin: Skin is warm, dry and intact. No rash noted.   Psychiatric: She has a normal mood and affect. Her speech is normal and behavior is normal. Cognition and memory are normal.   Nursing note and vitals reviewed.        Assessment & Plan:  Immunization due  -     (In Office Administered) Pneumococcal Conjugate Vaccine (13 Valent) (IM)    Grief reaction  -     ALPRAZolam (XANAX) 0.25 MG tablet; TAKE 1 TABLET TWICE A DAY AS NEEDED FOR ANXIETY  Dispense: 60 tablet; Refill: 2  Side effects and precautions of medication use reviewed with patient, expressed understanding. No questions or concerns.   Recurrent major depressive disorder, in partial remission  Doing ok. Better before late parent's bdays and holiday. She feels that overall she is improved from previous. Would like to do better with re: to tobacco cessation and self-care/weight loss.   Precordial pain  -     Ambulatory referral to Cardiology  Prev stress test abn, but subsequent cath neg.  Tobacco abuse  -     Ambulatory referral to Smoking Cessation Program  Counseling of tobacco cessation in office. Patient aware of risks of tobacco abuse and benefits of cessation. Review of goals, quit date, behavior modification. We reviewed adjuncts to cessation incl gum, patch and SNRI's such as wellbutrin and chantix.   Left scapulocostal sprain, initial encounter  -     cyclobenzaprine (FLEXERIL) 5 MG tablet; Take 1 tablet (5 mg total) by mouth nightly as needed for Muscle spasms.  Dispense: 90 tablet; Refill: 1  -     indomethacin (INDOCIN) 25 MG capsule; Take 1 capsule (25 mg total) by mouth 2 (two) times daily with meals.  Dispense: 30 capsule; Refill: 2  -     Ambulatory referral to Physical Medicine Rehab  Flexeril and indocin prn. D/w pmr if continuing. Pt open to PT, trigger inj if appropriate.  Renal arterial aneurysm  -     Ambulatory referral to Cardiology  For  review in cards. Discussed vascular eval as well.  Special screening for malignant neoplasms, colon  Pt did cscope earlier this year.  Type 2 diabetes mellitus without complication, without long-term current use of insulin  -     atorvastatin (LIPITOR) 20 MG tablet; Take 1 tablet (20 mg total) by mouth once daily.  Dispense: 90 tablet; Refill: 3  -     metFORMIN (GLUCOPHAGE-XR) 500 MG 24 hr tablet; Take 1 tablet (500 mg total) by mouth 2 (two) times daily with meals.  Dispense: 180 tablet; Refill: 1  Controlled. Cont regimen.  Hx abn stress test  Cath neg. Schedule cards f/u.   pulm nodule  Reviewed ct chest, pulm recs.  Constipation  Discussed miralax, water intake.

## 2019-05-07 ENCOUNTER — INITIAL CONSULT (OUTPATIENT)
Dept: PHYSICAL MEDICINE AND REHAB | Facility: CLINIC | Age: 59
End: 2019-05-07
Payer: COMMERCIAL

## 2019-05-07 VITALS
HEIGHT: 70 IN | HEART RATE: 70 BPM | SYSTOLIC BLOOD PRESSURE: 129 MMHG | DIASTOLIC BLOOD PRESSURE: 60 MMHG | WEIGHT: 188 LBS | BODY MASS INDEX: 26.92 KG/M2

## 2019-05-07 DIAGNOSIS — M79.18 MYOFASCIAL PAIN ON LEFT SIDE: ICD-10-CM

## 2019-05-07 DIAGNOSIS — M25.512 CHRONIC LEFT SHOULDER PAIN: Primary | ICD-10-CM

## 2019-05-07 DIAGNOSIS — G89.29 CHRONIC LEFT SHOULDER PAIN: Primary | ICD-10-CM

## 2019-05-07 PROCEDURE — 99999 PR PBB SHADOW E&M-EST. PATIENT-LVL III: CPT | Mod: PBBFAC,,, | Performed by: PHYSICAL MEDICINE & REHABILITATION

## 2019-05-07 PROCEDURE — 99243 OFF/OP CNSLTJ NEW/EST LOW 30: CPT | Mod: 25,S$GLB,, | Performed by: PHYSICAL MEDICINE & REHABILITATION

## 2019-05-07 PROCEDURE — 20553 PR INJECT TRIGGER POINTS, > 3: ICD-10-PCS | Mod: S$GLB,,, | Performed by: PHYSICAL MEDICINE & REHABILITATION

## 2019-05-07 PROCEDURE — 20553 NJX 1/MLT TRIGGER POINTS 3/>: CPT | Mod: S$GLB,,, | Performed by: PHYSICAL MEDICINE & REHABILITATION

## 2019-05-07 PROCEDURE — 99999 PR PBB SHADOW E&M-EST. PATIENT-LVL III: ICD-10-PCS | Mod: PBBFAC,,, | Performed by: PHYSICAL MEDICINE & REHABILITATION

## 2019-05-07 PROCEDURE — 99243 PR OFFICE CONSULTATION,LEVEL III: ICD-10-PCS | Mod: 25,S$GLB,, | Performed by: PHYSICAL MEDICINE & REHABILITATION

## 2019-05-07 NOTE — PROGRESS NOTES
[facility]    Consulting Provider: Comfort Dave MD    CHIEF COMPLAINT:   Chief Complaint   Patient presents with    Shoulder Pain     left shoulder pain     HISTORY OF PRESENT ILLNESS: Malathi Martinez is a 58 y.o. female who presents to me in consultation for evaluation of left shoulder pain.    Patient reports pain started about three weeks ago after lifting a case of water. She denies a pop or a click. She is taking flexeril and indocin without significant relief. She has also tried tylenol without relief. Pain has improved minimally. She is right handed. Pain is not worse with overhead activity or reaching behind her back. Pain is worse at night. Describes posterior pain about the scapula, today rated 8/10. Denies pain on the right, neck pain, and paresthesias in the arms.  She has never had PT or injections for this issue.    Review of Systems   Constitutional: Negative for fever.   HENT: Negative for drooling.    Eyes: Negative for discharge.   Respiratory: Negative for choking.    Cardiovascular: Negative for chest pain.   Genitourinary: Negative for flank pain.   Skin: Negative for wound.   Allergic/Immunologic: Negative for immunocompromised state.   Neurological: Negative for tremors and syncope.   Psychiatric/Behavioral: Negative for behavioral problems.     Past Medical History:   Past Medical History:   Diagnosis Date    Abnormal stress test     Bronchitis     COPD (chronic obstructive pulmonary disease)     Diabetes mellitus, type 2     HLD (hyperlipidemia)     Hx of endoscopy     Hypertension     Mental disorder     Pulmonary nodule 4/9/2018    SOB (shortness of breath)     resolved    Tobacco abuse 12/26/2012       Past Surgical History:   Past Surgical History:   Procedure Laterality Date    APPENDECTOMY      COLONOSCOPY N/A 1/18/2019    [facility]    ESOPHAGOGASTRODUODENOSCOPY (EGD) N/A 3/14/2018    [facility]    HEART CATH-LEFT N/A 7/1/2016    [facility]    TONSILLECTOMY         Family History:   Family History   Problem Relation Age of Onset    Heart disease Mother         MVP    Cataracts Mother     Heart disease Father 65        MI and PVD    Throat cancer Father     Heart disease Sister         mvp    Esophageal cancer Brother     No Known Problems Sister     No Known Problems Sister     Hyperlipidemia Paternal Grandmother        Medications:   Current Outpatient Medications on File Prior to Visit   Medication Sig Dispense Refill    albuterol (PROAIR HFA) 90 mcg/actuation inhaler INHALE 2 PUFFS INTO THE LUNGS EVERY 4 (FOUR) HOURS AS NEEDED FOR WHEEZING. 8.5 Inhaler 3    ALPRAZolam (XANAX) 0.25 MG tablet TAKE 1 TABLET TWICE A DAY AS NEEDED FOR ANXIETY 60 tablet 2    atorvastatin (LIPITOR) 20 MG tablet Take 1 tablet (20 mg total) by mouth once daily. 90 tablet 3    blood sugar diagnostic (CONTOUR TEST STRIPS MISC) Contour Test Strips      blood sugar diagnostic Strp 1 strip by Misc.(Non-Drug; Combo Route) route once daily. 50 each 6    blood-glucose meter kit Use as instructed 1 each 0    buPROPion (WELLBUTRIN XL) 150 MG TB24 tablet TAKE 1 TABLET BY MOUTH EVERY DAY 90 tablet 1    cholecalciferol, vitamin D3, 2,000 unit Cap Take 1 capsule by mouth once daily.      cyclobenzaprine (FLEXERIL) 5 MG tablet Take 1 tablet (5 mg total) by mouth nightly as needed for Muscle spasms. 90 tablet 1    desloratadine (CLARINEX) 5 mg tablet Take 1 tablet (5 mg total) by mouth once daily. 90 tablet 3    escitalopram oxalate (LEXAPRO) 10 MG tablet Take 1.5 tablets (15 mg total) by mouth once daily. 135 tablet 1    fluticasone (FLONASE ALLERGY RELIEF) 50 mcg/actuation nasal spray 2 sprays (100 mcg total) by Each Nare route once daily. 1 Bottle 0    furosemide (LASIX) 20 MG tablet TAKE 1 TABLET BY MOUTH EVERY DAY 90 tablet 1     indomethacin (INDOCIN) 25 MG capsule Take 1 capsule (25 mg total) by mouth 2 (two) times daily with meals. 30 capsule 2    lancets (MICROLET LANCET MISC) Microlet Lancet      lancets Misc 1 each by Misc.(Non-Drug; Combo Route) route once daily. 50 each 6    lisinopril (PRINIVIL,ZESTRIL) 2.5 MG tablet Take 1 tablet (2.5 mg total) by mouth once daily. 90 tablet 1    metFORMIN (GLUCOPHAGE-XR) 500 MG 24 hr tablet Take 1 tablet (500 mg total) by mouth 2 (two) times daily with meals. 180 tablet 1    omeprazole (PRILOSEC) 40 MG capsule TAKE 1 CAPSULE BY MOUTH EVERY DAY 90 capsule 1    ranitidine (ZANTAC) 300 MG tablet TAKE 1 TABLET BY MOUTH IN THE EVENING 30 tablet 2     No current facility-administered medications on file prior to visit.        Allergies:   Review of patient's allergies indicates:   Allergen Reactions    Penicillins Hives       Social History:   Social History     Socioeconomic History    Marital status:      Spouse name: Not on file    Number of children: Not on file    Years of education: Not on file    Highest education level: Not on file   Occupational History    Not on file   Social Needs    Financial resource strain: Not very hard    Food insecurity:     Worry: Never true     Inability: Never true    Transportation needs:     Medical: No     Non-medical: No   Tobacco Use    Smoking status: Current Every Day Smoker     Packs/day: 1.00    Smokeless tobacco: Never Used   Substance and Sexual Activity    Alcohol use: No     Frequency: Never     Drinks per session: Patient refused     Binge frequency: Never    Drug use: No    Sexual activity: Yes     Partners: Male     Birth control/protection: Post-menopausal   Lifestyle    Physical activity:     Days per week: 5 days     Minutes per session: 40 min    Stress: To some extent   Relationships    Social connections:     Talks on phone: More than three times a week     Gets together: Once a week     Attends [employer] service:  "Not on file     Active member of club or organization: No     Attends meetings of clubs or organizations: Never     Relationship status:    Other Topics Concern    Are you pregnant or think you may be? Not Asked    Breast-feeding Not Asked   Social History Narrative    Not on file     PHYSICAL EXAMINATION:   General    Vitals:    05/07/19 1312   BP: 129/60   Pulse: 70   Weight: 85.3 kg (188 lb)   Height: 5' 10" (1.778 m)     Constitutional: Oriented to person, place, and time. No apparent distress. Appears well-developed and well-nourished. Pleasant.  HENT:   Head: Normocephalic and atraumatic.   Eyes: Right eye exhibits no discharge. Left eye exhibits no discharge. No scleral icterus.   Pulmonary/Chest: Effort normal. No respiratory distress.   Abdominal: There is no guarding.   Neurological: Alert and oriented to person, place, and time.   Psychiatric: Behavior is normal.   Right Shoulder Exam     Tenderness   The patient is experiencing no tenderness.    Range of Motion   Active abduction: 150   Passive abduction: 170   Extension: 50   External rotation: 70   Forward flexion: 180   Internal rotation 90 degrees: 90     Muscle Strength   Abduction: 5/5   Internal rotation: 5/5   External rotation: 5/5   Supraspinatus: 5/5   Subscapularis: 5/5   Biceps: 5/5     Tests   Lau test: negative  Cross arm: negative  Impingement: negative  Sulcus: absent    Other   Erythema: absent  Scars: absent  Sensation: normal  Pulse: present      Left Shoulder Exam     Tenderness   Left shoulder tenderness location: rhomboid and levator scapulae mm. (+ jump sign)    Range of Motion   Active abduction: 150   Passive abduction: 170   Extension: 50   External rotation: 70   Forward flexion: 180   Internal rotation 90 degrees: 90     Muscle Strength   Abduction: 5/5   Internal rotation: 5/5   External rotation: 5/5   Supraspinatus: 5/5   Subscapularis: 5/5   Biceps: 5/5     Tests   Lau test: negative  Cross arm: " "negative  Impingement: negative  Sulcus: absent    Other   Erythema: absent  Scars: absent  Sensation: normal  Pulse: present         INSPECTION: There is no swelling, ecchymoses, erythema or gross deformity.    ASSESSMENT:   1. Chronic left shoulder pain    2. Myofascial pain on left side      PLAN:     1. Time was spent reviewing the diagnosis of myofascial pain in depth with Malathi today, including acute management and rehabilitation.     2. Left rhomboid and thoracic paraspinal trigger point injections today. See procedure note below.    3. Discussed importance of routine stretching, good posture, and HEP.  We discussed formal physical therapy, however she wishes to try home stretches and may consider physical therapy going forward.    4. May consider botox IM in the future if insufficient relief with above.    5.  She may continue Flexeril p.r.n..    5. RTC in 6 weeks if symptoms persist or worsen.    Procedure Note: Trigger point injections  Time: 13:30  Indications: Pain  Description: After 3 maximal tender points were identified in the left rhomboid and levator scapulae musculature, the overlying skin was cleaned with etoh swabs. Each point was injected with 1 cc of 1% Lidocaine after negative aspiration using a 30 g 1" needle. A stellate pattern was then used to needle the surrounding area. Needle was removed and areas cleaned. Blood loss minimal.  Complications: None  Disposition: Pt left in good condition with no complaints.    This is a consult from Dr. Comfort Dave. Please see the "Communications" section of Epic to see how the consulting physician received the report of today's findings and recommendations. If it's an [facility] physician, it will be forwarded to his/her "in basket".    The above note was completed, in part, with the aid of Dragon dictation software/hardware. Translation errors may be present.    "

## 2019-05-07 NOTE — LETTER
May 7, 2019      Comfort Dave MD  [address]  [place] [address]           [facility]  [address] [facility] [address] [place] [address]  Phone: [phone]  Fax: [phone]          Patient: Malathi Martinez   MR Number: [id number]   YOB: 1960   Date of Visit: 5/7/2019       Dear Dr. Comfort Dave:    Thank you for referring Malathi Martinez to me for evaluation. Attached you will find relevant portions of my assessment and plan of care.    If you have questions, please do not hesitate to call me. I look forward to following Malathi Martinez along with you.    Sincerely,    Dale Foote MD    Enclosure  CC:  No Recipients    If you would like to receive this communication electronically, please contact [email or web address] or [phone] to request more information on [employer] access.    For providers and/or their staff who would like to refer a patient to [facility], please contact us through our one-stop-shop provider referral line, [facility], at [phone].    If you feel you have received this communication in error or would no longer like to receive these types of communications, please e-mail [email or web address]

## 2019-05-09 ENCOUNTER — CLINICAL SUPPORT (OUTPATIENT)
Dept: SMOKING CESSATION | Facility: CLINIC | Age: 59
End: 2019-05-09
Payer: COMMERCIAL

## 2019-05-09 DIAGNOSIS — F17.210 MODERATE SMOKER (20 OR LESS PER DAY): Primary | ICD-10-CM

## 2019-05-09 PROCEDURE — 99999 PR PBB SHADOW E&M-EST. PATIENT-LVL III: ICD-10-PCS | Mod: PBBFAC,,,

## 2019-05-09 PROCEDURE — 99404 PREV MED CNSL INDIV APPRX 60: CPT | Mod: S$GLB,,, | Performed by: GENERAL PRACTICE

## 2019-05-09 PROCEDURE — 99999 PR PBB SHADOW E&M-EST. PATIENT-LVL III: CPT | Mod: PBBFAC,,,

## 2019-05-09 PROCEDURE — 99404 PR PREVENT COUNSEL,INDIV,60 MIN: ICD-10-PCS | Mod: S$GLB,,, | Performed by: GENERAL PRACTICE

## 2019-05-09 RX ORDER — IBUPROFEN 200 MG
1 TABLET ORAL DAILY
Qty: 14 PATCH | Refills: 0 | Status: SHIPPED | OUTPATIENT
Start: 2019-05-09 | End: 2019-08-09

## 2019-05-09 RX ORDER — DIPHENHYDRAMINE HCL 25 MG
CAPSULE ORAL
Qty: 100 EACH | Refills: 0 | Status: SHIPPED | OUTPATIENT
Start: 2019-05-09 | End: 2019-08-09

## 2019-05-09 NOTE — PROGRESS NOTES
Last 6 Patient Entered Readings                                          Most Recent A1c: 5.6% on 4/27/2019  (Goal: 7%)     Recent Readings 5/6/2019 5/1/2019 4/25/2019 4/24/2019 4/24/2019    Blood Glucose (mg/dL) 114 118 89 80 119        Digital Medicine: Health  Follow Up    Addressed low BG reading of 59 mg/dl on 4/24.  Patient reports reading was false and she did not put enough blood on the strip.  Patient denies s/sx of hypoglycemia.    Lifestyle Modifications:    1.Dietary Modifications (Sodium intake <2,000mg/day, food labels, dining out):   Deferred    2.Physical Activity:   Patient reports she has been walking more.  Encouraged patient to continue increasing activity.     3.Medication Therapy:   Patient has been compliant with the medication regimen.    4.Patient has the following medication side effects/concerns: None  (Frequency/Alleviating factors/Precipitating factors, etc.)     Follow up with MsDayron Malathigeovanny Martinez completed. No further questions or concerns. Will continue to follow up to achieve health goals.  Most BG readings are at goal.

## 2019-05-09 NOTE — Clinical Note
Patient will be participating in weekly tobacco cessation meetings and will begin the prescribed tobacco cessation medication regime of the 21 mg patch and 4 mg gum. Patient has used Chantix before.

## 2019-05-14 ENCOUNTER — TELEPHONE (OUTPATIENT)
Dept: SMOKING CESSATION | Facility: CLINIC | Age: 59
End: 2019-05-14

## 2019-05-14 ENCOUNTER — CLINICAL SUPPORT (OUTPATIENT)
Dept: SMOKING CESSATION | Facility: CLINIC | Age: 59
End: 2019-05-14
Payer: COMMERCIAL

## 2019-05-14 DIAGNOSIS — F17.210 MODERATE SMOKER (20 OR LESS PER DAY): Primary | ICD-10-CM

## 2019-05-14 PROCEDURE — 99999 PR PBB SHADOW E&M-EST. PATIENT-LVL III: CPT | Mod: PBBFAC,,,

## 2019-05-14 PROCEDURE — 99406 BEHAV CHNG SMOKING 3-10 MIN: CPT | Mod: S$GLB,,, | Performed by: GENERAL PRACTICE

## 2019-05-14 PROCEDURE — 99406 PR TOBACCO USE CESSATION INTERMEDIATE 3-10 MINUTES: ICD-10-PCS | Mod: S$GLB,,, | Performed by: GENERAL PRACTICE

## 2019-05-14 PROCEDURE — 99999 PR PBB SHADOW E&M-EST. PATIENT-LVL III: ICD-10-PCS | Mod: PBBFAC,,,

## 2019-05-23 ENCOUNTER — TELEPHONE (OUTPATIENT)
Dept: CARDIOLOGY | Facility: CLINIC | Age: 59
End: 2019-05-23

## 2019-05-23 ENCOUNTER — OFFICE VISIT (OUTPATIENT)
Dept: CARDIOLOGY | Facility: CLINIC | Age: 59
End: 2019-05-23
Payer: COMMERCIAL

## 2019-05-23 VITALS
DIASTOLIC BLOOD PRESSURE: 76 MMHG | HEART RATE: 72 BPM | WEIGHT: 190.69 LBS | BODY MASS INDEX: 27.3 KG/M2 | HEIGHT: 70 IN | SYSTOLIC BLOOD PRESSURE: 125 MMHG

## 2019-05-23 DIAGNOSIS — E78.5 HYPERLIPIDEMIA, UNSPECIFIED HYPERLIPIDEMIA TYPE: ICD-10-CM

## 2019-05-23 DIAGNOSIS — R07.2 PRECORDIAL PAIN: ICD-10-CM

## 2019-05-23 DIAGNOSIS — F41.1 GAD (GENERALIZED ANXIETY DISORDER): ICD-10-CM

## 2019-05-23 DIAGNOSIS — R94.39 ABNORMAL STRESS TEST: Primary | ICD-10-CM

## 2019-05-23 DIAGNOSIS — Z72.0 TOBACCO ABUSE: ICD-10-CM

## 2019-05-23 DIAGNOSIS — J44.9 CHRONIC OBSTRUCTIVE PULMONARY DISEASE, UNSPECIFIED COPD TYPE: ICD-10-CM

## 2019-05-23 PROCEDURE — 3008F BODY MASS INDEX DOCD: CPT | Mod: CPTII,S$GLB,, | Performed by: INTERNAL MEDICINE

## 2019-05-23 PROCEDURE — 99214 OFFICE O/P EST MOD 30 MIN: CPT | Mod: S$GLB,,, | Performed by: INTERNAL MEDICINE

## 2019-05-23 PROCEDURE — 99999 PR PBB SHADOW E&M-EST. PATIENT-LVL III: ICD-10-PCS | Mod: PBBFAC,,, | Performed by: INTERNAL MEDICINE

## 2019-05-23 PROCEDURE — 3008F PR BODY MASS INDEX (BMI) DOCUMENTED: ICD-10-PCS | Mod: CPTII,S$GLB,, | Performed by: INTERNAL MEDICINE

## 2019-05-23 PROCEDURE — 99214 PR OFFICE/OUTPT VISIT, EST, LEVL IV, 30-39 MIN: ICD-10-PCS | Mod: S$GLB,,, | Performed by: INTERNAL MEDICINE

## 2019-05-23 PROCEDURE — 99999 PR PBB SHADOW E&M-EST. PATIENT-LVL III: CPT | Mod: PBBFAC,,, | Performed by: INTERNAL MEDICINE

## 2019-05-23 NOTE — LETTER
May 23, 2019      Comfort Dave MD  [address]  [facility] [address]           [facility]  [address] [facility] [address] [place] [address]  Phone: [phone]          Patient: Malathi Martinez   MR Number: [id number]   YOB: 1960   Date of Visit: 5/23/2019       Dear Dr. Comfort Dave:    Thank you for referring Malathi Martinez to me for evaluation. Attached you will find relevant portions of my assessment and plan of care.    If you have questions, please do not hesitate to call me. I look forward to following Malathi Martinez along with you.    Sincerely,    Hardeep Patiño MD    Enclosure  CC:  No Recipients    If you would like to receive this communication electronically, please contact [email or web address] or [phone] to request more information on [employer] Link access.    For providers and/or their staff who would like to refer a patient to [facility], please contact us through our one-stop-shop provider referral line, [facility], at [phone].    If you feel you have received this communication in error or would no longer like to receive these types of communications, please e-mail [email or web address]

## 2019-05-23 NOTE — PROGRESS NOTES
Subjective:    Patient ID:  Mlaathi Martinez is a 58 y.o. female who presents for evaluation of chest pain    HPI  She comes with episodes of atypical chest pain with episodes of lightheadedness      Review of Systems   Constitution: Negative for decreased appetite, malaise/fatigue, weight gain and weight loss.   Cardiovascular: Negative for chest pain, dyspnea on exertion, leg swelling, palpitations and syncope.   Respiratory: Negative for cough and shortness of breath.    Gastrointestinal: Negative.    Neurological: Negative for weakness.   All other systems reviewed and are negative.       Objective:      Physical Exam   Constitutional: She is oriented to person, place, and time. She appears well-developed and well-nourished.   HENT:   Head: Normocephalic.   Eyes: Pupils are equal, round, and reactive to light.   Neck: Normal range of motion. Neck supple. No JVD present. Carotid bruit is not present. No thyromegaly present.   Cardiovascular: Normal rate, regular rhythm, normal heart sounds, intact distal pulses and normal pulses. PMI is not displaced. Exam reveals no gallop.   No murmur heard.  Pulmonary/Chest: Effort normal and breath sounds normal.   Abdominal: Soft. Normal appearance. She exhibits no mass. There is no hepatosplenomegaly. There is no tenderness.   Musculoskeletal: Normal range of motion. She exhibits no edema.   Neurological: She is alert and oriented to person, place, and time. She has normal strength and normal reflexes. No sensory deficit.   Skin: Skin is warm and intact.   Psychiatric: She has a normal mood and affect.   Nursing note and vitals reviewed.        Assessment:       1. Abnormal stress test    2. Hyperlipidemia, unspecified hyperlipidemia type    3. Chronic obstructive pulmonary disease, unspecified COPD type    4. JEAN CLAUDE (generalized anxiety disorder)    5. Precordial pain    6. Tobacco abuse         Plan:     Stress darren, holter

## 2019-05-23 NOTE — TELEPHONE ENCOUNTER
----- Message from Sol Neil sent at 5/23/2019  3:58 PM CDT -----  Type:  Patient Returning Call    Who Called:  Patient  Who Left Message for Patient:  Terriflaco  Does the patient know what this is regarding?:  Halter Monitor  Best Call Back Number:  [phone] (home)

## 2019-05-23 NOTE — TELEPHONE ENCOUNTER
----- Message from Malathi Black sent at 5/23/2019  1:13 PM CDT -----  Type: Needs Medical Advice    Who Called:  Patient  Best Call Back Number: [phone]  Additional Information: Patient would like to speak with nurse concerning Holter monitor appointment/has question/please call patient back to advise.

## 2019-06-11 ENCOUNTER — PATIENT OUTREACH (OUTPATIENT)
Dept: OTHER | Facility: OTHER | Age: 59
End: 2019-06-11

## 2019-06-11 NOTE — PROGRESS NOTES
Last 6 Patient Entered Readings                                      Most Recent A1c: 5.6% on 4/27/2019  (Goal: 7%)     Recent Readings 6/11/2019 5/16/2019 5/10/2019 5/6/2019 5/1/2019    Blood Glucose (mg/dL) 125 120 122 114 118          Left voicemail  A1C remains at goal  eGFR > 60  Encourage patient to incorporate 2hpp or AC-lunch or supper into testing schedule

## 2019-06-12 ENCOUNTER — CLINICAL SUPPORT (OUTPATIENT)
Dept: CARDIOLOGY | Facility: CLINIC | Age: 59
End: 2019-06-12
Attending: INTERNAL MEDICINE
Payer: COMMERCIAL

## 2019-06-12 DIAGNOSIS — E78.5 HYPERLIPIDEMIA, UNSPECIFIED HYPERLIPIDEMIA TYPE: ICD-10-CM

## 2019-06-12 DIAGNOSIS — F41.1 GAD (GENERALIZED ANXIETY DISORDER): ICD-10-CM

## 2019-06-12 DIAGNOSIS — R94.39 ABNORMAL STRESS TEST: ICD-10-CM

## 2019-06-12 DIAGNOSIS — R07.2 PRECORDIAL PAIN: ICD-10-CM

## 2019-06-12 DIAGNOSIS — J44.9 CHRONIC OBSTRUCTIVE PULMONARY DISEASE, UNSPECIFIED COPD TYPE: ICD-10-CM

## 2019-06-12 DIAGNOSIS — Z72.0 TOBACCO ABUSE: ICD-10-CM

## 2019-06-12 PROCEDURE — 93224 HOLTER MONITOR - 48 HOUR (CUPID ONLY): ICD-10-PCS | Mod: S$GLB,,, | Performed by: INTERNAL MEDICINE

## 2019-06-12 PROCEDURE — 93224 XTRNL ECG REC UP TO 48 HRS: CPT | Mod: S$GLB,,, | Performed by: INTERNAL MEDICINE

## 2019-06-14 ENCOUNTER — HOSPITAL ENCOUNTER (OUTPATIENT)
Dept: RADIOLOGY | Facility: HOSPITAL | Age: 59
Discharge: HOME | End: 2019-06-14
Attending: INTERNAL MEDICINE
Payer: COMMERCIAL

## 2019-06-14 DIAGNOSIS — R07.2 PRECORDIAL PAIN: ICD-10-CM

## 2019-06-14 DIAGNOSIS — R94.39 ABNORMAL STRESS TEST: ICD-10-CM

## 2019-06-14 DIAGNOSIS — J44.9 CHRONIC OBSTRUCTIVE PULMONARY DISEASE, UNSPECIFIED COPD TYPE: ICD-10-CM

## 2019-06-14 DIAGNOSIS — E78.5 HYPERLIPIDEMIA, UNSPECIFIED HYPERLIPIDEMIA TYPE: ICD-10-CM

## 2019-06-14 DIAGNOSIS — Z72.0 TOBACCO ABUSE: ICD-10-CM

## 2019-06-14 DIAGNOSIS — F41.1 GAD (GENERALIZED ANXIETY DISORDER): ICD-10-CM

## 2019-06-14 LAB
CV STRESS BASE HR: 59 BPM
DIASTOLIC BLOOD PRESSURE: 61 MMHG
NUC REST EJECTION FRACTION: 66
OHS CV CPX 1 MINUTE RECOVERY HEART RATE: 83 BPM
OHS CV CPX 85 PERCENT MAX PREDICTED HEART RATE MALE: 131
OHS CV CPX MAX PREDICTED HEART RATE: 154
OHS CV CPX PATIENT IS FEMALE: 1
OHS CV CPX PATIENT IS MALE: 0
OHS CV CPX PEAK DIASTOLIC BLOOD PRESSURE: 60 MMHG
OHS CV CPX PEAK HEAR RATE: 86 BPM
OHS CV CPX PEAK RATE PRESSURE PRODUCT: NORMAL
OHS CV CPX PEAK SYSTOLIC BLOOD PRESSURE: 138 MMHG
OHS CV CPX PERCENT MAX PREDICTED HEART RATE ACHIEVED: 56
OHS CV CPX RATE PRESSURE PRODUCT PRESENTING: 8024
STRESS ECHO TARGET HR: 136.85 BPM
SYSTOLIC BLOOD PRESSURE: 136 MMHG

## 2019-06-14 PROCEDURE — 93018 CV STRESS TEST I&R ONLY: CPT | Mod: ,,, | Performed by: INTERNAL MEDICINE

## 2019-06-14 PROCEDURE — 93018 PR CARDIAC STRESS TST,INTERP/REPT ONLY: ICD-10-PCS | Mod: ,,, | Performed by: INTERNAL MEDICINE

## 2019-06-14 PROCEDURE — 78452 HT MUSCLE IMAGE SPECT MULT: CPT | Mod: 26,,, | Performed by: INTERNAL MEDICINE

## 2019-06-14 PROCEDURE — 78452 STRESS TEST WITH MYOCARDIAL PERFUSION (CUPID ONLY): ICD-10-PCS | Mod: 26,,, | Performed by: INTERNAL MEDICINE

## 2019-06-14 PROCEDURE — 93016 STRESS TEST WITH MYOCARDIAL PERFUSION (CUPID ONLY): ICD-10-PCS | Mod: ,,, | Performed by: INTERNAL MEDICINE

## 2019-06-14 PROCEDURE — 93016 CV STRESS TEST SUPVJ ONLY: CPT | Mod: ,,, | Performed by: INTERNAL MEDICINE

## 2019-06-21 ENCOUNTER — TELEPHONE (OUTPATIENT)
Dept: CARDIOLOGY | Facility: CLINIC | Age: 59
End: 2019-06-21

## 2019-06-21 LAB
OHS CV EVENT MONITOR DAY: 0
OHS CV HOLTER LENGTH DECIMAL HOURS: 48
OHS CV HOLTER LENGTH HOURS: 48
OHS CV HOLTER LENGTH MINUTES: 0

## 2019-06-21 NOTE — TELEPHONE ENCOUNTER
----- Message from Tello Scott sent at 6/21/2019  2:22 PM CDT -----  Contact: pt  Type:  Patient Returning Call    Who Called:  pt  Who Left Message for Patient:  unknown  Does the patient know what this is regarding?:    Best Call Back Number:  [phone]  Additional Information:

## 2019-07-11 ENCOUNTER — PATIENT OUTREACH (OUTPATIENT)
Dept: OTHER | Facility: OTHER | Age: 59
End: 2019-07-11

## 2019-07-11 NOTE — PROGRESS NOTES
Last 6 Patient Entered Readings                                          Most Recent A1c: 5.6% on 4/27/2019  (Goal: 7%)     Recent Readings 7/2/2019 7/2/2019 6/26/2019 6/12/2019 6/12/2019    Blood Glucose (mg/dL) 113 120 101 86 116          Digital Medicine: Health  Follow Up    7/11: Left voicemail to follow up with Ms. Malathi Martinez.  No readings since 7/2, will send [employer] message.

## 2019-07-14 ENCOUNTER — OFFICE VISIT (OUTPATIENT)
Dept: URGENT CARE | Facility: CLINIC | Age: 59
End: 2019-07-14
Payer: COMMERCIAL

## 2019-07-14 VITALS
HEART RATE: 69 BPM | OXYGEN SATURATION: 98 % | DIASTOLIC BLOOD PRESSURE: 78 MMHG | RESPIRATION RATE: 16 BRPM | WEIGHT: 190 LBS | SYSTOLIC BLOOD PRESSURE: 149 MMHG | TEMPERATURE: 99 F | BODY MASS INDEX: 27.2 KG/M2 | HEIGHT: 70 IN

## 2019-07-14 DIAGNOSIS — H00.011 HORDEOLUM EXTERNUM OF RIGHT UPPER EYELID: Primary | ICD-10-CM

## 2019-07-14 PROCEDURE — 99213 OFFICE O/P EST LOW 20 MIN: CPT | Mod: S$GLB,,, | Performed by: INTERNAL MEDICINE

## 2019-07-14 PROCEDURE — 3008F PR BODY MASS INDEX (BMI) DOCUMENTED: ICD-10-PCS | Mod: CPTII,S$GLB,, | Performed by: INTERNAL MEDICINE

## 2019-07-14 PROCEDURE — 99213 PR OFFICE/OUTPT VISIT, EST, LEVL III, 20-29 MIN: ICD-10-PCS | Mod: S$GLB,,, | Performed by: INTERNAL MEDICINE

## 2019-07-14 PROCEDURE — 3008F BODY MASS INDEX DOCD: CPT | Mod: CPTII,S$GLB,, | Performed by: INTERNAL MEDICINE

## 2019-07-14 RX ORDER — ERYTHROMYCIN 5 MG/G
OINTMENT OPHTHALMIC EVERY 6 HOURS
Qty: 1 TUBE | Refills: 1 | Status: SHIPPED | OUTPATIENT
Start: 2019-07-14 | End: 2019-07-21

## 2019-07-14 NOTE — PATIENT INSTRUCTIONS
Sty (or Stye)  A sty is an infection of the oil gland of the eyelid. It may develop into a small pocket of pus (abscess). This can cause pain, redness, and swelling. In early stages, styes are treated with antibiotic cream, eye drops, or warm packs (small towels soaked in warm water). More severe cases may need to be opened and drained by a health care provider.  Home care  · Eye drops or ointment are usually prescribed to treat the infection. Use these as directed.   ¨ Artificial tears may also be used to lubricate the eye and make it more comfortable. These may be purchased without a prescription.   ¨ Talk to your health care provider before using any over-the-counter treatment for a sty.  · Apply a warm, damp towel to the affected eye for at least 5 minutes, 3 to 4 times a day for a week. Warm compresses open the pores and speed the healing. If the compresses are too hot, they may burn your eyelid.  · Sometimes the sty will drain with this treatment alone. If this happens, continue the antibiotic until all the redness and swelling are gone.  · Wash your hands before and after touching the infected eye to avoid spreading the infection.  · Do not squeeze or try to puncture the sty.  Follow-up care  Follow up with your health care provider, or as advised.   When to seek medical advice  Call your health care provider right away if you have:  · Increase in swelling or redness around the eyelid after 48 to 72 hours  · Increase in eye pain or the eyelid blisters  · Increase in warmth--the eyelid feels hot  · Drainage of blood or thick pus from the sty  · Blister on the eyelid  · Inability to open the eyelid due to swelling  · Fever  ¨ 1 degree above your normal temperature lasting for 24 to 48 hours, or  ¨ Whatever your health care provider told you to report based on your medical condition  · Vision changes  · Headache or stiff neck  · Recurrence of the sty

## 2019-07-14 NOTE — PROGRESS NOTES
"Subjective:       Patient ID: Malathi Martinez is a 58 y.o. female.    Vitals:  height is 5' 10" (1.778 m) and weight is 86.2 kg (190 lb). Her temperature is 98.7 °F (37.1 °C). Her blood pressure is 149/78 (abnormal) and her pulse is 69. Her respiration is 16 and oxygen saturation is 98%.     Chief Complaint: Eye Problem    She reports that she developed a stye on right upper eyelid about 2 weeks ago.  It is not resolving with warm compresses.     Eye Problem    The right eye is affected. This is a new problem. The current episode started 1 to 4 weeks ago. The problem occurs constantly. The problem has been unchanged. There was no injury mechanism. The pain is at a severity of 6/10. The pain is mild. There is no known exposure to pink eye. She does not wear contacts. Associated symptoms include eye redness. Pertinent negatives include no blurred vision, eye discharge, fever, nausea, photophobia or vomiting.       Constitution: Negative for chills and fever.   HENT: Negative for congestion and sinus pain.    Eyes: Positive for eye redness and eyelid swelling. Negative for eye trauma, foreign body in eye, eye discharge, eye pain, photophobia and blurred vision.   Gastrointestinal: Negative for nausea and vomiting.   Skin: Negative for rash.   Allergic/Immunologic: Negative for seasonal allergies and itching.   Neurological: Negative for headaches.       Objective:      Physical Exam   Constitutional: She is oriented to person, place, and time. She appears well-developed and well-nourished. No distress.   HENT:   Head: Normocephalic and atraumatic.   Right Ear: External ear normal.   Left Ear: External ear normal.   Nose: Nose normal.   Mouth/Throat: Oropharynx is clear and moist.   Eyes: Pupils are equal, round, and reactive to light. EOM and lids are normal. Right eye exhibits hordeolum ( Small on upper eyelid, no exudate). Right conjunctiva is injected. Left conjunctiva is injected.   Neck: Trachea normal, " full passive range of motion without pain and phonation normal. Neck supple.   Neurological: She is alert and oriented to person, place, and time.   Skin: Skin is warm, dry and intact. No rash noted.   Psychiatric: She has a normal mood and affect. Her speech is normal and behavior is normal. Cognition and memory are normal.   Nursing note and vitals reviewed.      Assessment:       1. Hordeolum externum of right upper eyelid        Plan:         Hordeolum externum of right upper eyelid  -     erythromycin (ROMYCIN) ophthalmic ointment; Place into the right eye every 6 (six) hours. for 7 days  Dispense: 1 Tube; Refill: 1

## 2019-07-16 ENCOUNTER — TELEPHONE (OUTPATIENT)
Dept: URGENT CARE | Facility: CLINIC | Age: 59
End: 2019-07-16

## 2019-07-25 NOTE — PROGRESS NOTES
Last 6 Patient Entered Readings                                          Most Recent A1c: 5.6% on 4/27/2019  (Goal: 7%)     Recent Readings 7/25/2019 7/15/2019 7/2/2019 7/2/2019 6/26/2019    Blood Glucose (mg/dL) 128 121 113 120 101          Digital Medicine: Health  Follow Up    7/25: Left voicemail to follow up with Ms. Malathigeovanny Martinez.  BG readings are below goal.  Will send [employer] message at next unsuccessful encounter.

## 2019-07-26 ENCOUNTER — HOSPITAL ENCOUNTER (OUTPATIENT)
Dept: RADIOLOGY | Facility: HOSPITAL | Age: 59
Discharge: HOME | End: 2019-07-26
Attending: FAMILY MEDICINE
Payer: COMMERCIAL

## 2019-07-26 ENCOUNTER — PATIENT OUTREACH (OUTPATIENT)
Dept: ADMINISTRATIVE | Facility: HOSPITAL | Age: 59
End: 2019-07-26

## 2019-07-26 ENCOUNTER — OFFICE VISIT (OUTPATIENT)
Dept: CARDIOLOGY | Facility: CLINIC | Age: 59
End: 2019-07-26
Payer: COMMERCIAL

## 2019-07-26 VITALS
WEIGHT: 192.88 LBS | SYSTOLIC BLOOD PRESSURE: 117 MMHG | BODY MASS INDEX: 27.61 KG/M2 | DIASTOLIC BLOOD PRESSURE: 66 MMHG | HEIGHT: 70 IN | HEART RATE: 73 BPM

## 2019-07-26 DIAGNOSIS — E78.5 HYPERLIPIDEMIA, UNSPECIFIED HYPERLIPIDEMIA TYPE: Primary | ICD-10-CM

## 2019-07-26 DIAGNOSIS — R93.89 ABNORMAL PELVIC ULTRASOUND: ICD-10-CM

## 2019-07-26 DIAGNOSIS — Z12.39 ENCOUNTER FOR SPECIAL SCREENING EXAMINATION FOR NEOPLASM OF BREAST: ICD-10-CM

## 2019-07-26 DIAGNOSIS — E11.9 TYPE 2 DIABETES MELLITUS WITHOUT COMPLICATION, WITHOUT LONG-TERM CURRENT USE OF INSULIN: ICD-10-CM

## 2019-07-26 DIAGNOSIS — F41.1 GAD (GENERALIZED ANXIETY DISORDER): ICD-10-CM

## 2019-07-26 DIAGNOSIS — Z72.0 TOBACCO ABUSE: ICD-10-CM

## 2019-07-26 PROCEDURE — 76830 TRANSVAGINAL US NON-OB: CPT | Mod: 26,,, | Performed by: RADIOLOGY

## 2019-07-26 PROCEDURE — 99213 PR OFFICE/OUTPT VISIT, EST, LEVL III, 20-29 MIN: ICD-10-PCS | Mod: S$GLB,,, | Performed by: INTERNAL MEDICINE

## 2019-07-26 PROCEDURE — 3044F PR MOST RECENT HEMOGLOBIN A1C LEVEL <7.0%: ICD-10-PCS | Mod: CPTII,S$GLB,, | Performed by: INTERNAL MEDICINE

## 2019-07-26 PROCEDURE — 3008F PR BODY MASS INDEX (BMI) DOCUMENTED: ICD-10-PCS | Mod: CPTII,S$GLB,, | Performed by: INTERNAL MEDICINE

## 2019-07-26 PROCEDURE — 77063 BREAST TOMOSYNTHESIS BI: CPT | Mod: 26,,, | Performed by: RADIOLOGY

## 2019-07-26 PROCEDURE — 99999 PR PBB SHADOW E&M-EST. PATIENT-LVL III: ICD-10-PCS | Mod: PBBFAC,,, | Performed by: INTERNAL MEDICINE

## 2019-07-26 PROCEDURE — 77067 SCR MAMMO BI INCL CAD: CPT | Mod: TC,PO

## 2019-07-26 PROCEDURE — 76856 US PELVIS COMP WITH TRANSVAG NON-OB (XPD): ICD-10-PCS | Mod: 26,,, | Performed by: RADIOLOGY

## 2019-07-26 PROCEDURE — 3008F BODY MASS INDEX DOCD: CPT | Mod: CPTII,S$GLB,, | Performed by: INTERNAL MEDICINE

## 2019-07-26 PROCEDURE — 77067 MAMMO DIGITAL SCREENING BILAT WITH TOMOSYNTHESIS_CAD: ICD-10-PCS | Mod: 26,,, | Performed by: RADIOLOGY

## 2019-07-26 PROCEDURE — 76830 TRANSVAGINAL US NON-OB: CPT | Mod: TC,PO

## 2019-07-26 PROCEDURE — 77067 SCR MAMMO BI INCL CAD: CPT | Mod: 26,,, | Performed by: RADIOLOGY

## 2019-07-26 PROCEDURE — 76856 US EXAM PELVIC COMPLETE: CPT | Mod: 26,,, | Performed by: RADIOLOGY

## 2019-07-26 PROCEDURE — 77063 MAMMO DIGITAL SCREENING BILAT WITH TOMOSYNTHESIS_CAD: ICD-10-PCS | Mod: 26,,, | Performed by: RADIOLOGY

## 2019-07-26 PROCEDURE — 99999 PR PBB SHADOW E&M-EST. PATIENT-LVL III: CPT | Mod: PBBFAC,,, | Performed by: INTERNAL MEDICINE

## 2019-07-26 PROCEDURE — 3044F HG A1C LEVEL LT 7.0%: CPT | Mod: CPTII,S$GLB,, | Performed by: INTERNAL MEDICINE

## 2019-07-26 PROCEDURE — 76830 US PELVIS COMP WITH TRANSVAG NON-OB (XPD): ICD-10-PCS | Mod: 26,,, | Performed by: RADIOLOGY

## 2019-07-26 PROCEDURE — 99213 OFFICE O/P EST LOW 20 MIN: CPT | Mod: S$GLB,,, | Performed by: INTERNAL MEDICINE

## 2019-07-26 NOTE — PROGRESS NOTES
Subjective:    Patient ID:  Malathi Martinez is a 58 y.o. female who presents for follow-up of chest pain    HPI  She comes with no complaints, no chest pain, no shortness of breath  Had normal nuke, normal holter  Occasional dizziness, unrelated to any activity    Review of Systems   Constitution: Negative for decreased appetite, malaise/fatigue, weight gain and weight loss.   Cardiovascular: Negative for chest pain, dyspnea on exertion, leg swelling, palpitations and syncope.   Respiratory: Negative for cough and shortness of breath.    Gastrointestinal: Negative.    Neurological: Negative for weakness.   All other systems reviewed and are negative.       Objective:      Physical Exam   Constitutional: She is oriented to person, place, and time. She appears well-developed and well-nourished.   HENT:   Head: Normocephalic.   Eyes: Pupils are equal, round, and reactive to light.   Neck: Normal range of motion. Neck supple. No JVD present. Carotid bruit is not present. No thyromegaly present.   Cardiovascular: Normal rate, regular rhythm, normal heart sounds, intact distal pulses and normal pulses. PMI is not displaced. Exam reveals no gallop.   No murmur heard.  Pulmonary/Chest: Effort normal and breath sounds normal.   Abdominal: Soft. Normal appearance. She exhibits no mass. There is no hepatosplenomegaly. There is no tenderness.   Musculoskeletal: Normal range of motion. She exhibits no edema.   Neurological: She is alert and oriented to person, place, and time. She has normal strength and normal reflexes. No sensory deficit.   Skin: Skin is warm and intact.   Psychiatric: She has a normal mood and affect.   Nursing note and vitals reviewed.        Assessment:       1. Hyperlipidemia, unspecified hyperlipidemia type    2. JEAN CLAUDE (generalized anxiety disorder)    3. Type 2 diabetes mellitus without complication, without long-term current use of insulin    4. Tobacco abuse         Plan:     Reassurance  Follow  up with pcp

## 2019-08-03 DIAGNOSIS — F33.41 RECURRENT MAJOR DEPRESSIVE DISORDER, IN PARTIAL REMISSION: ICD-10-CM

## 2019-08-03 DIAGNOSIS — F43.21 GRIEF REACTION: ICD-10-CM

## 2019-08-05 RX ORDER — ESCITALOPRAM OXALATE 10 MG/1
TABLET ORAL
Qty: 90 TABLET | Refills: 0 | Status: SHIPPED | OUTPATIENT
Start: 2019-08-05 | End: 2019-08-09 | Stop reason: DRUGHIGH

## 2019-08-05 NOTE — PROGRESS NOTES
Last 6 Patient Entered Readings                                      Most Recent A1c: 5.6% on 4/27/2019  (Goal: 7%)     Recent Readings 8/5/2019 8/1/2019 7/29/2019 7/25/2019 7/15/2019    Blood Glucose (mg/dL) 126 105 112 128 121          HPI:  Called patient to follow up regarding the DDMP (Diabetes Digital Medicine Program).  Patient endorses adherence to medication regimen. Patient denies hypoglycemic s/sx (dizziness, extreme hunger, headaches, confusion, trouble concentrating, sweating, shaking, blurred vision, personality changes); patient denies hyperglycemic s/sx (increased thirst, increased urination, headaches, trouble concentrating, blurred vision, fatigue).    Assessment:  Reviewed recent readings. Per ADA guidelines (goal A1C < 7%), DM is well controlled. SMBGs at goal. eGFR > 60 4/2019.    Plan:  Continue current medication regimen. Include AC-lunch and supper into testing schedule. Reviewed ADA SMBG goals. Discussed labeling readings as after-snack or before-meal appropriately.  I will continue to monitor regularly and will follow-up in 4 to 6 weeks, sooner if blood sugar begins to trend upward or downward.     Current Medication Regimen:  Diabetes Medications             metFORMIN (GLUCOPHAGE-XR) 500 MG 24 hr tablet Take 1 tablet (500 mg total) by mouth 2 (two) times daily with meals.            Patient has my contact information and knows to call with any concerns or clinical changes.

## 2019-08-08 NOTE — PROGRESS NOTES
Last 6 Patient Entered Readings                                          Most Recent A1c: 5.6% on 4/27/2019  (Goal: 7%)     Recent Readings 8/6/2019 8/5/2019 8/1/2019 7/29/2019 7/25/2019    Blood Glucose (mg/dL) 105 126 105 112 128        8/8: PharmD spoke with patient on 8/5.  Most BG readings are below goal.  Will push outreach 2 weeks.

## 2019-08-09 ENCOUNTER — OFFICE VISIT (OUTPATIENT)
Dept: FAMILY MEDICINE | Facility: CLINIC | Age: 59
End: 2019-08-09
Payer: COMMERCIAL

## 2019-08-09 ENCOUNTER — OFFICE VISIT (OUTPATIENT)
Dept: OBSTETRICS AND GYNECOLOGY | Facility: CLINIC | Age: 59
End: 2019-08-09
Payer: COMMERCIAL

## 2019-08-09 VITALS
HEIGHT: 70 IN | OXYGEN SATURATION: 96 % | BODY MASS INDEX: 27.75 KG/M2 | WEIGHT: 193.81 LBS | DIASTOLIC BLOOD PRESSURE: 74 MMHG | RESPIRATION RATE: 16 BRPM | TEMPERATURE: 99 F | HEART RATE: 65 BPM | SYSTOLIC BLOOD PRESSURE: 118 MMHG

## 2019-08-09 VITALS
BODY MASS INDEX: 27.71 KG/M2 | DIASTOLIC BLOOD PRESSURE: 74 MMHG | WEIGHT: 193.56 LBS | HEIGHT: 70 IN | RESPIRATION RATE: 18 BRPM | SYSTOLIC BLOOD PRESSURE: 118 MMHG

## 2019-08-09 DIAGNOSIS — S43.82XA: ICD-10-CM

## 2019-08-09 DIAGNOSIS — M79.89 LEG SWELLING: ICD-10-CM

## 2019-08-09 DIAGNOSIS — K21.9 GASTROESOPHAGEAL REFLUX DISEASE, ESOPHAGITIS PRESENCE NOT SPECIFIED: ICD-10-CM

## 2019-08-09 DIAGNOSIS — Z12.4 PAP SMEAR FOR CERVICAL CANCER SCREENING: Primary | ICD-10-CM

## 2019-08-09 DIAGNOSIS — E66.3 OVERWEIGHT: ICD-10-CM

## 2019-08-09 DIAGNOSIS — F43.21 GRIEF REACTION: ICD-10-CM

## 2019-08-09 DIAGNOSIS — F33.41 RECURRENT MAJOR DEPRESSIVE DISORDER, IN PARTIAL REMISSION: Primary | ICD-10-CM

## 2019-08-09 DIAGNOSIS — J44.9 CHRONIC OBSTRUCTIVE PULMONARY DISEASE, UNSPECIFIED COPD TYPE: ICD-10-CM

## 2019-08-09 PROCEDURE — 99999 PR PBB SHADOW E&M-EST. PATIENT-LVL III: CPT | Mod: PBBFAC,,, | Performed by: OBSTETRICS & GYNECOLOGY

## 2019-08-09 PROCEDURE — 99396 PREV VISIT EST AGE 40-64: CPT | Mod: S$GLB,,, | Performed by: OBSTETRICS & GYNECOLOGY

## 2019-08-09 PROCEDURE — 88175 CYTOPATH C/V AUTO FLUID REDO: CPT

## 2019-08-09 PROCEDURE — 99396 PR PREVENTIVE VISIT,EST,40-64: ICD-10-PCS | Mod: S$GLB,,, | Performed by: OBSTETRICS & GYNECOLOGY

## 2019-08-09 PROCEDURE — 99214 PR OFFICE/OUTPT VISIT, EST, LEVL IV, 30-39 MIN: ICD-10-PCS | Mod: S$GLB,,, | Performed by: FAMILY MEDICINE

## 2019-08-09 PROCEDURE — 99999 PR PBB SHADOW E&M-EST. PATIENT-LVL III: ICD-10-PCS | Mod: PBBFAC,,, | Performed by: OBSTETRICS & GYNECOLOGY

## 2019-08-09 PROCEDURE — 99999 PR PBB SHADOW E&M-EST. PATIENT-LVL III: CPT | Mod: PBBFAC,,, | Performed by: FAMILY MEDICINE

## 2019-08-09 PROCEDURE — 99999 PR PBB SHADOW E&M-EST. PATIENT-LVL III: ICD-10-PCS | Mod: PBBFAC,,, | Performed by: FAMILY MEDICINE

## 2019-08-09 PROCEDURE — 3008F BODY MASS INDEX DOCD: CPT | Mod: CPTII,S$GLB,, | Performed by: FAMILY MEDICINE

## 2019-08-09 PROCEDURE — 99214 OFFICE O/P EST MOD 30 MIN: CPT | Mod: S$GLB,,, | Performed by: FAMILY MEDICINE

## 2019-08-09 PROCEDURE — 3008F PR BODY MASS INDEX (BMI) DOCUMENTED: ICD-10-PCS | Mod: CPTII,S$GLB,, | Performed by: FAMILY MEDICINE

## 2019-08-09 RX ORDER — FUROSEMIDE 20 MG/1
20 TABLET ORAL DAILY
Qty: 90 TABLET | Refills: 1 | Status: SHIPPED | OUTPATIENT
Start: 2019-08-09 | End: 2020-01-30

## 2019-08-09 RX ORDER — OMEPRAZOLE 40 MG/1
40 CAPSULE, DELAYED RELEASE ORAL DAILY
Qty: 90 CAPSULE | Refills: 1 | Status: SHIPPED | OUTPATIENT
Start: 2019-08-09 | End: 2020-04-13

## 2019-08-09 RX ORDER — CYCLOBENZAPRINE HCL 5 MG
5 TABLET ORAL NIGHTLY PRN
Qty: 90 TABLET | Refills: 1 | Status: SHIPPED | OUTPATIENT
Start: 2019-08-09 | End: 2020-05-11 | Stop reason: SDUPTHER

## 2019-08-09 RX ORDER — HYDROCODONE BITARTRATE AND ACETAMINOPHEN 7.5; 325 MG/1; MG/1
1 TABLET ORAL NIGHTLY
COMMUNITY
Start: 2019-08-08 | End: 2019-11-08 | Stop reason: SDUPTHER

## 2019-08-09 RX ORDER — CLINDAMYCIN HYDROCHLORIDE 150 MG/1
150 CAPSULE ORAL EVERY 8 HOURS
COMMUNITY
Start: 2019-08-08 | End: 2019-11-08

## 2019-08-09 RX ORDER — ESCITALOPRAM OXALATE 10 MG/1
10 TABLET ORAL DAILY
COMMUNITY
End: 2019-11-08 | Stop reason: SDUPTHER

## 2019-08-09 RX ORDER — ALPRAZOLAM 0.25 MG/1
TABLET ORAL
Qty: 60 TABLET | Refills: 2 | Status: SHIPPED | OUTPATIENT
Start: 2019-08-09 | End: 2019-11-10 | Stop reason: SDUPTHER

## 2019-08-09 NOTE — PROGRESS NOTES
"Subjective:       Patient ID: Malathi Martinez is a 58 y.o. female.    Chief Complaint: Follow-up (test results)    HPI  Patient in the office for f/u and review.  Stye on R eye. Saw UC.  Now on oral clinda from dentist for gum infection.   Coming up on anniversary of her dad's passing.   Had updated pelvic us for fibroid vs cyst. Has gyn appt this afternoon.  Planning repeat ct chest this fall with pulm/mckoy. No new resp issues.  Discussed weight management, tracking. She feels her goal weight should be around 170#.  Ongoing, periodic cp/palp. She did see cards/alex. Had stress test 2019 wnl. She attributes any continued sx to her stress/anxiety.     Review of Systems:  Review of Systems   Constitutional: Positive for unexpected weight change. Negative for activity change.   HENT: Positive for rhinorrhea. Negative for hearing loss and trouble swallowing.    Eyes: Positive for discharge. Negative for visual disturbance.   Respiratory: Negative for chest tightness and wheezing.    Cardiovascular: Positive for chest pain and palpitations.   Gastrointestinal: Positive for constipation. Negative for blood in stool, diarrhea and vomiting.   Endocrine: Negative for polydipsia and polyuria.   Genitourinary: Negative for difficulty urinating, dysuria, hematuria and menstrual problem.   Musculoskeletal: Negative for arthralgias, joint swelling and neck pain.   Neurological: Negative for weakness and headaches.   Psychiatric/Behavioral: Positive for dysphoric mood. Negative for confusion.       Objective:     Vitals:    08/09/19 1100   BP: 118/74   Pulse: 65   Resp: 16   Temp: 98.8 °F (37.1 °C)   TempSrc: Oral   SpO2: 96%   Weight: 87.9 kg (193 lb 12.6 oz)   Height: 5' 10" (1.778 m)          Physical Exam   Constitutional: She is oriented to person, place, and time. She appears well-developed and well-nourished. No distress.   HENT:   Head: Normocephalic and atraumatic.   Eyes: Conjunctivae are normal. Right eye " exhibits no discharge. Left eye exhibits no discharge. No scleral icterus.   Neck: Normal range of motion. Neck supple.   Cardiovascular: Normal rate and regular rhythm.   Pulmonary/Chest: Effort normal and breath sounds normal. No respiratory distress.   Abdominal: Soft. She exhibits no distension.   Musculoskeletal: Normal range of motion. She exhibits no edema.   Neurological: She is alert and oriented to person, place, and time.   Skin: Skin is warm and dry. No rash noted.   Psychiatric: She has a normal mood and affect. Her behavior is normal.   Nursing note and vitals reviewed.        Assessment & Plan:  Recurrent major depressive disorder, in partial remission  Stable, cont regimen.  Chronic obstructive pulmonary disease, unspecified COPD type  Per pulm, stable.   Grief reaction  -     ALPRAZolam (XANAX) 0.25 MG tablet; TAKE 1 TABLET TWICE A DAY AS NEEDED FOR ANXIETY  Dispense: 60 tablet; Refill: 2  Side effects and precautions of medication use reviewed with patient, expressed understanding. No questions or concerns.   Left scapulocostal sprain, initial encounter  -     cyclobenzaprine (FLEXERIL) 5 MG tablet; Take 1 tablet (5 mg total) by mouth nightly as needed for Muscle spasms.  Dispense: 90 tablet; Refill: 1  Stable, cont.  Gastroesophageal reflux disease, esophagitis presence not specified  -     omeprazole (PRILOSEC) 40 MG capsule; Take 1 capsule (40 mg total) by mouth once daily.  Dispense: 90 capsule; Refill: 1  Stable, cont.  Leg swelling  -     furosemide (LASIX) 20 MG tablet; Take 1 tablet (20 mg total) by mouth once daily.  Dispense: 90 tablet; Refill: 1  Stable. Cont.  Overweight  Goal weight reviewed as well as need for lifestyle modifications to incl caloric restriction, high protein/low carb, increased water intake, muscle-building exercises as well as cardio.

## 2019-08-09 NOTE — PROGRESS NOTES
Chief Complaint   Patient presents with    Well Woman    Results     discuss us results       History and Physical:  Patient's last menstrual period was 09/10/2012.       Malathi Martinez is a 58 y.o.  female who presents today for her routine annual GYN exam. The patient has no Gynecology complaints today. No bowel or bladder complaints.       Allergies:   Review of patient's allergies indicates:   Allergen Reactions    Penicillins Hives       Past Medical History:   Diagnosis Date    Abnormal stress test     Bronchitis     COPD (chronic obstructive pulmonary disease)     Diabetes mellitus, type 2     HLD (hyperlipidemia)     Hx of endoscopy     Hypertension     Mental disorder     Pulmonary nodule 4/9/2018    SOB (shortness of breath)     resolved    Tobacco abuse 12/26/2012       Past Surgical History:   Procedure Laterality Date    APPENDECTOMY      COLONOSCOPY N/A 1/18/2019    Performed by [facility] at [facility]    ESOPHAGOGASTRODUODENOSCOPY (EGD) N/A 3/14/2018    Performed by [facility] at [facility] ENDO    HEART CATH-LEFT N/A 7/1/2016    Performed by Hardeep Patiño [facility] at [facility] CATH    TONSILLECTOMY         MEDS:   Current Outpatient Medications on File Prior to Visit   Medication Sig Dispense Refill    albuterol (PROAIR HFA) 90 mcg/actuation inhaler INHALE 2 PUFFS INTO THE LUNGS EVERY 4 (FOUR) HOURS AS NEEDED FOR WHEEZING. 8.5 Inhaler 3    ALPRAZolam (XANAX) 0.25 MG tablet TAKE 1 TABLET TWICE A DAY AS NEEDED FOR ANXIETY 60 tablet 2    atorvastatin (LIPITOR) 20 MG tablet Take 1 tablet (20 mg total) by mouth once daily. 90 tablet 3    blood sugar diagnostic (CONTOUR TEST STRIPS MISC) Contour Test Strips      blood sugar diagnostic Strp 1 strip by Misc.(Non-Drug; Combo Route) route once daily. 50 each 6    blood-glucose meter kit Use as instructed 1 each 0    buPROPion (WELLBUTRIN XL) 150 MG TB24 tablet TAKE 1 TABLET BY MOUTH EVERY DAY 90 tablet  1    cholecalciferol, vitamin D3, 2,000 unit Cap Take 1 capsule by mouth once daily.      clindamycin (CLEOCIN) 150 MG capsule Take 150 mg by mouth every 8 (eight) hours.       cyclobenzaprine (FLEXERIL) 5 MG tablet Take 1 tablet (5 mg total) by mouth nightly as needed for Muscle spasms. 90 tablet 1    desloratadine (CLARINEX) 5 mg tablet Take 1 tablet (5 mg total) by mouth once daily. 90 tablet 3    escitalopram oxalate (LEXAPRO) 10 MG tablet Take 10 mg by mouth once daily.      furosemide (LASIX) 20 MG tablet Take 1 tablet (20 mg total) by mouth once daily. 90 tablet 1    HYDROcodone-acetaminophen (NORCO) 7.5-325 mg per tablet Take 1 tablet by mouth every evening.       indomethacin (INDOCIN) 25 MG capsule Take 1 capsule (25 mg total) by mouth 2 (two) times daily with meals. 30 capsule 2    lancets (MICROLET LANCET MISC) Microlet Lancet      lisinopril (PRINIVIL,ZESTRIL) 2.5 MG tablet Take 1 tablet (2.5 mg total) by mouth once daily. 90 tablet 1    metFORMIN (GLUCOPHAGE-XR) 500 MG 24 hr tablet Take 1 tablet (500 mg total) by mouth 2 (two) times daily with meals. 180 tablet 1    omeprazole (PRILOSEC) 40 MG capsule Take 1 capsule (40 mg total) by mouth once daily. 90 capsule 1    ranitidine (ZANTAC) 300 MG tablet TAKE 1 TABLET BY MOUTH IN THE EVENING 30 tablet 2    [DISCONTINUED] ALPRAZolam (XANAX) 0.25 MG tablet TAKE 1 TABLET TWICE A DAY AS NEEDED FOR ANXIETY 60 tablet 2    [DISCONTINUED] cyclobenzaprine (FLEXERIL) 5 MG tablet Take 1 tablet (5 mg total) by mouth nightly as needed for Muscle spasms. 90 tablet 1    [DISCONTINUED] escitalopram oxalate (LEXAPRO) 10 MG tablet Take 1.5 tablets (15 mg total) by mouth once daily. 135 tablet 1    [DISCONTINUED] escitalopram oxalate (LEXAPRO) 10 MG tablet TAKE 1 TABLET (10 MG TOTAL) BY MOUTH ONCE DAILY.NEXT FILL 11/16 (Patient taking differently: TAKE 1 TABLET (10 MG TOTAL) BY MOUTH ONCE DAILY.) 90 tablet 0    [DISCONTINUED] furosemide (LASIX) 20 MG tablet  TAKE 1 TABLET BY MOUTH EVERY DAY 90 tablet 1    [DISCONTINUED] lancets Misc 1 each by Misc.(Non-Drug; Combo Route) route once daily. 50 each 6    [DISCONTINUED] nicotine (NICODERM CQ) 21 mg/24 hr Place 1 patch onto the skin once daily. 14 patch 0    [DISCONTINUED] nicotine polacrilex (NICORETTE) 4 MG Gum Take up to 6 pieces daily as needed 100 each 0    [DISCONTINUED] omeprazole (PRILOSEC) 40 MG capsule TAKE 1 CAPSULE BY MOUTH EVERY DAY 90 capsule 1     No current facility-administered medications on file prior to visit.        OB History        0    Para   0    Term   0       0    AB   0    Living   0       SAB   0    TAB   0    Ectopic   0    Multiple   0    Live Births                     Social History     Socioeconomic History    Marital status:      Spouse name: Not on file    Number of children: Not on file    Years of education: Not on file    Highest education level: Not on file   Occupational History    Not on file   Social Needs    Financial resource strain: Not very hard    Food insecurity:     Worry: Never true     Inability: Never true    Transportation needs:     Medical: No     Non-medical: No   Tobacco Use    Smoking status: Current Every Day Smoker     Packs/day: 1.00     Years: 41.00     Pack years: 41.00    Smokeless tobacco: Never Used   Substance and Sexual Activity    Alcohol use: No     Frequency: Never     Drinks per session: Patient refused     Binge frequency: Never    Drug use: No    Sexual activity: Yes     Partners: Male     Birth control/protection: Post-menopausal   Lifestyle    Physical activity:     Days per week: 5 days     Minutes per session: 40 min    Stress: To some extent   Relationships    Social connections:     Talks on phone: More than three times a week     Gets together: Once a week     Attends Yazidi service: Not on file     Active member of club or organization: No     Attends meetings of clubs or organizations: Never      "Relationship status:    Other Topics Concern    Are you pregnant or think you may be? Not Asked    Breast-feeding Not Asked   Social History Narrative    Not on file       Family History   Problem Relation Age of Onset    Heart disease Mother         MVP    Cataracts Mother     Heart disease Father 65        MI and PVD    Throat cancer Father     Heart disease Sister         mvp    Esophageal cancer Brother     No Known Problems Sister     No Known Problems Sister     Hyperlipidemia Paternal Grandmother          Past medical and surgical history reviewed.   I have reviewed the patient's medical history in detail and updated the computerized patient record.        Review of System:  General: no chills, fever, night sweats, weight gain or weight loss  Psychological: no depression or suicidal ideation  Breasts: no new or changing breast lumps, nipple discharge or masses.  Respiratory: no cough, shortness of breath, or wheezing  Cardiovascular: no chest pain or dyspnea on exertion  Gastrointestinal: no abdominal pain, change in bowel habits, or black or bloody stools  Genito-Urinary: no incontinence, urinary frequency/urgency or vulvar/vaginal symptoms, pelvic pain or abnormal vaginal bleeding.  Musculoskeletal: no gait disturbance or muscular weakness      Physical Exam:   /74   Resp 18   Ht 5' 10" (1.778 m)   Wt 87.8 kg (193 lb 9 oz)   LMP 09/10/2012   BMI 27.77 kg/m²   Constitutional: She appears alert and responsive. She appears well-developed, well-groomed, and well-nourished. No distress. OverWeight   HENT:   Head: Normocephalic and atraumatic.   Eyes: Conjunctivae and EOM are normal. No scleral icterus.   Neck: Symmetrical. Normal range of motion. Neck supple. No tracheal deviation present. THYROID: without masses or tenderness.  Cardiovascular: Normal rate, no rhythm abnormality noted. Extremities without swelling or edema, warm.    Pulmonary/Chest: Normal respiratory Effort. No " distress or retractions. She exhibits no tenderness.  Breasts: are symmetrical.   Right breast exhibits no inverted nipple, no mass, no nipple discharge, no skin change and no tenderness.   Left breast exhibits no inverted nipple, no mass, no nipple discharge, no skin change and no tenderness.  Abdominal: Soft. She exhibits no distension, hernias or masses. There is no tenderness. No enlargement of liver edge or spleen.  There is no rebound and no guarding.   Genitourinary:    External rectal exam shows no thrombosed external hemorrhoids, no lesions.     Pelvic exam was performed with patient supine.   No labial fusion, and symmetrical.    There is no rash, lesion or injury on the right labia.   There is no rash, lesion or injury on the left labia.   No bleeding and no signs of injury around the vaginal introitus, urethral meatus is normal size and without prolapse or lesions, urethra well supported. The cervix is visualized with no discharge, lesions or friability.   No vaginal discharge found.   No significant Cystocele, Enterocele or rectocele, and cervix and uterus well supported.   Bimanual exam:   The urethra is normal to palpation and there are no palpable vaginal wall masses.   Uterus is not deviated, not enlarged, not fixed, normal shape and not tender.   Cervix exhibits no motion tenderness.    Right adnexum displays no mass or nodularity and no tenderness.   Left adnexum displays no mass or nodularity and no tenderness.  Musculoskeletal: Normal range of motion.   Lymphadenopathy: No inguinal adenopathy present.   Neurological: She is alert and oriented to person, place, and time. Coordination normal.   Skin: Skin is warm and dry. She is not diaphoretic. No rashes, lesions or ulcers.   Psychiatric: She has a normal mood and affect, oriented to person, place, and time.      Assessment:   Normal annual GYN exam  1. Pap smear for cervical cancer screening  Liquid-based pap smear, screening   tobacco abuse,  counseled  Fibroids/ stable ultrasound, reassured, no follow up needed    Plan:   PAP  Mammogram  Follow up in 1 year.  Patient informed will be contacted with results within 2 weeks. Encouraged to please call back or email if she has not heard from us by then.

## 2019-08-21 NOTE — PROGRESS NOTES
Last 6 Patient Entered Readings                                          Most Recent A1c: 5.6% on 4/27/2019  (Goal: 7%)     Recent Readings 8/21/2019 8/21/2019 8/16/2019 8/11/2019 8/6/2019    Blood Glucose (mg/dL) 106 156 106 100 105          Digital Medicine: Health  Follow Up    8/21: Left voicemail to follow up with MsDayron Malathi Brock Juan.  Most BG readings are below goal.  Patient spoke with PharmD on 8/5, will continue to follow up.   Patient called back and left VM stating she was doing well.  WCB in 2-3 weeks.

## 2019-08-27 ENCOUNTER — PATIENT MESSAGE (OUTPATIENT)
Dept: FAMILY MEDICINE | Facility: CLINIC | Age: 59
End: 2019-08-27

## 2019-08-27 ENCOUNTER — TELEPHONE (OUTPATIENT)
Dept: OPTOMETRY | Facility: CLINIC | Age: 59
End: 2019-08-27

## 2019-08-27 DIAGNOSIS — H00.019 HORDEOLUM EXTERNUM, UNSPECIFIED LATERALITY: Primary | ICD-10-CM

## 2019-08-27 NOTE — TELEPHONE ENCOUNTER
----- Message from Ángela Chu sent at 8/27/2019 12:17 PM CDT -----  Contact: Malathi Vázquez returned phone call for scheduling. [phone] ext [phone] work

## 2019-08-27 NOTE — TELEPHONE ENCOUNTER
----- Message from Ibeth Odell sent at 8/27/2019 11:02 AM CDT -----  Contact: self  Type: Sooner Appointment Request    Caller is requesting a sooner  appointment.  Caller declined first available appointment listed below.      Name of Caller:  Malathi Martinez  When is the first available appointment?  10/10 through out [place] location ,decline slidell   Symptoms:  Stye ,inflammation on eye per patient   Best Call Back Number:  [phone] patient cell   Additional Information:   Patient does have referral in epic but want sooner appt

## 2019-09-05 ENCOUNTER — PATIENT MESSAGE (OUTPATIENT)
Dept: FAMILY MEDICINE | Facility: CLINIC | Age: 59
End: 2019-09-05

## 2019-09-13 ENCOUNTER — HOSPITAL ENCOUNTER (OUTPATIENT)
Dept: RADIOLOGY | Facility: HOSPITAL | Age: 59
Discharge: HOME | End: 2019-09-13
Attending: INTERNAL MEDICINE
Payer: COMMERCIAL

## 2019-09-13 ENCOUNTER — IMMUNIZATION (OUTPATIENT)
Dept: FAMILY MEDICINE | Facility: CLINIC | Age: 59
End: 2019-09-13
Payer: COMMERCIAL

## 2019-09-13 ENCOUNTER — PATIENT OUTREACH (OUTPATIENT)
Dept: OTHER | Facility: OTHER | Age: 59
End: 2019-09-13

## 2019-09-13 DIAGNOSIS — R91.8 LUNG MASS: ICD-10-CM

## 2019-09-13 DIAGNOSIS — J44.89 OBSTRUCTIVE CHRONIC BRONCHITIS WITHOUT EXACERBATION: ICD-10-CM

## 2019-09-13 PROCEDURE — 90686 IIV4 VACC NO PRSV 0.5 ML IM: CPT | Mod: S$GLB,,, | Performed by: INTERNAL MEDICINE

## 2019-09-13 PROCEDURE — 90471 FLU VACCINE (QUAD) GREATER THAN OR EQUAL TO 3YO PRESERVATIVE FREE IM: ICD-10-PCS | Mod: S$GLB,,, | Performed by: INTERNAL MEDICINE

## 2019-09-13 PROCEDURE — 90686 FLU VACCINE (QUAD) GREATER THAN OR EQUAL TO 3YO PRESERVATIVE FREE IM: ICD-10-PCS | Mod: S$GLB,,, | Performed by: INTERNAL MEDICINE

## 2019-09-13 PROCEDURE — 71250 CT CHEST WITHOUT CONTRAST: ICD-10-PCS | Mod: 26,,, | Performed by: RADIOLOGY

## 2019-09-13 PROCEDURE — 90471 IMMUNIZATION ADMIN: CPT | Mod: S$GLB,,, | Performed by: INTERNAL MEDICINE

## 2019-09-13 PROCEDURE — 71250 CT THORAX DX C-: CPT | Mod: 26,,, | Performed by: RADIOLOGY

## 2019-09-13 PROCEDURE — 71250 CT THORAX DX C-: CPT | Mod: TC,PO

## 2019-09-13 NOTE — PROGRESS NOTES
Left voicemail requesting call back  Address recent elevation in BG readings  Encourage patient to follow up with health

## 2019-09-15 ENCOUNTER — OFFICE VISIT (OUTPATIENT)
Dept: URGENT CARE | Facility: CLINIC | Age: 59
End: 2019-09-15
Payer: COMMERCIAL

## 2019-09-15 VITALS
TEMPERATURE: 99 F | DIASTOLIC BLOOD PRESSURE: 73 MMHG | BODY MASS INDEX: 27.63 KG/M2 | HEART RATE: 79 BPM | WEIGHT: 193 LBS | HEIGHT: 70 IN | SYSTOLIC BLOOD PRESSURE: 129 MMHG | OXYGEN SATURATION: 97 %

## 2019-09-15 DIAGNOSIS — J32.9 BACTERIAL SINUSITIS: Primary | ICD-10-CM

## 2019-09-15 DIAGNOSIS — B96.89 BACTERIAL SINUSITIS: Primary | ICD-10-CM

## 2019-09-15 PROCEDURE — 99214 OFFICE O/P EST MOD 30 MIN: CPT | Mod: S$GLB,,, | Performed by: PHYSICIAN ASSISTANT

## 2019-09-15 PROCEDURE — 3008F BODY MASS INDEX DOCD: CPT | Mod: CPTII,S$GLB,, | Performed by: PHYSICIAN ASSISTANT

## 2019-09-15 PROCEDURE — 3008F PR BODY MASS INDEX (BMI) DOCUMENTED: ICD-10-PCS | Mod: CPTII,S$GLB,, | Performed by: PHYSICIAN ASSISTANT

## 2019-09-15 PROCEDURE — 99214 PR OFFICE/OUTPT VISIT, EST, LEVL IV, 30-39 MIN: ICD-10-PCS | Mod: S$GLB,,, | Performed by: PHYSICIAN ASSISTANT

## 2019-09-15 RX ORDER — AZELASTINE 1 MG/ML
1 SPRAY, METERED NASAL 2 TIMES DAILY
Qty: 30 ML | Refills: 0 | Status: SHIPPED | OUTPATIENT
Start: 2019-09-15 | End: 2021-02-26 | Stop reason: ALTCHOICE

## 2019-09-15 RX ORDER — DOXYCYCLINE 100 MG/1
100 CAPSULE ORAL 2 TIMES DAILY
Qty: 14 CAPSULE | Refills: 0 | Status: SHIPPED | OUTPATIENT
Start: 2019-09-15 | End: 2019-09-22

## 2019-09-15 NOTE — PROGRESS NOTES
"Subjective:       Patient ID: Malathi Martinez is a 59 y.o. female.    Vitals:  height is 5' 10" (1.778 m) and weight is 87.5 kg (193 lb). Her oral temperature is 99 °F (37.2 °C). Her blood pressure is 129/73 and her pulse is 79. Her oxygen saturation is 97%.     Chief Complaint: Sinus Problem    Pt received flu shot 3 days ago.  Taking Claritin daily.    Sinus Problem   This is a new problem. The current episode started in the past 7 days. The problem is unchanged. There has been no fever. She is experiencing no pain. Associated symptoms include congestion (6 days, stuffy nose), coughing, ear pain, sinus pressure and sneezing. Pertinent negatives include no chills, diaphoresis, shortness of breath or sore throat. Past treatments include nothing. The treatment provided no relief.       Constitution: Negative for chills, sweating, fatigue and fever.   HENT: Positive for ear pain, congestion (6 days, stuffy nose), postnasal drip, sinus pain and sinus pressure. Negative for sore throat and voice change.    Neck: Negative for painful lymph nodes.   Eyes: Positive for eye discharge (watery) and eye itching. Negative for eye redness.   Respiratory: Positive for cough. Negative for chest tightness, sputum production, bloody sputum, COPD, shortness of breath, stridor, wheezing and asthma.    Gastrointestinal: Negative for nausea and vomiting.   Musculoskeletal: Negative for muscle ache.   Skin: Negative for rash.   Allergic/Immunologic: Positive for seasonal allergies and sneezing. Negative for asthma.   Hematologic/Lymphatic: Negative for swollen lymph nodes.       Objective:      Physical Exam   Constitutional: She is oriented to person, place, and time. She appears well-developed and well-nourished. She is cooperative.  Non-toxic appearance. She does not appear ill. No distress.   HENT:   Head: Normocephalic and atraumatic.   Right Ear: Hearing, tympanic membrane, external ear and ear canal normal.   Left Ear: " Hearing, tympanic membrane, external ear and ear canal normal.   Nose: No mucosal edema, rhinorrhea or nasal deformity. No epistaxis. Right sinus exhibits maxillary sinus tenderness. Right sinus exhibits no frontal sinus tenderness. Left sinus exhibits maxillary sinus tenderness. Left sinus exhibits no frontal sinus tenderness.   Mouth/Throat: Uvula is midline and mucous membranes are normal. No trismus in the jaw. Normal dentition. No uvula swelling. Posterior oropharyngeal erythema present.   Eyes: Conjunctivae and lids are normal. No scleral icterus.   Sclera clear bilat   Neck: Trachea normal, full passive range of motion without pain and phonation normal. Neck supple.   Cardiovascular: Normal rate, regular rhythm, normal heart sounds, intact distal pulses and normal pulses.   Pulmonary/Chest: Effort normal and breath sounds normal. No respiratory distress.   Abdominal: Soft. Normal appearance and bowel sounds are normal. She exhibits no distension. There is no tenderness.   Musculoskeletal: Normal range of motion. She exhibits no edema or deformity.   Neurological: She is alert and oriented to person, place, and time. She exhibits normal muscle tone. Coordination normal.   Skin: Skin is warm, dry and intact. She is not diaphoretic. No pallor.   Psychiatric: She has a normal mood and affect. Her speech is normal and behavior is normal. Judgment and thought content normal. Cognition and memory are normal.   Nursing note and vitals reviewed.      Assessment:       1. Bacterial sinusitis        Plan:         Bacterial sinusitis    Other orders  -     azelastine (ASTELIN) 137 mcg (0.1 %) nasal spray; 1 spray (137 mcg total) by Nasal route 2 (two) times daily.  Dispense: 30 mL; Refill: 0  -     doxycycline (VIBRAMYCIN) 100 MG Cap; Take 1 capsule (100 mg total) by mouth 2 (two) times daily. for 7 days  Dispense: 14 capsule; Refill: 0     Discussed probiotic with antibiotic.    You must understand that you've received  an Urgent Care treatment only and that you may be released before all your medical problems are known or treated. You, the patient, will arrange for follow up care as instructed.  Follow up with your PCP or specialty clinic as directed in the next 1-2 weeks if not improved or as needed.  You can call [phone] to schedule an appointment with the appropriate provider.  If your condition worsens we recommend that you receive another evaluation at the emergency room immediately or contact your primary medical clinics after hours call service to discuss your concerns.  Please return here or go to the Emergency Department for any concerns or worsening of condition.

## 2019-09-15 NOTE — PATIENT INSTRUCTIONS

## 2019-09-18 ENCOUNTER — TELEPHONE (OUTPATIENT)
Dept: URGENT CARE | Facility: CLINIC | Age: 59
End: 2019-09-18

## 2019-09-19 NOTE — PROGRESS NOTES
"Digital Medicine: Health  Follow-Up    Patient reports she has bronchitis.  Patient reports she has been feeling bad "for about two weeks".  Patient states "she has been so tired".  Patient was prescribed antibiotics and steroids. Will notify PharmD.         Follow Up  Follow-up reason(s): reading review      Readings are trending up due to patient was diagnosed with bronchitis.            Diet:       Encouraged patient to stay well hydrated and focus on picking healthier options even though she isn't feeling well.     Physical Activity:   When asked if exercising, patient responded: no    She identified the following barriers to physical activity: Patient has not been feeling well due to bronchitis.       [facility]    INTERVENTION(S)  encouragement/support      There are no preventive care reminders to display for this patient.      Last 6 Patient Entered Readings                                          Most Recent A1c: 5.6% on 4/27/2019  (Goal: 7%)     Recent Readings 9/9/2019 9/5/2019 9/1/2019 8/29/2019 8/28/2019    Blood Glucose (mg/dL) 177 154 163 143 136              "

## 2019-09-20 NOTE — PROGRESS NOTES
Chief Complaint   Patient presents with    Discuss CT Scan       History of Present Illness   58 y.o.  female patient presents today for review and discussion of recent pelvic CT, follow up from pelvic ultrasound.   Benign stable appearing uterine fibroids - no symptoms. Discussed benign nature of fibroids, no treatment recommended unless symptoms of pain or bleeding arise.       Ultrasound(9/14/2018):  FINDINGS:  The uterus measures 7.5 x 3.3 x 3.5 cm in dimension.  There is a 2.4 x 2.1 x 1.5 cm pedunculated subserosal uterine fibroid arising from the right posterolateral aspect of the uterine fundus.  There is also a 3.6 x 3.1 x 2.3 cm intramural uterine fibroid present within the posterior wall of the uterine fundus.  The endometrial stripe is not thickened measuring 3 mm.  There are cervical nabothian cysts present.    The right ovary measures approximately 3.7 x 3.2 x 2.4 cm in dimension and appears enlarged.  There are multiple shadowing echogenic foci present within the right ovary, possibly calcifications.  While this could be artifactual and represent a fluid filled loop of bowel, a partially calcified mass completely replacing the right ovary cannot be excluded.  Consider additional evaluation with MRI of the female pelvis for additional characterization as a dermoid cyst, benign or malignant ovarian tumor cannot be excluded.  The left ovary measures 2.9 x 1.9 x 1.9 cm in dimension and shows 2 simple cysts, the larger of which measures 10 mm.  There is arterial and venous color flow in both ovaries.  No free fluid in the pelvis.      Impression       3.7 x 3.2 x 2.4 cm right adnexal mass with calcifications, possibly the right ovary is replaced by a partially calcified solid right ovarian mass.  Differential considerations are provided above.  Additional evaluation with MRI of the female pelvis could provide additional diagnostic information and could be performed as deemed clinically  Patient came in for a blood pressure check because it was high at pain management   appropriate.    Uterine fibroids with 2.4 x 2.1 x 1.5 cm pedunculated subserosal fibroid arising from the right posterolateral aspect of the uterine fundus.           CT results: 9/19/2018  FINDINGS:  A 5 mm nodule is noted in the right lower lobe stable since 06/08/2018.  Apparent wall thickening is noted involving the stomach which could relate to a gastritis, inflammatory, infectious, and infiltrative processes are on the differential.  Please clinically correlate if necessary correlation with endoscopy could be performed.    A nonspecific hypodensity is noted within the right lobe of the liver near the inferior tip of 7 mm nonspecific on this examination but statistically favored relate to a cyst.  A similar more inferior hypodensity of 6 mm is noted within the right lobe tip.  A similar hypodensity is noted within the left lobe of the liver near the dome without obvious evidence of enhancement of 1.5 cm.  If confirmation of cysts is desired ultrasound could be performed to exclude solid components. The kidneys appear lobular and irregular particularly on the right suggestive of prior injury. Neither kidney demonstrates evidence of obstruction.    Intervertebral disc height loss is noted at the L5-S1 level.    The ovaries are suboptimally evaluated on CT.  A heterogeneous mass is noted along the posterior border of the uterus however suggestive a partially calcified exophytic fibroid of 3.4 by 3.5 cm axially which may relate to the ultrasound finding on 09/14/2018.    Infrarenal aortic dilatation to 2.2 cm is noted, this is ectatic.    The colon demonstrates multiple colonic diverticulum without obvious inflammation.    Within the left lower quadrant of the abdomen a transient intussusception appears to be present in a loop of small bowel with a targetoid appearance noted on image 78 sequence 5 and on image 124 sequence 602.  This report was flagged in Epic as abnormal.      Impression       1.  A partially  calcified mass appears to be present along the posterior border of the uterus with multiple small calcifications.  This mass is of similar size to the region of concern on 09/14/2018. This most likely relates to an exophytic partially calcified fibroid. MRI female pelvis evaluation could better evaluate the ovaries and confirm that this is connected to the uterus as is suspected.  This has a relatively benign appearance with coarse benign-appearing calcifications suspected  2. Apparent wall thickening involving the stomach,, this could relate to an infiltrative or inflammatory process as well as gastritis.  Correlation with endoscopy and/or clinical correlation is necessary.  3. Transient small bowel intussusception in the left lower quadrant without evidence of obstruction.  4. Hepatic hypodensities nonspecific on this examination but statistically favored relate to cysts.  If confirmation is desired to exclude solid components ultrasound could be performed.  5. Lobular contour to the right kidney, please correlate for a history of injury.  6. Moderate quantity of stool in the colon, please correlate for constipation.  Multiple colonic diverticula are noted.  7. 5 mm pulmonary nodule, in a low risk patient no further follow-up is necessary.  In a high-risk patient follow-up in 1 year could be performed.  8. Infrarenal aortic ectasia         Past medical and surgical history reviewed.   I have reviewed the patient's medical history in detail and updated the computerized patient record.    Review of patient's allergies indicates:   Allergen Reactions    Penicillins Hives         Review of Systems - Negative except HPI  GEN ROS: negative for - chills or fever  Breast ROS: negative for breast lumps  Genito-Urinary ROS: no dysuria, trouble voiding, or hematuria      Physical Examination:  /66   Wt 82.1 kg (180 lb 16 oz)   LMP 09/10/2012   BMI 25.97 kg/m²    Deferred      15 minutes spent with patient with >  1/2 time in counseling.          Assessment:  57yo, WF - benign fibroids    Plan:  Reassured, watchful waiting  Patient informed will be contacted with results within 2 weeks. Encouraged to please call back or email if she has not heard from us by then.

## 2019-10-02 NOTE — PROGRESS NOTES
Digital Medicine: Clinician Follow-Up    Reports she has been sick with bronchitis for about 3 weeks  She used a steroid nasal spray and antibiotics. She has been completed with both for about 1 week.    The history is provided by the patient.     Follow Up  Follow-up reason(s): reading review              Sleep Apnea  Patient not previously diagnosed with APRIL and     Medication Affordability  Patient is currently not having problems affording medications    Medication Adherence:     reports infrequent missed metformin dose  She does not wonder if medications are working.  She knows purpose of medications.      Adherence tools used: pill box      INTERVENTION(S)  reviewed appropriate dose schedule and recommended physical activity    PLAN  patient verbalizes understanding    Counseled that can take XR formulation of metformin together if desired  Increase physical activity as tolerated  Expect BG will continue to trend back down as she recovers  Discussed continuing to vary BG testing schedule and to use meal-relationship labels in phone karli  Consider increasing metformin to 1500 mg daily if BG do not continue to improve      There are no preventive care reminders to display for this patient.      Last 6 Patient Entered Readings                                      Most Recent A1c: 5.6% on 4/27/2019  (Goal: 7%)     Recent Readings 10/2/2019 10/1/2019 9/30/2019 9/25/2019 9/20/2019    Blood Glucose (mg/dL) 130 172 166 128 113             Diabetes Medications             metFORMIN (GLUCOPHAGE-XR) 500 MG 24 hr tablet Take 1 tablet (500 mg total) by mouth 2 (two) times daily with meals.

## 2019-10-15 ENCOUNTER — PATIENT MESSAGE (OUTPATIENT)
Dept: FAMILY MEDICINE | Facility: CLINIC | Age: 59
End: 2019-10-15

## 2019-10-15 NOTE — TELEPHONE ENCOUNTER
The blood tests used for certain cancers are not recommended for screening bc of the risk of false positive. If she has a specific concern, we can go over it at her next visit, sooner if needed.

## 2019-10-15 NOTE — PROGRESS NOTES
Limited SMBGs since last outreach but at goal  Patient working with Dr. Dave to schedule labs, including A1C, for 10/26  Continue to monitor

## 2019-10-25 ENCOUNTER — PATIENT OUTREACH (OUTPATIENT)
Dept: ADMINISTRATIVE | Facility: HOSPITAL | Age: 59
End: 2019-10-25

## 2019-10-27 DIAGNOSIS — F33.41 RECURRENT MAJOR DEPRESSIVE DISORDER, IN PARTIAL REMISSION: ICD-10-CM

## 2019-10-27 DIAGNOSIS — F43.21 GRIEF REACTION: ICD-10-CM

## 2019-10-28 ENCOUNTER — TELEPHONE (OUTPATIENT)
Dept: FAMILY MEDICINE | Facility: CLINIC | Age: 59
End: 2019-10-28

## 2019-10-28 DIAGNOSIS — E11.9 TYPE 2 DIABETES MELLITUS WITHOUT COMPLICATION, WITHOUT LONG-TERM CURRENT USE OF INSULIN: Primary | ICD-10-CM

## 2019-10-28 RX ORDER — ESCITALOPRAM OXALATE 10 MG/1
10 TABLET ORAL DAILY
Qty: 90 TABLET | Refills: 1 | Status: SHIPPED | OUTPATIENT
Start: 2019-10-28 | End: 2020-04-20 | Stop reason: SDUPTHER

## 2019-10-28 NOTE — TELEPHONE ENCOUNTER
----- Message from Alfred Adame sent at 10/26/2019  8:12 AM CDT -----  Contact: Patient  Type: Needs Medical Advice    Who Called:  Patient  Best Call Back Number: [phone]  Additional Information: Patient would like to schedule lab orders. No orders in Epic. Please call to advise. Thanks!

## 2019-10-28 NOTE — TELEPHONE ENCOUNTER
----- Message from Cherise Vasquez sent at 10/28/2019  9:42 AM CDT -----  Contact: Malathi  Type:  Patient Returning Call    Who Called:  patient  Who Left Message for Patient:  Cristiana  Does the patient know what this is regarding?:  labs  Best Call Back Number:  [phone]--LM  Additional Information:  Requesting A1C orders--would like to come to [facility] location on Saturday 11/2 or 11/9--please advise--thank you

## 2019-10-28 NOTE — TELEPHONE ENCOUNTER
----- Message from Alfred Adame sent at 10/26/2019  8:12 AM CDT -----  Contact: Patient  Type: Needs Medical Advice    Who Called:  Patient  Best Call Back Number: [phone]  Additional Information: Patient would like to schedule lab orders. No orders in Epic. Please call to advise. Thanks!

## 2019-10-29 DIAGNOSIS — E11.9 TYPE 2 DIABETES MELLITUS WITHOUT COMPLICATION, WITHOUT LONG-TERM CURRENT USE OF INSULIN: ICD-10-CM

## 2019-10-29 RX ORDER — METFORMIN HYDROCHLORIDE 500 MG/1
TABLET, EXTENDED RELEASE ORAL
Qty: 180 TABLET | Refills: 1 | Status: SHIPPED | OUTPATIENT
Start: 2019-10-29 | End: 2020-05-11 | Stop reason: SDUPTHER

## 2019-10-29 RX ORDER — LISINOPRIL 2.5 MG/1
TABLET ORAL
Qty: 90 TABLET | Refills: 1 | Status: SHIPPED | OUTPATIENT
Start: 2019-10-29 | End: 2020-05-11 | Stop reason: SDUPTHER

## 2019-11-02 ENCOUNTER — LAB VISIT (OUTPATIENT)
Dept: LAB | Facility: HOSPITAL | Age: 59
End: 2019-11-02
Attending: FAMILY MEDICINE
Payer: COMMERCIAL

## 2019-11-02 DIAGNOSIS — E11.9 TYPE 2 DIABETES MELLITUS WITHOUT COMPLICATION, WITHOUT LONG-TERM CURRENT USE OF INSULIN: ICD-10-CM

## 2019-11-02 LAB
ALBUMIN SERPL BCP-MCNC: 4.1 G/DL (ref 3.5–5.2)
ALP SERPL-CCNC: 75 U/L (ref 55–135)
ALT SERPL W/O P-5'-P-CCNC: 18 U/L (ref 10–44)
ANION GAP SERPL CALC-SCNC: 11 MMOL/L (ref 8–16)
AST SERPL-CCNC: 19 U/L (ref 10–40)
BILIRUB SERPL-MCNC: 0.4 MG/DL (ref 0.1–1)
BUN SERPL-MCNC: 9 MG/DL (ref 6–20)
CALCIUM SERPL-MCNC: 9.7 MG/DL (ref 8.7–10.5)
CHLORIDE SERPL-SCNC: 103 MMOL/L (ref 95–110)
CO2 SERPL-SCNC: 24 MMOL/L (ref 23–29)
CREAT SERPL-MCNC: 0.8 MG/DL (ref 0.5–1.4)
EST. GFR  (AFRICAN AMERICAN): >60 ML/MIN/1.73 M^2
EST. GFR  (NON AFRICAN AMERICAN): >60 ML/MIN/1.73 M^2
ESTIMATED AVG GLUCOSE: 117 MG/DL (ref 68–131)
GLUCOSE SERPL-MCNC: 90 MG/DL (ref 70–110)
HBA1C MFR BLD HPLC: 5.7 % (ref 4–5.6)
POTASSIUM SERPL-SCNC: 4.2 MMOL/L (ref 3.5–5.1)
PROT SERPL-MCNC: 7.6 G/DL (ref 6–8.4)
SODIUM SERPL-SCNC: 138 MMOL/L (ref 136–145)

## 2019-11-02 PROCEDURE — 80053 COMPREHEN METABOLIC PANEL: CPT

## 2019-11-02 PROCEDURE — 83036 HEMOGLOBIN GLYCOSYLATED A1C: CPT

## 2019-11-02 PROCEDURE — 36415 COLL VENOUS BLD VENIPUNCTURE: CPT | Mod: PO

## 2019-11-04 ENCOUNTER — TELEPHONE (OUTPATIENT)
Dept: PODIATRY | Facility: CLINIC | Age: 59
End: 2019-11-04

## 2019-11-05 ENCOUNTER — PATIENT OUTREACH (OUTPATIENT)
Dept: OTHER | Facility: OTHER | Age: 59
End: 2019-11-05

## 2019-11-05 NOTE — PROGRESS NOTES
Left voicemail requesting call back  A1C remains at goal, 5.7%. eGFR > 60  Assess for s/s of hypoglycemia  Limited SMBGs to review

## 2019-11-08 ENCOUNTER — OFFICE VISIT (OUTPATIENT)
Dept: FAMILY MEDICINE | Facility: CLINIC | Age: 59
End: 2019-11-08
Payer: COMMERCIAL

## 2019-11-08 VITALS
TEMPERATURE: 99 F | WEIGHT: 193.31 LBS | OXYGEN SATURATION: 98 % | BODY MASS INDEX: 27.67 KG/M2 | HEIGHT: 70 IN | DIASTOLIC BLOOD PRESSURE: 70 MMHG | HEART RATE: 68 BPM | SYSTOLIC BLOOD PRESSURE: 122 MMHG

## 2019-11-08 DIAGNOSIS — F33.41 RECURRENT MAJOR DEPRESSIVE DISORDER, IN PARTIAL REMISSION: ICD-10-CM

## 2019-11-08 DIAGNOSIS — F43.21 GRIEF REACTION: ICD-10-CM

## 2019-11-08 DIAGNOSIS — F43.23 ADJUSTMENT DISORDER WITH MIXED ANXIETY AND DEPRESSED MOOD: ICD-10-CM

## 2019-11-08 DIAGNOSIS — E11.9 TYPE 2 DIABETES MELLITUS WITHOUT COMPLICATION, WITHOUT LONG-TERM CURRENT USE OF INSULIN: Primary | ICD-10-CM

## 2019-11-08 PROCEDURE — 99214 PR OFFICE/OUTPT VISIT, EST, LEVL IV, 30-39 MIN: ICD-10-PCS | Mod: S$GLB,,, | Performed by: FAMILY MEDICINE

## 2019-11-08 PROCEDURE — 99214 OFFICE O/P EST MOD 30 MIN: CPT | Mod: S$GLB,,, | Performed by: FAMILY MEDICINE

## 2019-11-08 PROCEDURE — 3044F PR MOST RECENT HEMOGLOBIN A1C LEVEL <7.0%: ICD-10-PCS | Mod: CPTII,S$GLB,, | Performed by: FAMILY MEDICINE

## 2019-11-08 PROCEDURE — 3008F PR BODY MASS INDEX (BMI) DOCUMENTED: ICD-10-PCS | Mod: CPTII,S$GLB,, | Performed by: FAMILY MEDICINE

## 2019-11-08 PROCEDURE — 99999 PR PBB SHADOW E&M-EST. PATIENT-LVL III: ICD-10-PCS | Mod: PBBFAC,,, | Performed by: FAMILY MEDICINE

## 2019-11-08 PROCEDURE — 99999 PR PBB SHADOW E&M-EST. PATIENT-LVL III: CPT | Mod: PBBFAC,,, | Performed by: FAMILY MEDICINE

## 2019-11-08 PROCEDURE — 3044F HG A1C LEVEL LT 7.0%: CPT | Mod: CPTII,S$GLB,, | Performed by: FAMILY MEDICINE

## 2019-11-08 PROCEDURE — 3008F BODY MASS INDEX DOCD: CPT | Mod: CPTII,S$GLB,, | Performed by: FAMILY MEDICINE

## 2019-11-08 RX ORDER — IBUPROFEN 800 MG/1
800 TABLET ORAL 3 TIMES DAILY PRN
Refills: 0 | COMMUNITY
Start: 2019-10-23 | End: 2021-08-20

## 2019-11-08 RX ORDER — BUPROPION HYDROCHLORIDE 150 MG/1
150 TABLET ORAL DAILY
Qty: 90 TABLET | Refills: 1 | Status: SHIPPED | OUTPATIENT
Start: 2019-11-08 | End: 2020-04-20 | Stop reason: SDUPTHER

## 2019-11-08 NOTE — PROGRESS NOTES
Subjective:       Patient ID: Malathi Martinez is a 59 y.o. female.    Chief Complaint: Follow-up and Medication Refill      Malathi Martinez is in the office for f/u.    HPI  No major health updates.   She notes that her cough tends to linger and cuts can be slower to heal.  Past Medical History:   Diagnosis Date    Abnormal stress test     Bronchitis     COPD (chronic obstructive pulmonary disease)     Diabetes mellitus, type 2     HLD (hyperlipidemia)     Hx of endoscopy     Hypertension     Mental disorder     Pulmonary nodule 4/9/2018    SOB (shortness of breath)     resolved    Tobacco abuse 12/26/2012         Current Outpatient Medications:     albuterol (PROAIR HFA) 90 mcg/actuation inhaler, INHALE 2 PUFFS INTO THE LUNGS EVERY 4 (FOUR) HOURS AS NEEDED FOR WHEEZING., Disp: 8.5 Inhaler, Rfl: 3    ALPRAZolam (XANAX) 0.25 MG tablet, TAKE 1 TABLET TWICE A DAY AS NEEDED FOR ANXIETY, Disp: 60 tablet, Rfl: 2    atorvastatin (LIPITOR) 20 MG tablet, Take 1 tablet (20 mg total) by mouth once daily., Disp: 90 tablet, Rfl: 3    azelastine (ASTELIN) 137 mcg (0.1 %) nasal spray, 1 spray (137 mcg total) by Nasal route 2 (two) times daily., Disp: 30 mL, Rfl: 0    blood sugar diagnostic (CONTOUR TEST STRIPS MISC), Contour Test Strips, Disp: , Rfl:     blood sugar diagnostic Strp, 1 strip by Misc.(Non-Drug; Combo Route) route once daily., Disp: 50 each, Rfl: 6    blood-glucose meter kit, Use as instructed, Disp: 1 each, Rfl: 0    buPROPion (WELLBUTRIN XL) 150 MG TB24 tablet, TAKE 1 TABLET BY MOUTH EVERY DAY, Disp: 90 tablet, Rfl: 1    cholecalciferol, vitamin D3, 2,000 unit Cap, Take 1 capsule by mouth once daily., Disp: , Rfl:     clindamycin (CLEOCIN) 150 MG capsule, Take 150 mg by mouth every 8 (eight) hours. , Disp: , Rfl:     cyclobenzaprine (FLEXERIL) 5 MG tablet, Take 1 tablet (5 mg total) by mouth nightly as needed for Muscle spasms., Disp: 90 tablet, Rfl: 1    escitalopram oxalate  (LEXAPRO) 10 MG tablet, Take 10 mg by mouth once daily., Disp: , Rfl:     escitalopram oxalate (LEXAPRO) 10 MG tablet, Take 1 tablet (10 mg total) by mouth once daily., Disp: 90 tablet, Rfl: 1    furosemide (LASIX) 20 MG tablet, Take 1 tablet (20 mg total) by mouth once daily., Disp: 90 tablet, Rfl: 1    HYDROcodone-acetaminophen (NORCO) 7.5-325 mg per tablet, Take 1 tablet by mouth every evening. , Disp: , Rfl:     ibuprofen (ADVIL,MOTRIN) 800 MG tablet, Take 800 mg by mouth 3 (three) times daily as needed., Disp: , Rfl: 0    indomethacin (INDOCIN) 25 MG capsule, Take 1 capsule (25 mg total) by mouth 2 (two) times daily with meals., Disp: 30 capsule, Rfl: 2    lancets (MICROLET LANCET MISC), Microlet Lancet, Disp: , Rfl:     lisinopril (PRINIVIL,ZESTRIL) 2.5 MG tablet, Take 1 tablet (2.5 mg total) by mouth once daily., Disp: 90 tablet, Rfl: 1    lisinopril (PRINIVIL,ZESTRIL) 2.5 MG tablet, TAKE 1 TABLET BY MOUTH ONCE DAILY., Disp: 90 tablet, Rfl: 1    metFORMIN (GLUCOPHAGE-XR) 500 MG 24 hr tablet, TAKE 1 TABLET BY MOUTH TWICE A DAY WITH MEALS, Disp: 180 tablet, Rfl: 1    omeprazole (PRILOSEC) 40 MG capsule, Take 1 capsule (40 mg total) by mouth once daily., Disp: 90 capsule, Rfl: 1    ranitidine (ZANTAC) 300 MG tablet, TAKE 1 TABLET BY MOUTH IN THE EVENING, Disp: 30 tablet, Rfl: 2    desloratadine (CLARINEX) 5 mg tablet, Take 1 tablet (5 mg total) by mouth once daily., Disp: 90 tablet, Rfl: 3    The 10-year ASCVD risk score (Becki JACLYN Fuentes., et al., 2013) is: 10.9%    Values used to calculate the score:      Age: 59 years      Sex: Female      Is Non- : No      Diabetic: Yes      Tobacco smoker: Yes      Systolic Blood Pressure: 122 mmHg      Is BP treated: No      HDL Cholesterol: 47 mg/dL      Total Cholesterol: 156 mg/dL     Lab Results   Component Value Date    HGBA1C 5.7 (H) 11/02/2019    HGBA1C 5.6 04/27/2019    HGBA1C 5.7 (H) 01/25/2019     Lab Results   Component Value Date     LDLCALC 90.2 04/27/2019    CREATININE 0.8 11/02/2019   labs 2019 rev.    Review of Systems   Constitutional: Positive for unexpected weight change. Negative for activity change.   HENT: Negative for hearing loss, rhinorrhea and trouble swallowing.    Eyes: Negative for discharge and visual disturbance.   Respiratory: Negative for chest tightness and wheezing.    Cardiovascular: Positive for palpitations. Negative for chest pain.   Gastrointestinal: Negative for blood in stool, constipation, diarrhea and vomiting.   Endocrine: Negative for polydipsia and polyuria.   Genitourinary: Negative for difficulty urinating, dysuria, hematuria and menstrual problem.   Musculoskeletal: Negative for arthralgias, joint swelling and neck pain.   Neurological: Negative for weakness and headaches.   Psychiatric/Behavioral: Positive for dysphoric mood. Negative for confusion.       Objective:      Physical Exam   Constitutional: She is oriented to person, place, and time. She appears well-developed and well-nourished. No distress.   HENT:   Head: Normocephalic and atraumatic.   Eyes: Conjunctivae are normal. Right eye exhibits no discharge. Left eye exhibits no discharge. No scleral icterus.   Neck: Normal range of motion. Neck supple.   Cardiovascular: Normal rate and regular rhythm.   Pulses:       Dorsalis pedis pulses are 1+ on the right side, and 1+ on the left side.        Posterior tibial pulses are 1+ on the right side, and 1+ on the left side.   Pulmonary/Chest: Effort normal and breath sounds normal. No respiratory distress.   Abdominal: Soft. She exhibits no distension.   Musculoskeletal: Normal range of motion. She exhibits no edema.        Right foot: There is normal range of motion and no deformity.        Left foot: There is normal range of motion and no deformity.   Feet:   Right Foot:   Protective Sensation: 5 sites tested. 5 sites sensed.   Skin Integrity: Negative for ulcer, blister, skin breakdown, erythema,  warmth, callus or dry skin.   Left Foot:   Protective Sensation: 5 sites tested. 5 sites sensed.   Skin Integrity: Negative for ulcer, blister, skin breakdown, erythema, warmth, callus or dry skin.   Neurological: She is alert and oriented to person, place, and time.   Skin: Skin is warm and dry. No rash noted.   Psychiatric: She has a normal mood and affect. Her behavior is normal.   Nursing note and vitals reviewed.          Screening recommendations appropriate to age and health status were reviewed.    Assessment & Plan:    Type 2 diabetes mellitus without complication, without long-term current use of insulin  Well-controlled with a1c <6%. Consider for complete remission. No complications related to her diabetes.   Adjustment disorder with mixed anxiety and depressed mood  -     buPROPion (WELLBUTRIN XL) 150 MG TB24 tablet; Take 1 tablet (150 mg total) by mouth once daily.  Dispense: 90 tablet; Refill: 1  Stable. Self-care, sx monitoring, and counseling reviewed. Patient receptive to discussion.    Grief reaction  -     buPROPion (WELLBUTRIN XL) 150 MG TB24 tablet; Take 1 tablet (150 mg total) by mouth once daily.  Dispense: 90 tablet; Refill: 1  Recurrent major depressive disorder, in partial remission

## 2019-11-10 DIAGNOSIS — F43.21 GRIEF REACTION: ICD-10-CM

## 2019-11-11 RX ORDER — ALPRAZOLAM 0.25 MG/1
TABLET ORAL
Qty: 60 TABLET | Refills: 2 | Status: SHIPPED | OUTPATIENT
Start: 2019-11-11 | End: 2020-02-17

## 2019-11-11 RX ORDER — DESLORATADINE 5 MG/1
5 TABLET ORAL DAILY
Qty: 90 TABLET | Refills: 3 | Status: SHIPPED | OUTPATIENT
Start: 2019-11-11 | End: 2020-08-21 | Stop reason: ALTCHOICE

## 2019-11-13 ENCOUNTER — OFFICE VISIT (OUTPATIENT)
Dept: PODIATRY | Facility: CLINIC | Age: 59
End: 2019-11-13
Payer: COMMERCIAL

## 2019-11-13 VITALS
HEIGHT: 70 IN | DIASTOLIC BLOOD PRESSURE: 64 MMHG | WEIGHT: 193 LBS | SYSTOLIC BLOOD PRESSURE: 128 MMHG | HEART RATE: 73 BPM | BODY MASS INDEX: 27.63 KG/M2

## 2019-11-13 DIAGNOSIS — Q82.8 POROKERATOSIS: ICD-10-CM

## 2019-11-13 DIAGNOSIS — E11.9 TYPE 2 DIABETES MELLITUS WITHOUT COMPLICATION, WITHOUT LONG-TERM CURRENT USE OF INSULIN: Primary | ICD-10-CM

## 2019-11-13 PROCEDURE — 3044F PR MOST RECENT HEMOGLOBIN A1C LEVEL <7.0%: ICD-10-PCS | Mod: CPTII,S$GLB,, | Performed by: PODIATRIST

## 2019-11-13 PROCEDURE — 99213 OFFICE O/P EST LOW 20 MIN: CPT | Mod: S$GLB,,, | Performed by: PODIATRIST

## 2019-11-13 PROCEDURE — 3008F BODY MASS INDEX DOCD: CPT | Mod: CPTII,S$GLB,, | Performed by: PODIATRIST

## 2019-11-13 PROCEDURE — 3044F HG A1C LEVEL LT 7.0%: CPT | Mod: CPTII,S$GLB,, | Performed by: PODIATRIST

## 2019-11-13 PROCEDURE — 99213 PR OFFICE/OUTPT VISIT, EST, LEVL III, 20-29 MIN: ICD-10-PCS | Mod: S$GLB,,, | Performed by: PODIATRIST

## 2019-11-13 PROCEDURE — 99999 PR PBB SHADOW E&M-EST. PATIENT-LVL IV: ICD-10-PCS | Mod: PBBFAC,,, | Performed by: PODIATRIST

## 2019-11-13 PROCEDURE — 3008F PR BODY MASS INDEX (BMI) DOCUMENTED: ICD-10-PCS | Mod: CPTII,S$GLB,, | Performed by: PODIATRIST

## 2019-11-13 PROCEDURE — 99999 PR PBB SHADOW E&M-EST. PATIENT-LVL IV: CPT | Mod: PBBFAC,,, | Performed by: PODIATRIST

## 2019-11-13 NOTE — PROGRESS NOTES
Subjective:      Patient ID: Malathi Martinez is a 59 y.o. female.    Chief Complaint: Follow-up (DR Dave 5.7 11/2019 ); Diabetes Mellitus; and Diabetic Foot Exam    Malathi is a 59 y.o. female who presents to the clinic for routine evaluation and treatment of diabetic feet. Malathi has a past medical history of Abnormal stress test, Bronchitis, COPD (chronic obstructive pulmonary disease), Diabetes mellitus, type 2, HLD (hyperlipidemia), endoscopy, Hypertension, Mental disorder, Pulmonary nodule (4/9/2018), SOB (shortness of breath), and Tobacco abuse (12/26/2012). Patient denies any major complaints with today's exam.  Notes occasional neuropathy pain in her feet, however, states this resolves quickly and happens infrequently.  Also, continues to note callus build up to bilateral foot.  Denies this being a source of pain.  Occasionally applies moisturizer to these areas.  Continues with tight control over her blood glucose.   Denies any additional pedal complaints.       PCP: Comfort Dave MD    Date Last Seen by PCP: 11/19    Hemoglobin A1C   Date Value Ref Range Status   11/02/2019 5.7 (H) 4.0 - 5.6 % Final     Comment:     ADA Screening Guidelines:  5.7-6.4%  Consistent with prediabetes  >or=6.5%  Consistent with diabetes  High levels of fetal hemoglobin interfere with the HbA1C  assay. Heterozygous hemoglobin variants (HbS, HgC, etc)do  not significantly interfere with this assay.   However, presence of multiple variants may affect accuracy.     04/27/2019 5.6 4.0 - 5.6 % Final     Comment:     ADA Screening Guidelines:  5.7-6.4%  Consistent with prediabetes  >or=6.5%  Consistent with diabetes  High levels of fetal hemoglobin interfere with the HbA1C  assay. Heterozygous hemoglobin variants (HbS, HgC, etc)do  not significantly interfere with this assay.   However, presence of multiple variants may affect accuracy.     01/25/2019 5.7 (H) 4.0 - 5.6 % Final     Comment:     ADA Screening  Guidelines:  5.7-6.4%  Consistent with prediabetes  >or=6.5%  Consistent with diabetes  High levels of fetal hemoglobin interfere with the HbA1C  assay. Heterozygous hemoglobin variants (HbS, HgC, etc)do  not significantly interfere with this assay.   However, presence of multiple variants may affect accuracy.             Past Medical History:   Diagnosis Date    Abnormal stress test     Bronchitis     COPD (chronic obstructive pulmonary disease)     Diabetes mellitus, type 2     HLD (hyperlipidemia)     Hx of endoscopy     Hypertension     Mental disorder     Pulmonary nodule 4/9/2018    SOB (shortness of breath)     resolved    Tobacco abuse 12/26/2012       Past Surgical History:   Procedure Laterality Date    APPENDECTOMY      COLONOSCOPY N/A 1/18/2019    Procedure: COLONOSCOPY;  Surgeon: Lai Cardoso MD;  Location: [facility];  Service: Endoscopy;  Laterality: N/A;    TONSILLECTOMY         Family History   Problem Relation Age of Onset    Heart disease Mother         MVP    Cataracts Mother     Heart disease Father 65        MI and PVD    Throat cancer Father     Heart disease Sister         mvp    Esophageal cancer Brother     No Known Problems Sister     No Known Problems Sister     Hyperlipidemia Paternal Grandmother        Social History     Socioeconomic History    Marital status:      Spouse name: Not on file    Number of children: Not on file    Years of education: Not on file    Highest education level: Not on file   Occupational History    Not on file   Social Needs    Financial resource strain: Not very hard    Food insecurity:     Worry: Never true     Inability: Never true    Transportation needs:     Medical: No     Non-medical: No   Tobacco Use    Smoking status: Current Every Day Smoker     Packs/day: 1.00     Years: 41.00     Pack years: 41.00    Smokeless tobacco: Never Used   Substance and Sexual Activity    Alcohol use: No     Frequency: Never      Drinks per session: Patient refused     Binge frequency: Never    Drug use: No    Sexual activity: Yes     Partners: Male     Birth control/protection: Post-menopausal   Lifestyle    Physical activity:     Days per week: 5 days     Minutes per session: 40 min    Stress: To some extent   Relationships    Social connections:     Talks on phone: More than three times a week     Gets together: Once a week     Attends Synagogue service: Not on file     Active member of club or organization: No     Attends meetings of clubs or organizations: Never     Relationship status:    Other Topics Concern    Are you pregnant or think you may be? Not Asked    Breast-feeding Not Asked   Social History Narrative    Not on file       Current Outpatient Medications   Medication Sig Dispense Refill    albuterol (PROAIR HFA) 90 mcg/actuation inhaler INHALE 2 PUFFS INTO THE LUNGS EVERY 4 (FOUR) HOURS AS NEEDED FOR WHEEZING. 8.5 Inhaler 3    ALPRAZolam (XANAX) 0.25 MG tablet TAKE 1 TABLET BY MOUTH TWICE A DAY AS NEEDED FOR ANXIETY 60 tablet 2    atorvastatin (LIPITOR) 20 MG tablet Take 1 tablet (20 mg total) by mouth once daily. 90 tablet 3    azelastine (ASTELIN) 137 mcg (0.1 %) nasal spray 1 spray (137 mcg total) by Nasal route 2 (two) times daily. 30 mL 0    blood sugar diagnostic (CONTOUR TEST STRIPS MISC) Contour Test Strips      blood sugar diagnostic Strp 1 strip by Misc.(Non-Drug; Combo Route) route once daily. 50 each 6    blood-glucose meter kit Use as instructed 1 each 0    buPROPion (WELLBUTRIN XL) 150 MG TB24 tablet Take 1 tablet (150 mg total) by mouth once daily. 90 tablet 1    cholecalciferol, vitamin D3, 2,000 unit Cap Take 1 capsule by mouth once daily.      cyclobenzaprine (FLEXERIL) 5 MG tablet Take 1 tablet (5 mg total) by mouth nightly as needed for Muscle spasms. 90 tablet 1    desloratadine (CLARINEX) 5 mg tablet Take 1 tablet (5 mg total) by mouth once daily. 90 tablet 3    escitalopram  oxalate (LEXAPRO) 10 MG tablet Take 1 tablet (10 mg total) by mouth once daily. 90 tablet 1    furosemide (LASIX) 20 MG tablet Take 1 tablet (20 mg total) by mouth once daily. 90 tablet 1    ibuprofen (ADVIL,MOTRIN) 800 MG tablet Take 800 mg by mouth 3 (three) times daily as needed.  0    indomethacin (INDOCIN) 25 MG capsule Take 1 capsule (25 mg total) by mouth 2 (two) times daily with meals. 30 capsule 2    lancets (MICROLET LANCET MISC) Microlet Lancet      lisinopril (PRINIVIL,ZESTRIL) 2.5 MG tablet TAKE 1 TABLET BY MOUTH ONCE DAILY. 90 tablet 1    metFORMIN (GLUCOPHAGE-XR) 500 MG 24 hr tablet TAKE 1 TABLET BY MOUTH TWICE A DAY WITH MEALS 180 tablet 1    omeprazole (PRILOSEC) 40 MG capsule Take 1 capsule (40 mg total) by mouth once daily. 90 capsule 1    ranitidine (ZANTAC) 300 MG tablet TAKE 1 TABLET BY MOUTH IN THE EVENING 30 tablet 2     No current facility-administered medications for this visit.        Review of patient's allergies indicates:   Allergen Reactions    Penicillins          Review of Systems   Constitution: Negative for chills and fever.   Cardiovascular: Negative for claudication and leg swelling.   Skin: Negative for nail changes.   Musculoskeletal: Negative for joint pain and joint swelling.   Neurological: Positive for paresthesias. Negative for numbness.   Psychiatric/Behavioral: Negative for altered mental status.           Objective:      Physical Exam   Constitutional: She is oriented to person, place, and time. She appears well-developed and well-nourished. No distress.   Cardiovascular:   Pulses:       Dorsalis pedis pulses are 2+ on the right side, and 2+ on the left side.        Posterior tibial pulses are 2+ on the right side, and 2+ on the left side.   CFT <3 seconds bilateral.  Pedal hair growth present bilateral.   No varicosities noted bilateral.  No bilateral lower extremity edema.  Toes are warm to touch bilateral.     Musculoskeletal: She exhibits no edema or  tenderness.   Muscle strength 5/5 in all muscle groups bilateral.  No tenderness nor crepitation with ROM of foot/ankle joints bilateral.  No tenderness with palpation of bilateral foot and ankle.  Bilateral pes planus foot type.  Bilateral semi-reducible contracture of toes 2-5.   Neurological: She is alert and oriented to person, place, and time. She has normal strength. No sensory deficit.   Protective sensation per [place] monofilament intact bilateral.    Vibratory sensation intact bilateral.    Light touch intact bilateral.       Skin: Skin is warm, dry and intact. Capillary refill takes less than 2 seconds. Lesion noted. No abrasion, no burn, no ecchymosis, no laceration and no rash noted. She is not diaphoretic. No erythema. No pallor.   Pedal skin has normal turgor, temperature, and texture bilateral. Toenails x 10 appear normotrophic.  Porokeratoses noted to the plantar aspect of bilateral styloid process.   Examination of the skin reveals no evidence of significant maceration, rashes, open lesions, suspicious appearing nevi or other concerning lesions.              Assessment:       Encounter Diagnoses   Name Primary?    Type 2 diabetes mellitus without complication, without long-term current use of insulin Yes    Porokeratosis          Plan:       Malathi was seen today for follow-up, diabetes mellitus and diabetic foot exam.    Diagnoses and all orders for this visit:    Type 2 diabetes mellitus without complication, without long-term current use of insulin    Porokeratosis      I counseled the patient on her conditions, their implications and medical management.    Shoe inspection. Diabetic Foot Education. Patient reminded of the importance of good nutrition and blood sugar control to help prevent podiatric complications of diabetes. Patient instructed on proper foot hygeine. We discussed wearing proper shoe gear, daily foot inspections, never walking without protective shoe gear, never  putting sharp instruments to feet    Advised to regularly apply Amlactin to the site of callus build up daily.    Patient instructed to inspect her feet, wear protective shoe gear when ambulatory, moisturizer to maintain skin integrity and follow in this office in approximately 12 months, sooner p.r.n.    Follow up in about 1 year (around 11/13/2020).    Tonio Melo DPM

## 2019-11-14 ENCOUNTER — PATIENT MESSAGE (OUTPATIENT)
Dept: FAMILY MEDICINE | Facility: CLINIC | Age: 59
End: 2019-11-14

## 2019-11-15 ENCOUNTER — TELEPHONE (OUTPATIENT)
Dept: SMOKING CESSATION | Facility: CLINIC | Age: 59
End: 2019-11-15

## 2019-11-15 NOTE — TELEPHONE ENCOUNTER
Push fluids like gatorade. Ok to use immodium to slow stooling. Needs to be seen if febrile (>100.4) or if unable to maintain po intake.

## 2019-11-17 ENCOUNTER — OFFICE VISIT (OUTPATIENT)
Dept: URGENT CARE | Facility: CLINIC | Age: 59
End: 2019-11-17
Payer: COMMERCIAL

## 2019-11-17 VITALS
HEIGHT: 70 IN | OXYGEN SATURATION: 99 % | TEMPERATURE: 97 F | SYSTOLIC BLOOD PRESSURE: 143 MMHG | WEIGHT: 193 LBS | HEART RATE: 68 BPM | BODY MASS INDEX: 27.63 KG/M2 | RESPIRATION RATE: 16 BRPM | DIASTOLIC BLOOD PRESSURE: 78 MMHG

## 2019-11-17 DIAGNOSIS — R11.0 NAUSEA: ICD-10-CM

## 2019-11-17 DIAGNOSIS — R10.9 ABDOMINAL CRAMPING: ICD-10-CM

## 2019-11-17 DIAGNOSIS — R19.7 DIARRHEA, UNSPECIFIED TYPE: Primary | ICD-10-CM

## 2019-11-17 PROCEDURE — 99214 OFFICE O/P EST MOD 30 MIN: CPT | Mod: S$GLB,,, | Performed by: NURSE PRACTITIONER

## 2019-11-17 PROCEDURE — 3008F BODY MASS INDEX DOCD: CPT | Mod: CPTII,S$GLB,, | Performed by: NURSE PRACTITIONER

## 2019-11-17 PROCEDURE — 3008F PR BODY MASS INDEX (BMI) DOCUMENTED: ICD-10-PCS | Mod: CPTII,S$GLB,, | Performed by: NURSE PRACTITIONER

## 2019-11-17 PROCEDURE — 99214 PR OFFICE/OUTPT VISIT, EST, LEVL IV, 30-39 MIN: ICD-10-PCS | Mod: S$GLB,,, | Performed by: NURSE PRACTITIONER

## 2019-11-17 RX ORDER — DICYCLOMINE HYDROCHLORIDE 20 MG/1
20 TABLET ORAL EVERY 6 HOURS
Qty: 120 TABLET | Refills: 0 | Status: SHIPPED | OUTPATIENT
Start: 2019-11-17 | End: 2019-12-17

## 2019-11-17 RX ORDER — PROMETHAZINE HYDROCHLORIDE 25 MG/1
25 TABLET ORAL EVERY 6 HOURS PRN
Qty: 10 TABLET | Refills: 0 | Status: SHIPPED | OUTPATIENT
Start: 2019-11-17 | End: 2020-05-29

## 2019-11-17 RX ORDER — ONDANSETRON 4 MG/1
4 TABLET, ORALLY DISINTEGRATING ORAL EVERY 8 HOURS PRN
Qty: 30 TABLET | Refills: 0 | Status: SHIPPED | OUTPATIENT
Start: 2019-11-17 | End: 2019-11-27

## 2019-11-17 NOTE — LETTER
November 17, 2019      [facility] [place] [address], [address]  [facility] [address]  Phone: [phone]  Fax: [phone]       Patient: Malathi Martinez   YOB: 1960  Date of Visit: 11/17/2019    To Whom It May Concern:    Dwight Martinez  was at [facility] on 11/17/2019. She may return to work/school on 11/20/2019 with no restrictions. If you have any questions or concerns, or if I can be of further assistance, please do not hesitate to contact me.    Sincerely,        Kim Cortez NP

## 2019-11-17 NOTE — PROGRESS NOTES
"Subjective:       Patient ID: Malathi Martinez is a 59 y.o. female.    Vitals:  height is 5' 10" (1.778 m) and weight is 87.5 kg (193 lb). Her temperature is 97.1 °F (36.2 °C). Her blood pressure is 143/78 (abnormal) and her pulse is 68. Her respiration is 16 and oxygen saturation is 99%.     Chief Complaint: Nausea    smh dm2, hld, tobacco abuse, gerd, miky, pul nodule. Zantac, prilosec for gerd.  Nausea/vomiting on day 1, now with nausea and diarrhea x 4 days. Reports non bloody. Denies sharp pain or epigastric pain. Denies recent travel or raw/suspicious food prior to symptoms starting. Denies lightheaded, dizzy. Denies recent abx use.     Nausea   This is a new problem. The current episode started in the past 7 days. The problem occurs constantly. The problem has been unchanged. Associated symptoms include nausea. Pertinent negatives include no chills, congestion, coughing, diaphoresis, fatigue, fever, myalgias, rash, sore throat or vomiting. Treatments tried: pepto bismol. The treatment provided mild relief.       Constitution: Negative for chills, sweating, fatigue and fever.   HENT: Negative for ear pain, congestion, sinus pain, sinus pressure, sore throat and voice change.    Neck: Negative for painful lymph nodes.   Eyes: Negative for eye redness.   Respiratory: Negative for chest tightness, cough, sputum production, bloody sputum, COPD, shortness of breath, stridor, wheezing and asthma.    Gastrointestinal: Positive for nausea and diarrhea. Negative for vomiting.   Musculoskeletal: Negative for muscle ache.   Skin: Negative for rash.   Allergic/Immunologic: Negative for seasonal allergies and asthma.   Hematologic/Lymphatic: Negative for swollen lymph nodes.       Objective:      Physical Exam   Constitutional: She is oriented to person, place, and time. She appears well-developed and well-nourished.  Non-toxic appearance. She does not have a sickly appearance. She does not appear ill. No distress. "   HENT:   Head: Normocephalic and atraumatic.   Right Ear: External ear normal.   Left Ear: External ear normal.   Nose: Nose normal.   Mouth/Throat: Mucous membranes are normal.   Eyes: Conjunctivae and lids are normal.   Neck: Trachea normal and full passive range of motion without pain. Neck supple.   Cardiovascular: Normal rate, regular rhythm and normal heart sounds.   Pulmonary/Chest: Effort normal and breath sounds normal. No respiratory distress.   Abdominal: Soft. Normal appearance and bowel sounds are normal. She exhibits no distension, no abdominal bruit, no pulsatile midline mass and no mass. There is generalized tenderness (mild). There is no rigidity, no rebound, no guarding, no CVA tenderness, no tenderness at McBurney's point and negative Monique's sign.   Musculoskeletal: Normal range of motion. She exhibits no edema.   Neurological: She is alert and oriented to person, place, and time. She has normal strength.   Skin: Skin is warm, dry, intact, not diaphoretic and not pale.   Psychiatric: She has a normal mood and affect. Her speech is normal and behavior is normal. Judgment and thought content normal. Cognition and memory are normal.   Nursing note and vitals reviewed.        Assessment:       1. Diarrhea, unspecified type    2. Nausea    3. Abdominal cramping        Plan:     [facility]u to [facility] or [facility]cp if diarrhea persistent >7 days for possible stool studies.     Diarrhea, unspecified type    Nausea  -     ondansetron (ZOFRAN-ODT) 4 MG TbDL; Take 1 tablet (4 mg total) by mouth every 8 (eight) hours as needed.  Dispense: 30 tablet; Refill: 0  -     promethazine (PHENERGAN) 25 MG tablet; Take 1 tablet (25 mg total) by mouth every 6 (six) hours as needed for Nausea.  Dispense: 10 tablet; Refill: 0    Abdominal cramping  -     dicyclomine (BENTYL) 20 mg tablet; Take 1 tablet (20 mg total) by mouth every 6 (six) hours.  Dispense: 120 tablet; Refill: 0      Patient Instructions   Follow up with your doctor in a  few days.  Return to the urgent care or go to the ER if symptoms get worse.    Continue probiotics.  Oral rehydration with electrolytes.  Imodium over the counter as directed for 1-2 days.  Follow up by Tuesday-wed with primary care or urgent care if diarrhea persists for possible stool studies.    zofran or phenergan for nausea- not both. Phenergan will cause drowsiness.  Bentyl for stomach cramping.      Uncertain Causes of Diarrhea (Adult)    Diarrhea is when stools are loose and watery. This can be caused by:  · Viral infections  · Bacterial infections  · Food poisoning  · Parasites  · Irritable bowel syndrome (IBS)  · Inflammatory bowel diseases such as ulcerative colitis, Crohn's disease, and celiac disease  · Food intolerance, such as to lactose, the sugar found in milk and milk products  · Reaction to medicines like antibiotics, laxatives, cancer drugs, and antacids  Along with diarrhea, you may also have:  · Abdominal pain and cramping  · Nausea and vomiting  · Loss of bowel control  · Fever and chills  · Bloody stools  In some cases, antibiotics may help to treat diarrhea. You may have a stool sample test. This is done to see what is causing your diarrhea, and if antibiotics will help treat it. The results of a stool sample test may take up to 2 days. The healthcare provider may not give you antibiotics until he or she has the stool test results.  Diarrhea can cause dehydration. This is the loss of too much water and other fluids from the body. When this occurs, body fluid must be replaced. This can be done with oral rehydration solutions. Oral rehydration solutions are available at drugstores and grocery stores without a prescription.  Home care  Follow all instructions given by your healthcare provider. Rest at home for the next 24 hours, or until you feel better. Avoid caffeine, tobacco, and alcohol. These can make diarrhea, cramping, and pain worse.  If taking medicines:  · Dont take over-the-counter  diarrhea or nausea medicines unless your healthcare provider tells you to.  · You may use acetaminophen or NSAID medicines like ibuprofen or naproxen to reduce pain and fever. Dont use these if you have chronic liver or kidney disease, or ever had a stomach ulcer or gastrointestinal bleeding. Don't use NSAID medicines if you are already taking one for another condition (like arthritis) or are on daily aspirin therapy (such as for heart disease or after a stroke). Talk with your healthcare provider first.  · If antibiotics were prescribed, be sure you take them until they are finished. Dont stop taking them even when you feel better. Antibiotics must be taken as a full course.  To prevent the spread of illness:  · Remember that washing with soap and water and using alcohol-based  is the best way to prevent the spread of infection.  · Clean the toilet after each use.  · Wash your hands before eating.  · Wash your hands before and after preparing food. Keep in mind that people with diarrhea or vomiting should not prepare food for others.  · Wash your hands after using cutting boards, countertops, and knives that have been in contact with raw foods.  · Wash and then peel fruits and vegetables.  · Keep uncooked meats away from cooked and ready-to-eat foods.  · Use a food thermometer when cooking. Cook poultry to at least 165°F (74°C). Cook ground meat (beef, veal, pork, lamb) to at least 160°F (71°C). Cook fresh beef, veal, lamb, and pork to at least 145°F (63°C).  · Dont eat raw or undercooked eggs (poached or hilary side up), poultry, meat, or unpasteurized milk and juices.  Food and drinks  The main goal while treating vomiting or diarrhea is to prevent dehydration. This is done by taking small amounts of liquids often.  · Keep in mind that liquids are more important than food right now.  · Drink only small amounts of liquids at a time.  · Dont force yourself to eat, especially if you are having cramping,  vomiting, or diarrhea. Dont eat large amounts at a time, even if you are hungry.  · If you eat, avoid fatty, greasy, spicy, or fried foods.  · Dont eat dairy foods or drink milk if you have diarrhea. These can make diarrhea worse.  During the first 24 hours you can try:  · Oral rehydration solutions. Do not use sports drinks. They have too much sugar and not enough electrolytes.  · Soft drinks without caffeine  · Ginger ale  · Water (plain or flavored)  · Decaf tea or coffee  · Clear broth, consommé, or bouillon  · Gelatin, popsicles, or frozen fruit juice bars  The second 24 hours, if you are feeling better, you can add:  · Hot cereal, plain toast, bread, rolls, or crackers  · Plain noodles, rice, mashed potatoes, chicken noodle soup, or rice soup  · Unsweetened canned fruit (no pineapple)  · Bananas  As you recover:  · Limit fat intake to less than 15 grams per day. Dont eat margarine, butter, oils, mayonnaise, sauces, gravies, fried foods, peanut butter, meat, poultry, or fish.  · Limit fiber. Dont eat raw or cooked vegetables, fresh fruits except bananas, or bran cereals.  · Limit caffeine and chocolate.  · Limit dairy.  · Dont use spices or seasonings except salt.  · Go back to your normal diet over time, as you feel better and your symptoms improve.  · If the symptoms come back, go back to a simple diet or clear liquids.  Follow-up care  Follow up with your healthcare provider, or as advised. If a stool sample was taken or cultures were done, call the healthcare provider for the results as instructed.  Call 911  Call 911 if you have any of these symptoms:  · Trouble breathing  · Confusion  · Extreme drowsiness or trouble walking  · Loss of consciousness  · Rapid heart rate  · Chest pain  · Stiff neck  · Seizure  When to seek medical advice  Call your healthcare provider right away if any of these occur:  · Abdominal pain that gets worse  · Constant lower right abdominal pain  · Continued vomiting and  inability to keep liquids down  · Diarrhea more than 5 times a day  · Blood in vomit or stool  · Dark urine or no urine for 8 hours, dry mouth and tongue, tiredness, weakness, or dizziness  · Drowsiness  · New rash  · You dont get better in 2 to 3 days  · Fever of 100.4°F (38°C) or higher that doesnt get lower with medicine  Date Last Reviewed: 1/3/2016  © 9222-4701 [employer]. [address] [place] [address], [place] [address]. All rights reserved. This information is not intended as a substitute for professional medical care. Always follow your healthcare professional's instructions.

## 2019-11-17 NOTE — PATIENT INSTRUCTIONS
Follow up with your doctor in a few days.  Return to the urgent care or go to the ER if symptoms get worse.    Continue probiotics.  Oral rehydration with electrolytes.  Imodium over the counter as directed for 1-2 days.  Follow up by Tuesday-wed with primary care or urgent care if diarrhea persists for possible stool studies.    zofran or phenergan for nausea- not both. Phenergan will cause drowsiness.  Bentyl for stomach cramping.      Uncertain Causes of Diarrhea (Adult)    Diarrhea is when stools are loose and watery. This can be caused by:  · Viral infections  · Bacterial infections  · Food poisoning  · Parasites  · Irritable bowel syndrome (IBS)  · Inflammatory bowel diseases such as ulcerative colitis, Crohn's disease, and celiac disease  · Food intolerance, such as to lactose, the sugar found in milk and milk products  · Reaction to medicines like antibiotics, laxatives, cancer drugs, and antacids  Along with diarrhea, you may also have:  · Abdominal pain and cramping  · Nausea and vomiting  · Loss of bowel control  · Fever and chills  · Bloody stools  In some cases, antibiotics may help to treat diarrhea. You may have a stool sample test. This is done to see what is causing your diarrhea, and if antibiotics will help treat it. The results of a stool sample test may take up to 2 days. The healthcare provider may not give you antibiotics until he or she has the stool test results.  Diarrhea can cause dehydration. This is the loss of too much water and other fluids from the body. When this occurs, body fluid must be replaced. This can be done with oral rehydration solutions. Oral rehydration solutions are available at drugstores and grocery stores without a prescription.  Home care  Follow all instructions given by your healthcare provider. Rest at home for the next 24 hours, or until you feel better. Avoid caffeine, tobacco, and alcohol. These can make diarrhea, cramping, and pain worse.  If taking  medicines:  · Dont take over-the-counter diarrhea or nausea medicines unless your healthcare provider tells you to.  · You may use acetaminophen or NSAID medicines like ibuprofen or naproxen to reduce pain and fever. Dont use these if you have chronic liver or kidney disease, or ever had a stomach ulcer or gastrointestinal bleeding. Don't use NSAID medicines if you are already taking one for another condition (like arthritis) or are on daily aspirin therapy (such as for heart disease or after a stroke). Talk with your healthcare provider first.  · If antibiotics were prescribed, be sure you take them until they are finished. Dont stop taking them even when you feel better. Antibiotics must be taken as a full course.  To prevent the spread of illness:  · Remember that washing with soap and water and using alcohol-based  is the best way to prevent the spread of infection.  · Clean the toilet after each use.  · Wash your hands before eating.  · Wash your hands before and after preparing food. Keep in mind that people with diarrhea or vomiting should not prepare food for others.  · Wash your hands after using cutting boards, countertops, and knives that have been in contact with raw foods.  · Wash and then peel fruits and vegetables.  · Keep uncooked meats away from cooked and ready-to-eat foods.  · Use a food thermometer when cooking. Cook poultry to at least 165°F (74°C). Cook ground meat (beef, veal, pork, lamb) to at least 160°F (71°C). Cook fresh beef, veal, lamb, and pork to at least 145°F (63°C).  · Dont eat raw or undercooked eggs (poached or hilary side up), poultry, meat, or unpasteurized milk and juices.  Food and drinks  The main goal while treating vomiting or diarrhea is to prevent dehydration. This is done by taking small amounts of liquids often.  · Keep in mind that liquids are more important than food right now.  · Drink only small amounts of liquids at a time.  · Dont force yourself to  eat, especially if you are having cramping, vomiting, or diarrhea. Dont eat large amounts at a time, even if you are hungry.  · If you eat, avoid fatty, greasy, spicy, or fried foods.  · Dont eat dairy foods or drink milk if you have diarrhea. These can make diarrhea worse.  During the first 24 hours you can try:  · Oral rehydration solutions. Do not use sports drinks. They have too much sugar and not enough electrolytes.  · Soft drinks without caffeine  · Ginger ale  · Water (plain or flavored)  · Decaf tea or coffee  · Clear broth, consommé, or bouillon  · Gelatin, popsicles, or frozen fruit juice bars  The second 24 hours, if you are feeling better, you can add:  · Hot cereal, plain toast, bread, rolls, or crackers  · Plain noodles, rice, mashed potatoes, chicken noodle soup, or rice soup  · Unsweetened canned fruit (no pineapple)  · Bananas  As you recover:  · Limit fat intake to less than 15 grams per day. Dont eat margarine, butter, oils, mayonnaise, sauces, gravies, fried foods, peanut butter, meat, poultry, or fish.  · Limit fiber. Dont eat raw or cooked vegetables, fresh fruits except bananas, or bran cereals.  · Limit caffeine and chocolate.  · Limit dairy.  · Dont use spices or seasonings except salt.  · Go back to your normal diet over time, as you feel better and your symptoms improve.  · If the symptoms come back, go back to a simple diet or clear liquids.  Follow-up care  Follow up with your healthcare provider, or as advised. If a stool sample was taken or cultures were done, call the healthcare provider for the results as instructed.  Call 911  Call 911 if you have any of these symptoms:  · Trouble breathing  · Confusion  · Extreme drowsiness or trouble walking  · Loss of consciousness  · Rapid heart rate  · Chest pain  · Stiff neck  · Seizure  When to seek medical advice  Call your healthcare provider right away if any of these occur:  · Abdominal pain that gets worse  · Constant lower right  abdominal pain  · Continued vomiting and inability to keep liquids down  · Diarrhea more than 5 times a day  · Blood in vomit or stool  · Dark urine or no urine for 8 hours, dry mouth and tongue, tiredness, weakness, or dizziness  · Drowsiness  · New rash  · You dont get better in 2 to 3 days  · Fever of 100.4°F (38°C) or higher that doesnt get lower with medicine  Date Last Reviewed: 1/3/2016  © 5921-7707 [employer]. [address] [place] [address], [place] [address]. All rights reserved. This information is not intended as a substitute for professional medical care. Always follow your healthcare professional's instructions.

## 2019-11-20 ENCOUNTER — TELEPHONE (OUTPATIENT)
Dept: URGENT CARE | Facility: CLINIC | Age: 59
End: 2019-11-20

## 2019-11-21 ENCOUNTER — PATIENT OUTREACH (OUTPATIENT)
Dept: OTHER | Facility: OTHER | Age: 59
End: 2019-11-21

## 2019-12-05 ENCOUNTER — TELEPHONE (OUTPATIENT)
Dept: SMOKING CESSATION | Facility: CLINIC | Age: 59
End: 2019-12-05

## 2019-12-23 ENCOUNTER — OFFICE VISIT (OUTPATIENT)
Dept: URGENT CARE | Facility: CLINIC | Age: 59
End: 2019-12-23
Payer: COMMERCIAL

## 2019-12-23 VITALS
WEIGHT: 193 LBS | HEART RATE: 90 BPM | OXYGEN SATURATION: 97 % | RESPIRATION RATE: 16 BRPM | HEIGHT: 70 IN | BODY MASS INDEX: 27.63 KG/M2 | SYSTOLIC BLOOD PRESSURE: 142 MMHG | DIASTOLIC BLOOD PRESSURE: 77 MMHG | TEMPERATURE: 99 F

## 2019-12-23 DIAGNOSIS — J06.9 VIRAL URI WITH COUGH: Primary | ICD-10-CM

## 2019-12-23 PROCEDURE — 99214 OFFICE O/P EST MOD 30 MIN: CPT | Mod: S$GLB,,, | Performed by: PHYSICIAN ASSISTANT

## 2019-12-23 PROCEDURE — 99214 PR OFFICE/OUTPT VISIT, EST, LEVL IV, 30-39 MIN: ICD-10-PCS | Mod: S$GLB,,, | Performed by: PHYSICIAN ASSISTANT

## 2019-12-23 RX ORDER — PROMETHAZINE HYDROCHLORIDE AND DEXTROMETHORPHAN HYDROBROMIDE 6.25; 15 MG/5ML; MG/5ML
5 SYRUP ORAL NIGHTLY PRN
Qty: 118 ML | Refills: 0 | Status: SHIPPED | OUTPATIENT
Start: 2019-12-23 | End: 2020-01-02

## 2019-12-23 RX ORDER — AZELASTINE 1 MG/ML
1 SPRAY, METERED NASAL 2 TIMES DAILY
Qty: 30 ML | Refills: 0 | Status: SHIPPED | OUTPATIENT
Start: 2019-12-23 | End: 2020-01-30

## 2019-12-23 NOTE — PROGRESS NOTES
"Subjective:       Patient ID: Malathi Martinez is a 59 y.o. female.    Vitals:  height is 5' 10" (1.778 m) and weight is 87.5 kg (193 lb). Her temperature is 99.1 °F (37.3 °C). Her blood pressure is 142/77 (abnormal) and her pulse is 90. Her respiration is 16 and oxygen saturation is 97%.     Chief Complaint: Cough    Pt c/o sinus symptoms x 2 days.     Cough   This is a new problem. The current episode started yesterday. The problem has been unchanged. The cough is non-productive. Pertinent negatives include no chills, ear pain, eye redness, fever, hemoptysis, myalgias, rash, sore throat, shortness of breath or wheezing. She has tried OTC cough suppressant for the symptoms. The treatment provided mild relief.       Constitution: Negative for chills, sweating, fatigue and fever.   HENT: Positive for congestion. Negative for ear pain, sinus pain, sinus pressure, sore throat and voice change.    Neck: Negative for painful lymph nodes.   Eyes: Negative for eye redness.   Respiratory: Positive for cough and sputum production. Negative for chest tightness, bloody sputum, COPD, shortness of breath, stridor, wheezing and asthma.    Gastrointestinal: Negative for nausea and vomiting.   Musculoskeletal: Negative for muscle ache.   Skin: Negative for rash.   Allergic/Immunologic: Negative for seasonal allergies and asthma.   Hematologic/Lymphatic: Negative for swollen lymph nodes.       Objective:      Physical Exam   Constitutional: She is oriented to person, place, and time. She appears well-developed and well-nourished. She is cooperative.  Non-toxic appearance. She does not have a sickly appearance. She does not appear ill. No distress.   HENT:   Head: Normocephalic and atraumatic.   Right Ear: Hearing, tympanic membrane, external ear and ear canal normal.   Left Ear: Hearing, tympanic membrane, external ear and ear canal normal.   Nose: Rhinorrhea present. No mucosal edema or nasal deformity. No epistaxis. Right " sinus exhibits no maxillary sinus tenderness and no frontal sinus tenderness. Left sinus exhibits no maxillary sinus tenderness and no frontal sinus tenderness.   Mouth/Throat: Uvula is midline, oropharynx is clear and moist and mucous membranes are normal. No trismus in the jaw. Normal dentition. No uvula swelling. Cobblestoning present. No oropharyngeal exudate, posterior oropharyngeal edema or posterior oropharyngeal erythema.   Eyes: Conjunctivae and lids are normal. No scleral icterus.   Neck: Trachea normal, full passive range of motion without pain and phonation normal. Neck supple. No neck rigidity. No edema and no erythema present.   Cardiovascular: Normal rate, regular rhythm, normal heart sounds, intact distal pulses and normal pulses.   Pulmonary/Chest: Effort normal and breath sounds normal. No respiratory distress. She has no decreased breath sounds. She has no rhonchi.   Abdominal: Normal appearance.   Musculoskeletal: Normal range of motion. She exhibits no edema or deformity.   Neurological: She is alert and oriented to person, place, and time. She exhibits normal muscle tone. Coordination normal.   Skin: Skin is warm, dry, intact, not diaphoretic and not pale.   Psychiatric: She has a normal mood and affect. Her speech is normal and behavior is normal. Judgment and thought content normal. Cognition and memory are normal.   Nursing note and vitals reviewed.        Assessment:       1. Viral URI with cough        Plan:         Viral URI with cough    Other orders  -     azelastine (ASTELIN) 137 mcg (0.1 %) nasal spray; 1 spray (137 mcg total) by Nasal route 2 (two) times daily.  Dispense: 30 mL; Refill: 0  -     promethazine-dextromethorphan (PROMETHAZINE-DM) 6.25-15 mg/5 mL Syrp; Take 5 mLs by mouth nightly as needed.  Dispense: 118 mL; Refill: 0     Patient has inhaler at home.  She has not been using this.  Discussed may use this if she hears wheezing or has any difficulty breathing.  She states  Tessalon Perles do not work for her.  Will prescribed promethazine.  Advised to use this with caution as a base cause sedation.  She cannot use Flonase, will try Astelin nasal spray and advised copious use of saline nasal spray.    Symptomatic treatment:    Alternate Tylenol and Ibuprofen every 3 hrs  salt water gargles to soothe throat  Honey/lemon water to soothe throat  Cold-eeze helps to reduce the duration of URI symptoms  Elderberry to reduce duration of URI symptoms  Cepachol helps to numb the discomfort in throat  Nasal saline spray reduces inflammation and dryness  Warm face compresses/hot showers as often as you can to open sinuses   Vicks vapor rub at night  Simple foods like chicken noodle soup help hydrate  Zyrtec/Claritin during the day and Benadryl at night may help if allergy component   Zantac will help if there is reflux from the post nasal drip  Rest as much as you can  Your symptoms will likely last 7-10 days, maybe longer depending on how it affects your body.  You are contagious 7-10, so minimize contact with others to reduce the spread to others and stay home from work or school as we discussed. Dehydration is preventable but is one of the main reasons why you will feel so badly. Drink pedialyte, gatorade or propel. Stay hydrated.  Antibiotics are not needed unless a complication(such as Otitis Media, Bacterial sinus infection or pneumonia). Taking antibiotics for Flu/Cold is not supported by evidencebased medicine and can expose you to unnecessary side effects of the medication, such as anaphylaxis.   If you experience any:  Chest pain, shortness of breath, wheezing or difficulty breathing  Severe headache, face, neck or ear pain  New rash  Fever over 101.5º F (38.6 C) for more than three days  Confusion, behavior change or seizure  Severe weakness or dizziness  Go to ER

## 2019-12-23 NOTE — PATIENT INSTRUCTIONS
Symptomatic treatment:    Alternate Tylenol and Ibuprofen every 3 hrs  salt water gargles to soothe throat  Honey/lemon water to soothe throat  Cold-eeze helps to reduce the duration of URI symptoms  Elderberry to reduce duration of URI symptoms  Cepachol helps to numb the discomfort in throat  Nasal saline spray reduces inflammation and dryness  Warm face compresses/hot showers as often as you can to open sinuses   Vicks vapor rub at night  Simple foods like chicken noodle soup help hydrate  Zyrtec/Claritin during the day and Benadryl at night may help if allergy component   Zantac will help if there is reflux from the post nasal drip  Rest as much as you can  Your symptoms will likely last 7-10 days, maybe longer depending on how it affects your body.  You are contagious 7-10, so minimize contact with others to reduce the spread to others and stay home from work or school as we discussed. Dehydration is preventable but is one of the main reasons why you will feel so badly. Drink pedialyte, gatorade or propel. Stay hydrated.  Antibiotics are not needed unless a complication(such as Otitis Media, Bacterial sinus infection or pneumonia). Taking antibiotics for Flu/Cold is not supported by evidencebased medicine and can expose you to unnecessary side effects of the medication, such as anaphylaxis.   If you experience any:  Chest pain, shortness of breath, wheezing or difficulty breathing  Severe headache, face, neck or ear pain  New rash  Fever over 101.5º F (38.6 C) for more than three days  Confusion, behavior change or seizure  Severe weakness or dizziness  Go to ER

## 2019-12-24 NOTE — PROGRESS NOTES
Limited SMBGs, health  addressing  A1C at goal 5.7%    Diabetes Medications             metFORMIN (GLUCOPHAGE-XR) 500 MG 24 hr tablet TAKE 1 TABLET BY MOUTH TWICE A DAY WITH MEALS

## 2019-12-26 ENCOUNTER — TELEPHONE (OUTPATIENT)
Dept: SMOKING CESSATION | Facility: CLINIC | Age: 59
End: 2019-12-26

## 2019-12-29 ENCOUNTER — OFFICE VISIT (OUTPATIENT)
Dept: URGENT CARE | Facility: CLINIC | Age: 59
End: 2019-12-29
Payer: COMMERCIAL

## 2019-12-29 VITALS
OXYGEN SATURATION: 94 % | DIASTOLIC BLOOD PRESSURE: 72 MMHG | RESPIRATION RATE: 16 BRPM | HEIGHT: 70 IN | TEMPERATURE: 98 F | HEART RATE: 76 BPM | SYSTOLIC BLOOD PRESSURE: 154 MMHG | WEIGHT: 193 LBS | BODY MASS INDEX: 27.63 KG/M2

## 2019-12-29 DIAGNOSIS — J06.9 BACTERIAL URI: Primary | ICD-10-CM

## 2019-12-29 DIAGNOSIS — B96.89 BACTERIAL URI: Primary | ICD-10-CM

## 2019-12-29 PROCEDURE — 99214 OFFICE O/P EST MOD 30 MIN: CPT | Mod: S$GLB,,, | Performed by: PHYSICIAN ASSISTANT

## 2019-12-29 PROCEDURE — 99214 PR OFFICE/OUTPT VISIT, EST, LEVL IV, 30-39 MIN: ICD-10-PCS | Mod: S$GLB,,, | Performed by: PHYSICIAN ASSISTANT

## 2019-12-29 RX ORDER — DOXYCYCLINE 100 MG/1
100 CAPSULE ORAL 2 TIMES DAILY
Qty: 14 CAPSULE | Refills: 0 | Status: SHIPPED | OUTPATIENT
Start: 2019-12-29 | End: 2020-01-05

## 2019-12-29 NOTE — LETTER
December 29, 2019      [facility] [place] [address], [address]  [facility] [address]  Phone: [phone]  Fax: [phone]       Patient: Malathi Martinez   YOB: 1960  Date of Visit: 12/29/2019    To Whom It May Concern:    Dwight Martinez  was at [facility] on 12/29/2019. She may return to work/school on 12/31/19 with no restrictions. If you have any questions or concerns, or if I can be of further assistance, please do not hesitate to contact me.    Sincerely,    ALEXEY Key

## 2019-12-29 NOTE — PROGRESS NOTES
"Subjective:       Patient ID: Malathi Martinez is a 59 y.o. female.    Vitals:  height is 5' 10" (1.778 m) and weight is 87.5 kg (193 lb). Her tympanic temperature is 97.7 °F (36.5 °C). Her blood pressure is 154/72 (abnormal) and her pulse is 76. Her respiration is 16 and oxygen saturation is 94% (abnormal).     Chief Complaint: Cough    Patient was seen last week for same complaint.  She states she is worse and cough feels deeper in her chest.  She states she can hear a wheeze and she has SOB of when coughing    Cough   This is a new problem. The current episode started 1 to 4 weeks ago. The problem has been gradually worsening. The problem occurs every few minutes. Associated symptoms include chills, postnasal drip, shortness of breath and wheezing. Pertinent negatives include no ear pain, eye redness, fever, hemoptysis, myalgias, rash or sore throat. She has tried prescription cough suppressant for the symptoms. The treatment provided no relief.       Constitution: Positive for chills and fatigue. Negative for sweating and fever.   HENT: Positive for congestion and postnasal drip. Negative for ear pain, sinus pain, sinus pressure, sore throat and voice change.    Neck: Negative for painful lymph nodes.   Eyes: Negative for eye redness.   Respiratory: Positive for chest tightness, cough, shortness of breath and wheezing. Negative for sputum production, bloody sputum, COPD, stridor and asthma.    Gastrointestinal: Negative for nausea and vomiting.   Musculoskeletal: Negative for muscle ache.   Skin: Negative for rash.   Allergic/Immunologic: Negative for seasonal allergies and asthma.   Hematologic/Lymphatic: Negative for swollen lymph nodes.       Objective:      Physical Exam   Constitutional: She is oriented to person, place, and time. She appears well-developed and well-nourished. She is cooperative.  Non-toxic appearance. She does not have a sickly appearance. She does not appear ill. No distress. "   HENT:   Head: Normocephalic and atraumatic.   Right Ear: Hearing, tympanic membrane, external ear and ear canal normal.   Left Ear: Hearing, tympanic membrane, external ear and ear canal normal.   Nose: Nose normal. No mucosal edema, rhinorrhea or nasal deformity. No epistaxis. Right sinus exhibits no maxillary sinus tenderness and no frontal sinus tenderness. Left sinus exhibits no maxillary sinus tenderness and no frontal sinus tenderness.   Mouth/Throat: Uvula is midline, oropharynx is clear and moist and mucous membranes are normal. No trismus in the jaw. Normal dentition. No uvula swelling. No oropharyngeal exudate, posterior oropharyngeal edema or posterior oropharyngeal erythema.   Eyes: Conjunctivae and lids are normal. No scleral icterus.   Neck: Trachea normal, full passive range of motion without pain and phonation normal. Neck supple. No neck rigidity. No edema and no erythema present.   Cardiovascular: Normal rate, regular rhythm, normal heart sounds, intact distal pulses and normal pulses.   Pulmonary/Chest: Effort normal. No respiratory distress. She has no decreased breath sounds. She has wheezes (Diffuse). She has rhonchi (Diffuse).   Abdominal: Normal appearance.   Musculoskeletal: Normal range of motion. She exhibits no edema or deformity.   Neurological: She is alert and oriented to person, place, and time. She exhibits normal muscle tone. Coordination normal.   Skin: Skin is warm, dry, intact, not diaphoretic and not pale.   Psychiatric: She has a normal mood and affect. Her speech is normal and behavior is normal. Judgment and thought content normal. Cognition and memory are normal.   Nursing note and vitals reviewed.        Assessment:       1. Bacterial URI        Plan:         Bacterial URI    Other orders  -     doxycycline (VIBRAMYCIN) 100 MG Cap; Take 1 capsule (100 mg total) by mouth 2 (two) times daily. for 7 days  Dispense: 14 capsule; Refill: 0     The patient I had a long  discussion that smoking contributed to her decline.  She needs to quit smoking as soon as possible.  Declines smoking cessation program.  Discussed if symptoms have not improved in 2 days will need follow-up with PCP or go to ED.  If she has any worsening of symptoms should go directly to ED.  Patient states understanding and agrees with plan.    Follow up with PCP next week  Any worsening of symptoms go to ED    You must understand that you've received an Urgent Care treatment only and that you may be released before all your medical problems are known or treated. You, the patient, will arrange for follow up care as instructed.  Follow up with your PCP or specialty clinic as directed in the next 1-2 weeks if not improved or as needed.  You can call [phone] to schedule an appointment with the appropriate provider.  If your condition worsens we recommend that you receive another evaluation at the emergency room immediately or contact your primary medical clinics after hours call service to discuss your concerns.  Please return here or go to the Emergency Department for any concerns or worsening of condition.

## 2019-12-29 NOTE — PATIENT INSTRUCTIONS
Follow up with PCP next week  Any worsening of symptoms go to ED    You must understand that you've received an Urgent Care treatment only and that you may be released before all your medical problems are known or treated. You, the patient, will arrange for follow up care as instructed.  Follow up with your PCP or specialty clinic as directed in the next 1-2 weeks if not improved or as needed.  You can call [phone] to schedule an appointment with the appropriate provider.  If your condition worsens we recommend that you receive another evaluation at the emergency room immediately or contact your primary medical clinics after hours call service to discuss your concerns.  Please return here or go to the Emergency Department for any concerns or worsening of condition.

## 2019-12-30 ENCOUNTER — OFFICE VISIT (OUTPATIENT)
Dept: FAMILY MEDICINE | Facility: CLINIC | Age: 59
End: 2019-12-30
Payer: COMMERCIAL

## 2019-12-30 VITALS
SYSTOLIC BLOOD PRESSURE: 114 MMHG | TEMPERATURE: 99 F | HEIGHT: 70 IN | HEART RATE: 84 BPM | RESPIRATION RATE: 19 BRPM | OXYGEN SATURATION: 93 % | WEIGHT: 196 LBS | DIASTOLIC BLOOD PRESSURE: 68 MMHG | BODY MASS INDEX: 28.06 KG/M2

## 2019-12-30 DIAGNOSIS — J44.9 CHRONIC OBSTRUCTIVE PULMONARY DISEASE, UNSPECIFIED COPD TYPE: ICD-10-CM

## 2019-12-30 DIAGNOSIS — E11.49 TYPE II DIABETES MELLITUS WITH NEUROLOGICAL MANIFESTATIONS: ICD-10-CM

## 2019-12-30 DIAGNOSIS — Z72.0 TOBACCO ABUSE: ICD-10-CM

## 2019-12-30 DIAGNOSIS — J44.1 COPD WITH EXACERBATION: Primary | ICD-10-CM

## 2019-12-30 DIAGNOSIS — D21.9 FIBROID: ICD-10-CM

## 2019-12-30 PROCEDURE — 94640 PR INHAL RX, AIRWAY OBST/DX SPUTUM INDUCT: ICD-10-PCS | Mod: 59,S$GLB,, | Performed by: FAMILY MEDICINE

## 2019-12-30 PROCEDURE — 99999 PR PBB SHADOW E&M-EST. PATIENT-LVL IV: CPT | Mod: PBBFAC,,, | Performed by: FAMILY MEDICINE

## 2019-12-30 PROCEDURE — 99999 PR PBB SHADOW E&M-EST. PATIENT-LVL IV: ICD-10-PCS | Mod: PBBFAC,,, | Performed by: FAMILY MEDICINE

## 2019-12-30 PROCEDURE — 99214 PR OFFICE/OUTPT VISIT, EST, LEVL IV, 30-39 MIN: ICD-10-PCS | Mod: 25,S$GLB,, | Performed by: FAMILY MEDICINE

## 2019-12-30 PROCEDURE — 3044F PR MOST RECENT HEMOGLOBIN A1C LEVEL <7.0%: ICD-10-PCS | Mod: CPTII,S$GLB,, | Performed by: FAMILY MEDICINE

## 2019-12-30 PROCEDURE — 94640 AIRWAY INHALATION TREATMENT: CPT | Mod: 59,S$GLB,, | Performed by: FAMILY MEDICINE

## 2019-12-30 PROCEDURE — 3008F BODY MASS INDEX DOCD: CPT | Mod: CPTII,S$GLB,, | Performed by: FAMILY MEDICINE

## 2019-12-30 PROCEDURE — 3044F HG A1C LEVEL LT 7.0%: CPT | Mod: CPTII,S$GLB,, | Performed by: FAMILY MEDICINE

## 2019-12-30 PROCEDURE — 3008F PR BODY MASS INDEX (BMI) DOCUMENTED: ICD-10-PCS | Mod: CPTII,S$GLB,, | Performed by: FAMILY MEDICINE

## 2019-12-30 PROCEDURE — 96372 THER/PROPH/DIAG INJ SC/IM: CPT | Mod: S$GLB,,, | Performed by: FAMILY MEDICINE

## 2019-12-30 PROCEDURE — 96372 PR INJECTION,THERAP/PROPH/DIAG2ST, IM OR SUBCUT: ICD-10-PCS | Mod: S$GLB,,, | Performed by: FAMILY MEDICINE

## 2019-12-30 PROCEDURE — 99214 OFFICE O/P EST MOD 30 MIN: CPT | Mod: 25,S$GLB,, | Performed by: FAMILY MEDICINE

## 2019-12-30 RX ORDER — BETAMETHASONE SODIUM PHOSPHATE AND BETAMETHASONE ACETATE 3; 3 MG/ML; MG/ML
6 INJECTION, SUSPENSION INTRA-ARTICULAR; INTRALESIONAL; INTRAMUSCULAR; SOFT TISSUE
Status: COMPLETED | OUTPATIENT
Start: 2019-12-30 | End: 2019-12-30

## 2019-12-30 RX ORDER — IPRATROPIUM BROMIDE AND ALBUTEROL SULFATE 2.5; .5 MG/3ML; MG/3ML
3 SOLUTION RESPIRATORY (INHALATION)
Status: COMPLETED | OUTPATIENT
Start: 2019-12-30 | End: 2019-12-30

## 2019-12-30 RX ORDER — IPRATROPIUM BROMIDE AND ALBUTEROL SULFATE 2.5; .5 MG/3ML; MG/3ML
3 SOLUTION RESPIRATORY (INHALATION) EVERY 6 HOURS PRN
Qty: 1 BOX | Refills: 11 | Status: SHIPPED | OUTPATIENT
Start: 2019-12-30 | End: 2022-02-25 | Stop reason: SDUPTHER

## 2019-12-30 RX ORDER — PREDNISONE 20 MG/1
TABLET ORAL
Qty: 9 TABLET | Refills: 0 | Status: SHIPPED | OUTPATIENT
Start: 2019-12-30 | End: 2020-01-06

## 2019-12-30 RX ORDER — CEFTRIAXONE 1 G/1
1 INJECTION, POWDER, FOR SOLUTION INTRAMUSCULAR; INTRAVENOUS
Status: COMPLETED | OUTPATIENT
Start: 2019-12-30 | End: 2019-12-30

## 2019-12-30 RX ORDER — HYDROCODONE POLISTIREX AND CHLORPHENIRAMINE POLISTIREX 10; 8 MG/5ML; MG/5ML
5 SUSPENSION, EXTENDED RELEASE ORAL EVERY 12 HOURS PRN
Qty: 120 ML | Refills: 0 | Status: SHIPPED | OUTPATIENT
Start: 2019-12-30 | End: 2020-01-30

## 2019-12-30 RX ADMIN — CEFTRIAXONE 1 G: 1 INJECTION, POWDER, FOR SOLUTION INTRAMUSCULAR; INTRAVENOUS at 11:12

## 2019-12-30 RX ADMIN — BETAMETHASONE SODIUM PHOSPHATE AND BETAMETHASONE ACETATE 6 MG: 3; 3 INJECTION, SUSPENSION INTRA-ARTICULAR; INTRALESIONAL; INTRAMUSCULAR; SOFT TISSUE at 11:12

## 2019-12-30 RX ADMIN — IPRATROPIUM BROMIDE AND ALBUTEROL SULFATE 3 ML: 2.5; .5 SOLUTION RESPIRATORY (INHALATION) at 11:12

## 2019-12-30 NOTE — PROGRESS NOTES
"Subjective:       Patient ID: Malathi Martinez is a 59 y.o. female.    Chief Complaint: Cough    HPI  Patient in clinic with c/o cough x 2 weeks. Afebrile throughout, although she reports subjective temps. Has seen UC twice since onset. Initial dx of viral URI. Second visit with bacterial URI. Given rx for doxy, but pharmacy didn't fill before closing yesterday. She notes significant SHAH and persistent cough. Current meds incl mucinex and promethazine-dm ineffective.     Review of Systems:  Review of Systems   Constitutional: Positive for chills and fatigue. Negative for fever.   HENT: Positive for postnasal drip and rhinorrhea. Negative for ear pain and sore throat.    Respiratory: Positive for cough, shortness of breath and wheezing.    Cardiovascular: Positive for chest pain.   Gastrointestinal: Negative for diarrhea and nausea.   Musculoskeletal: Negative for gait problem and myalgias.   Skin: Negative for rash.   Allergic/Immunologic: Negative for environmental allergies.   Neurological: Positive for headaches. Negative for dizziness.   Psychiatric/Behavioral: Positive for sleep disturbance. Negative for confusion.       Objective:     Vitals:    12/30/19 1047   BP: 114/68   BP Location: Right arm   Patient Position: Sitting   BP Method: Large (Manual)   Pulse: 84   Resp: 19   Temp: 98.9 °F (37.2 °C)   TempSrc: Oral   SpO2: (!) 93%   Weight: 88.9 kg (195 lb 15.8 oz)   Height: 5' 10" (1.778 m)          Physical Exam   Constitutional: She is oriented to person, place, and time. She appears well-developed and well-nourished. No distress.   HENT:   Head: Normocephalic and atraumatic.   Right Ear: External ear normal. Tympanic membrane is not erythematous. A middle ear effusion is present.   Left Ear: External ear normal. Tympanic membrane is not erythematous. A middle ear effusion is present.   Nose: Mucosal edema and rhinorrhea present.   Mouth/Throat: Posterior oropharyngeal erythema present. No " oropharyngeal exudate.   Eyes: Pupils are equal, round, and reactive to light. Conjunctivae and EOM are normal. Right eye exhibits no discharge. Left eye exhibits no discharge.   Neck: Normal range of motion. Neck supple.   Cardiovascular: Normal rate and regular rhythm.   Pulmonary/Chest: No respiratory distress. She has decreased breath sounds. She has wheezes. She has no rales. She exhibits no tenderness.   Bronchial breath sounds   Abdominal: Soft. Bowel sounds are normal. She exhibits no distension. There is no tenderness.   Musculoskeletal: She exhibits no edema or deformity.   Lymphadenopathy:     She has no cervical adenopathy.   Neurological: She is alert and oriented to person, place, and time.   Skin: Skin is warm and dry.   Nursing note and vitals reviewed.         Improvement in ease of respiration, decrease in cough, and decrease in auscultated wheeze/rhonchi noted post-neb.   Post-neb pOx = 98%       Improvement in ease of respiration, decrease in cough, and decrease in auscultated wheeze/rhonchi noted post-neb.     Assessment & Plan:  COPD with exacerbation  -     Discontinue: albuterol sulfate (PROAIR RESPICLICK) 90 mcg/actuation AePB; Inhale 2 puffs into the lungs every 4 (four) hours as needed. Rescue  Dispense: 1 each; Refill: 11  -     cefTRIAXone injection 1 g  -     betamethasone acetate-betamethasone sodium phosphate injection 6 mg  -     albuterol-ipratropium 2.5 mg-0.5 mg/3 mL nebulizer solution 3 mL  -     hydrocodone-chlorpheniramine (TUSSIONEX) 10-8 mg/5 mL suspension; Take 5 mLs by mouth every 12 (twelve) hours as needed for Cough.  Dispense: 120 mL; Refill: 0  -     albuterol-ipratropium 2.5 mg-0.5 mg/3 mL nebulizer solution 3 mL  -     albuterol sulfate (PROAIR RESPICLICK) 90 mcg/actuation AePB; Inhale 2 puffs into the lungs every 4 (four) hours as needed. Rescue  Dispense: 1 each; Refill: 11  -     predniSONE (DELTASONE) 20 MG tablet; Take 2 tablets (40 mg total) by mouth once daily  for 2 days, THEN 1 tablet (20 mg total) once daily for 5 days.  Dispense: 9 tablet; Refill: 0  -     NEBULIZER FOR HOME USE  -     albuterol-ipratropium (DUO-NEB) 2.5 mg-0.5 mg/3 mL nebulizer solution; Take 3 mLs by nebulization every 6 (six) hours as needed for Wheezing or Shortness of Breath. Rescue  Dispense: 1 Box; Refill: 11  Side effects and precautions of medication use reviewed with patient, expressed understanding. No questions or concerns. Expected course of illness and sx tx incl otc med use reviewed. Notify MD if sx persist or worsen.   Type II diabetes mellitus with neurological manifestations  Stable per previous.  Tobacco abuse    Fibroid  -     US Pelvis Comp with Transvag NON-OB (xpd; Future; Expected date: 12/30/2019  Due for f/u.  Chronic obstructive pulmonary disease, unspecified COPD type  -     albuterol sulfate (PROAIR RESPICLICK) 90 mcg/actuation AePB; Inhale 2 puffs into the lungs every 4 (four) hours as needed. Rescue  Dispense: 1 each; Refill: 11  -     predniSONE (DELTASONE) 20 MG tablet; Take 2 tablets (40 mg total) by mouth once daily for 2 days, THEN 1 tablet (20 mg total) once daily for 5 days.  Dispense: 9 tablet; Refill: 0  -     NEBULIZER FOR HOME USE  -     albuterol-ipratropium (DUO-NEB) 2.5 mg-0.5 mg/3 mL nebulizer solution; Take 3 mLs by nebulization every 6 (six) hours as needed for Wheezing or Shortness of Breath. Rescue  Dispense: 1 Box; Refill: 11

## 2019-12-30 NOTE — LETTER
December 30, 2019                 [facility]  Family Medicine  [address]  [place] [address]  Phone: [phone]  Fax: [phone]   December 30, 2019     Patient: Malathi Martinez   YOB: 1960   Date of Visit: 12/30/2019       To Whom it May Concern:    Malathi Martinez was seen in my clinic on 12/30/2019. She may return to work on 1/2/2019.    Please excuse her from any classes or work missed.    If you have any questions or concerns, please don't hesitate to call.    Sincerely,         Comfort Dave MD

## 2019-12-31 ENCOUNTER — TELEPHONE (OUTPATIENT)
Dept: URGENT CARE | Facility: CLINIC | Age: 59
End: 2019-12-31

## 2020-01-02 ENCOUNTER — PATIENT MESSAGE (OUTPATIENT)
Dept: FAMILY MEDICINE | Facility: CLINIC | Age: 60
End: 2020-01-02

## 2020-01-02 NOTE — PROGRESS NOTES
Digital Medicine: Health  Follow-Up    Patient reports she has pneumonia.   Patient denies any other concerns at this time.           Follow Up  Follow-up reason(s): reading review      Readings are missing.   Patient reports she has pneumonia and is on prednisone.  Patient reports she has not been taking readings because of being on prednisone.  Encouraged patient to take readings so we can continue to monitor her.         INTERVENTION(S)  encouragement/support    PLAN  additional monitoring needed and continue monitoring      There are no preventive care reminders to display for this patient.      Last 6 Patient Entered Readings                                          Most Recent A1c: 5.7% on 11/2/2019  (Goal: 7%)     Recent Readings 12/21/2019 12/17/2019 11/14/2019 11/5/2019 10/30/2019    Blood Glucose (mg/dL) 140 136 119 144 129

## 2020-01-13 ENCOUNTER — OFFICE VISIT (OUTPATIENT)
Dept: FAMILY MEDICINE | Facility: CLINIC | Age: 60
End: 2020-01-13
Payer: COMMERCIAL

## 2020-01-13 VITALS
HEART RATE: 81 BPM | DIASTOLIC BLOOD PRESSURE: 80 MMHG | BODY MASS INDEX: 28.44 KG/M2 | WEIGHT: 198.63 LBS | SYSTOLIC BLOOD PRESSURE: 138 MMHG | OXYGEN SATURATION: 98 % | HEIGHT: 70 IN | TEMPERATURE: 98 F

## 2020-01-13 DIAGNOSIS — J40 BRONCHITIS: Primary | ICD-10-CM

## 2020-01-13 DIAGNOSIS — F33.41 RECURRENT MAJOR DEPRESSIVE DISORDER, IN PARTIAL REMISSION: ICD-10-CM

## 2020-01-13 PROCEDURE — 99999 PR PBB SHADOW E&M-EST. PATIENT-LVL III: CPT | Mod: PBBFAC,,, | Performed by: FAMILY MEDICINE

## 2020-01-13 PROCEDURE — 3008F BODY MASS INDEX DOCD: CPT | Mod: CPTII,S$GLB,, | Performed by: FAMILY MEDICINE

## 2020-01-13 PROCEDURE — 99213 OFFICE O/P EST LOW 20 MIN: CPT | Mod: S$GLB,,, | Performed by: FAMILY MEDICINE

## 2020-01-13 PROCEDURE — 99213 PR OFFICE/OUTPT VISIT, EST, LEVL III, 20-29 MIN: ICD-10-PCS | Mod: S$GLB,,, | Performed by: FAMILY MEDICINE

## 2020-01-13 PROCEDURE — 3008F PR BODY MASS INDEX (BMI) DOCUMENTED: ICD-10-PCS | Mod: CPTII,S$GLB,, | Performed by: FAMILY MEDICINE

## 2020-01-13 PROCEDURE — 99999 PR PBB SHADOW E&M-EST. PATIENT-LVL III: ICD-10-PCS | Mod: PBBFAC,,, | Performed by: FAMILY MEDICINE

## 2020-01-13 NOTE — PROGRESS NOTES
"Subjective:       Patient ID: Malathi Martinez is a 59 y.o. female.    Chief Complaint: Follow-up (2 week follow up )    HPI  Patient in the office for f/u and review.  afebrile throughout. Mild cough persists with some chest congestion, but not productive. For the most part, cough allows her to rest at night.   No abd issues with med regimen. N/v/d.  Has not required her inhaler in several days. +nebulizer at home, and found that it helped.     Review of Systems:  Review of Systems   Constitutional: Negative for chills and fever.   HENT: Positive for postnasal drip and rhinorrhea. Negative for congestion, ear pain and sore throat.    Respiratory: Positive for cough and shortness of breath (improved).    Cardiovascular: Negative for chest pain.   Gastrointestinal: Negative for diarrhea and nausea.   Musculoskeletal: Negative for myalgias.   Skin: Negative for rash.   Allergic/Immunologic: Negative for environmental allergies.   Neurological: Negative for dizziness and headaches.       Objective:     Vitals:    01/13/20 1401   BP: 138/80   Pulse: 81   Temp: 98.2 °F (36.8 °C)   TempSrc: Oral   SpO2: 98%   Weight: 90.1 kg (198 lb 10.2 oz)   Height: 5' 10" (1.778 m)          Physical Exam   Constitutional: She is oriented to person, place, and time. She appears well-developed and well-nourished. No distress.   HENT:   Head: Normocephalic and atraumatic.   Mouth/Throat: Oropharynx is clear and moist.   Eyes: Conjunctivae are normal. Right eye exhibits no discharge. Left eye exhibits no discharge. No scleral icterus.   Neck: Normal range of motion. Neck supple.   Cardiovascular: Normal rate and regular rhythm.   Pulmonary/Chest: Effort normal and breath sounds normal. No respiratory distress. She has no wheezes.   Abdominal: Soft. She exhibits no distension.   Musculoskeletal: Normal range of motion. She exhibits no edema.   Neurological: She is alert and oriented to person, place, and time.   Skin: Skin is warm and " dry. No rash noted.   Psychiatric: She has a normal mood and affect. Her behavior is normal.   Nursing note and vitals reviewed.        Assessment & Plan:  Bronchitis  Copd with exacerbation resolved. Recommend to continue nebs/albuterol prn for chest tightness. Early intervention for any sx recurrence.   Recurrent major depressive disorder, in partial remission  Patient started counseling prior to above. Recalls that she was feeling better, had started walking for exercise. Recommend she resume on schedule and we can discuss psychiatry referral for med review if needed for sx continuance.

## 2020-01-22 ENCOUNTER — TELEPHONE (OUTPATIENT)
Dept: PODIATRY | Facility: CLINIC | Age: 60
End: 2020-01-22

## 2020-01-22 NOTE — PROGRESS NOTES
Digital Medicine: Clinician Follow-Up    Completed course of prednisone 1/11/20  Confirms meal-relationship labels are correct. Post-prandial readings are 2 hours after meal  Denies s/s of hypoglycemia  eGFR 11/2019 > 60    The history is provided by the patient.     Follow Up  Follow-up reason(s): reading review          INTERVENTION(S)  reviewed appropriate dose schedule, encouragement/support and denied questions    PLAN  patient verbalizes understanding and continue monitoring    A1C and SMBGs at goal without hypoglycemia  Continue current regimen  Requested 2-3 BG readings per week, vary timing and use meal-relationship label          Topic    Eye Exam          Last 6 Patient Entered Readings                                     Most Recent A1c: 5.7% on 11/2/2019  (Goal: 7%)     Recent Readings 1/17/2020 1/14/2020 1/9/2020 1/6/2020 1/3/2020    Blood Glucose (mg/dL) 107 123 119 86 118             Diabetes Medications             metFORMIN (GLUCOPHAGE-XR) 500 MG 24 hr tablet TAKE 1 TABLET BY MOUTH TWICE A DAY WITH MEALS                           Medication Adherence Screening     She does not wonder if medications are working.  She knows purpose of medications.

## 2020-01-22 NOTE — TELEPHONE ENCOUNTER
----- Message from Kiara Lewis sent at 1/22/2020  8:24 AM CST -----  Contact: Patient  Type: Needs Medical Advice  Who Called:  patient  Best Call Back Number: [phone]  Additional Information: Patient states that she needs to speak with Dr. Melo about a personal matter.  Please call to advise.  Thanks!

## 2020-01-27 ENCOUNTER — TELEPHONE (OUTPATIENT)
Dept: FAMILY MEDICINE | Facility: CLINIC | Age: 60
End: 2020-01-27

## 2020-01-27 ENCOUNTER — PATIENT MESSAGE (OUTPATIENT)
Dept: FAMILY MEDICINE | Facility: CLINIC | Age: 60
End: 2020-01-27

## 2020-01-27 DIAGNOSIS — R05.9 COUGH: Primary | ICD-10-CM

## 2020-01-27 NOTE — TELEPHONE ENCOUNTER
----- Message from Milly Viramontes sent at 1/27/2020  4:29 PM CST -----  Contact: patient  Type:  Patient Returning Call    Who Called:  Patient  Who Left Message for Patient:  Roxi  Does the patient know what this is regarding?:  No  Best Call Back Number:   Additional Information:  Please advise-thank you

## 2020-01-28 ENCOUNTER — HOSPITAL ENCOUNTER (OUTPATIENT)
Dept: RADIOLOGY | Facility: HOSPITAL | Age: 60
Discharge: HOME | End: 2020-01-28
Attending: FAMILY MEDICINE
Payer: COMMERCIAL

## 2020-01-28 DIAGNOSIS — R05.9 COUGH: ICD-10-CM

## 2020-01-28 PROCEDURE — 71046 X-RAY EXAM CHEST 2 VIEWS: CPT | Mod: 26,,, | Performed by: RADIOLOGY

## 2020-01-28 PROCEDURE — 71046 XR CHEST PA AND LATERAL: ICD-10-PCS | Mod: 26,,, | Performed by: RADIOLOGY

## 2020-01-28 PROCEDURE — 71046 X-RAY EXAM CHEST 2 VIEWS: CPT | Mod: TC,FY,PO

## 2020-01-29 ENCOUNTER — PATIENT MESSAGE (OUTPATIENT)
Dept: FAMILY MEDICINE | Facility: CLINIC | Age: 60
End: 2020-01-29

## 2020-01-30 ENCOUNTER — OFFICE VISIT (OUTPATIENT)
Dept: FAMILY MEDICINE | Facility: CLINIC | Age: 60
End: 2020-01-30
Payer: COMMERCIAL

## 2020-01-30 VITALS
HEART RATE: 78 BPM | TEMPERATURE: 98 F | OXYGEN SATURATION: 97 % | HEIGHT: 70 IN | DIASTOLIC BLOOD PRESSURE: 80 MMHG | WEIGHT: 189.38 LBS | SYSTOLIC BLOOD PRESSURE: 138 MMHG | BODY MASS INDEX: 27.11 KG/M2

## 2020-01-30 DIAGNOSIS — M79.89 LEG SWELLING: ICD-10-CM

## 2020-01-30 DIAGNOSIS — J20.8 ACUTE BACTERIAL BRONCHITIS: Primary | ICD-10-CM

## 2020-01-30 DIAGNOSIS — B96.89 ACUTE BACTERIAL BRONCHITIS: Primary | ICD-10-CM

## 2020-01-30 PROCEDURE — 96372 THER/PROPH/DIAG INJ SC/IM: CPT | Mod: S$GLB,,, | Performed by: FAMILY MEDICINE

## 2020-01-30 PROCEDURE — 96372 PR INJECTION,THERAP/PROPH/DIAG2ST, IM OR SUBCUT: ICD-10-PCS | Mod: S$GLB,,, | Performed by: FAMILY MEDICINE

## 2020-01-30 PROCEDURE — 99213 PR OFFICE/OUTPT VISIT, EST, LEVL III, 20-29 MIN: ICD-10-PCS | Mod: 25,S$GLB,, | Performed by: FAMILY MEDICINE

## 2020-01-30 PROCEDURE — 3008F PR BODY MASS INDEX (BMI) DOCUMENTED: ICD-10-PCS | Mod: CPTII,S$GLB,, | Performed by: FAMILY MEDICINE

## 2020-01-30 PROCEDURE — 3008F BODY MASS INDEX DOCD: CPT | Mod: CPTII,S$GLB,, | Performed by: FAMILY MEDICINE

## 2020-01-30 PROCEDURE — 99999 PR PBB SHADOW E&M-EST. PATIENT-LVL III: ICD-10-PCS | Mod: PBBFAC,,, | Performed by: FAMILY MEDICINE

## 2020-01-30 PROCEDURE — 99999 PR PBB SHADOW E&M-EST. PATIENT-LVL III: CPT | Mod: PBBFAC,,, | Performed by: FAMILY MEDICINE

## 2020-01-30 PROCEDURE — 99213 OFFICE O/P EST LOW 20 MIN: CPT | Mod: 25,S$GLB,, | Performed by: FAMILY MEDICINE

## 2020-01-30 RX ORDER — FUROSEMIDE 20 MG/1
TABLET ORAL
Qty: 90 TABLET | Refills: 1 | Status: SHIPPED | OUTPATIENT
Start: 2020-01-30 | End: 2020-05-11 | Stop reason: SDUPTHER

## 2020-01-30 RX ORDER — HYDROCODONE POLISTIREX AND CHLORPHENIRAMINE POLISTIREX 10; 8 MG/5ML; MG/5ML
5 SUSPENSION, EXTENDED RELEASE ORAL EVERY 12 HOURS PRN
Qty: 120 ML | Refills: 0 | Status: SHIPPED | OUTPATIENT
Start: 2020-01-30 | End: 2020-05-29

## 2020-01-30 RX ORDER — LEVOFLOXACIN 500 MG/1
500 TABLET, FILM COATED ORAL DAILY
Qty: 7 TABLET | Refills: 0 | Status: SHIPPED | OUTPATIENT
Start: 2020-01-30 | End: 2020-08-21 | Stop reason: ALTCHOICE

## 2020-01-30 RX ORDER — BUDESONIDE 0.5 MG/2ML
0.5 INHALANT ORAL DAILY
Qty: 100 ML | Refills: 1 | Status: SHIPPED | OUTPATIENT
Start: 2020-01-30 | End: 2020-02-21

## 2020-01-30 RX ORDER — BETAMETHASONE SODIUM PHOSPHATE AND BETAMETHASONE ACETATE 3; 3 MG/ML; MG/ML
6 INJECTION, SUSPENSION INTRA-ARTICULAR; INTRALESIONAL; INTRAMUSCULAR; SOFT TISSUE
Status: COMPLETED | OUTPATIENT
Start: 2020-01-30 | End: 2020-01-30

## 2020-01-30 RX ADMIN — BETAMETHASONE SODIUM PHOSPHATE AND BETAMETHASONE ACETATE 6 MG: 3; 3 INJECTION, SUSPENSION INTRA-ARTICULAR; INTRALESIONAL; INTRAMUSCULAR; SOFT TISSUE at 01:01

## 2020-01-30 NOTE — LETTER
January 30, 2020                 [facility]  Family Medicine  [address]  [place] [address]  Phone: [phone]  Fax: [phone]   January 30, 2020     Patient: Malathi Martinez   YOB: 1960   Date of Visit: 1/30/2020       To Whom it May Concern:    Malathi Martinez was seen in my clinic on 1/30/2020 for severe bronchitis. She may return to work on 2/1/2020.    Please excuse her from any classes or work missed.    If you have any questions or concerns, please don't hesitate to call.    Sincerely,         Comfort Dave MD

## 2020-01-30 NOTE — PROGRESS NOTES
"Subjective:       Patient ID: Malathi Martinez is a 59 y.o. female.    Chief Complaint: Cough; Nasal Congestion; upset stomach; Back Pain (pain from coughing); and Headache    HPI  Patient in clinic for continued cough and nasal congestion. Slight improvement following oral steroids and abx from , but then recurred. No fever, but cough is hacking, persistent and worse at night. +chest tightness with wheezing that improves with neb use (currently BID).     Review of Systems:  Review of Systems   Constitutional: Positive for chills. Negative for fever.   HENT: Positive for postnasal drip and rhinorrhea. Negative for ear pain and sore throat.    Respiratory: Positive for cough and wheezing. Negative for shortness of breath.    Cardiovascular: Negative for chest pain.   Musculoskeletal: Negative for myalgias.   Skin: Negative for rash.   Allergic/Immunologic: Positive for environmental allergies. Negative for immunocompromised state.   Neurological: Positive for headaches. Negative for dizziness and light-headedness.       Objective:     Vitals:    01/30/20 1245   BP: 138/80   BP Location: Left arm   Patient Position: Sitting   BP Method: Medium (Manual)   Pulse: 78   Temp: 98.4 °F (36.9 °C)   TempSrc: Oral   SpO2: 97%   Weight: 85.9 kg (189 lb 5.7 oz)   Height: 5' 10" (1.778 m)          Physical Exam   Constitutional: She is oriented to person, place, and time. She appears well-developed and well-nourished. No distress.   HENT:   Head: Normocephalic and atraumatic.   Eyes: Conjunctivae are normal. Right eye exhibits no discharge. Left eye exhibits no discharge. No scleral icterus.   Neck: Normal range of motion. Neck supple.   Cardiovascular: Normal rate and regular rhythm.   Pulmonary/Chest: Effort normal. No respiratory distress. She has decreased breath sounds. She has wheezes.   Abdominal: Soft. She exhibits no distension.   Musculoskeletal: Normal range of motion. She exhibits no edema.   Neurological: She " is alert and oriented to person, place, and time.   Skin: Skin is warm and dry. No rash noted.   Psychiatric: She has a normal mood and affect. Her behavior is normal.   Nursing note and vitals reviewed.        Assessment & Plan:  Acute bacterial bronchitis  -     levoFLOXacin (LEVAQUIN) 500 MG tablet; Take 1 tablet (500 mg total) by mouth once daily.  Dispense: 7 tablet; Refill: 0  -     budesonide (PULMICORT) 0.5 mg/2 mL nebulizer solution; Take 2 mLs (0.5 mg total) by nebulization once daily. Controller  Dispense: 100 mL; Refill: 1  -     hydrocodone-chlorpheniramine (TUSSIONEX) 10-8 mg/5 mL suspension; Take 5 mLs by mouth every 12 (twelve) hours as needed for Cough.  Dispense: 120 mL; Refill: 0  -     fluticasone-umeclidin-vilanter (TRELEGY ELLIPTA) 100-62.5-25 mcg DsDv; Inhale 1 puff into the lungs once daily.  Dispense: 60 each; Refill: 2  -     betamethasone acetate-betamethasone sodium phosphate injection 6 mg    Side effects and precautions of medication use reviewed with patient, expressed understanding. No questions or concerns. Expected course of illness and sx tx incl otc med use reviewed. Notify MD if sx persist or worsen.

## 2020-02-05 DIAGNOSIS — B96.89 ACUTE BACTERIAL BRONCHITIS: ICD-10-CM

## 2020-02-05 DIAGNOSIS — J20.8 ACUTE BACTERIAL BRONCHITIS: ICD-10-CM

## 2020-02-05 RX ORDER — HYDROCODONE POLISTIREX AND CHLORPHENIRAMINE POLISTIREX 10; 8 MG/5ML; MG/5ML
5 SUSPENSION, EXTENDED RELEASE ORAL EVERY 12 HOURS PRN
Qty: 120 ML | Refills: 0 | OUTPATIENT
Start: 2020-02-05

## 2020-02-05 NOTE — TELEPHONE ENCOUNTER
----- Message from Tawanna Lazaro sent at 2/5/2020  9:40 AM CST -----  Contact: pt [phone]  Call placed to pod. Patient is returning a call back to the office.  This is for a refill

## 2020-02-05 NOTE — TELEPHONE ENCOUNTER
Pt states she only has one does left. States still coughing and it is preventing her from sleeping

## 2020-02-15 DIAGNOSIS — F43.21 GRIEF REACTION: ICD-10-CM

## 2020-02-17 RX ORDER — ALPRAZOLAM 0.25 MG/1
TABLET ORAL
Qty: 60 TABLET | Refills: 2 | Status: SHIPPED | OUTPATIENT
Start: 2020-02-17 | End: 2020-05-11 | Stop reason: SDUPTHER

## 2020-02-21 DIAGNOSIS — B96.89 ACUTE BACTERIAL BRONCHITIS: ICD-10-CM

## 2020-02-21 DIAGNOSIS — J20.8 ACUTE BACTERIAL BRONCHITIS: ICD-10-CM

## 2020-02-21 RX ORDER — BUDESONIDE 0.5 MG/2ML
0.5 INHALANT ORAL DAILY
Qty: 120 ML | Refills: 1 | Status: SHIPPED | OUTPATIENT
Start: 2020-02-21 | End: 2020-03-03 | Stop reason: SDUPTHER

## 2020-03-02 ENCOUNTER — PATIENT MESSAGE (OUTPATIENT)
Dept: ADMINISTRATIVE | Facility: OTHER | Age: 60
End: 2020-03-02

## 2020-03-03 DIAGNOSIS — B96.89 ACUTE BACTERIAL BRONCHITIS: ICD-10-CM

## 2020-03-03 DIAGNOSIS — J20.8 ACUTE BACTERIAL BRONCHITIS: ICD-10-CM

## 2020-03-03 RX ORDER — BUDESONIDE 0.5 MG/2ML
0.5 INHALANT ORAL DAILY
Qty: 360 ML | Refills: 1 | Status: SHIPPED | OUTPATIENT
Start: 2020-03-03 | End: 2021-02-26 | Stop reason: ALTCHOICE

## 2020-03-10 NOTE — PROGRESS NOTES
Majority of SMBGs at goal  Continue current regimen  Repeat A1C due ~ 4/30/20  A1C at goal, 5.7%    Diabetes Medications             metFORMIN (GLUCOPHAGE-XR) 500 MG 24 hr tablet TAKE 1 TABLET BY MOUTH TWICE A DAY WITH MEALS

## 2020-03-16 ENCOUNTER — PATIENT MESSAGE (OUTPATIENT)
Dept: ADMINISTRATIVE | Facility: OTHER | Age: 60
End: 2020-03-16

## 2020-03-18 ENCOUNTER — PATIENT OUTREACH (OUTPATIENT)
Dept: OTHER | Facility: OTHER | Age: 60
End: 2020-03-18

## 2020-03-18 NOTE — PROGRESS NOTES
Digital Medicine: Health  Follow-Up    Patient denies any other concerns at this time.           Follow Up  Follow-up reason(s): reading review      Patient reports she is doing well.       INTERVENTION(S)  encouragement/support and denied further coaching    PLAN  continue monitoring          Topic    Eye Exam     Urine Protein Check          Last 6 Patient Entered Readings                                          Most Recent A1c: 5.7% on 11/2/2019  (Goal: 7%)     Recent Readings 3/18/2020 3/13/2020 3/12/2020 3/10/2020 3/10/2020    Blood Glucose (mg/dL) 133 126 120 105 152            Diet Screening   No change to diet.      Physical Activity Screening   No change to exercise routine.

## 2020-04-04 ENCOUNTER — PATIENT MESSAGE (OUTPATIENT)
Dept: FAMILY MEDICINE | Facility: CLINIC | Age: 60
End: 2020-04-04

## 2020-04-08 ENCOUNTER — PATIENT OUTREACH (OUTPATIENT)
Dept: OTHER | Facility: OTHER | Age: 60
End: 2020-04-08

## 2020-04-08 NOTE — PROGRESS NOTES
"Digital Medicine: Health  Follow-Up    Patient reports "she has two strips left and ordered her refill a week ago".  Notified patient that mail, overall, is running a little slower.  Notified patient that [facility] does sell [employer] strips if she did not want to wait on her shipment.  Instructed patient to use curbside pickup if she does order strips from [facility] due to covid-19.         Follow Up  Follow-up reason(s): reading review      Alert received.   Care Team received low BG alert.  Patient is not experiencing symptoms.  Reading was invalid because not enough blodd on the strip.  Addressed low BG alert of 3 mg/dl on 4/3.  Patient reports it was false due to lack of blood on the strip.  Patient re-tested and reading was 113 /mg/dl.  Gave praises to patient for re-testing.  Will delete false low reading from the digital medicine record.     INTERVENTION(S)  encouragement/support    PLAN  continue monitoring          Topic    Eye Exam     Urine Protein Check          Last 6 Patient Entered Readings                                          Most Recent A1c: 5.7% on 11/2/2019  (Goal: 8%)     Recent Readings 4/6/2020 4/3/2020 4/3/2020 4/1/2020 3/30/2020    Blood Glucose (mg/dL) 120 113 3 108 83        "

## 2020-04-13 DIAGNOSIS — K21.9 GASTROESOPHAGEAL REFLUX DISEASE, ESOPHAGITIS PRESENCE NOT SPECIFIED: ICD-10-CM

## 2020-04-13 RX ORDER — OMEPRAZOLE 40 MG/1
CAPSULE, DELAYED RELEASE ORAL
Qty: 90 CAPSULE | Refills: 1 | Status: SHIPPED | OUTPATIENT
Start: 2020-04-13 | End: 2020-09-14

## 2020-04-19 DIAGNOSIS — F43.23 ADJUSTMENT DISORDER WITH MIXED ANXIETY AND DEPRESSED MOOD: ICD-10-CM

## 2020-04-19 DIAGNOSIS — F43.21 GRIEF REACTION: ICD-10-CM

## 2020-04-19 DIAGNOSIS — F33.41 RECURRENT MAJOR DEPRESSIVE DISORDER, IN PARTIAL REMISSION: ICD-10-CM

## 2020-04-20 RX ORDER — BUPROPION HYDROCHLORIDE 150 MG/1
TABLET ORAL
Qty: 90 TABLET | Refills: 1 | Status: SHIPPED | OUTPATIENT
Start: 2020-04-20 | End: 2020-10-03

## 2020-04-20 RX ORDER — ESCITALOPRAM OXALATE 10 MG/1
TABLET ORAL
Qty: 90 TABLET | Refills: 1 | Status: SHIPPED | OUTPATIENT
Start: 2020-04-20 | End: 2020-10-26 | Stop reason: SDUPTHER

## 2020-04-28 ENCOUNTER — TELEPHONE (OUTPATIENT)
Dept: FAMILY MEDICINE | Facility: CLINIC | Age: 60
End: 2020-04-28

## 2020-04-28 NOTE — PROGRESS NOTES
Majority of BGs readings remain at goal without hypoglycemia  A1C 5.7% - repeat one COVID exposure risk decreases    Diabetes Medications             metFORMIN (GLUCOPHAGE-XR) 500 MG 24 hr tablet TAKE 1 TABLET BY MOUTH TWICE A DAY WITH MEALS

## 2020-04-28 NOTE — TELEPHONE ENCOUNTER
----- Message from Tello Scott sent at 4/28/2020 11:08 AM CDT -----  Contact: pt  Type:  Patient Returning Call    Who Called:  pt  Who Left Message for Patient:  Dom  Does the patient know what this is regarding?:    Best Call Back Number:  [phone]  Additional Information:

## 2020-04-28 NOTE — TELEPHONE ENCOUNTER
----- Message from Malathi Black sent at 4/28/2020  9:13 AM CDT -----  Contact: [phone]  Type: Needs Medical Advice    Who Called:  Patient  Best Call Back Number: [phone]  Additional Information: Patient needs to speak with nurse concerning lab work/orders not showing in system/please call patient back to advise.

## 2020-05-06 ENCOUNTER — PATIENT MESSAGE (OUTPATIENT)
Dept: ADMINISTRATIVE | Facility: HOSPITAL | Age: 60
End: 2020-05-06

## 2020-05-07 DIAGNOSIS — E11.9 TYPE 2 DIABETES MELLITUS WITHOUT COMPLICATION, WITHOUT LONG-TERM CURRENT USE OF INSULIN: Primary | ICD-10-CM

## 2020-05-08 ENCOUNTER — OFFICE VISIT (OUTPATIENT)
Dept: URGENT CARE | Facility: CLINIC | Age: 60
End: 2020-05-08
Payer: COMMERCIAL

## 2020-05-08 VITALS
OXYGEN SATURATION: 97 % | HEIGHT: 70 IN | HEART RATE: 80 BPM | SYSTOLIC BLOOD PRESSURE: 151 MMHG | WEIGHT: 189 LBS | BODY MASS INDEX: 27.06 KG/M2 | TEMPERATURE: 98 F | DIASTOLIC BLOOD PRESSURE: 90 MMHG

## 2020-05-08 DIAGNOSIS — R05.9 COUGH: Primary | ICD-10-CM

## 2020-05-08 LAB — SARS-COV-2 RNA RESP QL NAA+PROBE: NOT DETECTED

## 2020-05-08 PROCEDURE — 99214 OFFICE O/P EST MOD 30 MIN: CPT | Mod: S$GLB,,, | Performed by: FAMILY MEDICINE

## 2020-05-08 PROCEDURE — U0002 COVID-19 LAB TEST NON-CDC: HCPCS

## 2020-05-08 PROCEDURE — 99214 PR OFFICE/OUTPT VISIT, EST, LEVL IV, 30-39 MIN: ICD-10-PCS | Mod: S$GLB,,, | Performed by: FAMILY MEDICINE

## 2020-05-08 NOTE — PROGRESS NOTES
"Subjective:       Patient ID: Malathi Martinez is a 59 y.o. female.    Vitals:  height is 5' 10" (1.778 m) and weight is 85.7 kg (189 lb). Her temperature is 98.2 °F (36.8 °C). Her blood pressure is 151/90 (abnormal) and her pulse is 80. Her oxygen saturation is 97%.     Chief Complaint: Fever    Pt presents with cough and chest tightness for the past 3 days ago. Pt said she is having trouble breathing after walking more than a few steps. Pt had pneumonia in January. Pts cough is not productive. Pt had 99.1 temperature this morning. Pt has had chills every day since symptoms started but temperature was unmeasured. Pt denies sore throat, diarrhea and loss of sense of taste and smell. Pt says she feels bilateral ear pressure. Pt says she had some sinus pressure. Pt says that her lungs feel like they are burning. No OTC medications have been taken.     Pt parked in the back of the building and is in a white [employer].     Pt would like covid    Fever    This is a new problem. The current episode started in the past 7 days. The problem has been gradually worsening. The maximum temperature noted was 99 to 99.9 F. Associated symptoms include chest pain, coughing and headaches. Pertinent negatives include no congestion, diarrhea, muscle aches, nausea, rash, sore throat, urinary pain or vomiting. She has tried nothing for the symptoms. The treatment provided no relief.       Constitution: Negative for chills, fatigue and fever.   HENT: Negative for congestion and sore throat.    Neck: Negative for painful lymph nodes.   Cardiovascular: Positive for chest pain. Negative for leg swelling.   Eyes: Negative for double vision and blurred vision.   Respiratory: Positive for cough. Negative for shortness of breath.    Gastrointestinal: Negative for nausea, vomiting and diarrhea.   Genitourinary: Negative for dysuria, frequency, urgency and history of kidney stones.   Musculoskeletal: Negative for joint pain, joint swelling, muscle " cramps and muscle ache.   Skin: Negative for color change, pale, rash and bruising.   Allergic/Immunologic: Negative for seasonal allergies.   Neurological: Positive for headaches. Negative for dizziness, history of vertigo, light-headedness and passing out.   Hematologic/Lymphatic: Negative for swollen lymph nodes.   Psychiatric/Behavioral: Negative for nervous/anxious, sleep disturbance and depression. The patient is not nervous/anxious.        Objective:      Physical Exam    Physical Exam   Constitutional: She is oriented to person, place, and time. She appears well-developed and well-nourished.   HENT:   Head: Normocephalic and atraumatic.   Nose: Nose normal.   Eyes: Conjunctivae, EOM and lids are normal.   Neck: Trachea normal, full passive range of motion without pain and phonation normal. Neck supple.   Cardiovascular: Normal rate.   Pulmonary/Chest: Effort normal and breath sounds normal.   Musculoskeletal: Normal range of motion.   Neurological: She is alert and oriented to person, place, and time.   Skin: Skin is intact and no rash.   Psychiatric: She has a normal mood and affect. Her speech is normal and behavior is normal. Judgment and thought content normal. Cognition and memory are normal.   Nursing note and vitals reviewed.  .  Assessment:       1. Cough        Plan:         Cough  -     COVID-19 Routine Screening       pt hasn't been taking her rescue inhaler- advised to start taking medication 3-4x per day- return if no improvement with sx.

## 2020-05-11 ENCOUNTER — OFFICE VISIT (OUTPATIENT)
Dept: FAMILY MEDICINE | Facility: CLINIC | Age: 60
End: 2020-05-11
Payer: COMMERCIAL

## 2020-05-11 VITALS — HEIGHT: 70 IN | TEMPERATURE: 99 F | BODY MASS INDEX: 27.12 KG/M2

## 2020-05-11 DIAGNOSIS — B96.89 ACUTE BACTERIAL SINUSITIS: Primary | ICD-10-CM

## 2020-05-11 DIAGNOSIS — F43.21 GRIEF REACTION: ICD-10-CM

## 2020-05-11 DIAGNOSIS — E11.9 TYPE 2 DIABETES MELLITUS WITHOUT COMPLICATION, WITHOUT LONG-TERM CURRENT USE OF INSULIN: ICD-10-CM

## 2020-05-11 DIAGNOSIS — M79.89 LEG SWELLING: ICD-10-CM

## 2020-05-11 DIAGNOSIS — J01.90 ACUTE BACTERIAL SINUSITIS: Primary | ICD-10-CM

## 2020-05-11 DIAGNOSIS — S43.82XA: ICD-10-CM

## 2020-05-11 PROCEDURE — 3008F BODY MASS INDEX DOCD: CPT | Mod: CPTII,,, | Performed by: FAMILY MEDICINE

## 2020-05-11 PROCEDURE — 3044F PR MOST RECENT HEMOGLOBIN A1C LEVEL <7.0%: ICD-10-PCS | Mod: CPTII,,, | Performed by: FAMILY MEDICINE

## 2020-05-11 PROCEDURE — 99213 PR OFFICE/OUTPT VISIT, EST, LEVL III, 20-29 MIN: ICD-10-PCS | Mod: 95,,, | Performed by: FAMILY MEDICINE

## 2020-05-11 PROCEDURE — 99213 OFFICE O/P EST LOW 20 MIN: CPT | Mod: 95,,, | Performed by: FAMILY MEDICINE

## 2020-05-11 PROCEDURE — 3044F HG A1C LEVEL LT 7.0%: CPT | Mod: CPTII,,, | Performed by: FAMILY MEDICINE

## 2020-05-11 PROCEDURE — 3008F PR BODY MASS INDEX (BMI) DOCUMENTED: ICD-10-PCS | Mod: CPTII,,, | Performed by: FAMILY MEDICINE

## 2020-05-11 RX ORDER — FUROSEMIDE 20 MG/1
20 TABLET ORAL DAILY
Qty: 90 TABLET | Refills: 1 | Status: SHIPPED | OUTPATIENT
Start: 2020-05-11 | End: 2021-01-13 | Stop reason: SDUPTHER

## 2020-05-11 RX ORDER — ALPRAZOLAM 0.25 MG/1
0.25 TABLET ORAL 2 TIMES DAILY
Qty: 60 TABLET | Refills: 2 | Status: SHIPPED | OUTPATIENT
Start: 2020-05-11 | End: 2020-08-17

## 2020-05-11 RX ORDER — INDOMETHACIN 25 MG/1
25 CAPSULE ORAL 2 TIMES DAILY WITH MEALS
Qty: 30 CAPSULE | Refills: 2 | Status: SHIPPED | OUTPATIENT
Start: 2020-05-11 | End: 2021-12-03

## 2020-05-11 RX ORDER — METFORMIN HYDROCHLORIDE 500 MG/1
500 TABLET, EXTENDED RELEASE ORAL 2 TIMES DAILY WITH MEALS
Qty: 180 TABLET | Refills: 1 | Status: SHIPPED | OUTPATIENT
Start: 2020-05-11 | End: 2021-01-13 | Stop reason: SDUPTHER

## 2020-05-11 RX ORDER — PREDNISONE 20 MG/1
20 TABLET ORAL DAILY
Qty: 6 TABLET | Refills: 0 | Status: SHIPPED | OUTPATIENT
Start: 2020-05-11 | End: 2020-05-29

## 2020-05-11 RX ORDER — CYCLOBENZAPRINE HCL 5 MG
5 TABLET ORAL NIGHTLY PRN
Qty: 90 TABLET | Refills: 1 | Status: SHIPPED | OUTPATIENT
Start: 2020-05-11 | End: 2020-11-23 | Stop reason: SDUPTHER

## 2020-05-11 RX ORDER — ATORVASTATIN CALCIUM 20 MG/1
20 TABLET, FILM COATED ORAL DAILY
Qty: 90 TABLET | Refills: 3 | Status: SHIPPED | OUTPATIENT
Start: 2020-05-11 | End: 2021-02-26 | Stop reason: SDUPTHER

## 2020-05-11 RX ORDER — LISINOPRIL 2.5 MG/1
2.5 TABLET ORAL DAILY
Qty: 90 TABLET | Refills: 1 | Status: SHIPPED | OUTPATIENT
Start: 2020-05-11 | End: 2020-11-23 | Stop reason: SDUPTHER

## 2020-05-11 RX ORDER — DOXYCYCLINE 100 MG/1
100 CAPSULE ORAL 2 TIMES DAILY
Qty: 20 CAPSULE | Refills: 0 | Status: SHIPPED | OUTPATIENT
Start: 2020-05-11 | End: 2020-05-29

## 2020-05-11 NOTE — PROGRESS NOTES
Subjective:       Patient ID: Malathi Martinez is a 59 y.o. female.    Chief Complaint: Fever      The patient location is: home  The chief complaint leading to consultation is: URI sx  Visit type: Virtual visit with synchronous audio and video  Total time spent with patient: 12mins  Each patient to whom he or she provides medical services by telemedicine is:  (1) informed of the relationship between the physician and patient and the respective role of any other health care provider with respect to management of the patient; and (2) notified that he or she may decline to receive medical services by telemedicine and may withdraw from such care at any time.    Notes: 4-5 days of upper respiratory sx. Started with increased sinus congestion. Low-grade temps with chills.   covid test negative this weekend. Cough is now productive with yellow sputum. +wheezing at night, some nights. +improvement with albuterol use. +ear pressure. +sinus pressure. Denies n/v/d.   Also, needs refill of indocin and flexeril as she pulled her L shoulder doing projects around the house.  Working from home.       Review of Systems   Constitutional: Positive for chills, fatigue and fever.   HENT: Positive for congestion and postnasal drip. Negative for sore throat.    Respiratory: Positive for cough, shortness of breath and wheezing.    Gastrointestinal: Negative for diarrhea and nausea.   Allergic/Immunologic: Positive for environmental allergies.   Neurological: Negative for dizziness and headaches.       Objective:        Physical Exam   Constitutional: She is oriented to person, place, and time. She appears well-developed and well-nourished. No distress.   HENT:   Head: Normocephalic and atraumatic.   Left Ear: Decreased hearing (pressure sensation) is noted.   Nose: Right sinus exhibits maxillary sinus tenderness. Left sinus exhibits maxillary sinus tenderness.   Eyes: Conjunctivae are normal. Right eye exhibits no discharge. Left  eye exhibits no discharge. No scleral icterus.   Pulmonary/Chest: Effort normal. No respiratory distress.   Neurological: She is alert and oriented to person, place, and time.   Skin: Skin is warm and dry.   Psychiatric: She has a normal mood and affect. Her behavior is normal.   Nursing note and vitals reviewed.          Assessment:   Leg swelling  -     furosemide (LASIX) 20 MG tablet; Take 1 tablet (20 mg total) by mouth once daily.  Dispense: 90 tablet; Refill: 1    Type 2 diabetes mellitus without complication, without long-term current use of insulin  -     lisinopriL (PRINIVIL,ZESTRIL) 2.5 MG tablet; Take 1 tablet (2.5 mg total) by mouth once daily.  Dispense: 90 tablet; Refill: 1  -     atorvastatin (LIPITOR) 20 MG tablet; Take 1 tablet (20 mg total) by mouth once daily.  Dispense: 90 tablet; Refill: 3  -     metFORMIN (GLUCOPHAGE-XR) 500 MG XR 24hr tablet; Take 1 tablet (500 mg total) by mouth 2 (two) times daily with meals.  Dispense: 180 tablet; Refill: 1    Grief reaction  -     ALPRAZolam (XANAX) 0.25 MG tablet; Take 1 tablet (0.25 mg total) by mouth 2 (two) times daily.  Dispense: 60 tablet; Refill: 2    Left scapulocostal sprain, initial encounter  -     indomethacin (INDOCIN) 25 MG capsule; Take 1 capsule (25 mg total) by mouth 2 (two) times daily with meals.  Dispense: 30 capsule; Refill: 2  -     cyclobenzaprine (FLEXERIL) 5 MG tablet; Take 1 tablet (5 mg total) by mouth nightly as needed for Muscle spasms.  Dispense: 90 tablet; Refill: 1    Other orders  -     predniSONE (DELTASONE) 20 MG tablet; Take 1 tablet (20 mg total) by mouth once daily. Take 2 tablets on day 1.  Dispense: 6 tablet; Refill: 0  -     doxycycline (VIBRAMYCIN) 100 MG Cap; Take 1 capsule (100 mg total) by mouth 2 (two) times daily.  Dispense: 20 capsule; Refill: 0         Patient aware of limitations to assessment and exam of telemedicine. Deemed appropriate at this time given current covid-19 concerns.   Answers for HPI/ROS  submitted by the patient on 5/8/2020   Cough  Chronicity: recurrent  Onset: in the past 7 days  Progression since onset: gradually worsening  Frequency: every few hours  Cough characteristics: non-productive  ear congestion: Yes  nasal congestion: Yes  sweats: Yes  Aggravated by: nothing  Risk factors for lung disease: smoking/tobacco exposure  asthma: No  bronchiectasis: No  bronchitis: Yes  COPD: Yes  emphysema: No  pneumonia: Yes  Treatments tried: nothing

## 2020-05-13 ENCOUNTER — CLINICAL SUPPORT (OUTPATIENT)
Dept: SMOKING CESSATION | Facility: CLINIC | Age: 60
End: 2020-05-13
Payer: COMMERCIAL

## 2020-05-13 DIAGNOSIS — F17.200 NICOTINE DEPENDENCE: Primary | ICD-10-CM

## 2020-05-13 PROCEDURE — 99407 BEHAV CHNG SMOKING > 10 MIN: CPT | Mod: S$GLB,,,

## 2020-05-13 PROCEDURE — 99407 PR TOBACCO USE CESSATION INTENSIVE >10 MINUTES: ICD-10-PCS | Mod: S$GLB,,,

## 2020-05-13 NOTE — PROGRESS NOTES
Called pt to f/u on her 12 month smoking cessation quit status. Pt stated she is still smoking. Informed her she has benefits available and is able to rejoin. Pt not interested. Informed her of benefit period, phone follow ups, and contact information. Will complete smart form and resolve quit #1 episode.

## 2020-05-14 ENCOUNTER — PATIENT MESSAGE (OUTPATIENT)
Dept: FAMILY MEDICINE | Facility: CLINIC | Age: 60
End: 2020-05-14

## 2020-05-14 NOTE — TELEPHONE ENCOUNTER
Good morning, our lab has made a few changes to their times due to COVID concern and social distancing. They only want 3 patients at the lab every 10 minutes and they also want patients to come in for their scheduled time. You are scheduled for blood work this coming Saturday 5/16/20 for 8:40am. If you do not plan to come in, you can cancel your appointment on your portal or give the phone room a call at [phone] to get rescheduled. Have a good day!

## 2020-05-16 ENCOUNTER — LAB VISIT (OUTPATIENT)
Dept: LAB | Facility: HOSPITAL | Age: 60
End: 2020-05-16
Attending: FAMILY MEDICINE
Payer: COMMERCIAL

## 2020-05-16 DIAGNOSIS — E11.9 TYPE 2 DIABETES MELLITUS WITHOUT COMPLICATION, WITHOUT LONG-TERM CURRENT USE OF INSULIN: ICD-10-CM

## 2020-05-16 LAB
ALBUMIN/CREAT UR: ABNORMAL UG/MG (ref 0–30)
CHOLEST SERPL-MCNC: 172 MG/DL (ref 120–199)
CHOLEST/HDLC SERPL: 3.1 {RATIO} (ref 2–5)
CREAT UR-MCNC: 14 MG/DL (ref 15–325)
ESTIMATED AVG GLUCOSE: 117 MG/DL (ref 68–131)
HBA1C MFR BLD HPLC: 5.7 % (ref 4–5.6)
HDLC SERPL-MCNC: 55 MG/DL (ref 40–75)
HDLC SERPL: 32 % (ref 20–50)
LDLC SERPL CALC-MCNC: 91 MG/DL (ref 63–159)
MICROALBUMIN UR DL<=1MG/L-MCNC: <2.5 UG/ML
NONHDLC SERPL-MCNC: 117 MG/DL
TRIGL SERPL-MCNC: 130 MG/DL (ref 30–150)

## 2020-05-16 PROCEDURE — 36415 COLL VENOUS BLD VENIPUNCTURE: CPT | Mod: PO

## 2020-05-16 PROCEDURE — 83036 HEMOGLOBIN GLYCOSYLATED A1C: CPT

## 2020-05-16 PROCEDURE — 80061 LIPID PANEL: CPT

## 2020-05-16 PROCEDURE — 82043 UR ALBUMIN QUANTITATIVE: CPT

## 2020-05-23 LAB
LEFT EYE DM RETINOPATHY: NEGATIVE
RIGHT EYE DM RETINOPATHY: NEGATIVE

## 2020-05-26 ENCOUNTER — PATIENT MESSAGE (OUTPATIENT)
Dept: FAMILY MEDICINE | Facility: CLINIC | Age: 60
End: 2020-05-26

## 2020-05-27 ENCOUNTER — TELEPHONE (OUTPATIENT)
Dept: ADMINISTRATIVE | Facility: HOSPITAL | Age: 60
End: 2020-05-27

## 2020-05-27 NOTE — LETTER
AUTHORIZATION FOR RELEASE OF   CONFIDENTIAL INFORMATION    Dear [facility],    We are seeing Malathi Martinez, date of birth 1960, in the clinic at [facility]. Comfort Dave MD is the patient's PCP. Malathi Martinez has an outstanding lab/procedure at the time we reviewed her chart. In order to help keep her health information updated, she has authorized us to request the following medical record(s):                                                 ( X )  EYE EXAM                  Please fax records to [facility], Lauren S Elder, MD, [phone]     If you have any questions, please contact Jose Luis Wright LPN Clinical Care Coordinator at [phone].           Patient Name: Malathi Martinez  : 1960  Patient Phone #: [phone]

## 2020-05-28 NOTE — TELEPHONE ENCOUNTER
----- Message from Aislinn Yates sent at 5/28/2020  2:49 PM CDT -----  Type: Patient Call Back    Who called: pt     What is the request in detail: pt returned Genia's call. Pt states she would like tomorrow's appt to be in person. Declined VV     Can the clinic reply by [facility]? No     Would the patient rather a call back or a response via [facility]? Call back     Best call back number: [phone]    Additional Information: She states she cannot answer the phone until 4pm if needed to call back.

## 2020-05-29 ENCOUNTER — OFFICE VISIT (OUTPATIENT)
Dept: FAMILY MEDICINE | Facility: CLINIC | Age: 60
End: 2020-05-29
Payer: COMMERCIAL

## 2020-05-29 ENCOUNTER — LAB VISIT (OUTPATIENT)
Dept: LAB | Facility: HOSPITAL | Age: 60
End: 2020-05-29
Attending: FAMILY MEDICINE
Payer: COMMERCIAL

## 2020-05-29 VITALS
OXYGEN SATURATION: 98 % | HEART RATE: 67 BPM | BODY MASS INDEX: 28.21 KG/M2 | TEMPERATURE: 99 F | DIASTOLIC BLOOD PRESSURE: 82 MMHG | WEIGHT: 197.06 LBS | HEIGHT: 70 IN | SYSTOLIC BLOOD PRESSURE: 138 MMHG

## 2020-05-29 DIAGNOSIS — Z20.822 CLOSE EXPOSURE TO COVID-19 VIRUS: ICD-10-CM

## 2020-05-29 DIAGNOSIS — B96.89 ACUTE BACTERIAL SINUSITIS: ICD-10-CM

## 2020-05-29 DIAGNOSIS — Z20.822 CLOSE EXPOSURE TO COVID-19 VIRUS: Primary | ICD-10-CM

## 2020-05-29 DIAGNOSIS — J01.90 ACUTE BACTERIAL SINUSITIS: ICD-10-CM

## 2020-05-29 LAB — SARS-COV-2 IGG SERPLBLD QL IA.RAPID: NEGATIVE

## 2020-05-29 PROCEDURE — 99999 PR PBB SHADOW E&M-EST. PATIENT-LVL III: ICD-10-PCS | Mod: PBBFAC,,, | Performed by: FAMILY MEDICINE

## 2020-05-29 PROCEDURE — 99213 PR OFFICE/OUTPT VISIT, EST, LEVL III, 20-29 MIN: ICD-10-PCS | Mod: S$GLB,,, | Performed by: FAMILY MEDICINE

## 2020-05-29 PROCEDURE — 86769 SARS-COV-2 COVID-19 ANTIBODY: CPT

## 2020-05-29 PROCEDURE — 99999 PR PBB SHADOW E&M-EST. PATIENT-LVL III: CPT | Mod: PBBFAC,,, | Performed by: FAMILY MEDICINE

## 2020-05-29 PROCEDURE — 36415 COLL VENOUS BLD VENIPUNCTURE: CPT | Mod: PN

## 2020-05-29 PROCEDURE — 99213 OFFICE O/P EST LOW 20 MIN: CPT | Mod: S$GLB,,, | Performed by: FAMILY MEDICINE

## 2020-05-29 PROCEDURE — 3008F BODY MASS INDEX DOCD: CPT | Mod: CPTII,S$GLB,, | Performed by: FAMILY MEDICINE

## 2020-05-29 PROCEDURE — 3008F PR BODY MASS INDEX (BMI) DOCUMENTED: ICD-10-PCS | Mod: CPTII,S$GLB,, | Performed by: FAMILY MEDICINE

## 2020-05-29 RX ORDER — PREDNISONE 20 MG/1
20 TABLET ORAL DAILY
Qty: 5 TABLET | Refills: 0 | Status: SHIPPED | OUTPATIENT
Start: 2020-05-29 | End: 2020-08-21 | Stop reason: ALTCHOICE

## 2020-05-29 RX ORDER — AZITHROMYCIN 250 MG/1
TABLET, FILM COATED ORAL
Qty: 6 TABLET | Refills: 0 | Status: SHIPPED | OUTPATIENT
Start: 2020-05-29 | End: 2020-06-03

## 2020-05-29 NOTE — PROGRESS NOTES
"Subjective:       Patient ID: Malathi Martinez is a 59 y.o. female.    Chief Complaint: Sinusitis (sx times 6 days) and Cough    HPI  Patient with c/o increased sinus pressure x 6 days despite otcs and usual sinus regimen. Afebrile, but R ear and occipital discomfort. No sore throat, but +pnd. +cough, productive.   She has some shortness of breath, and using the albuterol prn.     Review of Systems:  Review of Systems   Constitutional: Negative for activity change, fatigue and fever.   HENT: Positive for congestion, ear pain and postnasal drip. Negative for ear discharge, facial swelling, hearing loss, rhinorrhea, sore throat and tinnitus.    Eyes: Negative for redness and itching.   Respiratory: Positive for cough and shortness of breath. Negative for choking, chest tightness, wheezing and stridor.    Gastrointestinal: Negative for diarrhea and nausea.   Musculoskeletal: Positive for myalgias. Negative for arthralgias.   Neurological: Positive for headaches. Negative for light-headedness.       Objective:     Vitals:    05/29/20 1235   BP: 138/82   BP Location: Right arm   Patient Position: Sitting   BP Method: Medium (Manual)   Pulse: 67   Temp: 98.7 °F (37.1 °C)   TempSrc: Oral   SpO2: 98%   Weight: 89.4 kg (197 lb 1.5 oz)   Height: 5' 10" (1.778 m)          Physical Exam   Constitutional: She is oriented to person, place, and time. She appears well-developed and well-nourished. No distress.   HENT:   Head: Normocephalic and atraumatic.   Right Ear: There is tenderness. Tympanic membrane is not erythematous. A middle ear effusion is present.   Left Ear: Tympanic membrane is not erythematous. A middle ear effusion is present.   Eyes: Conjunctivae are normal. Right eye exhibits no discharge. Left eye exhibits no discharge. No scleral icterus.   Neck: Normal range of motion. Neck supple.   Cardiovascular: Normal rate and regular rhythm.   Pulmonary/Chest: Effort normal. No respiratory distress. She has " decreased breath sounds (mild). She has no wheezes. She has no rhonchi.   Abdominal: Soft. She exhibits no distension.   Musculoskeletal: Normal range of motion. She exhibits no edema.   Neurological: She is alert and oriented to person, place, and time.   Skin: Skin is warm and dry. No rash noted.   Psychiatric: She has a normal mood and affect. Her behavior is normal.   Nursing note and vitals reviewed.        Assessment & Plan:  Close Exposure to Covid-19 Virus  -     COVID-19 (SARS CoV-2) IgG Antibody; Future; Expected date: 05/29/2020    Acute bacterial sinusitis  -     predniSONE (DELTASONE) 20 MG tablet; Take 1 tablet (20 mg total) by mouth once daily.  Dispense: 5 tablet; Refill: 0  -     azithromycin (Z-PEDRO) 250 MG tablet; Take 2 tablets by mouth on day 1; Take 1 tablet by mouth on days 2-5  Dispense: 6 tablet; Refill: 0    Side effects and precautions of medication use reviewed with patient, expressed understanding. No questions or concerns. Expected course of illness and sx tx incl otc med use reviewed. Notify MD if sx persist or worsen.

## 2020-06-19 ENCOUNTER — PATIENT OUTREACH (OUTPATIENT)
Dept: OTHER | Facility: OTHER | Age: 60
End: 2020-06-19

## 2020-06-22 NOTE — PROGRESS NOTES
Noted BG elevations 6/19/20 and 6/21/20, otherwise readings at goal  Health  attempted contact 6/19/20  Repeat A1C 5/16/20 remains at goal at 5.7%  Continue current regimen and monitoring    Diabetes Medications             metFORMIN (GLUCOPHAGE-XR) 500 MG XR 24hr tablet Take 1 tablet (500 mg total) by mouth 2 (two) times daily with meals.

## 2020-06-24 ENCOUNTER — PATIENT MESSAGE (OUTPATIENT)
Dept: FAMILY MEDICINE | Facility: CLINIC | Age: 60
End: 2020-06-24

## 2020-06-26 ENCOUNTER — HOSPITAL ENCOUNTER (OUTPATIENT)
Dept: RADIOLOGY | Facility: HOSPITAL | Age: 60
Discharge: HOME | End: 2020-06-26
Attending: FAMILY MEDICINE
Payer: COMMERCIAL

## 2020-06-26 DIAGNOSIS — D21.9 FIBROID: ICD-10-CM

## 2020-06-26 PROCEDURE — 76856 US EXAM PELVIC COMPLETE: CPT | Mod: 26,,, | Performed by: RADIOLOGY

## 2020-06-26 PROCEDURE — 76856 US PELVIS COMP WITH TRANSVAG NON-OB (XPD): ICD-10-PCS | Mod: 26,,, | Performed by: RADIOLOGY

## 2020-06-26 PROCEDURE — 76830 TRANSVAGINAL US NON-OB: CPT | Mod: TC,PO

## 2020-06-26 PROCEDURE — 76830 TRANSVAGINAL US NON-OB: CPT | Mod: 26,,, | Performed by: RADIOLOGY

## 2020-06-26 PROCEDURE — 76830 US PELVIS COMP WITH TRANSVAG NON-OB (XPD): ICD-10-PCS | Mod: 26,,, | Performed by: RADIOLOGY

## 2020-06-29 ENCOUNTER — PATIENT MESSAGE (OUTPATIENT)
Dept: OBSTETRICS AND GYNECOLOGY | Facility: CLINIC | Age: 60
End: 2020-06-29

## 2020-06-29 NOTE — TELEPHONE ENCOUNTER
Pelvic ultrasound reviewed, stable small uterine fibroids, no treatment needed unless pain or bleeding symptoms.

## 2020-07-10 NOTE — PROGRESS NOTES
"Digital Medicine: Health  Follow-Up    The history is provided by the patient.   Follow Up  Follow-up reason(s): reading review and routine education      Routine Education Topics: eating patterns and physical activity  Called pt for follow up, and to introduce myself as her new health  for digital medicine. Provided my contact information, and encouraged her to reach out with any future questions or concerns.     Pt states she hasn't been very consistent with taking her BG readings, but wants to start taking more frequent readings. She does say she has been stressed with work, and when she is off of work she "just wants to sleep". She was concerned her recent readings have been higher than usual.    She limits her CHO intake. Pt reports liking foods like chicken, red fish, and salad. She always bakes her meats rather than having them fried. Encouraged consuming 30-45g CHO for meals, and 15g for snacks. Sent via [facility] so she may refer back.    Pt states she would like to get a FitBit by this [place]. She believes it will help motivate her.         INTERVENTION(S)  recommended diet modifications and encouragement/support    PLAN  patient verbalizes understanding, patient amenable to changes and continue monitoring      There are no preventive care reminders to display for this patient.      Last 6 Patient Entered Readings                                          Most Recent A1c: 5.7% on 5/16/2020  (Goal: 7%)     Recent Readings 7/7/2020 7/2/2020 6/29/2020 6/23/2020 6/21/2020    Blood Glucose (mg/dL) 147 142 128 129 196                    Diet Screening   No change to diet.  She has the following dietary restrictions: diabetic    Intervention(s): carb reduction    Physical Activity Screening   No change to exercise routine.          SDOH  "

## 2020-07-26 DIAGNOSIS — E11.9 TYPE 2 DIABETES MELLITUS WITHOUT COMPLICATION, WITHOUT LONG-TERM CURRENT USE OF INSULIN: ICD-10-CM

## 2020-07-26 RX ORDER — METFORMIN HYDROCHLORIDE 500 MG/1
500 TABLET, EXTENDED RELEASE ORAL 2 TIMES DAILY WITH MEALS
Qty: 180 TABLET | Refills: 1 | Status: CANCELLED | OUTPATIENT
Start: 2020-07-26

## 2020-07-27 NOTE — TELEPHONE ENCOUNTER
Medication was last refilled on 5/11/20 #180 x 1. Informed pt that refill on file at [facility].

## 2020-08-19 ENCOUNTER — PATIENT OUTREACH (OUTPATIENT)
Dept: OTHER | Facility: OTHER | Age: 60
End: 2020-08-19

## 2020-08-19 NOTE — PROGRESS NOTES
Left voicemail requesting call back  Last A1C 5/2020, well at goal. Could repeat A1C earlier than 6 months to better assess given some FBG elevations.  Patient has PCP appointment this week, 8/21/20

## 2020-08-21 ENCOUNTER — HOSPITAL ENCOUNTER (OUTPATIENT)
Dept: RADIOLOGY | Facility: HOSPITAL | Age: 60
Discharge: HOME | End: 2020-08-21
Attending: FAMILY MEDICINE
Payer: COMMERCIAL

## 2020-08-21 ENCOUNTER — OFFICE VISIT (OUTPATIENT)
Dept: FAMILY MEDICINE | Facility: CLINIC | Age: 60
End: 2020-08-21
Payer: COMMERCIAL

## 2020-08-21 VITALS
WEIGHT: 204.5 LBS | HEART RATE: 67 BPM | BODY MASS INDEX: 29.28 KG/M2 | SYSTOLIC BLOOD PRESSURE: 130 MMHG | TEMPERATURE: 99 F | OXYGEN SATURATION: 98 % | HEIGHT: 70 IN | DIASTOLIC BLOOD PRESSURE: 74 MMHG

## 2020-08-21 DIAGNOSIS — M54.2 CERVICALGIA: ICD-10-CM

## 2020-08-21 DIAGNOSIS — G89.29 CHRONIC NONINTRACTABLE HEADACHE, UNSPECIFIED HEADACHE TYPE: Primary | ICD-10-CM

## 2020-08-21 DIAGNOSIS — M54.12 RADICULOPATHY, CERVICAL REGION: ICD-10-CM

## 2020-08-21 DIAGNOSIS — G89.29 CHRONIC NONINTRACTABLE HEADACHE, UNSPECIFIED HEADACHE TYPE: ICD-10-CM

## 2020-08-21 DIAGNOSIS — R51.9 CHRONIC NONINTRACTABLE HEADACHE, UNSPECIFIED HEADACHE TYPE: Primary | ICD-10-CM

## 2020-08-21 DIAGNOSIS — E11.49 TYPE II DIABETES MELLITUS WITH NEUROLOGICAL MANIFESTATIONS: ICD-10-CM

## 2020-08-21 DIAGNOSIS — R09.81 SINUS CONGESTION: ICD-10-CM

## 2020-08-21 DIAGNOSIS — R23.2 HOT FLASHES: ICD-10-CM

## 2020-08-21 DIAGNOSIS — R51.9 CHRONIC NONINTRACTABLE HEADACHE, UNSPECIFIED HEADACHE TYPE: ICD-10-CM

## 2020-08-21 DIAGNOSIS — I72.2 RENAL ARTERIAL ANEURYSM: ICD-10-CM

## 2020-08-21 PROCEDURE — 99999 PR PBB SHADOW E&M-EST. PATIENT-LVL V: ICD-10-PCS | Mod: PBBFAC,,, | Performed by: FAMILY MEDICINE

## 2020-08-21 PROCEDURE — 99214 PR OFFICE/OUTPT VISIT, EST, LEVL IV, 30-39 MIN: ICD-10-PCS | Mod: S$GLB,,, | Performed by: FAMILY MEDICINE

## 2020-08-21 PROCEDURE — 99214 OFFICE O/P EST MOD 30 MIN: CPT | Mod: S$GLB,,, | Performed by: FAMILY MEDICINE

## 2020-08-21 PROCEDURE — 99999 PR PBB SHADOW E&M-EST. PATIENT-LVL V: CPT | Mod: PBBFAC,,, | Performed by: FAMILY MEDICINE

## 2020-08-21 PROCEDURE — 3044F HG A1C LEVEL LT 7.0%: CPT | Mod: CPTII,S$GLB,, | Performed by: FAMILY MEDICINE

## 2020-08-21 PROCEDURE — 72040 XR CERVICAL SPINE AP LATERAL: ICD-10-PCS | Mod: 26,,, | Performed by: RADIOLOGY

## 2020-08-21 PROCEDURE — 3044F PR MOST RECENT HEMOGLOBIN A1C LEVEL <7.0%: ICD-10-PCS | Mod: CPTII,S$GLB,, | Performed by: FAMILY MEDICINE

## 2020-08-21 PROCEDURE — 72040 X-RAY EXAM NECK SPINE 2-3 VW: CPT | Mod: 26,,, | Performed by: RADIOLOGY

## 2020-08-21 PROCEDURE — 72040 X-RAY EXAM NECK SPINE 2-3 VW: CPT | Mod: TC,PN

## 2020-08-21 PROCEDURE — 3008F PR BODY MASS INDEX (BMI) DOCUMENTED: ICD-10-PCS | Mod: CPTII,S$GLB,, | Performed by: FAMILY MEDICINE

## 2020-08-21 PROCEDURE — 3008F BODY MASS INDEX DOCD: CPT | Mod: CPTII,S$GLB,, | Performed by: FAMILY MEDICINE

## 2020-08-21 RX ORDER — FLUTICASONE PROPIONATE 50 MCG
1 SPRAY, SUSPENSION (ML) NASAL DAILY
Qty: 48 G | Refills: 2 | Status: SHIPPED | OUTPATIENT
Start: 2020-08-21 | End: 2022-03-14 | Stop reason: SDUPTHER

## 2020-08-21 RX ORDER — GABAPENTIN 300 MG/1
300 CAPSULE ORAL NIGHTLY
Qty: 30 CAPSULE | Refills: 1 | Status: SHIPPED | OUTPATIENT
Start: 2020-08-21 | End: 2020-11-23 | Stop reason: SDUPTHER

## 2020-08-21 RX ORDER — LEVOCETIRIZINE DIHYDROCHLORIDE 5 MG/1
5 TABLET, FILM COATED ORAL NIGHTLY
Qty: 90 TABLET | Refills: 3 | Status: SHIPPED | OUTPATIENT
Start: 2020-08-21 | End: 2020-11-09

## 2020-08-21 NOTE — PROGRESS NOTES
"Subjective:       Patient ID: Malathi Martinez is a 60 y.o. female.    Chief Complaint: Sinusitis (regular sneezing and congestion ), Memory Loss (off and on for 3 months, pt c/o forgetting certain words that she would commonly know ), Hot Flashes (pt c/o hot flashes and sometimes chills,but no fever ), and Pain (pt c/o sharp pains that come in go in both hands and feet ocurring x 4-5 months "It's like an electric shock it doesn't necessary hurt though, more of tingling." )    HPI  Patient with continued on/off sinus congestion. +itching and eye watering. She has not seen as significant improvement with claritin use.   Patient having hot flashes. Had them initially during menopause, and then they stopped for 10+years, and have since recurred. 2-3 days/week, anytime of day, happens both with activity and rest.   She notices an occ electric tingling sensation in her hands/arms/feet/legs at periods. Can occur throughout the day. Not nec painful.   A lot of personal stressors.  Has restarted activities she enjoys like walking and gardening.  Concerned about her memory. Notably bilingual, but can sometimes have trouble getting the english word that she's thinking of.  Mood is ok.   R neck tension. Pain when she tries to sleep on the affected side. +increase in assd HA.    Review of Systems:  Review of Systems   Constitutional: Positive for activity change and unexpected weight change.   HENT: Positive for hearing loss. Negative for rhinorrhea and trouble swallowing.    Eyes: Positive for discharge. Negative for visual disturbance.   Respiratory: Positive for wheezing. Negative for chest tightness.    Cardiovascular: Positive for chest pain. Negative for palpitations.   Gastrointestinal: Positive for constipation. Negative for blood in stool, diarrhea and vomiting.   Endocrine: Positive for polyuria.   Genitourinary: Negative for difficulty urinating, dysuria, hematuria and menstrual problem.   Musculoskeletal: " "Positive for arthralgias and neck pain. Negative for joint swelling.   Neurological: Positive for headaches. Negative for weakness.   Psychiatric/Behavioral: Positive for confusion (some trouble finding words in English, not Hungarian) and dysphoric mood (doing ok of late, just some anxiety).       Objective:     Vitals:    08/21/20 1127   BP: 130/74   Pulse: 67   Temp: 98.8 °F (37.1 °C)   TempSrc: Temporal   SpO2: 98%   Weight: 92.8 kg (204 lb 7.6 oz)   Height: 5' 10" (1.778 m)          Physical Exam  Vitals signs and nursing note reviewed.   Constitutional:       General: She is not in acute distress.     Appearance: She is well-developed. She is obese.   HENT:      Head: Normocephalic and atraumatic.   Eyes:      General: No scleral icterus.        Right eye: No discharge.         Left eye: No discharge.      Conjunctiva/sclera: Conjunctivae normal.   Neck:      Musculoskeletal: Normal range of motion and neck supple.   Cardiovascular:      Rate and Rhythm: Normal rate and regular rhythm.   Pulmonary:      Effort: Pulmonary effort is normal. No respiratory distress.      Breath sounds: Normal breath sounds.   Abdominal:      General: There is no distension.      Palpations: Abdomen is soft.   Musculoskeletal: Normal range of motion.   Skin:     General: Skin is warm and dry.      Findings: No rash.   Neurological:      Mental Status: She is alert and oriented to person, place, and time.   Psychiatric:         Behavior: Behavior normal.           Assessment & Plan:  Chronic nonintractable headache, unspecified headache type  Comments:  previous cervical imaging with deg changes, update xray, gabapentin trial  Orders:  -     X-Ray Cervical Spine AP And Lateral; Future; Expected date: 08/21/2020  -     gabapentin (NEURONTIN) 300 MG capsule; Take 1 capsule (300 mg total) by mouth every evening.  Dispense: 30 capsule; Refill: 1    Sinus congestion  Comments:  reviewed nasal steroid, antihistamines  Orders:  -     " levocetirizine (XYZAL) 5 MG tablet; Take 1 tablet (5 mg total) by mouth every evening.  Dispense: 90 tablet; Refill: 3  -     fluticasone propionate (FLONASE) 50 mcg/actuation nasal spray; 1 spray (50 mcg total) by Each Nostril route once daily.  Dispense: 48 g; Refill: 2    Hot flashes  Comments:  check estrogen level, pelvis us was updated in 6/2020 and stable  Orders:  -     Estradiol; Future; Expected date: 08/21/2020    Type II diabetes mellitus with neurological manifestations  Comments:  well controlled  Orders:  -     CBC Without Differential; Future; Expected date: 08/21/2020  -     Comprehensive metabolic panel; Future; Expected date: 08/21/2020  -     Hemoglobin A1C; Future; Expected date: 08/21/2020  -     Lipid Panel; Future; Expected date: 08/21/2020  -     TSH; Future; Expected date: 08/21/2020  -     Microalbumin/creatinine urine ratio; Future    Renal arterial aneurysm  Comments:  incidental finding on previous imaging, cont to monitor for size stability    Radiculopathy, cervical region  -     MRI Cervical Spine Without Contrast; Future; Expected date: 08/21/2020    Cervicalgia  -     MRI Cervical Spine Without Contrast; Future; Expected date: 08/21/2020

## 2020-08-24 ENCOUNTER — TELEPHONE (OUTPATIENT)
Dept: FAMILY MEDICINE | Facility: CLINIC | Age: 60
End: 2020-08-24

## 2020-08-24 DIAGNOSIS — E87.6 LOW SERUM POTASSIUM: Primary | ICD-10-CM

## 2020-08-24 NOTE — TELEPHONE ENCOUNTER
Called and spoke with patient in regards to her cervical xray and scheduling an MRI. Patient is scheduled for MRI on 9/4/2020. Pt voiced understanding.

## 2020-08-24 NOTE — TELEPHONE ENCOUNTER
----- Message from Yin Tirado sent at 8/24/2020 11:08 AM CDT -----  Contact: call  [phone]    Type:  Patient Returning Call    Who Called:  pt  Who Left Message for Patient:    nurse  Does the patient know what this is regarding?:   test   results?  Best Call Back Number:  [phone]  Additional Information:  please call  for  details

## 2020-09-02 ENCOUNTER — TELEPHONE (OUTPATIENT)
Dept: FAMILY MEDICINE | Facility: CLINIC | Age: 60
End: 2020-09-02

## 2020-09-03 ENCOUNTER — TELEPHONE (OUTPATIENT)
Dept: FAMILY MEDICINE | Facility: CLINIC | Age: 60
End: 2020-09-03

## 2020-09-03 NOTE — TELEPHONE ENCOUNTER
----- Message from Jan Shea sent at 9/3/2020  2:51 PM CDT -----  Contact: patient  Patient called in and stated she has some questions regarding the MRI that she is scheduled for tomorrow 9/4/20 at 3:15pm.    Patient call back number is [phone]

## 2020-09-09 ENCOUNTER — CLINICAL SUPPORT (OUTPATIENT)
Dept: FAMILY MEDICINE | Facility: CLINIC | Age: 60
End: 2020-09-09
Payer: COMMERCIAL

## 2020-09-09 DIAGNOSIS — Z23 ENCOUNTER FOR ADMINISTRATION OF VACCINE: Primary | ICD-10-CM

## 2020-09-09 PROCEDURE — 90686 FLU VACCINE (QUAD) GREATER THAN OR EQUAL TO 3YO PRESERVATIVE FREE IM: ICD-10-PCS | Mod: S$GLB,,, | Performed by: INTERNAL MEDICINE

## 2020-09-09 PROCEDURE — 90471 IMMUNIZATION ADMIN: CPT | Mod: S$GLB,,, | Performed by: INTERNAL MEDICINE

## 2020-09-09 PROCEDURE — 90471 FLU VACCINE (QUAD) GREATER THAN OR EQUAL TO 3YO PRESERVATIVE FREE IM: ICD-10-PCS | Mod: S$GLB,,, | Performed by: INTERNAL MEDICINE

## 2020-09-09 PROCEDURE — 90686 IIV4 VACC NO PRSV 0.5 ML IM: CPT | Mod: S$GLB,,, | Performed by: INTERNAL MEDICINE

## 2020-09-09 NOTE — PROGRESS NOTES
Confirmed pt name and . Verified medication with Karlee PRESLEY LPN. Administered Flu vaccine IM to L deltoid. Pt tolerated well.     
see attg note

## 2020-09-11 ENCOUNTER — TELEPHONE (OUTPATIENT)
Dept: FAMILY MEDICINE | Facility: CLINIC | Age: 60
End: 2020-09-11

## 2020-09-11 DIAGNOSIS — G89.29 CHRONIC NONINTRACTABLE HEADACHE, UNSPECIFIED HEADACHE TYPE: ICD-10-CM

## 2020-09-11 DIAGNOSIS — M54.12 RADICULOPATHY, CERVICAL REGION: Primary | ICD-10-CM

## 2020-09-11 DIAGNOSIS — R51.9 CHRONIC NONINTRACTABLE HEADACHE, UNSPECIFIED HEADACHE TYPE: ICD-10-CM

## 2020-09-14 NOTE — TELEPHONE ENCOUNTER
Patient states she is feeling better, states she will have difficulty going to therapy at this time due to her work schedule.

## 2020-09-21 NOTE — PROGRESS NOTES
[facility]: Clinician Follow-Up    Patient asks about increasing metformin; she feels her BG readings are trending higher. She denies GI side effects.    The history is provided by the patient.      Review of patient's allergies indicates:   -- Penicillins -- Hives  Follow-up reason(s): routine follow up.   Patient is not experiencing signs/symptoms of hypoglycemia.  Patient is not experiencing signs/symptoms of hyperglycemia.          Last 6 Patient Entered Readings                                          Most Recent A1c: 6% on 8/21/2020  (Goal: 7%)     Recent Readings 9/11/2020 9/8/2020 8/31/2020 8/27/2020 8/18/2020    Blood Glucose (mg/dL) 122 124 120 128 144               Depression Screening  Did not address depression screening.    Sleep Apnea Screening    Did not address sleep apnea screening.     Medication Affordability Screening  Did not address medication affordability screening.     Medication Adherence-Medication adherence was assessed.          ASSESSMENT(S)  Patient's A1C goal is less than or equal to 7 per 2020 ADA guidelines. Patient's most recent A1C result is at goal. Lab Results    Component                Value               Date                     HGBA1C                   6.0 (H)             08/21/2020          .       Diabetes Plan  Prescribed SMBG testing frequency. 2-3 times weekly, vary timing and use meal-relationship labels  Continue current therapy.  Provided patient education. Reviewed ADA SMBG goals  Discussed that given recent SMBGs and A1C at goal, would not increase metformin dose at this time. Patient amenable to increasing SMBG frequency to better assess current glycemic trends.     Addressed patient questions and patient has my contact information if needed prior to next outreach. Patient verbalizes understanding.            There are no preventive care reminders to display for this patient.  There are no preventive care reminders to display for this  patient.      Diabetes Medications             metFORMIN (GLUCOPHAGE-XR) 500 MG XR 24hr tablet Take 1 tablet (500 mg total) by mouth 2 (two) times daily with meals.

## 2020-10-08 ENCOUNTER — PATIENT OUTREACH (OUTPATIENT)
Dept: OTHER | Facility: OTHER | Age: 60
End: 2020-10-08

## 2020-10-26 DIAGNOSIS — F33.41 RECURRENT MAJOR DEPRESSIVE DISORDER, IN PARTIAL REMISSION: ICD-10-CM

## 2020-10-26 DIAGNOSIS — F43.21 GRIEF REACTION: ICD-10-CM

## 2020-10-26 RX ORDER — ESCITALOPRAM OXALATE 10 MG/1
10 TABLET ORAL DAILY
Qty: 90 TABLET | Refills: 1 | Status: SHIPPED | OUTPATIENT
Start: 2020-10-26 | End: 2021-02-26 | Stop reason: SDUPTHER

## 2020-11-02 ENCOUNTER — PATIENT OUTREACH (OUTPATIENT)
Dept: OTHER | Facility: OTHER | Age: 60
End: 2020-11-02

## 2020-11-09 RX ORDER — DESLORATADINE 5 MG/1
5 TABLET ORAL DAILY
COMMUNITY
Start: 2020-10-15 | End: 2020-11-09 | Stop reason: SDUPTHER

## 2020-11-09 NOTE — TELEPHONE ENCOUNTER
Rec'd fax request for Desloratadine to [facility] pharmacy. Please review and advise.    LOV 8/21/20  Phone note 8/21/20 Xyzal not covered by pt insurance.       (faxed back to [facility] pharmacy to notify to send through [employer] next request.)

## 2020-11-10 RX ORDER — DESLORATADINE 5 MG/1
5 TABLET ORAL DAILY
Qty: 90 TABLET | Refills: 3 | Status: SHIPPED | OUTPATIENT
Start: 2020-11-10 | End: 2022-02-08

## 2020-11-19 ENCOUNTER — PATIENT MESSAGE (OUTPATIENT)
Dept: OTHER | Facility: OTHER | Age: 60
End: 2020-11-19

## 2020-11-23 ENCOUNTER — OFFICE VISIT (OUTPATIENT)
Dept: FAMILY MEDICINE | Facility: CLINIC | Age: 60
End: 2020-11-23
Payer: COMMERCIAL

## 2020-11-23 VITALS
RESPIRATION RATE: 14 BRPM | BODY MASS INDEX: 29.78 KG/M2 | WEIGHT: 208 LBS | TEMPERATURE: 98 F | SYSTOLIC BLOOD PRESSURE: 118 MMHG | HEIGHT: 70 IN | DIASTOLIC BLOOD PRESSURE: 70 MMHG | HEART RATE: 80 BPM

## 2020-11-23 DIAGNOSIS — R51.9 CHRONIC NONINTRACTABLE HEADACHE, UNSPECIFIED HEADACHE TYPE: ICD-10-CM

## 2020-11-23 DIAGNOSIS — E11.9 TYPE 2 DIABETES MELLITUS WITHOUT COMPLICATION, WITHOUT LONG-TERM CURRENT USE OF INSULIN: ICD-10-CM

## 2020-11-23 DIAGNOSIS — S43.82XA: ICD-10-CM

## 2020-11-23 DIAGNOSIS — F41.9 ANXIETY: ICD-10-CM

## 2020-11-23 DIAGNOSIS — G89.29 CHRONIC NONINTRACTABLE HEADACHE, UNSPECIFIED HEADACHE TYPE: ICD-10-CM

## 2020-11-23 DIAGNOSIS — R35.0 URINARY FREQUENCY: Primary | ICD-10-CM

## 2020-11-23 LAB
BILIRUB SERPL-MCNC: NEGATIVE MG/DL
BILIRUB UR QL STRIP: NEGATIVE
BLOOD URINE, POC: NEGATIVE
CLARITY UR REFRACT.AUTO: CLEAR
CLARITY, POC UA: CLEAR
COLOR UR AUTO: NORMAL
COLOR, POC UA: NORMAL
GLUCOSE UR QL STRIP: NEGATIVE
GLUCOSE UR QL STRIP: NORMAL
HGB UR QL STRIP: NEGATIVE
KETONES UR QL STRIP: NEGATIVE
KETONES UR QL STRIP: NEGATIVE
LEUKOCYTE ESTERASE UR QL STRIP: NEGATIVE
LEUKOCYTE ESTERASE URINE, POC: NEGATIVE
NITRITE UR QL STRIP: NEGATIVE
NITRITE, POC UA: NEGATIVE
PH UR STRIP: 6 [PH] (ref 5–8)
PH, POC UA: 6
PROT UR QL STRIP: NEGATIVE
PROTEIN, POC: NEGATIVE
SP GR UR STRIP: 1 (ref 1–1.03)
SPECIFIC GRAVITY, POC UA: 1.01
URN SPEC COLLECT METH UR: NORMAL
UROBILINOGEN, POC UA: NORMAL

## 2020-11-23 PROCEDURE — 3044F PR MOST RECENT HEMOGLOBIN A1C LEVEL <7.0%: ICD-10-PCS | Mod: CPTII,S$GLB,, | Performed by: FAMILY MEDICINE

## 2020-11-23 PROCEDURE — 3072F LOW RISK FOR RETINOPATHY: CPT | Mod: S$GLB,,, | Performed by: FAMILY MEDICINE

## 2020-11-23 PROCEDURE — 99999 PR PBB SHADOW E&M-EST. PATIENT-LVL V: CPT | Mod: PBBFAC,,, | Performed by: FAMILY MEDICINE

## 2020-11-23 PROCEDURE — 81003 URINALYSIS AUTO W/O SCOPE: CPT

## 2020-11-23 PROCEDURE — 99214 PR OFFICE/OUTPT VISIT, EST, LEVL IV, 30-39 MIN: ICD-10-PCS | Mod: 25,S$GLB,, | Performed by: FAMILY MEDICINE

## 2020-11-23 PROCEDURE — 81002 POCT URINE DIPSTICK WITHOUT MICROSCOPE: ICD-10-PCS | Mod: S$GLB,,, | Performed by: FAMILY MEDICINE

## 2020-11-23 PROCEDURE — 3072F PR LOW RISK FOR RETINOPATHY: ICD-10-PCS | Mod: S$GLB,,, | Performed by: FAMILY MEDICINE

## 2020-11-23 PROCEDURE — 1126F PR PAIN SEVERITY QUANTIFIED, NO PAIN PRESENT: ICD-10-PCS | Mod: S$GLB,,, | Performed by: FAMILY MEDICINE

## 2020-11-23 PROCEDURE — 3008F BODY MASS INDEX DOCD: CPT | Mod: CPTII,S$GLB,, | Performed by: FAMILY MEDICINE

## 2020-11-23 PROCEDURE — 81002 URINALYSIS NONAUTO W/O SCOPE: CPT | Mod: S$GLB,,, | Performed by: FAMILY MEDICINE

## 2020-11-23 PROCEDURE — 3044F HG A1C LEVEL LT 7.0%: CPT | Mod: CPTII,S$GLB,, | Performed by: FAMILY MEDICINE

## 2020-11-23 PROCEDURE — 3008F PR BODY MASS INDEX (BMI) DOCUMENTED: ICD-10-PCS | Mod: CPTII,S$GLB,, | Performed by: FAMILY MEDICINE

## 2020-11-23 PROCEDURE — 1126F AMNT PAIN NOTED NONE PRSNT: CPT | Mod: S$GLB,,, | Performed by: FAMILY MEDICINE

## 2020-11-23 PROCEDURE — 99214 OFFICE O/P EST MOD 30 MIN: CPT | Mod: 25,S$GLB,, | Performed by: FAMILY MEDICINE

## 2020-11-23 PROCEDURE — 99999 PR PBB SHADOW E&M-EST. PATIENT-LVL V: ICD-10-PCS | Mod: PBBFAC,,, | Performed by: FAMILY MEDICINE

## 2020-11-23 RX ORDER — CYCLOBENZAPRINE HCL 5 MG
5 TABLET ORAL NIGHTLY PRN
Qty: 90 TABLET | Refills: 1 | Status: SHIPPED | OUTPATIENT
Start: 2020-11-23 | End: 2021-02-26 | Stop reason: ALTCHOICE

## 2020-11-23 RX ORDER — LISINOPRIL 2.5 MG/1
2.5 TABLET ORAL DAILY
Qty: 90 TABLET | Refills: 1 | Status: SHIPPED | OUTPATIENT
Start: 2020-11-23 | End: 2021-02-26 | Stop reason: SDUPTHER

## 2020-11-23 RX ORDER — ALPRAZOLAM 0.25 MG/1
0.25 TABLET ORAL 2 TIMES DAILY
Qty: 60 TABLET | Refills: 2 | Status: SHIPPED | OUTPATIENT
Start: 2020-11-23 | End: 2021-03-01

## 2020-11-23 RX ORDER — GABAPENTIN 300 MG/1
300 CAPSULE ORAL 2 TIMES DAILY PRN
Qty: 180 CAPSULE | Refills: 1 | Status: SHIPPED | OUTPATIENT
Start: 2020-11-23 | End: 2021-02-26 | Stop reason: ALTCHOICE

## 2020-11-23 NOTE — PROGRESS NOTES
"Subjective:       Patient ID: Malathi Martinez is a 60 y.o. female.    Chief Complaint: Follow-up    HPI  Patient in clinic for f/u and review.  +hot flashes. +lower abd cramping this morning. No spotting.   Bowels are moving normally at this point.   Urine had a pink tint a few days ago.   She drinks a lot of water, but xyzal was too drying. dc'd in favor of clarinex.  Working with digital [facility]. Walking for exercise, more than previous. Concerned about weight increasing despite.     Review of Systems:  Review of Systems   Constitutional: Positive for unexpected weight change (slight increase from prev). Negative for activity change.   HENT: Positive for hearing loss (R ear occ muffled) and trouble swallowing (less frequent than previous).    Eyes: Positive for discharge. Negative for visual disturbance.   Respiratory: Negative for cough, chest tightness, shortness of breath and wheezing (none recent).    Cardiovascular: Positive for palpitations (occ, stable). Negative for chest pain.   Gastrointestinal: Positive for constipation (resolved). Negative for blood in stool, diarrhea and vomiting.   Endocrine: Negative for polydipsia and polyuria.   Genitourinary: Negative for difficulty urinating, dysuria, hematuria and menstrual problem.   Musculoskeletal: Positive for neck pain (contributes to HA). Negative for arthralgias and joint swelling.   Neurological: Positive for headaches (occipital to frontal, responds to gabapentin, otcs (sometimes)). Negative for weakness.   Psychiatric/Behavioral: Positive for dysphoric mood (doing ok, meds helping, on vacation this week). Negative for confusion.       Objective:     Vitals:    11/23/20 1138   BP: 118/70   Pulse: 80   Resp: 14   Temp: 98.4 °F (36.9 °C)   TempSrc: Temporal   Weight: 94.3 kg (208 lb 0.1 oz)   Height: 5' 10" (1.778 m)        Physical Exam  Vitals signs and nursing note reviewed.   Constitutional:       General: She is not in acute distress.     " Appearance: She is well-developed.   HENT:      Head: Normocephalic and atraumatic.   Eyes:      General: No scleral icterus.        Right eye: No discharge.         Left eye: No discharge.      Conjunctiva/sclera: Conjunctivae normal.   Neck:      Musculoskeletal: Neck supple.   Cardiovascular:      Rate and Rhythm: Normal rate.   Pulmonary:      Effort: Pulmonary effort is normal. No respiratory distress.   Abdominal:      General: There is no distension.      Palpations: Abdomen is soft.   Musculoskeletal: Normal range of motion.   Skin:     General: Skin is warm and dry.      Findings: No rash.   Neurological:      General: No focal deficit present.      Mental Status: She is alert and oriented to person, place, and time.   Psychiatric:         Mood and Affect: Mood normal.         Behavior: Behavior normal.           Assessment & Plan:  Urinary frequency  -     Urinalysis, Reflex to Urine Culture Urine, Clean Catch; Future  -     POCT urine dipstick without microscope    Chronic nonintractable headache, unspecified headache type  Comments:  previous cervical imaging with deg changes, update xray, gabapentin trial  Orders:  -     gabapentin (NEURONTIN) 300 MG capsule; Take 1 capsule (300 mg total) by mouth 2 (two) times daily as needed.  Dispense: 180 capsule; Refill: 1    Left scapulocostal sprain, initial encounter  -     cyclobenzaprine (FLEXERIL) 5 MG tablet; Take 1 tablet (5 mg total) by mouth nightly as needed for Muscle spasms.  Dispense: 90 tablet; Refill: 1    Type 2 diabetes mellitus without complication, without long-term current use of insulin  -     lisinopriL (PRINIVIL,ZESTRIL) 2.5 MG tablet; Take 1 tablet (2.5 mg total) by mouth once daily.  Dispense: 90 tablet; Refill: 1    Anxiety  -     ALPRAZolam (XANAX) 0.25 MG tablet; Take 1 tablet (0.25 mg total) by mouth 2 (two) times daily.  Dispense: 60 tablet; Refill: 2

## 2020-11-30 ENCOUNTER — PATIENT MESSAGE (OUTPATIENT)
Dept: OTHER | Facility: OTHER | Age: 60
End: 2020-11-30

## 2020-12-17 DIAGNOSIS — F43.21 GRIEF REACTION: ICD-10-CM

## 2020-12-17 DIAGNOSIS — F43.23 ADJUSTMENT DISORDER WITH MIXED ANXIETY AND DEPRESSED MOOD: ICD-10-CM

## 2020-12-17 RX ORDER — BUPROPION HYDROCHLORIDE 150 MG/1
TABLET ORAL
Qty: 90 TABLET | Refills: 1 | Status: SHIPPED | OUTPATIENT
Start: 2020-12-17 | End: 2021-02-26 | Stop reason: SDUPTHER

## 2020-12-29 ENCOUNTER — OFFICE VISIT (OUTPATIENT)
Dept: FAMILY MEDICINE | Facility: CLINIC | Age: 60
End: 2020-12-29
Payer: COMMERCIAL

## 2020-12-29 VITALS
TEMPERATURE: 98 F | DIASTOLIC BLOOD PRESSURE: 70 MMHG | SYSTOLIC BLOOD PRESSURE: 138 MMHG | OXYGEN SATURATION: 97 % | HEIGHT: 70 IN | WEIGHT: 211.31 LBS | HEART RATE: 71 BPM | BODY MASS INDEX: 30.25 KG/M2

## 2020-12-29 DIAGNOSIS — K29.70 GASTRITIS, PRESENCE OF BLEEDING UNSPECIFIED, UNSPECIFIED CHRONICITY, UNSPECIFIED GASTRITIS TYPE: Primary | ICD-10-CM

## 2020-12-29 PROCEDURE — 99214 PR OFFICE/OUTPT VISIT, EST, LEVL IV, 30-39 MIN: ICD-10-PCS | Mod: S$GLB,,, | Performed by: FAMILY MEDICINE

## 2020-12-29 PROCEDURE — 99999 PR PBB SHADOW E&M-EST. PATIENT-LVL V: ICD-10-PCS | Mod: PBBFAC,,, | Performed by: FAMILY MEDICINE

## 2020-12-29 PROCEDURE — 1125F PR PAIN SEVERITY QUANTIFIED, PAIN PRESENT: ICD-10-PCS | Mod: S$GLB,,, | Performed by: FAMILY MEDICINE

## 2020-12-29 PROCEDURE — 1125F AMNT PAIN NOTED PAIN PRSNT: CPT | Mod: S$GLB,,, | Performed by: FAMILY MEDICINE

## 2020-12-29 PROCEDURE — 3008F BODY MASS INDEX DOCD: CPT | Mod: CPTII,S$GLB,, | Performed by: FAMILY MEDICINE

## 2020-12-29 PROCEDURE — 3072F LOW RISK FOR RETINOPATHY: CPT | Mod: S$GLB,,, | Performed by: FAMILY MEDICINE

## 2020-12-29 PROCEDURE — 99999 PR PBB SHADOW E&M-EST. PATIENT-LVL V: CPT | Mod: PBBFAC,,, | Performed by: FAMILY MEDICINE

## 2020-12-29 PROCEDURE — 99214 OFFICE O/P EST MOD 30 MIN: CPT | Mod: S$GLB,,, | Performed by: FAMILY MEDICINE

## 2020-12-29 PROCEDURE — 3072F PR LOW RISK FOR RETINOPATHY: ICD-10-PCS | Mod: S$GLB,,, | Performed by: FAMILY MEDICINE

## 2020-12-29 PROCEDURE — 3008F PR BODY MASS INDEX (BMI) DOCUMENTED: ICD-10-PCS | Mod: CPTII,S$GLB,, | Performed by: FAMILY MEDICINE

## 2020-12-29 RX ORDER — PANTOPRAZOLE SODIUM 40 MG/1
40 TABLET, DELAYED RELEASE ORAL DAILY
Qty: 90 TABLET | Refills: 1 | Status: SHIPPED | OUTPATIENT
Start: 2020-12-29 | End: 2021-03-19

## 2020-12-29 RX ORDER — SUCRALFATE 1 G/1
1 TABLET ORAL
Qty: 120 TABLET | Refills: 0 | Status: SHIPPED | OUTPATIENT
Start: 2020-12-29 | End: 2021-02-08

## 2020-12-29 RX ORDER — CLOTRIMAZOLE AND BETAMETHASONE DIPROPIONATE 10; .64 MG/G; MG/G
CREAM TOPICAL 2 TIMES DAILY
Qty: 45 G | Refills: 1 | Status: SHIPPED | OUTPATIENT
Start: 2020-12-29 | End: 2021-10-12 | Stop reason: SDUPTHER

## 2020-12-29 RX ORDER — FAMOTIDINE 40 MG/1
40 TABLET, FILM COATED ORAL 2 TIMES DAILY PRN
Qty: 180 TABLET | Refills: 1 | Status: SHIPPED | OUTPATIENT
Start: 2020-12-29 | End: 2021-07-15

## 2020-12-29 NOTE — PROGRESS NOTES
"Subjective:       Patient ID: Malathi Martinez is a 60 y.o. female.    Chief Complaint: Abdominal Pain, Diarrhea, Constipation, and Hip Pain    HPI  Patient with c/o stomach pain x 1.5 weeks. Initially an epigastric, burning discomfort. Took pepto, but did not see improvement. +constipation and diarrhea. Uses immodium for the diarrhea, but then bowels slow.  +increased in reflux and brash water taste. +belching, +gas, +hiccups.  Afebrile, no chills. No change to diet or meals prior to onset.    Review of Systems:  Review of Systems   Constitutional: Negative for fever.   Respiratory: Negative for cough and shortness of breath.    Cardiovascular: Negative for chest pain and palpitations.   Gastrointestinal: Positive for abdominal pain, constipation and diarrhea. Negative for blood in stool, nausea and vomiting.   Genitourinary: Negative for dysuria, frequency and hematuria.   Musculoskeletal: Negative for arthralgias and myalgias.   Neurological: Negative for dizziness and headaches.       Objective:     Vitals:    12/29/20 1350   BP: 138/70   BP Location: Right arm   Patient Position: Sitting   BP Method: Medium (Manual)   Pulse: 71   Temp: 98.4 °F (36.9 °C)   TempSrc: Skin   SpO2: 97%   Weight: 95.8 kg (211 lb 5 oz)   Height: 5' 10" (1.778 m)          Physical Exam  Vitals signs and nursing note reviewed.   Constitutional:       General: She is not in acute distress.     Appearance: She is well-developed.   HENT:      Head: Normocephalic and atraumatic.   Eyes:      General: No scleral icterus.        Right eye: No discharge.         Left eye: No discharge.      Conjunctiva/sclera: Conjunctivae normal.   Neck:      Musculoskeletal: Neck supple.   Cardiovascular:      Rate and Rhythm: Normal rate and regular rhythm.   Pulmonary:      Effort: Pulmonary effort is normal. No respiratory distress.      Breath sounds: Normal breath sounds.   Abdominal:      General: There is no distension.      Palpations: Abdomen " is soft.      Tenderness: There is abdominal tenderness in the right upper quadrant, epigastric area and left upper quadrant. There is no guarding.   Musculoskeletal: Normal range of motion.   Lymphadenopathy:      Cervical: No cervical adenopathy.   Skin:     General: Skin is warm and dry.      Findings: No rash.   Neurological:      Mental Status: She is alert and oriented to person, place, and time.   Psychiatric:         Behavior: Behavior normal.           Assessment & Plan:  Gastritis, presence of bleeding unspecified, unspecified chronicity, unspecified gastritis type  -     US Abdomen Complete; Future; Expected date: 12/29/2020    Other orders  -     clotrimazole-betamethasone 1-0.05% (LOTRISONE) cream; Apply topically 2 (two) times daily.  Dispense: 45 g; Refill: 1  -     sucralfate (CARAFATE) 1 gram tablet; Take 1 tablet (1 g total) by mouth 4 (four) times daily before meals and nightly.  Dispense: 120 tablet; Refill: 0  -     pantoprazole (PROTONIX) 40 MG tablet; Take 1 tablet (40 mg total) by mouth once daily.  Dispense: 90 tablet; Refill: 1  -     famotidine (PEPCID) 40 MG tablet; Take 1 tablet (40 mg total) by mouth 2 (two) times daily as needed for Heartburn.  Dispense: 180 tablet; Refill: 1    carafate qid, switch to protonix once daily, add pepcid bid.  Us if not improving, gb polyp noted 2019.

## 2021-01-05 ENCOUNTER — PATIENT OUTREACH (OUTPATIENT)
Dept: ADMINISTRATIVE | Facility: OTHER | Age: 61
End: 2021-01-05

## 2021-01-07 ENCOUNTER — PATIENT MESSAGE (OUTPATIENT)
Dept: PODIATRY | Facility: CLINIC | Age: 61
End: 2021-01-07

## 2021-01-07 ENCOUNTER — TELEPHONE (OUTPATIENT)
Dept: PODIATRY | Facility: CLINIC | Age: 61
End: 2021-01-07

## 2021-01-08 ENCOUNTER — PATIENT MESSAGE (OUTPATIENT)
Dept: FAMILY MEDICINE | Facility: CLINIC | Age: 61
End: 2021-01-08

## 2021-01-08 ENCOUNTER — HOSPITAL ENCOUNTER (OUTPATIENT)
Dept: RADIOLOGY | Facility: HOSPITAL | Age: 61
Discharge: HOME | End: 2021-01-08
Attending: FAMILY MEDICINE
Payer: COMMERCIAL

## 2021-01-08 DIAGNOSIS — K29.70 GASTRITIS, PRESENCE OF BLEEDING UNSPECIFIED, UNSPECIFIED CHRONICITY, UNSPECIFIED GASTRITIS TYPE: ICD-10-CM

## 2021-01-08 PROCEDURE — 76700 US EXAM ABDOM COMPLETE: CPT | Mod: TC,PO

## 2021-01-08 PROCEDURE — 76700 US ABDOMEN COMPLETE: ICD-10-PCS | Mod: 26,,, | Performed by: RADIOLOGY

## 2021-01-08 PROCEDURE — 76700 US EXAM ABDOM COMPLETE: CPT | Mod: 26,,, | Performed by: RADIOLOGY

## 2021-01-11 ENCOUNTER — OFFICE VISIT (OUTPATIENT)
Dept: FAMILY MEDICINE | Facility: CLINIC | Age: 61
End: 2021-01-11
Payer: COMMERCIAL

## 2021-01-11 VITALS
OXYGEN SATURATION: 99 % | HEIGHT: 71 IN | BODY MASS INDEX: 28.92 KG/M2 | WEIGHT: 206.56 LBS | SYSTOLIC BLOOD PRESSURE: 132 MMHG | DIASTOLIC BLOOD PRESSURE: 70 MMHG | HEART RATE: 61 BPM

## 2021-01-11 DIAGNOSIS — K29.70 GASTRITIS, PRESENCE OF BLEEDING UNSPECIFIED, UNSPECIFIED CHRONICITY, UNSPECIFIED GASTRITIS TYPE: Primary | ICD-10-CM

## 2021-01-11 PROCEDURE — 3008F BODY MASS INDEX DOCD: CPT | Mod: CPTII,S$GLB,, | Performed by: FAMILY MEDICINE

## 2021-01-11 PROCEDURE — 99999 PR PBB SHADOW E&M-EST. PATIENT-LVL V: ICD-10-PCS | Mod: PBBFAC,,, | Performed by: FAMILY MEDICINE

## 2021-01-11 PROCEDURE — 99213 OFFICE O/P EST LOW 20 MIN: CPT | Mod: S$GLB,,, | Performed by: FAMILY MEDICINE

## 2021-01-11 PROCEDURE — 1126F PR PAIN SEVERITY QUANTIFIED, NO PAIN PRESENT: ICD-10-PCS | Mod: S$GLB,,, | Performed by: FAMILY MEDICINE

## 2021-01-11 PROCEDURE — 3008F PR BODY MASS INDEX (BMI) DOCUMENTED: ICD-10-PCS | Mod: CPTII,S$GLB,, | Performed by: FAMILY MEDICINE

## 2021-01-11 PROCEDURE — 99213 PR OFFICE/OUTPT VISIT, EST, LEVL III, 20-29 MIN: ICD-10-PCS | Mod: S$GLB,,, | Performed by: FAMILY MEDICINE

## 2021-01-11 PROCEDURE — 1126F AMNT PAIN NOTED NONE PRSNT: CPT | Mod: S$GLB,,, | Performed by: FAMILY MEDICINE

## 2021-01-11 PROCEDURE — 99999 PR PBB SHADOW E&M-EST. PATIENT-LVL V: CPT | Mod: PBBFAC,,, | Performed by: FAMILY MEDICINE

## 2021-01-12 ENCOUNTER — TELEPHONE (OUTPATIENT)
Dept: GASTROENTEROLOGY | Facility: CLINIC | Age: 61
End: 2021-01-12

## 2021-01-12 DIAGNOSIS — Z01.812 ENCOUNTER FOR PREOPERATIVE SCREENING LABORATORY TESTING FOR COVID-19 VIRUS: ICD-10-CM

## 2021-01-12 DIAGNOSIS — Z11.52 ENCOUNTER FOR PREOPERATIVE SCREENING LABORATORY TESTING FOR COVID-19 VIRUS: ICD-10-CM

## 2021-01-13 DIAGNOSIS — E11.9 TYPE 2 DIABETES MELLITUS WITHOUT COMPLICATION, WITHOUT LONG-TERM CURRENT USE OF INSULIN: ICD-10-CM

## 2021-01-13 DIAGNOSIS — M79.89 LEG SWELLING: ICD-10-CM

## 2021-01-14 LAB — UREA BREATH TEST QL: NOT DETECTED

## 2021-01-14 RX ORDER — FUROSEMIDE 20 MG/1
20 TABLET ORAL DAILY
Qty: 90 TABLET | Refills: 1 | Status: SHIPPED | OUTPATIENT
Start: 2021-01-14 | End: 2021-10-12 | Stop reason: SDUPTHER

## 2021-01-14 RX ORDER — METFORMIN HYDROCHLORIDE 500 MG/1
500 TABLET, EXTENDED RELEASE ORAL 2 TIMES DAILY WITH MEALS
Qty: 180 TABLET | Refills: 1 | Status: SHIPPED | OUTPATIENT
Start: 2021-01-14 | End: 2021-06-21

## 2021-01-20 ENCOUNTER — CLINICAL SUPPORT (OUTPATIENT)
Dept: URGENT CARE | Facility: CLINIC | Age: 61
End: 2021-01-20
Payer: COMMERCIAL

## 2021-01-20 VITALS — OXYGEN SATURATION: 97 % | HEART RATE: 82 BPM | TEMPERATURE: 97 F

## 2021-01-20 DIAGNOSIS — Z11.52 ENCOUNTER FOR SCREENING FOR COVID-19: Primary | ICD-10-CM

## 2021-01-20 LAB
CTP QC/QA: YES
SARS-COV-2 RDRP RESP QL NAA+PROBE: NEGATIVE

## 2021-01-20 PROCEDURE — U0002: ICD-10-PCS | Mod: QW,S$GLB,, | Performed by: INTERNAL MEDICINE

## 2021-01-20 PROCEDURE — U0002 COVID-19 LAB TEST NON-CDC: HCPCS | Mod: QW,S$GLB,, | Performed by: INTERNAL MEDICINE

## 2021-02-02 ENCOUNTER — PATIENT MESSAGE (OUTPATIENT)
Dept: FAMILY MEDICINE | Facility: CLINIC | Age: 61
End: 2021-02-02

## 2021-02-02 DIAGNOSIS — Z12.31 ENCOUNTER FOR SCREENING MAMMOGRAM FOR BREAST CANCER: Primary | ICD-10-CM

## 2021-02-08 ENCOUNTER — OFFICE VISIT (OUTPATIENT)
Dept: PODIATRY | Facility: CLINIC | Age: 61
End: 2021-02-08
Payer: COMMERCIAL

## 2021-02-08 VITALS — BODY MASS INDEX: 28.92 KG/M2 | HEIGHT: 71 IN | WEIGHT: 206.56 LBS

## 2021-02-08 DIAGNOSIS — E11.9 TYPE 2 DIABETES MELLITUS WITHOUT COMPLICATION, WITHOUT LONG-TERM CURRENT USE OF INSULIN: Primary | ICD-10-CM

## 2021-02-08 PROCEDURE — 1126F PR PAIN SEVERITY QUANTIFIED, NO PAIN PRESENT: ICD-10-PCS | Mod: S$GLB,,, | Performed by: PODIATRIST

## 2021-02-08 PROCEDURE — 3008F BODY MASS INDEX DOCD: CPT | Mod: CPTII,S$GLB,, | Performed by: PODIATRIST

## 2021-02-08 PROCEDURE — 99999 PR PBB SHADOW E&M-EST. PATIENT-LVL II: ICD-10-PCS | Mod: PBBFAC,,, | Performed by: PODIATRIST

## 2021-02-08 PROCEDURE — 3044F PR MOST RECENT HEMOGLOBIN A1C LEVEL <7.0%: ICD-10-PCS | Mod: CPTII,S$GLB,, | Performed by: PODIATRIST

## 2021-02-08 PROCEDURE — 99213 OFFICE O/P EST LOW 20 MIN: CPT | Mod: S$GLB,,, | Performed by: PODIATRIST

## 2021-02-08 PROCEDURE — 99999 PR PBB SHADOW E&M-EST. PATIENT-LVL II: CPT | Mod: PBBFAC,,, | Performed by: PODIATRIST

## 2021-02-08 PROCEDURE — 99213 PR OFFICE/OUTPT VISIT, EST, LEVL III, 20-29 MIN: ICD-10-PCS | Mod: S$GLB,,, | Performed by: PODIATRIST

## 2021-02-08 PROCEDURE — 1126F AMNT PAIN NOTED NONE PRSNT: CPT | Mod: S$GLB,,, | Performed by: PODIATRIST

## 2021-02-08 PROCEDURE — 3044F HG A1C LEVEL LT 7.0%: CPT | Mod: CPTII,S$GLB,, | Performed by: PODIATRIST

## 2021-02-08 PROCEDURE — 3008F PR BODY MASS INDEX (BMI) DOCUMENTED: ICD-10-PCS | Mod: CPTII,S$GLB,, | Performed by: PODIATRIST

## 2021-02-10 ENCOUNTER — HOSPITAL ENCOUNTER (OUTPATIENT)
Dept: RADIOLOGY | Facility: HOSPITAL | Age: 61
Discharge: HOME | End: 2021-02-10
Attending: FAMILY MEDICINE
Payer: COMMERCIAL

## 2021-02-10 DIAGNOSIS — Z12.31 ENCOUNTER FOR SCREENING MAMMOGRAM FOR BREAST CANCER: ICD-10-CM

## 2021-02-10 DIAGNOSIS — Z12.31 SCREENING MAMMOGRAM, ENCOUNTER FOR: ICD-10-CM

## 2021-02-10 PROCEDURE — 77063 BREAST TOMOSYNTHESIS BI: CPT | Mod: 26,,, | Performed by: RADIOLOGY

## 2021-02-10 PROCEDURE — 77067 SCR MAMMO BI INCL CAD: CPT | Mod: 26,,, | Performed by: RADIOLOGY

## 2021-02-10 PROCEDURE — 77067 SCR MAMMO BI INCL CAD: CPT | Mod: TC,PO

## 2021-02-10 PROCEDURE — 77067 MAMMO DIGITAL SCREENING BILAT WITH TOMO: ICD-10-PCS | Mod: 26,,, | Performed by: RADIOLOGY

## 2021-02-10 PROCEDURE — 77063 MAMMO DIGITAL SCREENING BILAT WITH TOMO: ICD-10-PCS | Mod: 26,,, | Performed by: RADIOLOGY

## 2021-02-17 ENCOUNTER — LAB VISIT (OUTPATIENT)
Dept: FAMILY MEDICINE | Facility: CLINIC | Age: 61
End: 2021-02-17
Payer: COMMERCIAL

## 2021-02-17 DIAGNOSIS — Z01.812 ENCOUNTER FOR PREOPERATIVE SCREENING LABORATORY TESTING FOR COVID-19 VIRUS: ICD-10-CM

## 2021-02-17 DIAGNOSIS — Z11.52 ENCOUNTER FOR PREOPERATIVE SCREENING LABORATORY TESTING FOR COVID-19 VIRUS: ICD-10-CM

## 2021-02-17 PROCEDURE — U0003 INFECTIOUS AGENT DETECTION BY NUCLEIC ACID (DNA OR RNA); SEVERE ACUTE RESPIRATORY SYNDROME CORONAVIRUS 2 (SARS-COV-2) (CORONAVIRUS DISEASE [COVID-19]), AMPLIFIED PROBE TECHNIQUE, MAKING USE OF HIGH THROUGHPUT TECHNOLOGIES AS DESCRIBED BY CMS-2020-01-R: HCPCS

## 2021-02-17 PROCEDURE — U0005 INFEC AGEN DETEC AMPLI PROBE: HCPCS

## 2021-02-18 ENCOUNTER — ANESTHESIA EVENT (OUTPATIENT)
Dept: ENDOSCOPY | Facility: HOSPITAL | Age: 61
End: 2021-02-18
Payer: COMMERCIAL

## 2021-02-18 LAB — SARS-COV-2 RNA RESP QL NAA+PROBE: NOT DETECTED

## 2021-02-19 ENCOUNTER — ANESTHESIA (OUTPATIENT)
Dept: ENDOSCOPY | Facility: HOSPITAL | Age: 61
End: 2021-02-19
Payer: COMMERCIAL

## 2021-02-19 ENCOUNTER — HOSPITAL ENCOUNTER (OUTPATIENT)
Facility: HOSPITAL | Age: 61
Discharge: HOME | End: 2021-02-19
Attending: INTERNAL MEDICINE | Admitting: INTERNAL MEDICINE
Payer: COMMERCIAL

## 2021-02-19 VITALS
DIASTOLIC BLOOD PRESSURE: 79 MMHG | RESPIRATION RATE: 12 BRPM | SYSTOLIC BLOOD PRESSURE: 125 MMHG | WEIGHT: 200 LBS | TEMPERATURE: 97 F | OXYGEN SATURATION: 99 % | BODY MASS INDEX: 28.63 KG/M2 | HEART RATE: 60 BPM | HEIGHT: 70 IN

## 2021-02-19 DIAGNOSIS — R10.13 EPIGASTRIC PAIN: ICD-10-CM

## 2021-02-19 LAB — GLUCOSE SERPL-MCNC: 95 MG/DL (ref 70–110)

## 2021-02-19 PROCEDURE — 37000009 HC ANESTHESIA EA ADD 15 MINS: Mod: PO | Performed by: INTERNAL MEDICINE

## 2021-02-19 PROCEDURE — D9220A PRA ANESTHESIA: Mod: CRNA,,, | Performed by: NURSE ANESTHETIST, CERTIFIED REGISTERED

## 2021-02-19 PROCEDURE — D9220A PRA ANESTHESIA: ICD-10-PCS | Mod: CRNA,,, | Performed by: NURSE ANESTHETIST, CERTIFIED REGISTERED

## 2021-02-19 PROCEDURE — 88305 TISSUE EXAM BY PATHOLOGIST: CPT | Performed by: PATHOLOGY

## 2021-02-19 PROCEDURE — 88305 TISSUE EXAM BY PATHOLOGIST: ICD-10-PCS | Mod: 26,,, | Performed by: PATHOLOGY

## 2021-02-19 PROCEDURE — 25000003 PHARM REV CODE 250: Mod: PO | Performed by: NURSE ANESTHETIST, CERTIFIED REGISTERED

## 2021-02-19 PROCEDURE — 82962 GLUCOSE BLOOD TEST: CPT | Mod: PO | Performed by: INTERNAL MEDICINE

## 2021-02-19 PROCEDURE — 43239 EGD BIOPSY SINGLE/MULTIPLE: CPT | Mod: ,,, | Performed by: INTERNAL MEDICINE

## 2021-02-19 PROCEDURE — 37000008 HC ANESTHESIA 1ST 15 MINUTES: Mod: PO | Performed by: INTERNAL MEDICINE

## 2021-02-19 PROCEDURE — D9220A PRA ANESTHESIA: ICD-10-PCS | Mod: ANES,,, | Performed by: ANESTHESIOLOGY

## 2021-02-19 PROCEDURE — D9220A PRA ANESTHESIA: Mod: ANES,,, | Performed by: ANESTHESIOLOGY

## 2021-02-19 PROCEDURE — 63600175 PHARM REV CODE 636 W HCPCS: Mod: PO | Performed by: NURSE ANESTHETIST, CERTIFIED REGISTERED

## 2021-02-19 PROCEDURE — 43239 EGD BIOPSY SINGLE/MULTIPLE: CPT | Mod: PO | Performed by: INTERNAL MEDICINE

## 2021-02-19 PROCEDURE — 43239 PR EGD, FLEX, W/BIOPSY, SGL/MULTI: ICD-10-PCS | Mod: ,,, | Performed by: INTERNAL MEDICINE

## 2021-02-19 PROCEDURE — 27201012 HC FORCEPS, HOT/COLD, DISP: Mod: PO | Performed by: INTERNAL MEDICINE

## 2021-02-19 PROCEDURE — 88305 TISSUE EXAM BY PATHOLOGIST: CPT | Mod: 26,,, | Performed by: PATHOLOGY

## 2021-02-19 PROCEDURE — 63600175 PHARM REV CODE 636 W HCPCS: Mod: PO | Performed by: INTERNAL MEDICINE

## 2021-02-19 RX ORDER — SODIUM CHLORIDE 0.9 % (FLUSH) 0.9 %
10 SYRINGE (ML) INJECTION
Status: DISCONTINUED | OUTPATIENT
Start: 2021-02-19 | End: 2021-02-19 | Stop reason: HOSPADM

## 2021-02-19 RX ORDER — PROPOFOL 10 MG/ML
VIAL (ML) INTRAVENOUS
Status: DISCONTINUED | OUTPATIENT
Start: 2021-02-19 | End: 2021-02-19

## 2021-02-19 RX ORDER — LIDOCAINE HCL/PF 100 MG/5ML
SYRINGE (ML) INTRAVENOUS
Status: DISCONTINUED | OUTPATIENT
Start: 2021-02-19 | End: 2021-02-19

## 2021-02-19 RX ORDER — SODIUM CHLORIDE, SODIUM LACTATE, POTASSIUM CHLORIDE, CALCIUM CHLORIDE 600; 310; 30; 20 MG/100ML; MG/100ML; MG/100ML; MG/100ML
INJECTION, SOLUTION INTRAVENOUS CONTINUOUS
Status: DISCONTINUED | OUTPATIENT
Start: 2021-02-19 | End: 2021-02-19 | Stop reason: HOSPADM

## 2021-02-19 RX ADMIN — PROPOFOL 100 MG: 10 INJECTION, EMULSION INTRAVENOUS at 09:02

## 2021-02-19 RX ADMIN — PROPOFOL 50 MG: 10 INJECTION, EMULSION INTRAVENOUS at 09:02

## 2021-02-19 RX ADMIN — SODIUM CHLORIDE, SODIUM LACTATE, POTASSIUM CHLORIDE, AND CALCIUM CHLORIDE: .6; .31; .03; .02 INJECTION, SOLUTION INTRAVENOUS at 08:02

## 2021-02-19 RX ADMIN — LIDOCAINE HYDROCHLORIDE 100 MG: 20 INJECTION, SOLUTION INTRAVENOUS at 09:02

## 2021-02-23 LAB
FINAL PATHOLOGIC DIAGNOSIS: NORMAL
GROSS: NORMAL
Lab: NORMAL

## 2021-02-26 ENCOUNTER — LAB VISIT (OUTPATIENT)
Dept: LAB | Facility: HOSPITAL | Age: 61
End: 2021-02-26
Attending: FAMILY MEDICINE
Payer: COMMERCIAL

## 2021-02-26 ENCOUNTER — OFFICE VISIT (OUTPATIENT)
Dept: FAMILY MEDICINE | Facility: CLINIC | Age: 61
End: 2021-02-26
Payer: COMMERCIAL

## 2021-02-26 VITALS
SYSTOLIC BLOOD PRESSURE: 134 MMHG | HEART RATE: 60 BPM | HEIGHT: 70 IN | DIASTOLIC BLOOD PRESSURE: 68 MMHG | WEIGHT: 194.44 LBS | TEMPERATURE: 98 F | RESPIRATION RATE: 14 BRPM | BODY MASS INDEX: 27.84 KG/M2

## 2021-02-26 DIAGNOSIS — R91.1 PULMONARY NODULE: ICD-10-CM

## 2021-02-26 DIAGNOSIS — K29.70 GASTRITIS, PRESENCE OF BLEEDING UNSPECIFIED, UNSPECIFIED CHRONICITY, UNSPECIFIED GASTRITIS TYPE: ICD-10-CM

## 2021-02-26 DIAGNOSIS — F41.9 ANXIETY: ICD-10-CM

## 2021-02-26 DIAGNOSIS — Z12.2 ENCOUNTER FOR SCREENING FOR MALIGNANT NEOPLASM OF RESPIRATORY ORGANS: ICD-10-CM

## 2021-02-26 DIAGNOSIS — E87.6 LOW SERUM POTASSIUM: ICD-10-CM

## 2021-02-26 DIAGNOSIS — F33.41 RECURRENT MAJOR DEPRESSIVE DISORDER, IN PARTIAL REMISSION: ICD-10-CM

## 2021-02-26 DIAGNOSIS — E11.9 TYPE 2 DIABETES MELLITUS WITHOUT COMPLICATION, WITHOUT LONG-TERM CURRENT USE OF INSULIN: ICD-10-CM

## 2021-02-26 DIAGNOSIS — E11.9 TYPE 2 DIABETES MELLITUS WITHOUT COMPLICATION, WITHOUT LONG-TERM CURRENT USE OF INSULIN: Primary | ICD-10-CM

## 2021-02-26 LAB
ALBUMIN SERPL BCP-MCNC: 4.1 G/DL (ref 3.5–5.2)
ALP SERPL-CCNC: 81 U/L (ref 55–135)
ALT SERPL W/O P-5'-P-CCNC: 17 U/L (ref 10–44)
ANION GAP SERPL CALC-SCNC: 10 MMOL/L (ref 8–16)
ANION GAP SERPL CALC-SCNC: 10 MMOL/L (ref 8–16)
AST SERPL-CCNC: 16 U/L (ref 10–40)
BILIRUB SERPL-MCNC: 0.2 MG/DL (ref 0.1–1)
BUN SERPL-MCNC: 9 MG/DL (ref 8–23)
BUN SERPL-MCNC: 9 MG/DL (ref 8–23)
CALCIUM SERPL-MCNC: 9.3 MG/DL (ref 8.7–10.5)
CALCIUM SERPL-MCNC: 9.3 MG/DL (ref 8.7–10.5)
CHLORIDE SERPL-SCNC: 103 MMOL/L (ref 95–110)
CHLORIDE SERPL-SCNC: 103 MMOL/L (ref 95–110)
CO2 SERPL-SCNC: 26 MMOL/L (ref 23–29)
CO2 SERPL-SCNC: 26 MMOL/L (ref 23–29)
CREAT SERPL-MCNC: 0.8 MG/DL (ref 0.5–1.4)
CREAT SERPL-MCNC: 0.8 MG/DL (ref 0.5–1.4)
EST. GFR  (AFRICAN AMERICAN): >60 ML/MIN/1.73 M^2
EST. GFR  (AFRICAN AMERICAN): >60 ML/MIN/1.73 M^2
EST. GFR  (NON AFRICAN AMERICAN): >60 ML/MIN/1.73 M^2
EST. GFR  (NON AFRICAN AMERICAN): >60 ML/MIN/1.73 M^2
GLUCOSE SERPL-MCNC: 99 MG/DL (ref 70–110)
GLUCOSE SERPL-MCNC: 99 MG/DL (ref 70–110)
POTASSIUM SERPL-SCNC: 3.8 MMOL/L (ref 3.5–5.1)
POTASSIUM SERPL-SCNC: 3.8 MMOL/L (ref 3.5–5.1)
PROT SERPL-MCNC: 7.2 G/DL (ref 6–8.4)
SODIUM SERPL-SCNC: 139 MMOL/L (ref 136–145)
SODIUM SERPL-SCNC: 139 MMOL/L (ref 136–145)

## 2021-02-26 PROCEDURE — 99214 OFFICE O/P EST MOD 30 MIN: CPT | Mod: S$GLB,,, | Performed by: FAMILY MEDICINE

## 2021-02-26 PROCEDURE — 83036 HEMOGLOBIN GLYCOSYLATED A1C: CPT

## 2021-02-26 PROCEDURE — 99999 PR PBB SHADOW E&M-EST. PATIENT-LVL V: ICD-10-PCS | Mod: PBBFAC,,, | Performed by: FAMILY MEDICINE

## 2021-02-26 PROCEDURE — 3044F PR MOST RECENT HEMOGLOBIN A1C LEVEL <7.0%: ICD-10-PCS | Mod: CPTII,S$GLB,, | Performed by: FAMILY MEDICINE

## 2021-02-26 PROCEDURE — 3008F PR BODY MASS INDEX (BMI) DOCUMENTED: ICD-10-PCS | Mod: CPTII,S$GLB,, | Performed by: FAMILY MEDICINE

## 2021-02-26 PROCEDURE — 1125F AMNT PAIN NOTED PAIN PRSNT: CPT | Mod: S$GLB,,, | Performed by: FAMILY MEDICINE

## 2021-02-26 PROCEDURE — 3008F BODY MASS INDEX DOCD: CPT | Mod: CPTII,S$GLB,, | Performed by: FAMILY MEDICINE

## 2021-02-26 PROCEDURE — 99214 PR OFFICE/OUTPT VISIT, EST, LEVL IV, 30-39 MIN: ICD-10-PCS | Mod: S$GLB,,, | Performed by: FAMILY MEDICINE

## 2021-02-26 PROCEDURE — 36415 COLL VENOUS BLD VENIPUNCTURE: CPT | Mod: PN

## 2021-02-26 PROCEDURE — 99999 PR PBB SHADOW E&M-EST. PATIENT-LVL V: CPT | Mod: PBBFAC,,, | Performed by: FAMILY MEDICINE

## 2021-02-26 PROCEDURE — 80053 COMPREHEN METABOLIC PANEL: CPT

## 2021-02-26 PROCEDURE — 3044F HG A1C LEVEL LT 7.0%: CPT | Mod: CPTII,S$GLB,, | Performed by: FAMILY MEDICINE

## 2021-02-26 PROCEDURE — 1125F PR PAIN SEVERITY QUANTIFIED, PAIN PRESENT: ICD-10-PCS | Mod: S$GLB,,, | Performed by: FAMILY MEDICINE

## 2021-02-26 RX ORDER — ALPRAZOLAM 0.25 MG/1
0.25 TABLET ORAL 2 TIMES DAILY
Qty: 60 TABLET | Refills: 2 | Status: CANCELLED | OUTPATIENT
Start: 2021-02-26

## 2021-02-27 LAB
ESTIMATED AVG GLUCOSE: 114 MG/DL (ref 68–131)
HBA1C MFR BLD: 5.6 % (ref 4–5.6)

## 2021-03-01 RX ORDER — ESCITALOPRAM OXALATE 10 MG/1
10 TABLET ORAL DAILY
Qty: 90 TABLET | Refills: 1 | Status: SHIPPED | OUTPATIENT
Start: 2021-03-01 | End: 2021-08-20

## 2021-03-01 RX ORDER — ATORVASTATIN CALCIUM 20 MG/1
20 TABLET, FILM COATED ORAL DAILY
Qty: 90 TABLET | Refills: 3 | Status: SHIPPED | OUTPATIENT
Start: 2021-03-01 | End: 2021-05-14 | Stop reason: SDUPTHER

## 2021-03-01 RX ORDER — LISINOPRIL 2.5 MG/1
2.5 TABLET ORAL DAILY
Qty: 90 TABLET | Refills: 1 | Status: SHIPPED | OUTPATIENT
Start: 2021-03-01 | End: 2021-09-13

## 2021-03-01 RX ORDER — BUPROPION HYDROCHLORIDE 150 MG/1
150 TABLET ORAL DAILY
Qty: 90 TABLET | Refills: 1 | Status: SHIPPED | OUTPATIENT
Start: 2021-03-01 | End: 2021-08-20

## 2021-03-02 ENCOUNTER — HOSPITAL ENCOUNTER (OUTPATIENT)
Dept: RADIOLOGY | Facility: HOSPITAL | Age: 61
Discharge: HOME | End: 2021-03-02
Attending: FAMILY MEDICINE
Payer: COMMERCIAL

## 2021-03-02 DIAGNOSIS — Z12.2 ENCOUNTER FOR SCREENING FOR MALIGNANT NEOPLASM OF RESPIRATORY ORGANS: ICD-10-CM

## 2021-03-02 PROCEDURE — 71271 CT THORAX LUNG CANCER SCR C-: CPT | Mod: 26,,, | Performed by: RADIOLOGY

## 2021-03-02 PROCEDURE — 71271 CT THORAX LUNG CANCER SCR C-: CPT | Mod: TC,PO

## 2021-03-02 PROCEDURE — 71271 CT CHEST LUNG SCREENING LOW DOSE: ICD-10-PCS | Mod: 26,,, | Performed by: RADIOLOGY

## 2021-03-11 ENCOUNTER — PATIENT MESSAGE (OUTPATIENT)
Dept: ADMINISTRATIVE | Facility: OTHER | Age: 61
End: 2021-03-11

## 2021-03-19 ENCOUNTER — OFFICE VISIT (OUTPATIENT)
Dept: FAMILY MEDICINE | Facility: CLINIC | Age: 61
End: 2021-03-19
Payer: COMMERCIAL

## 2021-03-19 VITALS
TEMPERATURE: 99 F | HEIGHT: 70 IN | RESPIRATION RATE: 17 BRPM | HEART RATE: 74 BPM | DIASTOLIC BLOOD PRESSURE: 64 MMHG | SYSTOLIC BLOOD PRESSURE: 118 MMHG | WEIGHT: 189.63 LBS | BODY MASS INDEX: 27.15 KG/M2

## 2021-03-19 DIAGNOSIS — I72.2 RENAL ARTERIAL ANEURYSM: ICD-10-CM

## 2021-03-19 DIAGNOSIS — K21.9 GASTROESOPHAGEAL REFLUX DISEASE, UNSPECIFIED WHETHER ESOPHAGITIS PRESENT: Primary | ICD-10-CM

## 2021-03-19 PROBLEM — R10.13 EPIGASTRIC PAIN: Status: RESOLVED | Noted: 2021-02-19 | Resolved: 2021-03-19

## 2021-03-19 PROBLEM — E11.49 TYPE II DIABETES MELLITUS WITH NEUROLOGICAL MANIFESTATIONS: Status: RESOLVED | Noted: 2019-12-30 | Resolved: 2021-03-19

## 2021-03-19 PROCEDURE — 99999 PR PBB SHADOW E&M-EST. PATIENT-LVL IV: ICD-10-PCS | Mod: PBBFAC,,, | Performed by: FAMILY MEDICINE

## 2021-03-19 PROCEDURE — 3008F PR BODY MASS INDEX (BMI) DOCUMENTED: ICD-10-PCS | Mod: CPTII,S$GLB,, | Performed by: FAMILY MEDICINE

## 2021-03-19 PROCEDURE — 99213 PR OFFICE/OUTPT VISIT, EST, LEVL III, 20-29 MIN: ICD-10-PCS | Mod: S$GLB,,, | Performed by: FAMILY MEDICINE

## 2021-03-19 PROCEDURE — 1126F AMNT PAIN NOTED NONE PRSNT: CPT | Mod: S$GLB,,, | Performed by: FAMILY MEDICINE

## 2021-03-19 PROCEDURE — 99213 OFFICE O/P EST LOW 20 MIN: CPT | Mod: S$GLB,,, | Performed by: FAMILY MEDICINE

## 2021-03-19 PROCEDURE — 1126F PR PAIN SEVERITY QUANTIFIED, NO PAIN PRESENT: ICD-10-PCS | Mod: S$GLB,,, | Performed by: FAMILY MEDICINE

## 2021-03-19 PROCEDURE — 3008F BODY MASS INDEX DOCD: CPT | Mod: CPTII,S$GLB,, | Performed by: FAMILY MEDICINE

## 2021-03-19 PROCEDURE — 99999 PR PBB SHADOW E&M-EST. PATIENT-LVL IV: CPT | Mod: PBBFAC,,, | Performed by: FAMILY MEDICINE

## 2021-03-23 ENCOUNTER — TELEPHONE (OUTPATIENT)
Dept: FAMILY MEDICINE | Facility: CLINIC | Age: 61
End: 2021-03-23

## 2021-03-24 ENCOUNTER — PATIENT MESSAGE (OUTPATIENT)
Dept: FAMILY MEDICINE | Facility: CLINIC | Age: 61
End: 2021-03-24

## 2021-03-30 ENCOUNTER — PATIENT MESSAGE (OUTPATIENT)
Dept: FAMILY MEDICINE | Facility: CLINIC | Age: 61
End: 2021-03-30

## 2021-04-03 ENCOUNTER — PATIENT MESSAGE (OUTPATIENT)
Dept: FAMILY MEDICINE | Facility: CLINIC | Age: 61
End: 2021-04-03

## 2021-04-12 ENCOUNTER — OFFICE VISIT (OUTPATIENT)
Dept: URGENT CARE | Facility: CLINIC | Age: 61
End: 2021-04-12
Payer: COMMERCIAL

## 2021-04-12 VITALS
SYSTOLIC BLOOD PRESSURE: 136 MMHG | OXYGEN SATURATION: 98 % | TEMPERATURE: 97 F | HEART RATE: 59 BPM | DIASTOLIC BLOOD PRESSURE: 77 MMHG | RESPIRATION RATE: 15 BRPM

## 2021-04-12 DIAGNOSIS — J40 BRONCHITIS: ICD-10-CM

## 2021-04-12 DIAGNOSIS — R09.81 SINUS CONGESTION: Primary | ICD-10-CM

## 2021-04-12 LAB
CTP QC/QA: YES
SARS-COV-2 RDRP RESP QL NAA+PROBE: NEGATIVE

## 2021-04-12 PROCEDURE — U0002 COVID-19 LAB TEST NON-CDC: HCPCS | Mod: QW,S$GLB,, | Performed by: PHYSICIAN ASSISTANT

## 2021-04-12 PROCEDURE — 99214 OFFICE O/P EST MOD 30 MIN: CPT | Mod: S$GLB,,, | Performed by: PHYSICIAN ASSISTANT

## 2021-04-12 PROCEDURE — U0002: ICD-10-PCS | Mod: QW,S$GLB,, | Performed by: PHYSICIAN ASSISTANT

## 2021-04-12 PROCEDURE — 99214 PR OFFICE/OUTPT VISIT, EST, LEVL IV, 30-39 MIN: ICD-10-PCS | Mod: S$GLB,,, | Performed by: PHYSICIAN ASSISTANT

## 2021-04-12 RX ORDER — BENZONATATE 200 MG/1
200 CAPSULE ORAL 3 TIMES DAILY PRN
Qty: 60 CAPSULE | Refills: 1 | Status: SHIPPED | OUTPATIENT
Start: 2021-04-12 | End: 2021-04-22

## 2021-04-12 RX ORDER — METHYLPREDNISOLONE 4 MG/1
TABLET ORAL
Qty: 1 PACKAGE | Refills: 0 | Status: SHIPPED | OUTPATIENT
Start: 2021-04-12 | End: 2021-06-04

## 2021-04-12 RX ORDER — PROMETHAZINE HYDROCHLORIDE AND DEXTROMETHORPHAN HYDROBROMIDE 6.25; 15 MG/5ML; MG/5ML
5 SYRUP ORAL NIGHTLY PRN
Qty: 180 ML | Refills: 1 | Status: SHIPPED | OUTPATIENT
Start: 2021-04-12 | End: 2021-04-22

## 2021-04-12 RX ORDER — ALBUTEROL SULFATE 90 UG/1
2 AEROSOL, METERED RESPIRATORY (INHALATION) EVERY 6 HOURS PRN
Qty: 18 G | Refills: 3 | Status: SHIPPED | OUTPATIENT
Start: 2021-04-12 | End: 2022-04-12

## 2021-04-15 ENCOUNTER — TELEPHONE (OUTPATIENT)
Dept: FAMILY MEDICINE | Facility: CLINIC | Age: 61
End: 2021-04-15

## 2021-04-15 ENCOUNTER — OFFICE VISIT (OUTPATIENT)
Dept: FAMILY MEDICINE | Facility: CLINIC | Age: 61
End: 2021-04-15
Payer: COMMERCIAL

## 2021-04-15 ENCOUNTER — TELEPHONE (OUTPATIENT)
Dept: URGENT CARE | Facility: CLINIC | Age: 61
End: 2021-04-15

## 2021-04-15 DIAGNOSIS — J40 BRONCHITIS: ICD-10-CM

## 2021-04-15 DIAGNOSIS — E11.9 TYPE 2 DIABETES MELLITUS WITHOUT COMPLICATION, WITHOUT LONG-TERM CURRENT USE OF INSULIN: ICD-10-CM

## 2021-04-15 DIAGNOSIS — J32.9 SINUSITIS, UNSPECIFIED CHRONICITY, UNSPECIFIED LOCATION: Primary | ICD-10-CM

## 2021-04-15 PROCEDURE — 99214 OFFICE O/P EST MOD 30 MIN: CPT | Mod: 95,,, | Performed by: PHYSICIAN ASSISTANT

## 2021-04-15 PROCEDURE — 99214 PR OFFICE/OUTPT VISIT, EST, LEVL IV, 30-39 MIN: ICD-10-PCS | Mod: 95,,, | Performed by: PHYSICIAN ASSISTANT

## 2021-04-15 RX ORDER — AZITHROMYCIN 250 MG/1
TABLET, FILM COATED ORAL
Qty: 6 TABLET | Refills: 0 | Status: SHIPPED | OUTPATIENT
Start: 2021-04-15 | End: 2021-07-23 | Stop reason: SDUPTHER

## 2021-04-23 ENCOUNTER — OFFICE VISIT (OUTPATIENT)
Dept: FAMILY MEDICINE | Facility: CLINIC | Age: 61
End: 2021-04-23
Payer: COMMERCIAL

## 2021-04-23 ENCOUNTER — PATIENT MESSAGE (OUTPATIENT)
Dept: FAMILY MEDICINE | Facility: CLINIC | Age: 61
End: 2021-04-23

## 2021-04-23 DIAGNOSIS — J32.9 SINUSITIS, UNSPECIFIED CHRONICITY, UNSPECIFIED LOCATION: ICD-10-CM

## 2021-04-23 DIAGNOSIS — T78.40XS ALLERGY, SEQUELA: ICD-10-CM

## 2021-04-23 DIAGNOSIS — J40 BRONCHITIS: Primary | ICD-10-CM

## 2021-04-23 DIAGNOSIS — J44.9 CHRONIC OBSTRUCTIVE PULMONARY DISEASE, UNSPECIFIED COPD TYPE: ICD-10-CM

## 2021-04-23 PROCEDURE — 99214 PR OFFICE/OUTPT VISIT, EST, LEVL IV, 30-39 MIN: ICD-10-PCS | Mod: 95,,, | Performed by: INTERNAL MEDICINE

## 2021-04-23 PROCEDURE — 99214 OFFICE O/P EST MOD 30 MIN: CPT | Mod: 95,,, | Performed by: INTERNAL MEDICINE

## 2021-04-23 RX ORDER — CODEINE PHOSPHATE AND GUAIFENESIN 10; 100 MG/5ML; MG/5ML
5 SOLUTION ORAL EVERY 6 HOURS PRN
Qty: 180 ML | Refills: 0 | Status: SHIPPED | OUTPATIENT
Start: 2021-04-23 | End: 2021-05-03

## 2021-04-23 RX ORDER — DOXYCYCLINE 100 MG/1
100 CAPSULE ORAL 2 TIMES DAILY
Qty: 20 CAPSULE | Refills: 0 | Status: SHIPPED | OUTPATIENT
Start: 2021-04-23 | End: 2021-05-03

## 2021-04-23 RX ORDER — METHYLPREDNISOLONE 4 MG/1
TABLET ORAL
Qty: 21 TABLET | Refills: 0 | Status: SHIPPED | OUTPATIENT
Start: 2021-04-23 | End: 2021-06-04

## 2021-04-23 RX ORDER — MONTELUKAST SODIUM 10 MG/1
10 TABLET ORAL NIGHTLY
Qty: 30 TABLET | Refills: 1 | Status: SHIPPED | OUTPATIENT
Start: 2021-04-23 | End: 2021-06-15

## 2021-05-14 DIAGNOSIS — F41.9 ANXIETY: ICD-10-CM

## 2021-05-14 DIAGNOSIS — E11.9 TYPE 2 DIABETES MELLITUS WITHOUT COMPLICATION, WITHOUT LONG-TERM CURRENT USE OF INSULIN: ICD-10-CM

## 2021-05-17 RX ORDER — ALPRAZOLAM 0.25 MG/1
0.25 TABLET ORAL 2 TIMES DAILY
Qty: 60 TABLET | Refills: 2 | Status: SHIPPED | OUTPATIENT
Start: 2021-05-17 | End: 2021-08-20

## 2021-05-17 RX ORDER — ATORVASTATIN CALCIUM 20 MG/1
20 TABLET, FILM COATED ORAL DAILY
Qty: 90 TABLET | Refills: 3 | Status: SHIPPED | OUTPATIENT
Start: 2021-05-17 | End: 2022-02-25 | Stop reason: SDUPTHER

## 2021-05-26 DIAGNOSIS — E11.9 TYPE 2 DIABETES MELLITUS WITHOUT COMPLICATION, UNSPECIFIED WHETHER LONG TERM INSULIN USE: ICD-10-CM

## 2021-06-04 ENCOUNTER — OFFICE VISIT (OUTPATIENT)
Dept: FAMILY MEDICINE | Facility: CLINIC | Age: 61
End: 2021-06-04
Payer: COMMERCIAL

## 2021-06-04 VITALS
OXYGEN SATURATION: 99 % | TEMPERATURE: 99 F | BODY MASS INDEX: 27.21 KG/M2 | HEART RATE: 77 BPM | WEIGHT: 190.06 LBS | SYSTOLIC BLOOD PRESSURE: 132 MMHG | HEIGHT: 70 IN | DIASTOLIC BLOOD PRESSURE: 70 MMHG

## 2021-06-04 DIAGNOSIS — J01.90 ACUTE BACTERIAL SINUSITIS: Primary | ICD-10-CM

## 2021-06-04 DIAGNOSIS — B96.89 ACUTE BACTERIAL SINUSITIS: Primary | ICD-10-CM

## 2021-06-04 DIAGNOSIS — D21.9 FIBROID: ICD-10-CM

## 2021-06-04 DIAGNOSIS — F41.9 ANXIETY: ICD-10-CM

## 2021-06-04 PROCEDURE — 99999 PR PBB SHADOW E&M-EST. PATIENT-LVL V: ICD-10-PCS | Mod: PBBFAC,,, | Performed by: FAMILY MEDICINE

## 2021-06-04 PROCEDURE — 1125F AMNT PAIN NOTED PAIN PRSNT: CPT | Mod: S$GLB,,, | Performed by: FAMILY MEDICINE

## 2021-06-04 PROCEDURE — 3008F PR BODY MASS INDEX (BMI) DOCUMENTED: ICD-10-PCS | Mod: CPTII,S$GLB,, | Performed by: FAMILY MEDICINE

## 2021-06-04 PROCEDURE — 99214 OFFICE O/P EST MOD 30 MIN: CPT | Mod: S$GLB,,, | Performed by: FAMILY MEDICINE

## 2021-06-04 PROCEDURE — 99999 PR PBB SHADOW E&M-EST. PATIENT-LVL V: CPT | Mod: PBBFAC,,, | Performed by: FAMILY MEDICINE

## 2021-06-04 PROCEDURE — 99214 PR OFFICE/OUTPT VISIT, EST, LEVL IV, 30-39 MIN: ICD-10-PCS | Mod: S$GLB,,, | Performed by: FAMILY MEDICINE

## 2021-06-04 PROCEDURE — 3008F BODY MASS INDEX DOCD: CPT | Mod: CPTII,S$GLB,, | Performed by: FAMILY MEDICINE

## 2021-06-04 PROCEDURE — 1125F PR PAIN SEVERITY QUANTIFIED, PAIN PRESENT: ICD-10-PCS | Mod: S$GLB,,, | Performed by: FAMILY MEDICINE

## 2021-06-04 RX ORDER — PREDNISONE 20 MG/1
TABLET ORAL
Qty: 11 TABLET | Refills: 0 | Status: SHIPPED | OUTPATIENT
Start: 2021-06-04 | End: 2021-06-13

## 2021-06-04 RX ORDER — DOXYCYCLINE HYCLATE 100 MG
100 TABLET ORAL 2 TIMES DAILY
Qty: 14 TABLET | Refills: 0 | Status: SHIPPED | OUTPATIENT
Start: 2021-06-04 | End: 2021-08-20

## 2021-06-11 ENCOUNTER — TELEPHONE (OUTPATIENT)
Dept: FAMILY MEDICINE | Facility: CLINIC | Age: 61
End: 2021-06-11

## 2021-07-16 ENCOUNTER — HOSPITAL ENCOUNTER (OUTPATIENT)
Dept: RADIOLOGY | Facility: HOSPITAL | Age: 61
Discharge: HOME | End: 2021-07-16
Attending: FAMILY MEDICINE
Payer: COMMERCIAL

## 2021-07-16 DIAGNOSIS — D21.9 FIBROID: ICD-10-CM

## 2021-07-16 PROCEDURE — 76856 US PELVIS COMP WITH TRANSVAG NON-OB (XPD): ICD-10-PCS | Mod: 26,,, | Performed by: RADIOLOGY

## 2021-07-16 PROCEDURE — 76830 US PELVIS COMP WITH TRANSVAG NON-OB (XPD): ICD-10-PCS | Mod: 26,,, | Performed by: RADIOLOGY

## 2021-07-16 PROCEDURE — 76856 US EXAM PELVIC COMPLETE: CPT | Mod: TC,PO

## 2021-07-16 PROCEDURE — 76830 TRANSVAGINAL US NON-OB: CPT | Mod: 26,,, | Performed by: RADIOLOGY

## 2021-07-16 PROCEDURE — 76856 US EXAM PELVIC COMPLETE: CPT | Mod: 26,,, | Performed by: RADIOLOGY

## 2021-07-21 ENCOUNTER — PATIENT MESSAGE (OUTPATIENT)
Dept: FAMILY MEDICINE | Facility: CLINIC | Age: 61
End: 2021-07-21

## 2021-07-23 ENCOUNTER — OFFICE VISIT (OUTPATIENT)
Dept: FAMILY MEDICINE | Facility: CLINIC | Age: 61
End: 2021-07-23
Payer: COMMERCIAL

## 2021-07-23 DIAGNOSIS — R05.9 COUGH: ICD-10-CM

## 2021-07-23 DIAGNOSIS — R68.83 CHILLS: ICD-10-CM

## 2021-07-23 PROCEDURE — U0003 INFECTIOUS AGENT DETECTION BY NUCLEIC ACID (DNA OR RNA); SEVERE ACUTE RESPIRATORY SYNDROME CORONAVIRUS 2 (SARS-COV-2) (CORONAVIRUS DISEASE [COVID-19]), AMPLIFIED PROBE TECHNIQUE, MAKING USE OF HIGH THROUGHPUT TECHNOLOGIES AS DESCRIBED BY CMS-2020-01-R: HCPCS | Performed by: FAMILY MEDICINE

## 2021-07-23 PROCEDURE — 99213 OFFICE O/P EST LOW 20 MIN: CPT | Mod: 95,,, | Performed by: FAMILY MEDICINE

## 2021-07-23 PROCEDURE — 3044F HG A1C LEVEL LT 7.0%: CPT | Mod: CPTII,,, | Performed by: FAMILY MEDICINE

## 2021-07-23 PROCEDURE — 99213 PR OFFICE/OUTPT VISIT, EST, LEVL III, 20-29 MIN: ICD-10-PCS | Mod: 95,,, | Performed by: FAMILY MEDICINE

## 2021-07-23 PROCEDURE — U0005 INFEC AGEN DETEC AMPLI PROBE: HCPCS | Performed by: FAMILY MEDICINE

## 2021-07-23 PROCEDURE — 3044F PR MOST RECENT HEMOGLOBIN A1C LEVEL <7.0%: ICD-10-PCS | Mod: CPTII,,, | Performed by: FAMILY MEDICINE

## 2021-07-23 RX ORDER — METHYLPREDNISOLONE 4 MG/1
TABLET ORAL
Qty: 1 PACKAGE | Refills: 0 | Status: SHIPPED | OUTPATIENT
Start: 2021-07-23 | End: 2021-08-13

## 2021-07-23 RX ORDER — AZITHROMYCIN 250 MG/1
TABLET, FILM COATED ORAL
Qty: 6 TABLET | Refills: 0 | Status: SHIPPED | OUTPATIENT
Start: 2021-07-23 | End: 2021-08-20

## 2021-07-24 ENCOUNTER — PATIENT MESSAGE (OUTPATIENT)
Dept: FAMILY MEDICINE | Facility: CLINIC | Age: 61
End: 2021-07-24

## 2021-07-24 LAB
SARS-COV-2 RNA RESP QL NAA+PROBE: NOT DETECTED
SARS-COV-2- CYCLE NUMBER: -1

## 2021-07-26 RX ORDER — HYDROCODONE POLISTIREX AND CHLORPHENIRAMINE POLISTIREX 10; 8 MG/5ML; MG/5ML
5 SUSPENSION, EXTENDED RELEASE ORAL EVERY 12 HOURS PRN
Qty: 120 ML | Refills: 0 | Status: SHIPPED | OUTPATIENT
Start: 2021-07-26 | End: 2021-08-20

## 2021-08-15 ENCOUNTER — PATIENT MESSAGE (OUTPATIENT)
Dept: FAMILY MEDICINE | Facility: CLINIC | Age: 61
End: 2021-08-15

## 2021-08-15 DIAGNOSIS — Z00.00 ROUTINE GENERAL MEDICAL EXAMINATION AT A HEALTH CARE FACILITY: Primary | ICD-10-CM

## 2021-08-20 ENCOUNTER — LAB VISIT (OUTPATIENT)
Dept: LAB | Facility: HOSPITAL | Age: 61
End: 2021-08-20
Attending: FAMILY MEDICINE
Payer: COMMERCIAL

## 2021-08-20 ENCOUNTER — OFFICE VISIT (OUTPATIENT)
Dept: FAMILY MEDICINE | Facility: CLINIC | Age: 61
End: 2021-08-20
Payer: COMMERCIAL

## 2021-08-20 VITALS
BODY MASS INDEX: 27.08 KG/M2 | SYSTOLIC BLOOD PRESSURE: 126 MMHG | RESPIRATION RATE: 14 BRPM | WEIGHT: 189.13 LBS | HEART RATE: 63 BPM | DIASTOLIC BLOOD PRESSURE: 68 MMHG | TEMPERATURE: 99 F | HEIGHT: 70 IN

## 2021-08-20 DIAGNOSIS — Z00.00 ROUTINE GENERAL MEDICAL EXAMINATION AT A HEALTH CARE FACILITY: ICD-10-CM

## 2021-08-20 DIAGNOSIS — F41.9 ANXIETY: ICD-10-CM

## 2021-08-20 DIAGNOSIS — Z01.84 ENCOUNTER FOR ANTIBODY RESPONSE EXAMINATION: ICD-10-CM

## 2021-08-20 DIAGNOSIS — F33.41 RECURRENT MAJOR DEPRESSIVE DISORDER, IN PARTIAL REMISSION: Primary | ICD-10-CM

## 2021-08-20 LAB
ALBUMIN SERPL BCP-MCNC: 3.9 G/DL (ref 3.5–5.2)
ALP SERPL-CCNC: 71 U/L (ref 55–135)
ALT SERPL W/O P-5'-P-CCNC: 17 U/L (ref 10–44)
ANION GAP SERPL CALC-SCNC: 13 MMOL/L (ref 8–16)
AST SERPL-CCNC: 14 U/L (ref 10–40)
BILIRUB SERPL-MCNC: 0.4 MG/DL (ref 0.1–1)
BUN SERPL-MCNC: 7 MG/DL (ref 8–23)
CALCIUM SERPL-MCNC: 9.9 MG/DL (ref 8.7–10.5)
CHLORIDE SERPL-SCNC: 101 MMOL/L (ref 95–110)
CHOLEST SERPL-MCNC: 168 MG/DL (ref 120–199)
CHOLEST/HDLC SERPL: 3.2 {RATIO} (ref 2–5)
CO2 SERPL-SCNC: 25 MMOL/L (ref 23–29)
CREAT SERPL-MCNC: 0.8 MG/DL (ref 0.5–1.4)
EST. GFR  (AFRICAN AMERICAN): >60 ML/MIN/1.73 M^2
EST. GFR  (NON AFRICAN AMERICAN): >60 ML/MIN/1.73 M^2
GLUCOSE SERPL-MCNC: 92 MG/DL (ref 70–110)
HDLC SERPL-MCNC: 52 MG/DL (ref 40–75)
HDLC SERPL: 31 % (ref 20–50)
LDLC SERPL CALC-MCNC: 91.8 MG/DL (ref 63–159)
NONHDLC SERPL-MCNC: 116 MG/DL
POTASSIUM SERPL-SCNC: 3.7 MMOL/L (ref 3.5–5.1)
PROT SERPL-MCNC: 7.2 G/DL (ref 6–8.4)
SARS-COV-2 IGG SERPL IA-ACNC: 351.2 AU/ML
SARS-COV-2 IGG SERPL QL IA: POSITIVE
SODIUM SERPL-SCNC: 139 MMOL/L (ref 136–145)
TRIGL SERPL-MCNC: 121 MG/DL (ref 30–150)
TSH SERPL DL<=0.005 MIU/L-ACNC: 1.79 UIU/ML (ref 0.4–4)

## 2021-08-20 PROCEDURE — 3044F PR MOST RECENT HEMOGLOBIN A1C LEVEL <7.0%: ICD-10-PCS | Mod: CPTII,S$GLB,, | Performed by: FAMILY MEDICINE

## 2021-08-20 PROCEDURE — 99999 PR PBB SHADOW E&M-EST. PATIENT-LVL IV: CPT | Mod: PBBFAC,,, | Performed by: FAMILY MEDICINE

## 2021-08-20 PROCEDURE — 99214 OFFICE O/P EST MOD 30 MIN: CPT | Mod: S$GLB,,, | Performed by: FAMILY MEDICINE

## 2021-08-20 PROCEDURE — 85025 COMPLETE CBC W/AUTO DIFF WBC: CPT | Performed by: FAMILY MEDICINE

## 2021-08-20 PROCEDURE — 86769 SARS-COV-2 COVID-19 ANTIBODY: CPT | Performed by: FAMILY MEDICINE

## 2021-08-20 PROCEDURE — 80061 LIPID PANEL: CPT | Performed by: FAMILY MEDICINE

## 2021-08-20 PROCEDURE — 99214 PR OFFICE/OUTPT VISIT, EST, LEVL IV, 30-39 MIN: ICD-10-PCS | Mod: S$GLB,,, | Performed by: FAMILY MEDICINE

## 2021-08-20 PROCEDURE — 3044F HG A1C LEVEL LT 7.0%: CPT | Mod: CPTII,S$GLB,, | Performed by: FAMILY MEDICINE

## 2021-08-20 PROCEDURE — 3078F DIAST BP <80 MM HG: CPT | Mod: CPTII,S$GLB,, | Performed by: FAMILY MEDICINE

## 2021-08-20 PROCEDURE — 36415 COLL VENOUS BLD VENIPUNCTURE: CPT | Mod: PN | Performed by: FAMILY MEDICINE

## 2021-08-20 PROCEDURE — 3074F SYST BP LT 130 MM HG: CPT | Mod: CPTII,S$GLB,, | Performed by: FAMILY MEDICINE

## 2021-08-20 PROCEDURE — 80053 COMPREHEN METABOLIC PANEL: CPT | Performed by: FAMILY MEDICINE

## 2021-08-20 PROCEDURE — 3008F BODY MASS INDEX DOCD: CPT | Mod: CPTII,S$GLB,, | Performed by: FAMILY MEDICINE

## 2021-08-20 PROCEDURE — 3008F PR BODY MASS INDEX (BMI) DOCUMENTED: ICD-10-PCS | Mod: CPTII,S$GLB,, | Performed by: FAMILY MEDICINE

## 2021-08-20 PROCEDURE — 84443 ASSAY THYROID STIM HORMONE: CPT | Performed by: FAMILY MEDICINE

## 2021-08-20 PROCEDURE — 99999 PR PBB SHADOW E&M-EST. PATIENT-LVL IV: ICD-10-PCS | Mod: PBBFAC,,, | Performed by: FAMILY MEDICINE

## 2021-08-20 PROCEDURE — 3078F PR MOST RECENT DIASTOLIC BLOOD PRESSURE < 80 MM HG: ICD-10-PCS | Mod: CPTII,S$GLB,, | Performed by: FAMILY MEDICINE

## 2021-08-20 PROCEDURE — 3074F PR MOST RECENT SYSTOLIC BLOOD PRESSURE < 130 MM HG: ICD-10-PCS | Mod: CPTII,S$GLB,, | Performed by: FAMILY MEDICINE

## 2021-08-20 RX ORDER — ALPRAZOLAM 0.25 MG/1
0.25 TABLET ORAL 2 TIMES DAILY
Qty: 60 TABLET | Refills: 2 | Status: CANCELLED | OUTPATIENT
Start: 2021-08-20

## 2021-08-21 LAB
BASOPHILS # BLD AUTO: 0.01 K/UL (ref 0–0.2)
BASOPHILS # BLD AUTO: 0.01 K/UL (ref 0–0.2)
BASOPHILS NFR BLD: 0.2 % (ref 0–1.9)
BASOPHILS NFR BLD: 0.2 % (ref 0–1.9)
DIFFERENTIAL METHOD: ABNORMAL
DIFFERENTIAL METHOD: ABNORMAL
EOSINOPHIL # BLD AUTO: 0.1 K/UL (ref 0–0.5)
EOSINOPHIL # BLD AUTO: 0.1 K/UL (ref 0–0.5)
EOSINOPHIL NFR BLD: 1.1 % (ref 0–8)
EOSINOPHIL NFR BLD: 1.1 % (ref 0–8)
ERYTHROCYTE [DISTWIDTH] IN BLOOD BY AUTOMATED COUNT: 14 % (ref 11.5–14.5)
ERYTHROCYTE [DISTWIDTH] IN BLOOD BY AUTOMATED COUNT: 14 % (ref 11.5–14.5)
HCT VFR BLD AUTO: 43.9 % (ref 37–48.5)
HCT VFR BLD AUTO: 43.9 % (ref 37–48.5)
HGB BLD-MCNC: 13.6 G/DL (ref 12–16)
HGB BLD-MCNC: 13.6 G/DL (ref 12–16)
IMM GRANULOCYTES # BLD AUTO: 0.02 K/UL (ref 0–0.04)
IMM GRANULOCYTES # BLD AUTO: 0.02 K/UL (ref 0–0.04)
IMM GRANULOCYTES NFR BLD AUTO: 0.3 % (ref 0–0.5)
IMM GRANULOCYTES NFR BLD AUTO: 0.3 % (ref 0–0.5)
LYMPHOCYTES # BLD AUTO: 2.5 K/UL (ref 1–4.8)
LYMPHOCYTES # BLD AUTO: 2.5 K/UL (ref 1–4.8)
LYMPHOCYTES NFR BLD: 39.1 % (ref 18–48)
LYMPHOCYTES NFR BLD: 39.1 % (ref 18–48)
MCH RBC QN AUTO: 27.8 PG (ref 27–31)
MCH RBC QN AUTO: 27.8 PG (ref 27–31)
MCHC RBC AUTO-ENTMCNC: 31 G/DL (ref 32–36)
MCHC RBC AUTO-ENTMCNC: 31 G/DL (ref 32–36)
MCV RBC AUTO: 90 FL (ref 82–98)
MCV RBC AUTO: 90 FL (ref 82–98)
MONOCYTES # BLD AUTO: 0.5 K/UL (ref 0.3–1)
MONOCYTES # BLD AUTO: 0.5 K/UL (ref 0.3–1)
MONOCYTES NFR BLD: 7.7 % (ref 4–15)
MONOCYTES NFR BLD: 7.7 % (ref 4–15)
NEUTROPHILS # BLD AUTO: 3.3 K/UL (ref 1.8–7.7)
NEUTROPHILS # BLD AUTO: 3.3 K/UL (ref 1.8–7.7)
NEUTROPHILS NFR BLD: 51.6 % (ref 38–73)
NEUTROPHILS NFR BLD: 51.6 % (ref 38–73)
NRBC BLD-RTO: 0 /100 WBC
NRBC BLD-RTO: 0 /100 WBC
PLATELET # BLD AUTO: 290 K/UL (ref 150–450)
PLATELET # BLD AUTO: 290 K/UL (ref 150–450)
PMV BLD AUTO: 11.1 FL (ref 9.2–12.9)
PMV BLD AUTO: 11.1 FL (ref 9.2–12.9)
RBC # BLD AUTO: 4.89 M/UL (ref 4–5.4)
RBC # BLD AUTO: 4.89 M/UL (ref 4–5.4)
WBC # BLD AUTO: 6.37 K/UL (ref 3.9–12.7)
WBC # BLD AUTO: 6.37 K/UL (ref 3.9–12.7)

## 2021-08-23 ENCOUNTER — PATIENT MESSAGE (OUTPATIENT)
Dept: FAMILY MEDICINE | Facility: CLINIC | Age: 61
End: 2021-08-23

## 2021-08-26 ENCOUNTER — OFFICE VISIT (OUTPATIENT)
Dept: FAMILY MEDICINE | Facility: CLINIC | Age: 61
End: 2021-08-26
Payer: COMMERCIAL

## 2021-08-26 DIAGNOSIS — E11.9 TYPE 2 DIABETES MELLITUS WITHOUT COMPLICATION, WITHOUT LONG-TERM CURRENT USE OF INSULIN: ICD-10-CM

## 2021-08-26 DIAGNOSIS — Z01.84 ENCOUNTER FOR ANTIBODY RESPONSE EXAMINATION: Primary | ICD-10-CM

## 2021-08-26 PROCEDURE — 99213 PR OFFICE/OUTPT VISIT, EST, LEVL III, 20-29 MIN: ICD-10-PCS | Mod: 95,,, | Performed by: FAMILY MEDICINE

## 2021-08-26 PROCEDURE — 1159F PR MEDICATION LIST DOCUMENTED IN MEDICAL RECORD: ICD-10-PCS | Mod: CPTII,,, | Performed by: FAMILY MEDICINE

## 2021-08-26 PROCEDURE — 99213 OFFICE O/P EST LOW 20 MIN: CPT | Mod: 95,,, | Performed by: FAMILY MEDICINE

## 2021-08-26 PROCEDURE — 3044F PR MOST RECENT HEMOGLOBIN A1C LEVEL <7.0%: ICD-10-PCS | Mod: CPTII,,, | Performed by: FAMILY MEDICINE

## 2021-08-26 PROCEDURE — 3044F HG A1C LEVEL LT 7.0%: CPT | Mod: CPTII,,, | Performed by: FAMILY MEDICINE

## 2021-08-26 PROCEDURE — 1159F MED LIST DOCD IN RCRD: CPT | Mod: CPTII,,, | Performed by: FAMILY MEDICINE

## 2021-08-26 PROCEDURE — 1160F RVW MEDS BY RX/DR IN RCRD: CPT | Mod: CPTII,,, | Performed by: FAMILY MEDICINE

## 2021-08-26 PROCEDURE — 1160F PR REVIEW ALL MEDS BY PRESCRIBER/CLIN PHARMACIST DOCUMENTED: ICD-10-PCS | Mod: CPTII,,, | Performed by: FAMILY MEDICINE

## 2021-08-30 RX ORDER — BUPROPION HYDROCHLORIDE 150 MG/1
150 TABLET ORAL DAILY
Qty: 90 TABLET | Refills: 1 | Status: SHIPPED | OUTPATIENT
Start: 2021-08-30 | End: 2022-02-25 | Stop reason: SDUPTHER

## 2021-08-30 RX ORDER — ESCITALOPRAM OXALATE 10 MG/1
10 TABLET ORAL DAILY
Qty: 90 TABLET | Refills: 1 | Status: SHIPPED | OUTPATIENT
Start: 2021-08-30 | End: 2022-01-03 | Stop reason: SDUPTHER

## 2021-09-15 ENCOUNTER — PATIENT MESSAGE (OUTPATIENT)
Dept: FAMILY MEDICINE | Facility: CLINIC | Age: 61
End: 2021-09-15

## 2021-09-17 ENCOUNTER — OFFICE VISIT (OUTPATIENT)
Dept: FAMILY MEDICINE | Facility: CLINIC | Age: 61
End: 2021-09-17
Payer: COMMERCIAL

## 2021-09-17 ENCOUNTER — LAB VISIT (OUTPATIENT)
Dept: FAMILY MEDICINE | Facility: CLINIC | Age: 61
End: 2021-09-17
Payer: COMMERCIAL

## 2021-09-17 DIAGNOSIS — B96.89 ACUTE BACTERIAL SINUSITIS: Primary | ICD-10-CM

## 2021-09-17 DIAGNOSIS — J01.90 ACUTE BACTERIAL SINUSITIS: Primary | ICD-10-CM

## 2021-09-17 DIAGNOSIS — Z20.822 EXPOSURE TO COVID-19 VIRUS: Primary | ICD-10-CM

## 2021-09-17 PROCEDURE — 3044F HG A1C LEVEL LT 7.0%: CPT | Mod: CPTII,95,, | Performed by: FAMILY MEDICINE

## 2021-09-17 PROCEDURE — U0003 INFECTIOUS AGENT DETECTION BY NUCLEIC ACID (DNA OR RNA); SEVERE ACUTE RESPIRATORY SYNDROME CORONAVIRUS 2 (SARS-COV-2) (CORONAVIRUS DISEASE [COVID-19]), AMPLIFIED PROBE TECHNIQUE, MAKING USE OF HIGH THROUGHPUT TECHNOLOGIES AS DESCRIBED BY CMS-2020-01-R: HCPCS | Performed by: FAMILY MEDICINE

## 2021-09-17 PROCEDURE — 1160F PR REVIEW ALL MEDS BY PRESCRIBER/CLIN PHARMACIST DOCUMENTED: ICD-10-PCS | Mod: CPTII,95,, | Performed by: FAMILY MEDICINE

## 2021-09-17 PROCEDURE — 1160F RVW MEDS BY RX/DR IN RCRD: CPT | Mod: CPTII,95,, | Performed by: FAMILY MEDICINE

## 2021-09-17 PROCEDURE — 4010F PR ACE/ARB THEARPY RXD/TAKEN: ICD-10-PCS | Mod: CPTII,95,, | Performed by: FAMILY MEDICINE

## 2021-09-17 PROCEDURE — 3066F NEPHROPATHY DOC TX: CPT | Mod: CPTII,95,, | Performed by: FAMILY MEDICINE

## 2021-09-17 PROCEDURE — 4010F ACE/ARB THERAPY RXD/TAKEN: CPT | Mod: CPTII,95,, | Performed by: FAMILY MEDICINE

## 2021-09-17 PROCEDURE — 3044F PR MOST RECENT HEMOGLOBIN A1C LEVEL <7.0%: ICD-10-PCS | Mod: CPTII,95,, | Performed by: FAMILY MEDICINE

## 2021-09-17 PROCEDURE — 99213 PR OFFICE/OUTPT VISIT, EST, LEVL III, 20-29 MIN: ICD-10-PCS | Mod: 95,,, | Performed by: FAMILY MEDICINE

## 2021-09-17 PROCEDURE — 3061F NEG MICROALBUMINURIA REV: CPT | Mod: CPTII,95,, | Performed by: FAMILY MEDICINE

## 2021-09-17 PROCEDURE — 3061F PR NEG MICROALBUMINURIA RESULT DOCUMENTED/REVIEW: ICD-10-PCS | Mod: CPTII,95,, | Performed by: FAMILY MEDICINE

## 2021-09-17 PROCEDURE — 99213 OFFICE O/P EST LOW 20 MIN: CPT | Mod: 95,,, | Performed by: FAMILY MEDICINE

## 2021-09-17 PROCEDURE — 3066F PR DOCUMENTATION OF TREATMENT FOR NEPHROPATHY: ICD-10-PCS | Mod: CPTII,95,, | Performed by: FAMILY MEDICINE

## 2021-09-17 PROCEDURE — 1159F PR MEDICATION LIST DOCUMENTED IN MEDICAL RECORD: ICD-10-PCS | Mod: CPTII,95,, | Performed by: FAMILY MEDICINE

## 2021-09-17 PROCEDURE — 1159F MED LIST DOCD IN RCRD: CPT | Mod: CPTII,95,, | Performed by: FAMILY MEDICINE

## 2021-09-17 PROCEDURE — U0005 INFEC AGEN DETEC AMPLI PROBE: HCPCS | Performed by: FAMILY MEDICINE

## 2021-09-17 RX ORDER — AZITHROMYCIN 250 MG/1
TABLET, FILM COATED ORAL
Qty: 6 TABLET | Refills: 0 | Status: SHIPPED | OUTPATIENT
Start: 2021-09-17 | End: 2021-09-22

## 2021-09-17 RX ORDER — HYDROCODONE POLISTIREX AND CHLORPHENIRAMINE POLISTIREX 10; 8 MG/5ML; MG/5ML
5 SUSPENSION, EXTENDED RELEASE ORAL EVERY 12 HOURS PRN
Qty: 120 ML | Refills: 0 | Status: SHIPPED | OUTPATIENT
Start: 2021-09-17 | End: 2021-09-24 | Stop reason: SDUPTHER

## 2021-09-17 RX ORDER — METHYLPREDNISOLONE 4 MG/1
TABLET ORAL
Qty: 1 PACKAGE | Refills: 0 | Status: SHIPPED | OUTPATIENT
Start: 2021-09-17 | End: 2021-10-08

## 2021-09-18 ENCOUNTER — PATIENT MESSAGE (OUTPATIENT)
Dept: FAMILY MEDICINE | Facility: CLINIC | Age: 61
End: 2021-09-18

## 2021-09-18 LAB
SARS-COV-2 RNA RESP QL NAA+PROBE: NOT DETECTED
SARS-COV-2- CYCLE NUMBER: NORMAL

## 2021-09-19 ENCOUNTER — PATIENT MESSAGE (OUTPATIENT)
Dept: FAMILY MEDICINE | Facility: CLINIC | Age: 61
End: 2021-09-19

## 2021-09-24 ENCOUNTER — PATIENT MESSAGE (OUTPATIENT)
Dept: FAMILY MEDICINE | Facility: CLINIC | Age: 61
End: 2021-09-24

## 2021-09-24 RX ORDER — HYDROCODONE POLISTIREX AND CHLORPHENIRAMINE POLISTIREX 10; 8 MG/5ML; MG/5ML
5 SUSPENSION, EXTENDED RELEASE ORAL EVERY 12 HOURS PRN
Qty: 120 ML | Refills: 0 | Status: SHIPPED | OUTPATIENT
Start: 2021-09-24 | End: 2021-10-29 | Stop reason: SDUPTHER

## 2021-10-01 ENCOUNTER — CLINICAL SUPPORT (OUTPATIENT)
Dept: FAMILY MEDICINE | Facility: CLINIC | Age: 61
End: 2021-10-01
Payer: COMMERCIAL

## 2021-10-01 DIAGNOSIS — Z23 ENCOUNTER FOR ADMINISTRATION OF VACCINE: Primary | ICD-10-CM

## 2021-10-01 PROCEDURE — 90686 IIV4 VACC NO PRSV 0.5 ML IM: CPT | Mod: S$GLB,,, | Performed by: FAMILY MEDICINE

## 2021-10-01 PROCEDURE — 90471 FLU VACCINE (QUAD) GREATER THAN OR EQUAL TO 3YO PRESERVATIVE FREE IM: ICD-10-PCS | Mod: S$GLB,,, | Performed by: FAMILY MEDICINE

## 2021-10-01 PROCEDURE — 90686 FLU VACCINE (QUAD) GREATER THAN OR EQUAL TO 3YO PRESERVATIVE FREE IM: ICD-10-PCS | Mod: S$GLB,,, | Performed by: FAMILY MEDICINE

## 2021-10-01 PROCEDURE — 90471 IMMUNIZATION ADMIN: CPT | Mod: S$GLB,,, | Performed by: FAMILY MEDICINE

## 2021-10-13 ENCOUNTER — PATIENT MESSAGE (OUTPATIENT)
Dept: FAMILY MEDICINE | Facility: CLINIC | Age: 61
End: 2021-10-13
Payer: COMMERCIAL

## 2021-10-29 ENCOUNTER — OFFICE VISIT (OUTPATIENT)
Dept: FAMILY MEDICINE | Facility: CLINIC | Age: 61
End: 2021-10-29
Payer: COMMERCIAL

## 2021-10-29 DIAGNOSIS — R05.9 COUGH: ICD-10-CM

## 2021-10-29 DIAGNOSIS — J01.90 ACUTE BACTERIAL SINUSITIS: Primary | ICD-10-CM

## 2021-10-29 DIAGNOSIS — B96.89 ACUTE BACTERIAL SINUSITIS: Primary | ICD-10-CM

## 2021-10-29 LAB
CTP QC/QA: YES
CTP QC/QA: YES
POC MOLECULAR INFLUENZA A AGN: NEGATIVE
POC MOLECULAR INFLUENZA B AGN: NEGATIVE
S PYO RRNA THROAT QL PROBE: NEGATIVE

## 2021-10-29 PROCEDURE — 3066F NEPHROPATHY DOC TX: CPT | Mod: CPTII,95,, | Performed by: FAMILY MEDICINE

## 2021-10-29 PROCEDURE — 1159F MED LIST DOCD IN RCRD: CPT | Mod: CPTII,95,, | Performed by: FAMILY MEDICINE

## 2021-10-29 PROCEDURE — 99213 OFFICE O/P EST LOW 20 MIN: CPT | Mod: 95,,, | Performed by: FAMILY MEDICINE

## 2021-10-29 PROCEDURE — 87081 CULTURE SCREEN ONLY: CPT | Performed by: FAMILY MEDICINE

## 2021-10-29 PROCEDURE — U0003 INFECTIOUS AGENT DETECTION BY NUCLEIC ACID (DNA OR RNA); SEVERE ACUTE RESPIRATORY SYNDROME CORONAVIRUS 2 (SARS-COV-2) (CORONAVIRUS DISEASE [COVID-19]), AMPLIFIED PROBE TECHNIQUE, MAKING USE OF HIGH THROUGHPUT TECHNOLOGIES AS DESCRIBED BY CMS-2020-01-R: HCPCS | Performed by: FAMILY MEDICINE

## 2021-10-29 PROCEDURE — 1160F PR REVIEW ALL MEDS BY PRESCRIBER/CLIN PHARMACIST DOCUMENTED: ICD-10-PCS | Mod: CPTII,95,, | Performed by: FAMILY MEDICINE

## 2021-10-29 PROCEDURE — 4010F ACE/ARB THERAPY RXD/TAKEN: CPT | Mod: CPTII,95,, | Performed by: FAMILY MEDICINE

## 2021-10-29 PROCEDURE — 1160F RVW MEDS BY RX/DR IN RCRD: CPT | Mod: CPTII,95,, | Performed by: FAMILY MEDICINE

## 2021-10-29 PROCEDURE — 3044F HG A1C LEVEL LT 7.0%: CPT | Mod: CPTII,95,, | Performed by: FAMILY MEDICINE

## 2021-10-29 PROCEDURE — 99213 PR OFFICE/OUTPT VISIT, EST, LEVL III, 20-29 MIN: ICD-10-PCS | Mod: 95,,, | Performed by: FAMILY MEDICINE

## 2021-10-29 PROCEDURE — 3044F PR MOST RECENT HEMOGLOBIN A1C LEVEL <7.0%: ICD-10-PCS | Mod: CPTII,95,, | Performed by: FAMILY MEDICINE

## 2021-10-29 PROCEDURE — U0005 INFEC AGEN DETEC AMPLI PROBE: HCPCS | Performed by: FAMILY MEDICINE

## 2021-10-29 PROCEDURE — 3066F PR DOCUMENTATION OF TREATMENT FOR NEPHROPATHY: ICD-10-PCS | Mod: CPTII,95,, | Performed by: FAMILY MEDICINE

## 2021-10-29 PROCEDURE — 3061F NEG MICROALBUMINURIA REV: CPT | Mod: CPTII,95,, | Performed by: FAMILY MEDICINE

## 2021-10-29 PROCEDURE — 3061F PR NEG MICROALBUMINURIA RESULT DOCUMENTED/REVIEW: ICD-10-PCS | Mod: CPTII,95,, | Performed by: FAMILY MEDICINE

## 2021-10-29 PROCEDURE — 1159F PR MEDICATION LIST DOCUMENTED IN MEDICAL RECORD: ICD-10-PCS | Mod: CPTII,95,, | Performed by: FAMILY MEDICINE

## 2021-10-29 PROCEDURE — 4010F PR ACE/ARB THEARPY RXD/TAKEN: ICD-10-PCS | Mod: CPTII,95,, | Performed by: FAMILY MEDICINE

## 2021-10-29 RX ORDER — SULFAMETHOXAZOLE AND TRIMETHOPRIM 800; 160 MG/1; MG/1
1 TABLET ORAL 2 TIMES DAILY
Qty: 14 TABLET | Refills: 0 | Status: SHIPPED | OUTPATIENT
Start: 2021-10-29 | End: 2021-12-03

## 2021-10-29 RX ORDER — HYDROCODONE POLISTIREX AND CHLORPHENIRAMINE POLISTIREX 10; 8 MG/5ML; MG/5ML
5 SUSPENSION, EXTENDED RELEASE ORAL EVERY 12 HOURS PRN
Qty: 120 ML | Refills: 0 | Status: SHIPPED | OUTPATIENT
Start: 2021-10-29 | End: 2021-12-03

## 2021-10-30 LAB
SARS-COV-2 RNA RESP QL NAA+PROBE: NOT DETECTED
SARS-COV-2- CYCLE NUMBER: NORMAL

## 2021-11-01 LAB — BACTERIA THROAT CULT: NORMAL

## 2021-12-03 ENCOUNTER — OFFICE VISIT (OUTPATIENT)
Dept: FAMILY MEDICINE | Facility: CLINIC | Age: 61
End: 2021-12-03
Payer: COMMERCIAL

## 2021-12-03 ENCOUNTER — PATIENT MESSAGE (OUTPATIENT)
Dept: ADMINISTRATIVE | Facility: HOSPITAL | Age: 61
End: 2021-12-03
Payer: COMMERCIAL

## 2021-12-03 VITALS
SYSTOLIC BLOOD PRESSURE: 122 MMHG | DIASTOLIC BLOOD PRESSURE: 80 MMHG | HEIGHT: 70 IN | HEART RATE: 74 BPM | WEIGHT: 196.56 LBS | BODY MASS INDEX: 28.14 KG/M2

## 2021-12-03 DIAGNOSIS — S43.82XA: ICD-10-CM

## 2021-12-03 DIAGNOSIS — R51.9 GENERALIZED HEADACHES: Primary | ICD-10-CM

## 2021-12-03 PROCEDURE — 3061F NEG MICROALBUMINURIA REV: CPT | Mod: CPTII,S$GLB,, | Performed by: FAMILY MEDICINE

## 2021-12-03 PROCEDURE — 3066F NEPHROPATHY DOC TX: CPT | Mod: CPTII,S$GLB,, | Performed by: FAMILY MEDICINE

## 2021-12-03 PROCEDURE — 99213 PR OFFICE/OUTPT VISIT, EST, LEVL III, 20-29 MIN: ICD-10-PCS | Mod: S$GLB,,, | Performed by: FAMILY MEDICINE

## 2021-12-03 PROCEDURE — 99999 PR PBB SHADOW E&M-EST. PATIENT-LVL IV: ICD-10-PCS | Mod: PBBFAC,,, | Performed by: FAMILY MEDICINE

## 2021-12-03 PROCEDURE — 4010F PR ACE/ARB THEARPY RXD/TAKEN: ICD-10-PCS | Mod: CPTII,S$GLB,, | Performed by: FAMILY MEDICINE

## 2021-12-03 PROCEDURE — 99213 OFFICE O/P EST LOW 20 MIN: CPT | Mod: S$GLB,,, | Performed by: FAMILY MEDICINE

## 2021-12-03 PROCEDURE — 99999 PR PBB SHADOW E&M-EST. PATIENT-LVL IV: CPT | Mod: PBBFAC,,, | Performed by: FAMILY MEDICINE

## 2021-12-03 PROCEDURE — 4010F ACE/ARB THERAPY RXD/TAKEN: CPT | Mod: CPTII,S$GLB,, | Performed by: FAMILY MEDICINE

## 2021-12-03 PROCEDURE — 3066F PR DOCUMENTATION OF TREATMENT FOR NEPHROPATHY: ICD-10-PCS | Mod: CPTII,S$GLB,, | Performed by: FAMILY MEDICINE

## 2021-12-03 PROCEDURE — 3061F PR NEG MICROALBUMINURIA RESULT DOCUMENTED/REVIEW: ICD-10-PCS | Mod: CPTII,S$GLB,, | Performed by: FAMILY MEDICINE

## 2021-12-03 RX ORDER — NAPROXEN 500 MG/1
500 TABLET ORAL 2 TIMES DAILY WITH MEALS
Qty: 30 TABLET | Refills: 1 | Status: SHIPPED | OUTPATIENT
Start: 2021-12-03 | End: 2022-10-06 | Stop reason: SDUPTHER

## 2021-12-03 RX ORDER — CYCLOBENZAPRINE HCL 5 MG
5 TABLET ORAL NIGHTLY PRN
Qty: 60 TABLET | Refills: 1 | Status: SHIPPED | OUTPATIENT
Start: 2021-12-03 | End: 2022-05-27 | Stop reason: SDUPTHER

## 2021-12-19 ENCOUNTER — OFFICE VISIT (OUTPATIENT)
Dept: URGENT CARE | Facility: CLINIC | Age: 61
End: 2021-12-19
Payer: COMMERCIAL

## 2021-12-19 VITALS
OXYGEN SATURATION: 99 % | WEIGHT: 195 LBS | SYSTOLIC BLOOD PRESSURE: 129 MMHG | HEIGHT: 70 IN | RESPIRATION RATE: 16 BRPM | BODY MASS INDEX: 27.92 KG/M2 | TEMPERATURE: 98 F | DIASTOLIC BLOOD PRESSURE: 84 MMHG | HEART RATE: 72 BPM

## 2021-12-19 DIAGNOSIS — K59.00 CONSTIPATION, UNSPECIFIED CONSTIPATION TYPE: Primary | ICD-10-CM

## 2021-12-19 DIAGNOSIS — R30.0 DYSURIA: ICD-10-CM

## 2021-12-19 LAB
BILIRUB UR QL STRIP: NEGATIVE
GLUCOSE UR QL STRIP: NEGATIVE
KETONES UR QL STRIP: NEGATIVE
LEUKOCYTE ESTERASE UR QL STRIP: NEGATIVE
PH, POC UA: 7.5 (ref 5–8)
POC BLOOD, URINE: NEGATIVE
POC NITRATES, URINE: NEGATIVE
PROT UR QL STRIP: NEGATIVE
SP GR UR STRIP: 1 (ref 1–1.03)
UROBILINOGEN UR STRIP-ACNC: NORMAL (ref 0.1–1.1)

## 2021-12-19 PROCEDURE — 81003 POCT URINALYSIS, DIPSTICK, AUTOMATED, W/O SCOPE: ICD-10-PCS | Mod: QW,S$GLB,, | Performed by: PHYSICIAN ASSISTANT

## 2021-12-19 PROCEDURE — 3061F NEG MICROALBUMINURIA REV: CPT | Mod: CPTII,S$GLB,, | Performed by: PHYSICIAN ASSISTANT

## 2021-12-19 PROCEDURE — 3061F PR NEG MICROALBUMINURIA RESULT DOCUMENTED/REVIEW: ICD-10-PCS | Mod: CPTII,S$GLB,, | Performed by: PHYSICIAN ASSISTANT

## 2021-12-19 PROCEDURE — 99213 OFFICE O/P EST LOW 20 MIN: CPT | Mod: 25,S$GLB,, | Performed by: PHYSICIAN ASSISTANT

## 2021-12-19 PROCEDURE — 3066F PR DOCUMENTATION OF TREATMENT FOR NEPHROPATHY: ICD-10-PCS | Mod: CPTII,S$GLB,, | Performed by: PHYSICIAN ASSISTANT

## 2021-12-19 PROCEDURE — 99213 PR OFFICE/OUTPT VISIT, EST, LEVL III, 20-29 MIN: ICD-10-PCS | Mod: 25,S$GLB,, | Performed by: PHYSICIAN ASSISTANT

## 2021-12-19 PROCEDURE — 4010F ACE/ARB THERAPY RXD/TAKEN: CPT | Mod: CPTII,S$GLB,, | Performed by: PHYSICIAN ASSISTANT

## 2021-12-19 PROCEDURE — 3066F NEPHROPATHY DOC TX: CPT | Mod: CPTII,S$GLB,, | Performed by: PHYSICIAN ASSISTANT

## 2021-12-19 PROCEDURE — 4010F PR ACE/ARB THEARPY RXD/TAKEN: ICD-10-PCS | Mod: CPTII,S$GLB,, | Performed by: PHYSICIAN ASSISTANT

## 2021-12-19 PROCEDURE — 81003 URINALYSIS AUTO W/O SCOPE: CPT | Mod: QW,S$GLB,, | Performed by: PHYSICIAN ASSISTANT

## 2021-12-23 ENCOUNTER — CLINICAL SUPPORT (OUTPATIENT)
Dept: FAMILY MEDICINE | Facility: CLINIC | Age: 61
End: 2021-12-23
Attending: FAMILY MEDICINE
Payer: COMMERCIAL

## 2021-12-23 DIAGNOSIS — E11.9 TYPE 2 DIABETES MELLITUS WITHOUT COMPLICATION, UNSPECIFIED WHETHER LONG TERM INSULIN USE: ICD-10-CM

## 2021-12-23 PROCEDURE — 92228 IMG RTA DETC/MNTR DS PHY/QHP: CPT | Mod: 26,S$GLB,, | Performed by: OPTOMETRIST

## 2021-12-23 PROCEDURE — 92228 IMG RTA DETC/MNTR DS PHY/QHP: CPT | Mod: TC,S$GLB,, | Performed by: FAMILY MEDICINE

## 2021-12-23 PROCEDURE — 92228 DIABETIC EYE SCREENING PHOTO: ICD-10-PCS | Mod: TC,S$GLB,, | Performed by: FAMILY MEDICINE

## 2021-12-23 PROCEDURE — 92228 DIABETIC EYE SCREENING PHOTO: ICD-10-PCS | Mod: 26,S$GLB,, | Performed by: OPTOMETRIST

## 2022-01-01 ENCOUNTER — LAB VISIT (OUTPATIENT)
Dept: PRIMARY CARE CLINIC | Facility: OTHER | Age: 62
End: 2022-01-01
Payer: COMMERCIAL

## 2022-01-01 ENCOUNTER — PATIENT MESSAGE (OUTPATIENT)
Dept: ADMINISTRATIVE | Facility: OTHER | Age: 62
End: 2022-01-01
Payer: COMMERCIAL

## 2022-01-01 DIAGNOSIS — R05.9 COUGH: ICD-10-CM

## 2022-01-01 PROCEDURE — U0003 INFECTIOUS AGENT DETECTION BY NUCLEIC ACID (DNA OR RNA); SEVERE ACUTE RESPIRATORY SYNDROME CORONAVIRUS 2 (SARS-COV-2) (CORONAVIRUS DISEASE [COVID-19]), AMPLIFIED PROBE TECHNIQUE, MAKING USE OF HIGH THROUGHPUT TECHNOLOGIES AS DESCRIBED BY CMS-2020-01-R: HCPCS | Performed by: FAMILY MEDICINE

## 2022-01-03 DIAGNOSIS — F33.41 RECURRENT MAJOR DEPRESSIVE DISORDER, IN PARTIAL REMISSION: ICD-10-CM

## 2022-01-03 RX ORDER — ESCITALOPRAM OXALATE 10 MG/1
10 TABLET ORAL DAILY
Qty: 90 TABLET | Refills: 1 | OUTPATIENT
Start: 2022-01-03

## 2022-01-03 RX ORDER — ESCITALOPRAM OXALATE 10 MG/1
10 TABLET ORAL DAILY
Qty: 90 TABLET | Refills: 3 | Status: SHIPPED | OUTPATIENT
Start: 2022-01-03 | End: 2022-05-27 | Stop reason: SDUPTHER

## 2022-01-03 NOTE — TELEPHONE ENCOUNTER
No new care gaps identified.  Powered by [employer]. Reference number: [id number].   1/03/2022 3:23:45 AM CST   To go home

## 2022-01-03 NOTE — TELEPHONE ENCOUNTER
Refill Authorization Note   Malathi Martinez  is requesting a refill authorization.  Brief Assessment and Rationale for Refill:  Approve     Medication Therapy Plan:       Medication Reconciliation Completed: No   Comments:   --->Care Gap information included below if applicable.   Orders Placed This Encounter    EScitalopram oxalate (LEXAPRO) 10 MG tablet      Requested Prescriptions   Signed Prescriptions Disp Refills    EScitalopram oxalate (LEXAPRO) 10 MG tablet 90 tablet 3     Sig: Take 1 tablet (10 mg total) by mouth once daily.       Psychiatry:  Antidepressants - SSRI Passed - 1/3/2022  3:22 PM        Passed - Patient is at least 18 years old        Passed - Office visit in past 12 months.     Recent Visits  Date Type Provider Dept   12/03/21 Office Visit Comfort Dave MD [facility] Medicine   10/29/21 Office Visit Comfort Dave MD [facility]   09/17/21 Office Visit Comfort Dave MD [facility]   08/26/21 Office Visit Comfort Dave MD [facility]   08/20/21 Office Visit Comfort Dave MD [facility]   07/23/21 Office Visit Comfort Dave MD [facility]   06/04/21 Office Visit Comfort Dave MD [facility]   03/19/21 Office Visit Comfort Dave MD [facility]   02/26/21 Office Visit Comfort Dave MD [facility]   01/11/21 Office Visit Comfort Dave MD [facility]   Showing recent visits within past 720 days and meeting all other requirements  Future Appointments  No visits were found meeting these conditions.  Showing future appointments within next 150 days and meeting all other requirements      Future Appointments              In 1 month Comfort Dave MD [facility]                    Appointments  past 12m or future 3m with PCP    Date Provider   Last Visit   12/3/2021 Comfort Dave MD   Next Visit   1/3/2022 Comfort Dave MD   ED visits in past 90 days:  0     Note composed:3:23 PM 01/03/2022

## 2022-01-03 NOTE — TELEPHONE ENCOUNTER
No new care gaps identified.  Powered by [employer]. Reference number: [id number].   1/03/2022 3:26:48 AM CST

## 2022-01-05 ENCOUNTER — PATIENT MESSAGE (OUTPATIENT)
Dept: FAMILY MEDICINE | Facility: CLINIC | Age: 62
End: 2022-01-05
Payer: COMMERCIAL

## 2022-01-05 LAB
SARS-COV-2 RNA RESP QL NAA+PROBE: NOT DETECTED
SARS-COV-2- CYCLE NUMBER: NORMAL

## 2022-01-28 NOTE — TELEPHONE ENCOUNTER
No new care gaps identified.  Powered by [employer]. Reference number: [id number].   1/28/2022 12:26:29 AM CST

## 2022-02-01 ENCOUNTER — PATIENT MESSAGE (OUTPATIENT)
Dept: ADMINISTRATIVE | Facility: CLINIC | Age: 62
End: 2022-02-01
Payer: COMMERCIAL

## 2022-02-01 ENCOUNTER — OFFICE VISIT (OUTPATIENT)
Dept: URGENT CARE | Facility: CLINIC | Age: 62
End: 2022-02-01
Payer: COMMERCIAL

## 2022-02-01 ENCOUNTER — OFFICE VISIT (OUTPATIENT)
Dept: FAMILY MEDICINE | Facility: CLINIC | Age: 62
End: 2022-02-01
Payer: COMMERCIAL

## 2022-02-01 ENCOUNTER — PATIENT MESSAGE (OUTPATIENT)
Dept: ADMINISTRATIVE | Facility: OTHER | Age: 62
End: 2022-02-01
Payer: COMMERCIAL

## 2022-02-01 ENCOUNTER — PATIENT MESSAGE (OUTPATIENT)
Dept: FAMILY MEDICINE | Facility: CLINIC | Age: 62
End: 2022-02-01
Payer: COMMERCIAL

## 2022-02-01 VITALS
RESPIRATION RATE: 16 BRPM | TEMPERATURE: 99 F | BODY MASS INDEX: 25.77 KG/M2 | OXYGEN SATURATION: 97 % | HEART RATE: 80 BPM | HEIGHT: 70 IN | DIASTOLIC BLOOD PRESSURE: 73 MMHG | SYSTOLIC BLOOD PRESSURE: 134 MMHG | WEIGHT: 180 LBS

## 2022-02-01 DIAGNOSIS — U07.1 COVID-19: Primary | ICD-10-CM

## 2022-02-01 DIAGNOSIS — U07.1 COVID: Primary | ICD-10-CM

## 2022-02-01 DIAGNOSIS — R05.9 COUGH: ICD-10-CM

## 2022-02-01 LAB
CTP QC/QA: YES
SARS-COV-2 RDRP RESP QL NAA+PROBE: POSITIVE

## 2022-02-01 PROCEDURE — 1159F PR MEDICATION LIST DOCUMENTED IN MEDICAL RECORD: ICD-10-PCS | Mod: CPTII,95,, | Performed by: PHYSICIAN ASSISTANT

## 2022-02-01 PROCEDURE — 3078F DIAST BP <80 MM HG: CPT | Mod: CPTII,S$GLB,, | Performed by: PHYSICIAN ASSISTANT

## 2022-02-01 PROCEDURE — 3075F PR MOST RECENT SYSTOLIC BLOOD PRESS GE 130-139MM HG: ICD-10-PCS | Mod: CPTII,S$GLB,, | Performed by: PHYSICIAN ASSISTANT

## 2022-02-01 PROCEDURE — 1160F PR REVIEW ALL MEDS BY PRESCRIBER/CLIN PHARMACIST DOCUMENTED: ICD-10-PCS | Mod: CPTII,S$GLB,, | Performed by: PHYSICIAN ASSISTANT

## 2022-02-01 PROCEDURE — U0002: ICD-10-PCS | Mod: QW,S$GLB,, | Performed by: PHYSICIAN ASSISTANT

## 2022-02-01 PROCEDURE — 3078F PR MOST RECENT DIASTOLIC BLOOD PRESSURE < 80 MM HG: ICD-10-PCS | Mod: CPTII,S$GLB,, | Performed by: PHYSICIAN ASSISTANT

## 2022-02-01 PROCEDURE — 1159F MED LIST DOCD IN RCRD: CPT | Mod: CPTII,95,, | Performed by: PHYSICIAN ASSISTANT

## 2022-02-01 PROCEDURE — 4010F ACE/ARB THERAPY RXD/TAKEN: CPT | Mod: CPTII,S$GLB,, | Performed by: PHYSICIAN ASSISTANT

## 2022-02-01 PROCEDURE — 3075F SYST BP GE 130 - 139MM HG: CPT | Mod: CPTII,S$GLB,, | Performed by: PHYSICIAN ASSISTANT

## 2022-02-01 PROCEDURE — 99214 OFFICE O/P EST MOD 30 MIN: CPT | Mod: S$GLB,,, | Performed by: PHYSICIAN ASSISTANT

## 2022-02-01 PROCEDURE — 1159F MED LIST DOCD IN RCRD: CPT | Mod: CPTII,S$GLB,, | Performed by: PHYSICIAN ASSISTANT

## 2022-02-01 PROCEDURE — 4010F PR ACE/ARB THEARPY RXD/TAKEN: ICD-10-PCS | Mod: CPTII,S$GLB,, | Performed by: PHYSICIAN ASSISTANT

## 2022-02-01 PROCEDURE — 99213 OFFICE O/P EST LOW 20 MIN: CPT | Mod: 95,,, | Performed by: PHYSICIAN ASSISTANT

## 2022-02-01 PROCEDURE — 3008F PR BODY MASS INDEX (BMI) DOCUMENTED: ICD-10-PCS | Mod: CPTII,S$GLB,, | Performed by: PHYSICIAN ASSISTANT

## 2022-02-01 PROCEDURE — U0002 COVID-19 LAB TEST NON-CDC: HCPCS | Mod: QW,S$GLB,, | Performed by: PHYSICIAN ASSISTANT

## 2022-02-01 PROCEDURE — 1160F RVW MEDS BY RX/DR IN RCRD: CPT | Mod: CPTII,S$GLB,, | Performed by: PHYSICIAN ASSISTANT

## 2022-02-01 PROCEDURE — 4010F PR ACE/ARB THEARPY RXD/TAKEN: ICD-10-PCS | Mod: CPTII,95,, | Performed by: PHYSICIAN ASSISTANT

## 2022-02-01 PROCEDURE — 99213 PR OFFICE/OUTPT VISIT, EST, LEVL III, 20-29 MIN: ICD-10-PCS | Mod: 95,,, | Performed by: PHYSICIAN ASSISTANT

## 2022-02-01 PROCEDURE — 3008F BODY MASS INDEX DOCD: CPT | Mod: CPTII,S$GLB,, | Performed by: PHYSICIAN ASSISTANT

## 2022-02-01 PROCEDURE — 1159F PR MEDICATION LIST DOCUMENTED IN MEDICAL RECORD: ICD-10-PCS | Mod: CPTII,S$GLB,, | Performed by: PHYSICIAN ASSISTANT

## 2022-02-01 PROCEDURE — 1160F PR REVIEW ALL MEDS BY PRESCRIBER/CLIN PHARMACIST DOCUMENTED: ICD-10-PCS | Mod: CPTII,95,, | Performed by: PHYSICIAN ASSISTANT

## 2022-02-01 PROCEDURE — 1160F RVW MEDS BY RX/DR IN RCRD: CPT | Mod: CPTII,95,, | Performed by: PHYSICIAN ASSISTANT

## 2022-02-01 PROCEDURE — 4010F ACE/ARB THERAPY RXD/TAKEN: CPT | Mod: CPTII,95,, | Performed by: PHYSICIAN ASSISTANT

## 2022-02-01 PROCEDURE — 99214 PR OFFICE/OUTPT VISIT, EST, LEVL IV, 30-39 MIN: ICD-10-PCS | Mod: S$GLB,,, | Performed by: PHYSICIAN ASSISTANT

## 2022-02-01 RX ORDER — ALBUTEROL SULFATE 90 UG/1
2 AEROSOL, METERED RESPIRATORY (INHALATION) EVERY 6 HOURS PRN
Qty: 18 G | Refills: 0 | Status: SHIPPED | OUTPATIENT
Start: 2022-02-01 | End: 2022-02-25

## 2022-02-01 RX ORDER — PROMETHAZINE HYDROCHLORIDE AND DEXTROMETHORPHAN HYDROBROMIDE 6.25; 15 MG/5ML; MG/5ML
5 SYRUP ORAL NIGHTLY PRN
Qty: 118 ML | Refills: 0 | Status: SHIPPED | OUTPATIENT
Start: 2022-02-01 | End: 2022-02-11

## 2022-02-01 RX ORDER — CLINDAMYCIN HYDROCHLORIDE 150 MG/1
150 CAPSULE ORAL EVERY 6 HOURS
COMMUNITY
End: 2022-02-25

## 2022-02-01 NOTE — PROGRESS NOTES
"Subjective:       Patient ID: Malathi Martinez is a 61 y.o. female.    Vitals:  height is 5' 10" (1.778 m) and weight is 81.6 kg (180 lb). Her oral temperature is 99.1 °F (37.3 °C). Her blood pressure is 134/73 and her pulse is 80. Her respiration is 16 and oxygen saturation is 97%.     Chief Complaint: Cough    Pt presents to clinic today for cough, congestion, fever and body aches. Symptoms started yesterday. Pt is vaccinated.      Cough  This is a new problem. The current episode started yesterday. The problem has been unchanged. The problem occurs every few minutes. The cough is productive of sputum. Associated symptoms include chills, a fever, headaches, myalgias, nasal congestion, postnasal drip, rhinorrhea and wheezing. Pertinent negatives include no sore throat. The symptoms are aggravated by lying down. She has tried nothing for the symptoms. Her past medical history is significant for bronchitis, COPD and pneumonia. There is no history of asthma, bronchiectasis, emphysema or environmental allergies.       Constitution: Positive for chills and fever.   HENT: Positive for postnasal drip. Negative for sore throat.    Respiratory: Positive for cough and wheezing.    Musculoskeletal: Positive for muscle ache.   Allergic/Immunologic: Negative for environmental allergies.   Neurological: Positive for headaches.       Objective:      Physical Exam   Constitutional: She does not appear ill. No distress.   HENT:   Head: Normocephalic and atraumatic.   Ears:   Right Ear: External ear normal.   Left Ear: External ear normal.   Eyes: Conjunctivae are normal. Right eye exhibits no discharge. Left eye exhibits no discharge.      extraocular movement intact   Cardiovascular: Normal rate, regular rhythm and normal heart sounds.   No murmur heard.  Pulmonary/Chest: Effort normal and breath sounds normal. She has no wheezes. She has no rhonchi. She has no rales.   Abdominal: Normal appearance.   Musculoskeletal: Normal " "range of motion.         General: Normal range of motion.   Neurological: no focal deficit. She is alert.   Skin: Skin is warm, dry and not pale. jaundice  Psychiatric: Her behavior is normal. Mood, judgment and thought content normal.   Nursing note and vitals reviewed.        Assessment:       1. COVID-19    2. Cough          Plan:         COVID-19    Cough  -     POCT COVID-19 Rapid Screening    Results for orders placed or performed in visit on 02/01/22   POCT COVID-19 Rapid Screening   Result Value Ref Range    POC Rapid COVID Positive (A) Negative     Acceptable Yes        Other orders  -     albuterol (VENTOLIN HFA) 90 mcg/actuation inhaler; Inhale 2 puffs into the lungs every 6 (six) hours as needed for Wheezing. Rescue  Dispense: 18 g; Refill: 0  -     promethazine-dextromethorphan (PROMETHAZINE-DM) 6.25-15 mg/5 mL Syrp; Take 5 mLs by mouth nightly as needed (cough).  Dispense: 118 mL; Refill: 0    Discussed symptomatic treatment at home.  Discussed signs/symptoms, reasons to go to ED if needed.     Patient Instructions   You have tested POSITIVE for COVID-19 today    Quarantine recommendations based on CDC guidelines:      No symptoms present: You must quarantine for 5 days starting on the day of the positive test.    Symptoms present: You must quarantine for 5 days starting on the day of symptom onset.      AFTER 5 days, if your symptoms have improved and you are fever free, you can return to the community on day 6.  The CDC recommends strict mask use for 5 days following quarantine end.      You cannot test negative to "test out" of quarantine. Per CDC recommendations we do not retest within 90 days of a positive test.      Please refer to CDC website for additional information            "

## 2022-02-01 NOTE — LETTER
[address] [address], [address] ? [place] [address] ? Phone [phone] ? Fax [phone]           Return to Work/School    Patient: Malathi Martinez  YOB: 1960   Date: 02/01/2022      To Whom It May Concern:     Malathi Martinez was in contact with/seen in my office on 02/01/2022. COVID-19 is present in our communities across the [facility]. Not all patients are eligible or appropriate to be tested. In this situation, your employee meets the following criteria:     Malathi Martinez has met the criteria for COVID-19 testing and has a POSITIVE result. She can return to work once they are asymptomatic for 24 hours without the use of fever reducing medications AND at least five days from the start of symptoms (or from the first positive result if they have no symptoms).      If you have any questions or concerns, or if I can be of further assistance, please do not hesitate to contact me.     Sincerely,    ALEXEY Ball

## 2022-02-02 ENCOUNTER — PATIENT MESSAGE (OUTPATIENT)
Dept: ADMINISTRATIVE | Facility: CLINIC | Age: 62
End: 2022-02-02
Payer: COMMERCIAL

## 2022-02-02 ENCOUNTER — PATIENT MESSAGE (OUTPATIENT)
Dept: ADMINISTRATIVE | Facility: OTHER | Age: 62
End: 2022-02-02
Payer: COMMERCIAL

## 2022-02-02 NOTE — PROGRESS NOTES
Answers for HPI/ROS submitted by the patient on 2/1/2022  Chronicity: new  Onset: yesterday  Progression since onset: gradually worsening  Cough characteristics: productive of sputum  chest pain: Yes  chills: Yes  ear congestion: Yes  ear pain: No  fever: Yes  headaches: Yes  heartburn: No  hemoptysis: No  myalgias: Yes  nasal congestion: Yes  postnasal drip: Yes  rash: No  rhinorrhea: No  shortness of breath: Yes  sore throat: No  sweats: No  weight loss: No  wheezing: Yes  Aggravated by: nothing  Risk factors for lung disease: smoking/tobacco exposure  asthma: No  bronchiectasis: No  bronchitis: Yes  COPD: Yes  emphysema: No  environmental allergies: Yes  pneumonia: Yes

## 2022-02-02 NOTE — PROGRESS NOTES
Subjective:       Patient ID: Malathi Martinez is a 61 y.o. female.    Chief Complaint: URI and COVID-19 Concerns    Audio Only Telehealth Visit     The patient location is: [place], [place]  The chief complaint leading to consultation is: COVID  Visit type: Virtual visit with audio only (telephone)  Total time spent with patient: 15     The reason for the audio only service rather than synchronous audio and video virtual visit was related to technical difficulties or patient preference/necessity.     Each patient to whom I provide medical services by telemedicine is:  (1) informed of the relationship between the physician and patient and the respective role of any other health care provider with respect to management of the patient; and (2) notified that they may decline to receive medical services by telemedicine and may withdraw from such care at any time. Patient verbally consented to receive this service via voice-only telephone call.                        This service was not originating from a related E/M service provided within the previous 7 days nor will  to an E/M service or procedure within the next 24 hours or my soonest available appointment.  Prevailing standard of care was able to be met in this audio-only visit.      Cough  This is a new problem. The current episode started yesterday. The problem has been gradually worsening. The cough is productive of sputum. Associated symptoms include chest pain, chills, ear congestion, a fever, headaches, myalgias, nasal congestion, postnasal drip, shortness of breath and wheezing. Pertinent negatives include no ear pain, heartburn, hemoptysis, rash, rhinorrhea, sore throat, sweats or weight loss. Nothing aggravates the symptoms. Risk factors for lung disease include smoking/tobacco exposure. Her past medical history is significant for bronchitis, COPD, environmental allergies and pneumonia. There is no history of asthma, bronchiectasis or emphysema.      Review of Systems   Constitutional: Positive for chills and fever. Negative for weight loss.   HENT: Positive for postnasal drip. Negative for ear pain, rhinorrhea and sore throat.    Respiratory: Positive for cough, shortness of breath and wheezing. Negative for hemoptysis.    Cardiovascular: Positive for chest pain.   Gastrointestinal: Negative for heartburn.   Musculoskeletal: Positive for myalgias.   Integumentary:  Negative for rash.   Allergic/Immunologic: Positive for environmental allergies.   Neurological: Positive for headaches.         Objective:      Physical Exam    Assessment:       Problem List Items Addressed This Visit    None     Visit Diagnoses     COVID    -  Primary    Relevant Medications    pulse oximeter (PULSE OXIMETER) device    Other Relevant Orders    COVID-19 Surveillance Program (Completed)          Plan:       COVID  -     COVID-19 Surveillance Program  -     pulse oximeter (PULSE OXIMETER) device; by Apply Externally route 2 (two) times a day. Use twice daily at 8 AM and 3 PM and record the value in MyChart as directed.  Dispense: 1 each; Refill: 0

## 2022-02-03 ENCOUNTER — PATIENT MESSAGE (OUTPATIENT)
Dept: FAMILY MEDICINE | Facility: CLINIC | Age: 62
End: 2022-02-03
Payer: COMMERCIAL

## 2022-02-03 ENCOUNTER — PATIENT MESSAGE (OUTPATIENT)
Dept: ADMINISTRATIVE | Facility: OTHER | Age: 62
End: 2022-02-03
Payer: COMMERCIAL

## 2022-02-04 ENCOUNTER — PATIENT MESSAGE (OUTPATIENT)
Dept: ADMINISTRATIVE | Facility: OTHER | Age: 62
End: 2022-02-04
Payer: COMMERCIAL

## 2022-02-04 RX ORDER — HYDROCODONE POLISTIREX AND CHLORPHENIRAMINE POLISTIREX 10; 8 MG/5ML; MG/5ML
5 SUSPENSION, EXTENDED RELEASE ORAL EVERY 12 HOURS PRN
Qty: 120 ML | Refills: 0 | Status: SHIPPED | OUTPATIENT
Start: 2022-02-04 | End: 2022-02-25

## 2022-02-07 NOTE — TELEPHONE ENCOUNTER
Refill Routing Note   Medication(s) are not appropriate for processing by [facility] for the following reason(s):      - Drug-Drug Interaction (hydrocodone-chlorpheniramine, desloratadine)    [facility] action(s):  Defer Medication-related problems identified: Drug-drug interaction        --->Care Gap information included in message below if applicable.   Medication reconciliation completed: No   Automatic Epic Generated Protocol Data:        Requested Prescriptions   Pending Prescriptions Disp Refills    desloratadine (CLARINEX) 5 mg tablet [Pharmacy Med Name: DESLORATADINE 5 MG TABLET] 90 tablet 3     Sig: TAKE 1 TABLET BY MOUTH EVERY DAY       Ear, Nose, and Throat:  Antihistamines Passed - 1/28/2022 12:25 AM        Passed - Patient is at least 18 years old        Passed - Valid encounter within last 15 months     Recent Visits  Date Type Provider Dept   12/03/21 Office Visit Comfort Dave MD [facility]   10/29/21 Office Visit Comfort Dave MD [facility]   09/17/21 Office Visit Comfort Dave MD [facility]   08/26/21 Office Visit Comfort Dave MD [facility]   08/20/21 Office Visit Comfort Dave MD [facility]   07/23/21 Office Visit Comfort Dave MD [facility]   06/04/21 Office Visit Comfort Dave MD [facility]   03/19/21 Office Visit Comfort Dave MD [facility]   02/26/21 Office Visit Comfort Dave MD [facility]   01/11/21 Office Visit Comfort Dave MD [facility]   Showing recent visits within past 720 days and meeting all other requirements  Future Appointments  No visits were found meeting these conditions.  Showing future appointments within next 150 days and meeting all other requirements      Future Appointments              In 2 weeks Comfort Dave MD [facility]                      Appointments  past 12m or future 3m with PCP    Date  Provider   Last Visit   12/3/2021 Comfort Dave MD   Next Visit   2/25/2022 Comfort Dave MD   ED visits in past 90 days: 0        Note composed:4:50 PM 02/07/2022

## 2022-02-08 RX ORDER — DESLORATADINE 5 MG/1
TABLET ORAL
Qty: 90 TABLET | Refills: 3 | Status: SHIPPED | OUTPATIENT
Start: 2022-02-08 | End: 2023-02-03 | Stop reason: SDUPTHER

## 2022-02-09 ENCOUNTER — PATIENT MESSAGE (OUTPATIENT)
Dept: ADMINISTRATIVE | Facility: OTHER | Age: 62
End: 2022-02-09
Payer: COMMERCIAL

## 2022-02-10 ENCOUNTER — PATIENT MESSAGE (OUTPATIENT)
Dept: ADMINISTRATIVE | Facility: OTHER | Age: 62
End: 2022-02-10
Payer: COMMERCIAL

## 2022-02-11 ENCOUNTER — PATIENT MESSAGE (OUTPATIENT)
Dept: ADMINISTRATIVE | Facility: CLINIC | Age: 62
End: 2022-02-11
Payer: COMMERCIAL

## 2022-02-11 ENCOUNTER — PATIENT MESSAGE (OUTPATIENT)
Dept: ADMINISTRATIVE | Facility: OTHER | Age: 62
End: 2022-02-11
Payer: COMMERCIAL

## 2022-02-11 ENCOUNTER — PATIENT OUTREACH (OUTPATIENT)
Dept: ADMINISTRATIVE | Facility: HOSPITAL | Age: 62
End: 2022-02-11
Payer: COMMERCIAL

## 2022-02-11 ENCOUNTER — PATIENT MESSAGE (OUTPATIENT)
Dept: ADMINISTRATIVE | Facility: HOSPITAL | Age: 62
End: 2022-02-11
Payer: COMMERCIAL

## 2022-02-11 NOTE — PROGRESS NOTES
2022 Care Everywhere updates requested and reviewed.  Immunizations reconciled. Media reports reviewed.  Duplicate HM overrides and  orders removed.  Overdue HM topic chart audit and/or requested.  Overdue lab testing linked to upcoming lab appointments if applies.  Lab [facility], and [employer] reviewed   Lab testing     DIS reviewed      Mammogram     Health Maintenance Due   Topic Date Due    HIV Screening  Never done    Shingles Vaccine (1 of 2) Never done    Hemoglobin A1c  2021    COVID-19 Vaccine (3 - Booster for Pfizer series) 2021    Foot Exam  2022    Mammogram  02/10/2022

## 2022-02-12 ENCOUNTER — PATIENT MESSAGE (OUTPATIENT)
Dept: ADMINISTRATIVE | Facility: OTHER | Age: 62
End: 2022-02-12
Payer: COMMERCIAL

## 2022-02-13 ENCOUNTER — PATIENT MESSAGE (OUTPATIENT)
Dept: ADMINISTRATIVE | Facility: OTHER | Age: 62
End: 2022-02-13
Payer: COMMERCIAL

## 2022-02-14 ENCOUNTER — PATIENT MESSAGE (OUTPATIENT)
Dept: ADMINISTRATIVE | Facility: OTHER | Age: 62
End: 2022-02-14
Payer: COMMERCIAL

## 2022-02-15 ENCOUNTER — PATIENT MESSAGE (OUTPATIENT)
Dept: ADMINISTRATIVE | Facility: CLINIC | Age: 62
End: 2022-02-15
Payer: COMMERCIAL

## 2022-02-15 ENCOUNTER — PATIENT MESSAGE (OUTPATIENT)
Dept: ADMINISTRATIVE | Facility: OTHER | Age: 62
End: 2022-02-15
Payer: COMMERCIAL

## 2022-02-16 ENCOUNTER — PATIENT MESSAGE (OUTPATIENT)
Dept: FAMILY MEDICINE | Facility: CLINIC | Age: 62
End: 2022-02-16
Payer: COMMERCIAL

## 2022-02-16 DIAGNOSIS — Z12.31 OTHER SCREENING MAMMOGRAM: ICD-10-CM

## 2022-02-21 ENCOUNTER — PATIENT MESSAGE (OUTPATIENT)
Dept: ADMINISTRATIVE | Facility: HOSPITAL | Age: 62
End: 2022-02-21
Payer: COMMERCIAL

## 2022-02-25 ENCOUNTER — OFFICE VISIT (OUTPATIENT)
Dept: FAMILY MEDICINE | Facility: CLINIC | Age: 62
End: 2022-02-25
Payer: COMMERCIAL

## 2022-02-25 VITALS
HEART RATE: 72 BPM | SYSTOLIC BLOOD PRESSURE: 118 MMHG | HEIGHT: 70 IN | BODY MASS INDEX: 28.87 KG/M2 | DIASTOLIC BLOOD PRESSURE: 78 MMHG | WEIGHT: 201.63 LBS

## 2022-02-25 DIAGNOSIS — F33.41 RECURRENT MAJOR DEPRESSIVE DISORDER, IN PARTIAL REMISSION: ICD-10-CM

## 2022-02-25 DIAGNOSIS — J44.9 CHRONIC OBSTRUCTIVE PULMONARY DISEASE, UNSPECIFIED COPD TYPE: ICD-10-CM

## 2022-02-25 DIAGNOSIS — E11.9 TYPE 2 DIABETES MELLITUS WITHOUT COMPLICATION, WITHOUT LONG-TERM CURRENT USE OF INSULIN: ICD-10-CM

## 2022-02-25 DIAGNOSIS — F41.9 ANXIETY: ICD-10-CM

## 2022-02-25 DIAGNOSIS — M79.89 LEG SWELLING: ICD-10-CM

## 2022-02-25 DIAGNOSIS — I72.2 RENAL ARTERIAL ANEURYSM: ICD-10-CM

## 2022-02-25 DIAGNOSIS — J44.1 COPD WITH EXACERBATION: ICD-10-CM

## 2022-02-25 PROCEDURE — 4010F PR ACE/ARB THEARPY RXD/TAKEN: ICD-10-PCS | Mod: CPTII,S$GLB,, | Performed by: FAMILY MEDICINE

## 2022-02-25 PROCEDURE — 99214 OFFICE O/P EST MOD 30 MIN: CPT | Mod: S$GLB,,, | Performed by: FAMILY MEDICINE

## 2022-02-25 PROCEDURE — 3078F PR MOST RECENT DIASTOLIC BLOOD PRESSURE < 80 MM HG: ICD-10-PCS | Mod: CPTII,S$GLB,, | Performed by: FAMILY MEDICINE

## 2022-02-25 PROCEDURE — 4010F ACE/ARB THERAPY RXD/TAKEN: CPT | Mod: CPTII,S$GLB,, | Performed by: FAMILY MEDICINE

## 2022-02-25 PROCEDURE — 99214 PR OFFICE/OUTPT VISIT, EST, LEVL IV, 30-39 MIN: ICD-10-PCS | Mod: S$GLB,,, | Performed by: FAMILY MEDICINE

## 2022-02-25 PROCEDURE — 3074F SYST BP LT 130 MM HG: CPT | Mod: CPTII,S$GLB,, | Performed by: FAMILY MEDICINE

## 2022-02-25 PROCEDURE — 99999 PR PBB SHADOW E&M-EST. PATIENT-LVL IV: CPT | Mod: PBBFAC,,, | Performed by: FAMILY MEDICINE

## 2022-02-25 PROCEDURE — 1160F RVW MEDS BY RX/DR IN RCRD: CPT | Mod: CPTII,S$GLB,, | Performed by: FAMILY MEDICINE

## 2022-02-25 PROCEDURE — 1160F PR REVIEW ALL MEDS BY PRESCRIBER/CLIN PHARMACIST DOCUMENTED: ICD-10-PCS | Mod: CPTII,S$GLB,, | Performed by: FAMILY MEDICINE

## 2022-02-25 PROCEDURE — 1159F MED LIST DOCD IN RCRD: CPT | Mod: CPTII,S$GLB,, | Performed by: FAMILY MEDICINE

## 2022-02-25 PROCEDURE — 3008F BODY MASS INDEX DOCD: CPT | Mod: CPTII,S$GLB,, | Performed by: FAMILY MEDICINE

## 2022-02-25 PROCEDURE — 3078F DIAST BP <80 MM HG: CPT | Mod: CPTII,S$GLB,, | Performed by: FAMILY MEDICINE

## 2022-02-25 PROCEDURE — 99999 PR PBB SHADOW E&M-EST. PATIENT-LVL IV: ICD-10-PCS | Mod: PBBFAC,,, | Performed by: FAMILY MEDICINE

## 2022-02-25 PROCEDURE — 3008F PR BODY MASS INDEX (BMI) DOCUMENTED: ICD-10-PCS | Mod: CPTII,S$GLB,, | Performed by: FAMILY MEDICINE

## 2022-02-25 PROCEDURE — 1159F PR MEDICATION LIST DOCUMENTED IN MEDICAL RECORD: ICD-10-PCS | Mod: CPTII,S$GLB,, | Performed by: FAMILY MEDICINE

## 2022-02-25 PROCEDURE — 3074F PR MOST RECENT SYSTOLIC BLOOD PRESSURE < 130 MM HG: ICD-10-PCS | Mod: CPTII,S$GLB,, | Performed by: FAMILY MEDICINE

## 2022-02-25 RX ORDER — IPRATROPIUM BROMIDE AND ALBUTEROL SULFATE 2.5; .5 MG/3ML; MG/3ML
3 SOLUTION RESPIRATORY (INHALATION) EVERY 6 HOURS PRN
Qty: 200 EACH | Refills: 11 | Status: SHIPPED | OUTPATIENT
Start: 2022-02-25 | End: 2023-05-01

## 2022-02-25 RX ORDER — METFORMIN HYDROCHLORIDE 500 MG/1
500 TABLET, EXTENDED RELEASE ORAL 2 TIMES DAILY WITH MEALS
Qty: 180 TABLET | Refills: 3 | Status: SHIPPED | OUTPATIENT
Start: 2022-02-25 | End: 2023-02-03 | Stop reason: SDUPTHER

## 2022-02-25 RX ORDER — FUROSEMIDE 20 MG/1
20 TABLET ORAL DAILY
Qty: 90 TABLET | Refills: 1 | Status: SHIPPED | OUTPATIENT
Start: 2022-02-25 | End: 2022-09-16 | Stop reason: SDUPTHER

## 2022-02-25 RX ORDER — ATORVASTATIN CALCIUM 20 MG/1
20 TABLET, FILM COATED ORAL DAILY
Qty: 90 TABLET | Refills: 3 | Status: SHIPPED | OUTPATIENT
Start: 2022-02-25 | End: 2023-02-03 | Stop reason: SDUPTHER

## 2022-02-25 RX ORDER — ALPRAZOLAM 0.25 MG/1
0.25 TABLET ORAL 2 TIMES DAILY
Qty: 60 TABLET | Refills: 2 | Status: SHIPPED | OUTPATIENT
Start: 2022-02-25 | End: 2022-05-27 | Stop reason: SDUPTHER

## 2022-02-25 RX ORDER — FLUTICASONE PROPIONATE AND SALMETEROL 100; 50 UG/1; UG/1
1 POWDER RESPIRATORY (INHALATION) 2 TIMES DAILY
Qty: 60 EACH | Refills: 11 | Status: SHIPPED | OUTPATIENT
Start: 2022-02-25 | End: 2023-04-18 | Stop reason: SDUPTHER

## 2022-02-25 RX ORDER — BUPROPION HYDROCHLORIDE 150 MG/1
150 TABLET ORAL DAILY
Qty: 90 TABLET | Refills: 1 | Status: SHIPPED | OUTPATIENT
Start: 2022-02-25 | End: 2022-09-16 | Stop reason: SDUPTHER

## 2022-02-25 NOTE — PROGRESS NOTES
"Subjective:       Patient ID: Malathi Martinez is a 61 y.o. female.    Chief Complaint: Shortness of Breath (Since covid, also c/o leg numbness)    HPI  Patient in clinic for f/u.  She had covid last month. Primary issues was HA and joint pain. Pulse ox stayed >97%.  She notices that with lifting or walking long distances, she feel short of breath. She'll start coughing or need to sit down.   She does have albuterol hfa, but does not find it helpful at all.   The front of the L leg feels weird, but sensation is ok when she touches the skin.     Review of Systems:  Review of Systems   Constitutional: Negative for appetite change, chills and fever.   HENT: Positive for postnasal drip and rhinorrhea. Negative for ear pain and sore throat.    Respiratory: Positive for cough, shortness of breath and wheezing.    Cardiovascular: Negative for chest pain and leg swelling.   Gastrointestinal: Negative for diarrhea and nausea.        Covid-related GI issues resolved   Genitourinary: Negative for difficulty urinating and dysuria.   Musculoskeletal: Positive for myalgias. Negative for gait problem.   Skin: Negative for rash.   Allergic/Immunologic: Positive for environmental allergies.   Neurological: Negative for headaches.       Objective:     Vitals:    02/25/22 1310   BP: 118/78   BP Location: Left arm   Pulse: 72   Weight: 91.4 kg (201 lb 9.8 oz)   Height: 5' 10" (1.778 m)          Physical Exam  Vitals and nursing note reviewed.   Constitutional:       General: She is not in acute distress.     Appearance: Normal appearance. She is well-developed.   HENT:      Head: Normocephalic and atraumatic.   Eyes:      General: No scleral icterus.        Right eye: No discharge.         Left eye: No discharge.      Conjunctiva/sclera: Conjunctivae normal.   Cardiovascular:      Rate and Rhythm: Normal rate and regular rhythm.   Pulmonary:      Effort: Pulmonary effort is normal. No respiratory distress.      Breath sounds: " Normal breath sounds.   Abdominal:      General: There is no distension.      Palpations: Abdomen is soft.   Musculoskeletal:         General: No deformity or signs of injury.      Cervical back: Normal range of motion and neck supple.   Skin:     General: Skin is warm and dry.      Findings: No rash.   Neurological:      General: No focal deficit present.      Mental Status: She is alert and oriented to person, place, and time.   Psychiatric:         Mood and Affect: Mood normal.         Behavior: Behavior normal.           Assessment & Plan:  Type 2 diabetes mellitus without complication, without long-term current use of insulin  Comments:  stable, cont routine lab monitoring  Orders:  -     metFORMIN (GLUCOPHAGE-XR) 500 MG ER 24hr tablet; Take 1 tablet (500 mg total) by mouth 2 (two) times daily with meals.  Dispense: 180 tablet; Refill: 3  -     atorvastatin (LIPITOR) 20 MG tablet; Take 1 tablet (20 mg total) by mouth once daily.  Dispense: 90 tablet; Refill: 3    Recurrent major depressive disorder, in partial remission  -     buPROPion (WELLBUTRIN XL) 150 MG TB24 tablet; Take 1 tablet (150 mg total) by mouth once daily.  Dispense: 90 tablet; Refill: 1    Renal arterial aneurysm  Comments:  incidental finding on previous imaging, stable on follow-up    Chronic obstructive pulmonary disease, unspecified COPD type  Comments:  start advair twice daily, cont albuterol neb or hfa  Orders:  -     albuterol-ipratropium (DUO-NEB) 2.5 mg-0.5 mg/3 mL nebulizer solution; Take 3 mLs by nebulization every 6 (six) hours as needed for Wheezing or Shortness of Breath. Rescue  Dispense: 200 each; Refill: 11    COPD with exacerbation  -     albuterol-ipratropium (DUO-NEB) 2.5 mg-0.5 mg/3 mL nebulizer solution; Take 3 mLs by nebulization every 6 (six) hours as needed for Wheezing or Shortness of Breath. Rescue  Dispense: 200 each; Refill: 11    Leg swelling  -     furosemide (LASIX) 20 MG tablet; Take 1 tablet (20 mg total) by  mouth once daily.  Dispense: 90 tablet; Refill: 1    Recurrent major depressive disorder, in partial remission  Comments:  mood stable on wellbutrin 150, lexapro 10  Orders:  -     buPROPion (WELLBUTRIN XL) 150 MG TB24 tablet; Take 1 tablet (150 mg total) by mouth once daily.  Dispense: 90 tablet; Refill: 1    Anxiety  Comments:  cont xanax prn  Orders:  -     ALPRAZolam (XANAX) 0.25 MG tablet; Take 1 tablet (0.25 mg total) by mouth 2 (two) times daily.  Dispense: 60 tablet; Refill: 2    Type 2 diabetes mellitus without complication, without long-term current use of insulin  Comments:  well-controlled, cont regimen  Orders:  -     metFORMIN (GLUCOPHAGE-XR) 500 MG ER 24hr tablet; Take 1 tablet (500 mg total) by mouth 2 (two) times daily with meals.  Dispense: 180 tablet; Refill: 3  -     atorvastatin (LIPITOR) 20 MG tablet; Take 1 tablet (20 mg total) by mouth once daily.  Dispense: 90 tablet; Refill: 3    Other orders  -     fluticasone-salmeterol diskus inhaler 100-50 mcg; Inhale 1 puff into the lungs 2 (two) times daily. Controller  Dispense: 60 each; Refill: 11

## 2022-03-26 ENCOUNTER — PATIENT MESSAGE (OUTPATIENT)
Dept: FAMILY MEDICINE | Facility: CLINIC | Age: 62
End: 2022-03-26
Payer: COMMERCIAL

## 2022-04-02 ENCOUNTER — HOSPITAL ENCOUNTER (OUTPATIENT)
Dept: RADIOLOGY | Facility: HOSPITAL | Age: 62
Discharge: HOME | End: 2022-04-02
Attending: FAMILY MEDICINE
Payer: COMMERCIAL

## 2022-04-02 DIAGNOSIS — Z12.31 OTHER SCREENING MAMMOGRAM: ICD-10-CM

## 2022-04-02 PROCEDURE — 77063 MAMMO DIGITAL SCREENING BILAT WITH TOMO: ICD-10-PCS | Mod: 26,,, | Performed by: RADIOLOGY

## 2022-04-02 PROCEDURE — 77063 BREAST TOMOSYNTHESIS BI: CPT | Mod: 26,,, | Performed by: RADIOLOGY

## 2022-04-02 PROCEDURE — 77063 BREAST TOMOSYNTHESIS BI: CPT | Mod: TC,PO

## 2022-04-02 PROCEDURE — 77067 SCR MAMMO BI INCL CAD: CPT | Mod: 26,,, | Performed by: RADIOLOGY

## 2022-04-02 PROCEDURE — 77067 MAMMO DIGITAL SCREENING BILAT WITH TOMO: ICD-10-PCS | Mod: 26,,, | Performed by: RADIOLOGY

## 2022-04-22 ENCOUNTER — OFFICE VISIT (OUTPATIENT)
Dept: URGENT CARE | Facility: CLINIC | Age: 62
End: 2022-04-22
Payer: COMMERCIAL

## 2022-04-22 VITALS
RESPIRATION RATE: 16 BRPM | SYSTOLIC BLOOD PRESSURE: 150 MMHG | OXYGEN SATURATION: 98 % | WEIGHT: 201 LBS | DIASTOLIC BLOOD PRESSURE: 76 MMHG | HEART RATE: 67 BPM | BODY MASS INDEX: 28.77 KG/M2 | HEIGHT: 70 IN | TEMPERATURE: 98 F

## 2022-04-22 DIAGNOSIS — M79.672 LEFT FOOT PAIN: Primary | ICD-10-CM

## 2022-04-22 PROCEDURE — 3044F HG A1C LEVEL LT 7.0%: CPT | Mod: CPTII,S$GLB,, | Performed by: PHYSICIAN ASSISTANT

## 2022-04-22 PROCEDURE — 99213 OFFICE O/P EST LOW 20 MIN: CPT | Mod: S$GLB,,, | Performed by: PHYSICIAN ASSISTANT

## 2022-04-22 PROCEDURE — 1160F PR REVIEW ALL MEDS BY PRESCRIBER/CLIN PHARMACIST DOCUMENTED: ICD-10-PCS | Mod: CPTII,S$GLB,, | Performed by: PHYSICIAN ASSISTANT

## 2022-04-22 PROCEDURE — 3078F DIAST BP <80 MM HG: CPT | Mod: CPTII,S$GLB,, | Performed by: PHYSICIAN ASSISTANT

## 2022-04-22 PROCEDURE — 3008F BODY MASS INDEX DOCD: CPT | Mod: CPTII,S$GLB,, | Performed by: PHYSICIAN ASSISTANT

## 2022-04-22 PROCEDURE — 3077F PR MOST RECENT SYSTOLIC BLOOD PRESSURE >= 140 MM HG: ICD-10-PCS | Mod: CPTII,S$GLB,, | Performed by: PHYSICIAN ASSISTANT

## 2022-04-22 PROCEDURE — 73630 X-RAY EXAM OF FOOT: CPT | Mod: LT,S$GLB,, | Performed by: RADIOLOGY

## 2022-04-22 PROCEDURE — 3078F PR MOST RECENT DIASTOLIC BLOOD PRESSURE < 80 MM HG: ICD-10-PCS | Mod: CPTII,S$GLB,, | Performed by: PHYSICIAN ASSISTANT

## 2022-04-22 PROCEDURE — 4010F PR ACE/ARB THEARPY RXD/TAKEN: ICD-10-PCS | Mod: CPTII,S$GLB,, | Performed by: PHYSICIAN ASSISTANT

## 2022-04-22 PROCEDURE — 1159F PR MEDICATION LIST DOCUMENTED IN MEDICAL RECORD: ICD-10-PCS | Mod: CPTII,S$GLB,, | Performed by: PHYSICIAN ASSISTANT

## 2022-04-22 PROCEDURE — 1159F MED LIST DOCD IN RCRD: CPT | Mod: CPTII,S$GLB,, | Performed by: PHYSICIAN ASSISTANT

## 2022-04-22 PROCEDURE — 3077F SYST BP >= 140 MM HG: CPT | Mod: CPTII,S$GLB,, | Performed by: PHYSICIAN ASSISTANT

## 2022-04-22 PROCEDURE — 1160F RVW MEDS BY RX/DR IN RCRD: CPT | Mod: CPTII,S$GLB,, | Performed by: PHYSICIAN ASSISTANT

## 2022-04-22 PROCEDURE — 99213 PR OFFICE/OUTPT VISIT, EST, LEVL III, 20-29 MIN: ICD-10-PCS | Mod: S$GLB,,, | Performed by: PHYSICIAN ASSISTANT

## 2022-04-22 PROCEDURE — 3008F PR BODY MASS INDEX (BMI) DOCUMENTED: ICD-10-PCS | Mod: CPTII,S$GLB,, | Performed by: PHYSICIAN ASSISTANT

## 2022-04-22 PROCEDURE — 73630 XR FOOT COMPLETE 3 VIEW LEFT: ICD-10-PCS | Mod: LT,S$GLB,, | Performed by: RADIOLOGY

## 2022-04-22 PROCEDURE — 3044F PR MOST RECENT HEMOGLOBIN A1C LEVEL <7.0%: ICD-10-PCS | Mod: CPTII,S$GLB,, | Performed by: PHYSICIAN ASSISTANT

## 2022-04-22 PROCEDURE — 4010F ACE/ARB THERAPY RXD/TAKEN: CPT | Mod: CPTII,S$GLB,, | Performed by: PHYSICIAN ASSISTANT

## 2022-04-22 NOTE — PATIENT INSTRUCTIONS
You must understand that you've received an Urgent Care treatment only and that you may be released before all your medical problems are known or treated. You, the patient, will arrange for follow up care as instructed.  Follow up with your PCP or specialty clinic as directed if not improved or as needed. You can call [phone] to schedule an appointment with the appropriate provider.  If your condition worsens we recommend that you receive another evaluation at the Emergency Department for any concerns or worsening of condition.  Patient aware and verbalized understanding.    Discussed XRAY results with patient.  Patient aware and verbalized understanding.    Rest, Ice, Compression and Elevation.   Can alternate with warm soaks/epsom salt as needed.  ACE Wrap/Brace/Boot for better support/comfort as discussed.  OTC Ibuprofen or Tylenol UNLESS CONTRAINDICATED (KIDNEY AND/OR LIVER ISSUES, ETC.) every 4-6 hours for pain as needed.  You may do gentle stretching as tolerable.  Follow-up with PCP for further evaluation as needed.  Follow-up with Ortho for further evaluation as needed.  Strict ER precautions given to patient.  Patient aware and verbalized understanding.

## 2022-04-22 NOTE — PROGRESS NOTES
"Subjective:       Patient ID: Malathi Martinez is a 61 y.o. female.    Vitals:  height is 5' 10" (1.778 m) and weight is 91.2 kg (201 lb). Her oral temperature is 97.7 °F (36.5 °C). Her blood pressure is 150/76 (abnormal) and her pulse is 67. Her respiration is 16 and oxygen saturation is 98%.     Chief Complaint: Foot Injury    Patient presents to urgent care with left foot injury. Patient reports that she was vacuuming this morning, stepped wrong whenever she heard "a pop" around the top of her L foot. Patient denies prior injury/trauma to L foot. Patient currently denies numbness/tingling or weakness.     Foot Injury   The incident occurred 6 to 12 hours ago. The incident occurred at home. Injury mechanism: vacuuming. The pain is present in the left foot. The quality of the pain is described as aching. The pain is at a severity of 4/10. The pain is mild. The pain has been constant since onset. Associated symptoms include an inability to bear weight. Pertinent negatives include no loss of motion, loss of sensation, muscle weakness, numbness or tingling. She reports no foreign bodies present. The symptoms are aggravated by weight bearing. She has tried acetaminophen and ice for the symptoms. The treatment provided mild relief.       Constitution: Negative for chills, sweating, fatigue and fever.   HENT: Negative for ear pain, drooling, congestion, sore throat, trouble swallowing and voice change.    Neck: Negative for neck pain, neck stiffness, painful lymph nodes and neck swelling.   Cardiovascular: Negative for chest pain, leg swelling, palpitations, sob on exertion and passing out.   Eyes: Negative for eye pain, eye redness, photophobia, double vision, blurred vision and eyelid swelling.   Respiratory: Negative for chest tightness, cough, sputum production, bloody sputum, shortness of breath, stridor and wheezing.    Gastrointestinal: Negative for abdominal pain, abdominal bloating, nausea, vomiting, " constipation, diarrhea and heartburn.   Musculoskeletal: Positive for joint pain and joint swelling. Negative for abnormal ROM of joint, back pain, muscle cramps and muscle ache.   Skin: Negative for rash and hives.   Allergic/Immunologic: Negative for seasonal allergies, food allergies, hives, itching and sneezing.   Neurological: Negative for dizziness, light-headedness, passing out, loss of balance, headaches, altered mental status, loss of consciousness, numbness, tingling and seizures.   Hematologic/Lymphatic: Negative for swollen lymph nodes.   Psychiatric/Behavioral: Negative for altered mental status and nervous/anxious. The patient is not nervous/anxious.        Objective:      Physical Exam   Constitutional: She is oriented to person, place, and time. She appears well-developed. She is cooperative.  Non-toxic appearance. She does not appear ill. No distress.   HENT:   Head: Normocephalic and atraumatic.   Ears:   Right Ear: Hearing, tympanic membrane, external ear and ear canal normal.   Left Ear: Hearing, tympanic membrane, external ear and ear canal normal.   Nose: Nose normal. No mucosal edema, rhinorrhea or nasal deformity. No epistaxis. Right sinus exhibits no maxillary sinus tenderness and no frontal sinus tenderness. Left sinus exhibits no maxillary sinus tenderness and no frontal sinus tenderness.   Mouth/Throat: Uvula is midline, oropharynx is clear and moist and mucous membranes are normal. No trismus in the jaw. Normal dentition. No uvula swelling. No oropharyngeal exudate, posterior oropharyngeal edema or posterior oropharyngeal erythema.   Eyes: Conjunctivae and lids are normal. No scleral icterus.   Neck: Trachea normal and phonation normal. Neck supple. No edema present. No erythema present. No neck rigidity present.   Cardiovascular: Normal rate, regular rhythm, normal heart sounds and normal pulses.   Pulmonary/Chest: Effort normal and breath sounds normal. No respiratory distress. She has  no decreased breath sounds. She has no rhonchi.   Abdominal: Normal appearance.   Musculoskeletal: Normal range of motion.         General: No deformity. Normal range of motion.      Right foot: Normal.      Left foot: Normal range of motion and normal capillary refill. Tenderness present. No bony tenderness, swelling or crepitus.      Comments: FROM and pain with L foot dorsiflexion/plantarflexion/inversion/eversion. Mild amount of TTP over dorsal aspect of L foot. 3+ DP pulses bilateral. No numbness or tingling. Able to ambulate without difficulty.   Neurological: She is alert and oriented to person, place, and time. She has normal sensation. She exhibits normal muscle tone. Gait normal. Coordination normal.   Skin: Skin is warm, dry, intact, not diaphoretic, not pale and no rash. Capillary refill takes less than 2 seconds.   Psychiatric: Her speech is normal and behavior is normal. Judgment and thought content normal.   Nursing note and vitals reviewed.        X-Ray Foot Complete Left  Result Date: 4/22/2022  EXAMINATION: XR FOOT COMPLETE 3 VIEW LEFT CLINICAL HISTORY: .  Pain in left foot TECHNIQUE: AP, lateral and oblique views of the left foot were performed. COMPARISON: None FINDINGS: There is no evidence fracture or malalignment.  The adjacent soft tissues are unremarkable.     There is no evidence acute bony injury of the left foot. Electronically signed by: Dorys Barrera MD Date:    04/22/2022 Time:    17:10      Assessment:       1. Left foot pain          Plan:     Discussed XRAY results with patient. Advised close follow-up with PCP and/or Specialist for further evaluation as needed. ER precautions given to patient as well. Patient aware, verbalized understanding and agreed with plan of care.    Left foot pain  -     X-Ray Foot Complete Left; Future; Expected date: 04/22/2022      Patient Instructions   You must understand that you've received an Urgent Care treatment only and that you may be released  before all your medical problems are known or treated. You, the patient, will arrange for follow up care as instructed.  Follow up with your PCP or specialty clinic as directed if not improved or as needed. You can call [phone] to schedule an appointment with the appropriate provider.  If your condition worsens we recommend that you receive another evaluation at the Emergency Department for any concerns or worsening of condition.  Patient aware and verbalized understanding.    Discussed XRAY results with patient.  Patient aware and verbalized understanding.    Rest, Ice, Compression and Elevation.   Can alternate with warm soaks/epsom salt as needed.  ACE Wrap/Brace/Boot for better support/comfort as discussed.  OTC Ibuprofen or Tylenol UNLESS CONTRAINDICATED (KIDNEY AND/OR LIVER ISSUES, ETC.) every 4-6 hours for pain as needed.  You may do gentle stretching as tolerable.  Follow-up with PCP for further evaluation as needed.  Follow-up with Ortho for further evaluation as needed.  Strict ER precautions given to patient.  Patient aware and verbalized understanding.

## 2022-04-27 ENCOUNTER — PATIENT OUTREACH (OUTPATIENT)
Dept: ADMINISTRATIVE | Facility: OTHER | Age: 62
End: 2022-04-27
Payer: COMMERCIAL

## 2022-04-27 NOTE — PROGRESS NOTES
Health Maintenance Due   Topic Date Due    HIV Screening  Never done    Shingles Vaccine (1 of 2) Never done    COVID-19 Vaccine (3 - Booster for Pfizer series) 08/27/2021    Foot Exam  02/08/2022    LDCT Lung Screen  03/02/2022     Updates were requested from care everywhere.  Chart was reviewed for overdue Proactive [facility] Encounters ([facility]) topics (CRS, Breast Cancer Screening, Eye exam)  Health Maintenance has been updated.  LINKS immunization registry triggered.  Immunizations were reconciled.

## 2022-04-29 ENCOUNTER — OFFICE VISIT (OUTPATIENT)
Dept: PODIATRY | Facility: CLINIC | Age: 62
End: 2022-04-29
Payer: COMMERCIAL

## 2022-04-29 VITALS — WEIGHT: 201.06 LBS | BODY MASS INDEX: 28.78 KG/M2 | HEIGHT: 70 IN

## 2022-04-29 DIAGNOSIS — E11.42 DIABETIC POLYNEUROPATHY ASSOCIATED WITH TYPE 2 DIABETES MELLITUS: Primary | ICD-10-CM

## 2022-04-29 DIAGNOSIS — M76.71 PERONEAL TENDONITIS, RIGHT: ICD-10-CM

## 2022-04-29 DIAGNOSIS — R20.2 PARESTHESIA OF FOOT, BILATERAL: ICD-10-CM

## 2022-04-29 DIAGNOSIS — M21.41 PES PLANUS OF BOTH FEET: ICD-10-CM

## 2022-04-29 DIAGNOSIS — M21.42 PES PLANUS OF BOTH FEET: ICD-10-CM

## 2022-04-29 PROCEDURE — 4010F ACE/ARB THERAPY RXD/TAKEN: CPT | Mod: CPTII,S$GLB,, | Performed by: PODIATRIST

## 2022-04-29 PROCEDURE — 99999 PR PBB SHADOW E&M-EST. PATIENT-LVL III: ICD-10-PCS | Mod: PBBFAC,,, | Performed by: PODIATRIST

## 2022-04-29 PROCEDURE — 3008F BODY MASS INDEX DOCD: CPT | Mod: CPTII,S$GLB,, | Performed by: PODIATRIST

## 2022-04-29 PROCEDURE — 99999 PR PBB SHADOW E&M-EST. PATIENT-LVL III: CPT | Mod: PBBFAC,,, | Performed by: PODIATRIST

## 2022-04-29 PROCEDURE — 4010F PR ACE/ARB THEARPY RXD/TAKEN: ICD-10-PCS | Mod: CPTII,S$GLB,, | Performed by: PODIATRIST

## 2022-04-29 PROCEDURE — 1159F PR MEDICATION LIST DOCUMENTED IN MEDICAL RECORD: ICD-10-PCS | Mod: CPTII,S$GLB,, | Performed by: PODIATRIST

## 2022-04-29 PROCEDURE — 3044F PR MOST RECENT HEMOGLOBIN A1C LEVEL <7.0%: ICD-10-PCS | Mod: CPTII,S$GLB,, | Performed by: PODIATRIST

## 2022-04-29 PROCEDURE — 1159F MED LIST DOCD IN RCRD: CPT | Mod: CPTII,S$GLB,, | Performed by: PODIATRIST

## 2022-04-29 PROCEDURE — 99213 PR OFFICE/OUTPT VISIT, EST, LEVL III, 20-29 MIN: ICD-10-PCS | Mod: S$GLB,,, | Performed by: PODIATRIST

## 2022-04-29 PROCEDURE — 3008F PR BODY MASS INDEX (BMI) DOCUMENTED: ICD-10-PCS | Mod: CPTII,S$GLB,, | Performed by: PODIATRIST

## 2022-04-29 PROCEDURE — 3044F HG A1C LEVEL LT 7.0%: CPT | Mod: CPTII,S$GLB,, | Performed by: PODIATRIST

## 2022-04-29 PROCEDURE — 99213 OFFICE O/P EST LOW 20 MIN: CPT | Mod: S$GLB,,, | Performed by: PODIATRIST

## 2022-04-29 RX ORDER — FAMOTIDINE 40 MG/1
40 TABLET, FILM COATED ORAL 2 TIMES DAILY PRN
Qty: 180 TABLET | Refills: 1 | Status: SHIPPED | OUTPATIENT
Start: 2022-04-29 | End: 2022-10-28

## 2022-04-29 NOTE — TELEPHONE ENCOUNTER
No new care gaps identified.  Powered by [employer]. Reference number: [id number].   4/29/2022 12:29:24 AM CDT

## 2022-04-29 NOTE — PROGRESS NOTES
"Subjective:      Patient ID: Malathi Martinez is a 61 y.o. female.    Chief Complaint: Diabetes Mellitus and Diabetic Foot Exam    Malathi is a 61 y.o. female who presents to the clinic for routine evaluation and treatment of diabetic feet. Malathi has a past medical history of Abnormal stress test, Bronchitis, COPD (chronic obstructive pulmonary disease), Diabetes mellitus, type 2, HLD (hyperlipidemia), endoscopy, Hypertension, Mental disorder, Pulmonary nodule (4/9/2018), SOB (shortness of breath), and Tobacco abuse (12/26/2012). Patient notes having some paresthesias of the lower extremities while at rest.  Describes as tingling or "electric-like".  Notes symptoms occur sporadically and resolve on their own.  Has not attempted to self treat.  Continues to exercise good control over her blood glucose.   Denies any additional pedal complaints.       PCP: Comfort Dave MD    Date Last Seen by PCP:  2/22    Hemoglobin A1C   Date Value Ref Range Status   04/02/2022 5.7 (H) 4.0 - 5.6 % Final     Comment:     ADA Screening Guidelines:  5.7-6.4%  Consistent with prediabetes  >or=6.5%  Consistent with diabetes    High levels of fetal hemoglobin interfere with the HbA1C  assay. Heterozygous hemoglobin variants (HbS, HgC, etc)do  not significantly interfere with this assay.   However, presence of multiple variants may affect accuracy.     02/26/2021 5.6 4.0 - 5.6 % Final     Comment:     ADA Screening Guidelines:  5.7-6.4%  Consistent with prediabetes  >or=6.5%  Consistent with diabetes    High levels of fetal hemoglobin interfere with the HbA1C  assay. Heterozygous hemoglobin variants (HbS, HgC, etc)do  not significantly interfere with this assay.   However, presence of multiple variants may affect accuracy.     08/21/2020 6.0 (H) 4.0 - 5.6 % Final     Comment:     ADA Screening Guidelines:  5.7-6.4%  Consistent with prediabetes  >or=6.5%  Consistent with diabetes  High levels of fetal hemoglobin interfere with the " HbA1C  assay. Heterozygous hemoglobin variants (HbS, HgC, etc)do  not significantly interfere with this assay.   However, presence of multiple variants may affect accuracy.             Past Medical History:   Diagnosis Date    Abnormal stress test     Bronchitis     COPD (chronic obstructive pulmonary disease)     Diabetes mellitus, type 2     HLD (hyperlipidemia)     Hx of endoscopy     Hypertension     Mental disorder     Pulmonary nodule 4/9/2018    SOB (shortness of breath)     resolved    Tobacco abuse 12/26/2012       Past Surgical History:   Procedure Laterality Date    APPENDECTOMY      COLONOSCOPY N/A 1/18/2019    Procedure: COLONOSCOPY;  Surgeon: Lai Cardoso MD;  Location: [facility];  Service: Endoscopy;  Laterality: N/A;    ESOPHAGOGASTRODUODENOSCOPY N/A 2/19/2021    Procedure: ESOPHAGOGASTRODUODENOSCOPY (EGD);  Surgeon: Lai Cardoso MD;  Location: [facility];  Service: Endoscopy;  Laterality: N/A;    TONSILLECTOMY         Family History   Problem Relation Age of Onset    Heart disease Mother         MVP    Cataracts Mother     Heart disease Father 65        MI and PVD    Throat cancer Father     Heart disease Sister         mvp    Esophageal cancer Brother     No Known Problems Sister     No Known Problems Sister     Hyperlipidemia Paternal Grandmother        Social History     Socioeconomic History    Marital status:    Tobacco Use    Smoking status: Current Every Day Smoker     Packs/day: 0.75     Years: 41.00     Pack years: 30.75     Types: Cigarettes    Smokeless tobacco: Never Used   Substance and Sexual Activity    Alcohol use: No    Drug use: No    Sexual activity: Yes     Partners: Male     Birth control/protection: Post-menopausal     Social Determinants of Health     Financial Resource Strain: Low Risk     Difficulty of Paying Living Expenses: Not very hard   Food Insecurity: Food Insecurity Present    Worried About Running Out of Food in  the Last Year: Sometimes true    Ran Out of Food in the Last Year: Never true   Transportation Needs: No Transportation Needs    Lack of Transportation (Medical): No    Lack of Transportation (Non-Medical): No   Physical Activity: Insufficiently Active    Days of Exercise per Week: 3 days    Minutes of Exercise per Session: 40 min   Stress: Stress Concern Present    Feeling of Stress : Very much   Social Connections: Unknown    Frequency of Communication with Friends and Family: More than three times a week    Frequency of Social Gatherings with Friends and Family: Patient refused    Active Member of Clubs or Organizations: No    Attends Club or Organization Meetings: Patient refused    Marital Status:    Housing Stability: Low Risk     Unable to Pay for Housing in the Last Year: No    Number of Places Lived in the Last Year: 1    Unstable Housing in the Last Year: No       Current Outpatient Medications   Medication Sig Dispense Refill    albuterol-ipratropium (DUO-NEB) 2.5 mg-0.5 mg/3 mL nebulizer solution Take 3 mLs by nebulization every 6 (six) hours as needed for Wheezing or Shortness of Breath. Rescue 200 each 11    ALPRAZolam (XANAX) 0.25 MG tablet Take 1 tablet (0.25 mg total) by mouth 2 (two) times daily. 60 tablet 2    atorvastatin (LIPITOR) 20 MG tablet Take 1 tablet (20 mg total) by mouth once daily. 90 tablet 3    blood sugar diagnostic Strp 1 strip by Misc.(Non-Drug; Combo Route) route once daily. (Patient taking differently: 1 strip by Misc.(Non-Drug; Combo Route) route once daily.) 50 each 6    blood-glucose meter kit Use as instructed 1 each 0    buPROPion (WELLBUTRIN XL) 150 MG TB24 tablet Take 1 tablet (150 mg total) by mouth once daily. 90 tablet 1    cholecalciferol, vitamin D3, 2,000 unit Cap Take 1 capsule by mouth once daily.      clotrimazole-betamethasone 1-0.05% (LOTRISONE) cream APPLY TO AFFECTED AREA TWICE A DAY 45 g 1    cyclobenzaprine (FLEXERIL) 5 MG  tablet Take 1 tablet (5 mg total) by mouth nightly as needed for Muscle spasms. 60 tablet 1    desloratadine (CLARINEX) 5 mg tablet TAKE 1 TABLET BY MOUTH EVERY DAY 90 tablet 3    EScitalopram oxalate (LEXAPRO) 10 MG tablet Take 1 tablet (10 mg total) by mouth once daily. 90 tablet 3    famotidine (PEPCID) 40 MG tablet TAKE 1 TABLET (40 MG TOTAL) BY MOUTH 2 (TWO) TIMES DAILY AS NEEDED FOR HEARTBURN. 180 tablet 1    fluticasone propionate (FLONASE) 50 mcg/actuation nasal spray 1 spray (50 mcg total) by Each Nostril route once daily. 48 g 2    fluticasone-salmeterol diskus inhaler 100-50 mcg Inhale 1 puff into the lungs 2 (two) times daily. Controller 60 each 11    furosemide (LASIX) 20 MG tablet Take 1 tablet (20 mg total) by mouth once daily. 90 tablet 1    lisinopriL (PRINIVIL,ZESTRIL) 2.5 MG tablet TAKE 1 TABLET BY MOUTH EVERY DAY 90 tablet 1    metFORMIN (GLUCOPHAGE-XR) 500 MG ER 24hr tablet Take 1 tablet (500 mg total) by mouth 2 (two) times daily with meals. 180 tablet 3    montelukast (SINGULAIR) 10 mg tablet TAKE 1 TABLET BY MOUTH EVERY DAY IN THE EVENING 90 tablet 3    naproxen (NAPROSYN) 500 MG tablet Take 1 tablet (500 mg total) by mouth 2 (two) times daily with meals. 30 tablet 1    albuterol (PROVENTIL/VENTOLIN HFA) 90 mcg/actuation inhaler Inhale 2 puffs into the lungs every 6 (six) hours as needed for Wheezing. Rescue 18 g 3     No current facility-administered medications for this visit.       Review of patient's allergies indicates:   Allergen Reactions    Penicillins          Review of Systems   Constitutional: Negative for chills and fever.   Cardiovascular: Negative for claudication and leg swelling.   Skin: Negative for nail changes.   Musculoskeletal: Positive for myalgias. Negative for joint pain, joint swelling, muscle cramps and muscle weakness.   Gastrointestinal: Negative for nausea and vomiting.   Neurological: Positive for paresthesias. Negative for numbness.    Psychiatric/Behavioral: Negative for altered mental status.           Objective:      Physical Exam  Constitutional:       General: She is not in acute distress.     Appearance: She is well-developed. She is not diaphoretic.   Cardiovascular:      Pulses:           Dorsalis pedis pulses are 2+ on the right side and 2+ on the left side.        Posterior tibial pulses are 2+ on the right side and 2+ on the left side.      Comments: CFT < 3 seconds bilateral.  Pedal hair growth present bilateral.   No varicosities noted bilateral.  No bilateral lower extremity edema.  Toes are warm to touch bilateral.    Musculoskeletal:         General: Tenderness present.      Comments: Muscle strength 5/5 in all muscle groups bilateral.  Mild pain with palpation to the Rt. Peroneal tendons as they course inferior to the lateral malleolus.  Mild pain with both active and passive inversion of the Rt. Foot.  Bilateral pes planus foot type.  Bilateral semi-reducible contracture of toes 2-5.   Skin:     General: Skin is warm and dry.      Capillary Refill: Capillary refill takes less than 2 seconds.      Coloration: Skin is not pale.      Findings: No abrasion, burn, ecchymosis, erythema, laceration, lesion or rash.      Comments: Pedal skin has normal turgor, temperature, and texture bilateral. Toenails x 10 appear normotrophic.  Examination of the skin reveals no evidence of significant maceration, rashes, open lesions, suspicious appearing nevi or other concerning lesions.    Neurological:      Mental Status: She is alert and oriented to person, place, and time.      Sensory: No sensory deficit.      Motor: No weakness or atrophy.      Comments: Protective sensation per Anton-Gregorio monofilament intact bilateral.    Vibratory sensation intact bilateral.    Light touch intact bilateral.                   Assessment:       Encounter Diagnoses   Name Primary?    Diabetic polyneuropathy associated with type 2 diabetes mellitus Yes     Peroneal tendonitis, right     Pes planus of both feet     Paresthesia of foot, bilateral          Plan:       Malathi was seen today for diabetes mellitus and diabetic foot exam.    Diagnoses and all orders for this visit:    Diabetic polyneuropathy associated with type 2 diabetes mellitus    Peroneal tendonitis, right    Pes planus of both feet    Paresthesia of foot, bilateral      I counseled the patient on her conditions, their implications and medical management.    Given both verbal and written information regarding alpha lipoic acid and B-complex vitamins.  Take as instructed in clinic.    Recommend applying ice to the affected area up to 20 minutes daily.    Apply voltaren cream to the affected area 2-4 times daily.    Recommend switching to her pair of [employer] shoes, as this should limit the compensation that is contributing to current tendonitis.    Explained that symptoms should resolve in the next 4 weeks.      Shoe inspection. Diabetic Foot Education. Patient reminded of the importance of good nutrition and blood sugar control to help prevent podiatric complications of diabetes. Patient instructed on proper foot hygeine. We discussed wearing proper shoe gear, daily foot inspections, never walking without protective shoe gear, never putting sharp instruments to feet    Patient instructed to inspect her feet, wear protective shoe gear when ambulatory, moisturizer to maintain skin integrity and follow in this office in approximately 12 months, sooner p.r.n.    Follow up in about 1 year (around 4/29/2023).    Tonio Melo DPM

## 2022-04-29 NOTE — TELEPHONE ENCOUNTER
Refill Authorization Note   Malathi AguilarJuan  is requesting a refill authorization.  Brief Assessment and Rationale for Refill:  Approve     Medication Therapy Plan:       Medication Reconciliation Completed: No   Comments:     No Care Gaps recommended.     Note composed:8:25 AM 04/29/2022

## 2022-05-27 ENCOUNTER — HOSPITAL ENCOUNTER (OUTPATIENT)
Dept: RADIOLOGY | Facility: HOSPITAL | Age: 62
Discharge: HOME | End: 2022-05-27
Attending: FAMILY MEDICINE
Payer: COMMERCIAL

## 2022-05-27 ENCOUNTER — OFFICE VISIT (OUTPATIENT)
Dept: FAMILY MEDICINE | Facility: CLINIC | Age: 62
End: 2022-05-27
Payer: COMMERCIAL

## 2022-05-27 VITALS
SYSTOLIC BLOOD PRESSURE: 116 MMHG | WEIGHT: 203.38 LBS | BODY MASS INDEX: 29.12 KG/M2 | HEART RATE: 74 BPM | DIASTOLIC BLOOD PRESSURE: 72 MMHG | HEIGHT: 70 IN

## 2022-05-27 DIAGNOSIS — G89.29 CHRONIC BILATERAL LOW BACK PAIN WITHOUT SCIATICA: ICD-10-CM

## 2022-05-27 DIAGNOSIS — F33.41 RECURRENT MAJOR DEPRESSIVE DISORDER, IN PARTIAL REMISSION: ICD-10-CM

## 2022-05-27 DIAGNOSIS — T78.40XS ALLERGY, SEQUELA: ICD-10-CM

## 2022-05-27 DIAGNOSIS — M50.30 DDD (DEGENERATIVE DISC DISEASE), CERVICAL: ICD-10-CM

## 2022-05-27 DIAGNOSIS — J44.9 CHRONIC OBSTRUCTIVE PULMONARY DISEASE, UNSPECIFIED COPD TYPE: Primary | ICD-10-CM

## 2022-05-27 DIAGNOSIS — Z00.00 ROUTINE GENERAL MEDICAL EXAMINATION AT A HEALTH CARE FACILITY: ICD-10-CM

## 2022-05-27 DIAGNOSIS — F41.9 ANXIETY: ICD-10-CM

## 2022-05-27 DIAGNOSIS — M54.50 CHRONIC BILATERAL LOW BACK PAIN WITHOUT SCIATICA: ICD-10-CM

## 2022-05-27 DIAGNOSIS — E11.9 TYPE 2 DIABETES MELLITUS WITHOUT COMPLICATION, WITHOUT LONG-TERM CURRENT USE OF INSULIN: ICD-10-CM

## 2022-05-27 PROCEDURE — 72040 X-RAY EXAM NECK SPINE 2-3 VW: CPT | Mod: TC,PN

## 2022-05-27 PROCEDURE — 3078F DIAST BP <80 MM HG: CPT | Mod: CPTII,S$GLB,, | Performed by: FAMILY MEDICINE

## 2022-05-27 PROCEDURE — 3074F SYST BP LT 130 MM HG: CPT | Mod: CPTII,S$GLB,, | Performed by: FAMILY MEDICINE

## 2022-05-27 PROCEDURE — 4010F ACE/ARB THERAPY RXD/TAKEN: CPT | Mod: CPTII,S$GLB,, | Performed by: FAMILY MEDICINE

## 2022-05-27 PROCEDURE — 1159F MED LIST DOCD IN RCRD: CPT | Mod: CPTII,S$GLB,, | Performed by: FAMILY MEDICINE

## 2022-05-27 PROCEDURE — 3008F PR BODY MASS INDEX (BMI) DOCUMENTED: ICD-10-PCS | Mod: CPTII,S$GLB,, | Performed by: FAMILY MEDICINE

## 2022-05-27 PROCEDURE — 3074F PR MOST RECENT SYSTOLIC BLOOD PRESSURE < 130 MM HG: ICD-10-PCS | Mod: CPTII,S$GLB,, | Performed by: FAMILY MEDICINE

## 2022-05-27 PROCEDURE — 72100 X-RAY EXAM L-S SPINE 2/3 VWS: CPT | Mod: TC,PN

## 2022-05-27 PROCEDURE — 99999 PR PBB SHADOW E&M-EST. PATIENT-LVL V: CPT | Mod: PBBFAC,,, | Performed by: FAMILY MEDICINE

## 2022-05-27 PROCEDURE — 72100 XR LUMBAR SPINE AP AND LATERAL: ICD-10-PCS | Mod: 26,,, | Performed by: RADIOLOGY

## 2022-05-27 PROCEDURE — 1159F PR MEDICATION LIST DOCUMENTED IN MEDICAL RECORD: ICD-10-PCS | Mod: CPTII,S$GLB,, | Performed by: FAMILY MEDICINE

## 2022-05-27 PROCEDURE — 99999 PR PBB SHADOW E&M-EST. PATIENT-LVL V: ICD-10-PCS | Mod: PBBFAC,,, | Performed by: FAMILY MEDICINE

## 2022-05-27 PROCEDURE — 3044F PR MOST RECENT HEMOGLOBIN A1C LEVEL <7.0%: ICD-10-PCS | Mod: CPTII,S$GLB,, | Performed by: FAMILY MEDICINE

## 2022-05-27 PROCEDURE — 1160F RVW MEDS BY RX/DR IN RCRD: CPT | Mod: CPTII,S$GLB,, | Performed by: FAMILY MEDICINE

## 2022-05-27 PROCEDURE — 72040 XR CERVICAL SPINE AP LATERAL: ICD-10-PCS | Mod: 26,,, | Performed by: RADIOLOGY

## 2022-05-27 PROCEDURE — 72040 X-RAY EXAM NECK SPINE 2-3 VW: CPT | Mod: 26,,, | Performed by: RADIOLOGY

## 2022-05-27 PROCEDURE — 4010F PR ACE/ARB THEARPY RXD/TAKEN: ICD-10-PCS | Mod: CPTII,S$GLB,, | Performed by: FAMILY MEDICINE

## 2022-05-27 PROCEDURE — 1160F PR REVIEW ALL MEDS BY PRESCRIBER/CLIN PHARMACIST DOCUMENTED: ICD-10-PCS | Mod: CPTII,S$GLB,, | Performed by: FAMILY MEDICINE

## 2022-05-27 PROCEDURE — 99214 PR OFFICE/OUTPT VISIT, EST, LEVL IV, 30-39 MIN: ICD-10-PCS | Mod: S$GLB,,, | Performed by: FAMILY MEDICINE

## 2022-05-27 PROCEDURE — 3008F BODY MASS INDEX DOCD: CPT | Mod: CPTII,S$GLB,, | Performed by: FAMILY MEDICINE

## 2022-05-27 PROCEDURE — 3044F HG A1C LEVEL LT 7.0%: CPT | Mod: CPTII,S$GLB,, | Performed by: FAMILY MEDICINE

## 2022-05-27 PROCEDURE — 72100 X-RAY EXAM L-S SPINE 2/3 VWS: CPT | Mod: 26,,, | Performed by: RADIOLOGY

## 2022-05-27 PROCEDURE — 3078F PR MOST RECENT DIASTOLIC BLOOD PRESSURE < 80 MM HG: ICD-10-PCS | Mod: CPTII,S$GLB,, | Performed by: FAMILY MEDICINE

## 2022-05-27 PROCEDURE — 99214 OFFICE O/P EST MOD 30 MIN: CPT | Mod: S$GLB,,, | Performed by: FAMILY MEDICINE

## 2022-05-27 RX ORDER — GABAPENTIN 300 MG/1
300 CAPSULE ORAL 3 TIMES DAILY
Qty: 90 CAPSULE | Refills: 11 | Status: SHIPPED | OUTPATIENT
Start: 2022-05-27 | End: 2023-06-05 | Stop reason: SDUPTHER

## 2022-05-27 RX ORDER — ESCITALOPRAM OXALATE 10 MG/1
15 TABLET ORAL DAILY
Qty: 480 TABLET | Refills: 3 | Status: SHIPPED | OUTPATIENT
Start: 2022-05-27 | End: 2023-06-05 | Stop reason: SDUPTHER

## 2022-05-27 RX ORDER — MONTELUKAST SODIUM 10 MG/1
10 TABLET ORAL NIGHTLY
Qty: 90 TABLET | Refills: 3 | Status: SHIPPED | OUTPATIENT
Start: 2022-05-27 | End: 2022-12-27

## 2022-05-27 RX ORDER — LISINOPRIL 2.5 MG/1
2.5 TABLET ORAL DAILY
Qty: 90 TABLET | Refills: 3 | Status: ON HOLD | OUTPATIENT
Start: 2022-05-27 | End: 2023-05-07 | Stop reason: HOSPADM

## 2022-05-27 RX ORDER — ALPRAZOLAM 0.25 MG/1
0.25 TABLET ORAL 2 TIMES DAILY
Qty: 60 TABLET | Refills: 2 | Status: SHIPPED | OUTPATIENT
Start: 2022-05-27 | End: 2022-09-16 | Stop reason: SDUPTHER

## 2022-05-27 RX ORDER — CYCLOBENZAPRINE HCL 5 MG
5 TABLET ORAL NIGHTLY PRN
Qty: 60 TABLET | Refills: 1 | Status: SHIPPED | OUTPATIENT
Start: 2022-05-27 | End: 2022-07-08 | Stop reason: SDUPTHER

## 2022-05-27 NOTE — PROGRESS NOTES
"Subjective:       Patient ID: Malathi Martinez is a 61 y.o. female.    Chief Complaint: Shoulder Pain    HPI  Patient in clniic for sx review.  Notes an increase in hand and foot pain of late. Saw podiatry re feet - zapping sensations, slightly improved. No diurnal variation in sx.   The L shoulder and top of her hands have been bothering her too. They feel more stiff and achy.  Reviewed previous cervical spine eval in 2020. She does recall that gabapentin was helpful at the time for similar sx.    Review of Systems:  Review of Systems   Constitutional: Negative for appetite change, chills and fever.   HENT: Negative for ear pain, postnasal drip, rhinorrhea and sore throat.    Respiratory: Positive for cough and shortness of breath. Negative for wheezing.    Cardiovascular: Negative for chest pain and leg swelling.   Gastrointestinal: Negative for diarrhea and nausea.        Covid-related GI issues resolved   Genitourinary: Negative for difficulty urinating and dysuria.   Musculoskeletal: Positive for arthralgias and myalgias. Negative for gait problem.   Skin: Negative for rash.   Allergic/Immunologic: Positive for environmental allergies.   Neurological: Negative for headaches.       Objective:     Vitals:    05/27/22 1258   BP: 116/72   BP Location: Right arm   Pulse: 74   Weight: 92.3 kg (203 lb 6 oz)   Height: 5' 10" (1.778 m)          Physical Exam  Vitals and nursing note reviewed.   Constitutional:       General: She is not in acute distress.     Appearance: Normal appearance. She is well-developed.   HENT:      Head: Normocephalic and atraumatic.   Eyes:      General: No scleral icterus.        Right eye: No discharge.         Left eye: No discharge.      Conjunctiva/sclera: Conjunctivae normal.   Cardiovascular:      Rate and Rhythm: Normal rate and regular rhythm.   Pulmonary:      Effort: Pulmonary effort is normal. No respiratory distress.      Breath sounds: Normal breath sounds.   Abdominal:     "  General: There is no distension.      Palpations: Abdomen is soft.   Musculoskeletal:         General: Normal range of motion.      Cervical back: Normal range of motion and neck supple.   Skin:     General: Skin is warm and dry.      Findings: No rash.   Neurological:      General: No focal deficit present.      Mental Status: She is alert and oriented to person, place, and time.   Psychiatric:         Mood and Affect: Mood normal.         Behavior: Behavior normal.           Assessment & Plan:  Chronic obstructive pulmonary disease, unspecified COPD type  -     CT Chest Lung Screening Low Dose; Future; Expected date: 05/27/2022    Type 2 diabetes mellitus without complication, without long-term current use of insulin  Comments:  well-controlled, cont regimen  Orders:  -     lisinopriL (PRINIVIL,ZESTRIL) 2.5 MG tablet; Take 1 tablet (2.5 mg total) by mouth once daily.  Dispense: 90 tablet; Refill: 3    Allergy, sequela  -     montelukast (SINGULAIR) 10 mg tablet; Take 1 tablet (10 mg total) by mouth every evening.  Dispense: 90 tablet; Refill: 3    Routine general medical examination at a health care facility  -     CBC Without Differential; Future; Expected date: 05/27/2022  -     Comprehensive Metabolic Panel; Future; Expected date: 05/27/2022  -     Hemoglobin A1C; Future; Expected date: 05/27/2022  -     Lipid Panel; Future; Expected date: 05/27/2022  -     TSH; Future; Expected date: 05/27/2022  -     Urinalysis, Reflex to Urine Culture Urine, Clean Catch; Future  -     HIV 1/2 Ag/Ab (4th Gen); Future; Expected date: 05/27/2022    DDD (degenerative disc disease), cervical  -     gabapentin (NEURONTIN) 300 MG capsule; Take 1 capsule (300 mg total) by mouth 3 (three) times daily.  Dispense: 90 capsule; Refill: 11  -     cyclobenzaprine (FLEXERIL) 5 MG tablet; Take 1 tablet (5 mg total) by mouth nightly as needed for Muscle spasms.  Dispense: 60 tablet; Refill: 1  -     X-Ray Cervical Spine AP And Lateral;  Future; Expected date: 05/27/2022    Chronic bilateral low back pain without sciatica  -     gabapentin (NEURONTIN) 300 MG capsule; Take 1 capsule (300 mg total) by mouth 3 (three) times daily.  Dispense: 90 capsule; Refill: 11  -     cyclobenzaprine (FLEXERIL) 5 MG tablet; Take 1 tablet (5 mg total) by mouth nightly as needed for Muscle spasms.  Dispense: 60 tablet; Refill: 1  -     X-Ray Lumbar Spine AP And Lateral; Future; Expected date: 05/27/2022    Anxiety  Comments:  cont xanax prn  Orders:  -     ALPRAZolam (XANAX) 0.25 MG tablet; Take 1 tablet (0.25 mg total) by mouth 2 (two) times daily.  Dispense: 60 tablet; Refill: 2  -     Ambulatory referral/consult to Psychology; Future; Expected date: 06/03/2022    Recurrent major depressive disorder, in partial remission  Comments:  more depression sx of late, psychology referral updated, on wellbutrin 150, lexapro 15  Orders:  -     EScitalopram oxalate (LEXAPRO) 10 MG tablet; Take 1.5 tablets (15 mg total) by mouth once daily.  Dispense: 135 tablet; Refill: 3

## 2022-05-30 ENCOUNTER — TELEPHONE (OUTPATIENT)
Dept: PSYCHIATRY | Facility: CLINIC | Age: 62
End: 2022-05-30
Payer: COMMERCIAL

## 2022-05-30 ENCOUNTER — HOSPITAL ENCOUNTER (OUTPATIENT)
Dept: RADIOLOGY | Facility: HOSPITAL | Age: 62
Discharge: HOME | End: 2022-05-30
Attending: FAMILY MEDICINE
Payer: COMMERCIAL

## 2022-05-30 DIAGNOSIS — J44.9 CHRONIC OBSTRUCTIVE PULMONARY DISEASE, UNSPECIFIED COPD TYPE: ICD-10-CM

## 2022-05-30 PROCEDURE — 71271 CT THORAX LUNG CANCER SCR C-: CPT | Mod: TC,PO

## 2022-05-30 PROCEDURE — 71271 CT CHEST LUNG SCREENING LOW DOSE: ICD-10-PCS | Mod: 26,,, | Performed by: RADIOLOGY

## 2022-05-30 PROCEDURE — 71271 CT THORAX LUNG CANCER SCR C-: CPT | Mod: 26,,, | Performed by: RADIOLOGY

## 2022-06-01 ENCOUNTER — PATIENT MESSAGE (OUTPATIENT)
Dept: FAMILY MEDICINE | Facility: CLINIC | Age: 62
End: 2022-06-01
Payer: COMMERCIAL

## 2022-06-01 DIAGNOSIS — M54.16 LUMBAR RADICULOPATHY, CHRONIC: ICD-10-CM

## 2022-06-01 DIAGNOSIS — M50.30 DDD (DEGENERATIVE DISC DISEASE), CERVICAL: Primary | ICD-10-CM

## 2022-06-01 DIAGNOSIS — G89.29 CHRONIC BILATERAL LOW BACK PAIN WITHOUT SCIATICA: ICD-10-CM

## 2022-06-01 DIAGNOSIS — M54.50 CHRONIC BILATERAL LOW BACK PAIN WITHOUT SCIATICA: ICD-10-CM

## 2022-06-23 ENCOUNTER — PATIENT MESSAGE (OUTPATIENT)
Dept: FAMILY MEDICINE | Facility: CLINIC | Age: 62
End: 2022-06-23
Payer: COMMERCIAL

## 2022-06-24 ENCOUNTER — HOSPITAL ENCOUNTER (OUTPATIENT)
Dept: RADIOLOGY | Facility: HOSPITAL | Age: 62
Discharge: HOME | End: 2022-06-24
Attending: FAMILY MEDICINE
Payer: COMMERCIAL

## 2022-06-24 DIAGNOSIS — M50.30 DDD (DEGENERATIVE DISC DISEASE), CERVICAL: ICD-10-CM

## 2022-06-24 PROCEDURE — 72141 MRI CERVICAL SPINE WITHOUT CONTRAST: ICD-10-PCS | Mod: 26,,, | Performed by: RADIOLOGY

## 2022-06-24 PROCEDURE — 72141 MRI NECK SPINE W/O DYE: CPT | Mod: TC,PO

## 2022-06-24 PROCEDURE — 72141 MRI NECK SPINE W/O DYE: CPT | Mod: 26,,, | Performed by: RADIOLOGY

## 2022-07-08 ENCOUNTER — OFFICE VISIT (OUTPATIENT)
Dept: FAMILY MEDICINE | Facility: CLINIC | Age: 62
End: 2022-07-08
Payer: COMMERCIAL

## 2022-07-08 DIAGNOSIS — M54.50 CHRONIC BILATERAL LOW BACK PAIN WITHOUT SCIATICA: ICD-10-CM

## 2022-07-08 DIAGNOSIS — G89.29 CHRONIC BILATERAL LOW BACK PAIN WITHOUT SCIATICA: ICD-10-CM

## 2022-07-08 DIAGNOSIS — M50.30 DDD (DEGENERATIVE DISC DISEASE), CERVICAL: ICD-10-CM

## 2022-07-08 PROCEDURE — 99213 PR OFFICE/OUTPT VISIT, EST, LEVL III, 20-29 MIN: ICD-10-PCS | Mod: 95,,, | Performed by: FAMILY MEDICINE

## 2022-07-08 PROCEDURE — 4010F PR ACE/ARB THEARPY RXD/TAKEN: ICD-10-PCS | Mod: CPTII,95,, | Performed by: FAMILY MEDICINE

## 2022-07-08 PROCEDURE — 4010F ACE/ARB THERAPY RXD/TAKEN: CPT | Mod: CPTII,95,, | Performed by: FAMILY MEDICINE

## 2022-07-08 PROCEDURE — 1159F MED LIST DOCD IN RCRD: CPT | Mod: CPTII,95,, | Performed by: FAMILY MEDICINE

## 2022-07-08 PROCEDURE — 3044F HG A1C LEVEL LT 7.0%: CPT | Mod: CPTII,95,, | Performed by: FAMILY MEDICINE

## 2022-07-08 PROCEDURE — 1160F RVW MEDS BY RX/DR IN RCRD: CPT | Mod: CPTII,95,, | Performed by: FAMILY MEDICINE

## 2022-07-08 PROCEDURE — 99213 OFFICE O/P EST LOW 20 MIN: CPT | Mod: 95,,, | Performed by: FAMILY MEDICINE

## 2022-07-08 PROCEDURE — 1159F PR MEDICATION LIST DOCUMENTED IN MEDICAL RECORD: ICD-10-PCS | Mod: CPTII,95,, | Performed by: FAMILY MEDICINE

## 2022-07-08 PROCEDURE — 3044F PR MOST RECENT HEMOGLOBIN A1C LEVEL <7.0%: ICD-10-PCS | Mod: CPTII,95,, | Performed by: FAMILY MEDICINE

## 2022-07-08 PROCEDURE — 1160F PR REVIEW ALL MEDS BY PRESCRIBER/CLIN PHARMACIST DOCUMENTED: ICD-10-PCS | Mod: CPTII,95,, | Performed by: FAMILY MEDICINE

## 2022-07-08 RX ORDER — CYCLOBENZAPRINE HCL 5 MG
5 TABLET ORAL NIGHTLY PRN
Qty: 60 TABLET | Refills: 1 | Status: SHIPPED | OUTPATIENT
Start: 2022-07-08 | End: 2022-11-23 | Stop reason: SDUPTHER

## 2022-07-08 NOTE — PROGRESS NOTES
Subjective:       Patient ID: Malathi Martinez is a 61 y.o. female.    Chief Complaint: No chief complaint on file.      The patient location is: home, [place]  The chief complaint leading to consultation is: imaging review  Visit type: Virtual visit with synchronous audio and video  Total time spent with patient: 10mins incl doc  Each patient to whom he or she provides medical services by telemedicine is:  (1) informed of the relationship between the physician and patient and the respective role of any other health care provider with respect to management of the patient; and (2) notified that he or she may decline to receive medical services by telemedicine and may withdraw from such care at any time.    Notes: Patient seen for f/u and med review.   Notes neck sx are more manageable with current regimen - naproxen prn, flexeril qhs, gabapentin. Not so stiff in the mornings.   Reviewed MRI cervical findings.      Review of Systems   Constitutional: Negative for activity change and unexpected weight change.   HENT: Negative for hearing loss, rhinorrhea and trouble swallowing.    Eyes: Negative for discharge and visual disturbance.   Respiratory: Negative for chest tightness and wheezing.    Cardiovascular: Positive for palpitations. Negative for chest pain.   Gastrointestinal: Negative for blood in stool, constipation, diarrhea and vomiting.   Endocrine: Negative for polydipsia and polyuria.   Genitourinary: Negative for difficulty urinating, dysuria, hematuria and menstrual problem.   Musculoskeletal: Positive for neck pain. Negative for arthralgias and joint swelling.   Neurological: Positive for headaches. Negative for weakness.   Psychiatric/Behavioral: Positive for dysphoric mood. Negative for confusion.       Objective:        Physical Exam  Vitals and nursing note reviewed.   Constitutional:       General: She is not in acute distress.     Appearance: Normal appearance. She is well-developed.   HENT:       Head: Normocephalic and atraumatic.   Eyes:      General: No scleral icterus.        Right eye: No discharge.         Left eye: No discharge.      Conjunctiva/sclera: Conjunctivae normal.   Pulmonary:      Effort: Pulmonary effort is normal. No respiratory distress.   Skin:     General: Skin is warm and dry.   Neurological:      General: No focal deficit present.      Mental Status: She is alert and oriented to person, place, and time.   Psychiatric:         Mood and Affect: Mood normal.         Behavior: Behavior normal.             Assessment:   DDD (degenerative disc disease), cervical  -     cyclobenzaprine (FLEXERIL) 5 MG tablet; Take 1 tablet (5 mg total) by mouth nightly as needed for Muscle spasms.  Dispense: 60 tablet; Refill: 1    Chronic bilateral low back pain without sciatica  -     cyclobenzaprine (FLEXERIL) 5 MG tablet; Take 1 tablet (5 mg total) by mouth nightly as needed for Muscle spasms.  Dispense: 60 tablet; Refill: 1         Patient aware of limitations to assessment and exam of telemedicine. Deemed appropriate at this time given current covid-19 concerns.

## 2022-08-24 ENCOUNTER — PATIENT MESSAGE (OUTPATIENT)
Dept: ADMINISTRATIVE | Facility: HOSPITAL | Age: 62
End: 2022-08-24
Payer: COMMERCIAL

## 2022-08-26 ENCOUNTER — OFFICE VISIT (OUTPATIENT)
Dept: URGENT CARE | Facility: CLINIC | Age: 62
End: 2022-08-26
Payer: COMMERCIAL

## 2022-08-26 VITALS
HEIGHT: 70 IN | OXYGEN SATURATION: 96 % | BODY MASS INDEX: 29.06 KG/M2 | RESPIRATION RATE: 16 BRPM | HEART RATE: 75 BPM | DIASTOLIC BLOOD PRESSURE: 67 MMHG | WEIGHT: 203 LBS | SYSTOLIC BLOOD PRESSURE: 127 MMHG | TEMPERATURE: 98 F

## 2022-08-26 DIAGNOSIS — J32.9 BACTERIAL SINUSITIS: Primary | ICD-10-CM

## 2022-08-26 DIAGNOSIS — J44.9 CHRONIC OBSTRUCTIVE PULMONARY DISEASE, UNSPECIFIED COPD TYPE: ICD-10-CM

## 2022-08-26 DIAGNOSIS — B96.89 BACTERIAL SINUSITIS: Primary | ICD-10-CM

## 2022-08-26 DIAGNOSIS — Z72.0 TOBACCO USE: ICD-10-CM

## 2022-08-26 DIAGNOSIS — R05.9 COUGH: ICD-10-CM

## 2022-08-26 LAB
CTP QC/QA: YES
SARS-COV-2 RDRP RESP QL NAA+PROBE: NEGATIVE

## 2022-08-26 PROCEDURE — 3008F PR BODY MASS INDEX (BMI) DOCUMENTED: ICD-10-PCS | Mod: CPTII,S$GLB,, | Performed by: PHYSICIAN ASSISTANT

## 2022-08-26 PROCEDURE — 3074F SYST BP LT 130 MM HG: CPT | Mod: CPTII,S$GLB,, | Performed by: PHYSICIAN ASSISTANT

## 2022-08-26 PROCEDURE — 3074F PR MOST RECENT SYSTOLIC BLOOD PRESSURE < 130 MM HG: ICD-10-PCS | Mod: CPTII,S$GLB,, | Performed by: PHYSICIAN ASSISTANT

## 2022-08-26 PROCEDURE — 1159F MED LIST DOCD IN RCRD: CPT | Mod: CPTII,S$GLB,, | Performed by: PHYSICIAN ASSISTANT

## 2022-08-26 PROCEDURE — 3008F BODY MASS INDEX DOCD: CPT | Mod: CPTII,S$GLB,, | Performed by: PHYSICIAN ASSISTANT

## 2022-08-26 PROCEDURE — 1160F PR REVIEW ALL MEDS BY PRESCRIBER/CLIN PHARMACIST DOCUMENTED: ICD-10-PCS | Mod: CPTII,S$GLB,, | Performed by: PHYSICIAN ASSISTANT

## 2022-08-26 PROCEDURE — 3044F PR MOST RECENT HEMOGLOBIN A1C LEVEL <7.0%: ICD-10-PCS | Mod: CPTII,S$GLB,, | Performed by: PHYSICIAN ASSISTANT

## 2022-08-26 PROCEDURE — U0002 COVID-19 LAB TEST NON-CDC: HCPCS | Mod: QW,S$GLB,, | Performed by: PHYSICIAN ASSISTANT

## 2022-08-26 PROCEDURE — 99214 PR OFFICE/OUTPT VISIT, EST, LEVL IV, 30-39 MIN: ICD-10-PCS | Mod: S$GLB,,, | Performed by: PHYSICIAN ASSISTANT

## 2022-08-26 PROCEDURE — 3044F HG A1C LEVEL LT 7.0%: CPT | Mod: CPTII,S$GLB,, | Performed by: PHYSICIAN ASSISTANT

## 2022-08-26 PROCEDURE — 1160F RVW MEDS BY RX/DR IN RCRD: CPT | Mod: CPTII,S$GLB,, | Performed by: PHYSICIAN ASSISTANT

## 2022-08-26 PROCEDURE — 4010F PR ACE/ARB THEARPY RXD/TAKEN: ICD-10-PCS | Mod: CPTII,S$GLB,, | Performed by: PHYSICIAN ASSISTANT

## 2022-08-26 PROCEDURE — 99214 OFFICE O/P EST MOD 30 MIN: CPT | Mod: S$GLB,,, | Performed by: PHYSICIAN ASSISTANT

## 2022-08-26 PROCEDURE — 3078F PR MOST RECENT DIASTOLIC BLOOD PRESSURE < 80 MM HG: ICD-10-PCS | Mod: CPTII,S$GLB,, | Performed by: PHYSICIAN ASSISTANT

## 2022-08-26 PROCEDURE — 4010F ACE/ARB THERAPY RXD/TAKEN: CPT | Mod: CPTII,S$GLB,, | Performed by: PHYSICIAN ASSISTANT

## 2022-08-26 PROCEDURE — 1159F PR MEDICATION LIST DOCUMENTED IN MEDICAL RECORD: ICD-10-PCS | Mod: CPTII,S$GLB,, | Performed by: PHYSICIAN ASSISTANT

## 2022-08-26 PROCEDURE — 3078F DIAST BP <80 MM HG: CPT | Mod: CPTII,S$GLB,, | Performed by: PHYSICIAN ASSISTANT

## 2022-08-26 PROCEDURE — U0002: ICD-10-PCS | Mod: QW,S$GLB,, | Performed by: PHYSICIAN ASSISTANT

## 2022-08-26 RX ORDER — PROMETHAZINE HYDROCHLORIDE AND DEXTROMETHORPHAN HYDROBROMIDE 6.25; 15 MG/5ML; MG/5ML
5 SYRUP ORAL NIGHTLY PRN
Qty: 50 ML | Refills: 0 | Status: SHIPPED | OUTPATIENT
Start: 2022-08-26 | End: 2022-09-05

## 2022-08-26 RX ORDER — DOXYCYCLINE 100 MG/1
100 CAPSULE ORAL 2 TIMES DAILY
Qty: 14 CAPSULE | Refills: 0 | Status: SHIPPED | OUTPATIENT
Start: 2022-08-26 | End: 2022-09-02

## 2022-08-26 NOTE — PROGRESS NOTES
"Subjective:       Patient ID: Malathi Martinez is a 62 y.o. female.    Vitals:  height is 5' 10" (1.778 m) and weight is 92.1 kg (203 lb). Her oral temperature is 98.3 °F (36.8 °C). Her blood pressure is 127/67 and her pulse is 75. Her respiration is 16 and oxygen saturation is 96%.     Chief Complaint: Cough    Pt presents today with cough, congestion, headache x 1 day. She does have chronic PND. Pt is Covid vacs with known exposure. OTC taken with mild relief and pain scale 4/10.    Cough  This is a new problem. The current episode started yesterday. The problem has been gradually worsening. The problem occurs every few hours. The cough is non-productive. Associated symptoms include chills, ear congestion, headaches, nasal congestion, postnasal drip, a sore throat and shortness of breath. Pertinent negatives include no fever. Nothing aggravates the symptoms. Risk factors for lung disease include smoking/tobacco exposure. Treatments tried: tylenol. The treatment provided mild relief. Her past medical history is significant for bronchitis, COPD, environmental allergies and pneumonia.       Constitution: Positive for chills. Negative for fever.   HENT: Positive for postnasal drip and sore throat.    Respiratory: Positive for cough and shortness of breath.    Allergic/Immunologic: Positive for environmental allergies.   Neurological: Positive for headaches.       Objective:      Physical Exam   Constitutional: She does not appear ill. No distress.   HENT:   Head: Normocephalic and atraumatic.   Ears:   Right Ear: External ear and ear canal normal. A middle ear effusion (clear) is present.   Left Ear: External ear and ear canal normal. A middle ear effusion (clear) is present.   Nose: Right sinus exhibits maxillary sinus tenderness and frontal sinus tenderness. Left sinus exhibits maxillary sinus tenderness and frontal sinus tenderness.   Eyes: Conjunctivae are normal. Right eye exhibits no discharge. Left eye " exhibits no discharge. Extraocular movement intact   Cardiovascular: Normal rate, regular rhythm and normal heart sounds.   No murmur heard.  Pulmonary/Chest: Effort normal and breath sounds normal. She has no wheezes. She has no rhonchi. She has no rales.   Abdominal: Normal appearance.   Musculoskeletal: Normal range of motion.         General: Normal range of motion.   Neurological: no focal deficit. She is alert.   Skin: Skin is warm, dry and not pale. jaundice  Psychiatric: Her behavior is normal. Mood, judgment and thought content normal.   Nursing note and vitals reviewed.        Assessment:       1. Bacterial sinusitis    2. Cough    3. Tobacco use    4. Chronic obstructive pulmonary disease, unspecified COPD type          Plan:         Bacterial sinusitis    Cough  -     POCT COVID-19 Rapid Screening    Results for orders placed or performed in visit on 08/26/22   POCT COVID-19 Rapid Screening   Result Value Ref Range    POC Rapid COVID Negative Negative     Acceptable Yes        Tobacco use    Chronic obstructive pulmonary disease, unspecified COPD type    Other orders  -     doxycycline (VIBRAMYCIN) 100 MG Cap; Take 1 capsule (100 mg total) by mouth 2 (two) times daily. for 7 days  Dispense: 14 capsule; Refill: 0  -     promethazine-dextromethorphan (PROMETHAZINE-DM) 6.25-15 mg/5 mL Syrp; Take 5 mLs by mouth nightly as needed (cough).  Dispense: 50 mL; Refill: 0    Sinusitis Discharge Instructions, Adult   About this topic   Your sinuses are hollow areas in the bones of your face. They have a thin lining that normally makes a small amount of mucus. When you have sinusitis, the lining gets swollen and makes extra mucus. You may have sinusitis with or after a cold. Most of the time sinusitis will get better in 1 to 2 weeks.  Sinusitis is most often caused by a virus, so antibiotics wont help. But some people do need antibiotics. If the doctor ordered antibiotics for you, be sure to follow  the instructions. It is important to take all of your antibiotics even if you start to feel better.       What care is needed at home?   · Ask your doctor what you need to do when you go home. Make sure you ask questions if you do not understand what you need to do.  · Try to thin the mucus.  ? Drink lots of liquids to stay hydrated.  ? Use a cool mist humidifier to avoid dry air.  ? Use saline nose drops or a saline nose rinse to relieve stuffiness.  · Wash your hands often. This will help keep others healthy.  · Do not smoke or be in smoke-filled places. Avoid things that may cause breathing problems like fumes, pollution, dust, and other common allergens and irritants.  · You may want to take medicine like ibuprofen, naproxen, or acetaminophen to help with pain.  What follow-up care is needed?   · Your doctor may ask you to make visits to the office to check on your progress. Be sure to keep your visits.  · Your doctor will tell you if other tests are needed.  · Your doctor may send you for allergy tests or to an allergy expert.   What lifestyle changes are needed?   · Avoid drinks that contain caffeine or alcohol.  · Try to stop smoking. Avoid being around others who smoke. Smoke can damage the small hairs inside your sinuses.  What drugs may be needed?   The doctor may order drugs to:  · Help with pain and swelling  · Fight an infection  · Control coughing  · Dry up the sinuses  · Help a runny or stuffy nose  Will physical activity be limited?   You do not have to limit your activity. You may want to rest more if you have a fever or headache.   What problems could happen?   · Infections that happen again and again  · Asthma attack  · Coughing  · Loss of voice  What can be done to prevent this health problem?   · Keep your nose as moist as possible. Use saline sprays, washes, and a humidifier often.  · Avoid being around cigarette and cigar smoke or strong odors from chemicals.  · Avoid long periods of swimming  in pools treated with chlorine. This can bother the lining of the nose and sinuses.  · Avoid water diving. This forces water into the sinuses from the nasal passages.  · Manage your allergies with your doctor's help.  · Use an air conditioner if allergies are a problem.  · Before air travel, use a drug to dry up mucus. As the plane takes off or lands, the pressure can cause sinus pain.  When do I need to call the doctor?   · You have a stiff neck, especially if you also have fever, chills, vomiting, or severe headache  · You have trouble thinking clearly.  · You have trouble seeing or have double vision.  · You have swelling or redness or pain around one or both eyes.  · You have a fever of 102°F (38.9°C) or higher, or have shaking chills or sweats.  · You have an upset stomach and throwing up.  · You have more pain in your face and head.  · You are not getting better within 1 to 2 weeks.  Teach Back: Helping You Understand   The Teach Back Method helps you understand the information we are giving you. After you talk with the staff, tell them in your own words what you learned. This helps to make sure the staff has covered each thing clearly. It also helps to explain things that may have been confusing. Before going home, make sure you can do these:  · I can tell you about my condition.  · I can tell you what may help ease my breathing.  · I can tell you what I will do if I have a fever, chills, or trouble breathing.  Where can I learn more?   American Academy of [employer]icians  https://familydoctor.org/condition/sinusitis/   Centers for Disease Control and Prevention  https://www.cdc.gov/antibiotic-use/community/for-hcp/outpatient-hcp/adult-treatment-rec.html   Last Reviewed Date   2021-06-09  Consumer Information Use and Disclaimer   This information is not specific medical advice and does not replace information you receive from your health care provider. This is only a brief summary of general information. It  does NOT include all information about conditions, illnesses, injuries, tests, procedures, treatments, therapies, discharge instructions or life-style choices that may apply to you. You must talk with your health care provider for complete information about your health and treatment options. This information should not be used to decide whether or not to accept your health care providers advice, instructions or recommendations. Only your health care provider has the knowledge and training to provide advice that is right for you.  Copyright   Copyright © 2021 [employer] and its affiliates and/or licensors. All rights reserved.

## 2022-08-30 ENCOUNTER — PATIENT MESSAGE (OUTPATIENT)
Dept: ADMINISTRATIVE | Facility: OTHER | Age: 62
End: 2022-08-30
Payer: COMMERCIAL

## 2022-09-16 ENCOUNTER — PATIENT OUTREACH (OUTPATIENT)
Dept: ADMINISTRATIVE | Facility: HOSPITAL | Age: 62
End: 2022-09-16
Payer: COMMERCIAL

## 2022-09-16 ENCOUNTER — PATIENT MESSAGE (OUTPATIENT)
Dept: ADMINISTRATIVE | Facility: HOSPITAL | Age: 62
End: 2022-09-16
Payer: COMMERCIAL

## 2022-09-16 DIAGNOSIS — F41.9 ANXIETY: ICD-10-CM

## 2022-09-16 DIAGNOSIS — F33.41 RECURRENT MAJOR DEPRESSIVE DISORDER, IN PARTIAL REMISSION: ICD-10-CM

## 2022-09-16 DIAGNOSIS — M79.89 LEG SWELLING: ICD-10-CM

## 2022-09-16 DIAGNOSIS — E11.9 DIABETES MELLITUS WITHOUT COMPLICATION: Primary | ICD-10-CM

## 2022-09-16 NOTE — TELEPHONE ENCOUNTER
Care Due:                  Date            Visit Type   Department     Provider  --------------------------------------------------------------------------------                                ESTABLISHED                              PATIENT -    [facility]  Last Visit: 07-      VIRTUAL      MEDICINE       Comfort Dave                              EP -                              PRIMARY      [facility]  Next Visit: 09-      CARE ([facility])   MEDICINE       Comfort Dave                                                            Last  Test          Frequency    Reason                     Performed    Due Date  --------------------------------------------------------------------------------    CMP.........  12 months..  atorvastatin, famotidine,   08- 08-                             furosemide, lisinopriL,                             metFORMIN................    HBA1C.......  6 months...  metFORMIN................  04- 09-    Lipid Panel.  12 months..  atorvastatin.............  08- 08-    [facility] Embedded Care Gaps. Reference number: [id number]. 9/16/2022   2:24:37 PM CDT

## 2022-09-16 NOTE — PROGRESS NOTES
2022 Care Everywhere updates requested and reviewed.  Immunizations reconciled. Media reports reviewed.  Duplicate HM overrides and  orders removed.  Overdue HM topic chart audit and/or requested.  Overdue lab testing linked to upcoming lab appointments if applies.  Lab michelle, and [employer] reviewed   Pap Smear   HIV/lipid/urine micro scheduled 22    Health Maintenance Due   Topic Date Due    HIV Screening  Never done    Shingles Vaccine (1 of 2) Never done    COVID-19 Vaccine (3 - Booster for Pfizer series) 2021    Lipid Panel  2022    Influenza Vaccine (1) 2022    Cervical Cancer Screening  2022    Diabetes Urine Screening  2022

## 2022-09-19 RX ORDER — BUPROPION HYDROCHLORIDE 150 MG/1
150 TABLET ORAL DAILY
Qty: 90 TABLET | Refills: 1 | Status: SHIPPED | OUTPATIENT
Start: 2022-09-19 | End: 2022-10-06 | Stop reason: SDUPTHER

## 2022-09-19 RX ORDER — FUROSEMIDE 20 MG/1
20 TABLET ORAL DAILY
Qty: 90 TABLET | Refills: 1 | Status: SHIPPED | OUTPATIENT
Start: 2022-09-19 | End: 2023-03-31 | Stop reason: SDUPTHER

## 2022-09-19 RX ORDER — ALPRAZOLAM 0.25 MG/1
0.25 TABLET ORAL 2 TIMES DAILY
Qty: 60 TABLET | Refills: 2 | Status: SHIPPED | OUTPATIENT
Start: 2022-09-19 | End: 2022-12-16 | Stop reason: SDUPTHER

## 2022-09-24 ENCOUNTER — LAB VISIT (OUTPATIENT)
Dept: LAB | Facility: HOSPITAL | Age: 62
End: 2022-09-24
Attending: FAMILY MEDICINE
Payer: COMMERCIAL

## 2022-09-24 DIAGNOSIS — Z00.00 ROUTINE GENERAL MEDICAL EXAMINATION AT A HEALTH CARE FACILITY: ICD-10-CM

## 2022-09-24 LAB
ALBUMIN SERPL BCP-MCNC: 4 G/DL (ref 3.5–5.2)
ALP SERPL-CCNC: 79 U/L (ref 55–135)
ALT SERPL W/O P-5'-P-CCNC: 23 U/L (ref 10–44)
ANION GAP SERPL CALC-SCNC: 10 MMOL/L (ref 8–16)
AST SERPL-CCNC: 19 U/L (ref 10–40)
BILIRUB SERPL-MCNC: 0.5 MG/DL (ref 0.1–1)
BUN SERPL-MCNC: 7 MG/DL (ref 8–23)
CALCIUM SERPL-MCNC: 9.3 MG/DL (ref 8.7–10.5)
CHLORIDE SERPL-SCNC: 100 MMOL/L (ref 95–110)
CHOLEST SERPL-MCNC: 164 MG/DL (ref 120–199)
CHOLEST/HDLC SERPL: 3.3 {RATIO} (ref 2–5)
CO2 SERPL-SCNC: 25 MMOL/L (ref 23–29)
CREAT SERPL-MCNC: 0.8 MG/DL (ref 0.5–1.4)
ERYTHROCYTE [DISTWIDTH] IN BLOOD BY AUTOMATED COUNT: 13.8 % (ref 11.5–14.5)
EST. GFR  (NO RACE VARIABLE): >60 ML/MIN/1.73 M^2
ESTIMATED AVG GLUCOSE: 117 MG/DL (ref 68–131)
GLUCOSE SERPL-MCNC: 95 MG/DL (ref 70–110)
HBA1C MFR BLD: 5.7 % (ref 4–5.6)
HCT VFR BLD AUTO: 45.4 % (ref 37–48.5)
HDLC SERPL-MCNC: 50 MG/DL (ref 40–75)
HDLC SERPL: 30.5 % (ref 20–50)
HGB BLD-MCNC: 14.3 G/DL (ref 12–16)
HIV 1+2 AB+HIV1 P24 AG SERPL QL IA: NORMAL
LDLC SERPL CALC-MCNC: 90 MG/DL (ref 63–159)
MCH RBC QN AUTO: 28.4 PG (ref 27–31)
MCHC RBC AUTO-ENTMCNC: 31.5 G/DL (ref 32–36)
MCV RBC AUTO: 90 FL (ref 82–98)
NONHDLC SERPL-MCNC: 114 MG/DL
PLATELET # BLD AUTO: 282 K/UL (ref 150–450)
PMV BLD AUTO: 11.1 FL (ref 9.2–12.9)
POTASSIUM SERPL-SCNC: 3.6 MMOL/L (ref 3.5–5.1)
PROT SERPL-MCNC: 7.5 G/DL (ref 6–8.4)
RBC # BLD AUTO: 5.04 M/UL (ref 4–5.4)
SODIUM SERPL-SCNC: 135 MMOL/L (ref 136–145)
TRIGL SERPL-MCNC: 120 MG/DL (ref 30–150)
TSH SERPL DL<=0.005 MIU/L-ACNC: 2.84 UIU/ML (ref 0.4–4)
WBC # BLD AUTO: 6.95 K/UL (ref 3.9–12.7)

## 2022-09-24 PROCEDURE — 87389 HIV-1 AG W/HIV-1&-2 AB AG IA: CPT | Performed by: FAMILY MEDICINE

## 2022-09-24 PROCEDURE — 85027 COMPLETE CBC AUTOMATED: CPT | Performed by: FAMILY MEDICINE

## 2022-09-24 PROCEDURE — 84443 ASSAY THYROID STIM HORMONE: CPT | Performed by: FAMILY MEDICINE

## 2022-09-24 PROCEDURE — 83036 HEMOGLOBIN GLYCOSYLATED A1C: CPT | Performed by: FAMILY MEDICINE

## 2022-09-24 PROCEDURE — 80053 COMPREHEN METABOLIC PANEL: CPT | Performed by: FAMILY MEDICINE

## 2022-09-24 PROCEDURE — 36415 COLL VENOUS BLD VENIPUNCTURE: CPT | Mod: PO | Performed by: FAMILY MEDICINE

## 2022-09-24 PROCEDURE — 80061 LIPID PANEL: CPT | Performed by: FAMILY MEDICINE

## 2022-10-06 ENCOUNTER — OFFICE VISIT (OUTPATIENT)
Dept: FAMILY MEDICINE | Facility: CLINIC | Age: 62
End: 2022-10-06
Payer: COMMERCIAL

## 2022-10-06 VITALS
DIASTOLIC BLOOD PRESSURE: 70 MMHG | BODY MASS INDEX: 29.55 KG/M2 | SYSTOLIC BLOOD PRESSURE: 124 MMHG | WEIGHT: 206.44 LBS | HEIGHT: 70 IN | HEART RATE: 68 BPM

## 2022-10-06 DIAGNOSIS — M51.37 DEGENERATION OF INTERVERTEBRAL DISC AT L5-S1 LEVEL: ICD-10-CM

## 2022-10-06 DIAGNOSIS — E11.9 TYPE 2 DIABETES MELLITUS WITHOUT COMPLICATION, WITHOUT LONG-TERM CURRENT USE OF INSULIN: Primary | ICD-10-CM

## 2022-10-06 DIAGNOSIS — F33.41 RECURRENT MAJOR DEPRESSIVE DISORDER, IN PARTIAL REMISSION: ICD-10-CM

## 2022-10-06 DIAGNOSIS — E66.3 OVERWEIGHT: ICD-10-CM

## 2022-10-06 PROCEDURE — 3008F PR BODY MASS INDEX (BMI) DOCUMENTED: ICD-10-PCS | Mod: CPTII,S$GLB,, | Performed by: FAMILY MEDICINE

## 2022-10-06 PROCEDURE — 3061F PR NEG MICROALBUMINURIA RESULT DOCUMENTED/REVIEW: ICD-10-PCS | Mod: CPTII,S$GLB,, | Performed by: FAMILY MEDICINE

## 2022-10-06 PROCEDURE — 3074F PR MOST RECENT SYSTOLIC BLOOD PRESSURE < 130 MM HG: ICD-10-PCS | Mod: CPTII,S$GLB,, | Performed by: FAMILY MEDICINE

## 2022-10-06 PROCEDURE — 3078F DIAST BP <80 MM HG: CPT | Mod: CPTII,S$GLB,, | Performed by: FAMILY MEDICINE

## 2022-10-06 PROCEDURE — 90471 IMMUNIZATION ADMIN: CPT | Mod: S$GLB,,, | Performed by: FAMILY MEDICINE

## 2022-10-06 PROCEDURE — 3074F SYST BP LT 130 MM HG: CPT | Mod: CPTII,S$GLB,, | Performed by: FAMILY MEDICINE

## 2022-10-06 PROCEDURE — 4010F ACE/ARB THERAPY RXD/TAKEN: CPT | Mod: CPTII,S$GLB,, | Performed by: FAMILY MEDICINE

## 2022-10-06 PROCEDURE — 99999 PR PBB SHADOW E&M-EST. PATIENT-LVL IV: CPT | Mod: PBBFAC,,, | Performed by: FAMILY MEDICINE

## 2022-10-06 PROCEDURE — 3044F HG A1C LEVEL LT 7.0%: CPT | Mod: CPTII,S$GLB,, | Performed by: FAMILY MEDICINE

## 2022-10-06 PROCEDURE — 1160F RVW MEDS BY RX/DR IN RCRD: CPT | Mod: CPTII,S$GLB,, | Performed by: FAMILY MEDICINE

## 2022-10-06 PROCEDURE — 1159F PR MEDICATION LIST DOCUMENTED IN MEDICAL RECORD: ICD-10-PCS | Mod: CPTII,S$GLB,, | Performed by: FAMILY MEDICINE

## 2022-10-06 PROCEDURE — 3044F PR MOST RECENT HEMOGLOBIN A1C LEVEL <7.0%: ICD-10-PCS | Mod: CPTII,S$GLB,, | Performed by: FAMILY MEDICINE

## 2022-10-06 PROCEDURE — 99214 OFFICE O/P EST MOD 30 MIN: CPT | Mod: 25,S$GLB,, | Performed by: FAMILY MEDICINE

## 2022-10-06 PROCEDURE — 3066F NEPHROPATHY DOC TX: CPT | Mod: CPTII,S$GLB,, | Performed by: FAMILY MEDICINE

## 2022-10-06 PROCEDURE — 1160F PR REVIEW ALL MEDS BY PRESCRIBER/CLIN PHARMACIST DOCUMENTED: ICD-10-PCS | Mod: CPTII,S$GLB,, | Performed by: FAMILY MEDICINE

## 2022-10-06 PROCEDURE — 99214 PR OFFICE/OUTPT VISIT, EST, LEVL IV, 30-39 MIN: ICD-10-PCS | Mod: 25,S$GLB,, | Performed by: FAMILY MEDICINE

## 2022-10-06 PROCEDURE — 1159F MED LIST DOCD IN RCRD: CPT | Mod: CPTII,S$GLB,, | Performed by: FAMILY MEDICINE

## 2022-10-06 PROCEDURE — 3078F PR MOST RECENT DIASTOLIC BLOOD PRESSURE < 80 MM HG: ICD-10-PCS | Mod: CPTII,S$GLB,, | Performed by: FAMILY MEDICINE

## 2022-10-06 PROCEDURE — 90686 IIV4 VACC NO PRSV 0.5 ML IM: CPT | Mod: S$GLB,,, | Performed by: FAMILY MEDICINE

## 2022-10-06 PROCEDURE — 3061F NEG MICROALBUMINURIA REV: CPT | Mod: CPTII,S$GLB,, | Performed by: FAMILY MEDICINE

## 2022-10-06 PROCEDURE — 90686 FLU VACCINE (QUAD) GREATER THAN OR EQUAL TO 3YO PRESERVATIVE FREE IM: ICD-10-PCS | Mod: S$GLB,,, | Performed by: FAMILY MEDICINE

## 2022-10-06 PROCEDURE — 4010F PR ACE/ARB THEARPY RXD/TAKEN: ICD-10-PCS | Mod: CPTII,S$GLB,, | Performed by: FAMILY MEDICINE

## 2022-10-06 PROCEDURE — 3008F BODY MASS INDEX DOCD: CPT | Mod: CPTII,S$GLB,, | Performed by: FAMILY MEDICINE

## 2022-10-06 PROCEDURE — 90471 FLU VACCINE (QUAD) GREATER THAN OR EQUAL TO 3YO PRESERVATIVE FREE IM: ICD-10-PCS | Mod: S$GLB,,, | Performed by: FAMILY MEDICINE

## 2022-10-06 PROCEDURE — 99999 PR PBB SHADOW E&M-EST. PATIENT-LVL IV: ICD-10-PCS | Mod: PBBFAC,,, | Performed by: FAMILY MEDICINE

## 2022-10-06 PROCEDURE — 3066F PR DOCUMENTATION OF TREATMENT FOR NEPHROPATHY: ICD-10-PCS | Mod: CPTII,S$GLB,, | Performed by: FAMILY MEDICINE

## 2022-10-06 RX ORDER — B-COMPLEX WITH VITAMIN C
1 TABLET ORAL DAILY
COMMUNITY

## 2022-10-06 RX ORDER — NAPROXEN 500 MG/1
500 TABLET ORAL 2 TIMES DAILY WITH MEALS
Qty: 30 TABLET | Refills: 1 | Status: ON HOLD | OUTPATIENT
Start: 2022-10-06 | End: 2023-05-07 | Stop reason: HOSPADM

## 2022-10-06 RX ORDER — BUPROPION HYDROCHLORIDE 150 MG/1
300 TABLET ORAL DAILY
Qty: 180 TABLET | Refills: 1 | Status: SHIPPED | OUTPATIENT
Start: 2022-10-06 | End: 2023-02-04 | Stop reason: SDUPTHER

## 2022-10-06 NOTE — PROGRESS NOTES
"Subjective:       Patient ID: Malathi Martinez is a 62 y.o. female.    Chief Complaint: Back Pain (Leg weakness x1 week, no falls. )    HPI  Patient seen for sx review.  Back pain x 1-2 weeks. Not daily, but having tingling int the R>L leg where it feels weak. No falls, but not sure what's precipitating this. Maybe after prolonged sitting? Sx last for approx 1/2 hour and then fully resolves. Can improve as well with position change. Stable in frequency/severity/duration.    Gabapentin currently qhs. Flexeril primarily qhs prn.   Having more trouble with bladder with leaking right now as well. Started prior to back sx.   Mood could use a boost. Stress related to 's foot, recent amp.     Review of Systems:  Review of Systems   Constitutional:  Negative for activity change and unexpected weight change.   HENT:  Negative for hearing loss and rhinorrhea.    Eyes:  Negative for photophobia and visual disturbance.   Respiratory:  Negative for chest tightness and wheezing.    Cardiovascular:  Negative for chest pain and palpitations.   Gastrointestinal:  Negative for blood in stool, constipation and diarrhea.   Endocrine: Negative for polydipsia and polyuria.   Genitourinary:  Negative for difficulty urinating and dysuria.        Occ bladder leak   Musculoskeletal:  Negative for arthralgias and joint swelling.   Neurological:  Negative for weakness and headaches (occ).   Psychiatric/Behavioral:  Positive for dysphoric mood. Negative for confusion.      Objective:     Vitals:    10/06/22 1518   BP: 124/70   Pulse: 68   Weight: 93.6 kg (206 lb 7.4 oz)   Height: 5' 10" (1.778 m)          Physical Exam  Vitals and nursing note reviewed.   Constitutional:       General: She is not in acute distress.     Appearance: Normal appearance. She is well-developed.   HENT:      Head: Normocephalic and atraumatic.   Eyes:      General: No scleral icterus.        Right eye: No discharge.         Left eye: No discharge.      " Conjunctiva/sclera: Conjunctivae normal.   Cardiovascular:      Rate and Rhythm: Normal rate.   Pulmonary:      Effort: Pulmonary effort is normal. No respiratory distress.   Abdominal:      Palpations: Abdomen is soft.      Tenderness: There is no guarding.   Musculoskeletal:         General: No deformity or signs of injury.      Cervical back: Normal range of motion and neck supple.   Skin:     General: Skin is warm and dry.      Findings: No rash.   Neurological:      General: No focal deficit present.      Mental Status: She is alert and oriented to person, place, and time.   Psychiatric:         Mood and Affect: Mood normal.         Behavior: Behavior normal.         Assessment & Plan:  Type 2 diabetes mellitus without complication, without long-term current use of insulin  Comments:  controlled, cont regimen  Orders:  -     semaglutide (OZEMPIC) 0.25 mg or 0.5 mg(2 mg/1.5 mL) pen injector; Inject 0.25 mg into the skin every 7 days.  Dispense: 1 pen; Refill: 5    Overweight  Comments:  trial ozempic  Orders:  -     semaglutide (OZEMPIC) 0.25 mg or 0.5 mg(2 mg/1.5 mL) pen injector; Inject 0.25 mg into the skin every 7 days.  Dispense: 1 pen; Refill: 5    Degeneration of intervertebral disc at L5-S1 level  Comments:  naproxen 2x/day for a week  previous MRI order was denied by ins, discussed pain/eva consult if persistent  Orders:  -     naproxen (NAPROSYN) 500 MG tablet; Take 1 tablet (500 mg total) by mouth 2 (two) times daily with meals.  Dispense: 30 tablet; Refill: 1    Recurrent major depressive disorder, in partial remission  Comments:  bump wellbutrin to 300mg daily for now  cont lexapro 15mg   Orders:  -     buPROPion (WELLBUTRIN XL) 150 MG TB24 tablet; Take 2 tablets (300 mg total) by mouth once daily.  Dispense: 180 tablet; Refill: 1

## 2022-10-28 ENCOUNTER — PATIENT MESSAGE (OUTPATIENT)
Dept: FAMILY MEDICINE | Facility: CLINIC | Age: 62
End: 2022-10-28
Payer: COMMERCIAL

## 2022-10-28 RX ORDER — FAMOTIDINE 40 MG/1
40 TABLET, FILM COATED ORAL 2 TIMES DAILY PRN
Qty: 180 TABLET | Refills: 1 | Status: SHIPPED | OUTPATIENT
Start: 2022-10-28 | End: 2023-02-03 | Stop reason: SDUPTHER

## 2022-10-28 NOTE — TELEPHONE ENCOUNTER
Spoke with pt. Notified her that I spoke with Dr. Dave. Per Dr. Dave she recommends that lightly wipe with alcohol pad vs washing until staples are removed and she is seen on Monday. Pt verbalized understanding.

## 2022-10-30 ENCOUNTER — PATIENT MESSAGE (OUTPATIENT)
Dept: FAMILY MEDICINE | Facility: CLINIC | Age: 62
End: 2022-10-30
Payer: COMMERCIAL

## 2022-10-31 ENCOUNTER — PATIENT MESSAGE (OUTPATIENT)
Dept: FAMILY MEDICINE | Facility: CLINIC | Age: 62
End: 2022-10-31

## 2022-10-31 ENCOUNTER — OFFICE VISIT (OUTPATIENT)
Dept: FAMILY MEDICINE | Facility: CLINIC | Age: 62
End: 2022-10-31
Payer: COMMERCIAL

## 2022-10-31 VITALS
RESPIRATION RATE: 14 BRPM | SYSTOLIC BLOOD PRESSURE: 122 MMHG | HEIGHT: 70 IN | DIASTOLIC BLOOD PRESSURE: 66 MMHG | HEART RATE: 88 BPM | BODY MASS INDEX: 28.47 KG/M2 | WEIGHT: 198.88 LBS

## 2022-10-31 DIAGNOSIS — S12.690A OTHER CLOSED DISPLACED FRACTURE OF SEVENTH CERVICAL VERTEBRA, INITIAL ENCOUNTER: Primary | ICD-10-CM

## 2022-10-31 DIAGNOSIS — M50.30 DDD (DEGENERATIVE DISC DISEASE), CERVICAL: ICD-10-CM

## 2022-10-31 DIAGNOSIS — S09.90XD TRAUMATIC HEAD INJURY WITH MULTIPLE LACERATIONS, SUBSEQUENT ENCOUNTER: ICD-10-CM

## 2022-10-31 DIAGNOSIS — S01.91XD TRAUMATIC HEAD INJURY WITH MULTIPLE LACERATIONS, SUBSEQUENT ENCOUNTER: ICD-10-CM

## 2022-10-31 PROCEDURE — 3066F NEPHROPATHY DOC TX: CPT | Mod: CPTII,S$GLB,, | Performed by: FAMILY MEDICINE

## 2022-10-31 PROCEDURE — 99214 PR OFFICE/OUTPT VISIT, EST, LEVL IV, 30-39 MIN: ICD-10-PCS | Mod: 25,S$GLB,, | Performed by: FAMILY MEDICINE

## 2022-10-31 PROCEDURE — 3008F PR BODY MASS INDEX (BMI) DOCUMENTED: ICD-10-PCS | Mod: CPTII,S$GLB,, | Performed by: FAMILY MEDICINE

## 2022-10-31 PROCEDURE — 3074F PR MOST RECENT SYSTOLIC BLOOD PRESSURE < 130 MM HG: ICD-10-PCS | Mod: CPTII,S$GLB,, | Performed by: FAMILY MEDICINE

## 2022-10-31 PROCEDURE — 3044F HG A1C LEVEL LT 7.0%: CPT | Mod: CPTII,S$GLB,, | Performed by: FAMILY MEDICINE

## 2022-10-31 PROCEDURE — 3078F PR MOST RECENT DIASTOLIC BLOOD PRESSURE < 80 MM HG: ICD-10-PCS | Mod: CPTII,S$GLB,, | Performed by: FAMILY MEDICINE

## 2022-10-31 PROCEDURE — 3078F DIAST BP <80 MM HG: CPT | Mod: CPTII,S$GLB,, | Performed by: FAMILY MEDICINE

## 2022-10-31 PROCEDURE — 3074F SYST BP LT 130 MM HG: CPT | Mod: CPTII,S$GLB,, | Performed by: FAMILY MEDICINE

## 2022-10-31 PROCEDURE — 3008F BODY MASS INDEX DOCD: CPT | Mod: CPTII,S$GLB,, | Performed by: FAMILY MEDICINE

## 2022-10-31 PROCEDURE — 3044F PR MOST RECENT HEMOGLOBIN A1C LEVEL <7.0%: ICD-10-PCS | Mod: CPTII,S$GLB,, | Performed by: FAMILY MEDICINE

## 2022-10-31 PROCEDURE — 99214 OFFICE O/P EST MOD 30 MIN: CPT | Mod: 25,S$GLB,, | Performed by: FAMILY MEDICINE

## 2022-10-31 PROCEDURE — 3066F PR DOCUMENTATION OF TREATMENT FOR NEPHROPATHY: ICD-10-PCS | Mod: CPTII,S$GLB,, | Performed by: FAMILY MEDICINE

## 2022-10-31 PROCEDURE — 99999 PR PBB SHADOW E&M-EST. PATIENT-LVL V: CPT | Mod: PBBFAC,,, | Performed by: FAMILY MEDICINE

## 2022-10-31 PROCEDURE — 3061F PR NEG MICROALBUMINURIA RESULT DOCUMENTED/REVIEW: ICD-10-PCS | Mod: CPTII,S$GLB,, | Performed by: FAMILY MEDICINE

## 2022-10-31 PROCEDURE — 3061F NEG MICROALBUMINURIA REV: CPT | Mod: CPTII,S$GLB,, | Performed by: FAMILY MEDICINE

## 2022-10-31 PROCEDURE — 99999 PR PBB SHADOW E&M-EST. PATIENT-LVL V: ICD-10-PCS | Mod: PBBFAC,,, | Performed by: FAMILY MEDICINE

## 2022-10-31 PROCEDURE — 4010F PR ACE/ARB THEARPY RXD/TAKEN: ICD-10-PCS | Mod: CPTII,S$GLB,, | Performed by: FAMILY MEDICINE

## 2022-10-31 PROCEDURE — 4010F ACE/ARB THERAPY RXD/TAKEN: CPT | Mod: CPTII,S$GLB,, | Performed by: FAMILY MEDICINE

## 2022-10-31 NOTE — LETTER
October 31, 2022    Malathi Martinez  [address] [facility] [address]             [facility]  [address] [facility] [address]  Phone: [phone]  Fax: [phone] To whom it may concern:    Ms. Martinez was seen in clinic on 10/31/2022, and can return to work without restrictions on 11/7/2022.    If you have any questions or concerns, please don't hesitate to call.    Sincerely,        Comfort Dave MD

## 2022-10-31 NOTE — PROGRESS NOTES
"Subjective:       Patient ID: Malathi Martinez is a 62 y.o. female.    Chief Complaint: Hospital Follow Up    HPI  Patient in clinic for hosp f/u, accompanied by her  who is assisting in her care. Fell off of the attic ladder when she lost her balance. Landed on her back, +head trauma without LOC. ER eval incl plain films and CT. Required staples to multiple lacs on the back of her head.   CT cervical noted for comminuted fx of C7. CT head negative acute.   At home, she notes muscle soreness, some HA. No nausea or vision issues. Ambulatory.    Review of Systems:  Review of Systems   Constitutional:  Negative for activity change, fever and unexpected weight change.   HENT:  Negative for congestion, facial swelling, hearing loss, nosebleeds, rhinorrhea and trouble swallowing.    Eyes:  Negative for photophobia and visual disturbance.   Respiratory:  Negative for chest tightness and wheezing.    Cardiovascular:  Negative for chest pain and palpitations.   Gastrointestinal:  Negative for constipation and diarrhea.   Endocrine: Negative for polydipsia and polyuria.   Genitourinary:  Negative for difficulty urinating and dysuria.        Occ bladder leak   Musculoskeletal:  Positive for back pain, myalgias, neck pain and neck stiffness. Negative for arthralgias and joint swelling.   Skin:  Positive for wound.   Neurological:  Negative for weakness and headaches (occ).   Psychiatric/Behavioral:  Positive for dysphoric mood. Negative for confusion.      Objective:     Vitals:    10/31/22 1312   BP: 122/66   BP Location: Right arm   Patient Position: Sitting   BP Method: Large (Manual)   Pulse: 88   Resp: 14   Weight: 90.2 kg (198 lb 13.7 oz)   Height: 5' 10" (1.778 m)          Physical Exam  Vitals and nursing note reviewed.   Constitutional:       General: She is not in acute distress.     Appearance: Normal appearance. She is well-developed.   HENT:      Head: Normocephalic and atraumatic.   Eyes:      " General: No scleral icterus.        Right eye: No discharge.         Left eye: No discharge.      Conjunctiva/sclera: Conjunctivae normal.   Cardiovascular:      Rate and Rhythm: Normal rate and regular rhythm.   Pulmonary:      Effort: Pulmonary effort is normal. No respiratory distress.      Breath sounds: Normal breath sounds.   Abdominal:      Palpations: Abdomen is soft.      Tenderness: There is no guarding.   Musculoskeletal:         General: No deformity or signs of injury.      Cervical back: Rigidity and tenderness present.   Skin:     General: Skin is warm and dry.      Findings: No rash.   Neurological:      General: No focal deficit present.      Mental Status: She is alert and oriented to person, place, and time.   Psychiatric:         Mood and Affect: Mood normal.         Behavior: Behavior normal.       Verbal consent obtained. Risks of removal incl pain, bleeding, infection, scarring and recurrence reviewed. Patient expressed understanding and had no additional questions or concerns.  30 staples removed from head laceration. Pt tolerated well. No bleeding observed. Reviewed home/wound care and monitoring.     Assessment & Plan:  Other closed displaced fracture of seventh cervical vertebra, initial encounter  Comments:  schedule appt with back/spine clinic for review  Orders:  -     Ambulatory referral/consult to Back [facility] Spine Clinic; Future; Expected date: 11/07/2022    Traumatic head injury with multiple lacerations, subsequent encounter  Comments:  staples removed in clinic  complete abx per ER

## 2022-11-07 ENCOUNTER — PATIENT MESSAGE (OUTPATIENT)
Dept: FAMILY MEDICINE | Facility: CLINIC | Age: 62
End: 2022-11-07
Payer: COMMERCIAL

## 2022-11-08 DIAGNOSIS — S12.690A OTHER CLOSED DISPLACED FRACTURE OF SEVENTH CERVICAL VERTEBRA, INITIAL ENCOUNTER: Primary | ICD-10-CM

## 2022-11-08 RX ORDER — OXYCODONE AND ACETAMINOPHEN 5; 325 MG/1; MG/1
1 TABLET ORAL EVERY 8 HOURS PRN
Qty: 20 TABLET | Refills: 0 | Status: SHIPPED | OUTPATIENT
Start: 2022-11-08 | End: 2022-11-08 | Stop reason: SDUPTHER

## 2022-11-08 RX ORDER — OXYCODONE AND ACETAMINOPHEN 5; 325 MG/1; MG/1
1 TABLET ORAL EVERY 8 HOURS PRN
Qty: 20 TABLET | Refills: 0 | Status: SHIPPED | OUTPATIENT
Start: 2022-11-08 | End: 2022-11-19 | Stop reason: SDUPTHER

## 2022-11-10 ENCOUNTER — OFFICE VISIT (OUTPATIENT)
Dept: PAIN MEDICINE | Facility: CLINIC | Age: 62
End: 2022-11-10
Payer: COMMERCIAL

## 2022-11-10 VITALS
WEIGHT: 199.75 LBS | BODY MASS INDEX: 28.6 KG/M2 | SYSTOLIC BLOOD PRESSURE: 123 MMHG | HEIGHT: 70 IN | DIASTOLIC BLOOD PRESSURE: 63 MMHG | HEART RATE: 81 BPM

## 2022-11-10 DIAGNOSIS — S12.690A OTHER CLOSED DISPLACED FRACTURE OF SEVENTH CERVICAL VERTEBRA, INITIAL ENCOUNTER: ICD-10-CM

## 2022-11-10 PROCEDURE — 4010F PR ACE/ARB THEARPY RXD/TAKEN: ICD-10-PCS | Mod: CPTII,S$GLB,, | Performed by: PHYSICIAN ASSISTANT

## 2022-11-10 PROCEDURE — 3061F PR NEG MICROALBUMINURIA RESULT DOCUMENTED/REVIEW: ICD-10-PCS | Mod: CPTII,S$GLB,, | Performed by: PHYSICIAN ASSISTANT

## 2022-11-10 PROCEDURE — 3066F PR DOCUMENTATION OF TREATMENT FOR NEPHROPATHY: ICD-10-PCS | Mod: CPTII,S$GLB,, | Performed by: PHYSICIAN ASSISTANT

## 2022-11-10 PROCEDURE — 3044F PR MOST RECENT HEMOGLOBIN A1C LEVEL <7.0%: ICD-10-PCS | Mod: CPTII,S$GLB,, | Performed by: PHYSICIAN ASSISTANT

## 2022-11-10 PROCEDURE — 99999 PR PBB SHADOW E&M-EST. PATIENT-LVL V: ICD-10-PCS | Mod: PBBFAC,,, | Performed by: PHYSICIAN ASSISTANT

## 2022-11-10 PROCEDURE — 1160F PR REVIEW ALL MEDS BY PRESCRIBER/CLIN PHARMACIST DOCUMENTED: ICD-10-PCS | Mod: CPTII,S$GLB,, | Performed by: PHYSICIAN ASSISTANT

## 2022-11-10 PROCEDURE — 3074F PR MOST RECENT SYSTOLIC BLOOD PRESSURE < 130 MM HG: ICD-10-PCS | Mod: CPTII,S$GLB,, | Performed by: PHYSICIAN ASSISTANT

## 2022-11-10 PROCEDURE — 1159F PR MEDICATION LIST DOCUMENTED IN MEDICAL RECORD: ICD-10-PCS | Mod: CPTII,S$GLB,, | Performed by: PHYSICIAN ASSISTANT

## 2022-11-10 PROCEDURE — 1160F RVW MEDS BY RX/DR IN RCRD: CPT | Mod: CPTII,S$GLB,, | Performed by: PHYSICIAN ASSISTANT

## 2022-11-10 PROCEDURE — 3078F PR MOST RECENT DIASTOLIC BLOOD PRESSURE < 80 MM HG: ICD-10-PCS | Mod: CPTII,S$GLB,, | Performed by: PHYSICIAN ASSISTANT

## 2022-11-10 PROCEDURE — 99999 PR PBB SHADOW E&M-EST. PATIENT-LVL V: CPT | Mod: PBBFAC,,, | Performed by: PHYSICIAN ASSISTANT

## 2022-11-10 PROCEDURE — 3061F NEG MICROALBUMINURIA REV: CPT | Mod: CPTII,S$GLB,, | Performed by: PHYSICIAN ASSISTANT

## 2022-11-10 PROCEDURE — 3074F SYST BP LT 130 MM HG: CPT | Mod: CPTII,S$GLB,, | Performed by: PHYSICIAN ASSISTANT

## 2022-11-10 PROCEDURE — 3008F BODY MASS INDEX DOCD: CPT | Mod: CPTII,S$GLB,, | Performed by: PHYSICIAN ASSISTANT

## 2022-11-10 PROCEDURE — 3044F HG A1C LEVEL LT 7.0%: CPT | Mod: CPTII,S$GLB,, | Performed by: PHYSICIAN ASSISTANT

## 2022-11-10 PROCEDURE — 99204 OFFICE O/P NEW MOD 45 MIN: CPT | Mod: S$GLB,,, | Performed by: PHYSICIAN ASSISTANT

## 2022-11-10 PROCEDURE — 4010F ACE/ARB THERAPY RXD/TAKEN: CPT | Mod: CPTII,S$GLB,, | Performed by: PHYSICIAN ASSISTANT

## 2022-11-10 PROCEDURE — 99204 PR OFFICE/OUTPT VISIT, NEW, LEVL IV, 45-59 MIN: ICD-10-PCS | Mod: S$GLB,,, | Performed by: PHYSICIAN ASSISTANT

## 2022-11-10 PROCEDURE — 3008F PR BODY MASS INDEX (BMI) DOCUMENTED: ICD-10-PCS | Mod: CPTII,S$GLB,, | Performed by: PHYSICIAN ASSISTANT

## 2022-11-10 PROCEDURE — 3066F NEPHROPATHY DOC TX: CPT | Mod: CPTII,S$GLB,, | Performed by: PHYSICIAN ASSISTANT

## 2022-11-10 PROCEDURE — 3078F DIAST BP <80 MM HG: CPT | Mod: CPTII,S$GLB,, | Performed by: PHYSICIAN ASSISTANT

## 2022-11-10 PROCEDURE — 1159F MED LIST DOCD IN RCRD: CPT | Mod: CPTII,S$GLB,, | Performed by: PHYSICIAN ASSISTANT

## 2022-11-10 RX ORDER — IBUPROFEN 800 MG/1
800 TABLET ORAL EVERY 8 HOURS PRN
Qty: 40 TABLET | Refills: 0 | Status: SHIPPED | OUTPATIENT
Start: 2022-11-10 | End: 2022-12-09

## 2022-11-11 ENCOUNTER — OFFICE VISIT (OUTPATIENT)
Dept: FAMILY MEDICINE | Facility: CLINIC | Age: 62
End: 2022-11-11
Payer: COMMERCIAL

## 2022-11-11 DIAGNOSIS — K21.9 GASTROESOPHAGEAL REFLUX DISEASE, UNSPECIFIED WHETHER ESOPHAGITIS PRESENT: ICD-10-CM

## 2022-11-11 DIAGNOSIS — S12.690A OTHER CLOSED DISPLACED FRACTURE OF SEVENTH CERVICAL VERTEBRA, INITIAL ENCOUNTER: ICD-10-CM

## 2022-11-11 DIAGNOSIS — S01.91XD TRAUMATIC HEAD INJURY WITH MULTIPLE LACERATIONS, SUBSEQUENT ENCOUNTER: Primary | ICD-10-CM

## 2022-11-11 DIAGNOSIS — S09.90XD TRAUMATIC HEAD INJURY WITH MULTIPLE LACERATIONS, SUBSEQUENT ENCOUNTER: Primary | ICD-10-CM

## 2022-11-11 PROCEDURE — 99213 OFFICE O/P EST LOW 20 MIN: CPT | Mod: 95,,, | Performed by: FAMILY MEDICINE

## 2022-11-11 PROCEDURE — 1160F RVW MEDS BY RX/DR IN RCRD: CPT | Mod: CPTII,95,, | Performed by: FAMILY MEDICINE

## 2022-11-11 PROCEDURE — 4010F ACE/ARB THERAPY RXD/TAKEN: CPT | Mod: CPTII,95,, | Performed by: FAMILY MEDICINE

## 2022-11-11 PROCEDURE — 3061F PR NEG MICROALBUMINURIA RESULT DOCUMENTED/REVIEW: ICD-10-PCS | Mod: CPTII,95,, | Performed by: FAMILY MEDICINE

## 2022-11-11 PROCEDURE — 4010F PR ACE/ARB THEARPY RXD/TAKEN: ICD-10-PCS | Mod: CPTII,95,, | Performed by: FAMILY MEDICINE

## 2022-11-11 PROCEDURE — 1159F PR MEDICATION LIST DOCUMENTED IN MEDICAL RECORD: ICD-10-PCS | Mod: CPTII,95,, | Performed by: FAMILY MEDICINE

## 2022-11-11 PROCEDURE — 3044F HG A1C LEVEL LT 7.0%: CPT | Mod: CPTII,95,, | Performed by: FAMILY MEDICINE

## 2022-11-11 PROCEDURE — 1159F MED LIST DOCD IN RCRD: CPT | Mod: CPTII,95,, | Performed by: FAMILY MEDICINE

## 2022-11-11 PROCEDURE — 3044F PR MOST RECENT HEMOGLOBIN A1C LEVEL <7.0%: ICD-10-PCS | Mod: CPTII,95,, | Performed by: FAMILY MEDICINE

## 2022-11-11 PROCEDURE — 1160F PR REVIEW ALL MEDS BY PRESCRIBER/CLIN PHARMACIST DOCUMENTED: ICD-10-PCS | Mod: CPTII,95,, | Performed by: FAMILY MEDICINE

## 2022-11-11 PROCEDURE — 3066F PR DOCUMENTATION OF TREATMENT FOR NEPHROPATHY: ICD-10-PCS | Mod: CPTII,95,, | Performed by: FAMILY MEDICINE

## 2022-11-11 PROCEDURE — 99213 PR OFFICE/OUTPT VISIT, EST, LEVL III, 20-29 MIN: ICD-10-PCS | Mod: 95,,, | Performed by: FAMILY MEDICINE

## 2022-11-11 PROCEDURE — 3061F NEG MICROALBUMINURIA REV: CPT | Mod: CPTII,95,, | Performed by: FAMILY MEDICINE

## 2022-11-11 PROCEDURE — 3066F NEPHROPATHY DOC TX: CPT | Mod: CPTII,95,, | Performed by: FAMILY MEDICINE

## 2022-11-11 NOTE — PROGRESS NOTES
Back and Spine Consult    Patient ID: Malathi Martinez is a 62 y.o. female.    Chief Complaint   Patient presents with    Neck Pain     C/o pain in neck    Shoulder Pain     C/o pain in left shoulder        Review of Systems   Constitutional:  Negative for activity change, appetite change, chills, fever and unexpected weight change.   HENT:  Negative for tinnitus, trouble swallowing and voice change.    Respiratory:  Negative for apnea, cough, chest tightness and shortness of breath.    Cardiovascular:  Negative for chest pain and palpitations.   Gastrointestinal:  Negative for constipation, diarrhea, nausea and vomiting.   Genitourinary:  Negative for difficulty urinating, dysuria, frequency and urgency.   Musculoskeletal:  Positive for myalgias and neck pain. Negative for back pain, gait problem and neck stiffness.   Skin:  Negative for wound.   Neurological:  Positive for weakness. Negative for dizziness, tremors, seizures, facial asymmetry, speech difficulty, light-headedness, numbness and headaches.   Psychiatric/Behavioral:  Negative for confusion and decreased concentration.      Past Medical History:   Diagnosis Date    Abnormal stress test     Bronchitis     COPD (chronic obstructive pulmonary disease)     Diabetes mellitus, type 2     HLD (hyperlipidemia)     Hx of endoscopy     Hypertension     Mental disorder     Pulmonary nodule 4/9/2018    SOB (shortness of breath)     resolved    Tobacco abuse 12/26/2012     Social History     Socioeconomic History    Marital status:    Tobacco Use    Smoking status: Every Day     Packs/day: 0.75     Years: 41.00     Pack years: 30.75     Types: Cigarettes    Smokeless tobacco: Never   Substance and Sexual Activity    Alcohol use: No    Drug use: No    Sexual activity: Yes     Partners: Male     Birth control/protection: Post-menopausal     Social Determinants of Health     Financial Resource Strain: Low Risk     Difficulty of Paying Living Expenses: Not  very hard   Food Insecurity: Food Insecurity Present    Worried About Running Out of Food in the Last Year: Sometimes true    Ran Out of Food in the Last Year: Sometimes true   Transportation Needs: No Transportation Needs    Lack of Transportation (Medical): No    Lack of Transportation (Non-Medical): No   Physical Activity: Insufficiently Active    Days of Exercise per Week: 3 days    Minutes of Exercise per Session: 30 min   Stress: Stress Concern Present    Feeling of Stress : Rather much   Social Connections: Unknown    Frequency of Communication with Friends and Family: Three times a week    Frequency of Social Gatherings with Friends and Family: Patient refused    Active Member of Clubs or Organizations: No    Attends Club or Organization Meetings: Patient refused    Marital Status:    Housing Stability: Low Risk     Unable to Pay for Housing in the Last Year: No    Number of Places Lived in the Last Year: 1    Unstable Housing in the Last Year: No     Family History   Problem Relation Age of Onset    Heart disease Mother         MVP    Cataracts Mother     Heart disease Father 65        MI and PVD    Throat cancer Father     Heart disease Sister         mvp    Esophageal cancer Brother     No Known Problems Sister     No Known Problems Sister     Hyperlipidemia Paternal Grandmother      Review of patient's allergies indicates:   Allergen Reactions    Penicillins Hives       Current Outpatient Medications:     albuterol-ipratropium (DUO-NEB) 2.5 mg-0.5 mg/3 mL nebulizer solution, Take 3 mLs by nebulization every 6 (six) hours as needed for Wheezing or Shortness of Breath. Rescue, Disp: 200 each, Rfl: 11    ALPRAZolam (XANAX) 0.25 MG tablet, Take 1 tablet (0.25 mg total) by mouth 2 (two) times daily., Disp: 60 tablet, Rfl: 2    atorvastatin (LIPITOR) 20 MG tablet, Take 1 tablet (20 mg total) by mouth once daily., Disp: 90 tablet, Rfl: 3    B-complex with vitamin C (Z-BEC OR EQUIV) tablet, Take 1 tablet  by mouth once daily., Disp: , Rfl:     blood sugar diagnostic Strp, 1 strip by Misc.(Non-Drug; Combo Route) route once daily. (Patient taking differently: 1 strip by Misc.(Non-Drug; Combo Route) route once daily.), Disp: 50 each, Rfl: 6    blood-glucose meter kit, Use as instructed, Disp: 1 each, Rfl: 0    buPROPion (WELLBUTRIN XL) 150 MG TB24 tablet, Take 2 tablets (300 mg total) by mouth once daily., Disp: 180 tablet, Rfl: 1    cholecalciferol, vitamin D3, 2,000 unit Cap, Take 1 capsule by mouth once daily., Disp: , Rfl:     clotrimazole-betamethasone 1-0.05% (LOTRISONE) cream, APPLY TO AFFECTED AREA TWICE A DAY, Disp: 45 g, Rfl: 1    cyclobenzaprine (FLEXERIL) 5 MG tablet, Take 1 tablet (5 mg total) by mouth nightly as needed for Muscle spasms., Disp: 60 tablet, Rfl: 1    desloratadine (CLARINEX) 5 mg tablet, TAKE 1 TABLET BY MOUTH EVERY DAY, Disp: 90 tablet, Rfl: 3    EScitalopram oxalate (LEXAPRO) 10 MG tablet, Take 1.5 tablets (15 mg total) by mouth once daily., Disp: 135 tablet, Rfl: 3    famotidine (PEPCID) 40 MG tablet, TAKE 1 TABLET (40 MG TOTAL) BY MOUTH 2 (TWO) TIMES DAILY AS NEEDED FOR HEARTBURN., Disp: 180 tablet, Rfl: 1    fluticasone propionate (FLONASE) 50 mcg/actuation nasal spray, 1 spray (50 mcg total) by Each Nostril route once daily., Disp: 48 g, Rfl: 2    fluticasone-salmeterol diskus inhaler 100-50 mcg, Inhale 1 puff into the lungs 2 (two) times daily. Controller, Disp: 60 each, Rfl: 11    furosemide (LASIX) 20 MG tablet, Take 1 tablet (20 mg total) by mouth once daily., Disp: 90 tablet, Rfl: 1    gabapentin (NEURONTIN) 300 MG capsule, Take 1 capsule (300 mg total) by mouth 3 (three) times daily., Disp: 90 capsule, Rfl: 11    lisinopriL (PRINIVIL,ZESTRIL) 2.5 MG tablet, Take 1 tablet (2.5 mg total) by mouth once daily., Disp: 90 tablet, Rfl: 3    metFORMIN (GLUCOPHAGE-XR) 500 MG ER 24hr tablet, Take 1 tablet (500 mg total) by mouth 2 (two) times daily with meals., Disp: 180 tablet, Rfl: 3     "montelukast (SINGULAIR) 10 mg tablet, Take 1 tablet (10 mg total) by mouth every evening., Disp: 90 tablet, Rfl: 3    naproxen (NAPROSYN) 500 MG tablet, Take 1 tablet (500 mg total) by mouth 2 (two) times daily with meals., Disp: 30 tablet, Rfl: 1    oxyCODONE-acetaminophen (PERCOCET) 5-325 mg per tablet, Take 1 tablet by mouth every 8 (eight) hours as needed for Pain., Disp: 20 tablet, Rfl: 0    semaglutide (OZEMPIC) 0.25 mg or 0.5 mg(2 mg/1.5 mL) pen injector, Inject 0.25 mg into the skin every 7 days., Disp: 1 pen, Rfl: 5    albuterol (PROVENTIL/VENTOLIN HFA) 90 mcg/actuation inhaler, Inhale 2 puffs into the lungs every 6 (six) hours as needed for Wheezing. Rescue (Patient not taking: No sig reported), Disp: 18 g, Rfl: 3    ibuprofen (ADVIL,MOTRIN) 800 MG tablet, Take 1 tablet (800 mg total) by mouth every 8 (eight) hours as needed for Pain., Disp: 40 tablet, Rfl: 0    Vitals:    11/10/22 1436   BP: 123/63   Pulse: 81   Weight: 90.6 kg (199 lb 11.8 oz)   Height: 5' 10" (1.778 m)       Physical Exam  Vitals and nursing note reviewed.   Constitutional:       Appearance: She is well-developed.   HENT:      Head: Normocephalic and atraumatic.   Eyes:      Pupils: Pupils are equal, round, and reactive to light.   Cardiovascular:      Rate and Rhythm: Normal rate.   Pulmonary:      Effort: Pulmonary effort is normal.   Musculoskeletal:         General: Normal range of motion.      Cervical back: Normal range of motion and neck supple.   Skin:     General: Skin is warm and dry.   Neurological:      Mental Status: She is alert and oriented to person, place, and time.      Coordination: Finger-Nose-Finger Test normal.      Gait: Gait is intact. Tandem walk normal.      Deep Tendon Reflexes:      Reflex Scores:       Tricep reflexes are 2+ on the right side and 2+ on the left side.       Bicep reflexes are 2+ on the right side and 2+ on the left side.       Brachioradialis reflexes are 2+ on the right side and 2+ on the " left side.       Patellar reflexes are 2+ on the right side and 2+ on the left side.       Achilles reflexes are 2+ on the right side and 2+ on the left side.  Psychiatric:         Speech: Speech normal.         Behavior: Behavior normal.         Thought Content: Thought content normal.         Judgment: Judgment normal.       Neurologic Exam     Mental Status   Oriented to person, place, and time.   Oriented to person.   Oriented to place.   Oriented to time.   Attention: normal. Concentration: normal.   Speech: speech is normal   Level of consciousness: alert  Knowledge: consistent with education.     Cranial Nerves     CN III, IV, VI   Pupils are equal, round, and reactive to light.    CN XI   Right sternocleidomastoid strength: normal  Left sternocleidomastoid strength: normal  Right trapezius strength: normal  Left trapezius strength: normal    Motor Exam   Muscle bulk: normal  Overall muscle tone: normal  Right arm tone: normal  Left arm tone: normal  Right arm pronator drift: absent  Left arm pronator drift: absent  Right leg tone: normal  Left leg tone: normal    Strength   Right neck flexion: 5/5  Left neck flexion: 5/5  Right neck extension: 5/5  Left neck extension: 5/5  Right deltoid: 5/5  Left deltoid: 4/5  Right biceps: 5/5  Left biceps: 5/5  Right triceps: 5/5  Left triceps: 5/5  Right wrist flexion: 5/5  Left wrist flexion: 5/5  Right wrist extension: 5/5  Left wrist extension: 5/5  Right interossei: 5/5  Left interossei: 5/5  Right abdominals: 5/5  Left abdominals: 5/5  Right iliopsoas: 5/5  Left iliopsoas: 5/5  Right quadriceps: 5/5  Left quadriceps: 5/5  Right hamstrin/5  Left hamstrin/5  Right glutei: 5/5  Left glutei: 5/5  Right anterior tibial: 5/5  Left anterior tibial: 5/5  Right posterior tibial: 5/5  Left posterior tibial: 5/5  Right peroneal: 5/5  Left peroneal: 5/5  Right gastroc: 5/5  Left gastroc: 5/5    Sensory Exam   Right arm light touch: normal  Left arm light touch:  normal  Right leg light touch: normal  Left leg light touch: normal    Gait, Coordination, and Reflexes     Gait  Gait: normal    Coordination   Finger to nose coordination: normal  Tandem walking coordination: normal    Tremor   Resting tremor: absent  Intention tremor: absent  Action tremor: absent    Reflexes   Right brachioradialis: 2+  Left brachioradialis: 2+  Right biceps: 2+  Left biceps: 2+  Right triceps: 2+  Left triceps: 2+  Right patellar: 2+  Left patellar: 2+  Right achilles: 2+  Left achilles: 2+  Right Castillo: absent  Left Castillo: absent  Right ankle clonus: absent  Left ankle clonus: absent    Provider dictation:    62 year old female with COPD, anxiety, GERD, hyperlipidemia, depression, diabetes is referred by Dr. Dave for neck pain.  She fell off an attic ladder 10-23-22.  She hit the ladder on the way down and landed on her back.  She has felt neck pain since then.  She was seen in the ER the same day with CT head and neck performed revelaing C7 spinous process fracture.  She has had some difficulty moving the left arm at the shoulder since then as well, but she feels it is improving slightly.  She feels tingling, bruising sensation in the left shoulder.  She has pain in the posterior neck.  She has been taking flexeril, gabapentin, advil, naproxen, oxycodone for pain.    She did hit her head with scalp laceration that was treated in ER.    Current medications:  flexeril, advil, oxycodone, gabapentin  Medications tried:  naproxen  Physical therapy:  none  Interventional Procedures:  none     On exam, there is 4/5 strength in the left deltoid with 5/5 strength with 2+ DTR and no sensory deficits.  Gait and station fluid.  Denies bowel/ bladder dysfunction.  Full range of motion of the upper and lower extremities. No pain with axial facet loading.  No SI joint pain on palpation.  No pain with lumbar flexion/ extension.  No difficulty walking. Negative hoffaman's.  No clonus.  Scab/ dried blood  over scalp lacerations.    CT head from 10-23-22:  no acute abnormalities.    CT neck from 10-23-22:  C7 spinouse process fracture.  Mild degenerative changes.    Ms. Servin has acute onset C7 spinous process fracture after fall 10-23-22.  She is healing.  I reassured her that the fracture will slowly heal in the next 4-6 weeks with improvement of pain.  I refilled ibuprofen for help with pain.  May continue with flexeril and oxycodone sparingly prn.  I am concerned about her left arm weakness - discussed MRI cervical spine and referral to orthopedics for shoulder evaluation.  She prefers to see who she does over the next week because she thinks the shoulder is improving.  If no improvement over the next 5-7 days, I strongly recommend MRI.  She will call if needed.  Follow up in 4 weeks.    Visit Diagnosis:  Other closed displaced fracture of seventh cervical vertebra, initial encounter  -     Ambulatory referral/consult to [facility]  -     ibuprofen (ADVIL,MOTRIN) 800 MG tablet; Take 1 tablet (800 mg total) by mouth every 8 (eight) hours as needed for Pain.  Dispense: 40 tablet; Refill: 0      Total time spent counseling greater than fifty percent of total visit time.  Counseling included discussion regarding imaging findings, diagnosis possibilities, treatment options, risks and benefits.   The patient had many questions regarding the options and long-term effects.

## 2022-11-11 NOTE — PROGRESS NOTES
Subjective:       Patient ID: Malathi Martinez is a 62 y.o. female.    Chief Complaint: Follow-up and Medication Refill      The patient location is: home [place]  The chief complaint leading to consultation is: med review  Visit type: Virtual visit with synchronous audio and video  Total time spent with patient: 10mins incl doc  Each patient to whom he or she provides medical services by telemedicine is:  (1) informed of the relationship between the physician and patient and the respective role of any other health care provider with respect to management of the patient; and (2) notified that he or she may decline to receive medical services by telemedicine and may withdraw from such care at any time.    Notes: patient seen for f/u and review.   Back pain is improving.   No HA.  Arm pain, L. Bruising is improving. Movement is ok, but limited by pain. She saw surg/holly.  Pain medication at night only. Flexeril without issue during the day/evening. Ibuprofen during the day as well.  Added pepcid for stomach issues.     Review of Systems   Constitutional:  Negative for fatigue and fever.   Cardiovascular:  Negative for chest pain.   Gastrointestinal:  Negative for abdominal pain.   Genitourinary:  Negative for dysuria, hematuria and pelvic pain.   Musculoskeletal:  Positive for arthralgias, back pain, myalgias and neck pain.   Neurological:  Positive for weakness and numbness. Negative for headaches.     Objective:        Physical Exam  Vitals and nursing note reviewed.   Constitutional:       General: She is not in acute distress.     Appearance: Normal appearance. She is well-developed.   HENT:      Head: Normocephalic and atraumatic.   Eyes:      General: No scleral icterus.        Right eye: No discharge.         Left eye: No discharge.      Conjunctiva/sclera: Conjunctivae normal.   Pulmonary:      Effort: Pulmonary effort is normal. No respiratory distress.   Skin:     General: Skin is warm and dry.    Neurological:      General: No focal deficit present.      Mental Status: She is alert and oriented to person, place, and time.   Psychiatric:         Mood and Affect: Mood normal.         Behavior: Behavior normal.           Assessment:   Traumatic head injury with multiple lacerations, subsequent encounter    Other closed displaced fracture of seventh cervical vertebra, initial encounter    Gastroesophageal reflux disease, unspecified whether esophagitis present     Improving. Reviewed med schedule with otcs and/or rx prns. Discussed activity restrictions and cont sx monitoring.   Agree with addition of pepcid for addl gi prophy.    Patient aware of limitations to assessment and exam of telemedicine. Deemed appropriate at this time given current covid-19 concerns. Answers submitted by the patient for this visit:  Back Pain Questionnaire (Submitted on 11/4/2022)  Chief Complaint: Back pain  Chronicity: new  Onset: in the past 7 days  Frequency: constantly  Progression since onset: waxing and waning  Pain location: thoracic spine  Pain quality: shooting  Radiates to: does not radiate  Pain - numeric: 5/10  Pain is: the same all the time  Aggravated by: coughing, position  Stiffness is present: all day  bladder incontinence: No  bowel incontinence: No  leg pain: No  paresis: No  paresthesias: Yes  perianal numbness: No  tingling: Yes  weight loss: No  genital pain: No  Pain severity: moderate  Improvement on treatment: mild

## 2022-11-19 ENCOUNTER — PATIENT MESSAGE (OUTPATIENT)
Dept: PAIN MEDICINE | Facility: CLINIC | Age: 62
End: 2022-11-19
Payer: COMMERCIAL

## 2022-11-19 DIAGNOSIS — S12.690A OTHER CLOSED DISPLACED FRACTURE OF SEVENTH CERVICAL VERTEBRA, INITIAL ENCOUNTER: ICD-10-CM

## 2022-11-19 DIAGNOSIS — M50.30 DDD (DEGENERATIVE DISC DISEASE), CERVICAL: Primary | ICD-10-CM

## 2022-11-19 NOTE — TELEPHONE ENCOUNTER
No new care gaps identified.  [facility] Embedded Care Gaps. Reference number: [id number]. 11/19/2022   5:05:17 AM CST

## 2022-11-21 RX ORDER — OXYCODONE AND ACETAMINOPHEN 5; 325 MG/1; MG/1
1 TABLET ORAL EVERY 8 HOURS PRN
Qty: 20 TABLET | Refills: 0 | Status: SHIPPED | OUTPATIENT
Start: 2022-11-21 | End: 2022-12-09 | Stop reason: SDUPTHER

## 2022-11-21 NOTE — TELEPHONE ENCOUNTER
I spoke with Mrs. Martinez and she has been scheduled for an MRI 12-3-22 and a follow up with Roxi Larson for results 12-6-22, she indicated understanding.

## 2022-11-23 DIAGNOSIS — M50.30 DDD (DEGENERATIVE DISC DISEASE), CERVICAL: ICD-10-CM

## 2022-11-23 DIAGNOSIS — M54.50 CHRONIC BILATERAL LOW BACK PAIN WITHOUT SCIATICA: ICD-10-CM

## 2022-11-23 DIAGNOSIS — G89.29 CHRONIC BILATERAL LOW BACK PAIN WITHOUT SCIATICA: ICD-10-CM

## 2022-11-23 NOTE — TELEPHONE ENCOUNTER
No new care gaps identified.  [facility] Embedded Care Gaps. Reference number: [id number]. 11/23/2022   10:18:22 AM CST

## 2022-11-29 RX ORDER — CYCLOBENZAPRINE HCL 5 MG
5 TABLET ORAL NIGHTLY PRN
Qty: 60 TABLET | Refills: 1 | Status: SHIPPED | OUTPATIENT
Start: 2022-11-29 | End: 2023-02-03 | Stop reason: SDUPTHER

## 2022-12-03 ENCOUNTER — HOSPITAL ENCOUNTER (OUTPATIENT)
Dept: RADIOLOGY | Facility: HOSPITAL | Age: 62
Discharge: HOME | End: 2022-12-03
Attending: PHYSICIAN ASSISTANT
Payer: COMMERCIAL

## 2022-12-03 DIAGNOSIS — M50.30 DDD (DEGENERATIVE DISC DISEASE), CERVICAL: ICD-10-CM

## 2022-12-03 PROCEDURE — 72141 MRI CERVICAL SPINE WITHOUT CONTRAST: ICD-10-PCS | Mod: 26,,, | Performed by: RADIOLOGY

## 2022-12-03 PROCEDURE — 72141 MRI NECK SPINE W/O DYE: CPT | Mod: 26,,, | Performed by: RADIOLOGY

## 2022-12-03 PROCEDURE — 72141 MRI NECK SPINE W/O DYE: CPT | Mod: TC,PO

## 2022-12-06 ENCOUNTER — OFFICE VISIT (OUTPATIENT)
Dept: PAIN MEDICINE | Facility: CLINIC | Age: 62
End: 2022-12-06
Payer: COMMERCIAL

## 2022-12-06 VITALS
HEART RATE: 81 BPM | HEIGHT: 70 IN | WEIGHT: 199.75 LBS | SYSTOLIC BLOOD PRESSURE: 130 MMHG | BODY MASS INDEX: 28.6 KG/M2 | DIASTOLIC BLOOD PRESSURE: 69 MMHG

## 2022-12-06 DIAGNOSIS — M50.30 DDD (DEGENERATIVE DISC DISEASE), CERVICAL: Primary | ICD-10-CM

## 2022-12-06 DIAGNOSIS — M54.12 CERVICAL RADICULOPATHY: ICD-10-CM

## 2022-12-06 DIAGNOSIS — M47.812 CERVICAL SPONDYLOSIS: ICD-10-CM

## 2022-12-06 DIAGNOSIS — S12.690A OTHER CLOSED DISPLACED FRACTURE OF SEVENTH CERVICAL VERTEBRA, INITIAL ENCOUNTER: ICD-10-CM

## 2022-12-06 PROCEDURE — 3066F PR DOCUMENTATION OF TREATMENT FOR NEPHROPATHY: ICD-10-PCS | Mod: CPTII,S$GLB,, | Performed by: PHYSICIAN ASSISTANT

## 2022-12-06 PROCEDURE — 99214 OFFICE O/P EST MOD 30 MIN: CPT | Mod: S$GLB,,, | Performed by: PHYSICIAN ASSISTANT

## 2022-12-06 PROCEDURE — 3075F PR MOST RECENT SYSTOLIC BLOOD PRESS GE 130-139MM HG: ICD-10-PCS | Mod: CPTII,S$GLB,, | Performed by: PHYSICIAN ASSISTANT

## 2022-12-06 PROCEDURE — 3075F SYST BP GE 130 - 139MM HG: CPT | Mod: CPTII,S$GLB,, | Performed by: PHYSICIAN ASSISTANT

## 2022-12-06 PROCEDURE — 3008F PR BODY MASS INDEX (BMI) DOCUMENTED: ICD-10-PCS | Mod: CPTII,S$GLB,, | Performed by: PHYSICIAN ASSISTANT

## 2022-12-06 PROCEDURE — 3066F NEPHROPATHY DOC TX: CPT | Mod: CPTII,S$GLB,, | Performed by: PHYSICIAN ASSISTANT

## 2022-12-06 PROCEDURE — 99999 PR PBB SHADOW E&M-EST. PATIENT-LVL V: ICD-10-PCS | Mod: PBBFAC,,, | Performed by: PHYSICIAN ASSISTANT

## 2022-12-06 PROCEDURE — 3008F BODY MASS INDEX DOCD: CPT | Mod: CPTII,S$GLB,, | Performed by: PHYSICIAN ASSISTANT

## 2022-12-06 PROCEDURE — 1159F MED LIST DOCD IN RCRD: CPT | Mod: CPTII,S$GLB,, | Performed by: PHYSICIAN ASSISTANT

## 2022-12-06 PROCEDURE — 3044F HG A1C LEVEL LT 7.0%: CPT | Mod: CPTII,S$GLB,, | Performed by: PHYSICIAN ASSISTANT

## 2022-12-06 PROCEDURE — 99999 PR PBB SHADOW E&M-EST. PATIENT-LVL V: CPT | Mod: PBBFAC,,, | Performed by: PHYSICIAN ASSISTANT

## 2022-12-06 PROCEDURE — 1159F PR MEDICATION LIST DOCUMENTED IN MEDICAL RECORD: ICD-10-PCS | Mod: CPTII,S$GLB,, | Performed by: PHYSICIAN ASSISTANT

## 2022-12-06 PROCEDURE — 1160F PR REVIEW ALL MEDS BY PRESCRIBER/CLIN PHARMACIST DOCUMENTED: ICD-10-PCS | Mod: CPTII,S$GLB,, | Performed by: PHYSICIAN ASSISTANT

## 2022-12-06 PROCEDURE — 3044F PR MOST RECENT HEMOGLOBIN A1C LEVEL <7.0%: ICD-10-PCS | Mod: CPTII,S$GLB,, | Performed by: PHYSICIAN ASSISTANT

## 2022-12-06 PROCEDURE — 4010F PR ACE/ARB THEARPY RXD/TAKEN: ICD-10-PCS | Mod: CPTII,S$GLB,, | Performed by: PHYSICIAN ASSISTANT

## 2022-12-06 PROCEDURE — 3078F DIAST BP <80 MM HG: CPT | Mod: CPTII,S$GLB,, | Performed by: PHYSICIAN ASSISTANT

## 2022-12-06 PROCEDURE — 99214 PR OFFICE/OUTPT VISIT, EST, LEVL IV, 30-39 MIN: ICD-10-PCS | Mod: S$GLB,,, | Performed by: PHYSICIAN ASSISTANT

## 2022-12-06 PROCEDURE — 1160F RVW MEDS BY RX/DR IN RCRD: CPT | Mod: CPTII,S$GLB,, | Performed by: PHYSICIAN ASSISTANT

## 2022-12-06 PROCEDURE — 3061F PR NEG MICROALBUMINURIA RESULT DOCUMENTED/REVIEW: ICD-10-PCS | Mod: CPTII,S$GLB,, | Performed by: PHYSICIAN ASSISTANT

## 2022-12-06 PROCEDURE — 3061F NEG MICROALBUMINURIA REV: CPT | Mod: CPTII,S$GLB,, | Performed by: PHYSICIAN ASSISTANT

## 2022-12-06 PROCEDURE — 4010F ACE/ARB THERAPY RXD/TAKEN: CPT | Mod: CPTII,S$GLB,, | Performed by: PHYSICIAN ASSISTANT

## 2022-12-06 PROCEDURE — 3078F PR MOST RECENT DIASTOLIC BLOOD PRESSURE < 80 MM HG: ICD-10-PCS | Mod: CPTII,S$GLB,, | Performed by: PHYSICIAN ASSISTANT

## 2022-12-06 NOTE — PROGRESS NOTES
Back and Spine Follow Up    Patient ID: Malathi Martinez is a 62 y.o. female.    Chief Complaint   Patient presents with    Follow-up     MRI results for neck and left shoulder pain.       Review of Systems   Constitutional:  Negative for activity change, appetite change, chills, fever and unexpected weight change.   HENT:  Negative for tinnitus, trouble swallowing and voice change.    Respiratory:  Negative for apnea, cough, chest tightness and shortness of breath.    Cardiovascular:  Negative for chest pain and palpitations.   Gastrointestinal:  Negative for constipation, diarrhea, nausea and vomiting.   Genitourinary:  Negative for difficulty urinating, dysuria, frequency and urgency.   Musculoskeletal:  Positive for myalgias and neck pain. Negative for back pain, gait problem and neck stiffness.   Skin:  Negative for wound.   Neurological:  Positive for weakness. Negative for dizziness, tremors, seizures, facial asymmetry, speech difficulty, light-headedness, numbness and headaches.   Psychiatric/Behavioral:  Negative for confusion and decreased concentration.      Past Medical History:   Diagnosis Date    Abnormal stress test     Bronchitis     COPD (chronic obstructive pulmonary disease)     Diabetes mellitus, type 2     HLD (hyperlipidemia)     Hx of endoscopy     Hypertension     Mental disorder     Pulmonary nodule 4/9/2018    SOB (shortness of breath)     resolved    Tobacco abuse 12/26/2012     Social History     Socioeconomic History    Marital status:    Tobacco Use    Smoking status: Every Day     Packs/day: 0.75     Years: 41.00     Pack years: 30.75     Types: Cigarettes    Smokeless tobacco: Never   Substance and Sexual Activity    Alcohol use: No    Drug use: No    Sexual activity: Yes     Partners: Male     Birth control/protection: Post-menopausal     Social Determinants of Health     Financial Resource Strain: Low Risk     Difficulty of Paying Living Expenses: Not very hard   Food  Insecurity: Food Insecurity Present    Worried About Running Out of Food in the Last Year: Sometimes true    Ran Out of Food in the Last Year: Patient refused   Transportation Needs: No Transportation Needs    Lack of Transportation (Medical): No    Lack of Transportation (Non-Medical): No   Physical Activity: Unknown    Days of Exercise per Week: Patient refused    Minutes of Exercise per Session: 30 min   Stress: Unknown    Feeling of Stress : Patient refused   Social Connections: Unknown    Frequency of Communication with Friends and Family: Patient refused    Frequency of Social Gatherings with Friends and Family: Patient refused    Active Member of Clubs or Organizations: Patient refused    Attends Club or Organization Meetings: Patient refused    Marital Status:    Housing Stability: Low Risk     Unable to Pay for Housing in the Last Year: No    Number of Places Lived in the Last Year: 1    Unstable Housing in the Last Year: No     Family History   Problem Relation Age of Onset    Heart disease Mother         MVP    Cataracts Mother     Heart disease Father 65        MI and PVD    Throat cancer Father     Heart disease Sister         mvp    Esophageal cancer Brother     No Known Problems Sister     No Known Problems Sister     Hyperlipidemia Paternal Grandmother      Review of patient's allergies indicates:   Allergen Reactions    Penicillins Hives       Current Outpatient Medications:     albuterol-ipratropium (DUO-NEB) 2.5 mg-0.5 mg/3 mL nebulizer solution, Take 3 mLs by nebulization every 6 (six) hours as needed for Wheezing or Shortness of Breath. Rescue, Disp: 200 each, Rfl: 11    ALPRAZolam (XANAX) 0.25 MG tablet, Take 1 tablet (0.25 mg total) by mouth 2 (two) times daily., Disp: 60 tablet, Rfl: 2    atorvastatin (LIPITOR) 20 MG tablet, Take 1 tablet (20 mg total) by mouth once daily., Disp: 90 tablet, Rfl: 3    B-complex with vitamin C (Z-BEC OR EQUIV) tablet, Take 1 tablet by mouth once daily.,  Disp: , Rfl:     blood sugar diagnostic Strp, 1 strip by Misc.(Non-Drug; Combo Route) route once daily. (Patient taking differently: 1 strip by Misc.(Non-Drug; Combo Route) route once daily.), Disp: 50 each, Rfl: 6    blood-glucose meter kit, Use as instructed, Disp: 1 each, Rfl: 0    buPROPion (WELLBUTRIN XL) 150 MG TB24 tablet, Take 2 tablets (300 mg total) by mouth once daily., Disp: 180 tablet, Rfl: 1    cholecalciferol, vitamin D3, 2,000 unit Cap, Take 1 capsule by mouth once daily., Disp: , Rfl:     clotrimazole-betamethasone 1-0.05% (LOTRISONE) cream, APPLY TO AFFECTED AREA TWICE A DAY, Disp: 45 g, Rfl: 1    cyclobenzaprine (FLEXERIL) 5 MG tablet, Take 1 tablet (5 mg total) by mouth nightly as needed for Muscle spasms., Disp: 60 tablet, Rfl: 1    desloratadine (CLARINEX) 5 mg tablet, TAKE 1 TABLET BY MOUTH EVERY DAY, Disp: 90 tablet, Rfl: 3    EScitalopram oxalate (LEXAPRO) 10 MG tablet, Take 1.5 tablets (15 mg total) by mouth once daily., Disp: 135 tablet, Rfl: 3    famotidine (PEPCID) 40 MG tablet, TAKE 1 TABLET (40 MG TOTAL) BY MOUTH 2 (TWO) TIMES DAILY AS NEEDED FOR HEARTBURN., Disp: 180 tablet, Rfl: 1    fluticasone propionate (FLONASE) 50 mcg/actuation nasal spray, 1 spray (50 mcg total) by Each Nostril route once daily., Disp: 48 g, Rfl: 2    fluticasone-salmeterol diskus inhaler 100-50 mcg, Inhale 1 puff into the lungs 2 (two) times daily. Controller, Disp: 60 each, Rfl: 11    furosemide (LASIX) 20 MG tablet, Take 1 tablet (20 mg total) by mouth once daily., Disp: 90 tablet, Rfl: 1    gabapentin (NEURONTIN) 300 MG capsule, Take 1 capsule (300 mg total) by mouth 3 (three) times daily., Disp: 90 capsule, Rfl: 11    ibuprofen (ADVIL,MOTRIN) 800 MG tablet, Take 1 tablet (800 mg total) by mouth every 8 (eight) hours as needed for Pain., Disp: 40 tablet, Rfl: 0    lisinopriL (PRINIVIL,ZESTRIL) 2.5 MG tablet, Take 1 tablet (2.5 mg total) by mouth once daily., Disp: 90 tablet, Rfl: 3    metFORMIN  "(GLUCOPHAGE-XR) 500 MG ER 24hr tablet, Take 1 tablet (500 mg total) by mouth 2 (two) times daily with meals., Disp: 180 tablet, Rfl: 3    montelukast (SINGULAIR) 10 mg tablet, Take 1 tablet (10 mg total) by mouth every evening., Disp: 90 tablet, Rfl: 3    naproxen (NAPROSYN) 500 MG tablet, Take 1 tablet (500 mg total) by mouth 2 (two) times daily with meals., Disp: 30 tablet, Rfl: 1    oxyCODONE-acetaminophen (PERCOCET) 5-325 mg per tablet, Take 1 tablet by mouth every 8 (eight) hours as needed for Pain., Disp: 20 tablet, Rfl: 0    semaglutide (OZEMPIC) 0.25 mg or 0.5 mg(2 mg/1.5 mL) pen injector, Inject 0.25 mg into the skin every 7 days., Disp: 1 pen, Rfl: 5    albuterol (PROVENTIL/VENTOLIN HFA) 90 mcg/actuation inhaler, Inhale 2 puffs into the lungs every 6 (six) hours as needed for Wheezing. Rescue (Patient not taking: No sig reported), Disp: 18 g, Rfl: 3    Vitals:    12/06/22 1519   BP: 130/69   BP Location: Right arm   Patient Position: Sitting   BP Method: Large (Automatic)   Pulse: 81   Weight: 90.6 kg (199 lb 11.8 oz)   Height: 5' 10" (1.778 m)       Physical Exam  Vitals and nursing note reviewed.   Constitutional:       Appearance: She is well-developed.   HENT:      Head: Normocephalic and atraumatic.   Eyes:      Pupils: Pupils are equal, round, and reactive to light.   Cardiovascular:      Rate and Rhythm: Normal rate.   Pulmonary:      Effort: Pulmonary effort is normal.   Musculoskeletal:         General: Normal range of motion.      Cervical back: Normal range of motion and neck supple.   Skin:     General: Skin is warm and dry.   Neurological:      Mental Status: She is alert and oriented to person, place, and time.      Coordination: Finger-Nose-Finger Test normal.      Gait: Gait is intact. Tandem walk normal.      Deep Tendon Reflexes:      Reflex Scores:       Tricep reflexes are 2+ on the right side and 2+ on the left side.       Bicep reflexes are 2+ on the right side and 2+ on the left " side.       Brachioradialis reflexes are 2+ on the right side and 2+ on the left side.       Patellar reflexes are 2+ on the right side and 2+ on the left side.       Achilles reflexes are 2+ on the right side and 2+ on the left side.  Psychiatric:         Speech: Speech normal.         Behavior: Behavior normal.         Thought Content: Thought content normal.         Judgment: Judgment normal.       Neurologic Exam     Mental Status   Oriented to person, place, and time.   Oriented to person.   Oriented to place.   Oriented to time.   Attention: normal. Concentration: normal.   Speech: speech is normal   Level of consciousness: alert  Knowledge: consistent with education.     Cranial Nerves     CN III, IV, VI   Pupils are equal, round, and reactive to light.    CN XI   Right sternocleidomastoid strength: normal  Left sternocleidomastoid strength: normal  Right trapezius strength: normal  Left trapezius strength: normal    Motor Exam   Muscle bulk: normal  Overall muscle tone: normal  Right arm tone: normal  Left arm tone: normal  Right arm pronator drift: absent  Left arm pronator drift: absent  Right leg tone: normal  Left leg tone: normal    Strength   Right neck flexion: 5/5  Left neck flexion: 5/5  Right neck extension: 5/5  Left neck extension: 5/5  Right deltoid: 5/5  Left deltoid: 4/5  Right biceps: 5/5  Left biceps: 5/5  Right triceps: 5/5  Left triceps: 5/5  Right wrist flexion: 5/5  Left wrist flexion: 5/5  Right wrist extension: 5/5  Left wrist extension: 5/5  Right interossei: 5/5  Left interossei: 5/5  Right abdominals: 5/5  Left abdominals: 5/5  Right iliopsoas: 5/5  Left iliopsoas: 5/5  Right quadriceps: 5/5  Left quadriceps: 5/5  Right hamstrin/5  Left hamstrin/5  Right glutei: 5/5  Left glutei: 5/5  Right anterior tibial: 5/5  Left anterior tibial: 5/5  Right posterior tibial: 5/5  Left posterior tibial: 5/5  Right peroneal: 5/5  Left peroneal: 5/5  Right gastroc: 5/5  Left gastroc:  5/5    Sensory Exam   Right arm light touch: normal  Left arm light touch: normal  Right leg light touch: normal  Left leg light touch: normal    Gait, Coordination, and Reflexes     Gait  Gait: normal    Coordination   Finger to nose coordination: normal  Tandem walking coordination: normal    Tremor   Resting tremor: absent  Intention tremor: absent  Action tremor: absent    Reflexes   Right brachioradialis: 2+  Left brachioradialis: 2+  Right biceps: 2+  Left biceps: 2+  Right triceps: 2+  Left triceps: 2+  Right patellar: 2+  Left patellar: 2+  Right achilles: 2+  Left achilles: 2+  Right Castillo: absent  Left Castillo: absent  Right ankle clonus: absent  Left ankle clonus: absent    Provider dictation:    62 year old female with COPD, anxiety, GERD, hyperlipidemia, depression, diabetes presents for follow up of neck pain.  She fell off an [employer] ladder 10-23-22.  She hit the ladder on the way down and landed on her back.  She developed neck pain due to muscle strain and C7 spinous process fracture.  She has also had some difficulty moving the left arm at the shoulder since then as well, but she feels it is improving slightly.  She feels tingling, bruising sensation in the left shoulder.  She has been taking flexeril, gabapentin, advil, naproxen, oxycodone for pain.    Since last visit neck pain has improved/ resolved.  Her greatest concern today is persist pain, limited range of motion in the left arm at the shoulder.  It may per improving slowly.  She has had MRI cervical spine for further assessment.    Current medications:  flexeril, advil, oxycodone, gabapentin  Medications tried:  naproxen  Physical therapy:  none  Interventional Procedures:  none     On exam, there is 4/5 strength in the left deltoid with 5/5 strength with 2+ DTR and no sensory deficits.  Gait and station fluid.  Denies bowel/ bladder dysfunction.  Full range of motion of the upper and lower extremities. No pain with axial facet loading.   No SI joint pain on palpation.  No pain with lumbar flexion/ extension.  No difficulty walking. Negative hoffaman's.  No clonus.  Scab/ dried blood over scalp lacerations.    CT head from 10-23-22:  no acute abnormalities.    CT neck from 10-23-22:  C7 spinouse process fracture.  Mild degenerative changes.    MRI 12-3-22:  Subacute, minimally displaced C7 spinous process fracture with supraspinous ligament avulsion and mild associated posterior paraspinous muscular strain.  Multilevel degenerative changes.  C2/3 disk osteophyte with right foraminal narrowing.  C3/4 facet arthropathy with left foraminal narrowing.  C4/5 disk/ osteophyte with left foraminal narrowing.  C5/6 disk/ osteophye complex with sever bilateral foraminal narrowing and some central canal stenosis with flattening of the cord.  C6/7 disk/ osteophyte complex with bilateral right greater than left foraminal narrowing.  C7/T1 disk/ osteophyte iwht right greater than left foraminal narrowing.  Changes at C5/6 are the greatest overall.    Ms. Servin has acute onset C7 spinous process fracture after fall 10-23-22 - healing with resolved neck pain at this time.  Limited movement of the left arm may be from cervical spondylosis and foraminal narrowing see throughout the neck may also be from potential rotator cuff disorder.  We discussed PT and seeing orthopedics for further shoulder assessment.  Also discussed referral to neurosurgery given distribution of degenerative changes.  She elects to only try PT at this time.  Will call if she wants to see ortho or neurosurgery.  Follow up in 4-6 weeks to monitor progress.    Visit Diagnosis:  DDD (degenerative disc disease), cervical    Other closed displaced fracture of seventh cervical vertebra, initial encounter  -     Ambulatory referral/consult to Physical/Occupational Therapy; Future; Expected date: 12/13/2022    Cervical spondylosis  -     Ambulatory referral/consult to Physical/Occupational Therapy;  Future; Expected date: 12/13/2022    Cervical radiculopathy  -     Ambulatory referral/consult to Physical/Occupational Therapy; Future; Expected date: 12/13/2022      Total time spent counseling greater than fifty percent of total visit time.  Counseling included discussion regarding imaging findings, diagnosis possibilities, treatment options, risks and benefits.   The patient had many questions regarding the options and long-term effects.

## 2022-12-09 ENCOUNTER — PATIENT MESSAGE (OUTPATIENT)
Dept: ADMINISTRATIVE | Facility: HOSPITAL | Age: 62
End: 2022-12-09
Payer: COMMERCIAL

## 2022-12-09 ENCOUNTER — OFFICE VISIT (OUTPATIENT)
Dept: FAMILY MEDICINE | Facility: CLINIC | Age: 62
End: 2022-12-09
Payer: COMMERCIAL

## 2022-12-09 ENCOUNTER — PATIENT OUTREACH (OUTPATIENT)
Dept: ADMINISTRATIVE | Facility: HOSPITAL | Age: 62
End: 2022-12-09
Payer: COMMERCIAL

## 2022-12-09 DIAGNOSIS — S12.690A OTHER CLOSED DISPLACED FRACTURE OF SEVENTH CERVICAL VERTEBRA, INITIAL ENCOUNTER: ICD-10-CM

## 2022-12-09 PROCEDURE — 99213 PR OFFICE/OUTPT VISIT, EST, LEVL III, 20-29 MIN: ICD-10-PCS | Mod: 95,,, | Performed by: FAMILY MEDICINE

## 2022-12-09 PROCEDURE — 4010F ACE/ARB THERAPY RXD/TAKEN: CPT | Mod: CPTII,95,, | Performed by: FAMILY MEDICINE

## 2022-12-09 PROCEDURE — 3066F NEPHROPATHY DOC TX: CPT | Mod: CPTII,95,, | Performed by: FAMILY MEDICINE

## 2022-12-09 PROCEDURE — 1159F MED LIST DOCD IN RCRD: CPT | Mod: CPTII,95,, | Performed by: FAMILY MEDICINE

## 2022-12-09 PROCEDURE — 99213 OFFICE O/P EST LOW 20 MIN: CPT | Mod: 95,,, | Performed by: FAMILY MEDICINE

## 2022-12-09 PROCEDURE — 3061F NEG MICROALBUMINURIA REV: CPT | Mod: CPTII,95,, | Performed by: FAMILY MEDICINE

## 2022-12-09 PROCEDURE — 1160F RVW MEDS BY RX/DR IN RCRD: CPT | Mod: CPTII,95,, | Performed by: FAMILY MEDICINE

## 2022-12-09 PROCEDURE — 3061F PR NEG MICROALBUMINURIA RESULT DOCUMENTED/REVIEW: ICD-10-PCS | Mod: CPTII,95,, | Performed by: FAMILY MEDICINE

## 2022-12-09 PROCEDURE — 3066F PR DOCUMENTATION OF TREATMENT FOR NEPHROPATHY: ICD-10-PCS | Mod: CPTII,95,, | Performed by: FAMILY MEDICINE

## 2022-12-09 PROCEDURE — 3044F PR MOST RECENT HEMOGLOBIN A1C LEVEL <7.0%: ICD-10-PCS | Mod: CPTII,95,, | Performed by: FAMILY MEDICINE

## 2022-12-09 PROCEDURE — 3044F HG A1C LEVEL LT 7.0%: CPT | Mod: CPTII,95,, | Performed by: FAMILY MEDICINE

## 2022-12-09 PROCEDURE — 1160F PR REVIEW ALL MEDS BY PRESCRIBER/CLIN PHARMACIST DOCUMENTED: ICD-10-PCS | Mod: CPTII,95,, | Performed by: FAMILY MEDICINE

## 2022-12-09 PROCEDURE — 4010F PR ACE/ARB THEARPY RXD/TAKEN: ICD-10-PCS | Mod: CPTII,95,, | Performed by: FAMILY MEDICINE

## 2022-12-09 PROCEDURE — 1159F PR MEDICATION LIST DOCUMENTED IN MEDICAL RECORD: ICD-10-PCS | Mod: CPTII,95,, | Performed by: FAMILY MEDICINE

## 2022-12-09 RX ORDER — OXYCODONE AND ACETAMINOPHEN 5; 325 MG/1; MG/1
1 TABLET ORAL EVERY 8 HOURS PRN
Qty: 20 TABLET | Refills: 0 | Status: SHIPPED | OUTPATIENT
Start: 2022-12-09 | End: 2022-12-19

## 2022-12-09 NOTE — PROGRESS NOTES
Subjective:       Patient ID: Malathi Martinez is a 62 y.o. female.    Chief Complaint: Medication Refill      The patient location is: [place]  The chief complaint leading to consultation is: med review  Visit type: Virtual visit with synchronous audio and video  Total time spent with patient: 10mins incl doc  Each patient to whom he or she provides medical services by telemedicine is:  (1) informed of the relationship between the physician and patient and the respective role of any other health care provider with respect to management of the patient; and (2) notified that he or she may decline to receive medical services by telemedicine and may withdraw from such care at any time.    Notes:   Pt saw spine/holly re neck pain following her fall off a ladder. Neck pain persistent. Planning PT trial.   Top of her head still hurts - slightly tender to touch.   No drainage or irritation from site of scalp staples.    Taking percocet in the evenings as she has a hard time getting comfortable - cannot sleep on usual side.    Review of Systems   Constitutional:  Negative for activity change and unexpected weight change.   HENT:  Negative for hearing loss, rhinorrhea and trouble swallowing.    Eyes:  Negative for discharge and visual disturbance.   Respiratory:  Negative for chest tightness and wheezing.    Cardiovascular:  Negative for chest pain and palpitations.   Gastrointestinal:  Negative for blood in stool, constipation, diarrhea and vomiting.   Endocrine: Negative for polydipsia and polyuria.   Genitourinary:  Negative for difficulty urinating, dysuria, hematuria and menstrual problem.   Musculoskeletal:  Positive for arthralgias and neck pain.   Neurological:  Positive for weakness and headaches.   Psychiatric/Behavioral:  Positive for dysphoric mood. Negative for confusion.      Objective:        Physical Exam  Vitals and nursing note reviewed.   Constitutional:       General: She is not in acute distress.      Appearance: Normal appearance. She is well-developed.   HENT:      Head: Normocephalic and atraumatic.   Eyes:      General: No scleral icterus.        Right eye: No discharge.         Left eye: No discharge.      Conjunctiva/sclera: Conjunctivae normal.   Pulmonary:      Effort: Pulmonary effort is normal. No respiratory distress.   Skin:     General: Skin is warm and dry.   Neurological:      General: No focal deficit present.      Mental Status: She is alert and oriented to person, place, and time.   Psychiatric:         Mood and Affect: Mood normal.         Behavior: Behavior normal.           Assessment:   Other closed displaced fracture of seventh cervical vertebra, initial encounter  -     oxyCODONE-acetaminophen (PERCOCET) 5-325 mg per tablet; Take 1 tablet by mouth every 8 (eight) hours as needed for Pain.  Dispense: 20 tablet; Refill: 0  Side effects and precautions of medication use reviewed with patient, expressed understanding. No questions or concerns. Fall and sedation precautions with med use. Caution with driving. No etoh with sedating meds.     Patient aware of limitations to assessment and exam of telemedicine. Deemed appropriate at this time given current covid-19 concerns.

## 2022-12-09 NOTE — PROGRESS NOTES
Non-compliant report chart audits for CERVICAL CANCER SCREENING. Chart review completed for HM test overdue (mammograms, Colonoscopies, pap smears, DM labs, and/or EYE EXAMs)      Care Everywhere and media, updates requested and reviewed.      [facility] and [facility] reviewed.      Outreach to patient in reference to cervical cancer screening.    Left message to return call to clinic    Message sent to patient's portal.      Outreach:  Cervical cancer screening

## 2022-12-10 ENCOUNTER — PATIENT MESSAGE (OUTPATIENT)
Dept: FAMILY MEDICINE | Facility: CLINIC | Age: 62
End: 2022-12-10
Payer: COMMERCIAL

## 2022-12-16 ENCOUNTER — PATIENT MESSAGE (OUTPATIENT)
Dept: FAMILY MEDICINE | Facility: CLINIC | Age: 62
End: 2022-12-16
Payer: COMMERCIAL

## 2022-12-16 DIAGNOSIS — E66.3 OVERWEIGHT: ICD-10-CM

## 2022-12-16 DIAGNOSIS — F41.9 ANXIETY: ICD-10-CM

## 2022-12-16 DIAGNOSIS — E11.9 TYPE 2 DIABETES MELLITUS WITHOUT COMPLICATION, WITHOUT LONG-TERM CURRENT USE OF INSULIN: ICD-10-CM

## 2022-12-16 NOTE — TELEPHONE ENCOUNTER
No new care gaps identified.  [facility] Embedded Care Gaps. Reference number: [id number]. 12/16/2022   5:42:18 PM CST

## 2022-12-19 ENCOUNTER — PATIENT MESSAGE (OUTPATIENT)
Dept: FAMILY MEDICINE | Facility: CLINIC | Age: 62
End: 2022-12-19
Payer: COMMERCIAL

## 2022-12-20 RX ORDER — ALPRAZOLAM 0.25 MG/1
0.25 TABLET ORAL 2 TIMES DAILY
Qty: 60 TABLET | Refills: 2 | Status: SHIPPED | OUTPATIENT
Start: 2022-12-20 | End: 2023-03-26 | Stop reason: SDUPTHER

## 2022-12-20 RX ORDER — CLOTRIMAZOLE AND BETAMETHASONE DIPROPIONATE 10; .64 MG/G; MG/G
CREAM TOPICAL 2 TIMES DAILY
Qty: 45 G | Refills: 1 | Status: SHIPPED | OUTPATIENT
Start: 2022-12-20

## 2022-12-22 ENCOUNTER — PATIENT MESSAGE (OUTPATIENT)
Dept: FAMILY MEDICINE | Facility: CLINIC | Age: 62
End: 2022-12-22
Payer: COMMERCIAL

## 2022-12-27 ENCOUNTER — OFFICE VISIT (OUTPATIENT)
Dept: URGENT CARE | Facility: CLINIC | Age: 62
End: 2022-12-27
Payer: COMMERCIAL

## 2022-12-27 VITALS
HEART RATE: 99 BPM | TEMPERATURE: 100 F | RESPIRATION RATE: 16 BRPM | OXYGEN SATURATION: 97 % | BODY MASS INDEX: 28.49 KG/M2 | WEIGHT: 199 LBS | HEIGHT: 70 IN | SYSTOLIC BLOOD PRESSURE: 139 MMHG | DIASTOLIC BLOOD PRESSURE: 80 MMHG

## 2022-12-27 DIAGNOSIS — J20.8 ACUTE BACTERIAL BRONCHITIS: Primary | ICD-10-CM

## 2022-12-27 DIAGNOSIS — R05.9 COUGH, UNSPECIFIED TYPE: ICD-10-CM

## 2022-12-27 DIAGNOSIS — B96.89 ACUTE BACTERIAL BRONCHITIS: Primary | ICD-10-CM

## 2022-12-27 DIAGNOSIS — R06.2 WHEEZING: ICD-10-CM

## 2022-12-27 DIAGNOSIS — R06.02 SOB (SHORTNESS OF BREATH): ICD-10-CM

## 2022-12-27 DIAGNOSIS — R50.9 FEVER, UNSPECIFIED FEVER CAUSE: ICD-10-CM

## 2022-12-27 LAB
CTP QC/QA: YES
CTP QC/QA: YES
POC MOLECULAR INFLUENZA A AGN: NEGATIVE
POC MOLECULAR INFLUENZA B AGN: NEGATIVE
SARS-COV-2 AG RESP QL IA.RAPID: NEGATIVE

## 2022-12-27 PROCEDURE — 71046 X-RAY EXAM CHEST 2 VIEWS: CPT | Mod: S$GLB,,, | Performed by: RADIOLOGY

## 2022-12-27 PROCEDURE — 3079F DIAST BP 80-89 MM HG: CPT | Mod: CPTII,S$GLB,, | Performed by: NURSE PRACTITIONER

## 2022-12-27 PROCEDURE — 3044F HG A1C LEVEL LT 7.0%: CPT | Mod: CPTII,S$GLB,, | Performed by: NURSE PRACTITIONER

## 2022-12-27 PROCEDURE — 3079F PR MOST RECENT DIASTOLIC BLOOD PRESSURE 80-89 MM HG: ICD-10-PCS | Mod: CPTII,S$GLB,, | Performed by: NURSE PRACTITIONER

## 2022-12-27 PROCEDURE — 3075F PR MOST RECENT SYSTOLIC BLOOD PRESS GE 130-139MM HG: ICD-10-PCS | Mod: CPTII,S$GLB,, | Performed by: NURSE PRACTITIONER

## 2022-12-27 PROCEDURE — 87502 INFLUENZA DNA AMP PROBE: CPT | Mod: QW,S$GLB,, | Performed by: NURSE PRACTITIONER

## 2022-12-27 PROCEDURE — 3061F PR NEG MICROALBUMINURIA RESULT DOCUMENTED/REVIEW: ICD-10-PCS | Mod: CPTII,S$GLB,, | Performed by: NURSE PRACTITIONER

## 2022-12-27 PROCEDURE — 3044F PR MOST RECENT HEMOGLOBIN A1C LEVEL <7.0%: ICD-10-PCS | Mod: CPTII,S$GLB,, | Performed by: NURSE PRACTITIONER

## 2022-12-27 PROCEDURE — 3008F PR BODY MASS INDEX (BMI) DOCUMENTED: ICD-10-PCS | Mod: CPTII,S$GLB,, | Performed by: NURSE PRACTITIONER

## 2022-12-27 PROCEDURE — 4010F ACE/ARB THERAPY RXD/TAKEN: CPT | Mod: CPTII,S$GLB,, | Performed by: NURSE PRACTITIONER

## 2022-12-27 PROCEDURE — 3075F SYST BP GE 130 - 139MM HG: CPT | Mod: CPTII,S$GLB,, | Performed by: NURSE PRACTITIONER

## 2022-12-27 PROCEDURE — 3066F PR DOCUMENTATION OF TREATMENT FOR NEPHROPATHY: ICD-10-PCS | Mod: CPTII,S$GLB,, | Performed by: NURSE PRACTITIONER

## 2022-12-27 PROCEDURE — 3008F BODY MASS INDEX DOCD: CPT | Mod: CPTII,S$GLB,, | Performed by: NURSE PRACTITIONER

## 2022-12-27 PROCEDURE — 99213 OFFICE O/P EST LOW 20 MIN: CPT | Mod: S$GLB,,, | Performed by: NURSE PRACTITIONER

## 2022-12-27 PROCEDURE — 1160F PR REVIEW ALL MEDS BY PRESCRIBER/CLIN PHARMACIST DOCUMENTED: ICD-10-PCS | Mod: CPTII,S$GLB,, | Performed by: NURSE PRACTITIONER

## 2022-12-27 PROCEDURE — 1160F RVW MEDS BY RX/DR IN RCRD: CPT | Mod: CPTII,S$GLB,, | Performed by: NURSE PRACTITIONER

## 2022-12-27 PROCEDURE — 1159F PR MEDICATION LIST DOCUMENTED IN MEDICAL RECORD: ICD-10-PCS | Mod: CPTII,S$GLB,, | Performed by: NURSE PRACTITIONER

## 2022-12-27 PROCEDURE — 99213 PR OFFICE/OUTPT VISIT, EST, LEVL III, 20-29 MIN: ICD-10-PCS | Mod: S$GLB,,, | Performed by: NURSE PRACTITIONER

## 2022-12-27 PROCEDURE — 71046 XR CHEST PA AND LATERAL: ICD-10-PCS | Mod: S$GLB,,, | Performed by: RADIOLOGY

## 2022-12-27 PROCEDURE — 87502 POCT INFLUENZA A/B MOLECULAR: ICD-10-PCS | Mod: QW,S$GLB,, | Performed by: NURSE PRACTITIONER

## 2022-12-27 PROCEDURE — 3061F NEG MICROALBUMINURIA REV: CPT | Mod: CPTII,S$GLB,, | Performed by: NURSE PRACTITIONER

## 2022-12-27 PROCEDURE — 3066F NEPHROPATHY DOC TX: CPT | Mod: CPTII,S$GLB,, | Performed by: NURSE PRACTITIONER

## 2022-12-27 PROCEDURE — 1159F MED LIST DOCD IN RCRD: CPT | Mod: CPTII,S$GLB,, | Performed by: NURSE PRACTITIONER

## 2022-12-27 PROCEDURE — 87811 SARS CORONAVIRUS 2 ANTIGEN POCT, MANUAL READ: ICD-10-PCS | Mod: QW,S$GLB,, | Performed by: NURSE PRACTITIONER

## 2022-12-27 PROCEDURE — 4010F PR ACE/ARB THEARPY RXD/TAKEN: ICD-10-PCS | Mod: CPTII,S$GLB,, | Performed by: NURSE PRACTITIONER

## 2022-12-27 PROCEDURE — 87811 SARS-COV-2 COVID19 W/OPTIC: CPT | Mod: QW,S$GLB,, | Performed by: NURSE PRACTITIONER

## 2022-12-27 RX ORDER — ALBUTEROL SULFATE 90 UG/1
2 AEROSOL, METERED RESPIRATORY (INHALATION) EVERY 6 HOURS PRN
Qty: 18 G | Refills: 0 | Status: SHIPPED | OUTPATIENT
Start: 2022-12-27

## 2022-12-27 RX ORDER — PREDNISONE 20 MG/1
20 TABLET ORAL DAILY
Qty: 5 TABLET | Refills: 0 | Status: SHIPPED | OUTPATIENT
Start: 2022-12-27 | End: 2023-01-01

## 2022-12-27 RX ORDER — DOXYCYCLINE 100 MG/1
100 CAPSULE ORAL 2 TIMES DAILY
Qty: 14 CAPSULE | Refills: 0 | Status: SHIPPED | OUTPATIENT
Start: 2022-12-27 | End: 2023-01-03

## 2022-12-27 RX ORDER — PROMETHAZINE HYDROCHLORIDE AND DEXTROMETHORPHAN HYDROBROMIDE 6.25; 15 MG/5ML; MG/5ML
5 SYRUP ORAL NIGHTLY PRN
Qty: 118 ML | Refills: 0 | Status: SHIPPED | OUTPATIENT
Start: 2022-12-27 | End: 2023-01-06

## 2022-12-28 DIAGNOSIS — E11.9 TYPE 2 DIABETES MELLITUS WITHOUT COMPLICATION, UNSPECIFIED WHETHER LONG TERM INSULIN USE: ICD-10-CM

## 2022-12-28 NOTE — PATIENT INSTRUCTIONS
INSTRUCTIONS:  - Rest.  - Drink plenty of fluids.  - Take Tylenol and/or Ibuprofen as directed as needed for fever/pain.  Do not take more than the recommended dose.  - follow up with your PCP within the next 1-2 weeks as needed.  - You must understand that you have received an Urgent Care treatment only and that you may be released before all of your medical problems are known or treated.   - You, the patient, will arrange for follow up care as instructed.   - If your condition worsens or fails to improve we recommend that you receive another evaluation at the ER immediately or contact your PCP to discuss your concerns.   - You can call [phone] or [phone] to help schedule an appointment with the appropriate provider.     -If you smoke cigarettes, it would be beneficial for you to stop.    OVER THE COUNTER RECOMMENDATIONS/SUGGESTIONS.     Make sure to stay well hydrated.     Use Nasal Saline to mechanically move any post nasal drip from your eustachian tube or from the back of your throat.     Use warm salt water gargles to ease your throat pain. Warm salt water gargles as needed for sore throat-  1/2 tsp salt to 1 cup warm water, gargle as desired.     Use an antihistamine such as Claritin, Zyrtec or Allegra to dry you out.      Use pseudoephedrine (behind the counter) to decongest. Pseudoephedrine  30 mg up to 240 mg /day. It can raise your blood pressure and give you palpitations.     Use mucinex (guaifenisin) to break up mucous up to 2400mg/day to loosen any mucous.   The mucinex DM pill has a cough suppressant that can be sedating. It can be used at night to stop the tickle at the back of your throat.  You can use Mucinex D (it has guaifenesin and a high dose of pseudoephedrine) in the mornings to help decongest.        Use Afrin in each nare for no longer than 3 days, as it is addictive. It can also dry out your mucous membranes and cause elevated blood pressure. This is especially useful if you  are flying.     Use Flonase 1-2 sprays/nostril per day. It is a local acting steroid nasal spray, if you develop a bloody nose, stop using the medication immediately.     Sometimes Nyquil at night is beneficial to help you get some rest, however it is sedating and it does have an antihistamine, and tylenol.     Honey is a natural cough suppressant that can be used.     Tylenol up to 4,000 mg a day is safe for short periods and can be used for body aches, pain, and fever. However in high doses and prolonged use it can cause liver irritation.     Ibuprofen is a non-steroidal anti-inflammatory that can be used for body aches, pain, and fever.However it can also cause stomach irritation if over used.

## 2022-12-28 NOTE — PROGRESS NOTES
"Subjective:       Patient ID: Malathi Martinez is a 62 y.o. female.    Vitals:  height is 5' 10" (1.778 m) and weight is 90.3 kg (199 lb). Her oral temperature is 100.2 °F (37.9 °C). Her blood pressure is 139/80 and her pulse is 99. Her respiration is 16 and oxygen saturation is 97%.     Chief Complaint: Cough    Patient presents today with cough and congestion that began yesterday.  Patient has history of bronchitis.  Patient has not taken any medications for symptoms.      Provider note begins below:  Past Medical History:  No date: Abnormal stress test  No date: Bronchitis  No date: COPD (chronic obstructive pulmonary disease)  No date: Diabetes mellitus, type 2  No date: HLD (hyperlipidemia)  No date: Hx of endoscopy  No date: Hypertension  No date: Mental disorder  4/9/2018: Pulmonary nodule  No date: SOB (shortness of breath)      Comment:  resolved  12/26/2012: Tobacco abuse     This is a 62-year-old female with above medical history here today with complaints of a nonproductive cough that started yesterday.  Patient has also experienced wheezing and some shortness of breath today.  Patient reports that this is usually how her bronchitis presents.  Patient is a smoker.  Patient denies any sick contacts.  Patient denies any chest pain, vomiting or abdominal pain.  Patient is awake and alert.  Speaking in full sentences.  Negative COVID and flu test in urgent care today.    Cough  This is a new problem. The current episode started yesterday. The problem has been gradually worsening. Associated symptoms include nasal congestion, shortness of breath and wheezing. Pertinent negatives include no chest pain, chills, ear pain, rash or sore throat. She has tried nothing for the symptoms.     Constitution: Negative. Negative for chills, sweating and fatigue.   HENT: Negative.  Negative for ear pain, facial swelling, congestion and sore throat.    Neck: Negative for painful lymph nodes.   Cardiovascular: Negative. "  Negative for chest trauma, chest pain and sob on exertion.   Eyes: Negative.  Negative for eye itching and eye pain.   Respiratory:  Positive for cough, shortness of breath and wheezing. Negative for chest tightness and asthma.    Gastrointestinal: Negative.  Negative for nausea, vomiting and diarrhea.   Endocrine: negative. cold intolerance and excessive thirst.   Genitourinary: Negative.  Negative for dysuria, frequency, urgency and hematuria.   Musculoskeletal:  Negative for pain, trauma and joint pain.   Skin: Negative.  Negative for rash, wound and hives.   Allergic/Immunologic: Negative.  Negative for eczema, asthma, hives and itching.   Neurological: Negative.  Negative for disorientation and altered mental status.   Hematologic/Lymphatic: Negative.  Negative for swollen lymph nodes.   Psychiatric/Behavioral: Negative.  Negative for altered mental status, disorientation and confusion.      Objective:      Physical Exam   Constitutional: She is oriented to person, place, and time. She appears well-developed. She is cooperative.  Non-toxic appearance. She does not appear ill. No distress.   HENT:   Head: Normocephalic and atraumatic.   Ears:   Right Ear: Hearing, tympanic membrane, external ear and ear canal normal. impacted cerumen  Left Ear: Hearing, tympanic membrane, external ear and ear canal normal. impacted cerumen  Nose: Nose normal. No mucosal edema, rhinorrhea, nasal deformity or congestion. No epistaxis. Right sinus exhibits no maxillary sinus tenderness and no frontal sinus tenderness. Left sinus exhibits no maxillary sinus tenderness and no frontal sinus tenderness.   Mouth/Throat: Uvula is midline and mucous membranes are normal. No trismus in the jaw. Normal dentition. No uvula swelling. Posterior oropharyngeal erythema present. No oropharyngeal exudate or posterior oropharyngeal edema.   Eyes: Conjunctivae and lids are normal. No scleral icterus.   Neck: Trachea normal and phonation normal.  Neck supple. No edema present. No erythema present. No neck rigidity present.   Cardiovascular: Normal rate, regular rhythm, normal heart sounds and normal pulses.   Pulmonary/Chest: Effort normal. No stridor. No respiratory distress. She has no decreased breath sounds. She has wheezes (expiratory.). She has no rhonchi. She has no rales. She exhibits no tenderness.   Abdominal: Normal appearance. She exhibits no distension. Soft. flat abdomen There is no abdominal tenderness. There is no rebound, no guarding, no left CVA tenderness and no right CVA tenderness.   Musculoskeletal: Normal range of motion.         General: No deformity. Normal range of motion.   Lymphadenopathy:     She has no cervical adenopathy.   Neurological: no focal deficit. She is alert and oriented to person, place, and time. She exhibits normal muscle tone. Coordination normal.   Skin: Skin is warm, dry, intact, not diaphoretic and not pale.   Psychiatric: Her speech is normal and behavior is normal. Mood, judgment and thought content normal.   Nursing note and vitals reviewed.  The following results have been reviewed with the patient:  LABS-  Results for orders placed or performed in visit on 12/27/22   SARS Coronavirus 2 Antigen, POCT Manual Read   Result Value Ref Range    SARS Coronavirus 2 Antigen Negative Negative     Acceptable Yes    POCT Influenza A/B MOLECULAR   Result Value Ref Range    POC Molecular Influenza A Ag Negative Negative, Not Reported    POC Molecular Influenza B Ag Negative Negative, Not Reported     Acceptable Yes         IMAGING-  XR CHEST PA AND LATERAL    Result Date: 12/27/2022  EXAMINATION: XR CHEST PA AND LATERAL CLINICAL HISTORY: Cough, unspecified TECHNIQUE: PA and lateral views of the chest were performed. COMPARISON: Chest x-ray of January 28, 2020 and March 21, 2019 FINDINGS: The mediastinal and cardiac size and contours are normal.  There is increased interstitial density in  both lower lung fields which may represent a pneumonitis or pre-existing interstitial fibrosis..  A consolidated infiltrate or solid mass is not seen.  No pneumothorax or pleural effusion is noted.     Increased interstitial density in both lower lung fields may represent a pre-existing interstitial fibrosis or a viral pneumonitis. Electronically signed by: Jhony Concepcion MD Date:    12/27/2022 Time:    18:59           Assessment:       1. Acute bacterial bronchitis    2. Cough, unspecified type    3. Fever, unspecified fever cause    4. Wheezing    5. SOB (shortness of breath)          Plan:       FOLLOWUP  Follow up if symptoms worsen or fail to improve, for PLEASE CONTACT PCP OR CONTACT THE EMERGENCY ROOM..     PATIENT INSTRUCTIONS  Patient Instructions   INSTRUCTIONS:  - Rest.  - Drink plenty of fluids.  - Take Tylenol and/or Ibuprofen as directed as needed for fever/pain.  Do not take more than the recommended dose.  - follow up with your PCP within the next 1-2 weeks as needed.  - You must understand that you have received an Urgent Care treatment only and that you may be released before all of your medical problems are known or treated.   - You, the patient, will arrange for follow up care as instructed.   - If your condition worsens or fails to improve we recommend that you receive another evaluation at the ER immediately or contact your PCP to discuss your concerns.   - You can call [phone] or [phone] to help schedule an appointment with the appropriate provider.     -If you smoke cigarettes, it would be beneficial for you to stop.    OVER THE COUNTER RECOMMENDATIONS/SUGGESTIONS.     Make sure to stay well hydrated.     Use Nasal Saline to mechanically move any post nasal drip from your eustachian tube or from the back of your throat.     Use warm salt water gargles to ease your throat pain. Warm salt water gargles as needed for sore throat-  1/2 tsp salt to 1 cup warm water, gargle as  desired.     Use an antihistamine such as Claritin, Zyrtec or Allegra to dry you out.      Use pseudoephedrine (behind the counter) to decongest. Pseudoephedrine  30 mg up to 240 mg /day. It can raise your blood pressure and give you palpitations.     Use mucinex (guaifenisin) to break up mucous up to 2400mg/day to loosen any mucous.   The mucinex DM pill has a cough suppressant that can be sedating. It can be used at night to stop the tickle at the back of your throat.  You can use Mucinex D (it has guaifenesin and a high dose of pseudoephedrine) in the mornings to help decongest.        Use Afrin in each nare for no longer than 3 days, as it is addictive. It can also dry out your mucous membranes and cause elevated blood pressure. This is especially useful if you are flying.     Use Flonase 1-2 sprays/nostril per day. It is a local acting steroid nasal spray, if you develop a bloody nose, stop using the medication immediately.     Sometimes Nyquil at night is beneficial to help you get some rest, however it is sedating and it does have an antihistamine, and tylenol.     Honey is a natural cough suppressant that can be used.     Tylenol up to 4,000 mg a day is safe for short periods and can be used for body aches, pain, and fever. However in high doses and prolonged use it can cause liver irritation.     Ibuprofen is a non-steroidal anti-inflammatory that can be used for body aches, pain, and fever.However it can also cause stomach irritation if over used.         THANK YOU FOR ALLOWING ME TO PARTICIPATE IN YOUR HEALTHCARE,     Loco Castro NP   Acute bacterial bronchitis  -     doxycycline (VIBRAMYCIN) 100 MG Cap; Take 1 capsule (100 mg total) by mouth 2 (two) times daily. for 7 days  Dispense: 14 capsule; Refill: 0  -     promethazine-dextromethorphan (PROMETHAZINE-DM) 6.25-15 mg/5 mL Syrp; Take 5 mLs by mouth nightly as needed (night cough).  Dispense: 118 mL; Refill: 0    Cough, unspecified type  -      SARS Coronavirus 2 Antigen, POCT Manual Read  -     POCT Influenza A/B MOLECULAR  -     XR CHEST PA AND LATERAL; Future; Expected date: 12/27/2022    Fever, unspecified fever cause  -     XR CHEST PA AND LATERAL; Future; Expected date: 12/27/2022    Wheezing  -     predniSONE (DELTASONE) 20 MG tablet; Take 1 tablet (20 mg total) by mouth once daily. for 5 days  Dispense: 5 tablet; Refill: 0  -     albuterol (VENTOLIN HFA) 90 mcg/actuation inhaler; Inhale 2 puffs into the lungs every 6 (six) hours as needed for Wheezing or Shortness of Breath. Rescue  Dispense: 18 g; Refill: 0    SOB (shortness of breath)  -     albuterol (VENTOLIN HFA) 90 mcg/actuation inhaler; Inhale 2 puffs into the lungs every 6 (six) hours as needed for Wheezing or Shortness of Breath. Rescue  Dispense: 18 g; Refill: 0

## 2023-01-03 ENCOUNTER — PATIENT MESSAGE (OUTPATIENT)
Dept: ADMINISTRATIVE | Facility: HOSPITAL | Age: 63
End: 2023-01-03
Payer: COMMERCIAL

## 2023-01-06 ENCOUNTER — OFFICE VISIT (OUTPATIENT)
Dept: FAMILY MEDICINE | Facility: CLINIC | Age: 63
End: 2023-01-06
Payer: COMMERCIAL

## 2023-01-06 VITALS
BODY MASS INDEX: 28.17 KG/M2 | SYSTOLIC BLOOD PRESSURE: 136 MMHG | OXYGEN SATURATION: 99 % | WEIGHT: 196.75 LBS | RESPIRATION RATE: 19 BRPM | DIASTOLIC BLOOD PRESSURE: 76 MMHG | HEART RATE: 77 BPM | HEIGHT: 70 IN | TEMPERATURE: 98 F

## 2023-01-06 DIAGNOSIS — J20.8 ACUTE BACTERIAL BRONCHITIS: Primary | ICD-10-CM

## 2023-01-06 DIAGNOSIS — B96.89 ACUTE BACTERIAL BRONCHITIS: Primary | ICD-10-CM

## 2023-01-06 PROCEDURE — 1159F MED LIST DOCD IN RCRD: CPT | Mod: CPTII,S$GLB,, | Performed by: FAMILY MEDICINE

## 2023-01-06 PROCEDURE — 99999 PR PBB SHADOW E&M-EST. PATIENT-LVL V: ICD-10-PCS | Mod: PBBFAC,,, | Performed by: FAMILY MEDICINE

## 2023-01-06 PROCEDURE — 1160F RVW MEDS BY RX/DR IN RCRD: CPT | Mod: CPTII,S$GLB,, | Performed by: FAMILY MEDICINE

## 2023-01-06 PROCEDURE — 1159F PR MEDICATION LIST DOCUMENTED IN MEDICAL RECORD: ICD-10-PCS | Mod: CPTII,S$GLB,, | Performed by: FAMILY MEDICINE

## 2023-01-06 PROCEDURE — 1160F PR REVIEW ALL MEDS BY PRESCRIBER/CLIN PHARMACIST DOCUMENTED: ICD-10-PCS | Mod: CPTII,S$GLB,, | Performed by: FAMILY MEDICINE

## 2023-01-06 PROCEDURE — 96372 PR INJECTION,THERAP/PROPH/DIAG2ST, IM OR SUBCUT: ICD-10-PCS | Mod: S$GLB,,, | Performed by: FAMILY MEDICINE

## 2023-01-06 PROCEDURE — 3078F DIAST BP <80 MM HG: CPT | Mod: CPTII,S$GLB,, | Performed by: FAMILY MEDICINE

## 2023-01-06 PROCEDURE — 99213 OFFICE O/P EST LOW 20 MIN: CPT | Mod: 25,S$GLB,, | Performed by: FAMILY MEDICINE

## 2023-01-06 PROCEDURE — 96372 THER/PROPH/DIAG INJ SC/IM: CPT | Mod: S$GLB,,, | Performed by: FAMILY MEDICINE

## 2023-01-06 PROCEDURE — 3075F SYST BP GE 130 - 139MM HG: CPT | Mod: CPTII,S$GLB,, | Performed by: FAMILY MEDICINE

## 2023-01-06 PROCEDURE — 3075F PR MOST RECENT SYSTOLIC BLOOD PRESS GE 130-139MM HG: ICD-10-PCS | Mod: CPTII,S$GLB,, | Performed by: FAMILY MEDICINE

## 2023-01-06 PROCEDURE — 99213 PR OFFICE/OUTPT VISIT, EST, LEVL III, 20-29 MIN: ICD-10-PCS | Mod: 25,S$GLB,, | Performed by: FAMILY MEDICINE

## 2023-01-06 PROCEDURE — 3008F BODY MASS INDEX DOCD: CPT | Mod: CPTII,S$GLB,, | Performed by: FAMILY MEDICINE

## 2023-01-06 PROCEDURE — 3008F PR BODY MASS INDEX (BMI) DOCUMENTED: ICD-10-PCS | Mod: CPTII,S$GLB,, | Performed by: FAMILY MEDICINE

## 2023-01-06 PROCEDURE — 3078F PR MOST RECENT DIASTOLIC BLOOD PRESSURE < 80 MM HG: ICD-10-PCS | Mod: CPTII,S$GLB,, | Performed by: FAMILY MEDICINE

## 2023-01-06 PROCEDURE — 99999 PR PBB SHADOW E&M-EST. PATIENT-LVL V: CPT | Mod: PBBFAC,,, | Performed by: FAMILY MEDICINE

## 2023-01-06 RX ORDER — CEFTRIAXONE 1 G/1
1 INJECTION, POWDER, FOR SOLUTION INTRAMUSCULAR; INTRAVENOUS
Status: COMPLETED | OUTPATIENT
Start: 2023-01-06 | End: 2023-01-06

## 2023-01-06 RX ORDER — BETAMETHASONE SODIUM PHOSPHATE AND BETAMETHASONE ACETATE 3; 3 MG/ML; MG/ML
6 INJECTION, SUSPENSION INTRA-ARTICULAR; INTRALESIONAL; INTRAMUSCULAR; SOFT TISSUE
Status: COMPLETED | OUTPATIENT
Start: 2023-01-06 | End: 2023-01-06

## 2023-01-06 RX ORDER — HYDROCODONE POLISTIREX AND CHLORPHENIRAMINE POLISTIREX 10; 8 MG/5ML; MG/5ML
5 SUSPENSION, EXTENDED RELEASE ORAL EVERY 12 HOURS PRN
Qty: 120 ML | Refills: 0 | Status: SHIPPED | OUTPATIENT
Start: 2023-01-06 | End: 2023-02-03

## 2023-01-06 RX ORDER — DOXYCYCLINE 100 MG/1
100 CAPSULE ORAL 2 TIMES DAILY
Qty: 14 CAPSULE | Refills: 0 | Status: SHIPPED | OUTPATIENT
Start: 2023-01-06 | End: 2023-02-03

## 2023-01-06 RX ORDER — METHYLPREDNISOLONE 4 MG/1
TABLET ORAL
Qty: 21 EACH | Refills: 0 | Status: SHIPPED | OUTPATIENT
Start: 2023-01-06 | End: 2023-01-27

## 2023-01-06 RX ADMIN — BETAMETHASONE SODIUM PHOSPHATE AND BETAMETHASONE ACETATE 6 MG: 3; 3 INJECTION, SUSPENSION INTRA-ARTICULAR; INTRALESIONAL; INTRAMUSCULAR; SOFT TISSUE at 01:01

## 2023-01-06 RX ADMIN — CEFTRIAXONE 1 G: 1 INJECTION, POWDER, FOR SOLUTION INTRAMUSCULAR; INTRAVENOUS at 01:01

## 2023-01-06 NOTE — PROGRESS NOTES
"Subjective:       Patient ID: Malathi Martinez is a 62 y.o. female.    Chief Complaint: Follow-up (Cough still lingering from bronchitis, discuss fall that she had)    Follow-up  Associated symptoms include arthralgias, headaches (on/off) and neck pain (was improving, but gets irritated by ongoing cough). Pertinent negatives include no chest pain, joint swelling, vomiting or weakness.     Patient in clinic for f/u.  Seen for URI f/u. Afebrile currently, given doxy/pred/alb. She did find the abx and pred somewhat helpful, but deep chest cough continues and is hacking. +muscle strain related to ongoing cough.     Review of Systems:  Review of Systems   Constitutional:  Positive for activity change and unexpected weight change.   HENT:  Positive for hearing loss and rhinorrhea. Negative for trouble swallowing.    Eyes:  Positive for visual disturbance. Negative for discharge.   Respiratory:  Positive for wheezing. Negative for chest tightness.    Cardiovascular:  Negative for chest pain and palpitations.   Gastrointestinal:  Positive for constipation. Negative for blood in stool, diarrhea and vomiting.   Endocrine: Negative for polydipsia and polyuria.   Genitourinary:  Negative for difficulty urinating, dysuria, hematuria and menstrual problem.   Musculoskeletal:  Positive for arthralgias and neck pain (was improving, but gets irritated by ongoing cough). Negative for joint swelling.   Neurological:  Positive for headaches (on/off). Negative for weakness.   Psychiatric/Behavioral:  Positive for dysphoric mood.      Objective:     Vitals:    01/06/23 1247   BP: 136/76   Pulse: 77   Resp: 19   Temp: 98.4 °F (36.9 °C)   SpO2: 99%   Weight: 89.3 kg (196 lb 12.2 oz)   Height: 5' 10" (1.778 m)          Physical Exam  Vitals and nursing note reviewed.   Constitutional:       General: She is not in acute distress.     Appearance: Normal appearance. She is well-developed.   HENT:      Head: Normocephalic and atraumatic. "      Right Ear: External ear normal. A middle ear effusion is present. Tympanic membrane is not erythematous.      Left Ear: External ear normal. A middle ear effusion is present. Tympanic membrane is not erythematous.      Nose: Mucosal edema and rhinorrhea present.      Mouth/Throat:      Pharynx: Posterior oropharyngeal erythema present. No oropharyngeal exudate.   Eyes:      General:         Right eye: No discharge.         Left eye: No discharge.      Conjunctiva/sclera: Conjunctivae normal.      Pupils: Pupils are equal, round, and reactive to light.   Cardiovascular:      Rate and Rhythm: Normal rate and regular rhythm.   Pulmonary:      Effort: Pulmonary effort is normal. No respiratory distress.      Breath sounds: Normal breath sounds. No wheezing.   Abdominal:      General: Bowel sounds are normal. There is no distension.      Palpations: Abdomen is soft.      Tenderness: There is no abdominal tenderness.   Musculoskeletal:      Cervical back: Normal range of motion and neck supple.   Lymphadenopathy:      Cervical: No cervical adenopathy.   Skin:     General: Skin is warm and dry.   Neurological:      General: No focal deficit present.      Mental Status: She is alert and oriented to person, place, and time.   Psychiatric:         Mood and Affect: Mood normal.         Assessment & Plan:  Acute bacterial bronchitis  -     cefTRIAXone injection 1 g  -     methylPREDNISolone (MEDROL DOSEPACK) 4 mg tablet; use as directed  Dispense: 21 each; Refill: 0  -     doxycycline (VIBRAMYCIN) 100 MG Cap; Take 1 capsule (100 mg total) by mouth 2 (two) times daily.  Dispense: 14 capsule; Refill: 0  -     betamethasone acetate-betamethasone sodium phosphate injection 6 mg  -     hydrocodone-chlorpheniramine (TUSSIONEX) 10-8 mg/5 mL suspension; Take 5 mLs by mouth every 12 (twelve) hours as needed for Cough.  Dispense: 120 mL; Refill: 0      Side effects and precautions of medication use reviewed with patient, expressed  understanding. No questions or concerns. Expected course of illness and sx tx incl otc med use reviewed. Notify MD if sx persist or worsen.

## 2023-01-09 ENCOUNTER — CLINICAL SUPPORT (OUTPATIENT)
Dept: REHABILITATION | Facility: HOSPITAL | Age: 63
End: 2023-01-09
Payer: COMMERCIAL

## 2023-01-09 DIAGNOSIS — M54.12 CERVICAL RADICULOPATHY: ICD-10-CM

## 2023-01-09 DIAGNOSIS — S12.690A OTHER CLOSED DISPLACED FRACTURE OF SEVENTH CERVICAL VERTEBRA, INITIAL ENCOUNTER: ICD-10-CM

## 2023-01-09 DIAGNOSIS — M47.812 CERVICAL SPONDYLOSIS: ICD-10-CM

## 2023-01-09 DIAGNOSIS — M25.512 CHRONIC LEFT SHOULDER PAIN: ICD-10-CM

## 2023-01-09 DIAGNOSIS — G89.29 CHRONIC LEFT SHOULDER PAIN: ICD-10-CM

## 2023-01-09 DIAGNOSIS — M54.2 NECK PAIN: ICD-10-CM

## 2023-01-09 PROCEDURE — 97161 PT EVAL LOW COMPLEX 20 MIN: CPT | Mod: PO | Performed by: PHYSICAL THERAPIST

## 2023-01-09 NOTE — PLAN OF CARE
[facility]  Physical Therapy Initial Evaluation    Name: Malathi Martinez  Clinic Number: [id number]    Therapy Diagnosis:   Encounter Diagnoses   Name Primary?    Other closed displaced fracture of seventh cervical vertebra, initial encounter     Cervical spondylosis     Cervical radiculopathy     Neck pain     Chronic left shoulder pain      Physician: Roxi Larson PA-C    Physician Orders: PT Eval and Treat   Medical Diagnosis from Referral:   Other closed displaced fracture of seventh cervical vertebra, initial encounter   Cervical spondylosis   Cervical radiculopathy   Evaluation Date: 1/9/2023  Authorization Period Expiration: 12/06/2023  Plan of Care Expiration: 03/10/2023  Visit # / Visits authorized: 1    Time In: 1600  Time Out: 1700  Total Billable Time: 60 minutes    Precautions: Standard, Diabetes, and Fall    Subjective   Date of onset: 10/23/2022  History of current condition - Malathi reports: having left shoulder pain and neck pain after falling off attic ladder and hitting her head along with left shoulder on 10/23/2022. Patient recalls having left shoulder pain, bruising along with 30 staples on top of her head. Neck pain intermittently noted to the lower cervical region, L>R side. No radicular pain. No numbness/tingling noted. Patient reported having some improvement since fall.       Past Medical History:   Diagnosis Date    Abnormal stress test     Bronchitis     COPD (chronic obstructive pulmonary disease)     Diabetes mellitus, type 2     HLD (hyperlipidemia)     Hx of endoscopy     Hypertension     Mental disorder     Pulmonary nodule 4/9/2018    SOB (shortness of breath)     resolved    Tobacco abuse 12/26/2012     Malathi Martinez  has a past surgical history that includes Tonsillectomy; Appendectomy; Colonoscopy (N/A, 1/18/2019); and Esophagogastroduodenoscopy (N/A, 2/19/2021).    Malathi has a current medication list which includes the following  prescription(s): albuterol, albuterol-ipratropium, alprazolam, atorvastatin, b-complex with vitamin c, bupropion, cholecalciferol (vitamin d3), clotrimazole-betamethasone 1-0.05%, cyclobenzaprine, desloratadine, doxycycline, escitalopram oxalate, famotidine, fluticasone propionate, fluticasone-salmeterol 100-50 mcg/dose, furosemide, gabapentin, hydrocodone-chlorpheniramine, lisinopril, metformin, methylprednisolone, naproxen, and semaglutide.    Review of patient's allergies indicates:   Allergen Reactions    Penicillins Hives        Imaging, MRI studies: 11/21/202    1. Subacute, minimally displaced C7 spinous process fracture with supraspinous ligament avulsion and mild associated posterior paraspinous muscular strain.  2. No new fracture.  3. Marked multilevel degenerative change of the cervical spine, as detailed above, without significant change as compared to the recent comparison studies.     Prior Therapy: none noted  Social History:  lives with their spouse  Occupation: Full time work, Customer service   Work demands: office work (prolong seated position)  Leisure activities: walking, home management  Prior Level of Function: independent  Current Level of Function/limitation: modified independent, increase time noted with home management  Recent or major surgery: none noted  Accidents: 10/23/2022      Pain:  Current 1/10, worst 8/10, best 0/10   Location: bilateral neck, left shoulder   Description: Aching, Dull, Tight, and Variable  Constant symptoms: no  Intermittent symptoms: yes  Worse: reaching  Better: rest     Disturbed sleep: Yes  Is it positional? Yes  Unexplained weight loss: no    Pts goals: Decrease pain to neck and left shoulder. Return to PLOF.    Objective     Posture: cervical protraction    Sensation: Dermatomes:   Right Left Comment   C2/C3 (posterior head/neck) intact intact    C4 intact intact    C5 intact intact    C6 intact intact    C7 intact intact    C8 intact intact    T1 intact  intact                                                                                                                                                                                                  Cervical ROM: Inclinometer/Goniometer/%                            Pain/dysfunction/movement  Flexion  Protraction  Retraction 25%  0  50% Can't touch sternum to chin. Non-uniform curvature   Extension 50% Non-uniform curvature. Not within 10 degrees of parallel of the horizontal line   Lateral flexion (L)     Lateral flexion (R)     Right rotation 51 degrees Chin/nose not in line with mid-clavicle. +/- 80 degrees   Left rotation 45 degrees Chin/nose not in line with mid-clavicle. +/- 80 degrees     Left shoulder: limited with muscle guarding. Flexion/abduction 120 degrees, ER 50 degrees, painful during movement.  -left shoulder shrug noted  Left shoulder ER 3+/5, IR 4-5    OA -  AA -     Upper Extremity Strength  (R) UE  (L) UE    C4 Upper trap 5/5 C4 Upper trap 4+/5   C5 Deltoid(90 ABD): 5/5 C5 Deltoid (90 ABD): 4-/5   C6 Biceps/ECRB/L 5/5 C6 Biceps/ECRB/L 4/5   C7 triceps/FCR 5/5 C7 triceps/FCR 4/5   C8 Abd.pollicis rodney 5/5 C8 Abd. Pollicis rodney. 5/5   T1 First Dorsal inter 5/5 T1 First Dorsal inter 5/5       Special Tests:  Left shoulder + for impingement, empty can  Alar/TL -    UMN:   Castillo: -    Upper Limb Neurodynamic testing:  ULTT: (Check Median). Good screen for radiculopathy -    Joint Mobility: hypo left inferior glide    Thoracic mobility: limited    Palpation: Point tenderness to left UT, lower cervical TP area, left lateral/posterior GHJ      Flexibility: pec-minor       CMS Impairment/Limitation/Restriction for FOTO Neck Survey  Status Limitation G-Code CMS Severity Modifier  Intake 61% 39% Current Status CJ - At least 20 percent but less than 40 percent  Predicted 69% 31% Goal Status+ CJ - At least 20 percent but less than 40 percent       TREATMENT   Treatment Time In: 1630  Treatment Time Out:  4705  Total Treatment time separate from Evaluation: 5 minutes    Malathi received therapeutic exercises to develop strength, endurance, ROM, flexibility, posture, and core stabilization for 5 minutes including:  Seated: left shoulder table slides (fwd)  Seated:scapular retractions  Left shoulder isometrics    Home Exercises and Patient Education Provided    Education provided:   - Yes    Written Home Exercises Provided: Patient instructed to cont prior HEP.  Exercises were reviewed and Malathi was able to demonstrate them prior to the end of the session.  Malathi demonstrated good  understanding of the education provided.     See EMR under Patient Instructions for exercises provided 1/9/2023.    Assessment   Malathi is a 62 y.o. female referred to outpatient Physical Therapy with a medical diagnosis of Other closed displaced fracture of seventh cervical vertebra, initial encounter ,Cervical spondylosis   Cervical radiculopathy. Pt presents with neck pain (L>R), left shoulder pain, decreased left shoulder motion, weakness of the left shoulder, decreased activity tolerance with UE tasks.    Problem List: pain, decreased ROM, decreased flexibility, decreased strength, decreased balance and stability, decreased motor control, and inability to participate fully in vocation pursuits.    Pt prognosis is Good.   Pt will benefit from skilled outpatient Physical Therapy to address the deficits stated above and in the chart below, provide pt/family education, and to maximize pt's level of independence.     Plan of care discussed with patient: Yes  Pt's spiritual, cultural and educational needs considered and patient is agreeable to the plan of care and goals as stated below:     Anticipated Barriers for therapy: none    Medical Necessity is demonstrated by the following  History  Co-morbidities and personal factors that may impact the plan of care Co-morbidities:   advanced age, diabetes, and difficulty sleeping    Personal Factors:    no deficits     moderate   Examination  Body Structures and Functions, activity limitations and participation restrictions that may impact the plan of care Body Regions:   neck  upper extremities    Body Systems:    ROM  strength  transitions  motor control    Participation Restrictions:   Home management  Work related tasks    Activity limitations:   Learning and applying knowledge  no deficits    General Tasks and Commands  no deficits    Communication  no deficits    Mobility  lifting and carrying objects    Self care  no deficits    Domestic Life  doing house work (cleaning house, washing dishes, laundry)    Interactions/Relationships  no deficits    Life Areas  no deficits    Community and Social Life  no deficits         high   Clinical Presentation stable and uncomplicated low   Decision Making/ Complexity Score: low     Short Term GOALS:  In 4 weeks, pt. will:  - improve cervical retraction by 25% for seated neutral postures.  - Improve left shoulder flexion by 10 degrees actively for reaching purposes.  - improve left shoulder MMT 1/2 grade for ADL purposes.  - decrease outcome measure limitation to <39%    Long Term GOALS:  In 8 weeks, pt. will:  - be independent and compliant with HEP and SX management   - decrease outcome measure limitation to <35%  - demonstrate seated: cervical retraction to neutral with no painful limitations.  - demonstrate left shoulder flexion > 130 degrees actively for reaching purposes.  - demonstrate left shoulder MMT 4 or > for home management purposes.    Plan   Plan of care Certification: 1/9/2023 to 03/10/2023.  Outpatient Physical Therapy 2 times weekly for 8 weeks to include the following interventions: Manual Therapy, Moist Heat/ Ice, Neuromuscular Re-ed, Patient Education, Therapeutic Activities, and Therapeutic Exercise.      Malathi may at times be seen by a PTA as part of the Rehab Team.    Delbert Patrick, PT

## 2023-01-17 ENCOUNTER — PATIENT MESSAGE (OUTPATIENT)
Dept: ADMINISTRATIVE | Facility: HOSPITAL | Age: 63
End: 2023-01-17
Payer: COMMERCIAL

## 2023-01-17 ENCOUNTER — OFFICE VISIT (OUTPATIENT)
Dept: PAIN MEDICINE | Facility: CLINIC | Age: 63
End: 2023-01-17
Payer: COMMERCIAL

## 2023-01-17 VITALS
BODY MASS INDEX: 28.01 KG/M2 | HEIGHT: 70 IN | HEART RATE: 88 BPM | SYSTOLIC BLOOD PRESSURE: 122 MMHG | DIASTOLIC BLOOD PRESSURE: 65 MMHG | WEIGHT: 195.69 LBS

## 2023-01-17 DIAGNOSIS — M47.812 CERVICAL SPONDYLOSIS: ICD-10-CM

## 2023-01-17 DIAGNOSIS — S12.690A OTHER CLOSED DISPLACED FRACTURE OF SEVENTH CERVICAL VERTEBRA, INITIAL ENCOUNTER: Primary | ICD-10-CM

## 2023-01-17 PROCEDURE — 3074F PR MOST RECENT SYSTOLIC BLOOD PRESSURE < 130 MM HG: ICD-10-PCS | Mod: CPTII,S$GLB,, | Performed by: PHYSICIAN ASSISTANT

## 2023-01-17 PROCEDURE — 3074F SYST BP LT 130 MM HG: CPT | Mod: CPTII,S$GLB,, | Performed by: PHYSICIAN ASSISTANT

## 2023-01-17 PROCEDURE — 1159F PR MEDICATION LIST DOCUMENTED IN MEDICAL RECORD: ICD-10-PCS | Mod: CPTII,S$GLB,, | Performed by: PHYSICIAN ASSISTANT

## 2023-01-17 PROCEDURE — 1160F PR REVIEW ALL MEDS BY PRESCRIBER/CLIN PHARMACIST DOCUMENTED: ICD-10-PCS | Mod: CPTII,S$GLB,, | Performed by: PHYSICIAN ASSISTANT

## 2023-01-17 PROCEDURE — 1160F RVW MEDS BY RX/DR IN RCRD: CPT | Mod: CPTII,S$GLB,, | Performed by: PHYSICIAN ASSISTANT

## 2023-01-17 PROCEDURE — 99999 PR PBB SHADOW E&M-EST. PATIENT-LVL IV: ICD-10-PCS | Mod: PBBFAC,,, | Performed by: PHYSICIAN ASSISTANT

## 2023-01-17 PROCEDURE — 3008F BODY MASS INDEX DOCD: CPT | Mod: CPTII,S$GLB,, | Performed by: PHYSICIAN ASSISTANT

## 2023-01-17 PROCEDURE — 3078F DIAST BP <80 MM HG: CPT | Mod: CPTII,S$GLB,, | Performed by: PHYSICIAN ASSISTANT

## 2023-01-17 PROCEDURE — 3008F PR BODY MASS INDEX (BMI) DOCUMENTED: ICD-10-PCS | Mod: CPTII,S$GLB,, | Performed by: PHYSICIAN ASSISTANT

## 2023-01-17 PROCEDURE — 99999 PR PBB SHADOW E&M-EST. PATIENT-LVL IV: CPT | Mod: PBBFAC,,, | Performed by: PHYSICIAN ASSISTANT

## 2023-01-17 PROCEDURE — 99213 OFFICE O/P EST LOW 20 MIN: CPT | Mod: S$GLB,,, | Performed by: PHYSICIAN ASSISTANT

## 2023-01-17 PROCEDURE — 3078F PR MOST RECENT DIASTOLIC BLOOD PRESSURE < 80 MM HG: ICD-10-PCS | Mod: CPTII,S$GLB,, | Performed by: PHYSICIAN ASSISTANT

## 2023-01-17 PROCEDURE — 1159F MED LIST DOCD IN RCRD: CPT | Mod: CPTII,S$GLB,, | Performed by: PHYSICIAN ASSISTANT

## 2023-01-17 PROCEDURE — 99213 PR OFFICE/OUTPT VISIT, EST, LEVL III, 20-29 MIN: ICD-10-PCS | Mod: S$GLB,,, | Performed by: PHYSICIAN ASSISTANT

## 2023-01-17 NOTE — PROGRESS NOTES
Back and Spine Follow Up    Patient ID: Malathi Martinez is a 62 y.o. female.    Chief Complaint   Patient presents with    Follow-up     Fracture follow up, symptoms improved       Review of Systems   Constitutional:  Negative for activity change, appetite change, chills, fever and unexpected weight change.   HENT:  Negative for tinnitus, trouble swallowing and voice change.    Respiratory:  Negative for apnea, cough, chest tightness and shortness of breath.    Cardiovascular:  Negative for chest pain and palpitations.   Gastrointestinal:  Negative for constipation, diarrhea, nausea and vomiting.   Genitourinary:  Negative for difficulty urinating, dysuria, frequency and urgency.   Musculoskeletal:  Positive for myalgias and neck pain. Negative for back pain, gait problem and neck stiffness.   Skin:  Negative for wound.   Neurological:  Negative for dizziness, tremors, seizures, facial asymmetry, speech difficulty, weakness, light-headedness, numbness and headaches.   Psychiatric/Behavioral:  Negative for confusion and decreased concentration.      Past Medical History:   Diagnosis Date    Abnormal stress test     Bronchitis     COPD (chronic obstructive pulmonary disease)     Diabetes mellitus, type 2     HLD (hyperlipidemia)     Hx of endoscopy     Hypertension     Mental disorder     Pulmonary nodule 4/9/2018    SOB (shortness of breath)     resolved    Tobacco abuse 12/26/2012     Social History     Socioeconomic History    Marital status:    Tobacco Use    Smoking status: Every Day     Packs/day: 0.75     Years: 41.00     Pack years: 30.75     Types: Cigarettes    Smokeless tobacco: Never   Substance and Sexual Activity    Alcohol use: No    Drug use: No    Sexual activity: Yes     Partners: Male     Birth control/protection: Post-menopausal     Social Determinants of Health     Financial Resource Strain: Medium Risk    Difficulty of Paying Living Expenses: Somewhat hard   Food  Insecurity: Food Insecurity Present    Worried About Running Out of Food in the Last Year: Sometimes true    Ran Out of Food in the Last Year: Sometimes true   Transportation Needs: No Transportation Needs    Lack of Transportation (Medical): No    Lack of Transportation (Non-Medical): No   Physical Activity: Insufficiently Active    Days of Exercise per Week: 2 days    Minutes of Exercise per Session: 30 min   Stress: Stress Concern Present    Feeling of Stress : Rather much   Social Connections: Unknown    Frequency of Communication with Friends and Family: Three times a week    Frequency of Social Gatherings with Friends and Family: Patient refused    Active Member of Clubs or Organizations: No    Attends Club or Organization Meetings: Never    Marital Status:    Housing Stability: Low Risk     Unable to Pay for Housing in the Last Year: No    Number of Places Lived in the Last Year: 1    Unstable Housing in the Last Year: No     Family History   Problem Relation Age of Onset    Heart disease Mother         MVP    Cataracts Mother     Heart disease Father 65        MI and PVD    Throat cancer Father     Heart disease Sister         mvp    Esophageal cancer Brother     No Known Problems Sister     No Known Problems Sister     Hyperlipidemia Paternal Grandmother      Review of patient's allergies indicates:   Allergen Reactions    Penicillins Hives       Current Outpatient Medications:     albuterol (VENTOLIN HFA) 90 mcg/actuation inhaler, Inhale 2 puffs into the lungs every 6 (six) hours as needed for Wheezing or Shortness of Breath. Rescue, Disp: 18 g, Rfl: 0    albuterol-ipratropium (DUO-NEB) 2.5 mg-0.5 mg/3 mL nebulizer solution, Take 3 mLs by nebulization every 6 (six) hours as needed for Wheezing or Shortness of Breath. Rescue, Disp: 200 each, Rfl: 11    ALPRAZolam (XANAX) 0.25 MG tablet, Take 1 tablet (0.25 mg total) by mouth 2 (two) times daily., Disp: 60 tablet, Rfl: 2     atorvastatin (LIPITOR) 20 MG tablet, Take 1 tablet (20 mg total) by mouth once daily., Disp: 90 tablet, Rfl: 3    B-complex with vitamin C (Z-BEC OR EQUIV) tablet, Take 1 tablet by mouth once daily., Disp: , Rfl:     buPROPion (WELLBUTRIN XL) 150 MG TB24 tablet, Take 2 tablets (300 mg total) by mouth once daily., Disp: 180 tablet, Rfl: 1    cholecalciferol, vitamin D3, 2,000 unit Cap, Take 1 capsule by mouth once daily., Disp: , Rfl:     clotrimazole-betamethasone 1-0.05% (LOTRISONE) cream, Apply topically to affected area 2 (two) times daily., Disp: 45 g, Rfl: 1    cyclobenzaprine (FLEXERIL) 5 MG tablet, Take 1 tablet (5 mg total) by mouth nightly as needed for Muscle spasms., Disp: 60 tablet, Rfl: 1    desloratadine (CLARINEX) 5 mg tablet, TAKE 1 TABLET BY MOUTH EVERY DAY, Disp: 90 tablet, Rfl: 3    doxycycline (VIBRAMYCIN) 100 MG Cap, Take 1 capsule (100 mg total) by mouth 2 (two) times daily., Disp: 14 capsule, Rfl: 0    EScitalopram oxalate (LEXAPRO) 10 MG tablet, Take 1.5 tablets (15 mg total) by mouth once daily., Disp: 480 tablet, Rfl: 3    famotidine (PEPCID) 40 MG tablet, TAKE 1 TABLET (40 MG TOTAL) BY MOUTH 2 (TWO) TIMES DAILY AS NEEDED FOR HEARTBURN., Disp: 180 tablet, Rfl: 1    fluticasone propionate (FLONASE) 50 mcg/actuation nasal spray, 1 spray (50 mcg total) by Each Nostril route once daily., Disp: 48 g, Rfl: 2    fluticasone-salmeterol diskus inhaler 100-50 mcg, Inhale 1 puff into the lungs 2 (two) times daily. Controller, Disp: 60 each, Rfl: 11    furosemide (LASIX) 20 MG tablet, Take 1 tablet (20 mg total) by mouth once daily., Disp: 90 tablet, Rfl: 1    gabapentin (NEURONTIN) 300 MG capsule, Take 1 capsule (300 mg total) by mouth 3 (three) times daily., Disp: 90 capsule, Rfl: 11    hydrocodone-chlorpheniramine (TUSSIONEX) 10-8 mg/5 mL suspension, Take 5 mLs by mouth every 12 (twelve) hours as needed for Cough., Disp: 120 mL, Rfl: 0    lisinopriL (PRINIVIL,ZESTRIL) 2.5 MG tablet,  "Take 1 tablet (2.5 mg total) by mouth once daily., Disp: 90 tablet, Rfl: 3    metFORMIN (GLUCOPHAGE-XR) 500 MG ER 24hr tablet, Take 1 tablet (500 mg total) by mouth 2 (two) times daily with meals., Disp: 180 tablet, Rfl: 3    methylPREDNISolone (MEDROL DOSEPACK) 4 mg tablet, use as directed, Disp: 21 each, Rfl: 0    naproxen (NAPROSYN) 500 MG tablet, Take 1 tablet (500 mg total) by mouth 2 (two) times daily with meals., Disp: 30 tablet, Rfl: 1    semaglutide (OZEMPIC) 0.25 mg or 0.5 mg(2 mg/1.5 mL) pen injector, Inject 0.25 mg into the skin every 7 days., Disp: 1 pen, Rfl: 5    Vitals:    01/17/23 1536   BP: 122/65   Pulse: 88   Weight: 88.7 kg (195 lb 10.5 oz)   Height: 5' 10" (1.778 m)       Physical Exam  Vitals and nursing note reviewed.   Constitutional:       Appearance: She is well-developed.   HENT:      Head: Normocephalic and atraumatic.   Eyes:      Pupils: Pupils are equal, round, and reactive to light.   Cardiovascular:      Rate and Rhythm: Normal rate.   Pulmonary:      Effort: Pulmonary effort is normal.   Musculoskeletal:         General: Normal range of motion.      Cervical back: Normal range of motion and neck supple.   Skin:     General: Skin is warm and dry.   Neurological:      Mental Status: She is alert and oriented to person, place, and time.      Coordination: Finger-Nose-Finger Test normal.      Gait: Gait is intact. Tandem walk normal.      Deep Tendon Reflexes:      Reflex Scores:       Tricep reflexes are 2+ on the right side and 2+ on the left side.       Bicep reflexes are 2+ on the right side and 2+ on the left side.       Brachioradialis reflexes are 2+ on the right side and 2+ on the left side.       Patellar reflexes are 2+ on the right side and 2+ on the left side.       Achilles reflexes are 2+ on the right side and 2+ on the left side.  Psychiatric:         Speech: Speech normal.         Behavior: Behavior normal.         Thought Content: Thought content normal.         " Judgment: Judgment normal.       Neurologic Exam     Mental Status   Oriented to person, place, and time.   Oriented to person.   Oriented to place.   Oriented to time.   Attention: normal. Concentration: normal.   Speech: speech is normal   Level of consciousness: alert  Knowledge: consistent with education.     Cranial Nerves     CN III, IV, VI   Pupils are equal, round, and reactive to light.    CN XI   Right sternocleidomastoid strength: normal  Left sternocleidomastoid strength: normal  Right trapezius strength: normal  Left trapezius strength: normal    Motor Exam   Muscle bulk: normal  Overall muscle tone: normal  Right arm tone: normal  Left arm tone: normal  Right arm pronator drift: absent  Left arm pronator drift: absent  Right leg tone: normal  Left leg tone: normal    Strength   Right neck flexion: 5/5  Left neck flexion: 5/5  Right neck extension: 5/5  Left neck extension: 5/5  Right deltoid: 5/5  Left deltoid: 5/5  Right biceps: 5/5  Left biceps: 5/5  Right triceps: 5/5  Left triceps: 5/5  Right wrist flexion: 5/5  Left wrist flexion: 5/5  Right wrist extension: 5/5  Left wrist extension: 5/5  Right interossei: 5/5  Left interossei: 5/5  Right abdominals: 5/5  Left abdominals: 5/5  Right iliopsoas: 5/5  Left iliopsoas: 5/5  Right quadriceps: 5/5  Left quadriceps: 5/5  Right hamstrin/5  Left hamstrin/5  Right glutei: 5/5  Left glutei: 5/5  Right anterior tibial: 5/5  Left anterior tibial: 5/5  Right posterior tibial: 5/5  Left posterior tibial: 5/5  Right peroneal: 5/5  Left peroneal: 5/5  Right gastroc: 5/5  Left gastroc: 5/5    Sensory Exam   Right arm light touch: normal  Left arm light touch: normal  Right leg light touch: normal  Left leg light touch: normal    Gait, Coordination, and Reflexes     Gait  Gait: normal    Coordination   Finger to nose coordination: normal  Tandem walking coordination: normal    Tremor   Resting tremor: absent  Intention tremor: absent  Action tremor:  absent    Reflexes   Right brachioradialis: 2+  Left brachioradialis: 2+  Right biceps: 2+  Left biceps: 2+  Right triceps: 2+  Left triceps: 2+  Right patellar: 2+  Left patellar: 2+  Right achilles: 2+  Left achilles: 2+  Right Castillo: absent  Left Castillo: absent  Right ankle clonus: absent  Left ankle clonus: absent    Provider dictation:    62 year old female with COPD, anxiety, GERD, hyperlipidemia, depression, diabetes presents for follow up of neck pain.  Recall, she fell off an attic ladder 10-23-22.  She hit the ladder on the way down and landed on her back.  She developed neck pain due to muscle strain and C7 spinous process fracture.  She has also had some difficulty moving the left arm at the shoulder.  She felt tingling, bruising sensation in the left shoulder.  She took flexeril, gabapentin, advil, naproxen, oxycodone for pain.    With time, pain in the neck has significnatly improved.  She has intermittent pain with leaning back, sitting down suddenly on a hard chair and certain movements.  Left shoulder pain and range of motion significantly improved.  She did have PT evaluation 1-9-23 and is scheduled for further treatment visits.  Overall at this time she is very pleased with her progress.    Current medications:  flexeril, advil, oxycodone, gabapentin  Medications tried:  naproxen  Physical therapy:  had eval and scheduled for further sessions  Interventional Procedures:  none     On exam, there is 5/5 strength throughout.  2+ DTR and no sensory deficits throughout.  Gait and station fluid.  Denies bowel/ bladder dysfunction.  Full range of motion of the upper and lower extremities. No pain with axial facet loading.  No SI joint pain on palpation.  No pain with lumbar flexion/ extension.  No difficulty walking. Negative hoffaman's.  No clonus.     CT head from 10-23-22:  no acute abnormalities.    CT neck from 10-23-22:  C7 spinouse process fracture.  Mild degenerative changes.    MRI 12-3-22:   Subacute, minimally displaced C7 spinous process fracture with supraspinous ligament avulsion and mild associated posterior paraspinous muscular strain.  Multilevel degenerative changes.  C2/3 disk osteophyte with right foraminal narrowing.  C3/4 facet arthropathy with left foraminal narrowing.  C4/5 disk/ osteophyte with left foraminal narrowing.  C5/6 disk/ osteophye complex with sever bilateral foraminal narrowing and some central canal stenosis with flattening of the cord.  C6/7 disk/ osteophyte complex with bilateral right greater than left foraminal narrowing.  C7/T1 disk/ osteophyte iwht right greater than left foraminal narrowing.  Changes at C5/6 are the greatest overall.    Ms. Servin has improved acute onset C7 spinous process fracture after fall 10-23-22 - healing with mostly resolved neck pain at this time.  Limited movement of the left arm may be from cervical spondylosis and foraminal narrowing see throughout the neck may also be from potential rotator cuff disorder - significatnly improved.  She will continue with PT as scheduled.  If she continues with pain at the end of PT then return to clinic for further recommendatiosn.  She is agreeable to plan and happy with current level of progress.    Visit Diagnosis:  Other closed displaced fracture of seventh cervical vertebra, initial encounter    Cervical spondylosis        Total time spent counseling greater than fifty percent of total visit time.  Counseling included discussion regarding imaging findings, diagnosis possibilities, treatment options, risks and benefits.   The patient had many questions regarding the options and long-term effects.

## 2023-01-18 ENCOUNTER — CLINICAL SUPPORT (OUTPATIENT)
Dept: REHABILITATION | Facility: HOSPITAL | Age: 63
End: 2023-01-18
Payer: COMMERCIAL

## 2023-01-18 DIAGNOSIS — G89.29 CHRONIC LEFT SHOULDER PAIN: ICD-10-CM

## 2023-01-18 DIAGNOSIS — M25.512 CHRONIC LEFT SHOULDER PAIN: ICD-10-CM

## 2023-01-18 DIAGNOSIS — M54.2 NECK PAIN: Primary | ICD-10-CM

## 2023-01-18 PROCEDURE — 97110 THERAPEUTIC EXERCISES: CPT | Mod: PO,CQ

## 2023-01-18 NOTE — PROGRESS NOTES
[facility]   Physical Therapy Treatment Note     Name: Malathi Brock [facility] Number: [id number]    Therapy Diagnosis:   Encounter Diagnoses   Name Primary?    Neck pain Yes    Chronic left shoulder pain      Physician: Roxi Larson PA-C    Visit Date: 1/18/2023       Physician Orders: PT Eval and Treat   Medical Diagnosis from Referral:   Other closed displaced fracture of seventh cervical vertebra, initial encounter   Cervical spondylosis   Cervical radiculopathy   Evaluation Date: 1/9/2023  Authorization Period Expiration: 12/06/2023  Plan of Care Expiration: 03/10/2023  Visit # / Visits authorized: 2     Time In: 4:35  Time Out: 5:30  Total Billable Time: 55 minutes     Precautions: Standard, Diabetes, and Fall       SUBJECTIVE     Pt reports: L side of neck and shoulder hurts with different movements. She has been having less tightness/pain since doing her HEP.   She was compliant with home exercise program.  Response to previous treatment: well with less stiffness.  Functional change: same    Pain: 0/10 More tightness  Location: left neck and shoulder       OBJECTIVE     Objective Measures updated at progress report unless specified.       TREATMENT     Malathi received the treatments listed below:      received therapeutic exercises to develop strength and ROM for 55  minutes including:  Seated: left shoulder table slides (fwd)10x (NP)  Standing shoulder flexion wall glides 2 x  10x  Standing shoulder abd wall glides 2 x10  Seated:scapular retractions 2 x 10  Left shoulder isometrics (NP)  Cervical rotation 3x 10 sec hold  Cervical Side bend 3x 10 sec hold  Supine L shoulder ER stretch with wand 10x 10sec hold  Supine shoulder flexion with 3# wand 10x  Standing isometric IR and ER step outs R TB 10x    received the following manual therapy techniques: Soft tissue Mobilization were applied to the: PROM w/ L shoulder flexion, abduction and IR/ER 10 mins      PATIENT  EDUCATION AND HOME EXERCISES     Home Exercises Provided and Patient Education Provided     Education provided:   - Educated pt on the importance of daily stretch to increase the benefit of therapy and positive results.      Written Home Exercises Provided: Patient instructed to cont prior HEP. Exercises were reviewed and Malathi was able to demonstrate them prior to the end of the session.  Malathi demonstrated good  understanding of the education provided. See EMR under Patient Instructions for exercises provided during therapy sessions    ASSESSMENT     Pt with L shoulder IR and flexion stiffness that did decrease with stretching/exercise and did gain more movement in all ROM. Pt able to progress to TB IR/ER with no c/o pain.     Malathi Is progressing well towards her goals.   Pt prognosis is Good.     Pt will continue to benefit from skilled outpatient physical therapy to address the deficits listed in the problem list box on initial evaluation, provide pt/family education and to maximize pt's level of independence in the home and community environment.     Pt's spiritual, cultural and educational needs considered and pt agreeable to plan of care and goals.     Anticipated barriers to physical therapy: none      Short Term GOALS:  In 4 weeks, pt. will:  - improve cervical retraction by 25% for seated neutral postures.  - Improve left shoulder flexion by 10 degrees actively for reaching purposes.  - improve left shoulder MMT 1/2 grade for ADL purposes.  - decrease outcome measure limitation to <39%     Long Term GOALS:  In 8 weeks, pt. will:  - be independent and compliant with HEP and SX management   - decrease outcome measure limitation to <35%  - demonstrate seated: cervical retraction to neutral with no painful limitations.  - demonstrate left shoulder flexion > 130 degrees actively for reaching purposes.  - demonstrate left shoulder MMT 4 or > for home management purposes.     PLAN         Sandi Valle, PTA

## 2023-01-24 ENCOUNTER — CLINICAL SUPPORT (OUTPATIENT)
Dept: REHABILITATION | Facility: HOSPITAL | Age: 63
End: 2023-01-24
Payer: COMMERCIAL

## 2023-01-24 DIAGNOSIS — M54.2 NECK PAIN: Primary | ICD-10-CM

## 2023-01-24 DIAGNOSIS — M25.512 CHRONIC LEFT SHOULDER PAIN: ICD-10-CM

## 2023-01-24 DIAGNOSIS — G89.29 CHRONIC LEFT SHOULDER PAIN: ICD-10-CM

## 2023-01-24 PROCEDURE — 97110 THERAPEUTIC EXERCISES: CPT | Mod: PO,CQ

## 2023-01-24 NOTE — PROGRESS NOTES
[facility]   Physical Therapy Treatment Note     Name: Malathi Brock [facility] Number: [id number]    Therapy Diagnosis:   Encounter Diagnoses   Name Primary?    Neck pain Yes    Chronic left shoulder pain        Physician: Roxi Larson PA-C    Visit Date: 1/24/2023       Physician Orders: PT Eval and Treat   Medical Diagnosis from Referral:   Other closed displaced fracture of seventh cervical vertebra, initial encounter   Cervical spondylosis   Cervical radiculopathy   Evaluation Date: 1/9/2023  Authorization Period Expiration: 12/31/2023  Plan of Care Expiration: 03/10/2023  Visit # / Visits authorized: 2/20     Time In: 1:00  Time Out: 1:45  Total Billable Time: 45 minutes     Precautions: Standard, Diabetes, and Fall       SUBJECTIVE     Pt reports: B UT tightness and pain. L shoulder feeling better.   She was compliant with home exercise program.  Response to previous treatment: well with less stiffness.  Functional change: same    Pain: 0/10 More tightness  Location: left neck and shoulder       OBJECTIVE     Objective Measures updated at progress report unless specified.       TREATMENT     Malathi received the treatments listed below:      received therapeutic exercises to develop strength and ROM for 45  minutes including:  Seated: left shoulder table slides (fwd)10x (NP)  Standing shoulder flexion wall glides 2 x 10(NP)  Standing shoulder abd wall glides 2 x10 (NP)  Left shoulder isometrics (NP)  Supine L shoulder ER stretch with wand 10x 10sec hold (NP)  Supine shoulder flexion with 3# wand 10x (NP)  Standing isometric IR and ER step outs R TB 10x(NP)    Seated or standing scapular retractions 2 x 10  Cervical rotation 5x 10 sec hold  Cervical Side bend 5x 10 sec hold w/ over pressure  Supine cervical retractions 10x  Thoracic stretch over chair 10x 10s  Open Book 10x  Rows R TB 10x  received the following manual therapy techniques:Neutral and side bend stretch  david      PATIENT EDUCATION AND HOME EXERCISES     Home Exercises Provided and Patient Education Provided     Education provided:   - Educated pt on the importance of daily stretch to increase the benefit of therapy and positive results.    -Also discussed importance for upright posture and to stand every hour to decrease back pain.    Written Home Exercises Provided: Patient instructed to cont prior HEP. Exercises were reviewed and Malathi was able to demonstrate them prior to the end of the session.  Malathi demonstrated good  understanding of the education provided. See EMR under Patient Instructions for exercises provided during therapy sessions    ASSESSMENT     Pt did benefit from cervical manual stretch and added more stretches that helped with decreasing tightness. Started scapular strengthening that did not increase pain. Pt stretching daily and feels shoulder symptoms are not as bad. Just upper trap.    Malathi Is progressing well towards her goals.   Pt prognosis is Good.     Pt will continue to benefit from skilled outpatient physical therapy to address the deficits listed in the problem list box on initial evaluation, provide pt/family education and to maximize pt's level of independence in the home and community environment.     Pt's spiritual, cultural and educational needs considered and pt agreeable to plan of care and goals.     Anticipated barriers to physical therapy: none      Short Term GOALS:  In 4 weeks, pt. will:  - improve cervical retraction by 25% for seated neutral postures.  - Improve left shoulder flexion by 10 degrees actively for reaching purposes.  - improve left shoulder MMT 1/2 grade for ADL purposes.  - decrease outcome measure limitation to <39%     Long Term GOALS:  In 8 weeks, pt. will:  - be independent and compliant with HEP and SX management   - decrease outcome measure limitation to <35%  - demonstrate seated: cervical retraction to neutral with no painful limitations.  -  demonstrate left shoulder flexion > 130 degrees actively for reaching purposes.  - demonstrate left shoulder MMT 4 or > for home management purposes.     PLAN         Sandi Valle, PTA

## 2023-01-30 ENCOUNTER — CLINICAL SUPPORT (OUTPATIENT)
Dept: REHABILITATION | Facility: HOSPITAL | Age: 63
End: 2023-01-30
Payer: COMMERCIAL

## 2023-01-30 DIAGNOSIS — G89.29 CHRONIC LEFT SHOULDER PAIN: ICD-10-CM

## 2023-01-30 DIAGNOSIS — M54.2 NECK PAIN: Primary | ICD-10-CM

## 2023-01-30 DIAGNOSIS — M25.512 CHRONIC LEFT SHOULDER PAIN: ICD-10-CM

## 2023-01-30 PROCEDURE — 97110 THERAPEUTIC EXERCISES: CPT | Mod: PO | Performed by: PHYSICAL THERAPIST

## 2023-01-30 NOTE — PROGRESS NOTES
[facility]   Physical Therapy Treatment Note     Name: Malathi Brock [facility] Number: [id number]    Therapy Diagnosis:   Encounter Diagnoses   Name Primary?    Neck pain Yes    Chronic left shoulder pain        Physician: Roxi Larson PA-C    Visit Date: 1/30/2023       Physician Orders: PT Eval and Treat   Medical Diagnosis from Referral:   Other closed displaced fracture of seventh cervical vertebra, initial encounter   Cervical spondylosis   Cervical radiculopathy   Evaluation Date: 1/9/2023  Authorization Period Expiration: 12/31/2023  Plan of Care Expiration: 03/10/2023  Visit # / Visits authorized: 3/20     Time In: 1310  Time Out:1400  Total Billable Time: 35 minutes     Precautions: Standard, Diabetes, and Fall       SUBJECTIVE     Pt reports: left shoulder feeling better with no new s/s.  She was compliant with home exercise program.  Response to previous treatment: well with less stiffness.  Functional change: same    Pain: 0/10 More tightness  Location: left neck and shoulder       OBJECTIVE     Objective Measures updated at progress report unless specified.       TREATMENT     Malathi received the treatments listed below:      received therapeutic exercises to develop strength and ROM for 45 minutes including: (35 minutes one on one)    Left shoulder: limited with muscle guarding. (Previous)  Flexion/abduction 130 degrees, ER 5 5degrees, painful during movement.  -left shoulder shrug noted  Left shoulder ER 4-/5, IR 4-5    Seated: left shoulder table slides (fwd)10x (NP)  Standing shoulder flexion wall glides 2 x 10(NP)  Standing shoulder abd wall glides 2 x10 (NP)  Left shoulder isometrics (NP)  S/L left shoulder ER 1# 2/10    Supine shoulder flexion with 3# wand 10x (NP)  Standing isometric IR and ER step outs R TB 10x(NP)    Seated or standing scapular retractions 2 x 10  Cervical rotation 5x 10 sec hold  Cervical Side bend 5x 10 sec hold w/ over  pressure      Supine cervical retractions 10x  Thoracic stretch over chair 10x 10s  Open Book 10x  Rows R TB 10x  received the following manual therapy techniques:Neutral and side bend stretch supin      PATIENT EDUCATION AND HOME EXERCISES     Home Exercises Provided and Patient Education Provided     Education provided:   - Educated pt on the importance of daily stretch to increase the benefit of therapy and positive results.    -Also discussed importance for upright posture and to stand every hour to decrease back pain.    Written Home Exercises Provided: Patient instructed to cont prior HEP. Exercises were reviewed and Malathi was able to demonstrate them prior to the end of the session.  Malathi demonstrated good  understanding of the education provided. See EMR under Patient Instructions for exercises provided during therapy sessions    ASSESSMENT     Pt demonstrated good tolerance with TE progression with improvement in left shoulder motion actively. Less pain and continues to work on strength/stabilization. No adverse effects.    Malathi Is progressing well towards her goals.   Pt prognosis is Good.     Pt will continue to benefit from skilled outpatient physical therapy to address the deficits listed in the problem list box on initial evaluation, provide pt/family education and to maximize pt's level of independence in the home and community environment.     Pt's spiritual, cultural and educational needs considered and pt agreeable to plan of care and goals.     Anticipated barriers to physical therapy: none      Short Term GOALS:  In 4 weeks, pt. will:  - improve cervical retraction by 25% for seated neutral postures.  - Improve left shoulder flexion by 10 degrees actively for reaching purposes.  - improve left shoulder MMT 1/2 grade for ADL purposes.  - decrease outcome measure limitation to <39%     Long Term GOALS:  In 8 weeks, pt. will:  - be independent and compliant with HEP and SX management   - decrease  outcome measure limitation to <35%  - demonstrate seated: cervical retraction to neutral with no painful limitations.  - demonstrate left shoulder flexion > 130 degrees actively for reaching purposes.  - demonstrate left shoulder MMT 4 or > for home management purposes.     PLAN         Delbert Patrick, PT

## 2023-02-03 ENCOUNTER — OFFICE VISIT (OUTPATIENT)
Dept: FAMILY MEDICINE | Facility: CLINIC | Age: 63
End: 2023-02-03
Payer: COMMERCIAL

## 2023-02-03 VITALS
BODY MASS INDEX: 28.2 KG/M2 | HEART RATE: 79 BPM | WEIGHT: 197 LBS | HEIGHT: 70 IN | DIASTOLIC BLOOD PRESSURE: 74 MMHG | RESPIRATION RATE: 14 BRPM | SYSTOLIC BLOOD PRESSURE: 138 MMHG

## 2023-02-03 DIAGNOSIS — E11.9 TYPE 2 DIABETES MELLITUS WITHOUT COMPLICATION, WITHOUT LONG-TERM CURRENT USE OF INSULIN: ICD-10-CM

## 2023-02-03 DIAGNOSIS — M50.30 DDD (DEGENERATIVE DISC DISEASE), CERVICAL: ICD-10-CM

## 2023-02-03 DIAGNOSIS — M54.50 CHRONIC BILATERAL LOW BACK PAIN WITHOUT SCIATICA: ICD-10-CM

## 2023-02-03 DIAGNOSIS — G89.29 CHRONIC BILATERAL LOW BACK PAIN WITHOUT SCIATICA: ICD-10-CM

## 2023-02-03 PROCEDURE — 1159F PR MEDICATION LIST DOCUMENTED IN MEDICAL RECORD: ICD-10-PCS | Mod: CPTII,S$GLB,, | Performed by: FAMILY MEDICINE

## 2023-02-03 PROCEDURE — 3008F PR BODY MASS INDEX (BMI) DOCUMENTED: ICD-10-PCS | Mod: CPTII,S$GLB,, | Performed by: FAMILY MEDICINE

## 2023-02-03 PROCEDURE — 1160F PR REVIEW ALL MEDS BY PRESCRIBER/CLIN PHARMACIST DOCUMENTED: ICD-10-PCS | Mod: CPTII,S$GLB,, | Performed by: FAMILY MEDICINE

## 2023-02-03 PROCEDURE — 99999 PR PBB SHADOW E&M-EST. PATIENT-LVL IV: CPT | Mod: PBBFAC,,, | Performed by: FAMILY MEDICINE

## 2023-02-03 PROCEDURE — 3078F DIAST BP <80 MM HG: CPT | Mod: CPTII,S$GLB,, | Performed by: FAMILY MEDICINE

## 2023-02-03 PROCEDURE — 99214 PR OFFICE/OUTPT VISIT, EST, LEVL IV, 30-39 MIN: ICD-10-PCS | Mod: S$GLB,,, | Performed by: FAMILY MEDICINE

## 2023-02-03 PROCEDURE — 1159F MED LIST DOCD IN RCRD: CPT | Mod: CPTII,S$GLB,, | Performed by: FAMILY MEDICINE

## 2023-02-03 PROCEDURE — 99999 PR PBB SHADOW E&M-EST. PATIENT-LVL IV: ICD-10-PCS | Mod: PBBFAC,,, | Performed by: FAMILY MEDICINE

## 2023-02-03 PROCEDURE — 3075F PR MOST RECENT SYSTOLIC BLOOD PRESS GE 130-139MM HG: ICD-10-PCS | Mod: CPTII,S$GLB,, | Performed by: FAMILY MEDICINE

## 2023-02-03 PROCEDURE — 3008F BODY MASS INDEX DOCD: CPT | Mod: CPTII,S$GLB,, | Performed by: FAMILY MEDICINE

## 2023-02-03 PROCEDURE — 3078F PR MOST RECENT DIASTOLIC BLOOD PRESSURE < 80 MM HG: ICD-10-PCS | Mod: CPTII,S$GLB,, | Performed by: FAMILY MEDICINE

## 2023-02-03 PROCEDURE — 99214 OFFICE O/P EST MOD 30 MIN: CPT | Mod: S$GLB,,, | Performed by: FAMILY MEDICINE

## 2023-02-03 PROCEDURE — 1160F RVW MEDS BY RX/DR IN RCRD: CPT | Mod: CPTII,S$GLB,, | Performed by: FAMILY MEDICINE

## 2023-02-03 PROCEDURE — 3075F SYST BP GE 130 - 139MM HG: CPT | Mod: CPTII,S$GLB,, | Performed by: FAMILY MEDICINE

## 2023-02-03 RX ORDER — FAMOTIDINE 40 MG/1
40 TABLET, FILM COATED ORAL 2 TIMES DAILY PRN
Qty: 180 TABLET | Refills: 1 | Status: SHIPPED | OUTPATIENT
Start: 2023-02-03 | End: 2024-02-27

## 2023-02-03 RX ORDER — DESLORATADINE 5 MG/1
5 TABLET ORAL DAILY
Qty: 90 TABLET | Refills: 3 | Status: SHIPPED | OUTPATIENT
Start: 2023-02-03 | End: 2024-03-15 | Stop reason: SDUPTHER

## 2023-02-03 RX ORDER — ATORVASTATIN CALCIUM 20 MG/1
20 TABLET, FILM COATED ORAL DAILY
Qty: 90 TABLET | Refills: 3 | Status: SHIPPED | OUTPATIENT
Start: 2023-02-03 | End: 2024-02-16 | Stop reason: SDUPTHER

## 2023-02-03 RX ORDER — CYCLOBENZAPRINE HCL 5 MG
5 TABLET ORAL NIGHTLY PRN
Qty: 60 TABLET | Refills: 1 | Status: SHIPPED | OUTPATIENT
Start: 2023-02-03 | End: 2024-02-16

## 2023-02-03 RX ORDER — METFORMIN HYDROCHLORIDE 500 MG/1
500 TABLET, EXTENDED RELEASE ORAL 2 TIMES DAILY WITH MEALS
Qty: 180 TABLET | Refills: 3 | Status: ON HOLD | OUTPATIENT
Start: 2023-02-03 | End: 2023-05-07 | Stop reason: HOSPADM

## 2023-02-03 NOTE — PROGRESS NOTES
Subjective:       Patient ID: Malathi Martinez is a 62 y.o. female.    Chief Complaint: Follow-up    Cough  This is a recurrent problem. The current episode started more than 1 month ago. The problem has been waxing and waning. The problem occurs every few hours. The cough is Productive of sputum. Associated symptoms include ear congestion, heartburn, nasal congestion, postnasal drip, rhinorrhea and wheezing. Pertinent negatives include no chest pain, chills, ear pain, fever, headaches, hemoptysis, myalgias, rash, sore throat, shortness of breath, sweats or weight loss. The symptoms are aggravated by stress. Risk factors for lung disease include smoking/tobacco exposure. She has tried a beta-agonist inhaler and prescription cough suppressant for the symptoms. The treatment provided moderate relief. Her past medical history is significant for bronchitis, COPD and environmental allergies. There is no history of asthma, bronchiectasis, emphysema or pneumonia.     Bronchitis is improving. Cough, chronic/stable, consistent with usual daytime cough. Nighttime wheeze. Not using the albuterol with any regularity.    Review of Systems:  Review of Systems   Constitutional:  Negative for chills, fever and weight loss.   HENT:  Positive for postnasal drip and rhinorrhea. Negative for congestion, ear pain, sinus pressure and sore throat.    Respiratory:  Positive for cough and wheezing. Negative for hemoptysis and shortness of breath.    Cardiovascular:  Negative for chest pain.   Gastrointestinal:  Positive for heartburn.   Musculoskeletal:  Positive for neck pain (some lingering pain). Negative for arthralgias (shoulder/arm rom is improved) and myalgias.   Skin:  Negative for rash.   Allergic/Immunologic: Positive for environmental allergies.   Neurological:  Negative for headaches.     Objective:     Vitals:    02/03/23 1127   BP: 138/74   BP Location: Right arm   Patient Position: Sitting   BP Method: Medium (Manual)  "  Pulse: 79   Resp: 14   Weight: 89.4 kg (196 lb 15.7 oz)   Height: 5' 10" (1.778 m)          Physical Exam  Vitals and nursing note reviewed.   Constitutional:       General: She is not in acute distress.     Appearance: Normal appearance. She is well-developed.   HENT:      Head: Normocephalic and atraumatic.   Eyes:      General: No scleral icterus.        Right eye: No discharge.         Left eye: No discharge.      Conjunctiva/sclera: Conjunctivae normal.   Cardiovascular:      Rate and Rhythm: Normal rate.   Pulmonary:      Effort: Pulmonary effort is normal. No respiratory distress.   Abdominal:      Palpations: Abdomen is soft.      Tenderness: There is no guarding.   Musculoskeletal:         General: No deformity or signs of injury.      Cervical back: Normal range of motion and neck supple.   Skin:     General: Skin is warm and dry.      Findings: No rash.   Neurological:      General: No focal deficit present.      Mental Status: She is alert and oriented to person, place, and time.   Psychiatric:         Mood and Affect: Mood normal.         Behavior: Behavior normal.         Assessment & Plan:  Type 2 diabetes mellitus without complication, without long-term current use of insulin  Comments:  well-controlled, cont regimen  Orders:  -     atorvastatin (LIPITOR) 20 MG tablet; Take 1 tablet (20 mg total) by mouth once daily.  Dispense: 90 tablet; Refill: 3  -     metFORMIN (GLUCOPHAGE-XR) 500 MG ER 24hr tablet; Take 1 tablet (500 mg total) by mouth 2 (two) times daily with meals.  Dispense: 180 tablet; Refill: 3    Type 2 diabetes mellitus without complication, without long-term current use of insulin  Comments:  stable, cont routine lab monitoring  Orders:  -     atorvastatin (LIPITOR) 20 MG tablet; Take 1 tablet (20 mg total) by mouth once daily.  Dispense: 90 tablet; Refill: 3  -     metFORMIN (GLUCOPHAGE-XR) 500 MG ER 24hr tablet; Take 1 tablet (500 mg total) by mouth 2 (two) times daily with meals.  " Dispense: 180 tablet; Refill: 3    DDD (degenerative disc disease), cervical  -     cyclobenzaprine (FLEXERIL) 5 MG tablet; Take 1 tablet (5 mg total) by mouth nightly as needed for Muscle spasms.  Dispense: 60 tablet; Refill: 1    Chronic bilateral low back pain without sciatica  -     cyclobenzaprine (FLEXERIL) 5 MG tablet; Take 1 tablet (5 mg total) by mouth nightly as needed for Muscle spasms.  Dispense: 60 tablet; Refill: 1    Other orders  -     desloratadine (CLARINEX) 5 mg tablet; Take 1 tablet (5 mg total) by mouth once daily.  Dispense: 90 tablet; Refill: 3  -     famotidine (PEPCID) 40 MG tablet; TAKE 1 TABLET (40 MG TOTAL) BY MOUTH 2 (TWO) TIMES DAILY AS NEEDED FOR HEARTBURN.  Dispense: 180 tablet; Refill: 1

## 2023-02-04 DIAGNOSIS — F33.41 RECURRENT MAJOR DEPRESSIVE DISORDER, IN PARTIAL REMISSION: ICD-10-CM

## 2023-02-04 NOTE — TELEPHONE ENCOUNTER
Care Due:                  Date            Visit Type   Department     Provider  --------------------------------------------------------------------------------                                MYCHART                              FOLLOWUP/OF  [facility] FAMILY  Last Visit: 02-      FICE VISIT   MEDICINE       Comfort Dave  Next Visit: None Scheduled  None         None Found                                                            Last  Test          Frequency    Reason                     Performed    Due Date  --------------------------------------------------------------------------------    HBA1C.......  6 months...  metFORMIN, semaglutide...  09- 03-    [facility] Embedded Care Gaps. Reference number: [id number]. 2/04/2023   4:30:49 PM CST

## 2023-02-06 ENCOUNTER — CLINICAL SUPPORT (OUTPATIENT)
Dept: REHABILITATION | Facility: HOSPITAL | Age: 63
End: 2023-02-06
Payer: COMMERCIAL

## 2023-02-06 DIAGNOSIS — M25.512 CHRONIC LEFT SHOULDER PAIN: ICD-10-CM

## 2023-02-06 DIAGNOSIS — M54.2 NECK PAIN: Primary | ICD-10-CM

## 2023-02-06 DIAGNOSIS — G89.29 CHRONIC LEFT SHOULDER PAIN: ICD-10-CM

## 2023-02-06 PROCEDURE — 97110 THERAPEUTIC EXERCISES: CPT | Mod: PO | Performed by: PHYSICAL THERAPIST

## 2023-02-06 RX ORDER — BUPROPION HYDROCHLORIDE 150 MG/1
300 TABLET ORAL DAILY
Qty: 180 TABLET | Refills: 1 | Status: SHIPPED | OUTPATIENT
Start: 2023-02-06 | End: 2023-06-30 | Stop reason: SDUPTHER

## 2023-02-06 NOTE — PROGRESS NOTES
[facility]   Physical Therapy Treatment Note     Name: Malathi Brock [facility] Number: [id number]    Therapy Diagnosis:   Encounter Diagnoses   Name Primary?    Neck pain Yes    Chronic left shoulder pain        Physician: Roxi Larson PA-C    Visit Date: 2/6/2023    Physician Orders: PT Eval and Treat   Medical Diagnosis from Referral:   Other closed displaced fracture of seventh cervical vertebra, initial encounter   Cervical spondylosis   Cervical radiculopathy   Evaluation Date: 1/9/2023  Authorization Period Expiration: 12/31/2023  Plan of Care Expiration: 03/10/2023  Visit # / Visits authorized: 4/20     Time In: 1300  Time Out:1400  Total Billable Time: 40 minutes     Precautions: Standard, Diabetes, and Fall    SUBJECTIVE     Pt reports shoulder doing better from last session. Neck stiffness due to staying over in the hospital to visit sister who is ill.   She was compliant with home exercise program.  Response to previous treatment: well with less stiffness.  Functional change: same    Pain: 0/10 More tightness  Location: left neck and shoulder       OBJECTIVE     Objective Measures updated at progress report unless specified.     TREATMENT     Malathi received the treatments listed below:      received therapeutic exercises to develop strength and ROM for 40 minutes including:    Left shoulder: limited with muscle guarding.     Flexion/abduction 145/150 degrees  -left shoulder shrug noted: decreased  Left shoulder ER 4-/5, IR 4-5    Seated: left shoulder table slides (fwd)10x (NP) HEP  Standing shoulder flexion wall glides 2 x 10(NP) HEP  Standing shoulder abd wall glides 2 x10 (NP) HEP  Left shoulder isometrics HEP  S/L left shoulder ER 2# 2/10    Standing: right shoulder flexion, lift 1# 1/10      Seated or standing scapular retractions 2 x 10  Cervical rotation 5x 10 sec hold  Cervical Side bend 5x 10 sec hold w/ over pressure      PATIENT EDUCATION AND HOME  EXERCISES     Home Exercises Provided and Patient Education Provided     Education provided:   - Educated pt on the importance of daily stretch to increase the benefit of therapy and positive results.    -Also discussed importance for upright posture and to stand every hour to decrease back pain.    Written Home Exercises Provided: Patient instructed to cont prior HEP. Exercises were reviewed and Malathi was able to demonstrate them prior to the end of the session.  Malathi demonstrated good  understanding of the education provided. See EMR under Patient Instructions for exercises provided during therapy sessions    ASSESSMENT     Pt demonstrated right shoulder flexion > 130 degrees with 1# and performed 2/10 tolerance of reps with no painful limitations.     Malathi Is progressing well towards her goals.   Pt prognosis is Good.     Pt will continue to benefit from skilled outpatient physical therapy to address the deficits listed in the problem list box on initial evaluation, provide pt/family education and to maximize pt's level of independence in the home and community environment.     Pt's spiritual, cultural and educational needs considered and pt agreeable to plan of care and goals.     Anticipated barriers to physical therapy: none      Short Term GOALS:  In 4 weeks, pt. will:  - improve cervical retraction by 25% for seated neutral postures. Met, 2/6/2023  - Improve left shoulder flexion by 10 degrees actively for reaching purposes. Met, 2/6/2023  - improve left shoulder MMT 1/2 grade for ADL purposes.  - decrease outcome measure limitation to <39%     Long Term GOALS:  In 8 weeks, pt. will:  - be independent and compliant with HEP and SX management   - decrease outcome measure limitation to <35%  - demonstrate seated: cervical retraction to neutral with no painful limitations.  - demonstrate left shoulder flexion > 130 degrees actively for reaching purposes.  - demonstrate left shoulder MMT 4 or > for home  management purposes.     PLAN         Delbert Patrick, PT

## 2023-02-13 ENCOUNTER — CLINICAL SUPPORT (OUTPATIENT)
Dept: REHABILITATION | Facility: HOSPITAL | Age: 63
End: 2023-02-13
Payer: COMMERCIAL

## 2023-02-13 DIAGNOSIS — M25.512 CHRONIC LEFT SHOULDER PAIN: ICD-10-CM

## 2023-02-13 DIAGNOSIS — G89.29 CHRONIC LEFT SHOULDER PAIN: ICD-10-CM

## 2023-02-13 DIAGNOSIS — M54.2 NECK PAIN: Primary | ICD-10-CM

## 2023-02-13 PROCEDURE — 97110 THERAPEUTIC EXERCISES: CPT | Mod: PO | Performed by: PHYSICAL THERAPIST

## 2023-02-13 NOTE — PROGRESS NOTES
"    [facility]   Physical Therapy Treatment Note     Name: Malathi Brock [facility]  Clinic Number: [id number]    Therapy Diagnosis:   Encounter Diagnoses   Name Primary?    Neck pain Yes    Chronic left shoulder pain        Physician: Roxi Larson PA-C    Visit Date: 2/13/2023    Physician Orders: PT Eval and Treat   Medical Diagnosis from Referral:   Other closed displaced fracture of seventh cervical vertebra, initial encounter   Cervical spondylosis   Cervical radiculopathy   Evaluation Date: 1/9/2023  Authorization Period Expiration: 12/31/2023  Plan of Care Expiration: 03/10/2023  Visit # / Visits authorized: 5/20     Time In: 1300  Time Out:1345  Total Billable Time: 42 minutes     Precautions: Standard, Diabetes, and Fall    SUBJECTIVE     Pt reports left shoulder doing well and neck pain due to stress with sister in the hospital.  No new s/s.  She was compliant with home exercise program.  Response to previous treatment: well with less stiffness.  Functional change: same    Pain: 0/10 More tightness  Location: left neck and shoulder       OBJECTIVE     Objective Measures updated at progress report unless specified.     TREATMENT     Malathi received the treatments listed below:      received therapeutic exercises to develop strength and ROM for 42 minutes including:    Left shoulder: limited with muscle guarding.   Seated: UT stretch 3/30"  Flexion/abduction 145/150 degrees  -left shoulder shrug noted: decreased  Left shoulder ER 4-/5, IR 4-5    Seated: left shoulder table slides (fwd)10x (NP) HEP  Standing shoulder flexion wall glides 2 x 10(NP) HEP  Standing shoulder abd wall glides 2 x10 (NP) HEP  Left shoulder isometrics HEP  S/L left shoulder ER 2# 2/10  Supine: left shoulder press ups 3# 2/10  Supine: horizontal abduction 2/10 with red band x 20  Supine: scaption plane 2/10 with red band x 20  Supine: scapular retraction with ER , RTB 2/10    Seated or standing scapular " retractions 2 x 10 (L)    Standing: ER/IR 3/10 with red band   Standing: scapular retraction 2/10 green T-band 2/10    Cervical rotation 5x 10 sec hold  Cervical Side bend 5x 10 sec hold w/ over pressure      PATIENT EDUCATION AND HOME EXERCISES     Home Exercises Provided and Patient Education Provided     Education provided:   - Educated pt on the importance of daily stretch to increase the benefit of therapy and positive results.    -Also discussed importance for upright posture and to stand every hour to decrease back pain.    Written Home Exercises Provided: Patient instructed to cont prior HEP. Exercises were reviewed and Malathi was able to demonstrate them prior to the end of the session.  Malathi demonstrated good  understanding of the education provided. See EMR under Patient Instructions for exercises provided during therapy sessions    ASSESSMENT     Pt demonstrated good tolerance with TE progression with no left shoulder painful limitations noted.     Malathi Is progressing well towards her goals.   Pt prognosis is Good.     Pt will continue to benefit from skilled outpatient physical therapy to address the deficits listed in the problem list box on initial evaluation, provide pt/family education and to maximize pt's level of independence in the home and community environment.     Pt's spiritual, cultural and educational needs considered and pt agreeable to plan of care and goals.     Anticipated barriers to physical therapy: none      Short Term GOALS:  In 4 weeks, pt. will:  - improve cervical retraction by 25% for seated neutral postures. Met, 2/6/2023  - Improve left shoulder flexion by 10 degrees actively for reaching purposes. Met, 2/6/2023  - improve left shoulder MMT 1/2 grade for ADL purposes.  - decrease outcome measure limitation to <39%     Long Term GOALS:  In 8 weeks, pt. will:  - be independent and compliant with HEP and SX management   - decrease outcome measure limitation to <35%  -  demonstrate seated: cervical retraction to neutral with no painful limitations.  - demonstrate left shoulder flexion > 130 degrees actively for reaching purposes.  - demonstrate left shoulder MMT 4 or > for home management purposes.     PLAN         Delbert Patrick, PT

## 2023-02-20 ENCOUNTER — CLINICAL SUPPORT (OUTPATIENT)
Dept: REHABILITATION | Facility: HOSPITAL | Age: 63
End: 2023-02-20
Payer: COMMERCIAL

## 2023-02-20 DIAGNOSIS — M25.512 CHRONIC LEFT SHOULDER PAIN: ICD-10-CM

## 2023-02-20 DIAGNOSIS — G89.29 CHRONIC LEFT SHOULDER PAIN: ICD-10-CM

## 2023-02-20 DIAGNOSIS — M54.2 NECK PAIN: Primary | ICD-10-CM

## 2023-02-20 PROCEDURE — 97110 THERAPEUTIC EXERCISES: CPT | Mod: PO | Performed by: PHYSICAL THERAPIST

## 2023-02-20 NOTE — PLAN OF CARE
[facility]HAB [facility] OUTPATIENT DISCHARGE SUMMARY  Physical Therapy      Name:  Malathi Martinez  Date:  2/20/2023  Medical Diagnosis from Referral:   Other closed displaced fracture of seventh cervical vertebra, initial encounter   Cervical spondylosis   Cervical radiculopathy   Evaluation Date: 1/9/2023  Authorization Period Expiration: 12/31/2023  Plan of Care Expiration: 03/10/2023  Visit # / Visits authorized: 6/20    Physical/Functional Status:  At time of discharge, patient was able to demonstrate left shoulder motion with no painful limitations.    The patient is to be discharged from our Therapy service for the following reason(s):  Patient has completed the physician's prescription, Patient is now asymptomatic, Patient has met her goals, and Patient requested discharge    Degree of Goal Achievement:  Patient has met all goals    Patient Education:  Please see patient instructions under notes tab. Patient was given instruction with demonstration with HEP.     Discharge Plan:  Home Program:  To f/u with MD as needed.    Thank you for consult.

## 2023-02-20 NOTE — PROGRESS NOTES
"    [facility]   Physical Therapy Treatment Note     Name: Malathi Brock [facility]  Clinic Number: [id number]    Therapy Diagnosis:   Encounter Diagnoses   Name Primary?    Neck pain Yes    Chronic left shoulder pain        Physician: Roxi Larson PA-C    Visit Date: 2/20/2023    Physician Orders: PT Eval and Treat   Medical Diagnosis from Referral:   Other closed displaced fracture of seventh cervical vertebra, initial encounter   Cervical spondylosis   Cervical radiculopathy   Evaluation Date: 1/9/2023  Authorization Period Expiration: 12/31/2023  Plan of Care Expiration: 03/10/2023  Visit # / Visits authorized: 6/20     Time In: 1300  Time Out:1345  Total Billable Time: 38 minutes     Precautions: Standard, Diabetes, and Fall    SUBJECTIVE     Pt reports: feeling much better and will stop therapy after last session today. No painful limitations. Patient to continue with HEP.  She was compliant with home exercise program.  Response to previous treatment: well with less stiffness.  Functional change: able to reach above head with no pain.    Pain: 0/10 More tightness  Location: left neck and shoulder       OBJECTIVE     Objective Measures updated at progress report unless specified.     TREATMENT     Malathi received the treatments listed below:      received therapeutic exercises to develop strength and ROM for 38 minutes including:    Left shoulder: limited with muscle guarding.   Seated: UT stretch 3/30"   Flexion/abduction 160 degrees  -left shoulder shrug noted: decreased  Left shoulder ER 4+/5, IR 4+/5    Seated: left shoulder table slides (fwd)10x (NP) HEP  Standing shoulder flexion wall glides 2 x 10(NP) HEP  Standing shoulder abd wall glides 2 x10 (NP) HEP  Left shoulder isometrics HEP  S/L left shoulder ER 2# 2/10  Supine: left shoulder press ups 3# 2/10  Supine: horizontal abduction 2/10 with red band x 20  Supine: scaption plane 2/10 with red band x 20  Supine: scapular " retraction with ER , RTB 2/10    Seated or standing scapular retractions 2 x 10 (L)    Standing: ER/IR 3/10 with red band   Standing: scapular retraction 2/10 green T-band 2/10    Cervical rotation 5x 10 sec hold  Cervical Side bend 5x 10 sec hold w/ over pressure      Cervical ROM: Inclinometer/Goniometer/%                            Pain/dysfunction/movement  Flexion  Protraction  Retraction 25%  025%% Can't touch sternum to chin. Non-uniform curvature   Extension 25% Non-uniform curvature. Not within 10 degrees of parallel of the horizontal line      Upper Extremity Strength  (R) UE   (L) UE     C4 Upper trap 5/5 C4 Upper trap 4+/5   C5 Deltoid(90 ABD): 5/5 C5 Deltoid (90 ABD): 4+/5   C6 Biceps/ECRB/L 5/5 C6 Biceps/ECRB/L 4/5   C7 triceps/FCR 5/5 C7 triceps/FCR 4/5   C8 Abd.pollicis rodney 5/5 C8 Abd. Pollicis rodney. 5/5   T1 First Dorsal inter 5/5 T1 First Dorsal inter 5/5       CMS Impairment/Limitation/Restriction for FOTO Neck Survey  Status Limitation G-Code CMS Severity Modifier  Intake 61% 39%  Predicted 69% 31% Goal Status+ CJ - At least 20 percent but less than 40 percent  2/20/2023 66% 34% Current Status CJ - At least 20 percent but less than 40 percent  D/C Status CJ **only report if this is discharge survey      PATIENT EDUCATION AND HOME EXERCISES     Home Exercises Provided and Patient Education Provided     Education provided:   - Educated pt on the importance of daily stretch to increase the benefit of therapy and positive results.    -Also discussed importance for upright posture and to stand every hour to decrease back pain.    Written Home Exercises Provided: Patient instructed to cont prior HEP. Exercises were reviewed and Malathi was able to demonstrate them prior to the end of the session.  Malathi demonstrated good  understanding of the education provided. See EMR under Patient Instructions for exercises provided during therapy sessions    ASSESSMENT     Pt improvement in left shoulder motion and  strength with no painful limitations. Functional goals Met.    Malathi Is progressing well towards her goals.   Pt prognosis is Good.   Pt's spiritual, cultural and educational needs considered and pt agreeable to plan of care and goals.     Anticipated barriers to physical therapy: none      Short Term GOALS:  In 4 weeks, pt. will:  - improve cervical retraction by 25% for seated neutral postures. Met, 2/6/2023  - Improve left shoulder flexion by 10 degrees actively for reaching purposes. Met, 2/6/2023  - improve left shoulder MMT 1/2 grade for ADL purposes. Met  - decrease outcome measure limitation to <39%. Met     Long Term GOALS:  In 8 weeks, pt. will:  - be independent and compliant with HEP and SX management. Met  - decrease outcome measure limitation to <35%. Met  - demonstrate seated: cervical retraction to neutral with no painful limitations. Met  - demonstrate left shoulder flexion > 130 degrees actively for reaching purposes. Met  - demonstrate left shoulder MMT 4 or > for home management purposes.Met     PLAN     D/C, HEP.    Delbert Patrick, PT

## 2023-03-20 NOTE — TELEPHONE ENCOUNTER
No new care gaps identified.  [facility] Embedded Care Gaps. Reference number: [id number]. 10/28/2022   12:31:54 AM FENGT   Is This A New Presentation, Or A Follow-Up?: Skin Lesions How Severe Is Your Skin Lesion?: moderate Have Your Skin Lesions Been Treated?: not been treated

## 2023-03-26 DIAGNOSIS — F41.9 ANXIETY: ICD-10-CM

## 2023-03-26 DIAGNOSIS — R09.81 SINUS CONGESTION: ICD-10-CM

## 2023-03-26 NOTE — TELEPHONE ENCOUNTER
No new care gaps identified.  [facility] Embedded Care Gaps. Reference number: [id number]. 3/26/2023   6:55:04 PM CDT

## 2023-03-26 NOTE — TELEPHONE ENCOUNTER
No new care gaps identified.  [facility] Embedded Care Gaps. Reference number: [id number]. 3/26/2023   3:19:44 PM CDT

## 2023-03-31 DIAGNOSIS — R09.81 SINUS CONGESTION: ICD-10-CM

## 2023-03-31 DIAGNOSIS — M79.89 LEG SWELLING: ICD-10-CM

## 2023-03-31 DIAGNOSIS — F41.9 ANXIETY: ICD-10-CM

## 2023-03-31 RX ORDER — ALPRAZOLAM 0.25 MG/1
0.25 TABLET ORAL 2 TIMES DAILY
Qty: 60 TABLET | Refills: 2 | Status: SHIPPED | OUTPATIENT
Start: 2023-03-31 | End: 2023-06-30 | Stop reason: SDUPTHER

## 2023-03-31 RX ORDER — FUROSEMIDE 20 MG/1
20 TABLET ORAL DAILY
Qty: 90 TABLET | Refills: 1 | Status: ON HOLD | OUTPATIENT
Start: 2023-03-31 | End: 2023-05-07 | Stop reason: HOSPADM

## 2023-03-31 RX ORDER — ALPRAZOLAM 0.25 MG/1
0.25 TABLET ORAL 2 TIMES DAILY
Qty: 60 TABLET | Refills: 2 | OUTPATIENT
Start: 2023-03-31

## 2023-03-31 RX ORDER — FLUTICASONE PROPIONATE 50 MCG
1 SPRAY, SUSPENSION (ML) NASAL DAILY
Qty: 48 G | Refills: 2 | Status: ON HOLD | OUTPATIENT
Start: 2023-03-31 | End: 2023-05-07 | Stop reason: HOSPADM

## 2023-03-31 RX ORDER — FLUTICASONE PROPIONATE 50 MCG
1 SPRAY, SUSPENSION (ML) NASAL DAILY
Qty: 48 G | Refills: 2 | OUTPATIENT
Start: 2023-03-31

## 2023-03-31 NOTE — TELEPHONE ENCOUNTER
No new care gaps identified.  [facility] Embedded Care Gaps. Reference number: [id number]. 3/31/2023   5:18:23 AM CDT

## 2023-03-31 NOTE — TELEPHONE ENCOUNTER
No new care gaps identified.  [facility] Embedded Care Gaps. Reference number: [id number]. 3/31/2023   5:19:52 AM FENGT

## 2023-03-31 NOTE — TELEPHONE ENCOUNTER
No new care gaps identified.  [facility] Embedded Care Gaps. Reference number: [id number]. 3/31/2023   5:19:16 AM CDT

## 2023-04-05 DIAGNOSIS — E11.9 TYPE 2 DIABETES MELLITUS WITHOUT COMPLICATION: ICD-10-CM

## 2023-04-05 DIAGNOSIS — Z12.31 OTHER SCREENING MAMMOGRAM: ICD-10-CM

## 2023-04-09 ENCOUNTER — PATIENT MESSAGE (OUTPATIENT)
Dept: FAMILY MEDICINE | Facility: CLINIC | Age: 63
End: 2023-04-09
Payer: COMMERCIAL

## 2023-04-10 ENCOUNTER — PATIENT MESSAGE (OUTPATIENT)
Dept: ADMINISTRATIVE | Facility: HOSPITAL | Age: 63
End: 2023-04-10
Payer: COMMERCIAL

## 2023-04-11 ENCOUNTER — PATIENT MESSAGE (OUTPATIENT)
Dept: ADMINISTRATIVE | Facility: HOSPITAL | Age: 63
End: 2023-04-11
Payer: COMMERCIAL

## 2023-04-11 NOTE — TELEPHONE ENCOUNTER
No PA initiated since new year. Last Approved in Nov 2022. Will attempt new PA.  If not approved, will find out more info from the insurance about preferred.

## 2023-04-15 ENCOUNTER — OFFICE VISIT (OUTPATIENT)
Dept: URGENT CARE | Facility: CLINIC | Age: 63
End: 2023-04-15
Payer: COMMERCIAL

## 2023-04-15 VITALS
BODY MASS INDEX: 28.21 KG/M2 | HEART RATE: 80 BPM | RESPIRATION RATE: 20 BRPM | TEMPERATURE: 99 F | WEIGHT: 197.06 LBS | OXYGEN SATURATION: 97 % | SYSTOLIC BLOOD PRESSURE: 141 MMHG | DIASTOLIC BLOOD PRESSURE: 85 MMHG | HEIGHT: 70 IN

## 2023-04-15 DIAGNOSIS — J20.8 ACUTE BACTERIAL BRONCHITIS: Primary | ICD-10-CM

## 2023-04-15 DIAGNOSIS — R06.02 SOB (SHORTNESS OF BREATH): ICD-10-CM

## 2023-04-15 DIAGNOSIS — B96.89 ACUTE BACTERIAL BRONCHITIS: Primary | ICD-10-CM

## 2023-04-15 DIAGNOSIS — R05.1 ACUTE COUGH: ICD-10-CM

## 2023-04-15 LAB
CTP QC/QA: YES
SARS-COV-2 AG RESP QL IA.RAPID: NEGATIVE

## 2023-04-15 PROCEDURE — 99213 PR OFFICE/OUTPT VISIT, EST, LEVL III, 20-29 MIN: ICD-10-PCS | Mod: S$GLB,,, | Performed by: NURSE PRACTITIONER

## 2023-04-15 PROCEDURE — 99213 OFFICE O/P EST LOW 20 MIN: CPT | Mod: S$GLB,,, | Performed by: NURSE PRACTITIONER

## 2023-04-15 PROCEDURE — 87811 SARS-COV-2 COVID19 W/OPTIC: CPT | Mod: QW,S$GLB,, | Performed by: NURSE PRACTITIONER

## 2023-04-15 PROCEDURE — 71046 X-RAY EXAM CHEST 2 VIEWS: CPT | Mod: S$GLB,,, | Performed by: RADIOLOGY

## 2023-04-15 PROCEDURE — 87811 SARS CORONAVIRUS 2 ANTIGEN POCT, MANUAL READ: ICD-10-PCS | Mod: QW,S$GLB,, | Performed by: NURSE PRACTITIONER

## 2023-04-15 PROCEDURE — 71046 XR CHEST PA AND LATERAL: ICD-10-PCS | Mod: S$GLB,,, | Performed by: RADIOLOGY

## 2023-04-15 RX ORDER — PROMETHAZINE HYDROCHLORIDE AND DEXTROMETHORPHAN HYDROBROMIDE 6.25; 15 MG/5ML; MG/5ML
5 SYRUP ORAL NIGHTLY PRN
Qty: 118 ML | Refills: 0 | Status: SHIPPED | OUTPATIENT
Start: 2023-04-15 | End: 2023-04-18

## 2023-04-15 RX ORDER — DOXYCYCLINE 100 MG/1
100 CAPSULE ORAL 2 TIMES DAILY
Qty: 14 CAPSULE | Refills: 0 | Status: SHIPPED | OUTPATIENT
Start: 2023-04-15 | End: 2023-04-22

## 2023-04-15 NOTE — PROGRESS NOTES
"Subjective:      Patient ID: Malathi Martinez is a 62 y.o. female.    Vitals:  height is 5' 10" (1.778 m) and weight is 89.4 kg (197 lb 1.5 oz). Her oral temperature is 98.9 °F (37.2 °C). Her blood pressure is 141/85 (abnormal) and her pulse is 80. Her respiration is 20 and oxygen saturation is 97%.     Chief Complaint: Sinus Problem    Pt present today c/o URI. Pt think its a sinus infection, she says she have so much film in the back of her throat she choke on it. Pt says she been coughing and having headaches also, pt says its been a week now, she been using nasal spray and taking tylenol.     Provider note begins below:  Past Medical History:  No date: Abnormal stress test  No date: Bronchitis  No date: COPD (chronic obstructive pulmonary disease)  No date: Diabetes mellitus, type 2  No date: HLD (hyperlipidemia)  No date: Hx of endoscopy  No date: Hypertension  No date: Mental disorder  4/9/2018: Pulmonary nodule  No date: SOB (shortness of breath)      Comment:  resolved  12/26/2012: Tobacco abuse     62-year-old female with above medical history presents to urgent care today with complaints of a productive cough, SOB, sinus congestion/headache and reports that she just does not feel well for the past week.  Patient also reports some diarrhea this morning.  Patient is negative for fever, chills, CP, nausea/vomiting or abdominal pain.  Patient is awake and alert.  Speaking in full sentences.  She is afebrile.    URI   This is a new problem. The current episode started 1 to 4 weeks ago. The problem has been unchanged. There has been no fever. Associated symptoms include congestion, coughing, headaches, neck pain, sinus pain and sneezing. Pertinent negatives include no chest pain, diarrhea, dysuria, ear pain, nausea, rash, sore throat or vomiting. Treatments tried: tylenol. The treatment provided no relief.     Constitution: Negative. Negative for chills, sweating and fatigue.   HENT:  Positive for " congestion and sinus pain. Negative for ear pain, facial swelling and sore throat.    Neck: Positive for neck pain. Negative for painful lymph nodes.   Cardiovascular: Negative.  Negative for chest trauma, chest pain and sob on exertion.   Eyes: Negative.  Negative for eye itching and eye pain.   Respiratory:  Positive for cough. Negative for chest tightness and asthma.    Gastrointestinal: Negative.  Negative for nausea, vomiting and diarrhea.   Endocrine: negative. cold intolerance and excessive thirst.   Genitourinary: Negative.  Negative for dysuria, frequency, urgency and hematuria.   Musculoskeletal:  Negative for pain, trauma and joint pain.   Skin: Negative.  Negative for rash, wound and hives.   Allergic/Immunologic: Negative.  Positive for sneezing. Negative for eczema, asthma, hives and itching.   Neurological:  Positive for headaches. Negative for disorientation and altered mental status.   Hematologic/Lymphatic: Negative.  Negative for swollen lymph nodes.   Psychiatric/Behavioral: Negative.  Negative for altered mental status, disorientation and confusion.     Objective:     Physical Exam   Constitutional: She is oriented to person, place, and time. She appears well-developed. She is cooperative.  Non-toxic appearance. She does not appear ill. No distress.   HENT:   Head: Normocephalic and atraumatic.   Ears:   Right Ear: Hearing, tympanic membrane, external ear and ear canal normal. impacted cerumen  Left Ear: Hearing, tympanic membrane, external ear and ear canal normal. impacted cerumen  Nose: Nose normal. No mucosal edema, rhinorrhea, nasal deformity or congestion. No epistaxis. Right sinus exhibits no maxillary sinus tenderness and no frontal sinus tenderness. Left sinus exhibits no maxillary sinus tenderness and no frontal sinus tenderness.   Mouth/Throat: Uvula is midline, oropharynx is clear and moist and mucous membranes are normal. No trismus in the jaw. Normal dentition. No uvula swelling.  No oropharyngeal exudate, posterior oropharyngeal edema or posterior oropharyngeal erythema.   Eyes: Conjunctivae and lids are normal. No scleral icterus.   Neck: Trachea normal and phonation normal. Neck supple. No edema present. No erythema present. No neck rigidity present.   Cardiovascular: Normal rate, regular rhythm, normal heart sounds and normal pulses.   Pulmonary/Chest: Effort normal. No stridor. No respiratory distress. She has no decreased breath sounds. She has no wheezes. She has no rhonchi. She has rales (Right lower base.). She exhibits no tenderness.   Abdominal: Normal appearance. Soft. flat abdomen There is no abdominal tenderness. There is no guarding, no left CVA tenderness and no right CVA tenderness.   Musculoskeletal: Normal range of motion.         General: No deformity. Normal range of motion.   Lymphadenopathy:     She has no cervical adenopathy.   Neurological: no focal deficit. She is alert and oriented to person, place, and time. She exhibits normal muscle tone. Coordination normal.   Skin: Skin is warm, dry, intact, not diaphoretic and not pale.   Psychiatric: Her speech is normal and behavior is normal. Mood, judgment and thought content normal.   Nursing note and vitals reviewed.  The following results have been reviewed with the patient:  LABS-  Results for orders placed or performed in visit on 04/15/23   SARS Coronavirus 2 Antigen, POCT Manual Read   Result Value Ref Range    SARS Coronavirus 2 Antigen Negative Negative     Acceptable Yes         IMAGING-  XR CHEST PA AND LATERAL    Result Date: 4/15/2023  EXAMINATION: XR CHEST PA AND LATERAL CLINICAL HISTORY: Acute cough TECHNIQUE: PA and lateral views of the chest were performed. COMPARISON: None FINDINGS: The lungs are clear, with normal appearance of pulmonary vasculature and no pleural effusion or pneumothorax. The cardiac silhouette is normal in size. The hilar and mediastinal contours are unremarkable. Bones  are intact.     No acute abnormality. Electronically signed by: Dorys Barrera MD Date:    04/15/2023 Time:    10:10      Assessment:     1. Acute bacterial bronchitis    2. Acute cough    3. SOB (shortness of breath)        Plan:     FOLLOWUP  Follow up if symptoms worsen or fail to improve, for PLEASE CONTACT PCP OR CONTACT THE EMERGENCY ROOM..     PATIENT INSTRUCTIONS  Patient Instructions   INSTRUCTIONS:  - Rest.  - Drink plenty of fluids.  - Take Tylenol and/or Ibuprofen as directed as needed for fever/pain.  Do not take more than the recommended dose.  - follow up with your PCP within the next 1-2 weeks as needed.  - You must understand that you have received an Urgent Care treatment only and that you may be released before all of your medical problems are known or treated.   - You, the patient, will arrange for follow up care as instructed.   - If your condition worsens or fails to improve we recommend that you receive another evaluation at the ER immediately or contact your PCP to discuss your concerns.   - You can call [phone] or [phone] to help schedule an appointment with the appropriate provider.     -If you smoke cigarettes, it would be beneficial for you to stop.         THANK YOU FOR ALLOWING ME TO PARTICIPATE IN YOUR HEALTHCARE,     Loco Castro NP   Acute bacterial bronchitis  -     doxycycline (VIBRAMYCIN) 100 MG Cap; Take 1 capsule (100 mg total) by mouth 2 (two) times daily. for 7 days  Dispense: 14 capsule; Refill: 0  -     promethazine-dextromethorphan (PROMETHAZINE-DM) 6.25-15 mg/5 mL Syrp; Take 5 mLs by mouth nightly as needed (cough).  Dispense: 118 mL; Refill: 0    Acute cough  -     SARS Coronavirus 2 Antigen, POCT Manual Read  -     XR CHEST PA AND LATERAL; Future; Expected date: 04/15/2023    SOB (shortness of breath)  -     XR CHEST PA AND LATERAL; Future; Expected date: 04/15/2023

## 2023-04-15 NOTE — PATIENT INSTRUCTIONS

## 2023-04-16 ENCOUNTER — PATIENT MESSAGE (OUTPATIENT)
Dept: FAMILY MEDICINE | Facility: CLINIC | Age: 63
End: 2023-04-16
Payer: COMMERCIAL

## 2023-04-18 ENCOUNTER — OFFICE VISIT (OUTPATIENT)
Dept: FAMILY MEDICINE | Facility: CLINIC | Age: 63
End: 2023-04-18
Payer: COMMERCIAL

## 2023-04-18 VITALS
HEIGHT: 70 IN | OXYGEN SATURATION: 98 % | HEART RATE: 78 BPM | WEIGHT: 196.19 LBS | BODY MASS INDEX: 28.09 KG/M2 | DIASTOLIC BLOOD PRESSURE: 78 MMHG | SYSTOLIC BLOOD PRESSURE: 134 MMHG

## 2023-04-18 DIAGNOSIS — B96.89 ACUTE BACTERIAL BRONCHITIS: ICD-10-CM

## 2023-04-18 DIAGNOSIS — Z72.0 TOBACCO ABUSE: ICD-10-CM

## 2023-04-18 DIAGNOSIS — J20.8 ACUTE BACTERIAL BRONCHITIS: ICD-10-CM

## 2023-04-18 DIAGNOSIS — J40 BRONCHITIS: Primary | ICD-10-CM

## 2023-04-18 DIAGNOSIS — J44.9 CHRONIC OBSTRUCTIVE PULMONARY DISEASE, UNSPECIFIED COPD TYPE: ICD-10-CM

## 2023-04-18 PROCEDURE — 3078F PR MOST RECENT DIASTOLIC BLOOD PRESSURE < 80 MM HG: ICD-10-PCS | Mod: CPTII,S$GLB,, | Performed by: PHYSICIAN ASSISTANT

## 2023-04-18 PROCEDURE — 99999 PR PBB SHADOW E&M-EST. PATIENT-LVL V: CPT | Mod: PBBFAC,,, | Performed by: PHYSICIAN ASSISTANT

## 2023-04-18 PROCEDURE — 99999 PR PBB SHADOW E&M-EST. PATIENT-LVL V: ICD-10-PCS | Mod: PBBFAC,,, | Performed by: PHYSICIAN ASSISTANT

## 2023-04-18 PROCEDURE — 3008F PR BODY MASS INDEX (BMI) DOCUMENTED: ICD-10-PCS | Mod: CPTII,S$GLB,, | Performed by: PHYSICIAN ASSISTANT

## 2023-04-18 PROCEDURE — 3078F DIAST BP <80 MM HG: CPT | Mod: CPTII,S$GLB,, | Performed by: PHYSICIAN ASSISTANT

## 2023-04-18 PROCEDURE — 3075F PR MOST RECENT SYSTOLIC BLOOD PRESS GE 130-139MM HG: ICD-10-PCS | Mod: CPTII,S$GLB,, | Performed by: PHYSICIAN ASSISTANT

## 2023-04-18 PROCEDURE — 3075F SYST BP GE 130 - 139MM HG: CPT | Mod: CPTII,S$GLB,, | Performed by: PHYSICIAN ASSISTANT

## 2023-04-18 PROCEDURE — 99213 OFFICE O/P EST LOW 20 MIN: CPT | Mod: S$GLB,,, | Performed by: PHYSICIAN ASSISTANT

## 2023-04-18 PROCEDURE — 99213 PR OFFICE/OUTPT VISIT, EST, LEVL III, 20-29 MIN: ICD-10-PCS | Mod: S$GLB,,, | Performed by: PHYSICIAN ASSISTANT

## 2023-04-18 PROCEDURE — 3008F BODY MASS INDEX DOCD: CPT | Mod: CPTII,S$GLB,, | Performed by: PHYSICIAN ASSISTANT

## 2023-04-18 RX ORDER — PREDNISONE 10 MG/1
TABLET ORAL
Qty: 21 TABLET | Refills: 0 | Status: ON HOLD | OUTPATIENT
Start: 2023-04-18 | End: 2023-05-07 | Stop reason: HOSPADM

## 2023-04-18 RX ORDER — FLUTICASONE PROPIONATE AND SALMETEROL 100; 50 UG/1; UG/1
1 POWDER RESPIRATORY (INHALATION) 2 TIMES DAILY
Qty: 60 EACH | Refills: 11 | Status: SHIPPED | OUTPATIENT
Start: 2023-04-18 | End: 2024-02-16

## 2023-04-18 NOTE — PROGRESS NOTES
Subjective     Patient ID: Malathi Martinez is a 62 y.o. female.    Chief Complaint: Follow-up (Coughing urgent care on Saturday still on antibotics not getting better )    HPI  Cough and congestion x 2 wks   Wheeze  No fever  Review of Systems   Constitutional:  Positive for activity change and fatigue. Negative for appetite change, chills, diaphoresis, fever and unexpected weight change.   HENT:  Positive for nasal congestion and postnasal drip. Negative for sinus pressure/congestion and sore throat.    Eyes: Negative.    Respiratory:  Positive for cough and wheezing. Negative for shortness of breath.    Gastrointestinal: Negative.    Endocrine: Negative.    Genitourinary: Negative.    Musculoskeletal: Negative.    Integumentary:  Negative for rash. Negative.   Neurological: Negative.         Objective     Physical Exam  Vitals reviewed.   Constitutional:       General: She is not in acute distress.     Appearance: Normal appearance. She is normal weight. She is not ill-appearing, toxic-appearing or diaphoretic.   HENT:      Head: Normocephalic and atraumatic.      Comments: Sinuses non tender     Right Ear: Tympanic membrane, ear canal and external ear normal. There is no impacted cerumen.      Left Ear: Tympanic membrane, ear canal and external ear normal. There is no impacted cerumen.      Nose: Congestion present.      Mouth/Throat:      Mouth: Mucous membranes are moist.      Pharynx: Oropharynx is clear. No oropharyngeal exudate or posterior oropharyngeal erythema.   Eyes:      General: No scleral icterus.     Conjunctiva/sclera: Conjunctivae normal.   Neck:      Vascular: No carotid bruit.   Cardiovascular:      Rate and Rhythm: Normal rate and regular rhythm.      Pulses: Normal pulses.      Heart sounds: Normal heart sounds. No murmur heard.    No friction rub. No gallop.   Pulmonary:      Effort: Pulmonary effort is normal. No respiratory distress.      Breath sounds: No stridor. Wheezing and  rhonchi present. No rales.   Musculoskeletal:         General: No swelling.      Cervical back: Normal range of motion and neck supple. No rigidity or tenderness.      Right lower leg: No edema.      Left lower leg: No edema.   Lymphadenopathy:      Cervical: No cervical adenopathy.   Skin:     General: Skin is warm and dry.      Findings: No rash.   Neurological:      General: No focal deficit present.      Mental Status: She is alert and oriented to person, place, and time.          Assessment and Plan     Problem List Items Addressed This Visit    None      Malathi was seen today for follow-up.    Diagnoses and all orders for this visit:    Bronchitis    Acute bacterial bronchitis  -     fluticasone-salmeterol diskus inhaler 100-50 mcg; Inhale 1 puff into the lungs 2 (two) times daily. Controller  -     predniSONE (DELTASONE) 10 MG tablet; 2 tabs in AM x 7 days then one tab in AM x 7 days    Chronic obstructive pulmonary disease, unspecified COPD type  -     fluticasone-salmeterol diskus inhaler 100-50 mcg; Inhale 1 puff into the lungs 2 (two) times daily. Controller  -     predniSONE (DELTASONE) 10 MG tablet; 2 tabs in AM x 7 days then one tab in AM x 7 days    Tobacco abuse     Discussed otc's  Hydrate  Monitor BP   Discussed smoking cessation     Continue nebs

## 2023-04-23 ENCOUNTER — PATIENT MESSAGE (OUTPATIENT)
Dept: FAMILY MEDICINE | Facility: CLINIC | Age: 63
End: 2023-04-23
Payer: COMMERCIAL

## 2023-04-28 ENCOUNTER — OFFICE VISIT (OUTPATIENT)
Dept: FAMILY MEDICINE | Facility: CLINIC | Age: 63
End: 2023-04-28
Payer: COMMERCIAL

## 2023-04-28 DIAGNOSIS — R22.1 LOCALIZED SWELLING, MASS AND LUMP, NECK: Primary | ICD-10-CM

## 2023-04-28 DIAGNOSIS — R06.02 SHORTNESS OF BREATH: ICD-10-CM

## 2023-04-28 PROCEDURE — 1159F PR MEDICATION LIST DOCUMENTED IN MEDICAL RECORD: ICD-10-PCS | Mod: CPTII,95,, | Performed by: FAMILY MEDICINE

## 2023-04-28 PROCEDURE — 99213 OFFICE O/P EST LOW 20 MIN: CPT | Mod: 95,,, | Performed by: FAMILY MEDICINE

## 2023-04-28 PROCEDURE — 1160F PR REVIEW ALL MEDS BY PRESCRIBER/CLIN PHARMACIST DOCUMENTED: ICD-10-PCS | Mod: CPTII,95,, | Performed by: FAMILY MEDICINE

## 2023-04-28 PROCEDURE — 1160F RVW MEDS BY RX/DR IN RCRD: CPT | Mod: CPTII,95,, | Performed by: FAMILY MEDICINE

## 2023-04-28 PROCEDURE — 99213 PR OFFICE/OUTPT VISIT, EST, LEVL III, 20-29 MIN: ICD-10-PCS | Mod: 95,,, | Performed by: FAMILY MEDICINE

## 2023-04-28 PROCEDURE — 1159F MED LIST DOCD IN RCRD: CPT | Mod: CPTII,95,, | Performed by: FAMILY MEDICINE

## 2023-04-28 NOTE — PROGRESS NOTES
Subjective:       Patient ID: Malathi Martinez is a 62 y.o. female.    Chief Complaint: URI      The patient location is: home, [place]  The chief complaint leading to consultation is:uri  Visit type: Virtual visit with synchronous audio and video  Total time spent with patient: 10mins incl doc  Each patient to whom he or she provides medical services by telemedicine is:  (1) informed of the relationship between the physician and patient and the respective role of any other health care provider with respect to management of the patient; and (2) notified that he or she may decline to receive medical services by telemedicine and may withdraw from such care at any time.    Notes: 3 weeks of URI sx. L neck feels swollen, not sure if it's from coughing, but she feels like the phlegm is not moving readily and either causing vomiting or diarrhea. The L neck swelling makes her feel sob. Admits to anxiety, but does feel like her airway is narrow. Feels like she can't catch her breath.  Food and water will go down, but it feels sluggish.   Swelling has remained the same despite the doxy and prednisone form her visit on 4/18. Shortness of breath has been present since onset 3 weeks ago.     Review of Systems   Constitutional:  Negative for chills and fever.   HENT:  Positive for postnasal drip and rhinorrhea. Negative for ear pain and sore throat.    Respiratory:  Positive for cough, shortness of breath and wheezing.    Cardiovascular:  Negative for chest pain.   Musculoskeletal:  Positive for myalgias.   Skin:  Negative for rash.   Allergic/Immunologic: Positive for environmental allergies.   Neurological:  Negative for headaches.     Objective:        Physical Exam  Vitals and nursing note reviewed.   Constitutional:       General: She is not in acute distress.     Appearance: Normal appearance. She is well-developed.   HENT:      Head: Normocephalic and atraumatic.   Eyes:      General: No scleral icterus.        Right  eye: No discharge.         Left eye: No discharge.      Conjunctiva/sclera: Conjunctivae normal.   Pulmonary:      Effort: Tachypnea and accessory muscle usage present. No respiratory distress.      Breath sounds: Stridor present.   Skin:     General: Skin is warm and dry.   Neurological:      General: No focal deficit present.      Mental Status: She is alert and oriented to person, place, and time.   Psychiatric:         Mood and Affect: Mood normal.         Behavior: Behavior normal.           Assessment:   Localized swelling, mass and lump, neck    Shortness of breath    Discussed with patient that given persistence of sx, particularly the shortness of breath, but when coupled with sluggish swallowing despite completing abx and steroid pack, needs ER eval for CT of neck, possible CTA.      Patient aware of limitations to assessment and exam of telemedicine. Deemed appropriate at this time given current covid-19 concerns.   Answers submitted by the patient for this visit:  Cough Questionnaire (Submitted on 4/24/2023)  Chief Complaint: Cough  Chronicity: recurrent  Onset: 1 to 4 weeks ago  Progression since onset: waxing and waning  Frequency: every few hours  Cough characteristics: non-productive  ear congestion: Yes  heartburn: No  hemoptysis: No  nasal congestion: Yes  sweats: No  Aggravated by: lying down, stress  Risk factors for lung disease: smoking/tobacco exposure  asthma: No  bronchiectasis: No  bronchitis: Yes  COPD: Yes  emphysema: No  Treatments tried: a beta-agonist inhaler, oral steroids, steroid inhaler

## 2023-04-29 ENCOUNTER — PATIENT MESSAGE (OUTPATIENT)
Dept: FAMILY MEDICINE | Facility: CLINIC | Age: 63
End: 2023-04-29
Payer: COMMERCIAL

## 2023-05-01 PROBLEM — J98.59 MEDIASTINAL MASS: Status: ACTIVE | Noted: 2023-05-01

## 2023-05-01 PROBLEM — E87.6 HYPOKALEMIA: Status: ACTIVE | Noted: 2023-05-01

## 2023-05-01 PROBLEM — J90 BILATERAL PLEURAL EFFUSION: Status: ACTIVE | Noted: 2023-05-01

## 2023-05-01 PROBLEM — Z71.89 ACP (ADVANCE CARE PLANNING): Status: ACTIVE | Noted: 2023-05-01

## 2023-05-01 PROBLEM — R91.8 LUNG MASS: Status: ACTIVE | Noted: 2023-05-01

## 2023-05-01 PROBLEM — R59.0 MEDIASTINAL ADENOPATHY: Status: ACTIVE | Noted: 2023-05-01

## 2023-05-01 PROBLEM — E87.1 HYPONATREMIA: Status: ACTIVE | Noted: 2023-05-01

## 2023-05-03 PROBLEM — C34.81: Status: ACTIVE | Noted: 2023-05-03

## 2023-05-12 ENCOUNTER — OFFICE VISIT (OUTPATIENT)
Dept: FAMILY MEDICINE | Facility: CLINIC | Age: 63
End: 2023-05-12
Payer: COMMERCIAL

## 2023-05-12 ENCOUNTER — LAB VISIT (OUTPATIENT)
Dept: LAB | Facility: HOSPITAL | Age: 63
End: 2023-05-12
Attending: FAMILY MEDICINE
Payer: COMMERCIAL

## 2023-05-12 VITALS
SYSTOLIC BLOOD PRESSURE: 134 MMHG | HEART RATE: 84 BPM | DIASTOLIC BLOOD PRESSURE: 76 MMHG | OXYGEN SATURATION: 97 % | BODY MASS INDEX: 27.41 KG/M2 | RESPIRATION RATE: 16 BRPM | WEIGHT: 191.5 LBS | HEIGHT: 70 IN

## 2023-05-12 DIAGNOSIS — F33.41 RECURRENT MAJOR DEPRESSIVE DISORDER, IN PARTIAL REMISSION: ICD-10-CM

## 2023-05-12 DIAGNOSIS — C34.90 SMALL CELL LUNG CANCER: ICD-10-CM

## 2023-05-12 DIAGNOSIS — E11.42 DIABETIC POLYNEUROPATHY ASSOCIATED WITH TYPE 2 DIABETES MELLITUS: ICD-10-CM

## 2023-05-12 DIAGNOSIS — R91.1 SOLITARY PULMONARY NODULE: Primary | Chronic | ICD-10-CM

## 2023-05-12 DIAGNOSIS — I72.2 RENAL ARTERIAL ANEURYSM: ICD-10-CM

## 2023-05-12 LAB
ALBUMIN SERPL BCP-MCNC: 3.1 G/DL (ref 3.5–5.2)
ALP SERPL-CCNC: 75 U/L (ref 55–135)
ALT SERPL W/O P-5'-P-CCNC: 15 U/L (ref 10–44)
ANION GAP SERPL CALC-SCNC: 8 MMOL/L (ref 8–16)
AST SERPL-CCNC: 15 U/L (ref 10–40)
BILIRUB SERPL-MCNC: 0.3 MG/DL (ref 0.1–1)
BUN SERPL-MCNC: 6 MG/DL (ref 8–23)
CALCIUM SERPL-MCNC: 9 MG/DL (ref 8.7–10.5)
CHLORIDE SERPL-SCNC: 103 MMOL/L (ref 95–110)
CO2 SERPL-SCNC: 24 MMOL/L (ref 23–29)
CREAT SERPL-MCNC: 0.8 MG/DL (ref 0.5–1.4)
EST. GFR  (NO RACE VARIABLE): >60 ML/MIN/1.73 M^2
GLUCOSE SERPL-MCNC: 78 MG/DL (ref 70–110)
POTASSIUM SERPL-SCNC: 3.5 MMOL/L (ref 3.5–5.1)
PROT SERPL-MCNC: 6.8 G/DL (ref 6–8.4)
SODIUM SERPL-SCNC: 135 MMOL/L (ref 136–145)

## 2023-05-12 PROCEDURE — 4010F PR ACE/ARB THEARPY RXD/TAKEN: ICD-10-PCS | Mod: CPTII,S$GLB,, | Performed by: FAMILY MEDICINE

## 2023-05-12 PROCEDURE — 3078F DIAST BP <80 MM HG: CPT | Mod: CPTII,S$GLB,, | Performed by: FAMILY MEDICINE

## 2023-05-12 PROCEDURE — 80053 COMPREHEN METABOLIC PANEL: CPT | Performed by: FAMILY MEDICINE

## 2023-05-12 PROCEDURE — 99999 PR PBB SHADOW E&M-EST. PATIENT-LVL V: CPT | Mod: PBBFAC,,, | Performed by: FAMILY MEDICINE

## 2023-05-12 PROCEDURE — 3008F BODY MASS INDEX DOCD: CPT | Mod: CPTII,S$GLB,, | Performed by: FAMILY MEDICINE

## 2023-05-12 PROCEDURE — 3044F HG A1C LEVEL LT 7.0%: CPT | Mod: CPTII,S$GLB,, | Performed by: FAMILY MEDICINE

## 2023-05-12 PROCEDURE — 4010F ACE/ARB THERAPY RXD/TAKEN: CPT | Mod: CPTII,S$GLB,, | Performed by: FAMILY MEDICINE

## 2023-05-12 PROCEDURE — 1111F DSCHRG MED/CURRENT MED MERGE: CPT | Mod: CPTII,S$GLB,, | Performed by: FAMILY MEDICINE

## 2023-05-12 PROCEDURE — 36415 COLL VENOUS BLD VENIPUNCTURE: CPT | Mod: PN | Performed by: FAMILY MEDICINE

## 2023-05-12 PROCEDURE — 99999 PR PBB SHADOW E&M-EST. PATIENT-LVL V: ICD-10-PCS | Mod: PBBFAC,,, | Performed by: FAMILY MEDICINE

## 2023-05-12 PROCEDURE — 1111F PR DISCHARGE MEDS RECONCILED W/ CURRENT OUTPATIENT MED LIST: ICD-10-PCS | Mod: CPTII,S$GLB,, | Performed by: FAMILY MEDICINE

## 2023-05-12 PROCEDURE — 99214 OFFICE O/P EST MOD 30 MIN: CPT | Mod: S$GLB,,, | Performed by: FAMILY MEDICINE

## 2023-05-12 PROCEDURE — 3075F SYST BP GE 130 - 139MM HG: CPT | Mod: CPTII,S$GLB,, | Performed by: FAMILY MEDICINE

## 2023-05-12 PROCEDURE — 3078F PR MOST RECENT DIASTOLIC BLOOD PRESSURE < 80 MM HG: ICD-10-PCS | Mod: CPTII,S$GLB,, | Performed by: FAMILY MEDICINE

## 2023-05-12 PROCEDURE — 99214 PR OFFICE/OUTPT VISIT, EST, LEVL IV, 30-39 MIN: ICD-10-PCS | Mod: S$GLB,,, | Performed by: FAMILY MEDICINE

## 2023-05-12 PROCEDURE — 3075F PR MOST RECENT SYSTOLIC BLOOD PRESS GE 130-139MM HG: ICD-10-PCS | Mod: CPTII,S$GLB,, | Performed by: FAMILY MEDICINE

## 2023-05-12 PROCEDURE — 3008F PR BODY MASS INDEX (BMI) DOCUMENTED: ICD-10-PCS | Mod: CPTII,S$GLB,, | Performed by: FAMILY MEDICINE

## 2023-05-12 PROCEDURE — 3044F PR MOST RECENT HEMOGLOBIN A1C LEVEL <7.0%: ICD-10-PCS | Mod: CPTII,S$GLB,, | Performed by: FAMILY MEDICINE

## 2023-05-12 RX ORDER — ONDANSETRON 8 MG/1
8 TABLET, ORALLY DISINTEGRATING ORAL 2 TIMES DAILY
Qty: 20 TABLET | Refills: 2 | Status: SHIPPED | OUTPATIENT
Start: 2023-05-12 | End: 2024-03-15 | Stop reason: SDUPTHER

## 2023-05-12 RX ORDER — MIRTAZAPINE 15 MG/1
TABLET, FILM COATED ORAL
Qty: 30 TABLET | Refills: 11 | Status: SHIPPED | OUTPATIENT
Start: 2023-05-12 | End: 2023-08-04

## 2023-05-12 NOTE — PROGRESS NOTES
Subjective:       Patient ID: Malathi Martinez is a 62 y.o. female.    Chief Complaint: Follow-up (Discuss recent dx with lung cancer )    Cough  This is a recurrent problem. The current episode started 1 to 4 weeks ago. The problem has been gradually improving. The problem occurs hourly. The cough is Productive of sputum. Associated symptoms include ear congestion, postnasal drip, rhinorrhea and wheezing. Pertinent negatives include no chest pain, chills, ear pain, fever, headaches, heartburn, hemoptysis, myalgias, nasal congestion, rash, sore throat, shortness of breath, sweats or weight loss. The symptoms are aggravated by stress. Risk factors for lung disease include smoking/tobacco exposure. The treatment provided significant relief. Her past medical history is significant for bronchitis, COPD and environmental allergies. There is no history of asthma, bronchiectasis or emphysema.     Patient was seen by me 2 weeks ago for acute on chronic sob/cooper and L neck swelling. Directed to the ER for further eval - findings concerning for lymphoma.   Subsequent eval/imaging reviewed. Ct chest with pulm nodule, possible mets - upcoming pulm appt. Ct abd/pelvis neg. Mri brain neg. Bone scan neg. U/s LUE neg thrombus. Pet scan with findings concerning for metastatic disease.   Appt scheduled with gen surg/skylallouet for portacath placement.   Labs at discharge reviewed and wnl.     Review of Systems:  Review of Systems   Constitutional:  Negative for chills, fever and weight loss.   HENT:  Positive for postnasal drip and rhinorrhea. Negative for ear pain and sore throat.    Respiratory:  Positive for cough and wheezing. Negative for hemoptysis and shortness of breath.    Cardiovascular:  Negative for chest pain.   Gastrointestinal:  Negative for heartburn.   Musculoskeletal:  Negative for myalgias.   Skin:  Negative for rash.   Allergic/Immunologic: Positive for environmental allergies.   Neurological:  Negative for  "headaches.     Objective:     Vitals:    05/12/23 1313 05/12/23 1343   BP: (!) 150/80 134/76   BP Location: Left arm Right arm   Patient Position: Sitting Sitting   BP Method:  Medium (Manual)   Pulse: 84    Resp: 16    SpO2: 97%    Weight: 86.8 kg (191 lb 7.5 oz)    Height: 5' 10" (1.778 m)           Physical Exam  Vitals and nursing note reviewed.   Constitutional:       General: She is not in acute distress.     Appearance: Normal appearance. She is well-developed.   HENT:      Head: Normocephalic and atraumatic.   Eyes:      General: No scleral icterus.        Right eye: No discharge.         Left eye: No discharge.      Conjunctiva/sclera: Conjunctivae normal.   Cardiovascular:      Rate and Rhythm: Normal rate and regular rhythm.   Pulmonary:      Effort: Pulmonary effort is normal. No respiratory distress.   Abdominal:      Palpations: Abdomen is soft.      Tenderness: There is no guarding.   Musculoskeletal:         General: No deformity or signs of injury.      Cervical back: Neck supple.   Skin:     General: Skin is warm and dry.      Findings: No rash.   Neurological:      General: No focal deficit present.      Mental Status: She is alert and oriented to person, place, and time.   Psychiatric:         Mood and Affect: Mood normal.         Behavior: Behavior normal.         Assessment & Plan:  Solitary pulmonary nodule  Comments:  results to onc/carinder  Orders:  -     CT Chest With Contrast; Future; Expected date: 05/12/2023  -     NM PET CT Routine Skull to Mid Thigh; Future; Expected date: 05/12/2023    Diabetic polyneuropathy associated with type 2 diabetes mellitus  Comments:  stable, cont meds/monitoring    Recurrent major depressive disorder, in partial remission  -     mirtazapine (REMERON) 15 MG tablet; Take 1/2-1 tablet nightly for rest.  Dispense: 30 tablet; Refill: 11    Renal arterial aneurysm  Comments:  incidental finding on previous imaging, cont monitoring    Small cell lung cancer  -    "  Comprehensive Metabolic Panel; Future; Expected date: 05/12/2023  -     Ambulatory referral/consult to General Surgery; Future; Expected date: 05/19/2023    Other orders  -     ondansetron (ZOFRAN-ODT) 8 MG TbDL; Take 1 tablet (8 mg total) by mouth 2 (two) times daily.  Dispense: 20 tablet; Refill: 2

## 2023-05-16 ENCOUNTER — HOSPITAL ENCOUNTER (OUTPATIENT)
Dept: RADIOLOGY | Facility: HOSPITAL | Age: 63
Discharge: HOME | End: 2023-05-16
Attending: FAMILY MEDICINE
Payer: COMMERCIAL

## 2023-05-16 DIAGNOSIS — R91.1 SOLITARY PULMONARY NODULE: ICD-10-CM

## 2023-05-16 LAB — GLUCOSE SERPL-MCNC: 89 MG/DL (ref 70–110)

## 2023-05-16 PROCEDURE — 78815 PET IMAGE W/CT SKULL-THIGH: CPT | Mod: 26,PS,, | Performed by: RADIOLOGY

## 2023-05-16 PROCEDURE — 78815 NM PET CT ROUTINE: ICD-10-PCS | Mod: 26,PS,, | Performed by: RADIOLOGY

## 2023-05-16 PROCEDURE — A9552 F18 FDG: HCPCS | Mod: PN

## 2023-05-16 NOTE — PROGRESS NOTES
PET Imaging Questionnaire    Are you a Diabetic? Recent Blood Sugar level? Yes    Are you anemic? Bone Marrow Stimulation Meds? No    Have you had a CT Scan, if so when & where was your last one? Yes -     Have you had a PET Scan, if so when & where was your last one? No    Chemotherapy or currently on Chemotherapy? Yes    Radiation therapy? No    Surgical History:   Past Surgical History:   Procedure Laterality Date    APPENDECTOMY      COLONOSCOPY N/A 1/18/2019    Procedure: COLONOSCOPY;  Surgeon: Lai Cardoso MD;  Location: [facility];  Service: Endoscopy;  Laterality: N/A;    ENDOBRONCHIAL ULTRASOUND N/A 5/3/2023    Procedure: ENDOBRONCHIAL ULTRASOUND (EBUS);  Surgeon: Nahun Nguyen MD;  Location: [facility] OR;  Service: Pulmonary;  Laterality: N/A;    ESOPHAGOGASTRODUODENOSCOPY N/A 2/19/2021    Procedure: ESOPHAGOGASTRODUODENOSCOPY (EGD);  Surgeon: Lai Cardoso MD;  Location: [facility];  Service: Endoscopy;  Laterality: N/A;    TONSILLECTOMY          Have you been fasting for at least 6 hours? Yes    Is there any chance you may be pregnant or breastfeeding? No    Assay: 13.99 MCi@:10.18   Injection Site:rt ac    Residual: 1.52 mCi@: 10.20   Technologist: Elba Stone Injected:12.47mCi

## 2023-05-18 ENCOUNTER — PATIENT MESSAGE (OUTPATIENT)
Dept: FAMILY MEDICINE | Facility: CLINIC | Age: 63
End: 2023-05-18
Payer: COMMERCIAL

## 2023-05-23 ENCOUNTER — HOSPITAL ENCOUNTER (OUTPATIENT)
Dept: RADIOLOGY | Facility: HOSPITAL | Age: 63
Discharge: HOME | End: 2023-05-23
Attending: FAMILY MEDICINE
Payer: COMMERCIAL

## 2023-05-23 DIAGNOSIS — R91.1 SOLITARY PULMONARY NODULE: ICD-10-CM

## 2023-05-23 DIAGNOSIS — C34.81 SMALL CELL CARCINOMA OF OVERLAPPING SITES OF RIGHT LUNG: Primary | ICD-10-CM

## 2023-05-23 PROCEDURE — 71260 CT THORAX DX C+: CPT | Mod: TC,PO

## 2023-05-23 PROCEDURE — 25500020 PHARM REV CODE 255: Mod: PO | Performed by: FAMILY MEDICINE

## 2023-05-23 PROCEDURE — 71260 CT THORAX DX C+: CPT | Mod: 26,,, | Performed by: RADIOLOGY

## 2023-05-23 PROCEDURE — 71260 CT CHEST WITH CONTRAST: ICD-10-PCS | Mod: 26,,, | Performed by: RADIOLOGY

## 2023-05-23 RX ADMIN — IOHEXOL 75 ML: 350 INJECTION, SOLUTION INTRAVENOUS at 12:05

## 2023-05-23 NOTE — PROGRESS NOTES
[facility] Consult Note    Referring provider: Iker Westfall, DO    Assessment:  Malathi Martinez is a 62 y.o. female with extensive stage, group stage IVB, small cell lung cancer s/p C1 5/4/23.  MRI brain without mets.   ECOG: (1) Restricted in physically strenuous activity, ambulatory and able to do work of light nature    Plan:  She is to undergo cycle 2 today, with cis/etop  Will defer regimen to medical oncology, but given extensive stage, recommend consideration for chemo+IO (atezolizumab or durvalumab)    Discussed role of radiotherapy, notably consolidation of thoracic disease as well as PCI after upfront systemic therapy. Given results of Scott et al, I prefer close surveillance with MRI but will re discuss at our next visit.   RTC 2 months or earlier PRN    Oncologic History:  5/30/22: CT lung screening LungRADS 2  4/28/23: CT neck with bulky SCV and mediastinal LN  5/1/23:   CT chest with extensive mediastinal and right hilar lymphadenopathy with vascular and tracheal encasement, narrowing of distal trachea   MRI brain with NEY  5/3/23: Bronchoscopy  Path: 4R and 4L with small cell carcinoma   5/4-5/6/23: C1 Cis/ etoposide.   5/16/23: PET/CT with bilateral SCV adenopathy, extensive bilateral mediastinal adenopathy, right > left effusions, with evidence of distant disease, such as right first rib, subpectoral node, subcutaneous node and possible muscle metastases.         Possibility of pregnancy: No  History of prior irradiation: No  History of prior systemic anti-cancer therapy: Yes - C1 cis/etop  History of collagen vascular disease: No  Implanted electronic device (pacer/defib/nerve stimulator): No     History of Present Illness:  Malathi Martinez presents today to discuss the role of radiotherapy for her newly diagnosed ES-SCLC.    She has had reduction in her cervical adenopathy and her breathing is improved after initiation of systemic therapy. She denies  worsening cough, hemoptysis. Has quit smoking. No prior Rt, AI disease, immunosuppression for transplant.       Review of Systems:  ROS as above    Social History:  Social History     Tobacco Use    Smoking status: Former     Packs/day: 0.75     Years: 41.00     Pack years: 30.75     Types: Cigarettes     Quit date: 2023     Years since quittin.0    Smokeless tobacco: Never   Substance Use Topics    Alcohol use: No    Drug use: No       Past Medical History:  Past Medical History:   Diagnosis Date    Abnormal stress test     Bronchitis     COPD (chronic obstructive pulmonary disease)     Diabetes mellitus, type 2     HLD (hyperlipidemia)     Hx of endoscopy     Hypertension     Lung cancer     Mental disorder     Pulmonary nodule 2018    SOB (shortness of breath)     resolved    Tobacco abuse 2012       Past Surgical History:   Procedure Laterality Date    APPENDECTOMY      COLONOSCOPY N/A 2019    Procedure: COLONOSCOPY;  Surgeon: Lai Cardoso MD;  Location: [facility];  Service: Endoscopy;  Laterality: N/A;    ENDOBRONCHIAL ULTRASOUND N/A 5/3/2023    Procedure: ENDOBRONCHIAL ULTRASOUND (EBUS);  Surgeon: Nahun Nguyen MD;  Location: [facility] OR;  Service: Pulmonary;  Laterality: N/A;    ESOPHAGOGASTRODUODENOSCOPY N/A 2021    Procedure: ESOPHAGOGASTRODUODENOSCOPY (EGD);  Surgeon: Lai Cardoso MD;  Location: [facility];  Service: Endoscopy;  Laterality: N/A;    INSERTION OF TUNNELED CENTRAL VENOUS CATHETER (CVC) WITH SUBCUTANEOUS PORT Right 2023    Procedure: RHBMRILNN-OEUY-I-CATH;  Surgeon: Ayah Aldana MD;  Location: [facility];  Service: General;  Laterality: Right;    TONSILLECTOMY         Cancer-related family history includes Esophageal cancer in her brother.    Medications:  Current Outpatient Medications on File Prior to Visit   Medication Sig Dispense Refill    albuterol (VENTOLIN HFA) 90 mcg/actuation inhaler Inhale 2 puffs into the lungs every 6 (six) hours  as needed for Wheezing or Shortness of Breath. Rescue 18 g 0    albuterol-ipratropium (DUO-NEB) 2.5 mg-0.5 mg/3 mL nebulizer solution Take 3 mLs by nebulization every 12 (twelve) hours. Rescue 75 mL 0    ALPRAZolam (XANAX) 0.25 MG tablet Take 1 tablet (0.25 mg total) by mouth 2 (two) times daily. 60 tablet 2    atorvastatin (LIPITOR) 20 MG tablet Take 1 tablet (20 mg total) by mouth once daily. 90 tablet 3    B-complex with vitamin C (Z-BEC OR EQUIV) tablet Take 1 tablet by mouth once daily.      buPROPion (WELLBUTRIN XL) 150 MG TB24 tablet Take 2 tablets (300 mg total) by mouth once daily. 180 tablet 1    chlorhexidine (PERIDEX) 0.12 % solution Use as directed 10 mLs in the mouth or throat 2 (two) times daily.      cholecalciferol, vitamin D3, 2,000 unit Cap Take 1 capsule by mouth once daily.      clotrimazole-betamethasone 1-0.05% (LOTRISONE) cream Apply topically to affected area 2 (two) times daily. (Patient taking differently: Apply topically as needed.) 45 g 1    cyclobenzaprine (FLEXERIL) 5 MG tablet Take 1 tablet (5 mg total) by mouth nightly as needed for Muscle spasms. 60 tablet 1    desloratadine (CLARINEX) 5 mg tablet Take 1 tablet (5 mg total) by mouth once daily. 90 tablet 3    EScitalopram oxalate (LEXAPRO) 10 MG tablet Take 1.5 tablets (15 mg total) by mouth once daily. 480 tablet 3    famotidine (PEPCID) 40 MG tablet TAKE 1 TABLET (40 MG TOTAL) BY MOUTH 2 (TWO) TIMES DAILY AS NEEDED FOR HEARTBURN. 180 tablet 1    fluticasone-salmeterol diskus inhaler 100-50 mcg Inhale 1 puff into the lungs 2 (two) times daily. Controller 60 each 11    gabapentin (NEURONTIN) 300 MG capsule Take 1 capsule (300 mg total) by mouth 3 (three) times daily. 90 capsule 11    HYDROcodone-acetaminophen (NORCO) 5-325 mg per tablet Take 1 tablet by mouth every 6 (six) hours as needed. 10 tablet 0    levothyroxine 13 mcg Cap Take 37.5 mcg by mouth before breakfast. 30 capsule 0    lisinopriL 10 MG tablet Take 1 tablet (10 mg  "total) by mouth every evening. 90 tablet 3    mirtazapine (REMERON) 15 MG tablet Take 1/2-1 tablet nightly for rest. 30 tablet 11    mupirocin (BACTROBAN) 2 % ointment Apply 1 g topically 2 (two) times daily.      nicotine (NICODERM CQ) 21 mg/24 hr Place 1 patch onto the skin once daily. 21 patch 30    ondansetron (ZOFRAN-ODT) 8 MG TbDL Take 1 tablet (8 mg total) by mouth 2 (two) times daily. 20 tablet 2    polyethylene glycol (GLYCOLAX) 17 gram PwPk Take 17 g by mouth once daily. 60 packet 0    tolvaptan (SAMSCA) 15 mg Tab Take 1 tablet (15 mg total) by mouth once daily. 30 tablet 0     Current Facility-Administered Medications on File Prior to Visit   Medication Dose Route Frequency Provider Last Rate Last Admin    [COMPLETED] iohexoL (OMNIPAQUE 350) injection 75 mL  75 mL Intravenous ONCE PRN Comfort Dave MD   75 mL at 05/23/23 1244       Allergies:  Review of patient's allergies indicates:   Allergen Reactions    Penicillins Hives       Exam:  Vitals:    05/24/23 0933   BP: 130/62   BP Location: Right arm   Patient Position: Sitting   Pulse: 78   Resp: 20   Temp: 98.2 °F (36.8 °C)   SpO2: 98%   Weight: 85.3 kg (188 lb 0.8 oz)   Height: 5' 10" (1.778 m)     Constitutional: Pleasant 62 y.o. female in no acute distress.  Well nourished. Well groomed.   HEENT: Normocephalic and atraumatic   Lymph: fullness in left SCV region  Cardiovascular: Upper extremities warm to touch  Lungs: No audible wheezing.  Normal effort.   Musculoskeletal: subcutaneous nodule overlying the upper right abdomen palpable  Skin: No rashes appreciated.  Psych: Alert and oriented with appropriate mood and affect.  Neuro:  Grossly normal.    Data Review:  Information obtained from Malathi Martinez and via chart review.     Independent Interpretation of Test(s): PET/CT from 5/16/23 was personally reviewed with Mrs. Martinez and her family        Michael Crawley MD  Radiation Oncology              "

## 2023-05-24 ENCOUNTER — TELEPHONE (OUTPATIENT)
Dept: FAMILY MEDICINE | Facility: CLINIC | Age: 63
End: 2023-05-24
Payer: COMMERCIAL

## 2023-05-24 ENCOUNTER — OFFICE VISIT (OUTPATIENT)
Dept: RADIATION ONCOLOGY | Facility: CLINIC | Age: 63
End: 2023-05-24
Payer: COMMERCIAL

## 2023-05-24 ENCOUNTER — INFUSION (OUTPATIENT)
Dept: INFUSION THERAPY | Facility: HOSPITAL | Age: 63
End: 2023-05-24
Attending: INTERNAL MEDICINE
Payer: COMMERCIAL

## 2023-05-24 ENCOUNTER — LAB VISIT (OUTPATIENT)
Dept: LAB | Facility: HOSPITAL | Age: 63
End: 2023-05-24
Attending: INTERNAL MEDICINE
Payer: COMMERCIAL

## 2023-05-24 ENCOUNTER — DOCUMENTATION ONLY (OUTPATIENT)
Dept: INFUSION THERAPY | Facility: HOSPITAL | Age: 63
End: 2023-05-24
Payer: COMMERCIAL

## 2023-05-24 VITALS
RESPIRATION RATE: 20 BRPM | HEART RATE: 78 BPM | WEIGHT: 188.06 LBS | TEMPERATURE: 98 F | DIASTOLIC BLOOD PRESSURE: 62 MMHG | BODY MASS INDEX: 26.92 KG/M2 | HEIGHT: 70 IN | OXYGEN SATURATION: 98 % | SYSTOLIC BLOOD PRESSURE: 130 MMHG

## 2023-05-24 VITALS
TEMPERATURE: 98 F | HEART RATE: 80 BPM | DIASTOLIC BLOOD PRESSURE: 71 MMHG | RESPIRATION RATE: 20 BRPM | WEIGHT: 188.06 LBS | HEIGHT: 70 IN | OXYGEN SATURATION: 98 % | BODY MASS INDEX: 26.92 KG/M2 | SYSTOLIC BLOOD PRESSURE: 161 MMHG

## 2023-05-24 DIAGNOSIS — C34.81 SMALL CELL CARCINOMA OF OVERLAPPING SITES OF RIGHT LUNG: ICD-10-CM

## 2023-05-24 DIAGNOSIS — C34.81 SMALL CELL CARCINOMA OF OVERLAPPING SITES OF RIGHT LUNG: Primary | ICD-10-CM

## 2023-05-24 LAB
ALBUMIN SERPL BCP-MCNC: 3.1 G/DL (ref 3.5–5.2)
ALP SERPL-CCNC: 103 U/L (ref 55–135)
ALT SERPL W/O P-5'-P-CCNC: 26 U/L (ref 10–44)
ANION GAP SERPL CALC-SCNC: 13 MMOL/L (ref 8–16)
AST SERPL-CCNC: 18 U/L (ref 10–40)
BASOPHILS # BLD AUTO: 0.02 K/UL (ref 0–0.2)
BASOPHILS NFR BLD: 0.4 % (ref 0–1.9)
BILIRUB SERPL-MCNC: 0.2 MG/DL (ref 0.1–1)
BUN SERPL-MCNC: 5 MG/DL (ref 8–23)
CALCIUM SERPL-MCNC: 9.1 MG/DL (ref 8.7–10.5)
CHLORIDE SERPL-SCNC: 104 MMOL/L (ref 95–110)
CO2 SERPL-SCNC: 25 MMOL/L (ref 23–29)
CREAT SERPL-MCNC: 0.8 MG/DL (ref 0.5–1.4)
DIFFERENTIAL METHOD: ABNORMAL
EOSINOPHIL # BLD AUTO: 0 K/UL (ref 0–0.5)
EOSINOPHIL NFR BLD: 0.6 % (ref 0–8)
ERYTHROCYTE [DISTWIDTH] IN BLOOD BY AUTOMATED COUNT: 14 % (ref 11.5–14.5)
EST. GFR  (NO RACE VARIABLE): >60 ML/MIN/1.73 M^2
GLUCOSE SERPL-MCNC: 96 MG/DL (ref 70–110)
HCT VFR BLD AUTO: 32.2 % (ref 37–48.5)
HGB BLD-MCNC: 10.4 G/DL (ref 12–16)
IMM GRANULOCYTES # BLD AUTO: 0.23 K/UL (ref 0–0.04)
IMM GRANULOCYTES NFR BLD AUTO: 4.6 % (ref 0–0.5)
LYMPHOCYTES # BLD AUTO: 1.9 K/UL (ref 1–4.8)
LYMPHOCYTES NFR BLD: 37.3 % (ref 18–48)
MAGNESIUM SERPL-MCNC: 1.8 MG/DL (ref 1.6–2.6)
MCH RBC QN AUTO: 26.9 PG (ref 27–31)
MCHC RBC AUTO-ENTMCNC: 32.3 G/DL (ref 32–36)
MCV RBC AUTO: 83 FL (ref 82–98)
MONOCYTES # BLD AUTO: 0.9 K/UL (ref 0.3–1)
MONOCYTES NFR BLD: 18 % (ref 4–15)
NEUTROPHILS # BLD AUTO: 2 K/UL (ref 1.8–7.7)
NEUTROPHILS NFR BLD: 39.1 % (ref 38–73)
NRBC BLD-RTO: 0 /100 WBC
PLATELET # BLD AUTO: 516 K/UL (ref 150–450)
PMV BLD AUTO: 8.4 FL (ref 9.2–12.9)
POTASSIUM SERPL-SCNC: 3.7 MMOL/L (ref 3.5–5.1)
PROT SERPL-MCNC: 6.8 G/DL (ref 6–8.4)
RBC # BLD AUTO: 3.86 M/UL (ref 4–5.4)
SODIUM SERPL-SCNC: 142 MMOL/L (ref 136–145)
WBC # BLD AUTO: 4.99 K/UL (ref 3.9–12.7)

## 2023-05-24 PROCEDURE — 3008F PR BODY MASS INDEX (BMI) DOCUMENTED: ICD-10-PCS | Mod: CPTII,S$GLB,, | Performed by: STUDENT IN AN ORGANIZED HEALTH CARE EDUCATION/TRAINING PROGRAM

## 2023-05-24 PROCEDURE — 3008F BODY MASS INDEX DOCD: CPT | Mod: CPTII,S$GLB,, | Performed by: STUDENT IN AN ORGANIZED HEALTH CARE EDUCATION/TRAINING PROGRAM

## 2023-05-24 PROCEDURE — 85025 COMPLETE CBC W/AUTO DIFF WBC: CPT | Mod: PN | Performed by: INTERNAL MEDICINE

## 2023-05-24 PROCEDURE — 3075F SYST BP GE 130 - 139MM HG: CPT | Mod: CPTII,S$GLB,, | Performed by: STUDENT IN AN ORGANIZED HEALTH CARE EDUCATION/TRAINING PROGRAM

## 2023-05-24 PROCEDURE — 3075F PR MOST RECENT SYSTOLIC BLOOD PRESS GE 130-139MM HG: ICD-10-PCS | Mod: CPTII,S$GLB,, | Performed by: STUDENT IN AN ORGANIZED HEALTH CARE EDUCATION/TRAINING PROGRAM

## 2023-05-24 PROCEDURE — 3078F DIAST BP <80 MM HG: CPT | Mod: CPTII,S$GLB,, | Performed by: STUDENT IN AN ORGANIZED HEALTH CARE EDUCATION/TRAINING PROGRAM

## 2023-05-24 PROCEDURE — 99999 PR PBB SHADOW E&M-EST. PATIENT-LVL V: ICD-10-PCS | Mod: PBBFAC,,, | Performed by: STUDENT IN AN ORGANIZED HEALTH CARE EDUCATION/TRAINING PROGRAM

## 2023-05-24 PROCEDURE — 96366 THER/PROPH/DIAG IV INF ADDON: CPT | Mod: PN

## 2023-05-24 PROCEDURE — 3044F HG A1C LEVEL LT 7.0%: CPT | Mod: CPTII,S$GLB,, | Performed by: STUDENT IN AN ORGANIZED HEALTH CARE EDUCATION/TRAINING PROGRAM

## 2023-05-24 PROCEDURE — 3044F PR MOST RECENT HEMOGLOBIN A1C LEVEL <7.0%: ICD-10-PCS | Mod: CPTII,S$GLB,, | Performed by: STUDENT IN AN ORGANIZED HEALTH CARE EDUCATION/TRAINING PROGRAM

## 2023-05-24 PROCEDURE — 36415 COLL VENOUS BLD VENIPUNCTURE: CPT | Mod: PN | Performed by: INTERNAL MEDICINE

## 2023-05-24 PROCEDURE — 96367 TX/PROPH/DG ADDL SEQ IV INF: CPT | Mod: PN

## 2023-05-24 PROCEDURE — 80053 COMPREHEN METABOLIC PANEL: CPT | Mod: PN | Performed by: INTERNAL MEDICINE

## 2023-05-24 PROCEDURE — 99204 OFFICE O/P NEW MOD 45 MIN: CPT | Mod: S$GLB,,, | Performed by: STUDENT IN AN ORGANIZED HEALTH CARE EDUCATION/TRAINING PROGRAM

## 2023-05-24 PROCEDURE — 99204 PR OFFICE/OUTPT VISIT, NEW, LEVL IV, 45-59 MIN: ICD-10-PCS | Mod: S$GLB,,, | Performed by: STUDENT IN AN ORGANIZED HEALTH CARE EDUCATION/TRAINING PROGRAM

## 2023-05-24 PROCEDURE — 4010F PR ACE/ARB THEARPY RXD/TAKEN: ICD-10-PCS | Mod: CPTII,S$GLB,, | Performed by: STUDENT IN AN ORGANIZED HEALTH CARE EDUCATION/TRAINING PROGRAM

## 2023-05-24 PROCEDURE — 63600175 PHARM REV CODE 636 W HCPCS: Mod: PN | Performed by: INTERNAL MEDICINE

## 2023-05-24 PROCEDURE — 1111F DSCHRG MED/CURRENT MED MERGE: CPT | Mod: CPTII,S$GLB,, | Performed by: STUDENT IN AN ORGANIZED HEALTH CARE EDUCATION/TRAINING PROGRAM

## 2023-05-24 PROCEDURE — 3078F PR MOST RECENT DIASTOLIC BLOOD PRESSURE < 80 MM HG: ICD-10-PCS | Mod: CPTII,S$GLB,, | Performed by: STUDENT IN AN ORGANIZED HEALTH CARE EDUCATION/TRAINING PROGRAM

## 2023-05-24 PROCEDURE — 96375 TX/PRO/DX INJ NEW DRUG ADDON: CPT | Mod: PN

## 2023-05-24 PROCEDURE — 99999 PR PBB SHADOW E&M-EST. PATIENT-LVL V: CPT | Mod: PBBFAC,,, | Performed by: STUDENT IN AN ORGANIZED HEALTH CARE EDUCATION/TRAINING PROGRAM

## 2023-05-24 PROCEDURE — 96413 CHEMO IV INFUSION 1 HR: CPT | Mod: PN

## 2023-05-24 PROCEDURE — 1111F PR DISCHARGE MEDS RECONCILED W/ CURRENT OUTPATIENT MED LIST: ICD-10-PCS | Mod: CPTII,S$GLB,, | Performed by: STUDENT IN AN ORGANIZED HEALTH CARE EDUCATION/TRAINING PROGRAM

## 2023-05-24 PROCEDURE — 83735 ASSAY OF MAGNESIUM: CPT | Mod: PN | Performed by: INTERNAL MEDICINE

## 2023-05-24 PROCEDURE — 4010F ACE/ARB THERAPY RXD/TAKEN: CPT | Mod: CPTII,S$GLB,, | Performed by: STUDENT IN AN ORGANIZED HEALTH CARE EDUCATION/TRAINING PROGRAM

## 2023-05-24 PROCEDURE — 96417 CHEMO IV INFUS EACH ADDL SEQ: CPT | Mod: PN

## 2023-05-24 PROCEDURE — 25000003 PHARM REV CODE 250: Mod: PN | Performed by: INTERNAL MEDICINE

## 2023-05-24 RX ORDER — SODIUM CHLORIDE 0.9 % (FLUSH) 0.9 %
10 SYRINGE (ML) INJECTION
Status: DISCONTINUED | OUTPATIENT
Start: 2023-05-24 | End: 2023-05-24 | Stop reason: HOSPADM

## 2023-05-24 RX ORDER — HEPARIN 100 UNIT/ML
500 SYRINGE INTRAVENOUS
Status: DISCONTINUED | OUTPATIENT
Start: 2023-05-24 | End: 2023-05-24 | Stop reason: HOSPADM

## 2023-05-24 RX ADMIN — APREPITANT 130 MG: 130 INJECTION, EMULSION INTRAVENOUS at 01:05

## 2023-05-24 RX ADMIN — POTASSIUM CHLORIDE 100 ML/HR: 2 INJECTION, SOLUTION, CONCENTRATE INTRAVENOUS at 11:05

## 2023-05-24 RX ADMIN — ETOPOSIDE 206 MG: 20 INJECTION INTRAVENOUS at 03:05

## 2023-05-24 RX ADMIN — CISPLATIN 154 MG: 1 INJECTION, SOLUTION INTRAVENOUS at 02:05

## 2023-05-24 RX ADMIN — PALONOSETRON 0.25 MG: 0.05 INJECTION, SOLUTION INTRAVENOUS at 01:05

## 2023-05-24 NOTE — PLAN OF CARE
Problem: Adult Inpatient Plan of Care  Goal: Plan of Care Review  Outcome: Ongoing, Progressing  Flowsheets (Taken 5/24/2023 1620)  Plan of Care Reviewed With:   patient   spouse   Pt tolerated cisplatin, etoposide infusion well.   No adverse reaction noted.  PAC flushed with NS and de-accessed per protocol.   Pt left clinic in no acute distress.

## 2023-05-24 NOTE — PROGRESS NOTES
1:32 PM    Oncology   Chemotherapy School Education  Malathi Yorkrough   1960    Social Service Education   This is a 62 y.o.female with a medical diagnosis of lung cancer.    Current Support System: The pt was accompanied by her , Kike at chairside. The pt reported Kike to be her main support.   Further, she stated they live near the cancer center, so transportation should not be a problem.    Met with pt for new pt education. Reviewed role of  during cancer treatment. Educated on role of SW on care team and resources available at the cancer center.     The pt reported no nutritional concerns at this time. Contact information for the dietitian was provided to the pt for future concerns or questions.    Patient provided with social contact number and advised to call with questions or make future appointment if further intervention is needed. NORMA to follow throughout tx.    Nissa Mendoza LCSW  05/24/2023  1:32 PM

## 2023-05-24 NOTE — TELEPHONE ENCOUNTER
----- Message from Comfort Dave MD sent at 5/18/2023  4:41 PM CDT -----  Can her CT scan be moved up any earlier?

## 2023-05-24 NOTE — PLAN OF CARE
Problem: Adult Inpatient Plan of Care  Goal: Patient-Specific Goal (Individualized)  Outcome: Ongoing, Progressing  Flowsheets (Taken 5/24/2023 1124)  Anxieties, Fears or Concerns: first time at cancer center  Individualized Care Needs: recliner, warm blanket, private room, ginger ale     Problem: Fatigue  Goal: Improved Activity Tolerance  Intervention: Promote Improved Energy  Flowsheets (Taken 5/24/2023 1124)  Fatigue Management:   frequent rest breaks encouraged   paced activity encouraged  Sleep/Rest Enhancement:   noise level reduced   room darkened  Activity Management:   Ambulated -L4   Ambulated to bathroom - L4   Ambulated in [place] - L4   Up in chair - L3

## 2023-05-24 NOTE — TELEPHONE ENCOUNTER
Can we resend in orders Pt appointment was scheduled incorrectly and will not allow me to unlink to schedule correctly.

## 2023-05-25 ENCOUNTER — DOCUMENTATION ONLY (OUTPATIENT)
Dept: PHARMACY | Facility: AMBULARY SURGERY CENTER | Age: 63
End: 2023-05-25
Payer: COMMERCIAL

## 2023-05-25 ENCOUNTER — INFUSION (OUTPATIENT)
Dept: INFUSION THERAPY | Facility: HOSPITAL | Age: 63
End: 2023-05-25
Attending: INTERNAL MEDICINE
Payer: COMMERCIAL

## 2023-05-25 VITALS
TEMPERATURE: 98 F | DIASTOLIC BLOOD PRESSURE: 68 MMHG | SYSTOLIC BLOOD PRESSURE: 145 MMHG | HEART RATE: 73 BPM | WEIGHT: 188.06 LBS | RESPIRATION RATE: 20 BRPM | OXYGEN SATURATION: 97 % | HEIGHT: 70 IN | BODY MASS INDEX: 26.92 KG/M2

## 2023-05-25 DIAGNOSIS — C34.81 SMALL CELL CARCINOMA OF OVERLAPPING SITES OF RIGHT LUNG: Primary | ICD-10-CM

## 2023-05-25 PROCEDURE — 96413 CHEMO IV INFUSION 1 HR: CPT | Mod: PN

## 2023-05-25 PROCEDURE — 63600175 PHARM REV CODE 636 W HCPCS: Mod: PN | Performed by: INTERNAL MEDICINE

## 2023-05-25 PROCEDURE — A4216 STERILE WATER/SALINE, 10 ML: HCPCS | Mod: PN | Performed by: INTERNAL MEDICINE

## 2023-05-25 PROCEDURE — 96361 HYDRATE IV INFUSION ADD-ON: CPT | Mod: PN

## 2023-05-25 PROCEDURE — 25000003 PHARM REV CODE 250: Mod: PN | Performed by: INTERNAL MEDICINE

## 2023-05-25 RX ORDER — SODIUM CHLORIDE 9 MG/ML
1000 INJECTION, SOLUTION INTRAVENOUS
Status: COMPLETED | OUTPATIENT
Start: 2023-05-25 | End: 2023-05-25

## 2023-05-25 RX ORDER — SODIUM CHLORIDE 0.9 % (FLUSH) 0.9 %
10 SYRINGE (ML) INJECTION
Status: DISCONTINUED | OUTPATIENT
Start: 2023-05-25 | End: 2023-05-25 | Stop reason: HOSPADM

## 2023-05-25 RX ADMIN — SODIUM CHLORIDE 1000 ML: 9 INJECTION, SOLUTION INTRAVENOUS at 01:05

## 2023-05-25 RX ADMIN — ETOPOSIDE 206 MG: 20 INJECTION INTRAVENOUS at 02:05

## 2023-05-25 RX ADMIN — Medication 10 ML: at 03:05

## 2023-05-25 NOTE — PROGRESS NOTES
Patient had received first cycle inpatient at [facility]. Didn't receive chemo school.    Pharmacist Patient Education Note     Cisplatin/etoposide chemotherapy regimen was discussed with the patient. Medication handouts for each agent were provided to the patient.     Administration instructions were discussed including   -Treatment days and cycle length     The following side effects were reviewed:   -Low blood counts  -Hair loss  -Nausea  -Hearing loss  -Kidney injury  -Skin issues(rash)  -Diarrhea  -Constipation     The patient concerns were addressed. All questions were answered.     Jose L Roth

## 2023-05-25 NOTE — PLAN OF CARE
Problem: Adult Inpatient Plan of Care  Goal: Patient-Specific Goal (Individualized)  Outcome: Ongoing, Progressing  Flowsheets (Taken 5/25/2023 1552)  Anxieties, Fears or Concerns: None  Individualized Care Needs: nelda [place]     Problem: Fatigue  Goal: Improved Activity Tolerance  Intervention: Promote Improved Energy  Flowsheets (Taken 5/25/2023 1552)  Fatigue Management:   activity schedule adjusted   paced activity encouraged   fatigue-related activity identified   frequent rest breaks encouraged  Sleep/Rest Enhancement:   regular sleep/rest pattern promoted   relaxation techniques promoted  Activity Management:   Ambulated -L4   Ambulated in [place]dez - L4     Problem: Adult Inpatient Plan of Care  Goal: Plan of Care Review  Outcome: Ongoing, Progressing  Flowsheets (Taken 5/25/2023 1552)  Plan of Care Reviewed With: patient  Tolerated treatment with no known distress.  Ambulated from infusion center with steady gait.

## 2023-05-26 ENCOUNTER — INFUSION (OUTPATIENT)
Dept: INFUSION THERAPY | Facility: HOSPITAL | Age: 63
End: 2023-05-26
Attending: INTERNAL MEDICINE
Payer: COMMERCIAL

## 2023-05-26 VITALS
BODY MASS INDEX: 26.92 KG/M2 | WEIGHT: 188.06 LBS | DIASTOLIC BLOOD PRESSURE: 75 MMHG | RESPIRATION RATE: 18 BRPM | HEART RATE: 69 BPM | SYSTOLIC BLOOD PRESSURE: 149 MMHG | HEIGHT: 70 IN

## 2023-05-26 DIAGNOSIS — C34.81 SMALL CELL CARCINOMA OF OVERLAPPING SITES OF RIGHT LUNG: Primary | ICD-10-CM

## 2023-05-26 PROCEDURE — A4216 STERILE WATER/SALINE, 10 ML: HCPCS | Mod: PN | Performed by: INTERNAL MEDICINE

## 2023-05-26 PROCEDURE — 25000003 PHARM REV CODE 250: Mod: PN | Performed by: INTERNAL MEDICINE

## 2023-05-26 PROCEDURE — 96413 CHEMO IV INFUSION 1 HR: CPT | Mod: PN

## 2023-05-26 PROCEDURE — 63600175 PHARM REV CODE 636 W HCPCS: Mod: PN | Performed by: INTERNAL MEDICINE

## 2023-05-26 RX ORDER — SODIUM CHLORIDE 0.9 % (FLUSH) 0.9 %
10 SYRINGE (ML) INJECTION
Status: DISCONTINUED | OUTPATIENT
Start: 2023-05-26 | End: 2023-05-26 | Stop reason: HOSPADM

## 2023-05-26 RX ADMIN — ETOPOSIDE 206 MG: 20 INJECTION INTRAVENOUS at 02:05

## 2023-05-26 RX ADMIN — Medication 10 ML: at 03:05

## 2023-05-26 RX ADMIN — SODIUM CHLORIDE: 9 INJECTION, SOLUTION INTRAVENOUS at 01:05

## 2023-05-26 NOTE — PLAN OF CARE
Problem: Fatigue  Goal: Improved Activity Tolerance  Intervention: Promote Improved Energy  Flowsheets (Taken 5/26/2023 1340)  Fatigue Management:   activity schedule adjusted   frequent rest breaks encouraged   paced activity encouraged   fatigue-related activity identified  Sleep/Rest Enhancement:   relaxation techniques promoted   natural light exposure provided   regular sleep/rest pattern promoted  Activity Management:   Ambulated -L4   Ambulated to bathroom - L4   Ambulated in [place] - L4   Up in stretcher chair - L1     Problem: Adult Inpatient Plan of Care  Goal: Patient-Specific Goal (Individualized)  Outcome: Ongoing, Progressing  Flowsheets (Taken 5/26/2023 1340)  Anxieties, Fears or Concerns: none  Individualized Care Needs: recliner, warm blanket, dim lights, conversation     Problem: Adult Inpatient Plan of Care  Goal: Plan of Care Review  Outcome: Ongoing, Progressing  Flowsheets (Taken 5/26/2023 1510)  Plan of Care Reviewed With: patient   Pt tolerated her Etopside infusion well, NAD. No new c/o voiced. Pt given a schedule and reviewed, pt verbalized understanding. Pt ambulated out of the clinic without difficulty accompanied by her .

## 2023-05-30 ENCOUNTER — LAB VISIT (OUTPATIENT)
Dept: LAB | Facility: HOSPITAL | Age: 63
End: 2023-05-30
Payer: COMMERCIAL

## 2023-05-30 DIAGNOSIS — R53.83 FATIGUE: ICD-10-CM

## 2023-05-30 DIAGNOSIS — C34.11 MALIGNANT NEOPLASM OF UPPER LOBE OF RIGHT LUNG: Primary | ICD-10-CM

## 2023-05-30 LAB
ALBUMIN SERPL BCP-MCNC: 3.2 G/DL (ref 3.5–5.2)
ALP SERPL-CCNC: 99 U/L (ref 55–135)
ALT SERPL W/O P-5'-P-CCNC: 17 U/L (ref 10–44)
ANION GAP SERPL CALC-SCNC: 10 MMOL/L (ref 8–16)
ANISOCYTOSIS BLD QL SMEAR: SLIGHT
AST SERPL-CCNC: 12 U/L (ref 10–40)
BASOPHILS # BLD AUTO: 0.01 K/UL (ref 0–0.2)
BASOPHILS NFR BLD: 0.2 % (ref 0–1.9)
BILIRUB SERPL-MCNC: 0.3 MG/DL (ref 0.1–1)
BUN SERPL-MCNC: 6 MG/DL (ref 8–23)
CALCIUM SERPL-MCNC: 9.5 MG/DL (ref 8.7–10.5)
CHLORIDE SERPL-SCNC: 103 MMOL/L (ref 95–110)
CO2 SERPL-SCNC: 28 MMOL/L (ref 23–29)
CREAT SERPL-MCNC: 0.8 MG/DL (ref 0.5–1.4)
DIFFERENTIAL METHOD: ABNORMAL
EOSINOPHIL # BLD AUTO: 0 K/UL (ref 0–0.5)
EOSINOPHIL NFR BLD: 0.4 % (ref 0–8)
ERYTHROCYTE [DISTWIDTH] IN BLOOD BY AUTOMATED COUNT: 14.3 % (ref 11.5–14.5)
EST. GFR  (NO RACE VARIABLE): >60 ML/MIN/1.73 M^2
GLUCOSE SERPL-MCNC: 87 MG/DL (ref 70–110)
HCT VFR BLD AUTO: 36.3 % (ref 37–48.5)
HGB BLD-MCNC: 11.4 G/DL (ref 12–16)
IMM GRANULOCYTES # BLD AUTO: 0.04 K/UL (ref 0–0.04)
IMM GRANULOCYTES NFR BLD AUTO: 0.8 % (ref 0–0.5)
LYMPHOCYTES # BLD AUTO: 1.6 K/UL (ref 1–4.8)
LYMPHOCYTES NFR BLD: 33.8 % (ref 18–48)
MAGNESIUM SERPL-MCNC: 2 MG/DL (ref 1.6–2.6)
MCH RBC QN AUTO: 26.1 PG (ref 27–31)
MCHC RBC AUTO-ENTMCNC: 31.4 G/DL (ref 32–36)
MCV RBC AUTO: 83 FL (ref 82–98)
MONOCYTES # BLD AUTO: 0.2 K/UL (ref 0.3–1)
MONOCYTES NFR BLD: 3.6 % (ref 4–15)
NEUTROPHILS # BLD AUTO: 2.9 K/UL (ref 1.8–7.7)
NEUTROPHILS NFR BLD: 61.2 % (ref 38–73)
NRBC BLD-RTO: 0 /100 WBC
PLATELET # BLD AUTO: 397 K/UL (ref 150–450)
PLATELET BLD QL SMEAR: ABNORMAL
PMV BLD AUTO: 9.8 FL (ref 9.2–12.9)
POTASSIUM SERPL-SCNC: 4 MMOL/L (ref 3.5–5.1)
PROT SERPL-MCNC: 7.2 G/DL (ref 6–8.4)
RBC # BLD AUTO: 4.36 M/UL (ref 4–5.4)
SODIUM SERPL-SCNC: 141 MMOL/L (ref 136–145)
TSH SERPL DL<=0.005 MIU/L-ACNC: 3.59 UIU/ML (ref 0.4–4)
VIT B12 SERPL-MCNC: 537 PG/ML (ref 210–950)
WBC # BLD AUTO: 4.76 K/UL (ref 3.9–12.7)

## 2023-05-30 PROCEDURE — 82607 VITAMIN B-12: CPT | Performed by: NURSE PRACTITIONER

## 2023-05-30 PROCEDURE — 84443 ASSAY THYROID STIM HORMONE: CPT | Performed by: NURSE PRACTITIONER

## 2023-05-30 PROCEDURE — 80053 COMPREHEN METABOLIC PANEL: CPT | Performed by: NURSE PRACTITIONER

## 2023-05-30 PROCEDURE — 85025 COMPLETE CBC W/AUTO DIFF WBC: CPT | Performed by: NURSE PRACTITIONER

## 2023-05-30 PROCEDURE — 36415 COLL VENOUS BLD VENIPUNCTURE: CPT | Mod: PO | Performed by: NURSE PRACTITIONER

## 2023-05-30 PROCEDURE — 83735 ASSAY OF MAGNESIUM: CPT | Performed by: NURSE PRACTITIONER

## 2023-06-05 ENCOUNTER — LAB VISIT (OUTPATIENT)
Dept: LAB | Facility: HOSPITAL | Age: 63
End: 2023-06-05
Attending: NURSE PRACTITIONER
Payer: COMMERCIAL

## 2023-06-05 ENCOUNTER — OFFICE VISIT (OUTPATIENT)
Dept: FAMILY MEDICINE | Facility: CLINIC | Age: 63
End: 2023-06-05
Payer: COMMERCIAL

## 2023-06-05 VITALS
OXYGEN SATURATION: 98 % | HEART RATE: 81 BPM | BODY MASS INDEX: 26.48 KG/M2 | WEIGHT: 184.94 LBS | SYSTOLIC BLOOD PRESSURE: 130 MMHG | HEIGHT: 70 IN | DIASTOLIC BLOOD PRESSURE: 72 MMHG

## 2023-06-05 DIAGNOSIS — F33.41 RECURRENT MAJOR DEPRESSIVE DISORDER, IN PARTIAL REMISSION: ICD-10-CM

## 2023-06-05 DIAGNOSIS — E11.9 TYPE 2 DIABETES MELLITUS WITHOUT COMPLICATION, WITHOUT LONG-TERM CURRENT USE OF INSULIN: ICD-10-CM

## 2023-06-05 DIAGNOSIS — C34.81 SMALL CELL CARCINOMA OF OVERLAPPING SITES OF RIGHT LUNG: Primary | ICD-10-CM

## 2023-06-05 LAB
ALBUMIN SERPL BCP-MCNC: 3.1 G/DL (ref 3.5–5.2)
ALP SERPL-CCNC: 101 U/L (ref 55–135)
ALT SERPL W/O P-5'-P-CCNC: 21 U/L (ref 10–44)
ANION GAP SERPL CALC-SCNC: 6 MMOL/L (ref 8–16)
AST SERPL-CCNC: 16 U/L (ref 10–40)
BASOPHILS # BLD AUTO: 0.01 K/UL (ref 0–0.2)
BASOPHILS NFR BLD: 0.3 % (ref 0–1.9)
BILIRUB SERPL-MCNC: 0.2 MG/DL (ref 0.1–1)
BUN SERPL-MCNC: 6 MG/DL (ref 8–23)
CALCIUM SERPL-MCNC: 9.5 MG/DL (ref 8.7–10.5)
CHLORIDE SERPL-SCNC: 105 MMOL/L (ref 95–110)
CO2 SERPL-SCNC: 31 MMOL/L (ref 23–29)
CREAT SERPL-MCNC: 0.8 MG/DL (ref 0.5–1.4)
DIFFERENTIAL METHOD: ABNORMAL
EOSINOPHIL # BLD AUTO: 0 K/UL (ref 0–0.5)
EOSINOPHIL NFR BLD: 0 % (ref 0–8)
ERYTHROCYTE [DISTWIDTH] IN BLOOD BY AUTOMATED COUNT: 13.9 % (ref 11.5–14.5)
EST. GFR  (NO RACE VARIABLE): >60 ML/MIN/1.73 M^2
GLUCOSE SERPL-MCNC: 93 MG/DL (ref 70–110)
HCT VFR BLD AUTO: 32.7 % (ref 37–48.5)
HGB BLD-MCNC: 10.3 G/DL (ref 12–16)
IMM GRANULOCYTES # BLD AUTO: 0 K/UL (ref 0–0.04)
IMM GRANULOCYTES NFR BLD AUTO: 0 % (ref 0–0.5)
LYMPHOCYTES # BLD AUTO: 1.7 K/UL (ref 1–4.8)
LYMPHOCYTES NFR BLD: 51 % (ref 18–48)
MAGNESIUM SERPL-MCNC: 2 MG/DL (ref 1.6–2.6)
MCH RBC QN AUTO: 26.1 PG (ref 27–31)
MCHC RBC AUTO-ENTMCNC: 31.5 G/DL (ref 32–36)
MCV RBC AUTO: 83 FL (ref 82–98)
MONOCYTES # BLD AUTO: 0.4 K/UL (ref 0.3–1)
MONOCYTES NFR BLD: 12.2 % (ref 4–15)
NEUTROPHILS # BLD AUTO: 1.2 K/UL (ref 1.8–7.7)
NEUTROPHILS NFR BLD: 36.5 % (ref 38–73)
NRBC BLD-RTO: 0 /100 WBC
PLATELET # BLD AUTO: 193 K/UL (ref 150–450)
PMV BLD AUTO: 10.1 FL (ref 9.2–12.9)
POTASSIUM SERPL-SCNC: 4 MMOL/L (ref 3.5–5.1)
PROT SERPL-MCNC: 6.9 G/DL (ref 6–8.4)
RBC # BLD AUTO: 3.94 M/UL (ref 4–5.4)
SODIUM SERPL-SCNC: 142 MMOL/L (ref 136–145)
WBC # BLD AUTO: 3.35 K/UL (ref 3.9–12.7)

## 2023-06-05 PROCEDURE — 80053 COMPREHEN METABOLIC PANEL: CPT | Performed by: NURSE PRACTITIONER

## 2023-06-05 PROCEDURE — 85025 COMPLETE CBC W/AUTO DIFF WBC: CPT | Performed by: NURSE PRACTITIONER

## 2023-06-05 PROCEDURE — 3044F HG A1C LEVEL LT 7.0%: CPT | Mod: CPTII,S$GLB,, | Performed by: FAMILY MEDICINE

## 2023-06-05 PROCEDURE — 99999 PR PBB SHADOW E&M-EST. PATIENT-LVL IV: ICD-10-PCS | Mod: PBBFAC,,, | Performed by: FAMILY MEDICINE

## 2023-06-05 PROCEDURE — 3075F PR MOST RECENT SYSTOLIC BLOOD PRESS GE 130-139MM HG: ICD-10-PCS | Mod: CPTII,S$GLB,, | Performed by: FAMILY MEDICINE

## 2023-06-05 PROCEDURE — 3044F PR MOST RECENT HEMOGLOBIN A1C LEVEL <7.0%: ICD-10-PCS | Mod: CPTII,S$GLB,, | Performed by: FAMILY MEDICINE

## 2023-06-05 PROCEDURE — 3075F SYST BP GE 130 - 139MM HG: CPT | Mod: CPTII,S$GLB,, | Performed by: FAMILY MEDICINE

## 2023-06-05 PROCEDURE — 83735 ASSAY OF MAGNESIUM: CPT | Performed by: NURSE PRACTITIONER

## 2023-06-05 PROCEDURE — 4010F ACE/ARB THERAPY RXD/TAKEN: CPT | Mod: CPTII,S$GLB,, | Performed by: FAMILY MEDICINE

## 2023-06-05 PROCEDURE — 99999 PR PBB SHADOW E&M-EST. PATIENT-LVL IV: CPT | Mod: PBBFAC,,, | Performed by: FAMILY MEDICINE

## 2023-06-05 PROCEDURE — 3078F DIAST BP <80 MM HG: CPT | Mod: CPTII,S$GLB,, | Performed by: FAMILY MEDICINE

## 2023-06-05 PROCEDURE — 3008F PR BODY MASS INDEX (BMI) DOCUMENTED: ICD-10-PCS | Mod: CPTII,S$GLB,, | Performed by: FAMILY MEDICINE

## 2023-06-05 PROCEDURE — 4010F PR ACE/ARB THEARPY RXD/TAKEN: ICD-10-PCS | Mod: CPTII,S$GLB,, | Performed by: FAMILY MEDICINE

## 2023-06-05 PROCEDURE — 1111F DSCHRG MED/CURRENT MED MERGE: CPT | Mod: CPTII,S$GLB,, | Performed by: FAMILY MEDICINE

## 2023-06-05 PROCEDURE — 3078F PR MOST RECENT DIASTOLIC BLOOD PRESSURE < 80 MM HG: ICD-10-PCS | Mod: CPTII,S$GLB,, | Performed by: FAMILY MEDICINE

## 2023-06-05 PROCEDURE — 99214 OFFICE O/P EST MOD 30 MIN: CPT | Mod: S$GLB,,, | Performed by: FAMILY MEDICINE

## 2023-06-05 PROCEDURE — 36415 COLL VENOUS BLD VENIPUNCTURE: CPT | Mod: PN | Performed by: NURSE PRACTITIONER

## 2023-06-05 PROCEDURE — 99214 PR OFFICE/OUTPT VISIT, EST, LEVL IV, 30-39 MIN: ICD-10-PCS | Mod: S$GLB,,, | Performed by: FAMILY MEDICINE

## 2023-06-05 PROCEDURE — 3008F BODY MASS INDEX DOCD: CPT | Mod: CPTII,S$GLB,, | Performed by: FAMILY MEDICINE

## 2023-06-05 PROCEDURE — 1111F PR DISCHARGE MEDS RECONCILED W/ CURRENT OUTPATIENT MED LIST: ICD-10-PCS | Mod: CPTII,S$GLB,, | Performed by: FAMILY MEDICINE

## 2023-06-05 RX ORDER — ESCITALOPRAM OXALATE 10 MG/1
15 TABLET ORAL DAILY
Qty: 135 TABLET | Refills: 3 | Status: SHIPPED | OUTPATIENT
Start: 2023-06-05 | End: 2023-06-08 | Stop reason: SDUPTHER

## 2023-06-05 NOTE — PROGRESS NOTES
"  Subjective:       Patient ID: Malathi Martinez is a 62 y.o. female.    Chief Complaint: Follow-up (Follow up on Lung Cancer )    Follow-up  Associated symptoms include weakness. Pertinent negatives include no arthralgias, chest pain, headaches, joint swelling, neck pain or vomiting.     Recall, dx of stage 4b small cell lung cancer. Imaging noted for bruce adenopathy, bruce effusions and indications of distant disease incl (R 1st rib, subpec node, subq nodule and possible muscle).   Now s/p port. Has started cis/etoph and f/u with med and rad onc. Currently waiting to start immotherapy as well.   Sore in the sight of her port placement, but healing well.   Working from home and appreciates the distraction during the day.   Sleeping ok with mirtazapine.     Review of Systems:  Review of Systems   Constitutional:  Positive for activity change. Negative for unexpected weight change.   HENT:  Positive for hearing loss and rhinorrhea. Negative for trouble swallowing.    Eyes:  Positive for visual disturbance. Negative for discharge.   Respiratory:  Negative for chest tightness and wheezing.    Cardiovascular:  Positive for palpitations (xanax prn). Negative for chest pain.   Gastrointestinal:  Negative for blood in stool, constipation, diarrhea and vomiting.   Endocrine: Negative for polydipsia and polyuria.   Genitourinary:  Negative for difficulty urinating, dysuria, hematuria and menstrual problem.   Musculoskeletal:  Negative for arthralgias, joint swelling and neck pain.   Neurological:  Positive for weakness. Negative for headaches.   Psychiatric/Behavioral:  Positive for dysphoric mood. Negative for confusion.      Objective:     Vitals:    06/05/23 0836   BP: 130/72   Pulse: 81   SpO2: 98%   Weight: 83.9 kg (184 lb 15.5 oz)   Height: 5' 10" (1.778 m)          Physical Exam  Vitals and nursing note reviewed.   Constitutional:       General: She is not in acute distress.     Appearance: Normal appearance. She " is well-developed.   HENT:      Head: Normocephalic and atraumatic.   Eyes:      General: No scleral icterus.        Right eye: No discharge.         Left eye: No discharge.      Conjunctiva/sclera: Conjunctivae normal.   Cardiovascular:      Rate and Rhythm: Normal rate.   Pulmonary:      Effort: Pulmonary effort is normal. No respiratory distress.   Abdominal:      Palpations: Abdomen is soft.      Tenderness: There is no guarding.   Musculoskeletal:         General: No deformity or signs of injury.      Cervical back: Neck supple.   Lymphadenopathy:      Cervical: No cervical adenopathy.   Skin:     General: Skin is warm and dry.      Findings: No rash.   Neurological:      General: No focal deficit present.      Mental Status: She is alert and oriented to person, place, and time.   Psychiatric:         Mood and Affect: Mood normal.         Behavior: Behavior normal.         Assessment & Plan:  Small cell carcinoma of overlapping sites of right lung  Comments:  regimen per oncology, tolerating well thus far with no sig nausea  reviewed diet/hydration/rest    Recurrent major depressive disorder, in partial remission  Comments:  more depression sx of late, psychology referral updated, on wellbutrin 150, lexapro 15  Orders:  -     Discontinue: EScitalopram oxalate (LEXAPRO) 10 MG tablet; Take 1.5 tablets (15 mg total) by mouth once daily.  Dispense: 135 tablet; Refill: 3    Type 2 diabetes mellitus without complication, without long-term current use of insulin  Comments:  stable, no hypoglycemia  will discuss BS monitoring, possible jorge if hypoglycemia becomes an issue

## 2023-06-07 ENCOUNTER — PATIENT MESSAGE (OUTPATIENT)
Dept: FAMILY MEDICINE | Facility: CLINIC | Age: 63
End: 2023-06-07
Payer: COMMERCIAL

## 2023-06-07 DIAGNOSIS — F33.41 RECURRENT MAJOR DEPRESSIVE DISORDER, IN PARTIAL REMISSION: ICD-10-CM

## 2023-06-08 ENCOUNTER — PATIENT MESSAGE (OUTPATIENT)
Dept: FAMILY MEDICINE | Facility: CLINIC | Age: 63
End: 2023-06-08
Payer: COMMERCIAL

## 2023-06-08 DIAGNOSIS — M50.30 DDD (DEGENERATIVE DISC DISEASE), CERVICAL: ICD-10-CM

## 2023-06-08 DIAGNOSIS — M54.50 CHRONIC BILATERAL LOW BACK PAIN WITHOUT SCIATICA: ICD-10-CM

## 2023-06-08 DIAGNOSIS — G89.29 CHRONIC BILATERAL LOW BACK PAIN WITHOUT SCIATICA: ICD-10-CM

## 2023-06-08 DIAGNOSIS — F33.41 RECURRENT MAJOR DEPRESSIVE DISORDER, IN PARTIAL REMISSION: ICD-10-CM

## 2023-06-08 RX ORDER — ESCITALOPRAM OXALATE 10 MG/1
15 TABLET ORAL DAILY
Qty: 135 TABLET | Refills: 3 | Status: CANCELLED | OUTPATIENT
Start: 2023-06-08

## 2023-06-08 NOTE — TELEPHONE ENCOUNTER
No care due was identified.  Health [facility] Embedded Care Due Messages. Reference number: [id number].   6/08/2023 11:32:51 AM CDT

## 2023-06-09 ENCOUNTER — TELEPHONE (OUTPATIENT)
Dept: FAMILY MEDICINE | Facility: CLINIC | Age: 63
End: 2023-06-09
Payer: COMMERCIAL

## 2023-06-09 RX ORDER — ESCITALOPRAM OXALATE 10 MG/1
20 TABLET ORAL DAILY
Qty: 180 TABLET | Refills: 3 | Status: SHIPPED | OUTPATIENT
Start: 2023-06-09 | End: 2023-06-13 | Stop reason: SDUPTHER

## 2023-06-09 NOTE — TELEPHONE ENCOUNTER
Spoke with pt. Pt insurance only covers for #90 tablets. Pended med with adjustment of quantity of med. Please review and sign. Pt out of med

## 2023-06-09 NOTE — TELEPHONE ENCOUNTER
----- Message from Jennasindi Guillermo sent at 6/9/2023 10:42 AM CDT -----  Regarding: pt call back  Name of Who is Calling: COLETTE MIN [[id number]]        What is the request in detail: pt needs a call back regarding medication , because of the quantity the pharmacy won't fill it, would like a call back ,please advise.     EScitalopram oxalate (LEXAPRO) 10 MG tablet    Can the clinic reply by [facility]: no           [facility]: [phone]

## 2023-06-12 ENCOUNTER — LAB VISIT (OUTPATIENT)
Dept: LAB | Facility: HOSPITAL | Age: 63
End: 2023-06-12
Attending: INTERNAL MEDICINE
Payer: COMMERCIAL

## 2023-06-12 ENCOUNTER — PATIENT MESSAGE (OUTPATIENT)
Dept: FAMILY MEDICINE | Facility: CLINIC | Age: 63
End: 2023-06-12
Payer: COMMERCIAL

## 2023-06-12 DIAGNOSIS — C34.81 SMALL CELL CARCINOMA OF OVERLAPPING SITES OF RIGHT LUNG: ICD-10-CM

## 2023-06-12 LAB
ALBUMIN SERPL BCP-MCNC: 3.3 G/DL (ref 3.5–5.2)
ALP SERPL-CCNC: 100 U/L (ref 55–135)
ALT SERPL W/O P-5'-P-CCNC: 26 U/L (ref 10–44)
ANION GAP SERPL CALC-SCNC: 8 MMOL/L (ref 8–16)
ANISOCYTOSIS BLD QL SMEAR: SLIGHT
AST SERPL-CCNC: 26 U/L (ref 10–40)
BASOPHILS # BLD AUTO: 0.02 K/UL (ref 0–0.2)
BASOPHILS NFR BLD: 0.4 % (ref 0–1.9)
BILIRUB SERPL-MCNC: 0.1 MG/DL (ref 0.1–1)
BUN SERPL-MCNC: 4 MG/DL (ref 8–23)
BURR CELLS BLD QL SMEAR: ABNORMAL
CALCIUM SERPL-MCNC: 9.7 MG/DL (ref 8.7–10.5)
CHLORIDE SERPL-SCNC: 105 MMOL/L (ref 95–110)
CO2 SERPL-SCNC: 28 MMOL/L (ref 23–29)
CREAT SERPL-MCNC: 0.8 MG/DL (ref 0.5–1.4)
DACRYOCYTES BLD QL SMEAR: ABNORMAL
DIFFERENTIAL METHOD: ABNORMAL
EOSINOPHIL # BLD AUTO: 0.1 K/UL (ref 0–0.5)
EOSINOPHIL NFR BLD: 1.8 % (ref 0–8)
ERYTHROCYTE [DISTWIDTH] IN BLOOD BY AUTOMATED COUNT: 17.8 % (ref 11.5–14.5)
EST. GFR  (NO RACE VARIABLE): >60 ML/MIN/1.73 M^2
GLUCOSE SERPL-MCNC: 80 MG/DL (ref 70–110)
HCT VFR BLD AUTO: 35.4 % (ref 37–48.5)
HGB BLD-MCNC: 10.8 G/DL (ref 12–16)
HYPOCHROMIA BLD QL SMEAR: ABNORMAL
IMM GRANULOCYTES # BLD AUTO: 0.15 K/UL (ref 0–0.04)
IMM GRANULOCYTES NFR BLD AUTO: 3 % (ref 0–0.5)
LYMPHOCYTES # BLD AUTO: 2.4 K/UL (ref 1–4.8)
LYMPHOCYTES NFR BLD: 47.7 % (ref 18–48)
MAGNESIUM SERPL-MCNC: 2 MG/DL (ref 1.6–2.6)
MCH RBC QN AUTO: 26.7 PG (ref 27–31)
MCHC RBC AUTO-ENTMCNC: 30.5 G/DL (ref 32–36)
MCV RBC AUTO: 87 FL (ref 82–98)
MONOCYTES # BLD AUTO: 1.2 K/UL (ref 0.3–1)
MONOCYTES NFR BLD: 24.9 % (ref 4–15)
NEUTROPHILS # BLD AUTO: 1.1 K/UL (ref 1.8–7.7)
NEUTROPHILS NFR BLD: 22.2 % (ref 38–73)
NRBC BLD-RTO: 1 /100 WBC
PLATELET # BLD AUTO: 517 K/UL (ref 150–450)
PLATELET BLD QL SMEAR: ABNORMAL
PMV BLD AUTO: 9.8 FL (ref 9.2–12.9)
POIKILOCYTOSIS BLD QL SMEAR: SLIGHT
POLYCHROMASIA BLD QL SMEAR: ABNORMAL
POTASSIUM SERPL-SCNC: 4.1 MMOL/L (ref 3.5–5.1)
PROT SERPL-MCNC: 6.9 G/DL (ref 6–8.4)
RBC # BLD AUTO: 4.05 M/UL (ref 4–5.4)
SCHISTOCYTES BLD QL SMEAR: ABNORMAL
SODIUM SERPL-SCNC: 141 MMOL/L (ref 136–145)
WBC # BLD AUTO: 4.97 K/UL (ref 3.9–12.7)

## 2023-06-12 PROCEDURE — 83735 ASSAY OF MAGNESIUM: CPT | Performed by: INTERNAL MEDICINE

## 2023-06-12 PROCEDURE — 85025 COMPLETE CBC W/AUTO DIFF WBC: CPT | Performed by: INTERNAL MEDICINE

## 2023-06-12 PROCEDURE — 36415 COLL VENOUS BLD VENIPUNCTURE: CPT | Mod: PN | Performed by: INTERNAL MEDICINE

## 2023-06-12 PROCEDURE — 80053 COMPREHEN METABOLIC PANEL: CPT | Performed by: INTERNAL MEDICINE

## 2023-06-13 ENCOUNTER — TELEPHONE (OUTPATIENT)
Dept: FAMILY MEDICINE | Facility: CLINIC | Age: 63
End: 2023-06-13
Payer: COMMERCIAL

## 2023-06-13 ENCOUNTER — PATIENT MESSAGE (OUTPATIENT)
Dept: FAMILY MEDICINE | Facility: CLINIC | Age: 63
End: 2023-06-13
Payer: COMMERCIAL

## 2023-06-13 DIAGNOSIS — F33.41 RECURRENT MAJOR DEPRESSIVE DISORDER, IN PARTIAL REMISSION: ICD-10-CM

## 2023-06-13 RX ORDER — ESCITALOPRAM OXALATE 10 MG/1
15 TABLET ORAL DAILY
Qty: 135 TABLET | Refills: 3 | Status: SHIPPED | OUTPATIENT
Start: 2023-06-13 | End: 2023-06-16 | Stop reason: DRUGHIGH

## 2023-06-13 NOTE — TELEPHONE ENCOUNTER
----- Message from Charles Caceres sent at 6/13/2023  8:53 AM CDT -----  Type:  Patient Returning Call    Who Called:  Raeann/ Na   Who Left Message for Patient:  Ebony  Does the patient know what this is regarding?:  Lexapro   Best Call Back Number:  [phone]    Additional Information:    [id number] [address] [place] [address]  [address] [place] [id number]  Phone: [phone] Fax: [phone]

## 2023-06-13 NOTE — TELEPHONE ENCOUNTER
Spoke with pharmacist Claudia at [facility]. She reported that insurance does not cover the 2 tablets of 10mg even if she takes 1.5 tablets d/t the medication comes in 20mg tablet. Per pharmacist recommendations, do rx for 1.5 tablet and qty of 135 and complete PA for the medication. Pended med. Please send rx and will do PA

## 2023-06-13 NOTE — TELEPHONE ENCOUNTER
No care due was identified.  Health [facility] Embedded Care Due Messages. Reference number: [id number].   6/13/2023 10:58:27 AM CDT

## 2023-06-16 ENCOUNTER — OFFICE VISIT (OUTPATIENT)
Dept: FAMILY MEDICINE | Facility: CLINIC | Age: 63
End: 2023-06-16
Payer: COMMERCIAL

## 2023-06-16 DIAGNOSIS — F41.1 GAD (GENERALIZED ANXIETY DISORDER): Primary | ICD-10-CM

## 2023-06-16 PROCEDURE — 4010F PR ACE/ARB THEARPY RXD/TAKEN: ICD-10-PCS | Mod: CPTII,95,, | Performed by: FAMILY MEDICINE

## 2023-06-16 PROCEDURE — 1159F MED LIST DOCD IN RCRD: CPT | Mod: CPTII,95,, | Performed by: FAMILY MEDICINE

## 2023-06-16 PROCEDURE — 1160F RVW MEDS BY RX/DR IN RCRD: CPT | Mod: CPTII,95,, | Performed by: FAMILY MEDICINE

## 2023-06-16 PROCEDURE — 3044F PR MOST RECENT HEMOGLOBIN A1C LEVEL <7.0%: ICD-10-PCS | Mod: CPTII,95,, | Performed by: FAMILY MEDICINE

## 2023-06-16 PROCEDURE — 1160F PR REVIEW ALL MEDS BY PRESCRIBER/CLIN PHARMACIST DOCUMENTED: ICD-10-PCS | Mod: CPTII,95,, | Performed by: FAMILY MEDICINE

## 2023-06-16 PROCEDURE — 4010F ACE/ARB THERAPY RXD/TAKEN: CPT | Mod: CPTII,95,, | Performed by: FAMILY MEDICINE

## 2023-06-16 PROCEDURE — 1159F PR MEDICATION LIST DOCUMENTED IN MEDICAL RECORD: ICD-10-PCS | Mod: CPTII,95,, | Performed by: FAMILY MEDICINE

## 2023-06-16 PROCEDURE — 3044F HG A1C LEVEL LT 7.0%: CPT | Mod: CPTII,95,, | Performed by: FAMILY MEDICINE

## 2023-06-16 PROCEDURE — 99213 OFFICE O/P EST LOW 20 MIN: CPT | Mod: 95,,, | Performed by: FAMILY MEDICINE

## 2023-06-16 PROCEDURE — 99213 PR OFFICE/OUTPT VISIT, EST, LEVL III, 20-29 MIN: ICD-10-PCS | Mod: 95,,, | Performed by: FAMILY MEDICINE

## 2023-06-16 RX ORDER — ESCITALOPRAM OXALATE 20 MG/1
20 TABLET ORAL DAILY
Qty: 90 TABLET | Refills: 3 | Status: SHIPPED | OUTPATIENT
Start: 2023-06-16 | End: 2024-03-15 | Stop reason: SDUPTHER

## 2023-06-16 NOTE — PATIENT INSTRUCTIONS
Knee high compression socks    Lasix 1 tablet Saturday and 1 tablet Sunday for diureis. Make sure that you're hydrating well even though you're taking the diuretic.

## 2023-06-16 NOTE — PROGRESS NOTES
Subjective:       Patient ID: Malathi Martinez is a 62 y.o. female.    Chief Complaint: Follow-up      The patient location is: home, [place]  The chief complaint leading to consultation is: f/u  Visit type: Virtual visit with synchronous audio and video  Total time spent with patient: 10mins incl doc  Each patient to whom he or she provides medical services by telemedicine is:  (1) informed of the relationship between the physician and patient and the respective role of any other health care provider with respect to management of the patient; and (2) notified that he or she may decline to receive medical services by telemedicine and may withdraw from such care at any time.    Notes: patient seen for f/u.   Issues getting her lexapro filled at 15mg (1.5 tablets)from pharmacy.   Fluid retention in her legs. Has lasix old rx that onc/carinder ok'd her to use.     Review of Systems   Constitutional:  Positive for activity change.   HENT:  Positive for hearing loss. Negative for rhinorrhea and trouble swallowing.    Eyes:  Positive for visual disturbance. Negative for discharge.   Respiratory:  Negative for chest tightness and wheezing.    Cardiovascular:  Negative for chest pain and palpitations.   Gastrointestinal:  Negative for blood in stool, constipation, diarrhea and vomiting.   Endocrine: Negative for polydipsia and polyuria.   Genitourinary:  Negative for difficulty urinating, dysuria, hematuria and menstrual problem.   Musculoskeletal:  Negative for arthralgias, joint swelling and neck pain.   Neurological:  Negative for weakness and headaches.   Psychiatric/Behavioral:  Positive for dysphoric mood. Negative for confusion.      Objective:        Physical Exam  Vitals and nursing note reviewed.   Constitutional:       General: She is not in acute distress.     Appearance: Normal appearance. She is well-developed.   HENT:      Head: Normocephalic and atraumatic.   Eyes:      General: No scleral icterus.         Right eye: No discharge.         Left eye: No discharge.      Conjunctiva/sclera: Conjunctivae normal.   Pulmonary:      Effort: Pulmonary effort is normal. No respiratory distress.   Skin:     General: Skin is warm and dry.   Neurological:      General: No focal deficit present.      Mental Status: She is alert and oriented to person, place, and time.   Psychiatric:         Mood and Affect: Mood normal.         Behavior: Behavior normal.           Assessment:   JEAN CLAUDE (generalized anxiety disorder)  Comments:  increase lexapro to 20mg daily    Other orders  -     EScitalopram oxalate (LEXAPRO) 20 MG tablet; Take 1 tablet (20 mg total) by mouth once daily.  Dispense: 90 tablet; Refill: 3         Patient aware of limitations to assessment and exam of telemedicine. Deemed appropriate at this time given current covid-19 concerns.

## 2023-06-19 ENCOUNTER — LAB VISIT (OUTPATIENT)
Dept: LAB | Facility: HOSPITAL | Age: 63
End: 2023-06-19
Attending: INTERNAL MEDICINE
Payer: COMMERCIAL

## 2023-06-19 DIAGNOSIS — R53.83 FATIGUE: ICD-10-CM

## 2023-06-19 DIAGNOSIS — C34.11 MALIGNANT NEOPLASM OF UPPER LOBE OF RIGHT LUNG: Primary | ICD-10-CM

## 2023-06-19 LAB
ALBUMIN SERPL BCP-MCNC: 3.5 G/DL (ref 3.5–5.2)
ALP SERPL-CCNC: 145 U/L (ref 55–135)
ALT SERPL W/O P-5'-P-CCNC: 28 U/L (ref 10–44)
ANION GAP SERPL CALC-SCNC: 12 MMOL/L (ref 8–16)
AST SERPL-CCNC: 25 U/L (ref 10–40)
BILIRUB SERPL-MCNC: 0.3 MG/DL (ref 0.1–1)
BUN SERPL-MCNC: 18 MG/DL (ref 8–23)
CALCIUM SERPL-MCNC: 9.6 MG/DL (ref 8.7–10.5)
CHLORIDE SERPL-SCNC: 100 MMOL/L (ref 95–110)
CO2 SERPL-SCNC: 24 MMOL/L (ref 23–29)
CREAT SERPL-MCNC: 0.8 MG/DL (ref 0.5–1.4)
EST. GFR  (NO RACE VARIABLE): >60 ML/MIN/1.73 M^2
GLUCOSE SERPL-MCNC: 80 MG/DL (ref 70–110)
MAGNESIUM SERPL-MCNC: 2.1 MG/DL (ref 1.6–2.6)
POTASSIUM SERPL-SCNC: 3.9 MMOL/L (ref 3.5–5.1)
PROT SERPL-MCNC: 6.9 G/DL (ref 6–8.4)
SODIUM SERPL-SCNC: 136 MMOL/L (ref 136–145)

## 2023-06-19 PROCEDURE — 80053 COMPREHEN METABOLIC PANEL: CPT | Performed by: INTERNAL MEDICINE

## 2023-06-19 PROCEDURE — 83735 ASSAY OF MAGNESIUM: CPT | Performed by: INTERNAL MEDICINE

## 2023-06-19 PROCEDURE — 36415 COLL VENOUS BLD VENIPUNCTURE: CPT | Mod: PN | Performed by: INTERNAL MEDICINE

## 2023-06-19 PROCEDURE — 85007 BL SMEAR W/DIFF WBC COUNT: CPT | Performed by: INTERNAL MEDICINE

## 2023-06-19 PROCEDURE — 85027 COMPLETE CBC AUTOMATED: CPT | Performed by: INTERNAL MEDICINE

## 2023-06-20 LAB
ANISOCYTOSIS BLD QL SMEAR: SLIGHT
BASOPHILS # BLD AUTO: ABNORMAL K/UL (ref 0–0.2)
BASOPHILS NFR BLD: 0 % (ref 0–1.9)
DIFFERENTIAL METHOD: ABNORMAL
EOSINOPHIL # BLD AUTO: ABNORMAL K/UL (ref 0–0.5)
EOSINOPHIL NFR BLD: 0.5 % (ref 0–8)
ERYTHROCYTE [DISTWIDTH] IN BLOOD BY AUTOMATED COUNT: 19 % (ref 11.5–14.5)
HCT VFR BLD AUTO: 33 % (ref 37–48.5)
HGB BLD-MCNC: 10.3 G/DL (ref 12–16)
IMM GRANULOCYTES # BLD AUTO: ABNORMAL K/UL (ref 0–0.04)
IMM GRANULOCYTES NFR BLD AUTO: ABNORMAL % (ref 0–0.5)
LYMPHOCYTES # BLD AUTO: ABNORMAL K/UL (ref 1–4.8)
LYMPHOCYTES NFR BLD: 8 % (ref 18–48)
MCH RBC QN AUTO: 27 PG (ref 27–31)
MCHC RBC AUTO-ENTMCNC: 31.2 G/DL (ref 32–36)
MCV RBC AUTO: 87 FL (ref 82–98)
METAMYELOCYTES NFR BLD MANUAL: 0.5 %
MONOCYTES # BLD AUTO: ABNORMAL K/UL (ref 0.3–1)
MONOCYTES NFR BLD: 1 % (ref 4–15)
NEUTROPHILS NFR BLD: 87.5 % (ref 38–73)
NEUTS BAND NFR BLD MANUAL: 2.5 %
NRBC BLD-RTO: 0 /100 WBC
PLATELET # BLD AUTO: 355 K/UL (ref 150–450)
PLATELET BLD QL SMEAR: ABNORMAL
PMV BLD AUTO: 10.4 FL (ref 9.2–12.9)
RBC # BLD AUTO: 3.81 M/UL (ref 4–5.4)
WBC # BLD AUTO: 30.86 K/UL (ref 3.9–12.7)
WBC TOXIC VACUOLES BLD QL SMEAR: PRESENT

## 2023-06-26 ENCOUNTER — LAB VISIT (OUTPATIENT)
Dept: LAB | Facility: HOSPITAL | Age: 63
End: 2023-06-26
Attending: INTERNAL MEDICINE
Payer: COMMERCIAL

## 2023-06-26 DIAGNOSIS — C34.11 MALIGNANT NEOPLASM OF UPPER LOBE OF RIGHT LUNG: Primary | ICD-10-CM

## 2023-06-26 DIAGNOSIS — R53.83 FATIGUE: ICD-10-CM

## 2023-06-26 LAB
ALBUMIN SERPL BCP-MCNC: 3.4 G/DL (ref 3.5–5.2)
ALP SERPL-CCNC: 103 U/L (ref 55–135)
ALT SERPL W/O P-5'-P-CCNC: 27 U/L (ref 10–44)
ANION GAP SERPL CALC-SCNC: 10 MMOL/L (ref 8–16)
ANISOCYTOSIS BLD QL SMEAR: ABNORMAL
AST SERPL-CCNC: 19 U/L (ref 10–40)
BASOPHILS NFR BLD: 0 % (ref 0–1.9)
BILIRUB SERPL-MCNC: 0.1 MG/DL (ref 0.1–1)
BUN SERPL-MCNC: 8 MG/DL (ref 8–23)
CALCIUM SERPL-MCNC: 9.5 MG/DL (ref 8.7–10.5)
CHLORIDE SERPL-SCNC: 100 MMOL/L (ref 95–110)
CO2 SERPL-SCNC: 27 MMOL/L (ref 23–29)
CREAT SERPL-MCNC: 0.8 MG/DL (ref 0.5–1.4)
DIFFERENTIAL METHOD: ABNORMAL
EOSINOPHIL NFR BLD: 0 % (ref 0–8)
ERYTHROCYTE [DISTWIDTH] IN BLOOD BY AUTOMATED COUNT: 18.7 % (ref 11.5–14.5)
EST. GFR  (NO RACE VARIABLE): >60 ML/MIN/1.73 M^2
GLUCOSE SERPL-MCNC: 119 MG/DL (ref 70–110)
HCT VFR BLD AUTO: 29 % (ref 37–48.5)
HGB BLD-MCNC: 9 G/DL (ref 12–16)
HYPOCHROMIA BLD QL SMEAR: ABNORMAL
IMM GRANULOCYTES # BLD AUTO: ABNORMAL K/UL (ref 0–0.04)
IMM GRANULOCYTES NFR BLD AUTO: ABNORMAL % (ref 0–0.5)
LYMPHOCYTES NFR BLD: 23 % (ref 18–48)
MAGNESIUM SERPL-MCNC: 1.7 MG/DL (ref 1.6–2.6)
MCH RBC QN AUTO: 26.5 PG (ref 27–31)
MCHC RBC AUTO-ENTMCNC: 31 G/DL (ref 32–36)
MCV RBC AUTO: 86 FL (ref 82–98)
METAMYELOCYTES NFR BLD MANUAL: 1 %
MONOCYTES NFR BLD: 16 % (ref 4–15)
NEUTROPHILS NFR BLD: 52 % (ref 38–73)
NEUTS BAND NFR BLD MANUAL: 8 %
NRBC BLD-RTO: 1 /100 WBC
PLATELET # BLD AUTO: 94 K/UL (ref 150–450)
PLATELET BLD QL SMEAR: ABNORMAL
PMV BLD AUTO: 10.6 FL (ref 9.2–12.9)
POIKILOCYTOSIS BLD QL SMEAR: SLIGHT
POLYCHROMASIA BLD QL SMEAR: ABNORMAL
POTASSIUM SERPL-SCNC: 3.9 MMOL/L (ref 3.5–5.1)
PROT SERPL-MCNC: 6.8 G/DL (ref 6–8.4)
RBC # BLD AUTO: 3.39 M/UL (ref 4–5.4)
SODIUM SERPL-SCNC: 137 MMOL/L (ref 136–145)
WBC # BLD AUTO: 7.38 K/UL (ref 3.9–12.7)

## 2023-06-26 PROCEDURE — 36415 COLL VENOUS BLD VENIPUNCTURE: CPT | Mod: PN | Performed by: INTERNAL MEDICINE

## 2023-06-26 PROCEDURE — 80053 COMPREHEN METABOLIC PANEL: CPT | Performed by: INTERNAL MEDICINE

## 2023-06-26 PROCEDURE — 83735 ASSAY OF MAGNESIUM: CPT | Performed by: INTERNAL MEDICINE

## 2023-06-26 PROCEDURE — 85007 BL SMEAR W/DIFF WBC COUNT: CPT | Performed by: INTERNAL MEDICINE

## 2023-06-26 PROCEDURE — 85027 COMPLETE CBC AUTOMATED: CPT | Performed by: INTERNAL MEDICINE

## 2023-06-30 ENCOUNTER — OFFICE VISIT (OUTPATIENT)
Dept: FAMILY MEDICINE | Facility: CLINIC | Age: 63
End: 2023-06-30
Payer: COMMERCIAL

## 2023-06-30 ENCOUNTER — TELEPHONE (OUTPATIENT)
Dept: FAMILY MEDICINE | Facility: CLINIC | Age: 63
End: 2023-06-30

## 2023-06-30 VITALS
HEART RATE: 88 BPM | DIASTOLIC BLOOD PRESSURE: 74 MMHG | WEIGHT: 196 LBS | OXYGEN SATURATION: 98 % | SYSTOLIC BLOOD PRESSURE: 128 MMHG | HEIGHT: 70 IN | BODY MASS INDEX: 28.06 KG/M2

## 2023-06-30 DIAGNOSIS — E03.9 HYPOTHYROIDISM, UNSPECIFIED TYPE: Primary | ICD-10-CM

## 2023-06-30 DIAGNOSIS — F33.41 RECURRENT MAJOR DEPRESSIVE DISORDER, IN PARTIAL REMISSION: ICD-10-CM

## 2023-06-30 DIAGNOSIS — M79.89 ARM SWELLING: ICD-10-CM

## 2023-06-30 DIAGNOSIS — F41.9 ANXIETY: ICD-10-CM

## 2023-06-30 DIAGNOSIS — R60.9 EDEMA, UNSPECIFIED TYPE: ICD-10-CM

## 2023-06-30 PROCEDURE — 3008F BODY MASS INDEX DOCD: CPT | Mod: CPTII,S$GLB,, | Performed by: FAMILY MEDICINE

## 2023-06-30 PROCEDURE — 3008F PR BODY MASS INDEX (BMI) DOCUMENTED: ICD-10-PCS | Mod: CPTII,S$GLB,, | Performed by: FAMILY MEDICINE

## 2023-06-30 PROCEDURE — 1159F MED LIST DOCD IN RCRD: CPT | Mod: CPTII,S$GLB,, | Performed by: FAMILY MEDICINE

## 2023-06-30 PROCEDURE — 3044F HG A1C LEVEL LT 7.0%: CPT | Mod: CPTII,S$GLB,, | Performed by: FAMILY MEDICINE

## 2023-06-30 PROCEDURE — 1159F PR MEDICATION LIST DOCUMENTED IN MEDICAL RECORD: ICD-10-PCS | Mod: CPTII,S$GLB,, | Performed by: FAMILY MEDICINE

## 2023-06-30 PROCEDURE — 1160F PR REVIEW ALL MEDS BY PRESCRIBER/CLIN PHARMACIST DOCUMENTED: ICD-10-PCS | Mod: CPTII,S$GLB,, | Performed by: FAMILY MEDICINE

## 2023-06-30 PROCEDURE — 99999 PR PBB SHADOW E&M-EST. PATIENT-LVL V: CPT | Mod: PBBFAC,,, | Performed by: FAMILY MEDICINE

## 2023-06-30 PROCEDURE — 3044F PR MOST RECENT HEMOGLOBIN A1C LEVEL <7.0%: ICD-10-PCS | Mod: CPTII,S$GLB,, | Performed by: FAMILY MEDICINE

## 2023-06-30 PROCEDURE — 3074F PR MOST RECENT SYSTOLIC BLOOD PRESSURE < 130 MM HG: ICD-10-PCS | Mod: CPTII,S$GLB,, | Performed by: FAMILY MEDICINE

## 2023-06-30 PROCEDURE — 99214 OFFICE O/P EST MOD 30 MIN: CPT | Mod: S$GLB,,, | Performed by: FAMILY MEDICINE

## 2023-06-30 PROCEDURE — 1160F RVW MEDS BY RX/DR IN RCRD: CPT | Mod: CPTII,S$GLB,, | Performed by: FAMILY MEDICINE

## 2023-06-30 PROCEDURE — 3078F PR MOST RECENT DIASTOLIC BLOOD PRESSURE < 80 MM HG: ICD-10-PCS | Mod: CPTII,S$GLB,, | Performed by: FAMILY MEDICINE

## 2023-06-30 PROCEDURE — 4010F PR ACE/ARB THEARPY RXD/TAKEN: ICD-10-PCS | Mod: CPTII,S$GLB,, | Performed by: FAMILY MEDICINE

## 2023-06-30 PROCEDURE — 3078F DIAST BP <80 MM HG: CPT | Mod: CPTII,S$GLB,, | Performed by: FAMILY MEDICINE

## 2023-06-30 PROCEDURE — 3074F SYST BP LT 130 MM HG: CPT | Mod: CPTII,S$GLB,, | Performed by: FAMILY MEDICINE

## 2023-06-30 PROCEDURE — 99999 PR PBB SHADOW E&M-EST. PATIENT-LVL V: ICD-10-PCS | Mod: PBBFAC,,, | Performed by: FAMILY MEDICINE

## 2023-06-30 PROCEDURE — 99214 PR OFFICE/OUTPT VISIT, EST, LEVL IV, 30-39 MIN: ICD-10-PCS | Mod: S$GLB,,, | Performed by: FAMILY MEDICINE

## 2023-06-30 PROCEDURE — 4010F ACE/ARB THERAPY RXD/TAKEN: CPT | Mod: CPTII,S$GLB,, | Performed by: FAMILY MEDICINE

## 2023-06-30 RX ORDER — BUPROPION HYDROCHLORIDE 150 MG/1
300 TABLET ORAL DAILY
Qty: 180 TABLET | Refills: 3 | Status: SHIPPED | OUTPATIENT
Start: 2023-06-30 | End: 2024-03-15 | Stop reason: SDUPTHER

## 2023-06-30 RX ORDER — LEVOTHYROXINE SODIUM 13 UG/1
37.5 CAPSULE ORAL
Qty: 90 CAPSULE | Refills: 3 | Status: SHIPPED | OUTPATIENT
Start: 2023-06-30 | End: 2023-06-30

## 2023-06-30 RX ORDER — ALPRAZOLAM 0.25 MG/1
0.25 TABLET ORAL 2 TIMES DAILY
Qty: 60 TABLET | Refills: 5 | Status: SHIPPED | OUTPATIENT
Start: 2023-06-30 | End: 2023-12-15 | Stop reason: SDUPTHER

## 2023-06-30 RX ORDER — LEVOTHYROXINE SODIUM 25 UG/1
25 TABLET ORAL
Qty: 30 TABLET | Refills: 11 | Status: SHIPPED | OUTPATIENT
Start: 2023-06-30 | End: 2023-08-04 | Stop reason: CLARIF

## 2023-06-30 NOTE — TELEPHONE ENCOUNTER
----- Message from Arti Gomez sent at 6/30/2023  9:11 AM CDT -----  Regarding: Pharmacy  Contact: [facility] Rosales  Type:  Pharmacy Calling to Clarify an RX    Name of Caller:  Connie   Pharmacy Name:  [facility]  Prescription Name:  levothyroxine 13 mcg Cap Medication  What do they need to clarify?:  Directions  Best Call Back Number:  [phone]  Additional Information:  Connie states that the direction of the medication need to be clarified. She states the capsules are 13mcg, but it is instructed that the patient take 37.5 mcg by mouth before breakfast. Connie also states that the patient's insurance does not cover the capsules as well. Please call Connie to advise. Thanks!

## 2023-06-30 NOTE — PROGRESS NOTES
"Subjective:       Patient ID: Malathi Martinez is a 62 y.o. female.    Chief Complaint: Follow-up (Handicap tag, review ct, med refills )    Follow-up  Associated symptoms include weakness. Pertinent negatives include no arthralgias, chest pain, fatigue, headaches, joint swelling or vomiting.     Patient seen for f/u.   Has CT orders from onc/carinder to see of progress thus far.  She does have discomfort in the R chest wall and also R forearm from previous surgery and IV site, respectively. Maybe improved, but bothering her still a week later. Initial forearm swelling has resolved. Now with some tenderness medial elbow.   Needs a handicap tag as she is still manages her household and running errands like groceries.   Has been off of gabapentin x 2 days re LE edema - slight improvement.     Review of Systems:  Review of Systems   Constitutional:  Positive for activity change. Negative for fatigue and unexpected weight change (trying to maintain a stable weight).   HENT:  Positive for hearing loss. Negative for trouble swallowing.    Eyes:  Positive for discharge and visual disturbance.   Respiratory:  Negative for chest tightness and wheezing.    Cardiovascular:  Negative for chest pain and palpitations.   Gastrointestinal:  Negative for blood in stool, constipation, diarrhea and vomiting.   Endocrine: Negative for polydipsia and polyuria.   Genitourinary:  Negative for difficulty urinating, dysuria, hematuria and menstrual problem.   Musculoskeletal:  Negative for arthralgias and joint swelling.   Neurological:  Positive for weakness. Negative for headaches.   Psychiatric/Behavioral:  Positive for dysphoric mood. The patient is nervous/anxious.      Objective:     Vitals:    06/30/23 0817   BP: 128/74   Pulse: 88   SpO2: 98%   Weight: 88.9 kg (195 lb 15.8 oz)   Height: 5' 10" (1.778 m)          Physical Exam  Vitals and nursing note reviewed.   Constitutional:       General: She is not in acute distress.     " Appearance: Normal appearance. She is well-developed.   HENT:      Head: Normocephalic and atraumatic.   Eyes:      General: No scleral icterus.        Right eye: No discharge.         Left eye: No discharge.      Conjunctiva/sclera: Conjunctivae normal.   Cardiovascular:      Rate and Rhythm: Normal rate.   Pulmonary:      Effort: Pulmonary effort is normal. No respiratory distress.   Abdominal:      Palpations: Abdomen is soft.      Tenderness: There is no guarding.   Musculoskeletal:         General: No deformity or signs of injury.      Cervical back: Normal range of motion and neck supple.   Skin:     General: Skin is warm and dry.      Findings: No rash.   Neurological:      General: No focal deficit present.      Mental Status: She is alert and oriented to person, place, and time.   Psychiatric:         Mood and Affect: Mood normal.         Behavior: Behavior normal.         Assessment & Plan:  Hypothyroidism, unspecified type  -     levothyroxine 13 mcg Cap; Take 37.5 mcg by mouth before breakfast.  Dispense: 90 capsule; Refill: 3    Anxiety  Comments:  cont xanax prn  Orders:  -     ALPRAZolam (XANAX) 0.25 MG tablet; Take 1 tablet (0.25 mg total) by mouth 2 (two) times daily.  Dispense: 60 tablet; Refill: 5    Recurrent major depressive disorder, in partial remission  Comments:  bump wellbutrin to 300mg daily for now  cont lexapro 15mg   Orders:  -     buPROPion (WELLBUTRIN XL) 150 MG TB24 tablet; Take 2 tablets (300 mg total) by mouth once daily.  Dispense: 180 tablet; Refill: 3    Edema, unspecified type  Comments:  improved now off gabapentin x 2 days, cont compression socks at home and will let me know if we need to find an alternative to the gabapentin    Arm swelling  Comments:  update u/s  Orders:  -     US Upper Extremity Veins Right; Future; Expected date: 06/30/2023

## 2023-06-30 NOTE — TELEPHONE ENCOUNTER
Please review and adjust levothyroxine rx. Also, please send in correct rx as tab. Caps not covered.

## 2023-07-05 ENCOUNTER — LAB VISIT (OUTPATIENT)
Dept: LAB | Facility: HOSPITAL | Age: 63
End: 2023-07-05
Attending: NURSE PRACTITIONER
Payer: COMMERCIAL

## 2023-07-05 DIAGNOSIS — C34.11 MALIGNANT NEOPLASM OF UPPER LOBE OF RIGHT LUNG: Primary | ICD-10-CM

## 2023-07-05 DIAGNOSIS — R53.83 FATIGUE: ICD-10-CM

## 2023-07-05 LAB
ALBUMIN SERPL BCP-MCNC: 3.3 G/DL (ref 3.5–5.2)
ALP SERPL-CCNC: 98 U/L (ref 55–135)
ALT SERPL W/O P-5'-P-CCNC: 38 U/L (ref 10–44)
ANION GAP SERPL CALC-SCNC: 10 MMOL/L (ref 8–16)
AST SERPL-CCNC: 28 U/L (ref 10–40)
BASOPHILS # BLD AUTO: 0.01 K/UL (ref 0–0.2)
BASOPHILS NFR BLD: 0.2 % (ref 0–1.9)
BILIRUB SERPL-MCNC: 0.3 MG/DL (ref 0.1–1)
BUN SERPL-MCNC: 12 MG/DL (ref 8–23)
CALCIUM SERPL-MCNC: 9.4 MG/DL (ref 8.7–10.5)
CHLORIDE SERPL-SCNC: 105 MMOL/L (ref 95–110)
CO2 SERPL-SCNC: 23 MMOL/L (ref 23–29)
CREAT SERPL-MCNC: 0.7 MG/DL (ref 0.5–1.4)
DIFFERENTIAL METHOD: ABNORMAL
EOSINOPHIL # BLD AUTO: 0 K/UL (ref 0–0.5)
EOSINOPHIL NFR BLD: 0.2 % (ref 0–8)
ERYTHROCYTE [DISTWIDTH] IN BLOOD BY AUTOMATED COUNT: 21.1 % (ref 11.5–14.5)
EST. GFR  (NO RACE VARIABLE): >60 ML/MIN/1.73 M^2
GLUCOSE SERPL-MCNC: 95 MG/DL (ref 70–110)
HCT VFR BLD AUTO: 29.1 % (ref 37–48.5)
HGB BLD-MCNC: 9.3 G/DL (ref 12–16)
IMM GRANULOCYTES # BLD AUTO: 0.02 K/UL (ref 0–0.04)
IMM GRANULOCYTES NFR BLD AUTO: 0.4 % (ref 0–0.5)
LYMPHOCYTES # BLD AUTO: 1.4 K/UL (ref 1–4.8)
LYMPHOCYTES NFR BLD: 24.8 % (ref 18–48)
MCH RBC QN AUTO: 27.5 PG (ref 27–31)
MCHC RBC AUTO-ENTMCNC: 32 G/DL (ref 32–36)
MCV RBC AUTO: 86 FL (ref 82–98)
MONOCYTES # BLD AUTO: 1.1 K/UL (ref 0.3–1)
MONOCYTES NFR BLD: 19.4 % (ref 4–15)
NEUTROPHILS # BLD AUTO: 3.1 K/UL (ref 1.8–7.7)
NEUTROPHILS NFR BLD: 55 % (ref 38–73)
NRBC BLD-RTO: 0 /100 WBC
PLATELET # BLD AUTO: 274 K/UL (ref 150–450)
PMV BLD AUTO: 9.5 FL (ref 9.2–12.9)
POTASSIUM SERPL-SCNC: 4.3 MMOL/L (ref 3.5–5.1)
PROT SERPL-MCNC: 6.8 G/DL (ref 6–8.4)
RBC # BLD AUTO: 3.38 M/UL (ref 4–5.4)
SODIUM SERPL-SCNC: 138 MMOL/L (ref 136–145)
WBC # BLD AUTO: 5.57 K/UL (ref 3.9–12.7)

## 2023-07-05 PROCEDURE — 36415 COLL VENOUS BLD VENIPUNCTURE: CPT | Mod: PO | Performed by: NURSE PRACTITIONER

## 2023-07-05 PROCEDURE — 80053 COMPREHEN METABOLIC PANEL: CPT | Performed by: NURSE PRACTITIONER

## 2023-07-05 PROCEDURE — 85025 COMPLETE CBC W/AUTO DIFF WBC: CPT | Performed by: NURSE PRACTITIONER

## 2023-07-10 ENCOUNTER — LAB VISIT (OUTPATIENT)
Dept: LAB | Facility: HOSPITAL | Age: 63
End: 2023-07-10
Attending: INTERNAL MEDICINE
Payer: COMMERCIAL

## 2023-07-10 DIAGNOSIS — R53.83 FATIGUE: ICD-10-CM

## 2023-07-10 DIAGNOSIS — C34.11 MALIGNANT NEOPLASM OF UPPER LOBE OF RIGHT LUNG: Primary | ICD-10-CM

## 2023-07-10 LAB
BASOPHILS # BLD AUTO: 0 K/UL (ref 0–0.2)
BASOPHILS NFR BLD: 0 % (ref 0–1.9)
DIFFERENTIAL METHOD: ABNORMAL
EOSINOPHIL # BLD AUTO: 0 K/UL (ref 0–0.5)
EOSINOPHIL NFR BLD: 1 % (ref 0–8)
ERYTHROCYTE [DISTWIDTH] IN BLOOD BY AUTOMATED COUNT: 21.2 % (ref 11.5–14.5)
HCT VFR BLD AUTO: 26.7 % (ref 37–48.5)
HGB BLD-MCNC: 8.7 G/DL (ref 12–16)
IMM GRANULOCYTES # BLD AUTO: 0.01 K/UL (ref 0–0.04)
IMM GRANULOCYTES NFR BLD AUTO: 0.2 % (ref 0–0.5)
LYMPHOCYTES # BLD AUTO: 1.6 K/UL (ref 1–4.8)
LYMPHOCYTES NFR BLD: 39.9 % (ref 18–48)
MCH RBC QN AUTO: 28.5 PG (ref 27–31)
MCHC RBC AUTO-ENTMCNC: 32.6 G/DL (ref 32–36)
MCV RBC AUTO: 88 FL (ref 82–98)
MONOCYTES # BLD AUTO: 0.2 K/UL (ref 0.3–1)
MONOCYTES NFR BLD: 3.7 % (ref 4–15)
NEUTROPHILS # BLD AUTO: 2.2 K/UL (ref 1.8–7.7)
NEUTROPHILS NFR BLD: 55.2 % (ref 38–73)
NRBC BLD-RTO: 0 /100 WBC
PLATELET # BLD AUTO: 263 K/UL (ref 150–450)
PMV BLD AUTO: 9.6 FL (ref 9.2–12.9)
RBC # BLD AUTO: 3.05 M/UL (ref 4–5.4)
WBC # BLD AUTO: 4.04 K/UL (ref 3.9–12.7)

## 2023-07-10 PROCEDURE — 80053 COMPREHEN METABOLIC PANEL: CPT | Performed by: INTERNAL MEDICINE

## 2023-07-10 PROCEDURE — 36415 COLL VENOUS BLD VENIPUNCTURE: CPT | Mod: PN | Performed by: INTERNAL MEDICINE

## 2023-07-10 PROCEDURE — 83735 ASSAY OF MAGNESIUM: CPT | Performed by: INTERNAL MEDICINE

## 2023-07-10 PROCEDURE — 85025 COMPLETE CBC W/AUTO DIFF WBC: CPT | Performed by: INTERNAL MEDICINE

## 2023-07-11 LAB
ALBUMIN SERPL BCP-MCNC: 3.4 G/DL (ref 3.5–5.2)
ALP SERPL-CCNC: 80 U/L (ref 55–135)
ALT SERPL W/O P-5'-P-CCNC: 32 U/L (ref 10–44)
ANION GAP SERPL CALC-SCNC: 11 MMOL/L (ref 8–16)
AST SERPL-CCNC: 15 U/L (ref 10–40)
BILIRUB SERPL-MCNC: 0.3 MG/DL (ref 0.1–1)
BUN SERPL-MCNC: 20 MG/DL (ref 8–23)
CALCIUM SERPL-MCNC: 8.9 MG/DL (ref 8.7–10.5)
CHLORIDE SERPL-SCNC: 104 MMOL/L (ref 95–110)
CO2 SERPL-SCNC: 21 MMOL/L (ref 23–29)
CREAT SERPL-MCNC: 0.8 MG/DL (ref 0.5–1.4)
EST. GFR  (NO RACE VARIABLE): >60 ML/MIN/1.73 M^2
GLUCOSE SERPL-MCNC: 102 MG/DL (ref 70–110)
MAGNESIUM SERPL-MCNC: 1.8 MG/DL (ref 1.6–2.6)
POTASSIUM SERPL-SCNC: 4.5 MMOL/L (ref 3.5–5.1)
PROT SERPL-MCNC: 6.4 G/DL (ref 6–8.4)
SODIUM SERPL-SCNC: 136 MMOL/L (ref 136–145)

## 2023-07-14 ENCOUNTER — HOSPITAL ENCOUNTER (OUTPATIENT)
Dept: RADIOLOGY | Facility: HOSPITAL | Age: 63
Discharge: HOME | End: 2023-07-14
Attending: INTERNAL MEDICINE
Payer: COMMERCIAL

## 2023-07-14 DIAGNOSIS — C34.90 MALIGNANT NEOPLASM OF UNSPECIFIED PART OF UNSPECIFIED BRONCHUS OR LUNG: ICD-10-CM

## 2023-07-14 PROCEDURE — 71260 CT CHEST ABDOMEN PELVIS WITH CONTRAST (XPD): ICD-10-PCS | Mod: 26,,, | Performed by: RADIOLOGY

## 2023-07-14 PROCEDURE — A9698 NON-RAD CONTRAST MATERIALNOC: HCPCS | Mod: PO | Performed by: INTERNAL MEDICINE

## 2023-07-14 PROCEDURE — 71260 CT THORAX DX C+: CPT | Mod: 26,,, | Performed by: RADIOLOGY

## 2023-07-14 PROCEDURE — 74177 CT ABD & PELVIS W/CONTRAST: CPT | Mod: 26,,, | Performed by: RADIOLOGY

## 2023-07-14 PROCEDURE — 25500020 PHARM REV CODE 255: Mod: PO | Performed by: INTERNAL MEDICINE

## 2023-07-14 PROCEDURE — 71260 CT THORAX DX C+: CPT | Mod: TC,PO

## 2023-07-14 PROCEDURE — 74177 CT CHEST ABDOMEN PELVIS WITH CONTRAST (XPD): ICD-10-PCS | Mod: 26,,, | Performed by: RADIOLOGY

## 2023-07-14 PROCEDURE — 74177 CT ABD & PELVIS W/CONTRAST: CPT | Mod: TC,PO

## 2023-07-14 RX ADMIN — IOHEXOL 100 ML: 350 INJECTION, SOLUTION INTRAVENOUS at 08:07

## 2023-07-14 RX ADMIN — IOHEXOL 1000 ML: 9 SOLUTION ORAL at 08:07

## 2023-07-17 ENCOUNTER — LAB VISIT (OUTPATIENT)
Dept: LAB | Facility: HOSPITAL | Age: 63
End: 2023-07-17
Attending: NURSE PRACTITIONER
Payer: COMMERCIAL

## 2023-07-17 DIAGNOSIS — R53.83 OTHER FATIGUE: ICD-10-CM

## 2023-07-17 DIAGNOSIS — C34.11 MALIGNANT NEOPLASM OF UPPER LOBE, RIGHT BRONCHUS OR LUNG: Primary | ICD-10-CM

## 2023-07-17 LAB
ANISOCYTOSIS BLD QL SMEAR: SLIGHT
BASOPHILS # BLD AUTO: 0.01 K/UL (ref 0–0.2)
BASOPHILS NFR BLD: 0.3 % (ref 0–1.9)
DIFFERENTIAL METHOD: ABNORMAL
EOSINOPHIL # BLD AUTO: 0 K/UL (ref 0–0.5)
EOSINOPHIL NFR BLD: 0.6 % (ref 0–8)
ERYTHROCYTE [DISTWIDTH] IN BLOOD BY AUTOMATED COUNT: 22.4 % (ref 11.5–14.5)
HCT VFR BLD AUTO: 27.3 % (ref 37–48.5)
HGB BLD-MCNC: 8.4 G/DL (ref 12–16)
IMM GRANULOCYTES # BLD AUTO: 0.01 K/UL (ref 0–0.04)
IMM GRANULOCYTES NFR BLD AUTO: 0.3 % (ref 0–0.5)
LYMPHOCYTES # BLD AUTO: 1.3 K/UL (ref 1–4.8)
LYMPHOCYTES NFR BLD: 41.5 % (ref 18–48)
MCH RBC QN AUTO: 28.2 PG (ref 27–31)
MCHC RBC AUTO-ENTMCNC: 30.8 G/DL (ref 32–36)
MCV RBC AUTO: 92 FL (ref 82–98)
MONOCYTES # BLD AUTO: 0.5 K/UL (ref 0.3–1)
MONOCYTES NFR BLD: 16.7 % (ref 4–15)
NEUTROPHILS # BLD AUTO: 1.3 K/UL (ref 1.8–7.7)
NEUTROPHILS NFR BLD: 40.6 % (ref 38–73)
NRBC BLD-RTO: 0 /100 WBC
PLATELET # BLD AUTO: 107 K/UL (ref 150–450)
PLATELET BLD QL SMEAR: ABNORMAL
PMV BLD AUTO: 10.5 FL (ref 9.2–12.9)
POLYCHROMASIA BLD QL SMEAR: ABNORMAL
RBC # BLD AUTO: 2.98 M/UL (ref 4–5.4)
SCHISTOCYTES BLD QL SMEAR: ABNORMAL
WBC # BLD AUTO: 3.23 K/UL (ref 3.9–12.7)

## 2023-07-17 PROCEDURE — 85025 COMPLETE CBC W/AUTO DIFF WBC: CPT | Performed by: NURSE PRACTITIONER

## 2023-07-17 PROCEDURE — 36415 COLL VENOUS BLD VENIPUNCTURE: CPT | Mod: PN | Performed by: NURSE PRACTITIONER

## 2023-07-19 ENCOUNTER — OFFICE VISIT (OUTPATIENT)
Dept: RADIATION ONCOLOGY | Facility: CLINIC | Age: 63
End: 2023-07-19
Payer: COMMERCIAL

## 2023-07-19 ENCOUNTER — PATIENT MESSAGE (OUTPATIENT)
Dept: FAMILY MEDICINE | Facility: CLINIC | Age: 63
End: 2023-07-19
Payer: COMMERCIAL

## 2023-07-19 VITALS
SYSTOLIC BLOOD PRESSURE: 147 MMHG | BODY MASS INDEX: 28.62 KG/M2 | HEART RATE: 80 BPM | OXYGEN SATURATION: 94 % | HEIGHT: 70 IN | RESPIRATION RATE: 22 BRPM | DIASTOLIC BLOOD PRESSURE: 65 MMHG | TEMPERATURE: 98 F | WEIGHT: 199.94 LBS

## 2023-07-19 DIAGNOSIS — C34.90 MALIGNANT NEOPLASM OF UNSPECIFIED PART OF UNSPECIFIED BRONCHUS OR LUNG: Primary | ICD-10-CM

## 2023-07-19 PROCEDURE — 99999 PR PBB SHADOW E&M-EST. PATIENT-LVL V: CPT | Mod: PBBFAC,,, | Performed by: STUDENT IN AN ORGANIZED HEALTH CARE EDUCATION/TRAINING PROGRAM

## 2023-07-19 PROCEDURE — 3078F DIAST BP <80 MM HG: CPT | Mod: CPTII,S$GLB,, | Performed by: STUDENT IN AN ORGANIZED HEALTH CARE EDUCATION/TRAINING PROGRAM

## 2023-07-19 PROCEDURE — 1159F MED LIST DOCD IN RCRD: CPT | Mod: CPTII,S$GLB,, | Performed by: STUDENT IN AN ORGANIZED HEALTH CARE EDUCATION/TRAINING PROGRAM

## 2023-07-19 PROCEDURE — 99999 PR PBB SHADOW E&M-EST. PATIENT-LVL V: ICD-10-PCS | Mod: PBBFAC,,, | Performed by: STUDENT IN AN ORGANIZED HEALTH CARE EDUCATION/TRAINING PROGRAM

## 2023-07-19 PROCEDURE — 3077F PR MOST RECENT SYSTOLIC BLOOD PRESSURE >= 140 MM HG: ICD-10-PCS | Mod: CPTII,S$GLB,, | Performed by: STUDENT IN AN ORGANIZED HEALTH CARE EDUCATION/TRAINING PROGRAM

## 2023-07-19 PROCEDURE — 3044F PR MOST RECENT HEMOGLOBIN A1C LEVEL <7.0%: ICD-10-PCS | Mod: CPTII,S$GLB,, | Performed by: STUDENT IN AN ORGANIZED HEALTH CARE EDUCATION/TRAINING PROGRAM

## 2023-07-19 PROCEDURE — 4010F ACE/ARB THERAPY RXD/TAKEN: CPT | Mod: CPTII,S$GLB,, | Performed by: STUDENT IN AN ORGANIZED HEALTH CARE EDUCATION/TRAINING PROGRAM

## 2023-07-19 PROCEDURE — 99213 PR OFFICE/OUTPT VISIT, EST, LEVL III, 20-29 MIN: ICD-10-PCS | Mod: S$GLB,,, | Performed by: STUDENT IN AN ORGANIZED HEALTH CARE EDUCATION/TRAINING PROGRAM

## 2023-07-19 PROCEDURE — 3008F BODY MASS INDEX DOCD: CPT | Mod: CPTII,S$GLB,, | Performed by: STUDENT IN AN ORGANIZED HEALTH CARE EDUCATION/TRAINING PROGRAM

## 2023-07-19 PROCEDURE — 3008F PR BODY MASS INDEX (BMI) DOCUMENTED: ICD-10-PCS | Mod: CPTII,S$GLB,, | Performed by: STUDENT IN AN ORGANIZED HEALTH CARE EDUCATION/TRAINING PROGRAM

## 2023-07-19 PROCEDURE — 4010F PR ACE/ARB THEARPY RXD/TAKEN: ICD-10-PCS | Mod: CPTII,S$GLB,, | Performed by: STUDENT IN AN ORGANIZED HEALTH CARE EDUCATION/TRAINING PROGRAM

## 2023-07-19 PROCEDURE — 3044F HG A1C LEVEL LT 7.0%: CPT | Mod: CPTII,S$GLB,, | Performed by: STUDENT IN AN ORGANIZED HEALTH CARE EDUCATION/TRAINING PROGRAM

## 2023-07-19 PROCEDURE — 1159F PR MEDICATION LIST DOCUMENTED IN MEDICAL RECORD: ICD-10-PCS | Mod: CPTII,S$GLB,, | Performed by: STUDENT IN AN ORGANIZED HEALTH CARE EDUCATION/TRAINING PROGRAM

## 2023-07-19 PROCEDURE — 99213 OFFICE O/P EST LOW 20 MIN: CPT | Mod: S$GLB,,, | Performed by: STUDENT IN AN ORGANIZED HEALTH CARE EDUCATION/TRAINING PROGRAM

## 2023-07-19 PROCEDURE — 3077F SYST BP >= 140 MM HG: CPT | Mod: CPTII,S$GLB,, | Performed by: STUDENT IN AN ORGANIZED HEALTH CARE EDUCATION/TRAINING PROGRAM

## 2023-07-19 PROCEDURE — 3078F PR MOST RECENT DIASTOLIC BLOOD PRESSURE < 80 MM HG: ICD-10-PCS | Mod: CPTII,S$GLB,, | Performed by: STUDENT IN AN ORGANIZED HEALTH CARE EDUCATION/TRAINING PROGRAM

## 2023-07-19 NOTE — PROGRESS NOTES
[facility]  Follow Up Visit Note    Assessment  Malathi Martinez is a 62 y.o. female with a extensive stage, group stage IVB, small cell lung cancer s/p chemo/IO, due to start consolidative IO.   ECOG: (1) Restricted in physically strenuous activity, ambulatory and able to do work of light nature    Plan:  Reviewed images and results of CT CAP 7/14/23 showing treatment response with no evidence of progression  With respect to the brain, we discussed PCI as well as close surveillance. Given results of Scott et al Lancet Oncol 2017, recommended close MRI surveillance.   We also discussed consolidative thoracic RT, risks and benefits. After this discussion, I favored deferring thoracic RT as this was not used in [facility].   She is due for MRI brain. Will order and set up telephone follow up after MRI        Oncologic History:  5/30/22: CT lung screening LungRADS 2  4/28/23: CT neck with bulky SCV and mediastinal LN  5/1/23:   CT chest with extensive mediastinal and right hilar lymphadenopathy with vascular and tracheal encasement, narrowing of distal trachea   MRI brain with NEY  5/3/23: Bronchoscopy  Path: 4R and 4L with small cell carcinoma   5/4-5/6/23: C1 Cis/ etoposide.   5/16/23: PET/CT with bilateral SCV adenopathy, extensive bilateral mediastinal adenopathy, right > left effusions, with evidence of distant disease, such as right first rib, subpectoral node, subcutaneous node and possible muscle metastases.     6/14/23 - current: carbo/etop/atezo followed by atezo monotherapy.      Interval History  The patient presents today for a regularly scheduled follow up visit.  She has no complaints. Reports alopecia and chronic bilateral LE edema and decreased exercise tolerance with chemo. No orthopnea. Otherwise has tolerated systemic therapy well.     Review of Systems   ROS as above    Social History:  Social History     Tobacco Use    Smoking status: Former     Packs/day:  "0.75     Years: 41.00     Pack years: 30.75     Types: Cigarettes     Quit date: 2023     Years since quittin.2    Smokeless tobacco: Never   Substance Use Topics    Alcohol use: No    Drug use: No           Exam:  Vitals:    23 1124   BP: (!) 147/65   BP Location: Right arm   Patient Position: Sitting   Pulse: 80   Resp: (!) 22   Temp: 98.2 °F (36.8 °C)   SpO2: (!) 94%   Weight: 90.7 kg (199 lb 15.3 oz)   Height: 5' 10" (1.778 m)     Constitutional: Pleasant 62 y.o. female in no acute distress.  Well nourished. Well groomed.   HEENT: Normocephalic and atraumatic   Lungs: No audible wheezing.  Normal effort.   Musculoskeletal: slight LE edema  Skin: No rashes appreciated.   Psych: Alert and oriented with appropriate mood and affect.  Neuro:  Grossly normal.    Data Review:  Information obtained from Malathi Martinez and via chart review.     Independent Interpretation of Test(s): CT CAP from 23 was personally reviewed with Ms. Juan and her .        Michael Crawley MD  Radiation Oncology      "

## 2023-07-20 NOTE — TELEPHONE ENCOUNTER
*Tenzin pt* Please review and advise if this is something that can be changed or if it is/should wait until Dr Dave gets back.

## 2023-07-20 NOTE — TELEPHONE ENCOUNTER
I can say that it is safe for her to stop it as she is having side effects, but I would not be able to change her to a different medication without an apt. I would recommend that she schedule an apt or wait for Dr. Dave to get back.

## 2023-07-21 ENCOUNTER — LAB VISIT (OUTPATIENT)
Dept: LAB | Facility: HOSPITAL | Age: 63
End: 2023-07-21
Attending: NURSE PRACTITIONER
Payer: COMMERCIAL

## 2023-07-21 DIAGNOSIS — C34.11 MALIGNANT NEOPLASM OF UPPER LOBE, RIGHT BRONCHUS OR LUNG: Primary | ICD-10-CM

## 2023-07-21 DIAGNOSIS — R53.83 OTHER FATIGUE: ICD-10-CM

## 2023-07-21 LAB
ALBUMIN SERPL BCP-MCNC: 3.4 G/DL (ref 3.5–5.2)
ALP SERPL-CCNC: 95 U/L (ref 55–135)
ALT SERPL W/O P-5'-P-CCNC: 20 U/L (ref 10–44)
ANION GAP SERPL CALC-SCNC: 9 MMOL/L (ref 8–16)
AST SERPL-CCNC: 13 U/L (ref 10–40)
BASOPHILS # BLD AUTO: 0.01 K/UL (ref 0–0.2)
BASOPHILS NFR BLD: 0.3 % (ref 0–1.9)
BILIRUB SERPL-MCNC: 0.2 MG/DL (ref 0.1–1)
BUN SERPL-MCNC: 7 MG/DL (ref 8–23)
CALCIUM SERPL-MCNC: 9.1 MG/DL (ref 8.7–10.5)
CHLORIDE SERPL-SCNC: 103 MMOL/L (ref 95–110)
CO2 SERPL-SCNC: 25 MMOL/L (ref 23–29)
CREAT SERPL-MCNC: 0.8 MG/DL (ref 0.5–1.4)
DIFFERENTIAL METHOD: ABNORMAL
EOSINOPHIL # BLD AUTO: 0 K/UL (ref 0–0.5)
EOSINOPHIL NFR BLD: 1 % (ref 0–8)
ERYTHROCYTE [DISTWIDTH] IN BLOOD BY AUTOMATED COUNT: 23.2 % (ref 11.5–14.5)
EST. GFR  (NO RACE VARIABLE): >60 ML/MIN/1.73 M^2
GLUCOSE SERPL-MCNC: 91 MG/DL (ref 70–110)
HCT VFR BLD AUTO: 28.6 % (ref 37–48.5)
HGB BLD-MCNC: 8.7 G/DL (ref 12–16)
IMM GRANULOCYTES # BLD AUTO: 0.01 K/UL (ref 0–0.04)
IMM GRANULOCYTES NFR BLD AUTO: 0.3 % (ref 0–0.5)
LYMPHOCYTES # BLD AUTO: 1.4 K/UL (ref 1–4.8)
LYMPHOCYTES NFR BLD: 46.5 % (ref 18–48)
MAGNESIUM SERPL-MCNC: 2 MG/DL (ref 1.6–2.6)
MCH RBC QN AUTO: 28.1 PG (ref 27–31)
MCHC RBC AUTO-ENTMCNC: 30.4 G/DL (ref 32–36)
MCV RBC AUTO: 92 FL (ref 82–98)
MONOCYTES # BLD AUTO: 0.6 K/UL (ref 0.3–1)
MONOCYTES NFR BLD: 18.6 % (ref 4–15)
NEUTROPHILS # BLD AUTO: 1 K/UL (ref 1.8–7.7)
NEUTROPHILS NFR BLD: 33.3 % (ref 38–73)
NRBC BLD-RTO: 0 /100 WBC
PLATELET # BLD AUTO: 220 K/UL (ref 150–450)
PMV BLD AUTO: 10.1 FL (ref 9.2–12.9)
POTASSIUM SERPL-SCNC: 4.4 MMOL/L (ref 3.5–5.1)
PROT SERPL-MCNC: 6.7 G/DL (ref 6–8.4)
RBC # BLD AUTO: 3.1 M/UL (ref 4–5.4)
SODIUM SERPL-SCNC: 137 MMOL/L (ref 136–145)
WBC # BLD AUTO: 3.01 K/UL (ref 3.9–12.7)

## 2023-07-21 PROCEDURE — 36415 COLL VENOUS BLD VENIPUNCTURE: CPT | Mod: PN | Performed by: INTERNAL MEDICINE

## 2023-07-21 PROCEDURE — 80053 COMPREHEN METABOLIC PANEL: CPT | Performed by: INTERNAL MEDICINE

## 2023-07-21 PROCEDURE — 83735 ASSAY OF MAGNESIUM: CPT | Performed by: INTERNAL MEDICINE

## 2023-07-21 PROCEDURE — 85025 COMPLETE CBC W/AUTO DIFF WBC: CPT | Performed by: INTERNAL MEDICINE

## 2023-07-28 ENCOUNTER — PATIENT MESSAGE (OUTPATIENT)
Dept: RESEARCH | Facility: HOSPITAL | Age: 63
End: 2023-07-28
Payer: COMMERCIAL

## 2023-07-31 ENCOUNTER — LAB VISIT (OUTPATIENT)
Dept: LAB | Facility: HOSPITAL | Age: 63
End: 2023-07-31
Attending: NURSE PRACTITIONER
Payer: COMMERCIAL

## 2023-07-31 DIAGNOSIS — C34.11 MALIGNANT NEOPLASM OF UPPER LOBE OF RIGHT LUNG: Primary | ICD-10-CM

## 2023-07-31 DIAGNOSIS — R53.83 FATIGUE: ICD-10-CM

## 2023-07-31 LAB
ALBUMIN SERPL BCP-MCNC: 3.6 G/DL (ref 3.5–5.2)
ALP SERPL-CCNC: 82 U/L (ref 55–135)
ALT SERPL W/O P-5'-P-CCNC: 26 U/L (ref 10–44)
ANION GAP SERPL CALC-SCNC: 11 MMOL/L (ref 8–16)
AST SERPL-CCNC: 22 U/L (ref 10–40)
BASOPHILS # BLD AUTO: 0.01 K/UL (ref 0–0.2)
BASOPHILS NFR BLD: 0.2 % (ref 0–1.9)
BILIRUB SERPL-MCNC: 0.2 MG/DL (ref 0.1–1)
BUN SERPL-MCNC: 8 MG/DL (ref 8–23)
CALCIUM SERPL-MCNC: 9.1 MG/DL (ref 8.7–10.5)
CHLORIDE SERPL-SCNC: 103 MMOL/L (ref 95–110)
CO2 SERPL-SCNC: 23 MMOL/L (ref 23–29)
CREAT SERPL-MCNC: 0.9 MG/DL (ref 0.5–1.4)
DIFFERENTIAL METHOD: ABNORMAL
EOSINOPHIL # BLD AUTO: 0.1 K/UL (ref 0–0.5)
EOSINOPHIL NFR BLD: 1.1 % (ref 0–8)
ERYTHROCYTE [DISTWIDTH] IN BLOOD BY AUTOMATED COUNT: 22 % (ref 11.5–14.5)
EST. GFR  (NO RACE VARIABLE): >60 ML/MIN/1.73 M^2
GLUCOSE SERPL-MCNC: 124 MG/DL (ref 70–110)
HCT VFR BLD AUTO: 33.8 % (ref 37–48.5)
HGB BLD-MCNC: 10.3 G/DL (ref 12–16)
IMM GRANULOCYTES # BLD AUTO: 0.02 K/UL (ref 0–0.04)
IMM GRANULOCYTES NFR BLD AUTO: 0.4 % (ref 0–0.5)
LYMPHOCYTES # BLD AUTO: 1.7 K/UL (ref 1–4.8)
LYMPHOCYTES NFR BLD: 35.4 % (ref 18–48)
MCH RBC QN AUTO: 28.5 PG (ref 27–31)
MCHC RBC AUTO-ENTMCNC: 30.5 G/DL (ref 32–36)
MCV RBC AUTO: 93 FL (ref 82–98)
MONOCYTES # BLD AUTO: 0.8 K/UL (ref 0.3–1)
MONOCYTES NFR BLD: 16 % (ref 4–15)
NEUTROPHILS # BLD AUTO: 2.2 K/UL (ref 1.8–7.7)
NEUTROPHILS NFR BLD: 46.9 % (ref 38–73)
NRBC BLD-RTO: 0 /100 WBC
PLATELET # BLD AUTO: 326 K/UL (ref 150–450)
PMV BLD AUTO: 9.7 FL (ref 9.2–12.9)
POTASSIUM SERPL-SCNC: 3.9 MMOL/L (ref 3.5–5.1)
PROT SERPL-MCNC: 7.1 G/DL (ref 6–8.4)
RBC # BLD AUTO: 3.62 M/UL (ref 4–5.4)
SODIUM SERPL-SCNC: 137 MMOL/L (ref 136–145)
T4 FREE SERPL-MCNC: 0.68 NG/DL (ref 0.71–1.51)
TSH SERPL DL<=0.005 MIU/L-ACNC: 10.97 UIU/ML (ref 0.4–4)
WBC # BLD AUTO: 4.69 K/UL (ref 3.9–12.7)

## 2023-07-31 PROCEDURE — 84439 ASSAY OF FREE THYROXINE: CPT | Performed by: NURSE PRACTITIONER

## 2023-07-31 PROCEDURE — 80053 COMPREHEN METABOLIC PANEL: CPT | Performed by: NURSE PRACTITIONER

## 2023-07-31 PROCEDURE — 36415 COLL VENOUS BLD VENIPUNCTURE: CPT | Mod: PN | Performed by: NURSE PRACTITIONER

## 2023-07-31 PROCEDURE — 85025 COMPLETE CBC W/AUTO DIFF WBC: CPT | Performed by: NURSE PRACTITIONER

## 2023-07-31 PROCEDURE — 84443 ASSAY THYROID STIM HORMONE: CPT | Performed by: NURSE PRACTITIONER

## 2023-08-01 ENCOUNTER — HOSPITAL ENCOUNTER (OUTPATIENT)
Dept: RADIOLOGY | Facility: HOSPITAL | Age: 63
Discharge: HOME | End: 2023-08-01
Attending: STUDENT IN AN ORGANIZED HEALTH CARE EDUCATION/TRAINING PROGRAM
Payer: COMMERCIAL

## 2023-08-01 DIAGNOSIS — C34.90 MALIGNANT NEOPLASM OF UNSPECIFIED PART OF UNSPECIFIED BRONCHUS OR LUNG: ICD-10-CM

## 2023-08-01 PROCEDURE — 25500020 PHARM REV CODE 255: Mod: PO | Performed by: STUDENT IN AN ORGANIZED HEALTH CARE EDUCATION/TRAINING PROGRAM

## 2023-08-01 PROCEDURE — 70553 MRI BRAIN STEM W/O & W/DYE: CPT | Mod: TC,PO

## 2023-08-01 PROCEDURE — 70553 MRI BRAIN W WO CONTRAST: ICD-10-PCS | Mod: 26,,, | Performed by: RADIOLOGY

## 2023-08-01 PROCEDURE — 70553 MRI BRAIN STEM W/O & W/DYE: CPT | Mod: 26,,, | Performed by: RADIOLOGY

## 2023-08-01 PROCEDURE — A9585 GADOBUTROL INJECTION: HCPCS | Mod: PO | Performed by: STUDENT IN AN ORGANIZED HEALTH CARE EDUCATION/TRAINING PROGRAM

## 2023-08-01 RX ORDER — GADOBUTROL 604.72 MG/ML
10 INJECTION INTRAVENOUS
Status: COMPLETED | OUTPATIENT
Start: 2023-08-01 | End: 2023-08-01

## 2023-08-01 RX ADMIN — GADOBUTROL 9 ML: 604.72 INJECTION INTRAVENOUS at 10:08

## 2023-08-02 ENCOUNTER — OFFICE VISIT (OUTPATIENT)
Dept: RADIATION ONCOLOGY | Facility: CLINIC | Age: 63
End: 2023-08-02
Payer: COMMERCIAL

## 2023-08-02 DIAGNOSIS — C34.81 SMALL CELL CARCINOMA OF OVERLAPPING SITES OF RIGHT LUNG: Primary | ICD-10-CM

## 2023-08-02 PROCEDURE — 99213 PR OFFICE/OUTPT VISIT, EST, LEVL III, 20-29 MIN: ICD-10-PCS | Mod: 95,,, | Performed by: STUDENT IN AN ORGANIZED HEALTH CARE EDUCATION/TRAINING PROGRAM

## 2023-08-02 PROCEDURE — 3044F HG A1C LEVEL LT 7.0%: CPT | Mod: CPTII,95,, | Performed by: STUDENT IN AN ORGANIZED HEALTH CARE EDUCATION/TRAINING PROGRAM

## 2023-08-02 PROCEDURE — 99213 OFFICE O/P EST LOW 20 MIN: CPT | Mod: 95,,, | Performed by: STUDENT IN AN ORGANIZED HEALTH CARE EDUCATION/TRAINING PROGRAM

## 2023-08-02 PROCEDURE — 4010F PR ACE/ARB THEARPY RXD/TAKEN: ICD-10-PCS | Mod: CPTII,95,, | Performed by: STUDENT IN AN ORGANIZED HEALTH CARE EDUCATION/TRAINING PROGRAM

## 2023-08-02 PROCEDURE — 3044F PR MOST RECENT HEMOGLOBIN A1C LEVEL <7.0%: ICD-10-PCS | Mod: CPTII,95,, | Performed by: STUDENT IN AN ORGANIZED HEALTH CARE EDUCATION/TRAINING PROGRAM

## 2023-08-02 PROCEDURE — 4010F ACE/ARB THERAPY RXD/TAKEN: CPT | Mod: CPTII,95,, | Performed by: STUDENT IN AN ORGANIZED HEALTH CARE EDUCATION/TRAINING PROGRAM

## 2023-08-02 NOTE — PROGRESS NOTES
[facility]  Follow Up Visit Note    Virtual Visit: patient in [place]    Assessment  Malathi Martinez is a 62 y.o. female with a extensive stage, group stage IVB, small cell lung cancer s/p chemo/IO, due to start consolidative IO.   MRI brain 23 with NEY  ECOG: (1) Restricted in physically strenuous activity, ambulatory and able to do work of light nature    Plan:  She has started IO. She will continue to follow with Dr. Westfall. I will see her back PRN.   I recommended MRI brain every 3-4 months for year 1, every 6 months during year 2.        Oncologic History:  22: CT lung screening LungRADS 2  23: CT neck with bulky SCV and mediastinal LN  23:   CT chest with extensive mediastinal and right hilar lymphadenopathy with vascular and tracheal encasement, narrowing of distal trachea   MRI brain with NEY  5/3/23: Bronchoscopy  Path: 4R and 4L with small cell carcinoma   -23: C1 Cis/ etoposide.   23: PET/CT with bilateral SCV adenopathy, extensive bilateral mediastinal adenopathy, right > left effusions, with evidence of distant disease, such as right first rib, subpectoral node, subcutaneous node and possible muscle metastases.     23 - current: carbo/etop/atezo followed by atezo monotherapy.      Interval History  The patient presents today for a virtual follow up after MRI brain. She has no complaints. Doing well with IO    Review of Systems   ROS as above    Social History:  Social History     Tobacco Use    Smoking status: Former     Current packs/day: 0.00     Average packs/day: 0.8 packs/day for 41.0 years (30.8 ttl pk-yrs)     Types: Cigarettes     Start date: 1982     Quit date: 2023     Years since quittin.2    Smokeless tobacco: Never   Substance Use Topics    Alcohol use: No    Drug use: No           Exam:  There were no vitals filed for this visit.    Constitutional: Pleasant 62 y.o. female in no acute distress.  Well  nourished. Well groomed.       Data Review:  Information obtained from Malathi Martinez and via chart review.     Independent Interpretation of Test(s): MRI brain from 8/1/23 was personally reviewed with Ms. Martinez via screen share.         Michael Crawley MD  Radiation Oncology

## 2023-08-04 ENCOUNTER — HOSPITAL ENCOUNTER (OUTPATIENT)
Dept: RADIOLOGY | Facility: HOSPITAL | Age: 63
Discharge: HOME | End: 2023-08-04
Attending: FAMILY MEDICINE
Payer: COMMERCIAL

## 2023-08-04 ENCOUNTER — OFFICE VISIT (OUTPATIENT)
Dept: FAMILY MEDICINE | Facility: CLINIC | Age: 63
End: 2023-08-04
Payer: COMMERCIAL

## 2023-08-04 VITALS
HEART RATE: 73 BPM | OXYGEN SATURATION: 98 % | HEIGHT: 70 IN | DIASTOLIC BLOOD PRESSURE: 64 MMHG | SYSTOLIC BLOOD PRESSURE: 138 MMHG | BODY MASS INDEX: 29.28 KG/M2 | WEIGHT: 204.5 LBS

## 2023-08-04 DIAGNOSIS — M79.89 RIGHT LEG SWELLING: Primary | ICD-10-CM

## 2023-08-04 DIAGNOSIS — E03.9 HYPOTHYROIDISM, UNSPECIFIED TYPE: ICD-10-CM

## 2023-08-04 DIAGNOSIS — G47.9 TROUBLE IN SLEEPING: ICD-10-CM

## 2023-08-04 DIAGNOSIS — M79.89 RIGHT LEG SWELLING: ICD-10-CM

## 2023-08-04 DIAGNOSIS — E11.9 TYPE 2 DIABETES MELLITUS WITHOUT COMPLICATION, WITHOUT LONG-TERM CURRENT USE OF INSULIN: ICD-10-CM

## 2023-08-04 PROCEDURE — 1159F PR MEDICATION LIST DOCUMENTED IN MEDICAL RECORD: ICD-10-PCS | Mod: CPTII,S$GLB,, | Performed by: FAMILY MEDICINE

## 2023-08-04 PROCEDURE — 3008F BODY MASS INDEX DOCD: CPT | Mod: CPTII,S$GLB,, | Performed by: FAMILY MEDICINE

## 2023-08-04 PROCEDURE — 3078F DIAST BP <80 MM HG: CPT | Mod: CPTII,S$GLB,, | Performed by: FAMILY MEDICINE

## 2023-08-04 PROCEDURE — 99999 PR PBB SHADOW E&M-EST. PATIENT-LVL V: CPT | Mod: PBBFAC,,, | Performed by: FAMILY MEDICINE

## 2023-08-04 PROCEDURE — 99999 PR PBB SHADOW E&M-EST. PATIENT-LVL V: ICD-10-PCS | Mod: PBBFAC,,, | Performed by: FAMILY MEDICINE

## 2023-08-04 PROCEDURE — 3044F HG A1C LEVEL LT 7.0%: CPT | Mod: CPTII,S$GLB,, | Performed by: FAMILY MEDICINE

## 2023-08-04 PROCEDURE — 4010F PR ACE/ARB THEARPY RXD/TAKEN: ICD-10-PCS | Mod: CPTII,S$GLB,, | Performed by: FAMILY MEDICINE

## 2023-08-04 PROCEDURE — 3078F PR MOST RECENT DIASTOLIC BLOOD PRESSURE < 80 MM HG: ICD-10-PCS | Mod: CPTII,S$GLB,, | Performed by: FAMILY MEDICINE

## 2023-08-04 PROCEDURE — 93970 EXTREMITY STUDY: CPT | Mod: 26,,, | Performed by: RADIOLOGY

## 2023-08-04 PROCEDURE — 3044F PR MOST RECENT HEMOGLOBIN A1C LEVEL <7.0%: ICD-10-PCS | Mod: CPTII,S$GLB,, | Performed by: FAMILY MEDICINE

## 2023-08-04 PROCEDURE — 1160F PR REVIEW ALL MEDS BY PRESCRIBER/CLIN PHARMACIST DOCUMENTED: ICD-10-PCS | Mod: CPTII,S$GLB,, | Performed by: FAMILY MEDICINE

## 2023-08-04 PROCEDURE — 4010F ACE/ARB THERAPY RXD/TAKEN: CPT | Mod: CPTII,S$GLB,, | Performed by: FAMILY MEDICINE

## 2023-08-04 PROCEDURE — 93970 EXTREMITY STUDY: CPT | Mod: TC,PO

## 2023-08-04 PROCEDURE — 3008F PR BODY MASS INDEX (BMI) DOCUMENTED: ICD-10-PCS | Mod: CPTII,S$GLB,, | Performed by: FAMILY MEDICINE

## 2023-08-04 PROCEDURE — 1160F RVW MEDS BY RX/DR IN RCRD: CPT | Mod: CPTII,S$GLB,, | Performed by: FAMILY MEDICINE

## 2023-08-04 PROCEDURE — 1159F MED LIST DOCD IN RCRD: CPT | Mod: CPTII,S$GLB,, | Performed by: FAMILY MEDICINE

## 2023-08-04 PROCEDURE — 3075F SYST BP GE 130 - 139MM HG: CPT | Mod: CPTII,S$GLB,, | Performed by: FAMILY MEDICINE

## 2023-08-04 PROCEDURE — 99214 PR OFFICE/OUTPT VISIT, EST, LEVL IV, 30-39 MIN: ICD-10-PCS | Mod: S$GLB,,, | Performed by: FAMILY MEDICINE

## 2023-08-04 PROCEDURE — 3075F PR MOST RECENT SYSTOLIC BLOOD PRESS GE 130-139MM HG: ICD-10-PCS | Mod: CPTII,S$GLB,, | Performed by: FAMILY MEDICINE

## 2023-08-04 PROCEDURE — 99214 OFFICE O/P EST MOD 30 MIN: CPT | Mod: S$GLB,,, | Performed by: FAMILY MEDICINE

## 2023-08-04 PROCEDURE — 93970 US LOWER EXTREMITY VEINS BILATERAL: ICD-10-PCS | Mod: 26,,, | Performed by: RADIOLOGY

## 2023-08-04 RX ORDER — LEVOTHYROXINE SODIUM 88 UG/1
88 TABLET ORAL EVERY MORNING
COMMUNITY
Start: 2023-08-02 | End: 2023-08-04 | Stop reason: DRUGHIGH

## 2023-08-04 RX ORDER — LEVOTHYROXINE SODIUM 100 UG/1
100 TABLET ORAL
Qty: 30 TABLET | Refills: 11 | Status: SHIPPED | OUTPATIENT
Start: 2023-08-04 | End: 2023-08-04

## 2023-08-04 RX ORDER — TRAZODONE HYDROCHLORIDE 50 MG/1
50 TABLET ORAL NIGHTLY
Qty: 30 TABLET | Refills: 11 | Status: SHIPPED | OUTPATIENT
Start: 2023-08-04 | End: 2023-10-13 | Stop reason: SDUPTHER

## 2023-08-04 NOTE — PROGRESS NOTES
"Subjective:       Patient ID: Malathi Martinez is a 62 y.o. female.    Chief Complaint: Follow-up (Scalp getting itchy with hair growing back and right ankle is swelling and thyroid )    Follow-up  Associated symptoms include neck pain and weakness. Pertinent negatives include no arthralgias, chest pain, headaches, joint swelling or vomiting.     Patient in clinic for f/u.   Hair regrowth starting. Scalp itching a little.   Top of the foot bothering her, some swelling.   TSH still elevated on 88mcg dose. Taking am, etc.   Significant increase to appetite and weight gain with remeron.    Review of Systems:  Review of Systems   Constitutional:  Positive for activity change and unexpected weight change.   HENT:  Positive for hearing loss and rhinorrhea. Negative for trouble swallowing.    Eyes:  Positive for visual disturbance. Negative for discharge.   Respiratory:  Negative for chest tightness and wheezing.    Cardiovascular:  Positive for palpitations. Negative for chest pain.   Gastrointestinal:  Positive for diarrhea. Negative for blood in stool, constipation and vomiting.   Endocrine: Negative for polydipsia and polyuria.   Genitourinary:  Negative for difficulty urinating, dysuria and hematuria.   Musculoskeletal:  Positive for neck pain. Negative for arthralgias and joint swelling.   Neurological:  Positive for weakness. Negative for headaches.   Psychiatric/Behavioral:  Positive for dysphoric mood. Negative for confusion.        Objective:     Vitals:    08/04/23 1321   BP: 138/64   Pulse: 73   SpO2: 98%   Weight: 92.8 kg (204 lb 7.6 oz)   Height: 5' 10" (1.778 m)          Physical Exam  Vitals and nursing note reviewed.   Constitutional:       General: She is not in acute distress.     Appearance: Normal appearance. She is well-developed.   HENT:      Head: Normocephalic and atraumatic.   Eyes:      General: No scleral icterus.        Right eye: No discharge.         Left eye: No discharge.      " Conjunctiva/sclera: Conjunctivae normal.   Cardiovascular:      Rate and Rhythm: Normal rate.      Pulses:           Dorsalis pedis pulses are 1+ on the right side and 1+ on the left side.        Posterior tibial pulses are 1+ on the right side and 1+ on the left side.   Pulmonary:      Effort: Pulmonary effort is normal. No respiratory distress.   Abdominal:      Palpations: Abdomen is soft.      Tenderness: There is no guarding.   Musculoskeletal:         General: No deformity or signs of injury.      Cervical back: Neck supple.      Right lower leg: Edema (more than usual, currently 1+) present.      Left lower leg: Edema (trace to 1+) present.      Right foot: Normal range of motion. No deformity.      Left foot: Normal range of motion. No deformity.   Feet:      Right foot:      Protective Sensation: 5 sites tested.  5 sites sensed.      Skin integrity: No ulcer, blister, skin breakdown, erythema, warmth, callus or dry skin.      Left foot:      Protective Sensation: 5 sites tested.  5 sites sensed.      Skin integrity: No ulcer, blister, skin breakdown, erythema, warmth, callus or dry skin.   Lymphadenopathy:      Cervical: No cervical adenopathy.   Skin:     General: Skin is warm and dry.      Findings: No rash.   Neurological:      General: No focal deficit present.      Mental Status: She is alert and oriented to person, place, and time.   Psychiatric:         Mood and Affect: Mood normal.         Behavior: Behavior normal.           Assessment & Plan:  Right leg swelling  Comments:  needs dvt r/o  Orders:  -     US Lower Extremity Veins Bilateral; Future; Expected date: 08/04/2023    Type 2 diabetes mellitus without complication, without long-term current use of insulin  Comments:  stable, cont monitoring    Trouble in sleeping  -     traZODone (DESYREL) 50 MG tablet; Take 1 tablet (50 mg total) by mouth every evening.  Dispense: 30 tablet; Refill: 11    Hypothyroidism, unspecified type  Comments:  repeat  tsh 4 weeks    Other orders  -     Discontinue: levothyroxine (SYNTHROID) 100 MCG tablet; Take 1 tablet (100 mcg total) by mouth before breakfast.  Dispense: 30 tablet; Refill: 11

## 2023-08-07 ENCOUNTER — RESEARCH ENCOUNTER (OUTPATIENT)
Dept: RESEARCH | Facility: HOSPITAL | Age: 63
End: 2023-08-07
Payer: COMMERCIAL

## 2023-08-07 DIAGNOSIS — C34.90 SCLC (SMALL CELL LUNG CARCINOMA): Primary | ICD-10-CM

## 2023-08-07 DIAGNOSIS — Z00.6 EXAMINATION OF PARTICIPANT IN CLINICAL TRIAL: ICD-10-CM

## 2023-08-07 NOTE — PROGRESS NOTES
Ms. Martinez was called today regarding her participation in (IRB #[id number] PI: Aurelia).   The Verbal Informed Consent was read and discussed by the consenter. The following was discussed:  Types of specimens to be collected  All medical information released to researchers will be stripped of identifiers and no patient information will be given to anyone outside of this research project.   Participating in a research study is not the same as getting regular medical care and will not improve the patient's health. The purpose of a research study is to gather information.  Being in this study does not interfere with your regular medical care.  The patient does not have to participate in this study. If they do not join, their care at [facility] will not be affected.  The person granting permission was provided adequate time to ask questions regarding the scope and purpose of the study.  Permission was obtained by telephone.   The above statements were read by the person obtaining permission to the person granting permission and witnessed by Elly Snyder. The witness information was documented on the verbal consent form as well.  This Verbal Informed Consent process was conducted prior to initiation of any study procedures.

## 2023-08-14 ENCOUNTER — LAB VISIT (OUTPATIENT)
Dept: LAB | Facility: HOSPITAL | Age: 63
End: 2023-08-14
Attending: FAMILY MEDICINE
Payer: COMMERCIAL

## 2023-08-14 DIAGNOSIS — Z00.6 EXAMINATION OF PARTICIPANT IN CLINICAL TRIAL: ICD-10-CM

## 2023-08-14 DIAGNOSIS — C34.11 MALIGNANT NEOPLASM OF UPPER LOBE OF RIGHT LUNG: Primary | ICD-10-CM

## 2023-08-14 DIAGNOSIS — R53.83 FATIGUE: ICD-10-CM

## 2023-08-14 DIAGNOSIS — C34.90 SCLC (SMALL CELL LUNG CARCINOMA): ICD-10-CM

## 2023-08-14 LAB
ALBUMIN SERPL BCP-MCNC: 3.7 G/DL (ref 3.5–5.2)
ALP SERPL-CCNC: 84 U/L (ref 55–135)
ALT SERPL W/O P-5'-P-CCNC: 25 U/L (ref 10–44)
ANION GAP SERPL CALC-SCNC: 10 MMOL/L (ref 8–16)
AST SERPL-CCNC: 17 U/L (ref 10–40)
BASOPHILS # BLD AUTO: 0.01 K/UL (ref 0–0.2)
BASOPHILS NFR BLD: 0.2 % (ref 0–1.9)
BILIRUB SERPL-MCNC: 0.2 MG/DL (ref 0.1–1)
BUN SERPL-MCNC: 9 MG/DL (ref 8–23)
CALCIUM SERPL-MCNC: 9.1 MG/DL (ref 8.7–10.5)
CHLORIDE SERPL-SCNC: 102 MMOL/L (ref 95–110)
CO2 SERPL-SCNC: 25 MMOL/L (ref 23–29)
CREAT SERPL-MCNC: 0.8 MG/DL (ref 0.5–1.4)
DIFFERENTIAL METHOD: ABNORMAL
DRUG STUDY SPECIMEN TYPE: NORMAL
EOSINOPHIL # BLD AUTO: 0.1 K/UL (ref 0–0.5)
EOSINOPHIL NFR BLD: 1 % (ref 0–8)
ERYTHROCYTE [DISTWIDTH] IN BLOOD BY AUTOMATED COUNT: 18.5 % (ref 11.5–14.5)
EST. GFR  (NO RACE VARIABLE): >60 ML/MIN/1.73 M^2
GLUCOSE SERPL-MCNC: 89 MG/DL (ref 70–110)
HCT VFR BLD AUTO: 34.2 % (ref 37–48.5)
HGB BLD-MCNC: 10.7 G/DL (ref 12–16)
IMM GRANULOCYTES # BLD AUTO: 0.01 K/UL (ref 0–0.04)
IMM GRANULOCYTES NFR BLD AUTO: 0.2 % (ref 0–0.5)
LYMPHOCYTES # BLD AUTO: 1.8 K/UL (ref 1–4.8)
LYMPHOCYTES NFR BLD: 35.9 % (ref 18–48)
MCH RBC QN AUTO: 29.9 PG (ref 27–31)
MCHC RBC AUTO-ENTMCNC: 31.3 G/DL (ref 32–36)
MCV RBC AUTO: 96 FL (ref 82–98)
MONOCYTES # BLD AUTO: 0.6 K/UL (ref 0.3–1)
MONOCYTES NFR BLD: 12.4 % (ref 4–15)
NEUTROPHILS # BLD AUTO: 2.5 K/UL (ref 1.8–7.7)
NEUTROPHILS NFR BLD: 50.3 % (ref 38–73)
NRBC BLD-RTO: 0 /100 WBC
PLATELET # BLD AUTO: 267 K/UL (ref 150–450)
PMV BLD AUTO: 10.8 FL (ref 9.2–12.9)
POTASSIUM SERPL-SCNC: 3.9 MMOL/L (ref 3.5–5.1)
PROT SERPL-MCNC: 7.1 G/DL (ref 6–8.4)
RBC # BLD AUTO: 3.58 M/UL (ref 4–5.4)
SODIUM SERPL-SCNC: 137 MMOL/L (ref 136–145)
WBC # BLD AUTO: 5.01 K/UL (ref 3.9–12.7)

## 2023-08-14 PROCEDURE — 80053 COMPREHEN METABOLIC PANEL: CPT | Performed by: NURSE PRACTITIONER

## 2023-08-14 PROCEDURE — 85025 COMPLETE CBC W/AUTO DIFF WBC: CPT | Performed by: NURSE PRACTITIONER

## 2023-08-14 PROCEDURE — 36415 COLL VENOUS BLD VENIPUNCTURE: CPT | Mod: PN | Performed by: FAMILY MEDICINE

## 2023-08-17 ENCOUNTER — HOSPITAL ENCOUNTER (OUTPATIENT)
Dept: RADIOLOGY | Facility: HOSPITAL | Age: 63
Discharge: HOME | End: 2023-08-17
Attending: PHYSICIAN ASSISTANT
Payer: COMMERCIAL

## 2023-08-17 DIAGNOSIS — R53.83 OTHER FATIGUE: ICD-10-CM

## 2023-08-17 DIAGNOSIS — C34.11: ICD-10-CM

## 2023-08-17 LAB — GLUCOSE SERPL-MCNC: 106 MG/DL (ref 70–110)

## 2023-08-17 PROCEDURE — A9552 F18 FDG: HCPCS | Mod: PN

## 2023-08-17 PROCEDURE — 78815 PET IMAGE W/CT SKULL-THIGH: CPT | Mod: 26,PS,, | Performed by: RADIOLOGY

## 2023-08-17 PROCEDURE — 78815 NM PET CT ROUTINE: ICD-10-PCS | Mod: 26,PS,, | Performed by: RADIOLOGY

## 2023-08-17 NOTE — PROGRESS NOTES
PET Imaging Questionnaire    Are you a Diabetic? Recent Blood Sugar level? Yes    Are you anemic? Bone Marrow Stimulation Meds? No    Have you had a CT Scan, if so when & where was your last one? Yes -     Have you had a PET Scan, if so when & where was your last one? Yes -     Chemotherapy or currently on Chemotherapy? Yes    Radiation therapy? No    Surgical History:   Past Surgical History:   Procedure Laterality Date    APPENDECTOMY      COLONOSCOPY N/A 1/18/2019    Procedure: COLONOSCOPY;  Surgeon: Lai Cardoso MD;  Location: [facility];  Service: Endoscopy;  Laterality: N/A;    ENDOBRONCHIAL ULTRASOUND N/A 5/3/2023    Procedure: ENDOBRONCHIAL ULTRASOUND (EBUS);  Surgeon: Nahun Nguyen MD;  Location[facility] OR;  Service: Pulmonary;  Laterality: N/A;    ESOPHAGOGASTRODUODENOSCOPY N/A 2/19/2021    Procedure: ESOPHAGOGASTRODUODENOSCOPY (EGD);  Surgeon: Lai Cardoso MD;  Location: [facility];  Service: Endoscopy;  Laterality: N/A;    INSERTION OF TUNNELED CENTRAL VENOUS CATHETER (CVC) WITH SUBCUTANEOUS PORT Right 5/22/2023    Procedure: SNEBWJRAN-UZIU-B-CATH;  Surgeon: Ayah Aldana MD;  Location: [facility];  Service: General;  Laterality: Right;    TONSILLECTOMY          Have you been fasting for at least 6 hours? Yes    Is there any chance you may be pregnant or breastfeeding? No    Assay: 11.45 MCi@:7:38   Injection Site:RT AC    Residual: 1.075 mCi@: 7:40   Technologist: Ron Stnoe Injected:10.38 mCi

## 2023-08-21 ENCOUNTER — LAB VISIT (OUTPATIENT)
Dept: LAB | Facility: HOSPITAL | Age: 63
End: 2023-08-21
Attending: PHYSICIAN ASSISTANT
Payer: COMMERCIAL

## 2023-08-21 DIAGNOSIS — C34.11 MALIGNANT NEOPLASM OF UPPER LOBE OF RIGHT LUNG: Primary | ICD-10-CM

## 2023-08-21 DIAGNOSIS — R53.83 FATIGUE: ICD-10-CM

## 2023-08-21 LAB
ALBUMIN SERPL BCP-MCNC: 3.8 G/DL (ref 3.5–5.2)
ALP SERPL-CCNC: 79 U/L (ref 55–135)
ALT SERPL W/O P-5'-P-CCNC: 26 U/L (ref 10–44)
ANION GAP SERPL CALC-SCNC: 12 MMOL/L (ref 8–16)
AST SERPL-CCNC: 18 U/L (ref 10–40)
BASOPHILS # BLD AUTO: 0.01 K/UL (ref 0–0.2)
BASOPHILS NFR BLD: 0.2 % (ref 0–1.9)
BILIRUB SERPL-MCNC: 0.2 MG/DL (ref 0.1–1)
BUN SERPL-MCNC: 11 MG/DL (ref 8–23)
CALCIUM SERPL-MCNC: 9.3 MG/DL (ref 8.7–10.5)
CHLORIDE SERPL-SCNC: 106 MMOL/L (ref 95–110)
CO2 SERPL-SCNC: 22 MMOL/L (ref 23–29)
CREAT SERPL-MCNC: 1 MG/DL (ref 0.5–1.4)
DIFFERENTIAL METHOD: ABNORMAL
EOSINOPHIL # BLD AUTO: 0.1 K/UL (ref 0–0.5)
EOSINOPHIL NFR BLD: 1.4 % (ref 0–8)
ERYTHROCYTE [DISTWIDTH] IN BLOOD BY AUTOMATED COUNT: 16.7 % (ref 11.5–14.5)
EST. GFR  (NO RACE VARIABLE): >60 ML/MIN/1.73 M^2
FERRITIN SERPL-MCNC: 34 NG/ML (ref 20–300)
GLUCOSE SERPL-MCNC: 76 MG/DL (ref 70–110)
HCT VFR BLD AUTO: 36.2 % (ref 37–48.5)
HGB BLD-MCNC: 11.1 G/DL (ref 12–16)
IMM GRANULOCYTES # BLD AUTO: 0.01 K/UL (ref 0–0.04)
IMM GRANULOCYTES NFR BLD AUTO: 0.2 % (ref 0–0.5)
IRON SERPL-MCNC: 61 UG/DL (ref 30–160)
LYMPHOCYTES # BLD AUTO: 1.6 K/UL (ref 1–4.8)
LYMPHOCYTES NFR BLD: 32.7 % (ref 18–48)
MCH RBC QN AUTO: 29.1 PG (ref 27–31)
MCHC RBC AUTO-ENTMCNC: 30.7 G/DL (ref 32–36)
MCV RBC AUTO: 95 FL (ref 82–98)
MONOCYTES # BLD AUTO: 0.5 K/UL (ref 0.3–1)
MONOCYTES NFR BLD: 11.1 % (ref 4–15)
NEUTROPHILS # BLD AUTO: 2.6 K/UL (ref 1.8–7.7)
NEUTROPHILS NFR BLD: 54.4 % (ref 38–73)
NRBC BLD-RTO: 0 /100 WBC
PLATELET # BLD AUTO: 276 K/UL (ref 150–450)
PMV BLD AUTO: 10.2 FL (ref 9.2–12.9)
POTASSIUM SERPL-SCNC: 4.4 MMOL/L (ref 3.5–5.1)
PROT SERPL-MCNC: 7.4 G/DL (ref 6–8.4)
RBC # BLD AUTO: 3.82 M/UL (ref 4–5.4)
SATURATED IRON: 12 % (ref 20–50)
SODIUM SERPL-SCNC: 140 MMOL/L (ref 136–145)
TOTAL IRON BINDING CAPACITY: 512 UG/DL (ref 250–450)
TRANSFERRIN SERPL-MCNC: 346 MG/DL (ref 200–375)
WBC # BLD AUTO: 4.86 K/UL (ref 3.9–12.7)

## 2023-08-21 PROCEDURE — 85025 COMPLETE CBC W/AUTO DIFF WBC: CPT | Performed by: PHYSICIAN ASSISTANT

## 2023-08-21 PROCEDURE — 80053 COMPREHEN METABOLIC PANEL: CPT | Performed by: PHYSICIAN ASSISTANT

## 2023-08-21 PROCEDURE — 83540 ASSAY OF IRON: CPT | Performed by: PHYSICIAN ASSISTANT

## 2023-08-21 PROCEDURE — 82728 ASSAY OF FERRITIN: CPT | Performed by: PHYSICIAN ASSISTANT

## 2023-08-21 PROCEDURE — 84466 ASSAY OF TRANSFERRIN: CPT | Performed by: PHYSICIAN ASSISTANT

## 2023-08-21 PROCEDURE — 36415 COLL VENOUS BLD VENIPUNCTURE: CPT | Mod: PN | Performed by: PHYSICIAN ASSISTANT

## 2023-08-24 ENCOUNTER — OFFICE VISIT (OUTPATIENT)
Dept: FAMILY MEDICINE | Facility: CLINIC | Age: 63
End: 2023-08-24
Payer: COMMERCIAL

## 2023-08-24 VITALS
HEIGHT: 70 IN | BODY MASS INDEX: 30.38 KG/M2 | WEIGHT: 212.19 LBS | OXYGEN SATURATION: 98 % | DIASTOLIC BLOOD PRESSURE: 82 MMHG | HEART RATE: 76 BPM | SYSTOLIC BLOOD PRESSURE: 124 MMHG

## 2023-08-24 DIAGNOSIS — H11.31 CONJUNCTIVAL HEMORRHAGE OF RIGHT EYE: Primary | ICD-10-CM

## 2023-08-24 PROCEDURE — 3008F BODY MASS INDEX DOCD: CPT | Mod: CPTII,S$GLB,, | Performed by: FAMILY MEDICINE

## 2023-08-24 PROCEDURE — 3044F PR MOST RECENT HEMOGLOBIN A1C LEVEL <7.0%: ICD-10-PCS | Mod: CPTII,S$GLB,, | Performed by: FAMILY MEDICINE

## 2023-08-24 PROCEDURE — 4010F ACE/ARB THERAPY RXD/TAKEN: CPT | Mod: CPTII,S$GLB,, | Performed by: FAMILY MEDICINE

## 2023-08-24 PROCEDURE — 99999 PR PBB SHADOW E&M-EST. PATIENT-LVL V: ICD-10-PCS | Mod: PBBFAC,,, | Performed by: FAMILY MEDICINE

## 2023-08-24 PROCEDURE — 99213 OFFICE O/P EST LOW 20 MIN: CPT | Mod: S$GLB,,, | Performed by: FAMILY MEDICINE

## 2023-08-24 PROCEDURE — 1160F PR REVIEW ALL MEDS BY PRESCRIBER/CLIN PHARMACIST DOCUMENTED: ICD-10-PCS | Mod: CPTII,S$GLB,, | Performed by: FAMILY MEDICINE

## 2023-08-24 PROCEDURE — 1159F PR MEDICATION LIST DOCUMENTED IN MEDICAL RECORD: ICD-10-PCS | Mod: CPTII,S$GLB,, | Performed by: FAMILY MEDICINE

## 2023-08-24 PROCEDURE — 3008F PR BODY MASS INDEX (BMI) DOCUMENTED: ICD-10-PCS | Mod: CPTII,S$GLB,, | Performed by: FAMILY MEDICINE

## 2023-08-24 PROCEDURE — 99999 PR PBB SHADOW E&M-EST. PATIENT-LVL V: CPT | Mod: PBBFAC,,, | Performed by: FAMILY MEDICINE

## 2023-08-24 PROCEDURE — 1159F MED LIST DOCD IN RCRD: CPT | Mod: CPTII,S$GLB,, | Performed by: FAMILY MEDICINE

## 2023-08-24 PROCEDURE — 99213 PR OFFICE/OUTPT VISIT, EST, LEVL III, 20-29 MIN: ICD-10-PCS | Mod: S$GLB,,, | Performed by: FAMILY MEDICINE

## 2023-08-24 PROCEDURE — 4010F PR ACE/ARB THEARPY RXD/TAKEN: ICD-10-PCS | Mod: CPTII,S$GLB,, | Performed by: FAMILY MEDICINE

## 2023-08-24 PROCEDURE — 3079F PR MOST RECENT DIASTOLIC BLOOD PRESSURE 80-89 MM HG: ICD-10-PCS | Mod: CPTII,S$GLB,, | Performed by: FAMILY MEDICINE

## 2023-08-24 PROCEDURE — 3079F DIAST BP 80-89 MM HG: CPT | Mod: CPTII,S$GLB,, | Performed by: FAMILY MEDICINE

## 2023-08-24 PROCEDURE — 3074F SYST BP LT 130 MM HG: CPT | Mod: CPTII,S$GLB,, | Performed by: FAMILY MEDICINE

## 2023-08-24 PROCEDURE — 3044F HG A1C LEVEL LT 7.0%: CPT | Mod: CPTII,S$GLB,, | Performed by: FAMILY MEDICINE

## 2023-08-24 PROCEDURE — 1160F RVW MEDS BY RX/DR IN RCRD: CPT | Mod: CPTII,S$GLB,, | Performed by: FAMILY MEDICINE

## 2023-08-24 PROCEDURE — 3074F PR MOST RECENT SYSTOLIC BLOOD PRESSURE < 130 MM HG: ICD-10-PCS | Mod: CPTII,S$GLB,, | Performed by: FAMILY MEDICINE

## 2023-08-24 RX ORDER — LEVOTHYROXINE SODIUM 100 UG/1
100 TABLET ORAL
COMMUNITY
Start: 2023-08-04

## 2023-08-24 RX ORDER — DOXYCYCLINE HYCLATE 100 MG
100 TABLET ORAL 2 TIMES DAILY
COMMUNITY
Start: 2023-08-22 | End: 2024-02-16

## 2023-08-24 NOTE — PROGRESS NOTES
"Subjective:       Patient ID: Malathi Martinez is a 63 y.o. female.    Chief Complaint: Follow-up (Right blood shot red in corner started sunday)    Follow-up  Associated symptoms include headaches. Pertinent negatives include no arthralgias, chest pain, joint swelling, neck pain, vomiting or weakness.     Patient in clinic for f/u.   Woke up 4 days ago with redness in the R corner of her R eye. It does feel irritated, vision is normal, but she looks away from the computer more often as the eye gets tired. When the eye gets tired it starts to blur. No increased tearing, but she does have increased sleep in both eyes this week. No increase in floaters. Redness does appear to be improving.   She does have a dry cough, attributed as side effect of her chemotx.   BP has been well-controlled, she has some elevations at oncology appts, but she is asymp.    Review of Systems:  Review of Systems   Constitutional:  Positive for activity change and unexpected weight change.   HENT:  Positive for hearing loss and rhinorrhea. Negative for trouble swallowing.    Eyes:  Positive for discharge, redness and visual disturbance.   Respiratory:  Negative for chest tightness and wheezing.    Cardiovascular:  Positive for leg swelling. Negative for chest pain and palpitations.   Gastrointestinal:  Negative for blood in stool, constipation, diarrhea and vomiting.   Endocrine: Negative for polydipsia and polyuria.   Genitourinary:  Negative for difficulty urinating, dysuria, hematuria and menstrual problem.   Musculoskeletal:  Negative for arthralgias, joint swelling and neck pain.   Neurological:  Positive for headaches. Negative for weakness.   Psychiatric/Behavioral:  Positive for dysphoric mood. Negative for confusion.        Objective:     Vitals:    08/24/23 1521   BP: 124/82   Pulse: 76   SpO2: 98%   Weight: 96.2 kg (212 lb 3.1 oz)   Height: 5' 10" (1.778 m)          Physical Exam  Vitals and nursing note reviewed. "   Constitutional:       General: She is not in acute distress.     Appearance: Normal appearance. She is well-developed.   HENT:      Head: Normocephalic and atraumatic.   Eyes:      General: No scleral icterus.        Right eye: No discharge.         Left eye: No discharge.      Conjunctiva/sclera:      Right eye: Right conjunctiva is injected. Hemorrhage present. No exudate.     Left eye: Left conjunctiva is not injected. No hemorrhage.  Cardiovascular:      Rate and Rhythm: Normal rate.   Pulmonary:      Effort: Pulmonary effort is normal. No respiratory distress.   Abdominal:      Palpations: Abdomen is soft.      Tenderness: There is no guarding.   Musculoskeletal:         General: No deformity or signs of injury.      Cervical back: Neck supple.   Skin:     General: Skin is warm and dry.      Findings: No rash.   Neurological:      General: No focal deficit present.      Mental Status: She is alert and oriented to person, place, and time.   Psychiatric:         Mood and Affect: Mood normal.         Behavior: Behavior normal.           Assessment & Plan:  Conjunctival hemorrhage of right eye  -     Ambulatory referral/consult to Optometry; Future; Expected date: 08/31/2023    Optometry review ordered.

## 2023-08-26 ENCOUNTER — HOSPITAL ENCOUNTER (OUTPATIENT)
Dept: RADIOLOGY | Facility: HOSPITAL | Age: 63
Discharge: HOME | End: 2023-08-26
Attending: FAMILY MEDICINE
Payer: COMMERCIAL

## 2023-08-26 DIAGNOSIS — Z12.31 OTHER SCREENING MAMMOGRAM: ICD-10-CM

## 2023-08-26 PROCEDURE — 77067 MAMMO DIGITAL SCREENING BILAT WITH TOMO: ICD-10-PCS | Mod: 26,,, | Performed by: RADIOLOGY

## 2023-08-26 PROCEDURE — 77063 BREAST TOMOSYNTHESIS BI: CPT | Mod: 26,,, | Performed by: RADIOLOGY

## 2023-08-26 PROCEDURE — 77063 MAMMO DIGITAL SCREENING BILAT WITH TOMO: ICD-10-PCS | Mod: 26,,, | Performed by: RADIOLOGY

## 2023-08-26 PROCEDURE — 77067 SCR MAMMO BI INCL CAD: CPT | Mod: TC,PO

## 2023-08-26 PROCEDURE — 77067 SCR MAMMO BI INCL CAD: CPT | Mod: 26,,, | Performed by: RADIOLOGY

## 2023-08-28 ENCOUNTER — OFFICE VISIT (OUTPATIENT)
Dept: OPTOMETRY | Facility: CLINIC | Age: 63
End: 2023-08-28
Payer: COMMERCIAL

## 2023-08-28 DIAGNOSIS — H11.31 SUBCONJUNCTIVAL HEMORRHAGE OF RIGHT EYE: Primary | ICD-10-CM

## 2023-08-28 PROCEDURE — 99999 PR PBB SHADOW E&M-EST. PATIENT-LVL IV: ICD-10-PCS | Mod: PBBFAC,,,

## 2023-08-28 PROCEDURE — 99203 PR OFFICE/OUTPT VISIT, NEW, LEVL III, 30-44 MIN: ICD-10-PCS | Mod: S$GLB,,,

## 2023-08-28 PROCEDURE — 1159F MED LIST DOCD IN RCRD: CPT | Mod: CPTII,S$GLB,,

## 2023-08-28 PROCEDURE — 1159F PR MEDICATION LIST DOCUMENTED IN MEDICAL RECORD: ICD-10-PCS | Mod: CPTII,S$GLB,,

## 2023-08-28 PROCEDURE — 99203 OFFICE O/P NEW LOW 30 MIN: CPT | Mod: S$GLB,,,

## 2023-08-28 PROCEDURE — 3044F HG A1C LEVEL LT 7.0%: CPT | Mod: CPTII,S$GLB,,

## 2023-08-28 PROCEDURE — 4010F PR ACE/ARB THEARPY RXD/TAKEN: ICD-10-PCS | Mod: CPTII,S$GLB,,

## 2023-08-28 PROCEDURE — 4010F ACE/ARB THERAPY RXD/TAKEN: CPT | Mod: CPTII,S$GLB,,

## 2023-08-28 PROCEDURE — 3044F PR MOST RECENT HEMOGLOBIN A1C LEVEL <7.0%: ICD-10-PCS | Mod: CPTII,S$GLB,,

## 2023-08-28 PROCEDURE — 99999 PR PBB SHADOW E&M-EST. PATIENT-LVL IV: CPT | Mod: PBBFAC,,,

## 2023-08-28 NOTE — PROGRESS NOTES
HPI    Sunb conj heme OD    Pt sts it started last Sunday and lasted about a week. It had FBS and used   AT's for comfort. Pt was coughing a lot. Pt denies high blood pressure,   taking blood thinners, or strenuous activity.  Last edited by Mary Villareal, OD on 8/28/2023  4:41 PM.            Assessment /Plan     For exam results, see Encounter Report.    Subconjunctival hemorrhage of right eye  -     Ambulatory referral/consult to Optometry      Resolving subconjunctival hemorrhage superior of the right eye. Ed pt on benign nature of subconjunctival hemorrhages and that no treatment is required. Discussed various causes of subconjunctival hemorrhages including excessive coughing, sneezing, or nose blowing. Pt states she does have bad allergies and has been coughing/blowing nose more often than usual. Pt denies hypertension and is not currently on any blood thinners. Ed pt that subconjunctival hemorrhages may look worse before they look better and can take up to 2 weeks to fully resolve. Ed pt that ok to continue using OTC artificial tears prn for comfort. Ed pt to RTC if any pain or changes in vision.    RTC: in-office prn.

## 2023-09-01 ENCOUNTER — LAB VISIT (OUTPATIENT)
Dept: LAB | Facility: HOSPITAL | Age: 63
End: 2023-09-01
Attending: NURSE PRACTITIONER
Payer: COMMERCIAL

## 2023-09-01 DIAGNOSIS — C34.11 MALIGNANT NEOPLASM OF UPPER LOBE OF RIGHT LUNG: Primary | ICD-10-CM

## 2023-09-01 DIAGNOSIS — D50.9 IRON DEFICIENCY ANEMIA, UNSPECIFIED: ICD-10-CM

## 2023-09-01 DIAGNOSIS — R53.83 FATIGUE: ICD-10-CM

## 2023-09-01 LAB
ALBUMIN SERPL BCP-MCNC: 3.8 G/DL (ref 3.5–5.2)
ALP SERPL-CCNC: 82 U/L (ref 55–135)
ALT SERPL W/O P-5'-P-CCNC: 20 U/L (ref 10–44)
ANION GAP SERPL CALC-SCNC: 11 MMOL/L (ref 8–16)
AST SERPL-CCNC: 15 U/L (ref 10–40)
BASOPHILS # BLD AUTO: 0.01 K/UL (ref 0–0.2)
BASOPHILS NFR BLD: 0.2 % (ref 0–1.9)
BILIRUB SERPL-MCNC: 0.2 MG/DL (ref 0.1–1)
BUN SERPL-MCNC: 12 MG/DL (ref 8–23)
CALCIUM SERPL-MCNC: 9.4 MG/DL (ref 8.7–10.5)
CHLORIDE SERPL-SCNC: 99 MMOL/L (ref 95–110)
CO2 SERPL-SCNC: 25 MMOL/L (ref 23–29)
CREAT SERPL-MCNC: 1 MG/DL (ref 0.5–1.4)
DIFFERENTIAL METHOD: ABNORMAL
EOSINOPHIL # BLD AUTO: 0.1 K/UL (ref 0–0.5)
EOSINOPHIL NFR BLD: 1.8 % (ref 0–8)
ERYTHROCYTE [DISTWIDTH] IN BLOOD BY AUTOMATED COUNT: 14.3 % (ref 11.5–14.5)
EST. GFR  (NO RACE VARIABLE): >60 ML/MIN/1.73 M^2
GLUCOSE SERPL-MCNC: 82 MG/DL (ref 70–110)
HCT VFR BLD AUTO: 34.5 % (ref 37–48.5)
HGB BLD-MCNC: 10.8 G/DL (ref 12–16)
IMM GRANULOCYTES # BLD AUTO: 0.01 K/UL (ref 0–0.04)
IMM GRANULOCYTES NFR BLD AUTO: 0.2 % (ref 0–0.5)
LYMPHOCYTES # BLD AUTO: 1.6 K/UL (ref 1–4.8)
LYMPHOCYTES NFR BLD: 29.8 % (ref 18–48)
MCH RBC QN AUTO: 28.8 PG (ref 27–31)
MCHC RBC AUTO-ENTMCNC: 31.3 G/DL (ref 32–36)
MCV RBC AUTO: 92 FL (ref 82–98)
MONOCYTES # BLD AUTO: 0.7 K/UL (ref 0.3–1)
MONOCYTES NFR BLD: 12.9 % (ref 4–15)
NEUTROPHILS # BLD AUTO: 3 K/UL (ref 1.8–7.7)
NEUTROPHILS NFR BLD: 55.1 % (ref 38–73)
NRBC BLD-RTO: 0 /100 WBC
PLATELET # BLD AUTO: 261 K/UL (ref 150–450)
PMV BLD AUTO: 10.3 FL (ref 9.2–12.9)
POTASSIUM SERPL-SCNC: 3.8 MMOL/L (ref 3.5–5.1)
PROT SERPL-MCNC: 7.3 G/DL (ref 6–8.4)
RBC # BLD AUTO: 3.75 M/UL (ref 4–5.4)
SODIUM SERPL-SCNC: 135 MMOL/L (ref 136–145)
T4 FREE SERPL-MCNC: 1.01 NG/DL (ref 0.71–1.51)
TSH SERPL DL<=0.005 MIU/L-ACNC: 7.9 UIU/ML (ref 0.4–4)
WBC # BLD AUTO: 5.43 K/UL (ref 3.9–12.7)

## 2023-09-01 PROCEDURE — 80053 COMPREHEN METABOLIC PANEL: CPT | Performed by: NURSE PRACTITIONER

## 2023-09-01 PROCEDURE — 85025 COMPLETE CBC W/AUTO DIFF WBC: CPT | Performed by: NURSE PRACTITIONER

## 2023-09-01 PROCEDURE — 84443 ASSAY THYROID STIM HORMONE: CPT | Performed by: NURSE PRACTITIONER

## 2023-09-01 PROCEDURE — 36415 COLL VENOUS BLD VENIPUNCTURE: CPT | Mod: PN | Performed by: NURSE PRACTITIONER

## 2023-09-01 PROCEDURE — 84439 ASSAY OF FREE THYROXINE: CPT | Performed by: NURSE PRACTITIONER

## 2023-09-08 ENCOUNTER — HOSPITAL ENCOUNTER (OUTPATIENT)
Dept: RADIOLOGY | Facility: HOSPITAL | Age: 63
Discharge: HOME | End: 2023-09-08
Attending: FAMILY MEDICINE
Payer: COMMERCIAL

## 2023-09-08 ENCOUNTER — OFFICE VISIT (OUTPATIENT)
Dept: FAMILY MEDICINE | Facility: CLINIC | Age: 63
End: 2023-09-08
Payer: COMMERCIAL

## 2023-09-08 VITALS
WEIGHT: 213.19 LBS | SYSTOLIC BLOOD PRESSURE: 122 MMHG | BODY MASS INDEX: 30.52 KG/M2 | HEIGHT: 70 IN | DIASTOLIC BLOOD PRESSURE: 78 MMHG | OXYGEN SATURATION: 97 % | HEART RATE: 89 BPM

## 2023-09-08 DIAGNOSIS — E11.9 TYPE 2 DIABETES MELLITUS WITHOUT COMPLICATION, WITHOUT LONG-TERM CURRENT USE OF INSULIN: Primary | ICD-10-CM

## 2023-09-08 DIAGNOSIS — Z23 IMMUNIZATION DUE: ICD-10-CM

## 2023-09-08 DIAGNOSIS — C34.81 SMALL CELL CARCINOMA OF OVERLAPPING SITES OF RIGHT LUNG: ICD-10-CM

## 2023-09-08 PROCEDURE — 3008F PR BODY MASS INDEX (BMI) DOCUMENTED: ICD-10-PCS | Mod: CPTII,S$GLB,, | Performed by: FAMILY MEDICINE

## 2023-09-08 PROCEDURE — 4010F PR ACE/ARB THEARPY RXD/TAKEN: ICD-10-PCS | Mod: CPTII,S$GLB,, | Performed by: FAMILY MEDICINE

## 2023-09-08 PROCEDURE — 3066F NEPHROPATHY DOC TX: CPT | Mod: CPTII,S$GLB,, | Performed by: FAMILY MEDICINE

## 2023-09-08 PROCEDURE — 71045 X-RAY EXAM CHEST 1 VIEW: CPT | Mod: TC,PN

## 2023-09-08 PROCEDURE — 99214 OFFICE O/P EST MOD 30 MIN: CPT | Mod: 25,S$GLB,, | Performed by: FAMILY MEDICINE

## 2023-09-08 PROCEDURE — 3044F HG A1C LEVEL LT 7.0%: CPT | Mod: CPTII,S$GLB,, | Performed by: FAMILY MEDICINE

## 2023-09-08 PROCEDURE — 99999 PR PBB SHADOW E&M-EST. PATIENT-LVL V: CPT | Mod: PBBFAC,,, | Performed by: FAMILY MEDICINE

## 2023-09-08 PROCEDURE — 99999 PR PBB SHADOW E&M-EST. PATIENT-LVL V: ICD-10-PCS | Mod: PBBFAC,,, | Performed by: FAMILY MEDICINE

## 2023-09-08 PROCEDURE — 3078F DIAST BP <80 MM HG: CPT | Mod: CPTII,S$GLB,, | Performed by: FAMILY MEDICINE

## 2023-09-08 PROCEDURE — 90471 PNEUMOCOCCAL CONJUGATE VACCINE 20-VALENT: ICD-10-PCS | Mod: S$GLB,,, | Performed by: FAMILY MEDICINE

## 2023-09-08 PROCEDURE — 3044F PR MOST RECENT HEMOGLOBIN A1C LEVEL <7.0%: ICD-10-PCS | Mod: CPTII,S$GLB,, | Performed by: FAMILY MEDICINE

## 2023-09-08 PROCEDURE — 71045 X-RAY EXAM CHEST 1 VIEW: CPT | Mod: 26,,, | Performed by: RADIOLOGY

## 2023-09-08 PROCEDURE — 3008F BODY MASS INDEX DOCD: CPT | Mod: CPTII,S$GLB,, | Performed by: FAMILY MEDICINE

## 2023-09-08 PROCEDURE — 90471 IMMUNIZATION ADMIN: CPT | Mod: S$GLB,,, | Performed by: FAMILY MEDICINE

## 2023-09-08 PROCEDURE — 3066F PR DOCUMENTATION OF TREATMENT FOR NEPHROPATHY: ICD-10-PCS | Mod: CPTII,S$GLB,, | Performed by: FAMILY MEDICINE

## 2023-09-08 PROCEDURE — 3074F PR MOST RECENT SYSTOLIC BLOOD PRESSURE < 130 MM HG: ICD-10-PCS | Mod: CPTII,S$GLB,, | Performed by: FAMILY MEDICINE

## 2023-09-08 PROCEDURE — 90677 PCV20 VACCINE IM: CPT | Mod: S$GLB,,, | Performed by: FAMILY MEDICINE

## 2023-09-08 PROCEDURE — 90677 PNEUMOCOCCAL CONJUGATE VACCINE 20-VALENT: ICD-10-PCS | Mod: S$GLB,,, | Performed by: FAMILY MEDICINE

## 2023-09-08 PROCEDURE — 4010F ACE/ARB THERAPY RXD/TAKEN: CPT | Mod: CPTII,S$GLB,, | Performed by: FAMILY MEDICINE

## 2023-09-08 PROCEDURE — 3061F NEG MICROALBUMINURIA REV: CPT | Mod: CPTII,S$GLB,, | Performed by: FAMILY MEDICINE

## 2023-09-08 PROCEDURE — 71045 XR CHEST 1 VIEW: ICD-10-PCS | Mod: 26,,, | Performed by: RADIOLOGY

## 2023-09-08 PROCEDURE — 99214 PR OFFICE/OUTPT VISIT, EST, LEVL IV, 30-39 MIN: ICD-10-PCS | Mod: 25,S$GLB,, | Performed by: FAMILY MEDICINE

## 2023-09-08 PROCEDURE — 3061F PR NEG MICROALBUMINURIA RESULT DOCUMENTED/REVIEW: ICD-10-PCS | Mod: CPTII,S$GLB,, | Performed by: FAMILY MEDICINE

## 2023-09-08 PROCEDURE — 3074F SYST BP LT 130 MM HG: CPT | Mod: CPTII,S$GLB,, | Performed by: FAMILY MEDICINE

## 2023-09-08 PROCEDURE — 3078F PR MOST RECENT DIASTOLIC BLOOD PRESSURE < 80 MM HG: ICD-10-PCS | Mod: CPTII,S$GLB,, | Performed by: FAMILY MEDICINE

## 2023-09-08 NOTE — PROGRESS NOTES
"Subjective:       Patient ID: Malathi Martinez is a 63 y.o. female.    Chief Complaint: Follow-up (Due for test, pain on right side noticed Monday, port hurts)    Follow-up  Pertinent negatives include no arthralgias, chest pain, headaches, joint swelling, neck pain, vomiting or weakness.     Patient with c/o pain on/along her port. No visible bruising. Very tender on and around the port and its site.   Notes due for ct chest and mri brain.     Review of Systems:  Review of Systems   Constitutional:  Positive for activity change and unexpected weight change.   HENT:  Positive for hearing loss and rhinorrhea. Negative for trouble swallowing.    Eyes:  Negative for discharge and visual disturbance.   Respiratory:  Negative for chest tightness and wheezing.    Cardiovascular:  Negative for chest pain and palpitations.   Gastrointestinal:  Negative for blood in stool, constipation, diarrhea and vomiting.   Endocrine: Negative for polydipsia and polyuria.   Genitourinary:  Negative for difficulty urinating, dysuria, hematuria and menstrual problem.   Musculoskeletal:  Negative for arthralgias, joint swelling and neck pain.   Neurological:  Negative for weakness and headaches.   Psychiatric/Behavioral:  Positive for dysphoric mood. Negative for confusion.        Objective:     Vitals:    09/08/23 1255   BP: 122/78   Pulse: 89   SpO2: 97%   Weight: 96.7 kg (213 lb 3 oz)   Height: 5' 10" (1.778 m)          Physical Exam  Vitals and nursing note reviewed.   Constitutional:       General: She is not in acute distress.     Appearance: Normal appearance. She is well-developed.   HENT:      Head: Normocephalic and atraumatic.   Eyes:      General: No scleral icterus.        Right eye: No discharge.         Left eye: No discharge.      Conjunctiva/sclera: Conjunctivae normal.   Cardiovascular:      Rate and Rhythm: Normal rate and regular rhythm.   Pulmonary:      Effort: Pulmonary effort is normal. No respiratory distress. "      Breath sounds: Normal breath sounds.   Abdominal:      Palpations: Abdomen is soft.      Tenderness: There is no guarding.   Musculoskeletal:         General: No deformity or signs of injury.      Cervical back: Neck supple.   Lymphadenopathy:      Cervical: No cervical adenopathy.   Skin:     General: Skin is warm and dry.      Findings: No rash.   Neurological:      General: No focal deficit present.      Mental Status: She is alert and oriented to person, place, and time.   Psychiatric:         Mood and Affect: Mood normal.         Behavior: Behavior normal.           Assessment & Plan:  Type 2 diabetes mellitus without complication, without long-term current use of insulin  -     Lipid Panel; Future; Expected date: 09/08/2023  -     Microalbumin/Creatinine Ratio, Urine; Future  -     Hemoglobin A1C; Future; Expected date: 09/08/2023    Small cell carcinoma of overlapping sites of right lung  -     MRI Brain W WO Contrast; Future; Expected date: 09/08/2023  -     X-Ray Chest 1 View; Future; Expected date: 09/08/2023    Immunization due  -     (In Office Administered) Pneumococcal Conjugate Vaccine (20 Valent) (IM)

## 2023-09-11 ENCOUNTER — LAB VISIT (OUTPATIENT)
Dept: LAB | Facility: HOSPITAL | Age: 63
End: 2023-09-11
Attending: NURSE PRACTITIONER
Payer: COMMERCIAL

## 2023-09-11 DIAGNOSIS — C34.11 MALIGNANT NEOPLASM OF UPPER LOBE OF RIGHT LUNG: Primary | ICD-10-CM

## 2023-09-11 DIAGNOSIS — E11.9 TYPE 2 DIABETES MELLITUS WITHOUT COMPLICATION, WITHOUT LONG-TERM CURRENT USE OF INSULIN: ICD-10-CM

## 2023-09-11 DIAGNOSIS — D50.9 IRON DEFICIENCY ANEMIA, UNSPECIFIED: ICD-10-CM

## 2023-09-11 DIAGNOSIS — R53.83 FATIGUE: ICD-10-CM

## 2023-09-11 LAB
ALBUMIN SERPL BCP-MCNC: 3.6 G/DL (ref 3.5–5.2)
ALBUMIN/CREAT UR: NORMAL UG/MG (ref 0–30)
ALP SERPL-CCNC: 83 U/L (ref 55–135)
ALT SERPL W/O P-5'-P-CCNC: 14 U/L (ref 10–44)
ANION GAP SERPL CALC-SCNC: 11 MMOL/L (ref 8–16)
AST SERPL-CCNC: 12 U/L (ref 10–40)
BASOPHILS # BLD AUTO: 0 K/UL (ref 0–0.2)
BASOPHILS NFR BLD: 0 % (ref 0–1.9)
BILIRUB SERPL-MCNC: 0.2 MG/DL (ref 0.1–1)
BUN SERPL-MCNC: 12 MG/DL (ref 8–23)
CALCIUM SERPL-MCNC: 9.5 MG/DL (ref 8.7–10.5)
CHLORIDE SERPL-SCNC: 97 MMOL/L (ref 95–110)
CHOLEST SERPL-MCNC: 142 MG/DL (ref 120–199)
CHOLEST/HDLC SERPL: 2.7 {RATIO} (ref 2–5)
CO2 SERPL-SCNC: 24 MMOL/L (ref 23–29)
CREAT SERPL-MCNC: 1.1 MG/DL (ref 0.5–1.4)
CREAT UR-MCNC: 24 MG/DL (ref 15–325)
DIFFERENTIAL METHOD: ABNORMAL
EOSINOPHIL # BLD AUTO: 0.2 K/UL (ref 0–0.5)
EOSINOPHIL NFR BLD: 2.8 % (ref 0–8)
ERYTHROCYTE [DISTWIDTH] IN BLOOD BY AUTOMATED COUNT: 13.3 % (ref 11.5–14.5)
EST. GFR  (NO RACE VARIABLE): 56.1 ML/MIN/1.73 M^2
ESTIMATED AVG GLUCOSE: 105 MG/DL (ref 68–131)
GLUCOSE SERPL-MCNC: 72 MG/DL (ref 70–110)
HBA1C MFR BLD: 5.3 % (ref 4–5.6)
HCT VFR BLD AUTO: 34.3 % (ref 37–48.5)
HDLC SERPL-MCNC: 53 MG/DL (ref 40–75)
HDLC SERPL: 37.3 % (ref 20–50)
HGB BLD-MCNC: 11.3 G/DL (ref 12–16)
IMM GRANULOCYTES # BLD AUTO: 0.01 K/UL (ref 0–0.04)
IMM GRANULOCYTES NFR BLD AUTO: 0.2 % (ref 0–0.5)
LDLC SERPL CALC-MCNC: 71.6 MG/DL (ref 63–159)
LYMPHOCYTES # BLD AUTO: 1.1 K/UL (ref 1–4.8)
LYMPHOCYTES NFR BLD: 18.7 % (ref 18–48)
MCH RBC QN AUTO: 29 PG (ref 27–31)
MCHC RBC AUTO-ENTMCNC: 32.9 G/DL (ref 32–36)
MCV RBC AUTO: 88 FL (ref 82–98)
MICROALBUMIN UR DL<=1MG/L-MCNC: <5 UG/ML
MONOCYTES # BLD AUTO: 1 K/UL (ref 0.3–1)
MONOCYTES NFR BLD: 17.3 % (ref 4–15)
NEUTROPHILS # BLD AUTO: 3.4 K/UL (ref 1.8–7.7)
NEUTROPHILS NFR BLD: 61 % (ref 38–73)
NONHDLC SERPL-MCNC: 89 MG/DL
NRBC BLD-RTO: 0 /100 WBC
PLATELET # BLD AUTO: 289 K/UL (ref 150–450)
PMV BLD AUTO: 9.6 FL (ref 9.2–12.9)
POTASSIUM SERPL-SCNC: 3.9 MMOL/L (ref 3.5–5.1)
PROT SERPL-MCNC: 7.7 G/DL (ref 6–8.4)
RBC # BLD AUTO: 3.89 M/UL (ref 4–5.4)
SODIUM SERPL-SCNC: 132 MMOL/L (ref 136–145)
T4 FREE SERPL-MCNC: 1.17 NG/DL (ref 0.71–1.51)
TRIGL SERPL-MCNC: 87 MG/DL (ref 30–150)
TSH SERPL DL<=0.005 MIU/L-ACNC: 9.04 UIU/ML (ref 0.4–4)
WBC # BLD AUTO: 5.62 K/UL (ref 3.9–12.7)

## 2023-09-11 PROCEDURE — 82570 ASSAY OF URINE CREATININE: CPT | Performed by: FAMILY MEDICINE

## 2023-09-11 PROCEDURE — 84443 ASSAY THYROID STIM HORMONE: CPT | Performed by: NURSE PRACTITIONER

## 2023-09-11 PROCEDURE — 84439 ASSAY OF FREE THYROXINE: CPT | Performed by: NURSE PRACTITIONER

## 2023-09-11 PROCEDURE — 85025 COMPLETE CBC W/AUTO DIFF WBC: CPT | Performed by: NURSE PRACTITIONER

## 2023-09-11 PROCEDURE — 36415 COLL VENOUS BLD VENIPUNCTURE: CPT | Mod: PN | Performed by: NURSE PRACTITIONER

## 2023-09-11 PROCEDURE — 83036 HEMOGLOBIN GLYCOSYLATED A1C: CPT | Performed by: FAMILY MEDICINE

## 2023-09-11 PROCEDURE — 80053 COMPREHEN METABOLIC PANEL: CPT | Performed by: NURSE PRACTITIONER

## 2023-09-11 PROCEDURE — 80061 LIPID PANEL: CPT | Performed by: FAMILY MEDICINE

## 2023-09-15 PROBLEM — Z95.828 PORT-A-CATH IN PLACE: Status: ACTIVE | Noted: 2023-09-15

## 2023-09-21 ENCOUNTER — OFFICE VISIT (OUTPATIENT)
Dept: URGENT CARE | Facility: CLINIC | Age: 63
End: 2023-09-21
Payer: COMMERCIAL

## 2023-09-21 VITALS
WEIGHT: 213 LBS | HEART RATE: 83 BPM | DIASTOLIC BLOOD PRESSURE: 65 MMHG | SYSTOLIC BLOOD PRESSURE: 113 MMHG | BODY MASS INDEX: 30.49 KG/M2 | RESPIRATION RATE: 14 BRPM | TEMPERATURE: 98 F | HEIGHT: 70 IN | OXYGEN SATURATION: 97 %

## 2023-09-21 DIAGNOSIS — J06.9 VIRAL URI WITH COUGH: Primary | ICD-10-CM

## 2023-09-21 LAB
CTP QC/QA: YES
POC MOLECULAR INFLUENZA A AGN: NEGATIVE
POC MOLECULAR INFLUENZA B AGN: NEGATIVE
RSV RAPID ANTIGEN: NEGATIVE
SARS-COV-2 AG RESP QL IA.RAPID: NEGATIVE

## 2023-09-21 PROCEDURE — 87502 POCT INFLUENZA A/B MOLECULAR: ICD-10-PCS | Mod: QW,S$GLB,, | Performed by: NURSE PRACTITIONER

## 2023-09-21 PROCEDURE — 87807 RSV ASSAY W/OPTIC: CPT | Mod: QW,S$GLB,, | Performed by: NURSE PRACTITIONER

## 2023-09-21 PROCEDURE — 99213 PR OFFICE/OUTPT VISIT, EST, LEVL III, 20-29 MIN: ICD-10-PCS | Mod: S$GLB,,, | Performed by: NURSE PRACTITIONER

## 2023-09-21 PROCEDURE — 87811 SARS CORONAVIRUS 2 ANTIGEN POCT, MANUAL READ: ICD-10-PCS | Mod: QW,S$GLB,, | Performed by: NURSE PRACTITIONER

## 2023-09-21 PROCEDURE — 87807 POCT RESPIRATORY SYNCYTIAL VIRUS: ICD-10-PCS | Mod: QW,S$GLB,, | Performed by: NURSE PRACTITIONER

## 2023-09-21 PROCEDURE — 87502 INFLUENZA DNA AMP PROBE: CPT | Mod: QW,S$GLB,, | Performed by: NURSE PRACTITIONER

## 2023-09-21 PROCEDURE — 87811 SARS-COV-2 COVID19 W/OPTIC: CPT | Mod: QW,S$GLB,, | Performed by: NURSE PRACTITIONER

## 2023-09-21 PROCEDURE — 99213 OFFICE O/P EST LOW 20 MIN: CPT | Mod: S$GLB,,, | Performed by: NURSE PRACTITIONER

## 2023-09-21 NOTE — PATIENT INSTRUCTIONS

## 2023-09-25 ENCOUNTER — LAB VISIT (OUTPATIENT)
Dept: LAB | Facility: HOSPITAL | Age: 63
End: 2023-09-25
Attending: FAMILY MEDICINE
Payer: COMMERCIAL

## 2023-09-25 LAB
ALBUMIN SERPL BCP-MCNC: 3.1 G/DL (ref 3.5–5.2)
ALP SERPL-CCNC: 82 U/L (ref 55–135)
ALT SERPL W/O P-5'-P-CCNC: 14 U/L (ref 10–44)
ANION GAP SERPL CALC-SCNC: 11 MMOL/L (ref 8–16)
AST SERPL-CCNC: 14 U/L (ref 10–40)
BASOPHILS # BLD AUTO: 0.01 K/UL (ref 0–0.2)
BASOPHILS NFR BLD: 0.2 % (ref 0–1.9)
BILIRUB SERPL-MCNC: 0.2 MG/DL (ref 0.1–1)
BUN SERPL-MCNC: 6 MG/DL (ref 8–23)
CALCIUM SERPL-MCNC: 9.3 MG/DL (ref 8.7–10.5)
CHLORIDE SERPL-SCNC: 101 MMOL/L (ref 95–110)
CO2 SERPL-SCNC: 24 MMOL/L (ref 23–29)
CREAT SERPL-MCNC: 0.9 MG/DL (ref 0.5–1.4)
DIFFERENTIAL METHOD: ABNORMAL
EOSINOPHIL # BLD AUTO: 0.1 K/UL (ref 0–0.5)
EOSINOPHIL NFR BLD: 1.8 % (ref 0–8)
ERYTHROCYTE [DISTWIDTH] IN BLOOD BY AUTOMATED COUNT: 13.5 % (ref 11.5–14.5)
EST. GFR  (NO RACE VARIABLE): >60 ML/MIN/1.73 M^2
GLUCOSE SERPL-MCNC: 81 MG/DL (ref 70–110)
HCT VFR BLD AUTO: 35.3 % (ref 37–48.5)
HGB BLD-MCNC: 11.2 G/DL (ref 12–16)
IMM GRANULOCYTES # BLD AUTO: 0 K/UL (ref 0–0.04)
IMM GRANULOCYTES NFR BLD AUTO: 0 % (ref 0–0.5)
LYMPHOCYTES # BLD AUTO: 1.1 K/UL (ref 1–4.8)
LYMPHOCYTES NFR BLD: 25.5 % (ref 18–48)
MCH RBC QN AUTO: 27.8 PG (ref 27–31)
MCHC RBC AUTO-ENTMCNC: 31.7 G/DL (ref 32–36)
MCV RBC AUTO: 88 FL (ref 82–98)
MONOCYTES # BLD AUTO: 0.7 K/UL (ref 0.3–1)
MONOCYTES NFR BLD: 15 % (ref 4–15)
NEUTROPHILS # BLD AUTO: 2.5 K/UL (ref 1.8–7.7)
NEUTROPHILS NFR BLD: 57.5 % (ref 38–73)
NRBC BLD-RTO: 0 /100 WBC
PLATELET # BLD AUTO: 362 K/UL (ref 150–450)
PMV BLD AUTO: 9.1 FL (ref 9.2–12.9)
POTASSIUM SERPL-SCNC: 4 MMOL/L (ref 3.5–5.1)
PROT SERPL-MCNC: 7.7 G/DL (ref 6–8.4)
RBC # BLD AUTO: 4.03 M/UL (ref 4–5.4)
SODIUM SERPL-SCNC: 136 MMOL/L (ref 136–145)
WBC # BLD AUTO: 4.39 K/UL (ref 3.9–12.7)

## 2023-09-25 PROCEDURE — 85025 COMPLETE CBC W/AUTO DIFF WBC: CPT | Performed by: NURSE PRACTITIONER

## 2023-09-25 PROCEDURE — 36415 COLL VENOUS BLD VENIPUNCTURE: CPT | Mod: PN | Performed by: NURSE PRACTITIONER

## 2023-09-25 PROCEDURE — 80053 COMPREHEN METABOLIC PANEL: CPT | Performed by: NURSE PRACTITIONER

## 2023-10-03 ENCOUNTER — OFFICE VISIT (OUTPATIENT)
Dept: OBSTETRICS AND GYNECOLOGY | Facility: CLINIC | Age: 63
End: 2023-10-03
Payer: COMMERCIAL

## 2023-10-03 VITALS
DIASTOLIC BLOOD PRESSURE: 70 MMHG | SYSTOLIC BLOOD PRESSURE: 122 MMHG | BODY MASS INDEX: 29.22 KG/M2 | HEIGHT: 70 IN | WEIGHT: 204.13 LBS

## 2023-10-03 DIAGNOSIS — Z01.419 GYNECOLOGIC EXAM NORMAL: Primary | ICD-10-CM

## 2023-10-03 PROCEDURE — 3008F BODY MASS INDEX DOCD: CPT | Mod: CPTII,S$GLB,, | Performed by: OBSTETRICS & GYNECOLOGY

## 2023-10-03 PROCEDURE — 99999 PR PBB SHADOW E&M-EST. PATIENT-LVL IV: CPT | Mod: PBBFAC,,, | Performed by: OBSTETRICS & GYNECOLOGY

## 2023-10-03 PROCEDURE — 3078F DIAST BP <80 MM HG: CPT | Mod: CPTII,S$GLB,, | Performed by: OBSTETRICS & GYNECOLOGY

## 2023-10-03 PROCEDURE — 99396 PR PREVENTIVE VISIT,EST,40-64: ICD-10-PCS | Mod: S$GLB,,, | Performed by: OBSTETRICS & GYNECOLOGY

## 2023-10-03 PROCEDURE — 87624 HPV HI-RISK TYP POOLED RSLT: CPT | Performed by: OBSTETRICS & GYNECOLOGY

## 2023-10-03 PROCEDURE — 3008F PR BODY MASS INDEX (BMI) DOCUMENTED: ICD-10-PCS | Mod: CPTII,S$GLB,, | Performed by: OBSTETRICS & GYNECOLOGY

## 2023-10-03 PROCEDURE — 3074F SYST BP LT 130 MM HG: CPT | Mod: CPTII,S$GLB,, | Performed by: OBSTETRICS & GYNECOLOGY

## 2023-10-03 PROCEDURE — 1159F MED LIST DOCD IN RCRD: CPT | Mod: CPTII,S$GLB,, | Performed by: OBSTETRICS & GYNECOLOGY

## 2023-10-03 PROCEDURE — 3078F PR MOST RECENT DIASTOLIC BLOOD PRESSURE < 80 MM HG: ICD-10-PCS | Mod: CPTII,S$GLB,, | Performed by: OBSTETRICS & GYNECOLOGY

## 2023-10-03 PROCEDURE — 88175 CYTOPATH C/V AUTO FLUID REDO: CPT | Performed by: OBSTETRICS & GYNECOLOGY

## 2023-10-03 PROCEDURE — 4010F PR ACE/ARB THEARPY RXD/TAKEN: ICD-10-PCS | Mod: CPTII,S$GLB,, | Performed by: OBSTETRICS & GYNECOLOGY

## 2023-10-03 PROCEDURE — 99396 PREV VISIT EST AGE 40-64: CPT | Mod: S$GLB,,, | Performed by: OBSTETRICS & GYNECOLOGY

## 2023-10-03 PROCEDURE — 99999 PR PBB SHADOW E&M-EST. PATIENT-LVL IV: ICD-10-PCS | Mod: PBBFAC,,, | Performed by: OBSTETRICS & GYNECOLOGY

## 2023-10-03 PROCEDURE — 3061F PR NEG MICROALBUMINURIA RESULT DOCUMENTED/REVIEW: ICD-10-PCS | Mod: CPTII,S$GLB,, | Performed by: OBSTETRICS & GYNECOLOGY

## 2023-10-03 PROCEDURE — 4010F ACE/ARB THERAPY RXD/TAKEN: CPT | Mod: CPTII,S$GLB,, | Performed by: OBSTETRICS & GYNECOLOGY

## 2023-10-03 PROCEDURE — 3044F PR MOST RECENT HEMOGLOBIN A1C LEVEL <7.0%: ICD-10-PCS | Mod: CPTII,S$GLB,, | Performed by: OBSTETRICS & GYNECOLOGY

## 2023-10-03 PROCEDURE — 3074F PR MOST RECENT SYSTOLIC BLOOD PRESSURE < 130 MM HG: ICD-10-PCS | Mod: CPTII,S$GLB,, | Performed by: OBSTETRICS & GYNECOLOGY

## 2023-10-03 PROCEDURE — 3044F HG A1C LEVEL LT 7.0%: CPT | Mod: CPTII,S$GLB,, | Performed by: OBSTETRICS & GYNECOLOGY

## 2023-10-03 PROCEDURE — 1159F PR MEDICATION LIST DOCUMENTED IN MEDICAL RECORD: ICD-10-PCS | Mod: CPTII,S$GLB,, | Performed by: OBSTETRICS & GYNECOLOGY

## 2023-10-03 PROCEDURE — 3061F NEG MICROALBUMINURIA REV: CPT | Mod: CPTII,S$GLB,, | Performed by: OBSTETRICS & GYNECOLOGY

## 2023-10-03 PROCEDURE — 3066F NEPHROPATHY DOC TX: CPT | Mod: CPTII,S$GLB,, | Performed by: OBSTETRICS & GYNECOLOGY

## 2023-10-03 PROCEDURE — 3066F PR DOCUMENTATION OF TREATMENT FOR NEPHROPATHY: ICD-10-PCS | Mod: CPTII,S$GLB,, | Performed by: OBSTETRICS & GYNECOLOGY

## 2023-10-03 NOTE — PROGRESS NOTES
Chief Complaint   Patient presents with    Well Woman       History and Physical:  Patient's last menstrual period was 09/10/2012.       Malathi Martinez is a 63 y.o.   female who presents today for her routine annual GYN exam. The patient has no Gynecology complaints today. In treatment for lung cancer - completed chemotherapy      Allergies:   Review of patient's allergies indicates:   Allergen Reactions    Penicillins Hives       Past Medical History:   Diagnosis Date    Abnormal stress test     Bronchitis     COPD (chronic obstructive pulmonary disease)     Diabetes mellitus, type 2     HLD (hyperlipidemia)     Hx of endoscopy     Hypertension     Lung cancer     Mental disorder     Pulmonary nodule 2018    SOB (shortness of breath)     resolved    Tobacco abuse 2012       Past Surgical History:   Procedure Laterality Date    APPENDECTOMY      COLONOSCOPY N/A 2019    Procedure: COLONOSCOPY;  Surgeon: Lai Cardoso MD;  Location: [facility];  Service: Endoscopy;  Laterality: N/A;    ENDOBRONCHIAL ULTRASOUND N/A 5/3/2023    Procedure: ENDOBRONCHIAL ULTRASOUND (EBUS);  Surgeon: Nahun Nguyen MD;  Location: [facility];  Service: Pulmonary;  Laterality: N/A;    ESOPHAGOGASTRODUODENOSCOPY N/A 2021    Procedure: ESOPHAGOGASTRODUODENOSCOPY (EGD);  Surgeon: Lai Cardoso MD;  Location: [facility];  Service: Endoscopy;  Laterality: N/A;    INSERTION OF TUNNELED CENTRAL VENOUS CATHETER (CVC) WITH SUBCUTANEOUS PORT Right 2023    Procedure: [id number]-TCJS-X-CATH;  Surgeon: Ayah Aldana MD;  Location: [facility];  Service: General;  Laterality: Right;    TONSILLECTOMY         MEDS:   Current Outpatient Medications on File Prior to Visit   Medication Sig Dispense Refill    albuterol (VENTOLIN HFA) 90 mcg/actuation inhaler Inhale 2 puffs into the lungs every 6 (six) hours as needed for Wheezing or Shortness of Breath. Rescue 18 g 0    albuterol-ipratropium (DUO-NEB)  2.5 mg-0.5 mg/3 mL nebulizer solution Take 3 mLs by nebulization every 12 (twelve) hours. Rescue 75 mL 0    ALPRAZolam (XANAX) 0.25 MG tablet Take 1 tablet (0.25 mg total) by mouth 2 (two) times daily. 60 tablet 5    atorvastatin (LIPITOR) 20 MG tablet Take 1 tablet (20 mg total) by mouth once daily. 90 tablet 3    B-complex with vitamin C (Z-BEC OR EQUIV) tablet Take 1 tablet by mouth once daily.      buPROPion (WELLBUTRIN XL) 150 MG TB24 tablet Take 2 tablets (300 mg total) by mouth once daily. 180 tablet 3    cholecalciferol, vitamin D3, 2,000 unit Cap Take 1 capsule by mouth once daily.      clotrimazole-betamethasone 1-0.05% (LOTRISONE) cream Apply topically to affected area 2 (two) times daily. 45 g 1    cyclobenzaprine (FLEXERIL) 5 MG tablet Take 1 tablet (5 mg total) by mouth nightly as needed for Muscle spasms. 60 tablet 1    desloratadine (CLARINEX) 5 mg tablet Take 1 tablet (5 mg total) by mouth once daily. 90 tablet 3    EScitalopram oxalate (LEXAPRO) 20 MG tablet Take 1 tablet (20 mg total) by mouth once daily. 90 tablet 3    famotidine (PEPCID) 40 MG tablet TAKE 1 TABLET (40 MG TOTAL) BY MOUTH 2 (TWO) TIMES DAILY AS NEEDED FOR HEARTBURN. 180 tablet 1    fluticasone-salmeterol diskus inhaler 100-50 mcg Inhale 1 puff into the lungs 2 (two) times daily. Controller 60 each 11    levothyroxine (SYNTHROID) 100 MCG tablet       lisinopriL 10 MG tablet Take 1 tablet (10 mg total) by mouth every evening. 90 tablet 3    traZODone (DESYREL) 50 MG tablet Take 1 tablet (50 mg total) by mouth every evening. 30 tablet 11    doxycycline (VIBRA-TABS) 100 MG tablet Take 100 mg by mouth 2 (two) times daily.      gabapentin (NEURONTIN) 300 MG capsule Take 1 capsule (300 mg total) by mouth 3 (three) times daily. (Patient not taking: Reported on 10/3/2023) 90 capsule 11    HYDROcodone-acetaminophen (NORCO) 5-325 mg per tablet Take 1 tablet by mouth every 6 (six) hours as needed. (Patient not taking: Reported on 10/3/2023)  10 tablet 0    nicotine (NICODERM CQ) 21 mg/24 hr Place 1 patch onto the skin once daily. (Patient not taking: Reported on 10/3/2023) 21 patch 30    ondansetron (ZOFRAN-ODT) 8 MG TbDL Take 1 tablet (8 mg total) by mouth 2 (two) times daily. (Patient not taking: Reported on 10/3/2023) 20 tablet 2    polyethylene glycol (GLYCOLAX) 17 gram PwPk Take 17 g by mouth once daily. (Patient not taking: Reported on 10/3/2023) 60 packet 0     No current facility-administered medications on file prior to visit.       OB History          0    Para   0    Term   0       0    AB   0    Living   0         SAB   0    IAB   0    Ectopic   0    Multiple   0    Live Births                     Social History     Socioeconomic History    Marital status:    Tobacco Use    Smoking status: Former     Current packs/day: 0.00     Average packs/day: 0.8 packs/day for 41.0 years (30.8 ttl pk-yrs)     Types: Cigarettes     Start date: 1982     Quit date: 2023     Years since quittin.4    Smokeless tobacco: Never   Substance and Sexual Activity    Alcohol use: No    Drug use: No    Sexual activity: Yes     Partners: Male     Birth control/protection: Post-menopausal     Social Determinants of Health     Financial Resource Strain: Low Risk  (9/3/2023)    Overall Financial Resource Strain (CARDIA)     Difficulty of Paying Living Expenses: Not very hard   Food Insecurity: No Food Insecurity (9/3/2023)    Hunger Vital Sign     Worried About Running Out of Food in the Last Year: Never true     Ran Out of Food in the Last Year: Never true   Recent Concern: Food Insecurity - Food Insecurity Present (2023)    Hunger Vital Sign     Worried About Running Out of Food in the Last Year: Sometimes true     Ran Out of Food in the Last Year: Patient refused   Transportation Needs: No Transportation Needs (9/3/2023)    PRAPARE - Transportation     Lack of Transportation (Medical): No     Lack of Transportation (Non-Medical):  No   Physical Activity: Insufficiently Active (9/3/2023)    Exercise Vital Sign     Days of Exercise per Week: 1 day     Minutes of Exercise per Session: 30 min   Stress: No Stress Concern Present (9/3/2023)    [employer] [place] [employer] - Occupational Stress Questionnaire     Feeling of Stress : Only a little   Recent Concern: Stress - Stress Concern Present (8/22/2023)    [employer] [place] [employer] - Occupational Stress Questionnaire     Feeling of Stress : Very much   Social Connections: Moderately Integrated (9/3/2023)    Social Connection and Isolation Panel [NHANES]     Frequency of Communication with Friends and Family: Three times a week     Frequency of Social Gatherings with Friends and Family: Once a week     Attends Taoist Services: 1 to 4 times per year     Active Member of Clubs or Organizations: No     Attends Club or Organization Meetings: Never     Marital Status:    Housing Stability: Low Risk  (9/3/2023)    Housing Stability Vital Sign     Unable to Pay for Housing in the Last Year: No     Number of Places Lived in the Last Year: 1     Unstable Housing in the Last Year: No       Family History   Problem Relation Age of Onset    Heart disease Mother         MVP    Cataracts Mother     Heart disease Father 65        MI and PVD    Throat cancer Father     Heart disease Sister         mvp    No Known Problems Sister     No Known Problems Sister     Esophageal cancer Brother     Hyperlipidemia Paternal Grandmother     Glaucoma Neg Hx     Macular degeneration Neg Hx          Past medical and surgical history reviewed.   I have reviewed the patient's medical history in detail and updated the computerized patient record.    Review of System:   General: no chills, fever, night sweats, weight gain or weight loss  Psychological: no depression or suicidal ideation  Breasts: no new or changing breast lumps, nipple discharge or masses.  Respiratory: no cough, shortness of  "breath, or wheezing  Cardiovascular: no chest pain or dyspnea on exertion  Gastrointestinal: no abdominal pain, change in bowel habits, or black or bloody stools  Genito-Urinary: no incontinence, urinary frequency/urgency or vulvar/vaginal symptoms, pelvic pain or abnormal vaginal bleeding.  Musculoskeletal: no gait disturbance or muscular weakness      Physical Exam:   /70   Ht 5' 10" (1.778 m)   Wt 92.6 kg (204 lb 2.3 oz)   LMP 09/10/2012   BMI 29.29 kg/m²   Constitutional: She appears alert and responsive. She appears well-developed, well-groomed, and well-nourished. No distress. OverWeight   HENT:   Head: Normocephalic and atraumatic.   Eyes: Conjunctivae and EOM are normal. No scleral icterus.   Neck: Symmetrical. Normal range of motion. Neck supple. No tracheal deviation present.   Cardiovascular: Normal rate, no rhythm abnormality noted. Extremities without swelling or edema, warm.    Pulmonary/Chest: Normal respiratory Effort. No distress or retractions. She exhibits no tenderness.  Breasts: are symmetrical.   Right breast exhibits no inverted nipple, no mass, no nipple discharge, no skin change and no tenderness.   Left breast exhibits no inverted nipple, no mass, no nipple discharge, no skin change and no tenderness.  Abdominal: Soft. She exhibits no distension, hernias or masses. There is no tenderness. No enlargement of liver edge or spleen.  There is no rebound and no guarding.   Genitourinary:    External rectal exam shows no thrombosed external hemorrhoids, no lesions.     Pelvic exam was performed with patient supine.   No labial fusion, and symmetrical.    There is no rash, lesion or injury on the right labia.   There is no rash, lesion or injury on the left labia.   No bleeding and no signs of injury around the vaginal introitus, urethral meatus is normal size and without prolapse or lesions, urethra well supported. The cervix is visualized with no discharge, lesions or friability.   No " vaginal discharge found.   No significant Cystocele, Enterocele or rectocele, and cervix and uterus well supported.   Bimanual exam:   The urethra is normal to palpation and there are no palpable vaginal wall masses.   Uterus is not deviated, not enlarged, not fixed, normal shape and not tender.   Cervix exhibits no motion tenderness.    Right adnexum displays no mass or nodularity and no tenderness.   Left adnexum displays no mass or nodularity and no tenderness.  Musculoskeletal: Normal range of motion.   Neurological: She is alert and oriented to person, place, and time. Coordination normal.   Skin: Skin is warm and dry. She is not diaphoretic. No rashes, lesions or ulcers.   Psychiatric: She has a normal mood and affect, oriented to person, place, and time.      Assessment:   Normal annual GYN exam  1. Gynecologic exam normal  Liquid-Based Pap Smear, Screening    HPV High Risk Genotypes, PCR          Plan:   PAP  Mammogram done  Follow up in 1 year.  Patient informed will be contacted with results within 2 weeks. Encouraged to please call back or email if she has not heard from us by then.

## 2023-10-09 ENCOUNTER — PATIENT MESSAGE (OUTPATIENT)
Dept: OBSTETRICS AND GYNECOLOGY | Facility: CLINIC | Age: 63
End: 2023-10-09
Payer: COMMERCIAL

## 2023-10-10 ENCOUNTER — HOSPITAL ENCOUNTER (OUTPATIENT)
Dept: RADIOLOGY | Facility: HOSPITAL | Age: 63
Discharge: HOME | End: 2023-10-10
Attending: FAMILY MEDICINE
Payer: COMMERCIAL

## 2023-10-10 DIAGNOSIS — C34.81 SMALL CELL CARCINOMA OF OVERLAPPING SITES OF RIGHT LUNG: ICD-10-CM

## 2023-10-10 PROCEDURE — 70553 MRI BRAIN STEM W/O & W/DYE: CPT | Mod: 26,,, | Performed by: RADIOLOGY

## 2023-10-10 PROCEDURE — A9585 GADOBUTROL INJECTION: HCPCS | Mod: PO | Performed by: FAMILY MEDICINE

## 2023-10-10 PROCEDURE — 25500020 PHARM REV CODE 255: Mod: PO | Performed by: FAMILY MEDICINE

## 2023-10-10 PROCEDURE — 70553 MRI BRAIN STEM W/O & W/DYE: CPT | Mod: TC,PO

## 2023-10-10 PROCEDURE — 70553 MRI BRAIN W WO CONTRAST: ICD-10-PCS | Mod: 26,,, | Performed by: RADIOLOGY

## 2023-10-10 RX ORDER — GADOBUTROL 604.72 MG/ML
9 INJECTION INTRAVENOUS
Status: COMPLETED | OUTPATIENT
Start: 2023-10-10 | End: 2023-10-10

## 2023-10-10 RX ADMIN — GADOBUTROL 9 ML: 604.72 INJECTION INTRAVENOUS at 09:10

## 2023-10-13 ENCOUNTER — OFFICE VISIT (OUTPATIENT)
Dept: FAMILY MEDICINE | Facility: CLINIC | Age: 63
End: 2023-10-13
Payer: COMMERCIAL

## 2023-10-13 VITALS
BODY MASS INDEX: 29.45 KG/M2 | WEIGHT: 205.69 LBS | HEIGHT: 70 IN | DIASTOLIC BLOOD PRESSURE: 68 MMHG | SYSTOLIC BLOOD PRESSURE: 130 MMHG | OXYGEN SATURATION: 97 % | HEART RATE: 84 BPM

## 2023-10-13 DIAGNOSIS — D64.9 ANEMIA, UNSPECIFIED TYPE: ICD-10-CM

## 2023-10-13 DIAGNOSIS — K21.9 GASTROESOPHAGEAL REFLUX DISEASE, UNSPECIFIED WHETHER ESOPHAGITIS PRESENT: Primary | ICD-10-CM

## 2023-10-13 DIAGNOSIS — G47.9 TROUBLE IN SLEEPING: ICD-10-CM

## 2023-10-13 DIAGNOSIS — E11.9 TYPE 2 DIABETES MELLITUS WITHOUT COMPLICATION, WITHOUT LONG-TERM CURRENT USE OF INSULIN: ICD-10-CM

## 2023-10-13 DIAGNOSIS — C34.81 SMALL CELL CARCINOMA OF OVERLAPPING SITES OF RIGHT LUNG: ICD-10-CM

## 2023-10-13 PROCEDURE — 99214 OFFICE O/P EST MOD 30 MIN: CPT | Mod: 25,S$GLB,, | Performed by: FAMILY MEDICINE

## 2023-10-13 PROCEDURE — 3061F NEG MICROALBUMINURIA REV: CPT | Mod: CPTII,S$GLB,, | Performed by: FAMILY MEDICINE

## 2023-10-13 PROCEDURE — 1159F PR MEDICATION LIST DOCUMENTED IN MEDICAL RECORD: ICD-10-PCS | Mod: CPTII,S$GLB,, | Performed by: FAMILY MEDICINE

## 2023-10-13 PROCEDURE — 99214 PR OFFICE/OUTPT VISIT, EST, LEVL IV, 30-39 MIN: ICD-10-PCS | Mod: 25,S$GLB,, | Performed by: FAMILY MEDICINE

## 2023-10-13 PROCEDURE — 3078F PR MOST RECENT DIASTOLIC BLOOD PRESSURE < 80 MM HG: ICD-10-PCS | Mod: CPTII,S$GLB,, | Performed by: FAMILY MEDICINE

## 2023-10-13 PROCEDURE — 3075F PR MOST RECENT SYSTOLIC BLOOD PRESS GE 130-139MM HG: ICD-10-PCS | Mod: CPTII,S$GLB,, | Performed by: FAMILY MEDICINE

## 2023-10-13 PROCEDURE — 99999 PR PBB SHADOW E&M-EST. PATIENT-LVL V: ICD-10-PCS | Mod: PBBFAC,,, | Performed by: FAMILY MEDICINE

## 2023-10-13 PROCEDURE — 3008F PR BODY MASS INDEX (BMI) DOCUMENTED: ICD-10-PCS | Mod: CPTII,S$GLB,, | Performed by: FAMILY MEDICINE

## 2023-10-13 PROCEDURE — 3044F HG A1C LEVEL LT 7.0%: CPT | Mod: CPTII,S$GLB,, | Performed by: FAMILY MEDICINE

## 2023-10-13 PROCEDURE — 3075F SYST BP GE 130 - 139MM HG: CPT | Mod: CPTII,S$GLB,, | Performed by: FAMILY MEDICINE

## 2023-10-13 PROCEDURE — 90686 IIV4 VACC NO PRSV 0.5 ML IM: CPT | Mod: S$GLB,,, | Performed by: FAMILY MEDICINE

## 2023-10-13 PROCEDURE — 3078F DIAST BP <80 MM HG: CPT | Mod: CPTII,S$GLB,, | Performed by: FAMILY MEDICINE

## 2023-10-13 PROCEDURE — 3008F BODY MASS INDEX DOCD: CPT | Mod: CPTII,S$GLB,, | Performed by: FAMILY MEDICINE

## 2023-10-13 PROCEDURE — 1160F PR REVIEW ALL MEDS BY PRESCRIBER/CLIN PHARMACIST DOCUMENTED: ICD-10-PCS | Mod: CPTII,S$GLB,, | Performed by: FAMILY MEDICINE

## 2023-10-13 PROCEDURE — 4010F ACE/ARB THERAPY RXD/TAKEN: CPT | Mod: CPTII,S$GLB,, | Performed by: FAMILY MEDICINE

## 2023-10-13 PROCEDURE — 90686 FLU VACCINE (QUAD) GREATER THAN OR EQUAL TO 3YO PRESERVATIVE FREE IM: ICD-10-PCS | Mod: S$GLB,,, | Performed by: FAMILY MEDICINE

## 2023-10-13 PROCEDURE — 99999 PR PBB SHADOW E&M-EST. PATIENT-LVL V: CPT | Mod: PBBFAC,,, | Performed by: FAMILY MEDICINE

## 2023-10-13 PROCEDURE — 3066F NEPHROPATHY DOC TX: CPT | Mod: CPTII,S$GLB,, | Performed by: FAMILY MEDICINE

## 2023-10-13 PROCEDURE — 3061F PR NEG MICROALBUMINURIA RESULT DOCUMENTED/REVIEW: ICD-10-PCS | Mod: CPTII,S$GLB,, | Performed by: FAMILY MEDICINE

## 2023-10-13 PROCEDURE — 3066F PR DOCUMENTATION OF TREATMENT FOR NEPHROPATHY: ICD-10-PCS | Mod: CPTII,S$GLB,, | Performed by: FAMILY MEDICINE

## 2023-10-13 PROCEDURE — 1160F RVW MEDS BY RX/DR IN RCRD: CPT | Mod: CPTII,S$GLB,, | Performed by: FAMILY MEDICINE

## 2023-10-13 PROCEDURE — 90471 IMMUNIZATION ADMIN: CPT | Mod: S$GLB,,, | Performed by: FAMILY MEDICINE

## 2023-10-13 PROCEDURE — 4010F PR ACE/ARB THEARPY RXD/TAKEN: ICD-10-PCS | Mod: CPTII,S$GLB,, | Performed by: FAMILY MEDICINE

## 2023-10-13 PROCEDURE — 3044F PR MOST RECENT HEMOGLOBIN A1C LEVEL <7.0%: ICD-10-PCS | Mod: CPTII,S$GLB,, | Performed by: FAMILY MEDICINE

## 2023-10-13 PROCEDURE — 1159F MED LIST DOCD IN RCRD: CPT | Mod: CPTII,S$GLB,, | Performed by: FAMILY MEDICINE

## 2023-10-13 PROCEDURE — 90471 FLU VACCINE (QUAD) GREATER THAN OR EQUAL TO 3YO PRESERVATIVE FREE IM: ICD-10-PCS | Mod: S$GLB,,, | Performed by: FAMILY MEDICINE

## 2023-10-13 RX ORDER — TRAZODONE HYDROCHLORIDE 50 MG/1
50 TABLET ORAL NIGHTLY
Qty: 90 TABLET | Refills: 3 | Status: SHIPPED | OUTPATIENT
Start: 2023-10-13 | End: 2024-01-26 | Stop reason: SDUPTHER

## 2023-10-13 NOTE — PROGRESS NOTES
"Subjective:       Patient ID: Malathi Martinez is a 63 y.o. female.    Chief Complaint: Follow-up    Follow-up  Pertinent negatives include no arthralgias, chest pain, fatigue, headaches, joint swelling, neck pain, vomiting or weakness.     Patient seen for f/u. Feeling well overall.   Labs 2023 rev.   She had 2mos worth of ozempic early this year, but none since due to availability. Of note, A1c now at 5.3% with current lifestyle.     Review of Systems:  Review of Systems   Constitutional:  Positive for activity change and unexpected weight change (down 8# since last month and doing well). Negative for fatigue.   HENT:  Positive for hearing loss, postnasal drip, rhinorrhea and sneezing. Negative for trouble swallowing.    Eyes:  Positive for visual disturbance. Negative for discharge.        Upcoming eye exam this fall.   Respiratory:  Negative for chest tightness, shortness of breath and wheezing.    Cardiovascular:  Negative for chest pain and palpitations.   Gastrointestinal:  Negative for blood in stool, constipation, diarrhea and vomiting.   Endocrine: Positive for polyuria. Negative for polydipsia.   Genitourinary:  Negative for difficulty urinating, dysuria, hematuria and menstrual problem.   Musculoskeletal:  Negative for arthralgias, joint swelling and neck pain.   Neurological:  Negative for weakness and headaches.   Psychiatric/Behavioral:  Positive for dysphoric mood. Negative for confusion.        Objective:     Vitals:    10/13/23 1435   BP: 130/68   Pulse: 84   SpO2: 97%   Weight: 93.3 kg (205 lb 11 oz)   Height: 5' 10" (1.778 m)          Physical Exam  Vitals and nursing note reviewed.   Constitutional:       General: She is not in acute distress.     Appearance: Normal appearance. She is well-developed.   HENT:      Head: Normocephalic and atraumatic.   Eyes:      General: No scleral icterus.        Right eye: No discharge.         Left eye: No discharge.      Conjunctiva/sclera: Conjunctivae " normal.   Cardiovascular:      Rate and Rhythm: Normal rate.   Pulmonary:      Effort: Pulmonary effort is normal. No respiratory distress.   Abdominal:      Palpations: Abdomen is soft.      Tenderness: There is no guarding.   Musculoskeletal:         General: No deformity or signs of injury.      Cervical back: Neck supple.   Lymphadenopathy:      Cervical: No cervical adenopathy.   Skin:     General: Skin is warm and dry.      Findings: No rash.   Neurological:      General: No focal deficit present.      Mental Status: She is alert and oriented to person, place, and time.   Psychiatric:         Mood and Affect: Mood normal.         Behavior: Behavior normal.           Assessment & Plan:  Gastroesophageal reflux disease, unspecified whether esophagitis present  Comments:  controlled, cont regimen with pepcid    Small cell carcinoma of overlapping sites of right lung  -     MRI Brain W WO Contrast; Standing    Type 2 diabetes mellitus without complication, without long-term current use of insulin  Comments:  normalized on most recent    Anemia, unspecified type  Comments:  stable, update iron level  Orders:  -     Iron; Future; Expected date: 10/13/2023    Trouble in sleeping  -     traZODone (DESYREL) 50 MG tablet; Take 1 tablet (50 mg total) by mouth every evening.  Dispense: 90 tablet; Refill: 3

## 2023-10-16 ENCOUNTER — LAB VISIT (OUTPATIENT)
Dept: LAB | Facility: HOSPITAL | Age: 63
End: 2023-10-16
Attending: NURSE PRACTITIONER
Payer: COMMERCIAL

## 2023-10-16 DIAGNOSIS — C34.11 MALIGNANT NEOPLASM OF UPPER LOBE OF RIGHT LUNG: Primary | ICD-10-CM

## 2023-10-16 DIAGNOSIS — D64.9 ANEMIA, UNSPECIFIED TYPE: ICD-10-CM

## 2023-10-16 DIAGNOSIS — R53.83 OTHER FATIGUE: ICD-10-CM

## 2023-10-16 DIAGNOSIS — D50.9 IRON DEFICIENCY ANEMIA, UNSPECIFIED: ICD-10-CM

## 2023-10-16 LAB
ALBUMIN SERPL BCP-MCNC: 3.6 G/DL (ref 3.5–5.2)
ALP SERPL-CCNC: 94 U/L (ref 55–135)
ALT SERPL W/O P-5'-P-CCNC: 45 U/L (ref 10–44)
ANION GAP SERPL CALC-SCNC: 11 MMOL/L (ref 8–16)
AST SERPL-CCNC: 27 U/L (ref 10–40)
BASOPHILS # BLD AUTO: 0.01 K/UL (ref 0–0.2)
BASOPHILS NFR BLD: 0.2 % (ref 0–1.9)
BILIRUB SERPL-MCNC: 0.2 MG/DL (ref 0.1–1)
BUN SERPL-MCNC: 7 MG/DL (ref 8–23)
CALCIUM SERPL-MCNC: 9.6 MG/DL (ref 8.7–10.5)
CHLORIDE SERPL-SCNC: 103 MMOL/L (ref 95–110)
CO2 SERPL-SCNC: 24 MMOL/L (ref 23–29)
CREAT SERPL-MCNC: 0.9 MG/DL (ref 0.5–1.4)
DIFFERENTIAL METHOD: ABNORMAL
EOSINOPHIL # BLD AUTO: 0.1 K/UL (ref 0–0.5)
EOSINOPHIL NFR BLD: 2.3 % (ref 0–8)
ERYTHROCYTE [DISTWIDTH] IN BLOOD BY AUTOMATED COUNT: 14.2 % (ref 11.5–14.5)
EST. GFR  (NO RACE VARIABLE): >60 ML/MIN/1.73 M^2
GLUCOSE SERPL-MCNC: 90 MG/DL (ref 70–110)
HCT VFR BLD AUTO: 41.1 % (ref 37–48.5)
HGB BLD-MCNC: 12.9 G/DL (ref 12–16)
IMM GRANULOCYTES # BLD AUTO: 0 K/UL (ref 0–0.04)
IMM GRANULOCYTES NFR BLD AUTO: 0 % (ref 0–0.5)
IRON SERPL-MCNC: 74 UG/DL (ref 30–160)
LYMPHOCYTES # BLD AUTO: 1.6 K/UL (ref 1–4.8)
LYMPHOCYTES NFR BLD: 34 % (ref 18–48)
MCH RBC QN AUTO: 27.8 PG (ref 27–31)
MCHC RBC AUTO-ENTMCNC: 31.4 G/DL (ref 32–36)
MCV RBC AUTO: 89 FL (ref 82–98)
MONOCYTES # BLD AUTO: 0.6 K/UL (ref 0.3–1)
MONOCYTES NFR BLD: 11.7 % (ref 4–15)
NEUTROPHILS # BLD AUTO: 2.5 K/UL (ref 1.8–7.7)
NEUTROPHILS NFR BLD: 51.8 % (ref 38–73)
NRBC BLD-RTO: 0 /100 WBC
PLATELET # BLD AUTO: 277 K/UL (ref 150–450)
PMV BLD AUTO: 10.4 FL (ref 9.2–12.9)
POTASSIUM SERPL-SCNC: 3.7 MMOL/L (ref 3.5–5.1)
PROT SERPL-MCNC: 7.8 G/DL (ref 6–8.4)
RBC # BLD AUTO: 4.64 M/UL (ref 4–5.4)
SODIUM SERPL-SCNC: 138 MMOL/L (ref 136–145)
TSH SERPL DL<=0.005 MIU/L-ACNC: 0.83 UIU/ML (ref 0.4–4)
WBC # BLD AUTO: 4.79 K/UL (ref 3.9–12.7)

## 2023-10-16 PROCEDURE — 85025 COMPLETE CBC W/AUTO DIFF WBC: CPT | Performed by: NURSE PRACTITIONER

## 2023-10-16 PROCEDURE — 83540 ASSAY OF IRON: CPT | Performed by: FAMILY MEDICINE

## 2023-10-16 PROCEDURE — 80053 COMPREHEN METABOLIC PANEL: CPT | Performed by: NURSE PRACTITIONER

## 2023-10-16 PROCEDURE — 84443 ASSAY THYROID STIM HORMONE: CPT | Performed by: NURSE PRACTITIONER

## 2023-10-16 PROCEDURE — 36415 COLL VENOUS BLD VENIPUNCTURE: CPT | Mod: PN | Performed by: NURSE PRACTITIONER

## 2023-10-19 ENCOUNTER — HOSPITAL ENCOUNTER (OUTPATIENT)
Dept: RADIOLOGY | Facility: HOSPITAL | Age: 63
Discharge: HOME | End: 2023-10-19
Attending: INTERNAL MEDICINE
Payer: COMMERCIAL

## 2023-10-19 PROCEDURE — 71260 CT THORAX DX C+: CPT | Mod: 26,,, | Performed by: RADIOLOGY

## 2023-10-19 PROCEDURE — 71260 CT CHEST WITH CONTRAST: ICD-10-PCS | Mod: 26,,, | Performed by: RADIOLOGY

## 2023-10-19 PROCEDURE — 71260 CT THORAX DX C+: CPT | Mod: TC,PO

## 2023-10-19 PROCEDURE — 25500020 PHARM REV CODE 255: Mod: PO | Performed by: INTERNAL MEDICINE

## 2023-10-19 RX ADMIN — IOHEXOL 75 ML: 350 INJECTION, SOLUTION INTRAVENOUS at 08:10

## 2023-10-30 ENCOUNTER — LAB VISIT (OUTPATIENT)
Dept: LAB | Facility: HOSPITAL | Age: 63
End: 2023-10-30
Attending: NURSE PRACTITIONER
Payer: COMMERCIAL

## 2023-10-30 DIAGNOSIS — D50.9 IRON DEFICIENCY ANEMIA, UNSPECIFIED: ICD-10-CM

## 2023-10-30 DIAGNOSIS — R53.83 OTHER FATIGUE: ICD-10-CM

## 2023-10-30 DIAGNOSIS — C34.11 MALIGNANT NEOPLASM OF UPPER LOBE, RIGHT BRONCHUS OR LUNG: Primary | ICD-10-CM

## 2023-10-30 LAB
ALBUMIN SERPL BCP-MCNC: 3.7 G/DL (ref 3.5–5.2)
ALP SERPL-CCNC: 88 U/L (ref 55–135)
ALT SERPL W/O P-5'-P-CCNC: 49 U/L (ref 10–44)
ANION GAP SERPL CALC-SCNC: 16 MMOL/L (ref 8–16)
AST SERPL-CCNC: 26 U/L (ref 10–40)
BASOPHILS # BLD AUTO: 0.01 K/UL (ref 0–0.2)
BASOPHILS NFR BLD: 0.2 % (ref 0–1.9)
BILIRUB SERPL-MCNC: 0.2 MG/DL (ref 0.1–1)
BUN SERPL-MCNC: 9 MG/DL (ref 8–23)
CALCIUM SERPL-MCNC: 9.9 MG/DL (ref 8.7–10.5)
CHLORIDE SERPL-SCNC: 101 MMOL/L (ref 95–110)
CO2 SERPL-SCNC: 21 MMOL/L (ref 23–29)
CREAT SERPL-MCNC: 1 MG/DL (ref 0.5–1.4)
DIFFERENTIAL METHOD: ABNORMAL
EOSINOPHIL # BLD AUTO: 0.1 K/UL (ref 0–0.5)
EOSINOPHIL NFR BLD: 1.5 % (ref 0–8)
ERYTHROCYTE [DISTWIDTH] IN BLOOD BY AUTOMATED COUNT: 14.4 % (ref 11.5–14.5)
EST. GFR  (NO RACE VARIABLE): >60 ML/MIN/1.73 M^2
GLUCOSE SERPL-MCNC: 82 MG/DL (ref 70–110)
HCT VFR BLD AUTO: 42.6 % (ref 37–48.5)
HGB BLD-MCNC: 13.5 G/DL (ref 12–16)
IMM GRANULOCYTES # BLD AUTO: 0.01 K/UL (ref 0–0.04)
IMM GRANULOCYTES NFR BLD AUTO: 0.2 % (ref 0–0.5)
LYMPHOCYTES # BLD AUTO: 1.8 K/UL (ref 1–4.8)
LYMPHOCYTES NFR BLD: 38 % (ref 18–48)
MCH RBC QN AUTO: 27.7 PG (ref 27–31)
MCHC RBC AUTO-ENTMCNC: 31.7 G/DL (ref 32–36)
MCV RBC AUTO: 88 FL (ref 82–98)
MONOCYTES # BLD AUTO: 0.5 K/UL (ref 0.3–1)
MONOCYTES NFR BLD: 11.1 % (ref 4–15)
NEUTROPHILS # BLD AUTO: 2.3 K/UL (ref 1.8–7.7)
NEUTROPHILS NFR BLD: 49 % (ref 38–73)
NRBC BLD-RTO: 0 /100 WBC
PLATELET # BLD AUTO: 255 K/UL (ref 150–450)
PMV BLD AUTO: 10.8 FL (ref 9.2–12.9)
POTASSIUM SERPL-SCNC: 4 MMOL/L (ref 3.5–5.1)
PROT SERPL-MCNC: 7.6 G/DL (ref 6–8.4)
RBC # BLD AUTO: 4.87 M/UL (ref 4–5.4)
SODIUM SERPL-SCNC: 138 MMOL/L (ref 136–145)
TSH SERPL DL<=0.005 MIU/L-ACNC: 0.48 UIU/ML (ref 0.4–4)
WBC # BLD AUTO: 4.68 K/UL (ref 3.9–12.7)

## 2023-10-30 PROCEDURE — 80053 COMPREHEN METABOLIC PANEL: CPT | Performed by: NURSE PRACTITIONER

## 2023-10-30 PROCEDURE — 85025 COMPLETE CBC W/AUTO DIFF WBC: CPT | Performed by: NURSE PRACTITIONER

## 2023-10-30 PROCEDURE — 84443 ASSAY THYROID STIM HORMONE: CPT | Performed by: NURSE PRACTITIONER

## 2023-10-30 PROCEDURE — 36415 COLL VENOUS BLD VENIPUNCTURE: CPT | Mod: PN | Performed by: NURSE PRACTITIONER

## 2023-11-02 ENCOUNTER — PATIENT OUTREACH (OUTPATIENT)
Dept: ADMINISTRATIVE | Facility: HOSPITAL | Age: 63
End: 2023-11-02
Payer: COMMERCIAL

## 2023-11-02 NOTE — PROGRESS NOTES
Population Health Chart Review & Patient Outreach Details    Outreach Performed: YES Telephone Not Successful    Additional Pop Health Notes:           Updates Requested / Reviewed:      Immunizations Reconciliation Completed or Queried: [place] and Updated Care Coordination Note         Health Maintenance Topics Overdue:    Health Maintenance Due   Topic Date Due    RSV Vaccine (Age 60+) (1 - 1-dose 60+ series) Never done    Eye Exam  12/23/2022    Colorectal Cancer Screening  01/18/2024         Health Maintenance Topic(s) Outreach Outcomes & Actions Taken:    Eye Exam - Outreach Outcomes & Actions Taken  : Left message on recorder to return call to clinic

## 2023-11-09 ENCOUNTER — E-VISIT (OUTPATIENT)
Dept: FAMILY MEDICINE | Facility: CLINIC | Age: 63
End: 2023-11-09
Payer: COMMERCIAL

## 2023-11-09 DIAGNOSIS — N76.0 ACUTE VAGINITIS: Primary | ICD-10-CM

## 2023-11-09 PROCEDURE — 99421 OL DIG E/M SVC 5-10 MIN: CPT | Mod: ,,, | Performed by: FAMILY MEDICINE

## 2023-11-09 PROCEDURE — 99421 PR E&M, ONLINE DIGIT, EST, < 7 DAYS, 5-10 MINS: ICD-10-PCS | Mod: ,,, | Performed by: FAMILY MEDICINE

## 2023-11-09 RX ORDER — FLUCONAZOLE 150 MG/1
150 TABLET ORAL
Qty: 3 TABLET | Refills: 0 | Status: SHIPPED | OUTPATIENT
Start: 2023-11-09 | End: 2023-11-17

## 2023-11-09 NOTE — PROGRESS NOTES
Patient ID: Malathi Martinez is a 63 y.o. female.    Chief Complaint: Vaginal Discharge (Entered automatically based on patient selection in Patient Portal.)    The patient initiated a request through [employer] on 11/9/2023 for evaluation and management with a chief complaint of Vaginal Discharge (Entered automatically based on patient selection in Patient Portal.)     I evaluated the questionnaire submission on 11/9/23.    Ohs Peq Evisit Vaginal Discharge    11/9/2023  8:19 AM CST - Filed by Patient   Do you agree to participate in an E-Visit? Yes   If you have any of the following symptoms,  please do not complete an E-Visit,  schedule an appointment with your provider:    What is the main issue that you would like for your doctor to address today? Medicine for yeast infection   Are you able to take your vital signs? No   Which of the following are you experiencing? Vaginal Itching;  Vaginal discharge    Are you having pain while passing urine? No, I have no pain while urinating.   Which of the following applies to your vaginal discharge? I have a white/milky discharge.;  I have a foul-smelling discharge.    Which of the following are you experiencing? None of the above   Do you have any sores on your genitals? No    Have you taken antibiotics recently? I have not been on any antibiotics    Do you use any of the following? None of the above   Which of the following applies to your menstrual period? I do not have menstrual periods   Have you had similar symptoms in the past? Yes, I have had had similar symptoms more than once before.   When you had similar symptoms in the past, did any of the following work? Pills for yeast infection   Have you had a fever? No   During the last 2 months, have you had sexual contact with a specific person for the first time? No   Provide any information you feel is important to your history not asked above    Please attach any relevant images or files          Recent Labs  Obtained:  No visits with results within 7 Day(s) from this visit.   Latest known visit with results is:   Lab Visit on 10/30/2023   Component Date Value Ref Range Status    WBC 10/30/2023 4.68  3.90 - 12.70 K/uL Final    RBC 10/30/2023 4.87  4.00 - 5.40 M/uL Final    Hemoglobin 10/30/2023 13.5  12.0 - 16.0 g/dL Final    Hematocrit 10/30/2023 42.6  37.0 - 48.5 % Final    MCV 10/30/2023 88  82 - 98 fL Final    MCH 10/30/2023 27.7  27.0 - 31.0 pg Final    MCHC 10/30/2023 31.7 (L)  32.0 - 36.0 g/dL Final    RDW 10/30/2023 14.4  11.5 - 14.5 % Final    Platelets 10/30/2023 255  150 - 450 K/uL Final    MPV 10/30/2023 10.8  9.2 - 12.9 fL Final    Immature Granulocytes 10/30/2023 0.2  0.0 - 0.5 % Final    Gran # (ANC) 10/30/2023 2.3  1.8 - 7.7 K/uL Final    Immature Grans (Abs) 10/30/2023 0.01  0.00 - 0.04 K/uL Final    Comment: Mild elevation in immature granulocytes is non specific and   can be seen in a variety of conditions including stress response,   acute inflammation, trauma and pregnancy. Correlation with other   laboratory and clinical findings is essential.      Lymph # 10/30/2023 1.8  1.0 - 4.8 K/uL Final    Mono # 10/30/2023 0.5  0.3 - 1.0 K/uL Final    Eos # 10/30/2023 0.1  0.0 - 0.5 K/uL Final    Baso # 10/30/2023 0.01  0.00 - 0.20 K/uL Final    nRBC 10/30/2023 0  0 /100 WBC Final    Gran % 10/30/2023 49.0  38.0 - 73.0 % Final    Lymph % 10/30/2023 38.0  18.0 - 48.0 % Final    Mono % 10/30/2023 11.1  4.0 - 15.0 % Final    Eosinophil % 10/30/2023 1.5  0.0 - 8.0 % Final    Basophil % 10/30/2023 0.2  0.0 - 1.9 % Final    Differential Method 10/30/2023 Automated   Final    Sodium 10/30/2023 138  136 - 145 mmol/L Final    Potassium 10/30/2023 4.0  3.5 - 5.1 mmol/L Final    Chloride 10/30/2023 101  95 - 110 mmol/L Final    CO2 10/30/2023 21 (L)  23 - 29 mmol/L Final    Glucose 10/30/2023 82  70 - 110 mg/dL Final    BUN 10/30/2023 9  8 - 23 mg/dL Final    Creatinine 10/30/2023 1.0  0.5 - 1.4 mg/dL Final    Calcium  10/30/2023 9.9  8.7 - 10.5 mg/dL Final    Total Protein 10/30/2023 7.6  6.0 - 8.4 g/dL Final    Albumin 10/30/2023 3.7  3.5 - 5.2 g/dL Final    Total Bilirubin 10/30/2023 0.2  0.1 - 1.0 mg/dL Final    Comment: For infants and newborns, interpretation of results should be based  on gestational age, weight and in agreement with clinical  observations.    Premature Infant recommended reference ranges:  Up to 24 hours.............<8.0 mg/dL  Up to 48 hours............<12.0 mg/dL  3-5 days..................<15.0 mg/dL  6-29 days.................<15.0 mg/dL      Alkaline Phosphatase 10/30/2023 88  55 - 135 U/L Final    AST 10/30/2023 26  10 - 40 U/L Final    ALT 10/30/2023 49 (H)  10 - 44 U/L Final    eGFR 10/30/2023 >60.0  >60 mL/min/1.73 m^2 Final    Anion Gap 10/30/2023 16  8 - 16 mmol/L Final    TSH 10/30/2023 0.482  0.400 - 4.000 uIU/mL Final       Encounter Diagnosis   Name Primary?    Acute vaginitis Yes        No orders of the defined types were placed in this encounter.     Medications Ordered This Encounter   Medications    fluconazole (DIFLUCAN) 150 MG Tab     Sig: Take 1 tablet (150 mg total) by mouth every 72 hours.     Dispense:  3 tablet     Refill:  0        No follow-ups on file.      E-Visit Time Tracking:    Day 1 Time (in minutes): 5     Total Time (in minutes): 5

## 2023-11-17 ENCOUNTER — HOSPITAL ENCOUNTER (OUTPATIENT)
Dept: RADIOLOGY | Facility: HOSPITAL | Age: 63
Discharge: HOME | End: 2023-11-17
Attending: INTERNAL MEDICINE
Payer: COMMERCIAL

## 2023-11-17 ENCOUNTER — TELEPHONE (OUTPATIENT)
Dept: FAMILY MEDICINE | Facility: CLINIC | Age: 63
End: 2023-11-17

## 2023-11-17 ENCOUNTER — OFFICE VISIT (OUTPATIENT)
Dept: FAMILY MEDICINE | Facility: CLINIC | Age: 63
End: 2023-11-17
Payer: COMMERCIAL

## 2023-11-17 ENCOUNTER — TELEPHONE (OUTPATIENT)
Dept: PHARMACY | Facility: CLINIC | Age: 63
End: 2023-11-17
Payer: COMMERCIAL

## 2023-11-17 VITALS
DIASTOLIC BLOOD PRESSURE: 70 MMHG | OXYGEN SATURATION: 98 % | SYSTOLIC BLOOD PRESSURE: 126 MMHG | HEART RATE: 78 BPM | HEIGHT: 70 IN | WEIGHT: 212.31 LBS | BODY MASS INDEX: 30.39 KG/M2

## 2023-11-17 DIAGNOSIS — N39.3 SUI (STRESS URINARY INCONTINENCE, FEMALE): ICD-10-CM

## 2023-11-17 DIAGNOSIS — K21.9 GASTROESOPHAGEAL REFLUX DISEASE, UNSPECIFIED WHETHER ESOPHAGITIS PRESENT: ICD-10-CM

## 2023-11-17 DIAGNOSIS — E11.9 TYPE 2 DIABETES MELLITUS WITHOUT COMPLICATION, WITHOUT LONG-TERM CURRENT USE OF INSULIN: Primary | ICD-10-CM

## 2023-11-17 DIAGNOSIS — R53.83 OTHER FATIGUE: ICD-10-CM

## 2023-11-17 DIAGNOSIS — C34.81 SMALL CELL CARCINOMA OF OVERLAPPING SITES OF RIGHT LUNG: ICD-10-CM

## 2023-11-17 DIAGNOSIS — C34.11 MALIGNANT NEOPLASM OF UPPER LOBE, RIGHT BRONCHUS OR LUNG: ICD-10-CM

## 2023-11-17 DIAGNOSIS — D50.9 IRON DEFICIENCY ANEMIA, UNSPECIFIED: ICD-10-CM

## 2023-11-17 DIAGNOSIS — S83.421A SPRAIN OF LATERAL COLLATERAL LIGAMENT OF RIGHT KNEE, INITIAL ENCOUNTER: ICD-10-CM

## 2023-11-17 DIAGNOSIS — N76.1 SUBACUTE VAGINITIS: ICD-10-CM

## 2023-11-17 DIAGNOSIS — R04.2 HEMOPTYSIS: ICD-10-CM

## 2023-11-17 LAB — GLUCOSE SERPL-MCNC: 95 MG/DL (ref 70–110)

## 2023-11-17 PROCEDURE — 78815 PET IMAGE W/CT SKULL-THIGH: CPT | Mod: 26,PS,, | Performed by: RADIOLOGY

## 2023-11-17 PROCEDURE — 1159F PR MEDICATION LIST DOCUMENTED IN MEDICAL RECORD: ICD-10-PCS | Mod: CPTII,S$GLB,, | Performed by: FAMILY MEDICINE

## 2023-11-17 PROCEDURE — 3078F PR MOST RECENT DIASTOLIC BLOOD PRESSURE < 80 MM HG: ICD-10-PCS | Mod: CPTII,S$GLB,, | Performed by: FAMILY MEDICINE

## 2023-11-17 PROCEDURE — 3074F PR MOST RECENT SYSTOLIC BLOOD PRESSURE < 130 MM HG: ICD-10-PCS | Mod: CPTII,S$GLB,, | Performed by: FAMILY MEDICINE

## 2023-11-17 PROCEDURE — 3074F SYST BP LT 130 MM HG: CPT | Mod: CPTII,S$GLB,, | Performed by: FAMILY MEDICINE

## 2023-11-17 PROCEDURE — 1159F MED LIST DOCD IN RCRD: CPT | Mod: CPTII,S$GLB,, | Performed by: FAMILY MEDICINE

## 2023-11-17 PROCEDURE — 78815 NM PET CT FDG SKULL BASE TO MID THIGH: ICD-10-PCS | Mod: 26,PS,, | Performed by: RADIOLOGY

## 2023-11-17 PROCEDURE — 1160F RVW MEDS BY RX/DR IN RCRD: CPT | Mod: CPTII,S$GLB,, | Performed by: FAMILY MEDICINE

## 2023-11-17 PROCEDURE — 3066F NEPHROPATHY DOC TX: CPT | Mod: CPTII,S$GLB,, | Performed by: FAMILY MEDICINE

## 2023-11-17 PROCEDURE — 3061F PR NEG MICROALBUMINURIA RESULT DOCUMENTED/REVIEW: ICD-10-PCS | Mod: CPTII,S$GLB,, | Performed by: FAMILY MEDICINE

## 2023-11-17 PROCEDURE — 99999 PR PBB SHADOW E&M-EST. PATIENT-LVL V: CPT | Mod: PBBFAC,,, | Performed by: FAMILY MEDICINE

## 2023-11-17 PROCEDURE — 99214 PR OFFICE/OUTPT VISIT, EST, LEVL IV, 30-39 MIN: ICD-10-PCS | Mod: S$GLB,,, | Performed by: FAMILY MEDICINE

## 2023-11-17 PROCEDURE — 3044F PR MOST RECENT HEMOGLOBIN A1C LEVEL <7.0%: ICD-10-PCS | Mod: CPTII,S$GLB,, | Performed by: FAMILY MEDICINE

## 2023-11-17 PROCEDURE — 81514 NFCT DS BV&VAGINITIS DNA ALG: CPT | Performed by: FAMILY MEDICINE

## 2023-11-17 PROCEDURE — A9552 F18 FDG: HCPCS | Mod: PN

## 2023-11-17 PROCEDURE — 3061F NEG MICROALBUMINURIA REV: CPT | Mod: CPTII,S$GLB,, | Performed by: FAMILY MEDICINE

## 2023-11-17 PROCEDURE — 3008F PR BODY MASS INDEX (BMI) DOCUMENTED: ICD-10-PCS | Mod: CPTII,S$GLB,, | Performed by: FAMILY MEDICINE

## 2023-11-17 PROCEDURE — 99999 PR PBB SHADOW E&M-EST. PATIENT-LVL V: ICD-10-PCS | Mod: PBBFAC,,, | Performed by: FAMILY MEDICINE

## 2023-11-17 PROCEDURE — 99214 OFFICE O/P EST MOD 30 MIN: CPT | Mod: S$GLB,,, | Performed by: FAMILY MEDICINE

## 2023-11-17 PROCEDURE — 3008F BODY MASS INDEX DOCD: CPT | Mod: CPTII,S$GLB,, | Performed by: FAMILY MEDICINE

## 2023-11-17 PROCEDURE — 1160F PR REVIEW ALL MEDS BY PRESCRIBER/CLIN PHARMACIST DOCUMENTED: ICD-10-PCS | Mod: CPTII,S$GLB,, | Performed by: FAMILY MEDICINE

## 2023-11-17 PROCEDURE — 3066F PR DOCUMENTATION OF TREATMENT FOR NEPHROPATHY: ICD-10-PCS | Mod: CPTII,S$GLB,, | Performed by: FAMILY MEDICINE

## 2023-11-17 PROCEDURE — 4010F PR ACE/ARB THEARPY RXD/TAKEN: ICD-10-PCS | Mod: CPTII,S$GLB,, | Performed by: FAMILY MEDICINE

## 2023-11-17 PROCEDURE — 3078F DIAST BP <80 MM HG: CPT | Mod: CPTII,S$GLB,, | Performed by: FAMILY MEDICINE

## 2023-11-17 PROCEDURE — 4010F ACE/ARB THERAPY RXD/TAKEN: CPT | Mod: CPTII,S$GLB,, | Performed by: FAMILY MEDICINE

## 2023-11-17 PROCEDURE — 3044F HG A1C LEVEL LT 7.0%: CPT | Mod: CPTII,S$GLB,, | Performed by: FAMILY MEDICINE

## 2023-11-17 RX ORDER — ESOMEPRAZOLE MAGNESIUM 40 MG/1
40 CAPSULE, DELAYED RELEASE ORAL 2 TIMES DAILY
Qty: 60 CAPSULE | Refills: 2 | Status: SHIPPED | OUTPATIENT
Start: 2023-11-17 | End: 2023-12-05 | Stop reason: SDUPTHER

## 2023-11-17 RX ORDER — TIRZEPATIDE 2.5 MG/.5ML
2.5 INJECTION, SOLUTION SUBCUTANEOUS
Qty: 4 PEN | Refills: 3 | Status: SHIPPED | OUTPATIENT
Start: 2023-11-17 | End: 2023-12-15 | Stop reason: DRUGHIGH

## 2023-11-17 RX ORDER — CELECOXIB 100 MG/1
100 CAPSULE ORAL 2 TIMES DAILY PRN
Qty: 40 CAPSULE | Refills: 0 | Status: SHIPPED | OUTPATIENT
Start: 2023-11-17 | End: 2024-01-26

## 2023-11-17 NOTE — PROGRESS NOTES
PET Imaging Questionnaire    Are you a Diabetic? Recent Blood Sugar level? Yes    Are you anemic? Bone Marrow Stimulation Meds? No    Have you had a CT Scan, if so when & where was your last one? Yes -     Have you had a PET Scan, if so when & where was your last one? Yes -     Chemotherapy or currently on Chemotherapy? Yes    Radiation therapy? No    Surgical History:   Past Surgical History:   Procedure Laterality Date    APPENDECTOMY      COLONOSCOPY N/A 1/18/2019    Procedure: COLONOSCOPY;  Surgeon: Lai Cardoso MD;  Location: [facility];  Service: Endoscopy;  Laterality: N/A;    ENDOBRONCHIAL ULTRASOUND N/A 5/3/2023    Procedure: ENDOBRONCHIAL ULTRASOUND (EBUS);  Surgeon: Nahun Nguyen MD;  Location: [facility] OR;  Service: Pulmonary;  Laterality: N/A;    ESOPHAGOGASTRODUODENOSCOPY N/A 2/19/2021    Procedure: ESOPHAGOGASTRODUODENOSCOPY (EGD);  Surgeon: Lai Cardoso MD;  Location: [facility];  Service: Endoscopy;  Laterality: N/A;    INSERTION OF TUNNELED CENTRAL VENOUS CATHETER (CVC) WITH SUBCUTANEOUS PORT Right 5/22/2023    Procedure: [id number]-[id number];  Surgeon: Ayah Aldana MD;  Location: [facility];  Service: General;  Laterality: Right;    TONSILLECTOMY          Have you been fasting for at least 6 hours? Yes    Is there any chance you may be pregnant or breastfeeding? No    Assay: 13.12 MCi@:10:23   Injection Site:RT AC    Residual: .861 mCi@: 10:25   Technologist: Ron Stone Injected:12.26 mCi

## 2023-11-17 NOTE — TELEPHONE ENCOUNTER
----- Message from Cee Ang sent at 11/17/2023  3:59 PM CST -----  Type:  Needs Medical Advice    Who Called: pt   Best Call Back Number: [phone] (home)     Additional Information:  Requesting call back  wants to dx results     please advise thank you

## 2023-11-17 NOTE — PATIENT INSTRUCTIONS
Start Nexium 40mg 2x/day   Continue Pepcid 2x/day    Knee: Ice, compression sleeve, quad exercises    Urine: update urinalysis    Arm: anti-inflammatory if needed, try to wait at least a week for the stomach to feel better before trying.

## 2023-11-17 NOTE — PROGRESS NOTES
Subjective:       Patient ID: Malathi Martinez is a 63 y.o. female.    Chief Complaint: Follow-up (Cough and pain started for a while had blood when coughed last week )    Cough  This is a new problem. The current episode started 1 to 4 weeks ago. The problem has been waxing and waning. The problem occurs every few hours. The cough is Productive of blood-tinged sputum. Associated symptoms include ear congestion, heartburn, hemoptysis, nasal congestion, postnasal drip and rhinorrhea. Pertinent negatives include no chest pain, chills, ear pain, fever, headaches, myalgias, rash, sore throat, shortness of breath, sweats, weight loss or wheezing. Nothing aggravates the symptoms. Risk factors for lung disease include smoking/tobacco exposure. The treatment provided no relief. Her past medical history is significant for bronchitis, COPD, environmental allergies and pneumonia. There is no history of asthma, bronchiectasis or emphysema.     Patient seen for f/u and review.   Recall, hx of SCLC, s/p   Current cough attributed to allergies, now for several weeks. Last week, noticed some blood in the sputum, bright red, spread into the saliva (no clot) that has occurred a few times. Not daily, but has occurred 3x. Not sure if it's related to previous nose bleed. Cough is stable now for several months. Using lozenges which provide some sx relief. No wheezing or shortness of breath. Has not yet tried her inhaler.   GERD has not been well-controlled of late. Has had episode of emesis x 1 not long after a meal, no recurrence. +brash water taste at times. Occurred in week following hematemesis.     Ongoing issues with yeast infection. +itching and discharge.   +stress incontinence. Nighttime frequency x 3.   Side of the L knee pain.  Pain in the R arm with certain movements, pressure x 1mo. Can sleep on the affected side. R hand dominant, more careful with some activities.     Review of Systems:  Review of Systems  "  Constitutional:  Negative for chills, fever and weight loss.   HENT:  Positive for postnasal drip and rhinorrhea. Negative for ear pain and sore throat.    Respiratory:  Positive for cough and hemoptysis. Negative for shortness of breath and wheezing.    Cardiovascular:  Negative for chest pain.   Gastrointestinal:  Positive for heartburn.   Genitourinary:  Positive for vaginal discharge (recurs despite diflucan regimen, attributes to need for urinary pad).   Musculoskeletal:  Negative for myalgias.   Skin:  Negative for rash.   Allergic/Immunologic: Positive for environmental allergies.   Neurological:  Negative for headaches.       Objective:     Vitals:    11/17/23 0831   BP: 126/70   Pulse: 78   SpO2: 98%   Weight: 96.3 kg (212 lb 4.9 oz)   Height: 5' 10" (1.778 m)          Physical Exam  Vitals and nursing note reviewed.   Constitutional:       General: She is not in acute distress.     Appearance: Normal appearance. She is well-developed.   HENT:      Head: Normocephalic and atraumatic.   Eyes:      General: No scleral icterus.        Right eye: No discharge.         Left eye: No discharge.      Conjunctiva/sclera: Conjunctivae normal.   Cardiovascular:      Rate and Rhythm: Normal rate and regular rhythm.   Pulmonary:      Effort: Pulmonary effort is normal. No respiratory distress.      Breath sounds: Normal breath sounds.   Musculoskeletal:         General: Tenderness present.      Cervical back: Neck supple.      Right knee: Tenderness present over the LCL.   Lymphadenopathy:      Cervical: No cervical adenopathy.   Skin:     General: Skin is warm and dry.   Neurological:      General: No focal deficit present.      Mental Status: She is alert and oriented to person, place, and time.   Psychiatric:         Mood and Affect: Mood normal.         Behavior: Behavior normal.           Assessment & Plan:  Type 2 diabetes mellitus without complication, without long-term current use of insulin  -     tirzepatide " (MOUNJARO) 2.5 mg/0.5 mL PnIj; Inject 2.5 mg into the skin every 7 days.  Dispense: 4 Pen; Refill: 3    Small cell carcinoma of overlapping sites of right lung    Gastroesophageal reflux disease, unspecified whether esophagitis present  -     esomeprazole (NEXIUM) 40 MG capsule; Take 1 capsule (40 mg total) by mouth 2 (two) times daily.  Dispense: 60 capsule; Refill: 2    KIKI (stress urinary incontinence, female)  Comments:  update ua  discussed radha pelvic floor PT  Orders:  -     Urinalysis, Reflex to Urine Culture Urine, Clean Catch; Future    Sprain of lateral collateral ligament of right knee, initial encounter  -     celecoxib (CELEBREX) 100 MG capsule; Take 1 capsule (100 mg total) by mouth 2 (two) times daily as needed for Pain.  Dispense: 40 capsule; Refill: 0    Subacute vaginitis  -     VAGINOSIS SCREEN BY DNA PROBE    Hemoptysis  Comments:  no recurrence since initial, will need improved gerd control  discussed f/u EGD (last 2021)

## 2023-11-17 NOTE — TELEPHONE ENCOUNTER
Notified pt that Dr. Dave has not reviewed message yet,but once done she will be contacted     Please advise in Dr. Dave Absence

## 2023-11-18 LAB
BACTERIAL VAGINOSIS DNA: NEGATIVE
CANDIDA GLABRATA DNA: NEGATIVE
CANDIDA KRUSEI DNA: NEGATIVE
CANDIDA RRNA VAG QL PROBE: NEGATIVE
LEFT EYE DM RETINOPATHY: NEGATIVE
RIGHT EYE DM RETINOPATHY: NEGATIVE
T VAGINALIS RRNA GENITAL QL PROBE: NEGATIVE

## 2023-11-19 ENCOUNTER — TELEPHONE (OUTPATIENT)
Dept: PHARMACY | Facility: CLINIC | Age: 63
End: 2023-11-19
Payer: COMMERCIAL

## 2023-11-20 NOTE — TELEPHONE ENCOUNTER
Assessed patient medication adherence for population health /[facility] [place] medication adherence project

## 2023-11-20 NOTE — TELEPHONE ENCOUNTER
Have absolutely no idea what this is about, need much more information before I can be of assistance in this particular case.  Please call patient, ask what lab work or results they are talking about, what symptoms they are having,  then I might can help.

## 2023-11-21 NOTE — TELEPHONE ENCOUNTER
Attempted to contact pt. No answer, unable to leave message.    Portal message sent to pt notifying she will need to contact ordering provider as PCP is out of office.

## 2023-11-21 NOTE — TELEPHONE ENCOUNTER
Pt had PET scan on 11/17. Results were released to pt via portal and she does not understand what they say. Doesn't want to go into Thanksgiving without knowing results.   Pt aware PCP out of office this week.   Please advise if able to assist pt.

## 2023-11-24 ENCOUNTER — LAB VISIT (OUTPATIENT)
Dept: LAB | Facility: HOSPITAL | Age: 63
End: 2023-11-24
Attending: NURSE PRACTITIONER
Payer: COMMERCIAL

## 2023-11-24 DIAGNOSIS — C34.11 MALIGNANT NEOPLASM OF UPPER LOBE OF RIGHT LUNG: ICD-10-CM

## 2023-11-24 LAB
ALBUMIN SERPL BCP-MCNC: 3.8 G/DL (ref 3.5–5.2)
ALP SERPL-CCNC: 107 U/L (ref 55–135)
ALT SERPL W/O P-5'-P-CCNC: 36 U/L (ref 10–44)
ANION GAP SERPL CALC-SCNC: 11 MMOL/L (ref 8–16)
AST SERPL-CCNC: 22 U/L (ref 10–40)
BASOPHILS # BLD AUTO: 0.01 K/UL (ref 0–0.2)
BASOPHILS NFR BLD: 0.2 % (ref 0–1.9)
BILIRUB SERPL-MCNC: 0.2 MG/DL (ref 0.1–1)
BUN SERPL-MCNC: 8 MG/DL (ref 8–23)
CALCIUM SERPL-MCNC: 9.2 MG/DL (ref 8.7–10.5)
CHLORIDE SERPL-SCNC: 107 MMOL/L (ref 95–110)
CO2 SERPL-SCNC: 25 MMOL/L (ref 23–29)
CREAT SERPL-MCNC: 0.9 MG/DL (ref 0.5–1.4)
DIFFERENTIAL METHOD: ABNORMAL
EOSINOPHIL # BLD AUTO: 0.1 K/UL (ref 0–0.5)
EOSINOPHIL NFR BLD: 1.6 % (ref 0–8)
ERYTHROCYTE [DISTWIDTH] IN BLOOD BY AUTOMATED COUNT: 15.4 % (ref 11.5–14.5)
EST. GFR  (NO RACE VARIABLE): >60 ML/MIN/1.73 M^2
GLUCOSE SERPL-MCNC: 103 MG/DL (ref 70–110)
HCT VFR BLD AUTO: 40.8 % (ref 37–48.5)
HGB BLD-MCNC: 13.1 G/DL (ref 12–16)
IMM GRANULOCYTES # BLD AUTO: 0.01 K/UL (ref 0–0.04)
IMM GRANULOCYTES NFR BLD AUTO: 0.2 % (ref 0–0.5)
LYMPHOCYTES # BLD AUTO: 1.8 K/UL (ref 1–4.8)
LYMPHOCYTES NFR BLD: 36 % (ref 18–48)
MCH RBC QN AUTO: 27.5 PG (ref 27–31)
MCHC RBC AUTO-ENTMCNC: 32.1 G/DL (ref 32–36)
MCV RBC AUTO: 86 FL (ref 82–98)
MONOCYTES # BLD AUTO: 0.5 K/UL (ref 0.3–1)
MONOCYTES NFR BLD: 9.2 % (ref 4–15)
NEUTROPHILS # BLD AUTO: 2.6 K/UL (ref 1.8–7.7)
NEUTROPHILS NFR BLD: 52.8 % (ref 38–73)
NRBC BLD-RTO: 0 /100 WBC
PLATELET # BLD AUTO: 272 K/UL (ref 150–450)
PMV BLD AUTO: 10.7 FL (ref 9.2–12.9)
POTASSIUM SERPL-SCNC: 3.8 MMOL/L (ref 3.5–5.1)
PROT SERPL-MCNC: 7.6 G/DL (ref 6–8.4)
RBC # BLD AUTO: 4.76 M/UL (ref 4–5.4)
SODIUM SERPL-SCNC: 143 MMOL/L (ref 136–145)
TSH SERPL DL<=0.005 MIU/L-ACNC: 1.54 UIU/ML (ref 0.4–4)
WBC # BLD AUTO: 5 K/UL (ref 3.9–12.7)

## 2023-11-24 PROCEDURE — 80053 COMPREHEN METABOLIC PANEL: CPT | Performed by: NURSE PRACTITIONER

## 2023-11-24 PROCEDURE — 36415 COLL VENOUS BLD VENIPUNCTURE: CPT | Mod: PN | Performed by: NURSE PRACTITIONER

## 2023-11-24 PROCEDURE — 85025 COMPLETE CBC W/AUTO DIFF WBC: CPT | Performed by: NURSE PRACTITIONER

## 2023-11-24 PROCEDURE — 84443 ASSAY THYROID STIM HORMONE: CPT | Performed by: NURSE PRACTITIONER

## 2023-11-26 ENCOUNTER — PATIENT MESSAGE (OUTPATIENT)
Dept: FAMILY MEDICINE | Facility: CLINIC | Age: 63
End: 2023-11-26
Payer: COMMERCIAL

## 2023-11-27 ENCOUNTER — TELEPHONE (OUTPATIENT)
Dept: FAMILY MEDICINE | Facility: CLINIC | Age: 63
End: 2023-11-27
Payer: COMMERCIAL

## 2023-11-27 NOTE — TELEPHONE ENCOUNTER
Pt's last cscope indicates to repeat in 5 yrs, per Dr Cardoso. Please review. Pt is requesting to schedule. Please assist.

## 2023-12-05 ENCOUNTER — PATIENT MESSAGE (OUTPATIENT)
Dept: FAMILY MEDICINE | Facility: CLINIC | Age: 63
End: 2023-12-05
Payer: COMMERCIAL

## 2023-12-05 DIAGNOSIS — K21.9 GASTROESOPHAGEAL REFLUX DISEASE, UNSPECIFIED WHETHER ESOPHAGITIS PRESENT: ICD-10-CM

## 2023-12-05 NOTE — TELEPHONE ENCOUNTER
No care due was identified.  Health [facility] Embedded Care Due Messages. Reference number: [id number].   12/05/2023 6:24:04 AM CST

## 2023-12-08 RX ORDER — ESOMEPRAZOLE MAGNESIUM 40 MG/1
40 CAPSULE, DELAYED RELEASE ORAL 2 TIMES DAILY
Qty: 60 CAPSULE | Refills: 2 | Status: SHIPPED | OUTPATIENT
Start: 2023-12-08 | End: 2024-01-26

## 2023-12-11 ENCOUNTER — PATIENT OUTREACH (OUTPATIENT)
Dept: ADMINISTRATIVE | Facility: HOSPITAL | Age: 63
End: 2023-12-11
Payer: COMMERCIAL

## 2023-12-11 NOTE — PROGRESS NOTES
Population Health Chart Review & Patient Outreach Details    Outreach Performed: NO    Additional Pop Health Notes:           Updates Requested / Reviewed:      Updated Care Coordination Note, , and Immunizations Reconciliation Completed or Queried: [facility] Topics Overdue:    Health Maintenance Due   Topic Date Due    RSV Vaccine (Age 60+ and Pregnant patients) (1 - 1-dose 60+ series) Never done    Colorectal Cancer Screening  01/18/2024         Health Maintenance Topic(s) Outreach Outcomes & Actions Taken:    Eye Exam - Outreach Outcomes & Actions Taken  : Diabetic Eye External Records Uploaded, Care Team & History Updated if Applicable

## 2023-12-13 ENCOUNTER — TELEPHONE (OUTPATIENT)
Dept: GASTROENTEROLOGY | Facility: CLINIC | Age: 63
End: 2023-12-13
Payer: COMMERCIAL

## 2023-12-13 NOTE — TELEPHONE ENCOUNTER
Spoke to pt, pt wanting to know arrival time and prep instructions. Called in golytely and sent instructions to [facility]. Told pt surgery center will call a couple days prior with arrival time. Pt verbalized understanding.

## 2023-12-13 NOTE — TELEPHONE ENCOUNTER
----- Message from Lubna Marie RN sent at 12/13/2023  1:45 PM CST -----    ----- Message -----  From: Adali Taylor  Sent: 12/13/2023  12:50 PM CST  To: Annabelle Garcia Staff    Type: Needs Medical Advice  Who Called:  Patient   Symptoms (please be specific):    How long has patient had these symptoms:    Pharmacy name and phone #:    Best Call Back Number: [phone]  Additional Information: Patient is requesting a call back from the nurse regarding upcoming appt..

## 2023-12-15 ENCOUNTER — OFFICE VISIT (OUTPATIENT)
Dept: FAMILY MEDICINE | Facility: CLINIC | Age: 63
End: 2023-12-15
Payer: COMMERCIAL

## 2023-12-15 ENCOUNTER — LAB VISIT (OUTPATIENT)
Dept: LAB | Facility: HOSPITAL | Age: 63
End: 2023-12-15
Attending: NURSE PRACTITIONER
Payer: COMMERCIAL

## 2023-12-15 VITALS
WEIGHT: 215.38 LBS | DIASTOLIC BLOOD PRESSURE: 76 MMHG | OXYGEN SATURATION: 98 % | SYSTOLIC BLOOD PRESSURE: 124 MMHG | HEIGHT: 70 IN | HEART RATE: 73 BPM | BODY MASS INDEX: 30.83 KG/M2

## 2023-12-15 DIAGNOSIS — R53.83 OTHER FATIGUE: ICD-10-CM

## 2023-12-15 DIAGNOSIS — E11.9 TYPE 2 DIABETES MELLITUS WITHOUT COMPLICATION, WITHOUT LONG-TERM CURRENT USE OF INSULIN: Primary | ICD-10-CM

## 2023-12-15 DIAGNOSIS — C34.11 MALIGNANT NEOPLASM OF UPPER LOBE, RIGHT BRONCHUS OR LUNG: ICD-10-CM

## 2023-12-15 DIAGNOSIS — H91.92 HEARING LOSS OF LEFT EAR, UNSPECIFIED HEARING LOSS TYPE: ICD-10-CM

## 2023-12-15 DIAGNOSIS — D50.9 IRON DEFICIENCY ANEMIA, UNSPECIFIED: ICD-10-CM

## 2023-12-15 DIAGNOSIS — F41.1 GAD (GENERALIZED ANXIETY DISORDER): ICD-10-CM

## 2023-12-15 DIAGNOSIS — C34.11 MALIGNANT NEOPLASM OF UPPER LOBE OF RIGHT LUNG: ICD-10-CM

## 2023-12-15 DIAGNOSIS — C34.81 SMALL CELL CARCINOMA OF OVERLAPPING SITES OF RIGHT LUNG: ICD-10-CM

## 2023-12-15 LAB
ALBUMIN SERPL BCP-MCNC: 3.8 G/DL (ref 3.5–5.2)
ALP SERPL-CCNC: 83 U/L (ref 55–135)
ALT SERPL W/O P-5'-P-CCNC: 31 U/L (ref 10–44)
ANION GAP SERPL CALC-SCNC: 10 MMOL/L (ref 8–16)
AST SERPL-CCNC: 19 U/L (ref 10–40)
BASOPHILS # BLD AUTO: 0 K/UL (ref 0–0.2)
BASOPHILS NFR BLD: 0 % (ref 0–1.9)
BILIRUB SERPL-MCNC: 0.3 MG/DL (ref 0.1–1)
BUN SERPL-MCNC: 9 MG/DL (ref 8–23)
CALCIUM SERPL-MCNC: 9.8 MG/DL (ref 8.7–10.5)
CHLORIDE SERPL-SCNC: 102 MMOL/L (ref 95–110)
CO2 SERPL-SCNC: 26 MMOL/L (ref 23–29)
CREAT SERPL-MCNC: 1.1 MG/DL (ref 0.5–1.4)
DIFFERENTIAL METHOD: ABNORMAL
EOSINOPHIL # BLD AUTO: 0.1 K/UL (ref 0–0.5)
EOSINOPHIL NFR BLD: 1.2 % (ref 0–8)
ERYTHROCYTE [DISTWIDTH] IN BLOOD BY AUTOMATED COUNT: 15.7 % (ref 11.5–14.5)
EST. GFR  (NO RACE VARIABLE): 56.1 ML/MIN/1.73 M^2
GLUCOSE SERPL-MCNC: 85 MG/DL (ref 70–110)
HCT VFR BLD AUTO: 40.2 % (ref 37–48.5)
HGB BLD-MCNC: 13.2 G/DL (ref 12–16)
IMM GRANULOCYTES # BLD AUTO: 0.02 K/UL (ref 0–0.04)
IMM GRANULOCYTES NFR BLD AUTO: 0.4 % (ref 0–0.5)
LYMPHOCYTES # BLD AUTO: 1.8 K/UL (ref 1–4.8)
LYMPHOCYTES NFR BLD: 36.5 % (ref 18–48)
MCH RBC QN AUTO: 27.7 PG (ref 27–31)
MCHC RBC AUTO-ENTMCNC: 32.8 G/DL (ref 32–36)
MCV RBC AUTO: 84 FL (ref 82–98)
MONOCYTES # BLD AUTO: 0.5 K/UL (ref 0.3–1)
MONOCYTES NFR BLD: 9.3 % (ref 4–15)
NEUTROPHILS # BLD AUTO: 2.7 K/UL (ref 1.8–7.7)
NEUTROPHILS NFR BLD: 52.6 % (ref 38–73)
NRBC BLD-RTO: 0 /100 WBC
PLATELET # BLD AUTO: 234 K/UL (ref 150–450)
PMV BLD AUTO: 10.1 FL (ref 9.2–12.9)
POTASSIUM SERPL-SCNC: 4.1 MMOL/L (ref 3.5–5.1)
PROT SERPL-MCNC: 7.4 G/DL (ref 6–8.4)
RBC # BLD AUTO: 4.77 M/UL (ref 4–5.4)
SODIUM SERPL-SCNC: 138 MMOL/L (ref 136–145)
TSH SERPL DL<=0.005 MIU/L-ACNC: 3.79 UIU/ML (ref 0.4–4)
WBC # BLD AUTO: 5.04 K/UL (ref 3.9–12.7)

## 2023-12-15 PROCEDURE — 3066F PR DOCUMENTATION OF TREATMENT FOR NEPHROPATHY: ICD-10-PCS | Mod: CPTII,S$GLB,, | Performed by: FAMILY MEDICINE

## 2023-12-15 PROCEDURE — 1159F MED LIST DOCD IN RCRD: CPT | Mod: CPTII,S$GLB,, | Performed by: FAMILY MEDICINE

## 2023-12-15 PROCEDURE — 3078F DIAST BP <80 MM HG: CPT | Mod: CPTII,S$GLB,, | Performed by: FAMILY MEDICINE

## 2023-12-15 PROCEDURE — 3074F PR MOST RECENT SYSTOLIC BLOOD PRESSURE < 130 MM HG: ICD-10-PCS | Mod: CPTII,S$GLB,, | Performed by: FAMILY MEDICINE

## 2023-12-15 PROCEDURE — 3078F PR MOST RECENT DIASTOLIC BLOOD PRESSURE < 80 MM HG: ICD-10-PCS | Mod: CPTII,S$GLB,, | Performed by: FAMILY MEDICINE

## 2023-12-15 PROCEDURE — 1160F PR REVIEW ALL MEDS BY PRESCRIBER/CLIN PHARMACIST DOCUMENTED: ICD-10-PCS | Mod: CPTII,S$GLB,, | Performed by: FAMILY MEDICINE

## 2023-12-15 PROCEDURE — 2023F PR DILATED RETINAL EXAM W/O EVID OF RETINOPATHY: ICD-10-PCS | Mod: CPTII,S$GLB,, | Performed by: FAMILY MEDICINE

## 2023-12-15 PROCEDURE — 3008F BODY MASS INDEX DOCD: CPT | Mod: CPTII,S$GLB,, | Performed by: FAMILY MEDICINE

## 2023-12-15 PROCEDURE — 99999 PR PBB SHADOW E&M-EST. PATIENT-LVL V: CPT | Mod: PBBFAC,,, | Performed by: FAMILY MEDICINE

## 2023-12-15 PROCEDURE — 3074F SYST BP LT 130 MM HG: CPT | Mod: CPTII,S$GLB,, | Performed by: FAMILY MEDICINE

## 2023-12-15 PROCEDURE — 1160F RVW MEDS BY RX/DR IN RCRD: CPT | Mod: CPTII,S$GLB,, | Performed by: FAMILY MEDICINE

## 2023-12-15 PROCEDURE — 99999 PR PBB SHADOW E&M-EST. PATIENT-LVL V: ICD-10-PCS | Mod: PBBFAC,,, | Performed by: FAMILY MEDICINE

## 2023-12-15 PROCEDURE — 1159F PR MEDICATION LIST DOCUMENTED IN MEDICAL RECORD: ICD-10-PCS | Mod: CPTII,S$GLB,, | Performed by: FAMILY MEDICINE

## 2023-12-15 PROCEDURE — 85025 COMPLETE CBC W/AUTO DIFF WBC: CPT | Performed by: NURSE PRACTITIONER

## 2023-12-15 PROCEDURE — 4010F ACE/ARB THERAPY RXD/TAKEN: CPT | Mod: CPTII,S$GLB,, | Performed by: FAMILY MEDICINE

## 2023-12-15 PROCEDURE — 4010F PR ACE/ARB THEARPY RXD/TAKEN: ICD-10-PCS | Mod: CPTII,S$GLB,, | Performed by: FAMILY MEDICINE

## 2023-12-15 PROCEDURE — 3061F PR NEG MICROALBUMINURIA RESULT DOCUMENTED/REVIEW: ICD-10-PCS | Mod: CPTII,S$GLB,, | Performed by: FAMILY MEDICINE

## 2023-12-15 PROCEDURE — 80053 COMPREHEN METABOLIC PANEL: CPT | Performed by: NURSE PRACTITIONER

## 2023-12-15 PROCEDURE — 3008F PR BODY MASS INDEX (BMI) DOCUMENTED: ICD-10-PCS | Mod: CPTII,S$GLB,, | Performed by: FAMILY MEDICINE

## 2023-12-15 PROCEDURE — 36415 COLL VENOUS BLD VENIPUNCTURE: CPT | Mod: PN | Performed by: NURSE PRACTITIONER

## 2023-12-15 PROCEDURE — 3061F NEG MICROALBUMINURIA REV: CPT | Mod: CPTII,S$GLB,, | Performed by: FAMILY MEDICINE

## 2023-12-15 PROCEDURE — 3066F NEPHROPATHY DOC TX: CPT | Mod: CPTII,S$GLB,, | Performed by: FAMILY MEDICINE

## 2023-12-15 PROCEDURE — 3044F PR MOST RECENT HEMOGLOBIN A1C LEVEL <7.0%: ICD-10-PCS | Mod: CPTII,S$GLB,, | Performed by: FAMILY MEDICINE

## 2023-12-15 PROCEDURE — 3044F HG A1C LEVEL LT 7.0%: CPT | Mod: CPTII,S$GLB,, | Performed by: FAMILY MEDICINE

## 2023-12-15 PROCEDURE — 99214 OFFICE O/P EST MOD 30 MIN: CPT | Mod: S$GLB,,, | Performed by: FAMILY MEDICINE

## 2023-12-15 PROCEDURE — 84443 ASSAY THYROID STIM HORMONE: CPT | Performed by: NURSE PRACTITIONER

## 2023-12-15 PROCEDURE — 2023F DILAT RTA XM W/O RTNOPTHY: CPT | Mod: CPTII,S$GLB,, | Performed by: FAMILY MEDICINE

## 2023-12-15 PROCEDURE — 99214 PR OFFICE/OUTPT VISIT, EST, LEVL IV, 30-39 MIN: ICD-10-PCS | Mod: S$GLB,,, | Performed by: FAMILY MEDICINE

## 2023-12-15 RX ORDER — ALPRAZOLAM 0.25 MG/1
0.25 TABLET ORAL 2 TIMES DAILY
Qty: 60 TABLET | Refills: 5 | Status: SHIPPED | OUTPATIENT
Start: 2023-12-15

## 2023-12-15 RX ORDER — TIRZEPATIDE 5 MG/.5ML
5 INJECTION, SOLUTION SUBCUTANEOUS
Qty: 4 PEN | Refills: 4 | Status: SHIPPED | OUTPATIENT
Start: 2023-12-15

## 2023-12-15 NOTE — PROGRESS NOTES
"Subjective:       Patient ID: Malathi Martinez is a 63 y.o. female.    Chief Complaint: Follow-up    Follow-up  Associated symptoms include nausea (improved). Pertinent negatives include no chest pain, chills, coughing, fever, headaches, myalgias, rash or sore throat.     Patient in clinic for f/u.  L ear muffled hearing x 5 days without improvement this week. Hearing still intact, but muffled.     Review of Systems:  Review of Systems   Constitutional:  Negative for chills and fever.   HENT:  Positive for hearing loss (muffled, L). Negative for ear pain and sore throat.    Respiratory:  Negative for cough, shortness of breath and wheezing.    Cardiovascular:  Negative for chest pain.   Gastrointestinal:  Positive for nausea (improved). Negative for diarrhea.        Better with the nexium BID. Doesn't have the sensation of nausea/vomiting. Has zofran for prn.    Genitourinary:  Negative for vaginal discharge.   Musculoskeletal:  Negative for myalgias.   Skin:  Negative for rash.   Neurological:  Negative for headaches.       Objective:     Vitals:    12/15/23 1315   BP: 124/76   Pulse: 73   SpO2: 98%   Weight: 97.7 kg (215 lb 6.2 oz)   Height: 5' 10" (1.778 m)        Physical Exam  Vitals and nursing note reviewed.   Constitutional:       General: She is not in acute distress.     Appearance: Normal appearance. She is well-developed.   HENT:      Head: Normocephalic and atraumatic.   Eyes:      General: No scleral icterus.        Right eye: No discharge.         Left eye: No discharge.      Conjunctiva/sclera: Conjunctivae normal.   Cardiovascular:      Rate and Rhythm: Normal rate.   Pulmonary:      Effort: Pulmonary effort is normal. No respiratory distress.   Abdominal:      Palpations: Abdomen is soft.      Tenderness: There is no guarding.   Musculoskeletal:         General: No deformity or signs of injury.      Cervical back: Neck supple.   Skin:     General: Skin is warm and dry.      Findings: No rash. "   Neurological:      General: No focal deficit present.      Mental Status: She is alert and oriented to person, place, and time.   Psychiatric:         Mood and Affect: Mood normal.         Behavior: Behavior normal.           Assessment & Plan:  Type 2 diabetes mellitus without complication, without long-term current use of insulin  Comments:  ok to increase dose to 5mg weekly  start after cscope  Orders:  -     tirzepatide (MOUNJARO) 5 mg/0.5 mL PnIj; Inject 5 mg into the skin every 7 days.  Dispense: 4 Pen; Refill: 4    Small cell carcinoma of overlapping sites of right lung    JEAN CLAUDE (generalized anxiety disorder)  -     ALPRAZolam (XANAX) 0.25 MG tablet; Take 1 tablet (0.25 mg total) by mouth 2 (two) times daily.  Dispense: 60 tablet; Refill: 5    Hearing loss of left ear, unspecified hearing loss type  Comments:  flonase BID x 1 week, discussed audiology if not improving        MRI brain: every 3-4mos for 1y and then q6m for year 2.

## 2023-12-20 ENCOUNTER — ANESTHESIA (OUTPATIENT)
Dept: ENDOSCOPY | Facility: HOSPITAL | Age: 63
End: 2023-12-20
Payer: COMMERCIAL

## 2023-12-20 ENCOUNTER — HOSPITAL ENCOUNTER (OUTPATIENT)
Facility: HOSPITAL | Age: 63
Discharge: HOME | End: 2023-12-20
Attending: COLON & RECTAL SURGERY | Admitting: COLON & RECTAL SURGERY
Payer: COMMERCIAL

## 2023-12-20 ENCOUNTER — PATIENT MESSAGE (OUTPATIENT)
Dept: ADMINISTRATIVE | Facility: HOSPITAL | Age: 63
End: 2023-12-20
Payer: COMMERCIAL

## 2023-12-20 ENCOUNTER — ANESTHESIA EVENT (OUTPATIENT)
Dept: ENDOSCOPY | Facility: HOSPITAL | Age: 63
End: 2023-12-20
Payer: COMMERCIAL

## 2023-12-20 DIAGNOSIS — Z12.11 SCREEN FOR COLON CANCER: Primary | ICD-10-CM

## 2023-12-20 LAB — GLUCOSE SERPL-MCNC: 74 MG/DL (ref 70–110)

## 2023-12-20 PROCEDURE — 82962 GLUCOSE BLOOD TEST: CPT | Mod: PO | Performed by: COLON & RECTAL SURGERY

## 2023-12-20 PROCEDURE — 88305 TISSUE EXAM BY PATHOLOGIST: CPT | Mod: 26,,, | Performed by: PATHOLOGY

## 2023-12-20 PROCEDURE — D9220A PRA ANESTHESIA: ICD-10-PCS | Mod: 33,CRNA,, | Performed by: NURSE ANESTHETIST, CERTIFIED REGISTERED

## 2023-12-20 PROCEDURE — D9220A PRA ANESTHESIA: Mod: 33,CRNA,, | Performed by: NURSE ANESTHETIST, CERTIFIED REGISTERED

## 2023-12-20 PROCEDURE — D9220A PRA ANESTHESIA: Mod: 33,ANES,, | Performed by: ANESTHESIOLOGY

## 2023-12-20 PROCEDURE — 45385 PR COLONOSCOPY,REMV LESN,SNARE: ICD-10-PCS | Mod: 33,,, | Performed by: COLON & RECTAL SURGERY

## 2023-12-20 PROCEDURE — 88305 TISSUE EXAM BY PATHOLOGIST: ICD-10-PCS | Mod: 26,,, | Performed by: PATHOLOGY

## 2023-12-20 PROCEDURE — 63600175 PHARM REV CODE 636 W HCPCS: Mod: PO | Performed by: NURSE ANESTHETIST, CERTIFIED REGISTERED

## 2023-12-20 PROCEDURE — 37000009 HC ANESTHESIA EA ADD 15 MINS: Mod: PO | Performed by: COLON & RECTAL SURGERY

## 2023-12-20 PROCEDURE — 37000008 HC ANESTHESIA 1ST 15 MINUTES: Mod: PO | Performed by: COLON & RECTAL SURGERY

## 2023-12-20 PROCEDURE — 63600175 PHARM REV CODE 636 W HCPCS: Mod: PO | Performed by: COLON & RECTAL SURGERY

## 2023-12-20 PROCEDURE — 88305 TISSUE EXAM BY PATHOLOGIST: CPT | Mod: PO | Performed by: PATHOLOGY

## 2023-12-20 PROCEDURE — 27201089 HC SNARE, DISP (ANY): Mod: PO | Performed by: COLON & RECTAL SURGERY

## 2023-12-20 PROCEDURE — 45385 COLONOSCOPY W/LESION REMOVAL: CPT | Mod: PT,PO | Performed by: COLON & RECTAL SURGERY

## 2023-12-20 PROCEDURE — D9220A PRA ANESTHESIA: ICD-10-PCS | Mod: 33,ANES,, | Performed by: ANESTHESIOLOGY

## 2023-12-20 PROCEDURE — 45385 COLONOSCOPY W/LESION REMOVAL: CPT | Mod: 33,,, | Performed by: COLON & RECTAL SURGERY

## 2023-12-20 PROCEDURE — 25000003 PHARM REV CODE 250: Mod: PO | Performed by: NURSE ANESTHETIST, CERTIFIED REGISTERED

## 2023-12-20 RX ORDER — SODIUM CHLORIDE, SODIUM LACTATE, POTASSIUM CHLORIDE, CALCIUM CHLORIDE 600; 310; 30; 20 MG/100ML; MG/100ML; MG/100ML; MG/100ML
INJECTION, SOLUTION INTRAVENOUS CONTINUOUS
Status: DISCONTINUED | OUTPATIENT
Start: 2023-12-20 | End: 2023-12-20 | Stop reason: HOSPADM

## 2023-12-20 RX ORDER — PROPOFOL 10 MG/ML
VIAL (ML) INTRAVENOUS CONTINUOUS PRN
Status: DISCONTINUED | OUTPATIENT
Start: 2023-12-20 | End: 2023-12-20

## 2023-12-20 RX ORDER — PROPOFOL 10 MG/ML
VIAL (ML) INTRAVENOUS
Status: DISCONTINUED | OUTPATIENT
Start: 2023-12-20 | End: 2023-12-20

## 2023-12-20 RX ORDER — LIDOCAINE HYDROCHLORIDE 20 MG/ML
INJECTION INTRAVENOUS
Status: DISCONTINUED | OUTPATIENT
Start: 2023-12-20 | End: 2023-12-20

## 2023-12-20 RX ADMIN — PROPOFOL 75 MG: 10 INJECTION, EMULSION INTRAVENOUS at 12:12

## 2023-12-20 RX ADMIN — SODIUM CHLORIDE, POTASSIUM CHLORIDE, SODIUM LACTATE AND CALCIUM CHLORIDE: 600; 310; 30; 20 INJECTION, SOLUTION INTRAVENOUS at 12:12

## 2023-12-20 RX ADMIN — LIDOCAINE HYDROCHLORIDE 50 MG: 20 INJECTION INTRAVENOUS at 12:12

## 2023-12-20 RX ADMIN — PROPOFOL 100 MCG/KG/MIN: 10 INJECTION, EMULSION INTRAVENOUS at 12:12

## 2023-12-20 NOTE — BRIEF OP NOTE
Brief Operative Note    Date of surgery: 12/20/2023    Pre-operative Diagnosis:  History of colon polyps [Z86.010]     Post-operative Diagnosis:   Colon polyps, sigmoid diverticulosis    Procedure:  Colonoscopy and Polypectomy  Procedure(s) (LRB):  COLONOSCOPY (N/A)      Anesthesia: Monitored anesthesia care    Surgeon: Jose Alanis MD     Findings:  Right colon polyps, sigmoid diverticulosis               [id number]     Jose Alanis MD

## 2023-12-20 NOTE — ANESTHESIA PREPROCEDURE EVALUATION
12/20/2023  Malathi Martinez is a 63 y.o., female.      Pre-op Assessment          Review of Systems  Cardiovascular:     Hypertension                     Shortness of Breath                    Hypertension         Pulmonary:   COPD   Shortness of breath      Chronic Obstructive Pulmonary Disease (COPD):                      Hepatic/GI:     GERD      Gerd          Neurological:    Neuromuscular Disease,                                 Neuromuscular Disease   Endocrine:  Diabetes    Diabetes                      Psych:  Psychiatric History                  Physical Exam  General: Well nourished        Anesthesia Plan  Type of Anesthesia, risks & benefits discussed:    Anesthesia Type: Gen Natural Airway  Intra-op Monitoring Plan: Standard ASA Monitors  Induction:  IV  Informed Consent: Informed consent signed with the Patient and all parties understand the risks and agree with anesthesia plan.  All questions answered.   ASA Score: 4  Day of Surgery Review of History & Physical: H&P Update referred to the surgeon/provider.    Ready For Surgery From Anesthesia Perspective.     .

## 2023-12-20 NOTE — ANESTHESIA POSTPROCEDURE EVALUATION
Anesthesia Post Evaluation    Patient: Malathi Martinez    Procedure(s) Performed: Procedure(s) (LRB):  COLONOSCOPY (N/A)    Final Anesthesia Type: general      Patient location during evaluation: PACU  Patient participation: Yes- Able to Participate  Level of consciousness: awake and alert  Post-procedure vital signs: reviewed and stable  Pain management: adequate  Airway patency: patent    PONV status at discharge: No PONV  Anesthetic complications: no      Cardiovascular status: blood pressure returned to baseline  Respiratory status: unassisted and room air  Hydration status: euvolemic  Follow-up not needed.              Vitals Value Taken Time   /55 12/20/23 1409   Temp 36.3 °C (97.3 °F) 12/20/23 1340   Pulse 79 12/20/23 1409   Resp 16 12/20/23 1409   SpO2 99 % 12/20/23 1409         Event Time   Out of Recovery 14:11:09         Pain/Paulo Score: Paulo Score: 10 (12/20/2023  2:08 PM)

## 2023-12-20 NOTE — PROVATION PATIENT INSTRUCTIONS
Discharge Summary/Instructions after an Endoscopic Procedure  Patient Name: Malathi Martinez  Patient MRN: [id number]  Patient YOB: 1960 Wednesday, December 20, 2023  Jose Alanis MD  Dear patient,  As a result of recent federal legislation (The Federal Cures Act), you may   receive lab or pathology results from your procedure in your [facility]   account before your physician is able to contact you. Your physician or   their representative will relay the results to you with their   recommendations at their soonest availability.  Thank you,  RESTRICTIONS:  During your procedure today, you received medications for sedation.  These   medications may affect your judgment, balance and coordination.  Therefore,   for 24 hours, you have the following restrictions:   - DO NOT drive a car, operate machinery, make legal/financial decisions,   sign important papers or drink alcohol.    ACTIVITY:  Today: no heavy lifting, straining or running due to procedural   sedation/anesthesia.  The following day: return to full activity including work.  DIET:  Eat and drink normally unless instructed otherwise.     TREATMENT FOR COMMON SIDE EFFECTS:  - Mild abdominal pain, nausea, belching, bloating or excessive gas:  rest,   eat lightly and use a heating pad.  - Sore Throat: treat with throat lozenges and/or gargle with warm salt   water.  - Because air was used during the procedure, expelling large amounts of air   from your rectum or belching is normal.  - If a bowel prep was taken, you may not have a bowel movement for 1-3 days.    This is normal.  SYMPTOMS TO WATCH FOR AND REPORT TO YOUR PHYSICIAN:  1. Abdominal pain or bloating, other than gas cramps.  2. Chest pain.  3. Back pain.  4. Signs of infection such as: chills or fever occurring within 24 hours   after the procedure.  5. Rectal bleeding, which would show as bright red, maroon, or black stools.   (A tablespoon of blood from the rectum is not serious,  especially if   hemorrhoids are present.)  6. Vomiting.  7. Weakness or dizziness.  GO DIRECTLY TO THE NEAREST EMERGENCY ROOM IF YOU HAVE ANY OF THE FOLLOWING:      Difficulty breathing              Chills and/or fever over 101 F   Persistent vomiting and/or vomiting blood   Severe abdominal pain   Severe chest pain   Black, tarry stools   Bleeding- more than one tablespoon   Any other symptom or condition that you feel may need urgent attention  Your doctor recommends these additional instructions:  If any biopsies were taken, your doctors clinic will contact you in 1 to 2   weeks with any results.  Your physician has recommended a repeat colonoscopy in five years for   surveillance.   Return to your referring physician as previously scheduled.   Your physician has recommended a repeat colonoscopy in two years for   surveillance.  For questions, problems or results please call your physician - Jose Alanis MD at Work:  [phone].  EMERGENCY PHONE NUMBER: [phone], LAB RESULTS: [phone]  IF A COMPLICATION OR EMERGENCY SITUATION ARISES AND YOU ARE UNABLE TO REACH   YOUR PHYSICIAN - GO DIRECTLY TO THE EMERGENCY ROOM.  ___________________________________________  Nurse Signature  ___________________________________________  Patient/Designated Responsible Party Signature  Jose Alanis M.D.  Jose Alanis MD  12/20/2023 1:42:47 PM  This report has been verified and signed electronically.  Dear patient,  As a result of recent federal legislation (The Federal Cures Act), you may   receive lab or pathology results from your procedure in your [facility]   account before your physician is able to contact you. Your physician or   their representative will relay the results to you with their   recommendations at their soonest availability.  Thank you.  PROVATION

## 2023-12-20 NOTE — H&P
History & Physical - Short Stay  Gastroenterology      SUBJECTIVE:     Procedure: Colonoscopy    Chief Complaint/Indication for Procedure: Screening    PTA Medications   Medication Sig    albuterol (VENTOLIN HFA) 90 mcg/actuation inhaler Inhale 2 puffs into the lungs every 6 (six) hours as needed for Wheezing or Shortness of Breath. Rescue    albuterol-ipratropium (DUO-NEB) 2.5 mg-0.5 mg/3 mL nebulizer solution Take 3 mLs by nebulization every 12 (twelve) hours. Rescue    ALPRAZolam (XANAX) 0.25 MG tablet Take 1 tablet (0.25 mg total) by mouth 2 (two) times daily.    atorvastatin (LIPITOR) 20 MG tablet Take 1 tablet (20 mg total) by mouth once daily.    B-complex with vitamin C (Z-BEC OR EQUIV) tablet Take 1 tablet by mouth once daily.    buPROPion (WELLBUTRIN XL) 150 MG TB24 tablet Take 2 tablets (300 mg total) by mouth once daily.    cholecalciferol, vitamin D3, 2,000 unit Cap Take 1 capsule by mouth once daily.    clotrimazole-betamethasone 1-0.05% (LOTRISONE) cream Apply topically to affected area 2 (two) times daily.    cyclobenzaprine (FLEXERIL) 5 MG tablet Take 1 tablet (5 mg total) by mouth nightly as needed for Muscle spasms.    desloratadine (CLARINEX) 5 mg tablet Take 1 tablet (5 mg total) by mouth once daily.    EScitalopram oxalate (LEXAPRO) 20 MG tablet Take 1 tablet (20 mg total) by mouth once daily.    esomeprazole (NEXIUM) 40 MG capsule Take 1 capsule (40 mg total) by mouth 2 (two) times daily.    famotidine (PEPCID) 40 MG tablet TAKE 1 TABLET (40 MG TOTAL) BY MOUTH 2 (TWO) TIMES DAILY AS NEEDED FOR HEARTBURN.    levothyroxine (SYNTHROID) 100 MCG tablet     lisinopriL 10 MG tablet Take 1 tablet (10 mg total) by mouth every evening.    ondansetron (ZOFRAN-ODT) 8 MG TbDL Take 1 tablet (8 mg total) by mouth 2 (two) times daily.    traZODone (DESYREL) 50 MG tablet Take 1 tablet (50 mg total) by mouth every evening.    celecoxib (CELEBREX) 100 MG capsule Take 1 capsule (100 mg total) by mouth 2 (two)  times daily as needed for Pain. (Patient not taking: Reported on 12/18/2023)    doxycycline (VIBRA-TABS) 100 MG tablet Take 100 mg by mouth 2 (two) times daily.    fluticasone-salmeterol diskus inhaler 100-50 mcg Inhale 1 puff into the lungs 2 (two) times daily. Controller (Patient not taking: Reported on 12/18/2023)    gabapentin (NEURONTIN) 300 MG capsule Take 1 capsule (300 mg total) by mouth 3 (three) times daily. (Patient not taking: Reported on 12/18/2023)    polyethylene glycol (GLYCOLAX) 17 gram PwPk Take 17 g by mouth once daily. (Patient not taking: Reported on 12/18/2023)    tirzepatide (MOUNJARO) 5 mg/0.5 mL PnIj Inject 5 mg into the skin every 7 days. (Patient not taking: Reported on 12/18/2023)       Review of patient's allergies indicates:   Allergen Reactions    Penicillins Hives        Past Medical History:   Diagnosis Date    Abnormal stress test     Bronchitis     COPD (chronic obstructive pulmonary disease)     Diabetes mellitus, type 2     GERD (gastroesophageal reflux disease)     HLD (hyperlipidemia)     Hx of endoscopy     Hypertension     Lung cancer     Mental disorder     Pulmonary nodule 04/09/2018    SOB (shortness of breath)     resolved    Thyroid disease     Tobacco abuse 12/26/2012     Past Surgical History:   Procedure Laterality Date    APPENDECTOMY      COLONOSCOPY N/A 1/18/2019    Procedure: COLONOSCOPY;  Surgeon: Lai Cardoso MD;  Location: [facility];  Service: Endoscopy;  Laterality: N/A;    ENDOBRONCHIAL ULTRASOUND N/A 5/3/2023    Procedure: ENDOBRONCHIAL ULTRASOUND (EBUS);  Surgeon: Nahun Nguyen MD;  Location: [facility] OR;  Service: Pulmonary;  Laterality: N/A;    ESOPHAGOGASTRODUODENOSCOPY N/A 2/19/2021    Procedure: ESOPHAGOGASTRODUODENOSCOPY (EGD);  Surgeon: Lai Cardoso MD;  Location: [facility];  Service: Endoscopy;  Laterality: N/A;    INSERTION OF TUNNELED CENTRAL VENOUS CATHETER (CVC) WITH SUBCUTANEOUS PORT Right 5/22/2023    Procedure:  [id number];  Surgeon: Ayah Aldana MD;  Location: [facility];  Service: General;  Laterality: Right;    TONSILLECTOMY       Family History   Problem Relation Age of Onset    Heart disease Mother         MVP    Cataracts Mother     Heart disease Father 65        MI and PVD    Throat cancer Father     Heart disease Sister         mvp    No Known Problems Sister     No Known Problems Sister     Esophageal cancer Brother     Hyperlipidemia Paternal Grandmother     Glaucoma Neg Hx     Macular degeneration Neg Hx      Social History     Tobacco Use    Smoking status: Former     Current packs/day: 0.00     Average packs/day: 0.8 packs/day for 41.0 years (30.8 ttl pk-yrs)     Types: Cigarettes     Start date: 1982     Quit date: 2023     Years since quittin.6    Smokeless tobacco: Never   Substance Use Topics    Alcohol use: No    Drug use: No         OBJECTIVE:     Vital Signs (Most Recent)       Physical Exam:                                                       GENERAL:  Comfortable, in no acute distress.                                 HEENT EXAM:  Nonicteric.  No adenopathy.  Oropharynx is clear.               NECK:  Supple.                                                               LUNGS:  Clear.                                                               CARDIAC:  Regular rate and rhythm.  S1, S2.  No murmur.                      ABDOMEN:  Soft, positive bowel sounds, nontender.  No hepatosplenomegaly or masses.  No rebound or guarding.                                             EXTREMITIES:  No edema.     MENTAL STATUS:  Normal, alert and oriented.      ASSESSMENT/PLAN:     Assessment: Screening    Plan: Colonoscopy    Anesthesia Plan: Moderate Sedation    ASA Grade: ASA 2 - Patient with mild systemic disease with no functional limitations    MALLAMPATI SCORE:  I (soft palate, uvula, fauces, and tonsillar pillars visible)

## 2023-12-20 NOTE — TRANSFER OF CARE
"Anesthesia Transfer of Care Note    Patient: Malathi Martinez    Procedure(s) Performed: Procedure(s) (LRB):  COLONOSCOPY (N/A)    Patient location: PACU    Anesthesia Type: general    Transport from OR: Transported from OR on room air with adequate spontaneous ventilation    Post pain: adequate analgesia    Post assessment: no apparent anesthetic complications and tolerated procedure well    Post vital signs: stable    Level of consciousness: sedated and awake    Nausea/Vomiting: no nausea/vomiting    Complications: none    Transfer of care protocol was followed      Last vitals: Visit Vitals  BP (!) 116/58   Pulse 73   Temp 36.3 °C (97.3 °F) (Temporal)   Resp 17   Ht 5' 10" (1.778 m)   Wt 95.3 kg (210 lb)   LMP 09/10/2012   SpO2 99%   Breastfeeding No   BMI 30.13 kg/m²     "

## 2023-12-21 ENCOUNTER — PATIENT OUTREACH (OUTPATIENT)
Dept: ADMINISTRATIVE | Facility: HOSPITAL | Age: 63
End: 2023-12-21
Payer: COMMERCIAL

## 2023-12-21 VITALS
HEIGHT: 70 IN | RESPIRATION RATE: 16 BRPM | DIASTOLIC BLOOD PRESSURE: 55 MMHG | SYSTOLIC BLOOD PRESSURE: 114 MMHG | HEART RATE: 79 BPM | BODY MASS INDEX: 30.06 KG/M2 | TEMPERATURE: 97 F | OXYGEN SATURATION: 99 % | WEIGHT: 210 LBS

## 2023-12-21 NOTE — PROGRESS NOTES
Population Health Chart Review & Patient Outreach Details    Outreach Performed: YES Portal    Additional [facility] Health Notes:           Updates Requested / Reviewed:      Updated Care Coordination Note         Health Maintenance Topics Overdue:    Health Maintenance Due   Topic Date Due    RSV Vaccine (Age 60+ and Pregnant patients) (1 - 1-dose 60+ series) Never done         Health Maintenance Topic(s) Outreach Outcomes & Actions Taken:    Eye Exam - Outreach Outcomes & Actions Taken  : complete    Colorectal Cancer Screening - Outreach Outcomes & Actions Taken  : complete

## 2023-12-26 ENCOUNTER — TELEPHONE (OUTPATIENT)
Dept: PHARMACY | Facility: CLINIC | Age: 63
End: 2023-12-26
Payer: COMMERCIAL

## 2023-12-27 LAB
FINAL PATHOLOGIC DIAGNOSIS: NORMAL
GROSS: NORMAL
Lab: NORMAL

## 2024-01-03 ENCOUNTER — TELEPHONE (OUTPATIENT)
Dept: SURGERY | Facility: CLINIC | Age: 64
End: 2024-01-03
Payer: COMMERCIAL

## 2024-01-03 ENCOUNTER — PATIENT MESSAGE (OUTPATIENT)
Dept: SURGERY | Facility: CLINIC | Age: 64
End: 2024-01-03
Payer: COMMERCIAL

## 2024-01-03 NOTE — TELEPHONE ENCOUNTER
Per Dr Alanis pt notified of colonoscopy results - repeat colonoscopy in 5 years.    Answered questions to patient's satisfaction -- pt voiced understanding of content of call.

## 2024-01-08 ENCOUNTER — LAB VISIT (OUTPATIENT)
Dept: LAB | Facility: HOSPITAL | Age: 64
End: 2024-01-08
Attending: INTERNAL MEDICINE
Payer: COMMERCIAL

## 2024-01-08 DIAGNOSIS — D50.9 IRON DEFICIENCY ANEMIA, UNSPECIFIED: ICD-10-CM

## 2024-01-08 DIAGNOSIS — R53.83 OTHER FATIGUE: ICD-10-CM

## 2024-01-08 DIAGNOSIS — C34.11 MALIGNANT NEOPLASM OF UPPER LOBE, RIGHT BRONCHUS OR LUNG: ICD-10-CM

## 2024-01-08 DIAGNOSIS — C34.11 MALIGNANT NEOPLASM OF UPPER LOBE, RIGHT BRONCHUS OR LUNG: Primary | ICD-10-CM

## 2024-01-08 LAB
BASOPHILS # BLD AUTO: 0.01 K/UL (ref 0–0.2)
BASOPHILS NFR BLD: 0.2 % (ref 0–1.9)
DIFFERENTIAL METHOD BLD: ABNORMAL
EOSINOPHIL # BLD AUTO: 0.1 K/UL (ref 0–0.5)
EOSINOPHIL NFR BLD: 1 % (ref 0–8)
ERYTHROCYTE [DISTWIDTH] IN BLOOD BY AUTOMATED COUNT: 15.4 % (ref 11.5–14.5)
HCT VFR BLD AUTO: 41.9 % (ref 37–48.5)
HGB BLD-MCNC: 13.6 G/DL (ref 12–16)
IMM GRANULOCYTES # BLD AUTO: 0.01 K/UL (ref 0–0.04)
IMM GRANULOCYTES NFR BLD AUTO: 0.2 % (ref 0–0.5)
LYMPHOCYTES # BLD AUTO: 1.8 K/UL (ref 1–4.8)
LYMPHOCYTES NFR BLD: 34.9 % (ref 18–48)
MCH RBC QN AUTO: 28.1 PG (ref 27–31)
MCHC RBC AUTO-ENTMCNC: 32.5 G/DL (ref 32–36)
MCV RBC AUTO: 87 FL (ref 82–98)
MONOCYTES # BLD AUTO: 0.5 K/UL (ref 0.3–1)
MONOCYTES NFR BLD: 10.6 % (ref 4–15)
NEUTROPHILS # BLD AUTO: 2.7 K/UL (ref 1.8–7.7)
NEUTROPHILS NFR BLD: 53.1 % (ref 38–73)
NRBC BLD-RTO: 0 /100 WBC
PLATELET # BLD AUTO: 260 K/UL (ref 150–450)
PMV BLD AUTO: 10.3 FL (ref 9.2–12.9)
RBC # BLD AUTO: 4.84 M/UL (ref 4–5.4)
WBC # BLD AUTO: 5.01 K/UL (ref 3.9–12.7)

## 2024-01-08 PROCEDURE — 36415 COLL VENOUS BLD VENIPUNCTURE: CPT | Mod: PN | Performed by: INTERNAL MEDICINE

## 2024-01-08 PROCEDURE — 80053 COMPREHEN METABOLIC PANEL: CPT | Performed by: INTERNAL MEDICINE

## 2024-01-08 PROCEDURE — 85025 COMPLETE CBC W/AUTO DIFF WBC: CPT | Performed by: INTERNAL MEDICINE

## 2024-01-09 LAB
ALBUMIN SERPL BCP-MCNC: 3.9 G/DL (ref 3.5–5.2)
ALP SERPL-CCNC: 79 U/L (ref 55–135)
ALT SERPL W/O P-5'-P-CCNC: 27 U/L (ref 10–44)
ANION GAP SERPL CALC-SCNC: 9 MMOL/L (ref 8–16)
AST SERPL-CCNC: 17 U/L (ref 10–40)
BILIRUB SERPL-MCNC: 0.5 MG/DL (ref 0.1–1)
BUN SERPL-MCNC: 8 MG/DL (ref 8–23)
CALCIUM SERPL-MCNC: 9.5 MG/DL (ref 8.7–10.5)
CHLORIDE SERPL-SCNC: 104 MMOL/L (ref 95–110)
CO2 SERPL-SCNC: 25 MMOL/L (ref 23–29)
CREAT SERPL-MCNC: 1.1 MG/DL (ref 0.5–1.4)
EST. GFR  (NO RACE VARIABLE): 56.1 ML/MIN/1.73 M^2
GLUCOSE SERPL-MCNC: 63 MG/DL (ref 70–110)
POTASSIUM SERPL-SCNC: 3.4 MMOL/L (ref 3.5–5.1)
PROT SERPL-MCNC: 7.4 G/DL (ref 6–8.4)
SODIUM SERPL-SCNC: 138 MMOL/L (ref 136–145)

## 2024-01-26 ENCOUNTER — TELEPHONE (OUTPATIENT)
Dept: FAMILY MEDICINE | Facility: CLINIC | Age: 64
End: 2024-01-26
Payer: COMMERCIAL

## 2024-01-26 ENCOUNTER — OFFICE VISIT (OUTPATIENT)
Dept: FAMILY MEDICINE | Facility: CLINIC | Age: 64
End: 2024-01-26
Payer: COMMERCIAL

## 2024-01-26 VITALS
OXYGEN SATURATION: 98 % | HEART RATE: 76 BPM | WEIGHT: 205.5 LBS | SYSTOLIC BLOOD PRESSURE: 128 MMHG | HEIGHT: 70 IN | DIASTOLIC BLOOD PRESSURE: 70 MMHG | BODY MASS INDEX: 29.42 KG/M2

## 2024-01-26 DIAGNOSIS — G47.9 TROUBLE IN SLEEPING: ICD-10-CM

## 2024-01-26 DIAGNOSIS — E11.9 TYPE 2 DIABETES MELLITUS WITHOUT COMPLICATION, WITHOUT LONG-TERM CURRENT USE OF INSULIN: Primary | ICD-10-CM

## 2024-01-26 PROCEDURE — 3074F SYST BP LT 130 MM HG: CPT | Mod: CPTII,S$GLB,, | Performed by: FAMILY MEDICINE

## 2024-01-26 PROCEDURE — 3078F DIAST BP <80 MM HG: CPT | Mod: CPTII,S$GLB,, | Performed by: FAMILY MEDICINE

## 2024-01-26 PROCEDURE — 99999 PR PBB SHADOW E&M-EST. PATIENT-LVL V: CPT | Mod: PBBFAC,,, | Performed by: FAMILY MEDICINE

## 2024-01-26 PROCEDURE — 99214 OFFICE O/P EST MOD 30 MIN: CPT | Mod: S$GLB,,, | Performed by: FAMILY MEDICINE

## 2024-01-26 PROCEDURE — 3008F BODY MASS INDEX DOCD: CPT | Mod: CPTII,S$GLB,, | Performed by: FAMILY MEDICINE

## 2024-01-26 PROCEDURE — 3044F HG A1C LEVEL LT 7.0%: CPT | Mod: CPTII,S$GLB,, | Performed by: FAMILY MEDICINE

## 2024-01-26 PROCEDURE — 1160F RVW MEDS BY RX/DR IN RCRD: CPT | Mod: CPTII,S$GLB,, | Performed by: FAMILY MEDICINE

## 2024-01-26 PROCEDURE — 1159F MED LIST DOCD IN RCRD: CPT | Mod: CPTII,S$GLB,, | Performed by: FAMILY MEDICINE

## 2024-01-26 RX ORDER — TRAZODONE HYDROCHLORIDE 50 MG/1
100 TABLET ORAL NIGHTLY
Qty: 180 TABLET | Refills: 3 | Status: SHIPPED | OUTPATIENT
Start: 2024-01-26 | End: 2025-01-25

## 2024-01-26 NOTE — PROGRESS NOTES
"Subjective:       Patient ID: Malathi Martinez is a 63 y.o. female.    Chief Complaint: Follow-up    Follow-up  Associated symptoms include headaches, neck pain and weakness. Pertinent negatives include no arthralgias, chest pain, joint swelling or vomiting.     Patient in clinic for f/u.  She notes HA and nausea - doing ok.   Had potassium infusion for slight dip on cmp 2 weeks ago. Has f/u lab next week.   Labs 2023 rev.     Review of Systems:  Review of Systems   Constitutional:  Negative for activity change and unexpected weight change.   HENT:  Positive for hearing loss. Negative for rhinorrhea and trouble swallowing.    Eyes:  Positive for discharge. Negative for visual disturbance.   Respiratory:  Negative for chest tightness and wheezing.    Cardiovascular:  Negative for chest pain and palpitations.   Gastrointestinal:  Positive for constipation. Negative for blood in stool, diarrhea and vomiting.   Endocrine: Positive for polydipsia and polyuria.   Genitourinary:  Negative for difficulty urinating, dysuria, hematuria and menstrual problem.   Musculoskeletal:  Positive for neck pain. Negative for arthralgias and joint swelling.   Neurological:  Positive for weakness and headaches.   Psychiatric/Behavioral:  Positive for dysphoric mood and sleep disturbance.        Objective:     Vitals:    01/26/24 1309   BP: 128/70   Pulse: 76   SpO2: 98%   Weight: 93.2 kg (205 lb 7.5 oz)   Height: 5' 10" (1.778 m)          Physical Exam  Vitals and nursing note reviewed.   Constitutional:       General: She is not in acute distress.     Appearance: Normal appearance. She is well-developed.   HENT:      Head: Normocephalic and atraumatic.   Eyes:      General: No scleral icterus.        Right eye: No discharge.         Left eye: No discharge.      Conjunctiva/sclera: Conjunctivae normal.   Pulmonary:      Effort: Pulmonary effort is normal. No respiratory distress.   Skin:     General: Skin is warm and dry. "   Neurological:      General: No focal deficit present.      Mental Status: She is alert and oriented to person, place, and time.   Psychiatric:         Mood and Affect: Mood normal.         Behavior: Behavior normal.           Assessment & Plan:  Type 2 diabetes mellitus without complication, without long-term current use of insulin  -     TSH; Future; Expected date: 01/26/2024  -     Hemoglobin A1C; Future; Expected date: 01/26/2024    Trouble in sleeping  -     traZODone (DESYREL) 50 MG tablet; Take 2 tablets (100 mg total) by mouth every evening.  Dispense: 180 tablet; Refill: 3

## 2024-01-26 NOTE — TELEPHONE ENCOUNTER
----- Message from Comfort Dave MD sent at 1/26/2024  2:05 PM CST -----  Phone number for cancer center that she can use to schedule massage/acupuncture, etc?

## 2024-01-30 ENCOUNTER — LAB VISIT (OUTPATIENT)
Dept: LAB | Facility: HOSPITAL | Age: 64
End: 2024-01-30
Attending: INTERNAL MEDICINE
Payer: COMMERCIAL

## 2024-01-30 DIAGNOSIS — C34.11 MALIGNANT NEOPLASM OF UPPER LOBE OF RIGHT LUNG: ICD-10-CM

## 2024-01-30 DIAGNOSIS — D50.9 IRON DEFICIENCY ANEMIA, UNSPECIFIED: ICD-10-CM

## 2024-01-30 DIAGNOSIS — R53.83 FATIGUE: ICD-10-CM

## 2024-01-30 DIAGNOSIS — E11.9 TYPE 2 DIABETES MELLITUS WITHOUT COMPLICATION, WITHOUT LONG-TERM CURRENT USE OF INSULIN: ICD-10-CM

## 2024-01-30 LAB
BASOPHILS # BLD AUTO: 0.01 K/UL (ref 0–0.2)
BASOPHILS NFR BLD: 0.2 % (ref 0–1.9)
DIFFERENTIAL METHOD BLD: NORMAL
EOSINOPHIL # BLD AUTO: 0.1 K/UL (ref 0–0.5)
EOSINOPHIL NFR BLD: 0.9 % (ref 0–8)
ERYTHROCYTE [DISTWIDTH] IN BLOOD BY AUTOMATED COUNT: 13.6 % (ref 11.5–14.5)
HCT VFR BLD AUTO: 41.4 % (ref 37–48.5)
HGB BLD-MCNC: 13.6 G/DL (ref 12–16)
IMM GRANULOCYTES # BLD AUTO: 0.01 K/UL (ref 0–0.04)
IMM GRANULOCYTES NFR BLD AUTO: 0.2 % (ref 0–0.5)
LYMPHOCYTES # BLD AUTO: 1.8 K/UL (ref 1–4.8)
LYMPHOCYTES NFR BLD: 33.4 % (ref 18–48)
MCH RBC QN AUTO: 29 PG (ref 27–31)
MCHC RBC AUTO-ENTMCNC: 32.9 G/DL (ref 32–36)
MCV RBC AUTO: 88 FL (ref 82–98)
MONOCYTES # BLD AUTO: 0.5 K/UL (ref 0.3–1)
MONOCYTES NFR BLD: 9.9 % (ref 4–15)
NEUTROPHILS # BLD AUTO: 3 K/UL (ref 1.8–7.7)
NEUTROPHILS NFR BLD: 55.4 % (ref 38–73)
NRBC BLD-RTO: 0 /100 WBC
PLATELET # BLD AUTO: 230 K/UL (ref 150–450)
PMV BLD AUTO: 10.9 FL (ref 9.2–12.9)
RBC # BLD AUTO: 4.69 M/UL (ref 4–5.4)
WBC # BLD AUTO: 5.45 K/UL (ref 3.9–12.7)

## 2024-01-30 PROCEDURE — 80053 COMPREHEN METABOLIC PANEL: CPT | Performed by: INTERNAL MEDICINE

## 2024-01-30 PROCEDURE — 84443 ASSAY THYROID STIM HORMONE: CPT | Performed by: FAMILY MEDICINE

## 2024-01-30 PROCEDURE — 36415 COLL VENOUS BLD VENIPUNCTURE: CPT | Mod: PN | Performed by: INTERNAL MEDICINE

## 2024-01-30 PROCEDURE — 83036 HEMOGLOBIN GLYCOSYLATED A1C: CPT | Performed by: FAMILY MEDICINE

## 2024-01-30 PROCEDURE — 85025 COMPLETE CBC W/AUTO DIFF WBC: CPT | Performed by: INTERNAL MEDICINE

## 2024-01-31 LAB
ALBUMIN SERPL BCP-MCNC: 3.9 G/DL (ref 3.5–5.2)
ALP SERPL-CCNC: 79 U/L (ref 55–135)
ALT SERPL W/O P-5'-P-CCNC: 26 U/L (ref 10–44)
ANION GAP SERPL CALC-SCNC: 8 MMOL/L (ref 8–16)
AST SERPL-CCNC: 16 U/L (ref 10–40)
BILIRUB SERPL-MCNC: 0.4 MG/DL (ref 0.1–1)
BUN SERPL-MCNC: 9 MG/DL (ref 8–23)
CALCIUM SERPL-MCNC: 10.1 MG/DL (ref 8.7–10.5)
CHLORIDE SERPL-SCNC: 100 MMOL/L (ref 95–110)
CO2 SERPL-SCNC: 28 MMOL/L (ref 23–29)
CREAT SERPL-MCNC: 1.1 MG/DL (ref 0.5–1.4)
EST. GFR  (NO RACE VARIABLE): 56.1 ML/MIN/1.73 M^2
ESTIMATED AVG GLUCOSE: 103 MG/DL (ref 68–131)
GLUCOSE SERPL-MCNC: 75 MG/DL (ref 70–110)
HBA1C MFR BLD: 5.2 % (ref 4–5.6)
POTASSIUM SERPL-SCNC: 3.8 MMOL/L (ref 3.5–5.1)
PROT SERPL-MCNC: 7.4 G/DL (ref 6–8.4)
SODIUM SERPL-SCNC: 136 MMOL/L (ref 136–145)
TSH SERPL DL<=0.005 MIU/L-ACNC: 2.42 UIU/ML (ref 0.4–4)

## 2024-02-12 ENCOUNTER — HOSPITAL ENCOUNTER (OUTPATIENT)
Dept: RADIOLOGY | Facility: HOSPITAL | Age: 64
Discharge: HOME | End: 2024-02-12
Attending: FAMILY MEDICINE
Payer: COMMERCIAL

## 2024-02-12 DIAGNOSIS — C34.81 SMALL CELL CARCINOMA OF OVERLAPPING SITES OF RIGHT LUNG: ICD-10-CM

## 2024-02-12 PROCEDURE — 25500020 PHARM REV CODE 255: Mod: PO | Performed by: FAMILY MEDICINE

## 2024-02-12 PROCEDURE — A9585 GADOBUTROL INJECTION: HCPCS | Mod: PO | Performed by: FAMILY MEDICINE

## 2024-02-12 PROCEDURE — 70553 MRI BRAIN STEM W/O & W/DYE: CPT | Mod: 26,,, | Performed by: RADIOLOGY

## 2024-02-12 PROCEDURE — 70553 MRI BRAIN STEM W/O & W/DYE: CPT | Mod: TC,PO

## 2024-02-12 RX ORDER — GADOBUTROL 604.72 MG/ML
9 INJECTION INTRAVENOUS
Status: COMPLETED | OUTPATIENT
Start: 2024-02-12 | End: 2024-02-12

## 2024-02-12 RX ADMIN — GADOBUTROL 9 ML: 604.72 INJECTION INTRAVENOUS at 09:02

## 2024-02-16 ENCOUNTER — OFFICE VISIT (OUTPATIENT)
Dept: FAMILY MEDICINE | Facility: CLINIC | Age: 64
End: 2024-02-16
Payer: COMMERCIAL

## 2024-02-16 VITALS
SYSTOLIC BLOOD PRESSURE: 126 MMHG | BODY MASS INDEX: 28.87 KG/M2 | HEART RATE: 80 BPM | OXYGEN SATURATION: 97 % | HEIGHT: 70 IN | DIASTOLIC BLOOD PRESSURE: 74 MMHG | WEIGHT: 201.63 LBS

## 2024-02-16 DIAGNOSIS — R53.83 FATIGUE: ICD-10-CM

## 2024-02-16 DIAGNOSIS — D50.9 IRON DEFICIENCY ANEMIA, UNSPECIFIED: ICD-10-CM

## 2024-02-16 DIAGNOSIS — C34.81 SMALL CELL CARCINOMA OF OVERLAPPING SITES OF RIGHT LUNG: ICD-10-CM

## 2024-02-16 DIAGNOSIS — I72.2 RENAL ARTERIAL ANEURYSM: ICD-10-CM

## 2024-02-16 DIAGNOSIS — C34.11 MALIGNANT NEOPLASM OF UPPER LOBE OF RIGHT LUNG: Primary | ICD-10-CM

## 2024-02-16 DIAGNOSIS — J44.9 CHRONIC OBSTRUCTIVE PULMONARY DISEASE, UNSPECIFIED COPD TYPE: ICD-10-CM

## 2024-02-16 DIAGNOSIS — E11.42 DIABETIC POLYNEUROPATHY ASSOCIATED WITH TYPE 2 DIABETES MELLITUS: ICD-10-CM

## 2024-02-16 DIAGNOSIS — E11.9 TYPE 2 DIABETES MELLITUS WITHOUT COMPLICATION, WITHOUT LONG-TERM CURRENT USE OF INSULIN: ICD-10-CM

## 2024-02-16 DIAGNOSIS — R10.819 SUPRAPUBIC TENDERNESS: ICD-10-CM

## 2024-02-16 DIAGNOSIS — F33.41 RECURRENT MAJOR DEPRESSIVE DISORDER, IN PARTIAL REMISSION: ICD-10-CM

## 2024-02-16 DIAGNOSIS — Z00.00 ROUTINE GENERAL MEDICAL EXAMINATION AT A HEALTH CARE FACILITY: Primary | ICD-10-CM

## 2024-02-16 LAB
BACTERIA #/AREA URNS AUTO: NORMAL /HPF
BILIRUB SERPL-MCNC: NORMAL MG/DL
BILIRUB UR QL STRIP: NEGATIVE
BLOOD URINE, POC: NORMAL
CLARITY UR REFRACT.AUTO: ABNORMAL
CLARITY, POC UA: CLEAR
COLOR UR AUTO: YELLOW
COLOR, POC UA: NORMAL
GLUCOSE UR QL STRIP: NEGATIVE
GLUCOSE UR QL STRIP: NORMAL
HGB UR QL STRIP: NEGATIVE
KETONES UR QL STRIP: NEGATIVE
KETONES UR QL STRIP: NORMAL
LEUKOCYTE ESTERASE UR QL STRIP: ABNORMAL
LEUKOCYTE ESTERASE URINE, POC: NORMAL
MICROSCOPIC COMMENT: NORMAL
NITRITE UR QL STRIP: NEGATIVE
NITRITE, POC UA: NORMAL
PH UR STRIP: 5 [PH] (ref 5–8)
PH, POC UA: 5.5
PROT UR QL STRIP: NEGATIVE
PROTEIN, POC: NORMAL
RBC #/AREA URNS AUTO: 1 /HPF (ref 0–4)
SP GR UR STRIP: 1 (ref 1–1.03)
SPECIFIC GRAVITY, POC UA: 1
SQUAMOUS #/AREA URNS AUTO: 1 /HPF
URN SPEC COLLECT METH UR: ABNORMAL
UROBILINOGEN, POC UA: 0.2
WBC #/AREA URNS AUTO: 1 /HPF (ref 0–5)

## 2024-02-16 PROCEDURE — 3074F SYST BP LT 130 MM HG: CPT | Mod: CPTII,S$GLB,, | Performed by: FAMILY MEDICINE

## 2024-02-16 PROCEDURE — 99396 PREV VISIT EST AGE 40-64: CPT | Mod: S$GLB,,, | Performed by: FAMILY MEDICINE

## 2024-02-16 PROCEDURE — 99999 PR PBB SHADOW E&M-EST. PATIENT-LVL V: CPT | Mod: PBBFAC,,, | Performed by: FAMILY MEDICINE

## 2024-02-16 PROCEDURE — 3008F BODY MASS INDEX DOCD: CPT | Mod: CPTII,S$GLB,, | Performed by: FAMILY MEDICINE

## 2024-02-16 PROCEDURE — 81002 URINALYSIS NONAUTO W/O SCOPE: CPT | Mod: S$GLB,,, | Performed by: FAMILY MEDICINE

## 2024-02-16 PROCEDURE — 1159F MED LIST DOCD IN RCRD: CPT | Mod: CPTII,S$GLB,, | Performed by: FAMILY MEDICINE

## 2024-02-16 PROCEDURE — 99213 OFFICE O/P EST LOW 20 MIN: CPT | Mod: 25,S$GLB,, | Performed by: FAMILY MEDICINE

## 2024-02-16 PROCEDURE — 4010F ACE/ARB THERAPY RXD/TAKEN: CPT | Mod: CPTII,S$GLB,, | Performed by: FAMILY MEDICINE

## 2024-02-16 PROCEDURE — 3078F DIAST BP <80 MM HG: CPT | Mod: CPTII,S$GLB,, | Performed by: FAMILY MEDICINE

## 2024-02-16 PROCEDURE — 3044F HG A1C LEVEL LT 7.0%: CPT | Mod: CPTII,S$GLB,, | Performed by: FAMILY MEDICINE

## 2024-02-16 PROCEDURE — 81001 URINALYSIS AUTO W/SCOPE: CPT | Performed by: FAMILY MEDICINE

## 2024-02-16 PROCEDURE — 1160F RVW MEDS BY RX/DR IN RCRD: CPT | Mod: CPTII,S$GLB,, | Performed by: FAMILY MEDICINE

## 2024-02-16 RX ORDER — ATORVASTATIN CALCIUM 20 MG/1
20 TABLET, FILM COATED ORAL DAILY
Qty: 90 TABLET | Refills: 3 | Status: SHIPPED | OUTPATIENT
Start: 2024-02-16 | End: 2024-03-18

## 2024-02-16 NOTE — PROGRESS NOTES
Subjective:       Patient ID: Malathi Martinez is a 63 y.o. female.    Chief Complaint: Annual Exam (General exam)      Malathi Martinez is in the office for annual exam.    HPI  Medical hx reviewed.  Past Medical History:   Diagnosis Date    Abnormal stress test     Bronchitis     COPD (chronic obstructive pulmonary disease)     Diabetes mellitus, type 2     GERD (gastroesophageal reflux disease)     HLD (hyperlipidemia)     Hx of endoscopy     Hypertension     Lung cancer     Mental disorder     Pulmonary nodule 04/09/2018    SOB (shortness of breath)     resolved    Thyroid disease     Tobacco abuse 12/26/2012     R chest wall pain x 3-4 weeks. Onset after she leaned forward to pickup something. Comes and goes during the day with certain movements - anabela forward reach. She can get comfortable in bed. No pain with deep insp. She does feel like it's slowly improving.   During her most recent infusion 3 weeks ago, she had a first episode of nausea. Has not occurred previously. She does have zofran, etc for prn use.     Current Outpatient Medications:     ALPRAZolam (XANAX) 0.25 MG tablet, Take 1 tablet (0.25 mg total) by mouth 2 (two) times daily., Disp: 60 tablet, Rfl: 5    B-complex with vitamin C (Z-BEC OR EQUIV) tablet, Take 1 tablet by mouth once daily., Disp: , Rfl:     buPROPion (WELLBUTRIN XL) 150 MG TB24 tablet, Take 2 tablets (300 mg total) by mouth once daily., Disp: 180 tablet, Rfl: 3    cholecalciferol, vitamin D3, 2,000 unit Cap, Take 1 capsule by mouth once daily., Disp: , Rfl:     clotrimazole-betamethasone 1-0.05% (LOTRISONE) cream, Apply topically to affected area 2 (two) times daily. (Patient taking differently: Apply topically daily as needed.), Disp: 45 g, Rfl: 1    desloratadine (CLARINEX) 5 mg tablet, Take 1 tablet (5 mg total) by mouth once daily., Disp: 90 tablet, Rfl: 3    EScitalopram oxalate (LEXAPRO) 20 MG tablet, Take 1 tablet (20 mg total) by mouth once daily., Disp: 90  tablet, Rfl: 3    levothyroxine (SYNTHROID) 100 MCG tablet, , Disp: , Rfl:     lisinopriL 10 MG tablet, Take 1 tablet (10 mg total) by mouth every evening., Disp: 90 tablet, Rfl: 3    ondansetron (ZOFRAN-ODT) 8 MG TbDL, Take 1 tablet (8 mg total) by mouth 2 (two) times daily. (Patient taking differently: Take 8 mg by mouth daily as needed.), Disp: 20 tablet, Rfl: 2    tirzepatide (MOUNJARO) 5 mg/0.5 mL PnIj, Inject 5 mg into the skin every 7 days., Disp: 4 Pen, Rfl: 4    traZODone (DESYREL) 50 MG tablet, Take 2 tablets (100 mg total) by mouth every evening., Disp: 180 tablet, Rfl: 3    albuterol (VENTOLIN HFA) 90 mcg/actuation inhaler, Inhale 2 puffs into the lungs every 6 (six) hours as needed for Wheezing or Shortness of Breath. Rescue (Patient not taking: Reported on 1/26/2024), Disp: 18 g, Rfl: 0    albuterol-ipratropium (DUO-NEB) 2.5 mg-0.5 mg/3 mL nebulizer solution, Take 3 mLs by nebulization every 12 (twelve) hours. Rescue (Patient not taking: Reported on 1/26/2024), Disp: 75 mL, Rfl: 0    atorvastatin (LIPITOR) 20 MG tablet, Take 1 tablet (20 mg total) by mouth once daily., Disp: 90 tablet, Rfl: 3    famotidine (PEPCID) 40 MG tablet, TAKE 1 TABLET (40 MG TOTAL) BY MOUTH 2 (TWO) TIMES DAILY AS NEEDED FOR HEARTBURN., Disp: 180 tablet, Rfl: 1    polyethylene glycol (GLYCOLAX) 17 gram PwPk, Take 17 g by mouth once daily. (Patient not taking: Reported on 12/18/2023), Disp: 60 packet, Rfl: 0    The 10-year ASCVD risk score (Andrew ONEAL, et al., 2019) is: 7.7%    Values used to calculate the score:      Age: 63 years      Sex: Female      Is Non- : No      Diabetic: Yes      Tobacco smoker: No      Systolic Blood Pressure: 126 mmHg      Is BP treated: No      HDL Cholesterol: 53 mg/dL      Total Cholesterol: 142 mg/dL     Lab Results   Component Value Date    HGBA1C 5.2 01/30/2024    HGBA1C 5.3 09/11/2023    HGBA1C 6.1 (H) 05/01/2023     Lab Results   Component Value Date    LDLCALC 71.6  09/11/2023    CREATININE 1.1 01/30/2024   Labs 2024 rev.     Review of Systems   Constitutional:  Positive for fatigue. Negative for activity change and unexpected weight change.   HENT:  Positive for hearing loss and trouble swallowing (occ, not nec bothersome). Negative for rhinorrhea.    Eyes:  Positive for discharge. Negative for visual disturbance.   Respiratory:  Positive for cough (attributes to 's smoking). Negative for chest tightness, shortness of breath and wheezing.    Cardiovascular:  Positive for chest pain (R rib line/chest wall). Negative for palpitations.   Gastrointestinal:  Positive for constipation and nausea (1 episode during last immunotherapy). Negative for blood in stool, diarrhea and vomiting.   Endocrine: Negative for polydipsia and polyuria.   Genitourinary:  Negative for difficulty urinating, dysuria, hematuria and menstrual problem.   Musculoskeletal:  Positive for neck pain. Negative for arthralgias and joint swelling.   Neurological:  Positive for headaches (tension-type, responds to otcs).   Psychiatric/Behavioral:  Positive for confusion and dysphoric mood. Negative for sleep disturbance.        Objective:      Physical Exam  Vitals and nursing note reviewed.   Constitutional:       General: She is not in acute distress.     Appearance: Normal appearance. She is well-developed.   HENT:      Head: Normocephalic and atraumatic.      Right Ear: Tympanic membrane and external ear normal.      Left Ear: Tympanic membrane and external ear normal.      Nose: Nose normal.      Mouth/Throat:      Pharynx: No oropharyngeal exudate.   Eyes:      Conjunctiva/sclera: Conjunctivae normal.      Pupils: Pupils are equal, round, and reactive to light.   Neck:      Thyroid: No thyromegaly.   Cardiovascular:      Rate and Rhythm: Normal rate and regular rhythm.   Pulmonary:      Effort: Pulmonary effort is normal. No respiratory distress.      Breath sounds: Normal breath sounds. No wheezing.    Abdominal:      General: Bowel sounds are normal. There is no distension.      Palpations: Abdomen is soft. There is no mass.      Tenderness: There is abdominal tenderness in the suprapubic area. There is no guarding or rebound.   Musculoskeletal:         General: No signs of injury.      Cervical back: Neck supple.      Right lower leg: No edema.      Left lower leg: No edema.   Lymphadenopathy:      Cervical: No cervical adenopathy.   Skin:     General: Skin is warm and dry.   Neurological:      General: No focal deficit present.      Mental Status: She is alert and oriented to person, place, and time.      Cranial Nerves: No cranial nerve deficit.   Psychiatric:         Mood and Affect: Mood normal.         Behavior: Behavior normal.             Screening recommendations appropriate to age and health status were reviewed.    Assessment & Plan:    Routine general medical examination at a health care facility  Comments:  anticipatory guidance reviewed    Chronic obstructive pulmonary disease, unspecified COPD type  Comments:  currently not requiring inhalers    Small cell carcinoma of overlapping sites of right lung  Comments:  cont imaging (PET, MRI brain) per oncology  Orders:  -     Ambulatory referral/consult to HOME Palliative Care; Future; Expected date: 02/23/2024    Diabetic polyneuropathy associated with type 2 diabetes mellitus    Recurrent major depressive disorder, in partial remission  Comments:  stable, cont regimen    Renal arterial aneurysm  Comments:  stable, cont monitoring    Type 2 diabetes mellitus without complication, without long-term current use of insulin  Comments:  well-controlled, cont regimen  Orders:  -     atorvastatin (LIPITOR) 20 MG tablet; Take 1 tablet (20 mg total) by mouth once daily.  Dispense: 90 tablet; Refill: 3    Suprapubic tenderness  -     Urinalysis, Reflex to Urine Culture Urine, Clean Catch; Future  -     POCT URINE DIPSTICK WITHOUT MICROSCOPE

## 2024-02-19 ENCOUNTER — HOSPITAL ENCOUNTER (OUTPATIENT)
Dept: RADIOLOGY | Facility: HOSPITAL | Age: 64
Discharge: HOME | End: 2024-02-19
Attending: NURSE PRACTITIONER
Payer: COMMERCIAL

## 2024-02-19 DIAGNOSIS — D50.9 IRON DEFICIENCY ANEMIA, UNSPECIFIED: ICD-10-CM

## 2024-02-19 DIAGNOSIS — R53.83 OTHER FATIGUE: ICD-10-CM

## 2024-02-19 DIAGNOSIS — C34.11 MALIGNANT NEOPLASM OF UPPER LOBE OF RIGHT LUNG: ICD-10-CM

## 2024-02-19 LAB — GLUCOSE SERPL-MCNC: 74 MG/DL (ref 70–110)

## 2024-02-19 PROCEDURE — A9552 F18 FDG: HCPCS | Mod: PN

## 2024-02-19 PROCEDURE — 78815 PET IMAGE W/CT SKULL-THIGH: CPT | Mod: TC,PN

## 2024-02-19 PROCEDURE — 78815 PET IMAGE W/CT SKULL-THIGH: CPT | Mod: 26,PS,, | Performed by: RADIOLOGY

## 2024-02-19 NOTE — PROGRESS NOTES
PET Imaging Questionnaire    Are you a Diabetic? Recent Blood Sugar level? Yes    Are you anemic? Bone Marrow Stimulation Meds? No    Have you had a CT Scan, if so when & where was your last one? Yes -     Have you had a PET Scan, if so when & where was your last one? Yes -     Chemotherapy or currently on Chemotherapy? Yes    Radiation therapy? No    Surgical History:   Past Surgical History:   Procedure Laterality Date    APPENDECTOMY      COLONOSCOPY N/A 1/18/2019    Procedure: COLONOSCOPY;  Surgeon: Lai Cardoso MD;  Location: [facility];  Service: Endoscopy;  Laterality: N/A;    COLONOSCOPY N/A 12/20/2023    Procedure: COLONOSCOPY;  Surgeon: Jose Alanis MD;  Location: [facility];  Service: Colon and Rectal;  Laterality: N/A;    ENDOBRONCHIAL ULTRASOUND N/A 5/3/2023    Procedure: ENDOBRONCHIAL ULTRASOUND (EBUS);  Surgeon: Nahun Nguyen MD;  Location: [facility];  Service: Pulmonary;  Laterality: N/A;    ESOPHAGOGASTRODUODENOSCOPY N/A 2/19/2021    Procedure: ESOPHAGOGASTRODUODENOSCOPY (EGD);  Surgeon: Lai Cardoso MD;  Location: [facility];  Service: Endoscopy;  Laterality: N/A;    INSERTION OF TUNNELED CENTRAL VENOUS CATHETER (CVC) WITH SUBCUTANEOUS PORT Right 5/22/2023    Procedure: QEBZDOIMN-CCUN-O-CATH;  Surgeon: Ayah Aldana MD;  Location: [facility];  Service: General;  Laterality: Right;    TONSILLECTOMY          Have you been fasting for at least 6 hours? Yes    Is there any chance you may be pregnant or breastfeeding? No    Assay: 12.19 MCi@:7.40   Injection Site:rt ac     Residual: 1.05 mCi@: 7.42   Technologist: Elba Stone Injected:11.14mCi

## 2024-02-20 ENCOUNTER — PATIENT MESSAGE (OUTPATIENT)
Dept: FAMILY MEDICINE | Facility: CLINIC | Age: 64
End: 2024-02-20
Payer: COMMERCIAL

## 2024-02-26 ENCOUNTER — OFFICE VISIT (OUTPATIENT)
Dept: FAMILY MEDICINE | Facility: CLINIC | Age: 64
End: 2024-02-26
Payer: COMMERCIAL

## 2024-02-26 VITALS — BODY MASS INDEX: 28.77 KG/M2 | WEIGHT: 201 LBS | HEIGHT: 70 IN

## 2024-02-26 DIAGNOSIS — E11.9 TYPE 2 DIABETES MELLITUS WITHOUT COMPLICATION, WITHOUT LONG-TERM CURRENT USE OF INSULIN: ICD-10-CM

## 2024-02-26 DIAGNOSIS — R91.8 LUNG MASS: ICD-10-CM

## 2024-02-26 DIAGNOSIS — F41.1 GAD (GENERALIZED ANXIETY DISORDER): Primary | ICD-10-CM

## 2024-02-26 PROCEDURE — 99214 OFFICE O/P EST MOD 30 MIN: CPT | Mod: 95,,, | Performed by: FAMILY MEDICINE

## 2024-02-26 PROCEDURE — 1159F MED LIST DOCD IN RCRD: CPT | Mod: CPTII,95,, | Performed by: FAMILY MEDICINE

## 2024-02-26 PROCEDURE — 4010F ACE/ARB THERAPY RXD/TAKEN: CPT | Mod: CPTII,95,, | Performed by: FAMILY MEDICINE

## 2024-02-26 PROCEDURE — 3008F BODY MASS INDEX DOCD: CPT | Mod: CPTII,95,, | Performed by: FAMILY MEDICINE

## 2024-02-26 PROCEDURE — 1160F RVW MEDS BY RX/DR IN RCRD: CPT | Mod: CPTII,95,, | Performed by: FAMILY MEDICINE

## 2024-02-26 PROCEDURE — 3044F HG A1C LEVEL LT 7.0%: CPT | Mod: CPTII,95,, | Performed by: FAMILY MEDICINE

## 2024-02-26 NOTE — PROGRESS NOTES
Subjective:       Patient ID: Malathi Martinez is a 63 y.o. female.    Chief Complaint: Follow-up (Cancer back)      The patient location is: [place]  The chief complaint leading to consultation is: f/u  Visit type: Virtual visit with synchronous audio and video  Total time spent with patient: 10mins  Each patient to whom he or she provides medical services by telemedicine is:  (1) informed of the relationship between the physician and patient and the respective role of any other health care provider with respect to management of the patient; and (2) notified that he or she may decline to receive medical services by telemedicine and may withdraw from such care at any time.    Notes: Patient seen for f/u.   She feels ok other than usual fatigue. She has a bronch tomorrow with pulm/mckoy re possible new mass found on her PET scan last week. She's concerned bc the MRI isn't scheduled until mid-March.      Review of Systems   Constitutional:  Negative for activity change and unexpected weight change.   HENT:  Positive for hearing loss and trouble swallowing. Negative for rhinorrhea.    Eyes:  Positive for discharge. Negative for visual disturbance.   Respiratory:  Negative for chest tightness and wheezing.    Cardiovascular:  Positive for chest pain. Negative for palpitations.   Gastrointestinal:  Negative for blood in stool, constipation, diarrhea and vomiting.   Endocrine: Negative for polydipsia and polyuria.   Genitourinary:  Negative for difficulty urinating, dysuria, hematuria and menstrual problem.   Musculoskeletal:  Positive for neck pain. Negative for arthralgias and joint swelling.   Neurological:  Positive for weakness and headaches.   Psychiatric/Behavioral:  Positive for dysphoric mood. Negative for confusion.        Objective:        Physical Exam  Vitals and nursing note reviewed.   Constitutional:       General: She is not in acute distress.     Appearance: Normal appearance. She is well-developed.    HENT:      Head: Normocephalic and atraumatic.   Eyes:      General: No scleral icterus.        Right eye: No discharge.         Left eye: No discharge.      Conjunctiva/sclera: Conjunctivae normal.   Pulmonary:      Effort: Pulmonary effort is normal. No respiratory distress.   Skin:     General: Skin is warm and dry.   Neurological:      General: No focal deficit present.      Mental Status: She is alert and oriented to person, place, and time.   Psychiatric:         Mood and Affect: Mood normal.         Behavior: Behavior normal.             Assessment:   JEAN CLAUDE (generalized anxiety disorder)  Comments:  doing ok given possible recurrent noted on recent PET scan  confirmed sleeping well, discussed adjunctive therapies for mood if needed    Type 2 diabetes mellitus without complication, without long-term current use of insulin  Comments:  stable, ok to allow higher values during tx  avoidance of hypolgycemia was stressed    Lung mass  Comments:  proceed with bronch tomorrow for bx  MRI, will call about earlier availability         Patient aware of limitations to assessment and exam of telemedicine. Deemed appropriate at this time given current covid-19 concerns.

## 2024-03-11 ENCOUNTER — LAB VISIT (OUTPATIENT)
Dept: LAB | Facility: HOSPITAL | Age: 64
End: 2024-03-11
Attending: NURSE PRACTITIONER
Payer: COMMERCIAL

## 2024-03-11 DIAGNOSIS — R53.83 FATIGUE: ICD-10-CM

## 2024-03-11 DIAGNOSIS — C34.11 MALIGNANT NEOPLASM OF UPPER LOBE OF RIGHT LUNG: ICD-10-CM

## 2024-03-11 DIAGNOSIS — D50.9 IRON DEFICIENCY ANEMIA, UNSPECIFIED: ICD-10-CM

## 2024-03-11 DIAGNOSIS — C34.11 MALIGNANT NEOPLASM OF UPPER LOBE OF RIGHT LUNG: Primary | ICD-10-CM

## 2024-03-11 LAB
ALBUMIN SERPL BCP-MCNC: 3.8 G/DL (ref 3.5–5.2)
ALP SERPL-CCNC: 80 U/L (ref 55–135)
ALT SERPL W/O P-5'-P-CCNC: 26 U/L (ref 10–44)
ANION GAP SERPL CALC-SCNC: 9 MMOL/L (ref 8–16)
AST SERPL-CCNC: 16 U/L (ref 10–40)
BASOPHILS # BLD AUTO: 0.01 K/UL (ref 0–0.2)
BASOPHILS NFR BLD: 0.2 % (ref 0–1.9)
BILIRUB SERPL-MCNC: 0.3 MG/DL (ref 0.1–1)
BUN SERPL-MCNC: 7 MG/DL (ref 8–23)
CALCIUM SERPL-MCNC: 10.1 MG/DL (ref 8.7–10.5)
CHLORIDE SERPL-SCNC: 103 MMOL/L (ref 95–110)
CO2 SERPL-SCNC: 25 MMOL/L (ref 23–29)
CREAT SERPL-MCNC: 1.1 MG/DL (ref 0.5–1.4)
DIFFERENTIAL METHOD BLD: NORMAL
EOSINOPHIL # BLD AUTO: 0.1 K/UL (ref 0–0.5)
EOSINOPHIL NFR BLD: 0.8 % (ref 0–8)
ERYTHROCYTE [DISTWIDTH] IN BLOOD BY AUTOMATED COUNT: 12.7 % (ref 11.5–14.5)
EST. GFR  (NO RACE VARIABLE): 56.1 ML/MIN/1.73 M^2
GLUCOSE SERPL-MCNC: 72 MG/DL (ref 70–110)
HCT VFR BLD AUTO: 42.4 % (ref 37–48.5)
HGB BLD-MCNC: 13.9 G/DL (ref 12–16)
IMM GRANULOCYTES # BLD AUTO: 0.01 K/UL (ref 0–0.04)
IMM GRANULOCYTES NFR BLD AUTO: 0.2 % (ref 0–0.5)
LYMPHOCYTES # BLD AUTO: 1.8 K/UL (ref 1–4.8)
LYMPHOCYTES NFR BLD: 30.8 % (ref 18–48)
MCH RBC QN AUTO: 29.1 PG (ref 27–31)
MCHC RBC AUTO-ENTMCNC: 32.8 G/DL (ref 32–36)
MCV RBC AUTO: 89 FL (ref 82–98)
MONOCYTES # BLD AUTO: 0.6 K/UL (ref 0.3–1)
MONOCYTES NFR BLD: 9.4 % (ref 4–15)
NEUTROPHILS # BLD AUTO: 3.5 K/UL (ref 1.8–7.7)
NEUTROPHILS NFR BLD: 58.6 % (ref 38–73)
NRBC BLD-RTO: 0 /100 WBC
PLATELET # BLD AUTO: 246 K/UL (ref 150–450)
PMV BLD AUTO: 10.4 FL (ref 9.2–12.9)
POTASSIUM SERPL-SCNC: 3.6 MMOL/L (ref 3.5–5.1)
PROT SERPL-MCNC: 7.3 G/DL (ref 6–8.4)
RBC # BLD AUTO: 4.78 M/UL (ref 4–5.4)
SODIUM SERPL-SCNC: 137 MMOL/L (ref 136–145)
WBC # BLD AUTO: 5.98 K/UL (ref 3.9–12.7)

## 2024-03-11 PROCEDURE — 36415 COLL VENOUS BLD VENIPUNCTURE: CPT | Mod: PN | Performed by: NURSE PRACTITIONER

## 2024-03-11 PROCEDURE — 85025 COMPLETE CBC W/AUTO DIFF WBC: CPT | Performed by: NURSE PRACTITIONER

## 2024-03-11 PROCEDURE — 80053 COMPREHEN METABOLIC PANEL: CPT | Performed by: NURSE PRACTITIONER

## 2024-03-12 ENCOUNTER — HOSPITAL ENCOUNTER (OUTPATIENT)
Dept: RADIOLOGY | Facility: HOSPITAL | Age: 64
Discharge: HOME | End: 2024-03-12
Attending: INTERNAL MEDICINE
Payer: COMMERCIAL

## 2024-03-12 DIAGNOSIS — C34.11 MALIGNANT NEOPLASM OF UPPER LOBE OF RIGHT LUNG: ICD-10-CM

## 2024-03-12 PROCEDURE — A9585 GADOBUTROL INJECTION: HCPCS | Mod: PO | Performed by: INTERNAL MEDICINE

## 2024-03-12 PROCEDURE — 72157 MRI CHEST SPINE W/O & W/DYE: CPT | Mod: 26,,, | Performed by: RADIOLOGY

## 2024-03-12 PROCEDURE — 72157 MRI CHEST SPINE W/O & W/DYE: CPT | Mod: TC,PO

## 2024-03-12 PROCEDURE — 25500020 PHARM REV CODE 255: Mod: PO | Performed by: INTERNAL MEDICINE

## 2024-03-12 RX ORDER — GADOBUTROL 604.72 MG/ML
9 INJECTION INTRAVENOUS
Status: COMPLETED | OUTPATIENT
Start: 2024-03-12 | End: 2024-03-12

## 2024-03-12 RX ADMIN — GADOBUTROL 9 ML: 604.72 INJECTION INTRAVENOUS at 08:03

## 2024-03-15 ENCOUNTER — OFFICE VISIT (OUTPATIENT)
Dept: FAMILY MEDICINE | Facility: CLINIC | Age: 64
End: 2024-03-15
Payer: COMMERCIAL

## 2024-03-15 VITALS
SYSTOLIC BLOOD PRESSURE: 120 MMHG | WEIGHT: 199.88 LBS | DIASTOLIC BLOOD PRESSURE: 66 MMHG | HEIGHT: 70 IN | HEART RATE: 82 BPM | OXYGEN SATURATION: 97 % | BODY MASS INDEX: 28.61 KG/M2

## 2024-03-15 DIAGNOSIS — C34.81 SMALL CELL CARCINOMA OF OVERLAPPING SITES OF RIGHT LUNG: ICD-10-CM

## 2024-03-15 DIAGNOSIS — E11.9 TYPE 2 DIABETES MELLITUS WITHOUT COMPLICATION, WITHOUT LONG-TERM CURRENT USE OF INSULIN: ICD-10-CM

## 2024-03-15 DIAGNOSIS — F41.1 GAD (GENERALIZED ANXIETY DISORDER): Primary | ICD-10-CM

## 2024-03-15 DIAGNOSIS — F33.41 RECURRENT MAJOR DEPRESSIVE DISORDER, IN PARTIAL REMISSION: ICD-10-CM

## 2024-03-15 PROCEDURE — 3044F HG A1C LEVEL LT 7.0%: CPT | Mod: CPTII,S$GLB,, | Performed by: FAMILY MEDICINE

## 2024-03-15 PROCEDURE — 3008F BODY MASS INDEX DOCD: CPT | Mod: CPTII,S$GLB,, | Performed by: FAMILY MEDICINE

## 2024-03-15 PROCEDURE — 4010F ACE/ARB THERAPY RXD/TAKEN: CPT | Mod: CPTII,S$GLB,, | Performed by: FAMILY MEDICINE

## 2024-03-15 PROCEDURE — 99999 PR PBB SHADOW E&M-EST. PATIENT-LVL IV: CPT | Mod: PBBFAC,,, | Performed by: FAMILY MEDICINE

## 2024-03-15 PROCEDURE — 3074F SYST BP LT 130 MM HG: CPT | Mod: CPTII,S$GLB,, | Performed by: FAMILY MEDICINE

## 2024-03-15 PROCEDURE — 3078F DIAST BP <80 MM HG: CPT | Mod: CPTII,S$GLB,, | Performed by: FAMILY MEDICINE

## 2024-03-15 PROCEDURE — 1160F RVW MEDS BY RX/DR IN RCRD: CPT | Mod: CPTII,S$GLB,, | Performed by: FAMILY MEDICINE

## 2024-03-15 PROCEDURE — 99214 OFFICE O/P EST MOD 30 MIN: CPT | Mod: S$GLB,,, | Performed by: FAMILY MEDICINE

## 2024-03-15 PROCEDURE — 1159F MED LIST DOCD IN RCRD: CPT | Mod: CPTII,S$GLB,, | Performed by: FAMILY MEDICINE

## 2024-03-15 RX ORDER — ESCITALOPRAM OXALATE 20 MG/1
20 TABLET ORAL DAILY
Qty: 90 TABLET | Refills: 3 | Status: SHIPPED | OUTPATIENT
Start: 2024-03-15 | End: 2024-05-10 | Stop reason: ALTCHOICE

## 2024-03-15 RX ORDER — DESLORATADINE 5 MG/1
5 TABLET ORAL DAILY
Qty: 90 TABLET | Refills: 3 | Status: SHIPPED | OUTPATIENT
Start: 2024-03-15 | End: 2024-04-12

## 2024-03-15 RX ORDER — LISINOPRIL 10 MG/1
10 TABLET ORAL NIGHTLY
Qty: 90 TABLET | Refills: 3 | Status: SHIPPED | OUTPATIENT
Start: 2024-03-15 | End: 2025-03-15

## 2024-03-15 RX ORDER — BUPROPION HYDROCHLORIDE 150 MG/1
300 TABLET ORAL DAILY
Qty: 180 TABLET | Refills: 3 | Status: SHIPPED | OUTPATIENT
Start: 2024-03-15

## 2024-03-15 RX ORDER — ONDANSETRON 8 MG/1
8 TABLET, ORALLY DISINTEGRATING ORAL 2 TIMES DAILY
Qty: 20 TABLET | Refills: 2 | Status: SHIPPED | OUTPATIENT
Start: 2024-03-15

## 2024-03-15 RX ORDER — CYCLOBENZAPRINE HCL 5 MG
5 TABLET ORAL 3 TIMES DAILY PRN
Qty: 30 TABLET | Refills: 1 | Status: SHIPPED | OUTPATIENT
Start: 2024-03-15

## 2024-03-15 NOTE — PROGRESS NOTES
"Subjective:       Patient ID: Malathi Martinez is a 63 y.o. female.    Chief Complaint: Follow-up    Follow-up  Associated symptoms include headaches and neck pain. Pertinent negatives include no chest pain, congestion, coughing, fatigue, joint swelling, vomiting or weakness.     Patient seen for f/u.   She has a new area of malignancy concern in the R lung (as previous) and R rib (area of pain discussed prev). No new findings in the left or spine.   Oncology planning new regimen - in process.   Some stress tension in the shoulders, worse in some positions. No increase in HA frequency.    Review of Systems:  Review of Systems   Constitutional:  Negative for activity change, fatigue and unexpected weight change.   HENT:  Positive for hearing loss and rhinorrhea. Negative for congestion and trouble swallowing.    Eyes:  Positive for discharge. Negative for visual disturbance.   Respiratory:  Negative for cough, chest tightness and wheezing.    Cardiovascular:  Negative for chest pain and palpitations.   Gastrointestinal:  Negative for blood in stool, constipation, diarrhea and vomiting.   Endocrine: Negative for polydipsia and polyuria.   Genitourinary:  Negative for difficulty urinating, dysuria, hematuria and menstrual problem.   Musculoskeletal:  Positive for neck pain. Negative for joint swelling.   Neurological:  Positive for headaches. Negative for weakness.   Psychiatric/Behavioral:  Positive for dysphoric mood. Negative for confusion and sleep disturbance (better).        Objective:     Vitals:    03/15/24 1319   BP: 120/66   Pulse: 82   SpO2: 97%   Weight: 90.6 kg (199 lb 13.6 oz)   Height: 5' 10" (1.778 m)          Physical Exam  Vitals and nursing note reviewed.   Constitutional:       General: She is not in acute distress.     Appearance: Normal appearance. She is well-developed.   HENT:      Head: Normocephalic and atraumatic.   Eyes:      General: No scleral icterus.        Right eye: No discharge. "         Left eye: No discharge.      Conjunctiva/sclera: Conjunctivae normal.   Cardiovascular:      Rate and Rhythm: Normal rate.   Pulmonary:      Effort: Pulmonary effort is normal. No respiratory distress.   Musculoskeletal:      Cervical back: Neck supple.   Skin:     General: Skin is warm and dry.   Neurological:      General: No focal deficit present.      Mental Status: She is alert and oriented to person, place, and time.   Psychiatric:         Mood and Affect: Mood normal.         Behavior: Behavior normal.           Assessment & Plan:  JEAN CLAUDE (generalized anxiety disorder)  Comments:  stable regimen, cont    Type 2 diabetes mellitus without complication, without long-term current use of insulin    Recurrent major depressive disorder, in partial remission  Comments:  wellbutrin to 300mg daily for now  lexapro 20mg  Orders:  -     buPROPion (WELLBUTRIN XL) 150 MG TB24 tablet; Take 2 tablets (300 mg total) by mouth once daily.  Dispense: 180 tablet; Refill: 3  -     EScitalopram oxalate (LEXAPRO) 20 MG tablet; Take 1 tablet (20 mg total) by mouth once daily.  Dispense: 90 tablet; Refill: 3    Small cell carcinoma of overlapping sites of right lung  Comments:  new regimen per onc    Other orders  -     cyclobenzaprine (FLEXERIL) 5 MG tablet; Take 1 tablet (5 mg total) by mouth 3 (three) times daily as needed for Muscle spasms.  Dispense: 30 tablet; Refill: 1  -     desloratadine (CLARINEX) 5 mg tablet; Take 1 tablet (5 mg total) by mouth once daily.  Dispense: 90 tablet; Refill: 3  -     lisinopriL 10 MG tablet; Take 1 tablet (10 mg total) by mouth every evening.  Dispense: 90 tablet; Refill: 3  -     ondansetron (ZOFRAN-ODT) 8 MG TbDL; Take 1 tablet (8 mg total) by mouth 2 (two) times daily.  Dispense: 20 tablet; Refill: 2

## 2024-03-18 DIAGNOSIS — E11.9 TYPE 2 DIABETES MELLITUS WITHOUT COMPLICATION, WITHOUT LONG-TERM CURRENT USE OF INSULIN: ICD-10-CM

## 2024-03-18 RX ORDER — ATORVASTATIN CALCIUM 20 MG/1
20 TABLET, FILM COATED ORAL DAILY
Qty: 90 TABLET | Refills: 1 | Status: SHIPPED | OUTPATIENT
Start: 2024-03-18

## 2024-03-18 NOTE — TELEPHONE ENCOUNTER
Malathi Martinez  is requesting a refill authorization.  Brief Assessment and Rationale for Refill:  Approve     Medication Therapy Plan:         Comments:     Note composed:10:32 AM 03/18/2024

## 2024-03-18 NOTE — TELEPHONE ENCOUNTER
No care due was identified.  [facility] Embedded Care Due Messages. Reference number: [id number].   3/18/2024 3:25:48 AM CDT

## 2024-03-19 ENCOUNTER — LAB VISIT (OUTPATIENT)
Dept: LAB | Facility: HOSPITAL | Age: 64
End: 2024-03-19
Attending: INTERNAL MEDICINE
Payer: COMMERCIAL

## 2024-03-19 DIAGNOSIS — C34.11 MALIGNANT NEOPLASM OF UPPER LOBE OF RIGHT LUNG: Primary | ICD-10-CM

## 2024-03-19 DIAGNOSIS — R53.83 FATIGUE: ICD-10-CM

## 2024-03-19 DIAGNOSIS — D50.9 IRON DEFICIENCY ANEMIA, UNSPECIFIED: ICD-10-CM

## 2024-03-19 DIAGNOSIS — C34.11 MALIGNANT NEOPLASM OF UPPER LOBE OF RIGHT LUNG: ICD-10-CM

## 2024-03-19 PROBLEM — E87.1 HYPONATREMIA: Status: RESOLVED | Noted: 2023-05-01 | Resolved: 2024-03-19

## 2024-03-19 PROBLEM — E87.6 HYPOKALEMIA: Status: RESOLVED | Noted: 2023-05-01 | Resolved: 2024-03-19

## 2024-03-19 LAB
ALBUMIN SERPL BCP-MCNC: 3.8 G/DL (ref 3.5–5.2)
ALP SERPL-CCNC: 85 U/L (ref 55–135)
ALT SERPL W/O P-5'-P-CCNC: 28 U/L (ref 10–44)
ANION GAP SERPL CALC-SCNC: 9 MMOL/L (ref 8–16)
AST SERPL-CCNC: 15 U/L (ref 10–40)
BASOPHILS # BLD AUTO: 0.01 K/UL (ref 0–0.2)
BASOPHILS NFR BLD: 0.2 % (ref 0–1.9)
BILIRUB SERPL-MCNC: 0.3 MG/DL (ref 0.1–1)
BUN SERPL-MCNC: 8 MG/DL (ref 8–23)
CALCIUM SERPL-MCNC: 10 MG/DL (ref 8.7–10.5)
CHLORIDE SERPL-SCNC: 103 MMOL/L (ref 95–110)
CO2 SERPL-SCNC: 25 MMOL/L (ref 23–29)
CREAT SERPL-MCNC: 1.1 MG/DL (ref 0.5–1.4)
DIFFERENTIAL METHOD BLD: NORMAL
EOSINOPHIL # BLD AUTO: 0.1 K/UL (ref 0–0.5)
EOSINOPHIL NFR BLD: 1 % (ref 0–8)
ERYTHROCYTE [DISTWIDTH] IN BLOOD BY AUTOMATED COUNT: 12.9 % (ref 11.5–14.5)
EST. GFR  (NO RACE VARIABLE): 56.1 ML/MIN/1.73 M^2
GLUCOSE SERPL-MCNC: 70 MG/DL (ref 70–110)
HCT VFR BLD AUTO: 43.5 % (ref 37–48.5)
HGB BLD-MCNC: 14.2 G/DL (ref 12–16)
IMM GRANULOCYTES # BLD AUTO: 0.02 K/UL (ref 0–0.04)
IMM GRANULOCYTES NFR BLD AUTO: 0.3 % (ref 0–0.5)
LYMPHOCYTES # BLD AUTO: 1.9 K/UL (ref 1–4.8)
LYMPHOCYTES NFR BLD: 32.1 % (ref 18–48)
MCH RBC QN AUTO: 29 PG (ref 27–31)
MCHC RBC AUTO-ENTMCNC: 32.6 G/DL (ref 32–36)
MCV RBC AUTO: 89 FL (ref 82–98)
MONOCYTES # BLD AUTO: 0.6 K/UL (ref 0.3–1)
MONOCYTES NFR BLD: 9.7 % (ref 4–15)
NEUTROPHILS # BLD AUTO: 3.4 K/UL (ref 1.8–7.7)
NEUTROPHILS NFR BLD: 56.7 % (ref 38–73)
NRBC BLD-RTO: 0 /100 WBC
PLATELET # BLD AUTO: 234 K/UL (ref 150–450)
PMV BLD AUTO: 10.8 FL (ref 9.2–12.9)
POTASSIUM SERPL-SCNC: 3.7 MMOL/L (ref 3.5–5.1)
PROT SERPL-MCNC: 7.3 G/DL (ref 6–8.4)
RBC # BLD AUTO: 4.9 M/UL (ref 4–5.4)
SODIUM SERPL-SCNC: 137 MMOL/L (ref 136–145)
WBC # BLD AUTO: 5.95 K/UL (ref 3.9–12.7)

## 2024-03-19 PROCEDURE — 36415 COLL VENOUS BLD VENIPUNCTURE: CPT | Mod: PN | Performed by: INTERNAL MEDICINE

## 2024-03-19 PROCEDURE — 85025 COMPLETE CBC W/AUTO DIFF WBC: CPT | Performed by: INTERNAL MEDICINE

## 2024-03-19 PROCEDURE — 80053 COMPREHEN METABOLIC PANEL: CPT | Performed by: INTERNAL MEDICINE

## 2024-03-20 DIAGNOSIS — R53.83 FATIGUE: ICD-10-CM

## 2024-03-20 DIAGNOSIS — C34.11 MALIGNANT NEOPLASM OF UPPER LOBE OF RIGHT LUNG: Primary | ICD-10-CM

## 2024-03-20 DIAGNOSIS — D50.9 IRON DEFICIENCY ANEMIA, UNSPECIFIED: ICD-10-CM

## 2024-03-25 ENCOUNTER — LAB VISIT (OUTPATIENT)
Dept: LAB | Facility: HOSPITAL | Age: 64
End: 2024-03-25
Attending: INTERNAL MEDICINE
Payer: COMMERCIAL

## 2024-03-25 DIAGNOSIS — C34.11 MALIGNANT NEOPLASM OF UPPER LOBE OF RIGHT LUNG: ICD-10-CM

## 2024-03-25 DIAGNOSIS — R53.83 FATIGUE: ICD-10-CM

## 2024-03-25 DIAGNOSIS — D50.9 IRON DEFICIENCY ANEMIA, UNSPECIFIED: ICD-10-CM

## 2024-03-25 LAB
ALBUMIN SERPL BCP-MCNC: 3.5 G/DL (ref 3.5–5.2)
ALP SERPL-CCNC: 79 U/L (ref 55–135)
ALT SERPL W/O P-5'-P-CCNC: 145 U/L (ref 10–44)
ANION GAP SERPL CALC-SCNC: 6 MMOL/L (ref 8–16)
AST SERPL-CCNC: 46 U/L (ref 10–40)
BASOPHILS # BLD AUTO: 0 K/UL (ref 0–0.2)
BASOPHILS NFR BLD: 0 % (ref 0–1.9)
BILIRUB SERPL-MCNC: 0.3 MG/DL (ref 0.1–1)
BUN SERPL-MCNC: 9 MG/DL (ref 8–23)
CALCIUM SERPL-MCNC: 9.4 MG/DL (ref 8.7–10.5)
CHLORIDE SERPL-SCNC: 102 MMOL/L (ref 95–110)
CK SERPL-CCNC: 48 U/L (ref 20–180)
CO2 SERPL-SCNC: 24 MMOL/L (ref 23–29)
CREAT SERPL-MCNC: 0.9 MG/DL (ref 0.5–1.4)
DIFFERENTIAL METHOD BLD: ABNORMAL
EOSINOPHIL # BLD AUTO: 0.1 K/UL (ref 0–0.5)
EOSINOPHIL NFR BLD: 1 % (ref 0–8)
ERYTHROCYTE [DISTWIDTH] IN BLOOD BY AUTOMATED COUNT: 12.8 % (ref 11.5–14.5)
EST. GFR  (NO RACE VARIABLE): >60 ML/MIN/1.73 M^2
GLUCOSE SERPL-MCNC: 90 MG/DL (ref 70–110)
HCT VFR BLD AUTO: 41.4 % (ref 37–48.5)
HGB BLD-MCNC: 13.7 G/DL (ref 12–16)
IMM GRANULOCYTES # BLD AUTO: 0.02 K/UL (ref 0–0.04)
IMM GRANULOCYTES NFR BLD AUTO: 0.4 % (ref 0–0.5)
LYMPHOCYTES # BLD AUTO: 1.9 K/UL (ref 1–4.8)
LYMPHOCYTES NFR BLD: 38.9 % (ref 18–48)
MCH RBC QN AUTO: 29.5 PG (ref 27–31)
MCHC RBC AUTO-ENTMCNC: 33.1 G/DL (ref 32–36)
MCV RBC AUTO: 89 FL (ref 82–98)
MONOCYTES # BLD AUTO: 0.1 K/UL (ref 0.3–1)
MONOCYTES NFR BLD: 2.6 % (ref 4–15)
NEUTROPHILS # BLD AUTO: 2.9 K/UL (ref 1.8–7.7)
NEUTROPHILS NFR BLD: 57.1 % (ref 38–73)
NRBC BLD-RTO: 0 /100 WBC
PLATELET # BLD AUTO: 217 K/UL (ref 150–450)
PMV BLD AUTO: 10.6 FL (ref 9.2–12.9)
POTASSIUM SERPL-SCNC: 3.8 MMOL/L (ref 3.5–5.1)
PROT SERPL-MCNC: 6.7 G/DL (ref 6–8.4)
RBC # BLD AUTO: 4.65 M/UL (ref 4–5.4)
SODIUM SERPL-SCNC: 132 MMOL/L (ref 136–145)
WBC # BLD AUTO: 4.99 K/UL (ref 3.9–12.7)

## 2024-03-25 PROCEDURE — 36415 COLL VENOUS BLD VENIPUNCTURE: CPT | Mod: PN | Performed by: INTERNAL MEDICINE

## 2024-03-25 PROCEDURE — 82550 ASSAY OF CK (CPK): CPT | Performed by: INTERNAL MEDICINE

## 2024-03-25 PROCEDURE — 85025 COMPLETE CBC W/AUTO DIFF WBC: CPT | Performed by: INTERNAL MEDICINE

## 2024-03-25 PROCEDURE — 80053 COMPREHEN METABOLIC PANEL: CPT | Performed by: INTERNAL MEDICINE

## 2024-04-01 ENCOUNTER — LAB VISIT (OUTPATIENT)
Dept: LAB | Facility: HOSPITAL | Age: 64
End: 2024-04-01
Attending: INTERNAL MEDICINE
Payer: COMMERCIAL

## 2024-04-01 DIAGNOSIS — C34.81 SMALL CELL CARCINOMA OF OVERLAPPING SITES OF RIGHT LUNG: ICD-10-CM

## 2024-04-01 LAB
ALBUMIN SERPL BCP-MCNC: 3.6 G/DL (ref 3.5–5.2)
ALBUMIN SERPL BCP-MCNC: 3.6 G/DL (ref 3.5–5.2)
ALP SERPL-CCNC: 95 U/L (ref 55–135)
ALP SERPL-CCNC: 95 U/L (ref 55–135)
ALT SERPL W/O P-5'-P-CCNC: 54 U/L (ref 10–44)
ALT SERPL W/O P-5'-P-CCNC: 54 U/L (ref 10–44)
ANION GAP SERPL CALC-SCNC: 8 MMOL/L (ref 8–16)
ANION GAP SERPL CALC-SCNC: 8 MMOL/L (ref 8–16)
AST SERPL-CCNC: 18 U/L (ref 10–40)
AST SERPL-CCNC: 18 U/L (ref 10–40)
BASOPHILS # BLD AUTO: 0 K/UL (ref 0–0.2)
BASOPHILS # BLD AUTO: 0 K/UL (ref 0–0.2)
BASOPHILS NFR BLD: 0 % (ref 0–1.9)
BASOPHILS NFR BLD: 0 % (ref 0–1.9)
BILIRUB SERPL-MCNC: 0.3 MG/DL (ref 0.1–1)
BILIRUB SERPL-MCNC: 0.3 MG/DL (ref 0.1–1)
BUN SERPL-MCNC: 5 MG/DL (ref 8–23)
BUN SERPL-MCNC: 5 MG/DL (ref 8–23)
CALCIUM SERPL-MCNC: 9.6 MG/DL (ref 8.7–10.5)
CALCIUM SERPL-MCNC: 9.6 MG/DL (ref 8.7–10.5)
CHLORIDE SERPL-SCNC: 105 MMOL/L (ref 95–110)
CHLORIDE SERPL-SCNC: 105 MMOL/L (ref 95–110)
CO2 SERPL-SCNC: 23 MMOL/L (ref 23–29)
CO2 SERPL-SCNC: 23 MMOL/L (ref 23–29)
CREAT SERPL-MCNC: 0.9 MG/DL (ref 0.5–1.4)
CREAT SERPL-MCNC: 0.9 MG/DL (ref 0.5–1.4)
DIFFERENTIAL METHOD BLD: ABNORMAL
DIFFERENTIAL METHOD BLD: ABNORMAL
EOSINOPHIL # BLD AUTO: 0 K/UL (ref 0–0.5)
EOSINOPHIL # BLD AUTO: 0 K/UL (ref 0–0.5)
EOSINOPHIL NFR BLD: 0.7 % (ref 0–8)
EOSINOPHIL NFR BLD: 0.7 % (ref 0–8)
ERYTHROCYTE [DISTWIDTH] IN BLOOD BY AUTOMATED COUNT: 12.8 % (ref 11.5–14.5)
ERYTHROCYTE [DISTWIDTH] IN BLOOD BY AUTOMATED COUNT: 12.8 % (ref 11.5–14.5)
EST. GFR  (NO RACE VARIABLE): >60 ML/MIN/1.73 M^2
EST. GFR  (NO RACE VARIABLE): >60 ML/MIN/1.73 M^2
GLUCOSE SERPL-MCNC: 77 MG/DL (ref 70–110)
GLUCOSE SERPL-MCNC: 77 MG/DL (ref 70–110)
HCT VFR BLD AUTO: 37.9 % (ref 37–48.5)
HCT VFR BLD AUTO: 37.9 % (ref 37–48.5)
HGB BLD-MCNC: 12.8 G/DL (ref 12–16)
HGB BLD-MCNC: 12.8 G/DL (ref 12–16)
IMM GRANULOCYTES # BLD AUTO: 0.01 K/UL (ref 0–0.04)
IMM GRANULOCYTES # BLD AUTO: 0.01 K/UL (ref 0–0.04)
IMM GRANULOCYTES NFR BLD AUTO: 0.3 % (ref 0–0.5)
IMM GRANULOCYTES NFR BLD AUTO: 0.3 % (ref 0–0.5)
LYMPHOCYTES # BLD AUTO: 1.5 K/UL (ref 1–4.8)
LYMPHOCYTES # BLD AUTO: 1.5 K/UL (ref 1–4.8)
LYMPHOCYTES NFR BLD: 50.8 % (ref 18–48)
LYMPHOCYTES NFR BLD: 50.8 % (ref 18–48)
MAGNESIUM SERPL-MCNC: 2 MG/DL (ref 1.6–2.6)
MAGNESIUM SERPL-MCNC: 2 MG/DL (ref 1.6–2.6)
MCH RBC QN AUTO: 29.8 PG (ref 27–31)
MCH RBC QN AUTO: 29.8 PG (ref 27–31)
MCHC RBC AUTO-ENTMCNC: 33.8 G/DL (ref 32–36)
MCHC RBC AUTO-ENTMCNC: 33.8 G/DL (ref 32–36)
MCV RBC AUTO: 88 FL (ref 82–98)
MCV RBC AUTO: 88 FL (ref 82–98)
MONOCYTES # BLD AUTO: 0.3 K/UL (ref 0.3–1)
MONOCYTES # BLD AUTO: 0.3 K/UL (ref 0.3–1)
MONOCYTES NFR BLD: 9 % (ref 4–15)
MONOCYTES NFR BLD: 9 % (ref 4–15)
NEUTROPHILS # BLD AUTO: 1.2 K/UL (ref 1.8–7.7)
NEUTROPHILS # BLD AUTO: 1.2 K/UL (ref 1.8–7.7)
NEUTROPHILS NFR BLD: 39.2 % (ref 38–73)
NEUTROPHILS NFR BLD: 39.2 % (ref 38–73)
NRBC BLD-RTO: 0 /100 WBC
NRBC BLD-RTO: 0 /100 WBC
PLATELET # BLD AUTO: 233 K/UL (ref 150–450)
PLATELET # BLD AUTO: 233 K/UL (ref 150–450)
PMV BLD AUTO: 10.3 FL (ref 9.2–12.9)
PMV BLD AUTO: 10.3 FL (ref 9.2–12.9)
POTASSIUM SERPL-SCNC: 3.8 MMOL/L (ref 3.5–5.1)
POTASSIUM SERPL-SCNC: 3.8 MMOL/L (ref 3.5–5.1)
PROT SERPL-MCNC: 7 G/DL (ref 6–8.4)
PROT SERPL-MCNC: 7 G/DL (ref 6–8.4)
RBC # BLD AUTO: 4.3 M/UL (ref 4–5.4)
RBC # BLD AUTO: 4.3 M/UL (ref 4–5.4)
SODIUM SERPL-SCNC: 136 MMOL/L (ref 136–145)
SODIUM SERPL-SCNC: 136 MMOL/L (ref 136–145)
WBC # BLD AUTO: 3.01 K/UL (ref 3.9–12.7)
WBC # BLD AUTO: 3.01 K/UL (ref 3.9–12.7)

## 2024-04-01 PROCEDURE — 80053 COMPREHEN METABOLIC PANEL: CPT | Performed by: INTERNAL MEDICINE

## 2024-04-01 PROCEDURE — 83735 ASSAY OF MAGNESIUM: CPT | Performed by: INTERNAL MEDICINE

## 2024-04-01 PROCEDURE — 36415 COLL VENOUS BLD VENIPUNCTURE: CPT | Mod: PN | Performed by: INTERNAL MEDICINE

## 2024-04-01 PROCEDURE — 85025 COMPLETE CBC W/AUTO DIFF WBC: CPT | Performed by: INTERNAL MEDICINE

## 2024-04-08 ENCOUNTER — LAB VISIT (OUTPATIENT)
Dept: LAB | Facility: HOSPITAL | Age: 64
End: 2024-04-08
Attending: INTERNAL MEDICINE
Payer: COMMERCIAL

## 2024-04-08 DIAGNOSIS — D50.9 IRON DEFICIENCY ANEMIA, UNSPECIFIED: ICD-10-CM

## 2024-04-08 DIAGNOSIS — C34.11 MALIGNANT NEOPLASM OF UPPER LOBE OF RIGHT LUNG: ICD-10-CM

## 2024-04-08 DIAGNOSIS — R53.83 FATIGUE: ICD-10-CM

## 2024-04-08 DIAGNOSIS — C34.81 SMALL CELL CARCINOMA OF OVERLAPPING SITES OF RIGHT LUNG: ICD-10-CM

## 2024-04-08 LAB
ALBUMIN SERPL BCP-MCNC: 3.6 G/DL (ref 3.5–5.2)
ALP SERPL-CCNC: 98 U/L (ref 55–135)
ALT SERPL W/O P-5'-P-CCNC: 31 U/L (ref 10–44)
ANION GAP SERPL CALC-SCNC: 11 MMOL/L (ref 8–16)
AST SERPL-CCNC: 20 U/L (ref 10–40)
BASOPHILS # BLD AUTO: 0.01 K/UL (ref 0–0.2)
BASOPHILS NFR BLD: 0.3 % (ref 0–1.9)
BILIRUB SERPL-MCNC: 0.2 MG/DL (ref 0.1–1)
BUN SERPL-MCNC: 5 MG/DL (ref 8–23)
CALCIUM SERPL-MCNC: 9.5 MG/DL (ref 8.7–10.5)
CHLORIDE SERPL-SCNC: 107 MMOL/L (ref 95–110)
CK SERPL-CCNC: 72 U/L (ref 20–180)
CO2 SERPL-SCNC: 20 MMOL/L (ref 23–29)
CREAT SERPL-MCNC: 1 MG/DL (ref 0.5–1.4)
DIFFERENTIAL METHOD BLD: ABNORMAL
EOSINOPHIL # BLD AUTO: 0 K/UL (ref 0–0.5)
EOSINOPHIL NFR BLD: 1.2 % (ref 0–8)
ERYTHROCYTE [DISTWIDTH] IN BLOOD BY AUTOMATED COUNT: 13.2 % (ref 11.5–14.5)
EST. GFR  (NO RACE VARIABLE): >60 ML/MIN/1.73 M^2
GLUCOSE SERPL-MCNC: 85 MG/DL (ref 70–110)
HCT VFR BLD AUTO: 39 % (ref 37–48.5)
HGB BLD-MCNC: 12.9 G/DL (ref 12–16)
IMM GRANULOCYTES # BLD AUTO: 0.02 K/UL (ref 0–0.04)
IMM GRANULOCYTES NFR BLD AUTO: 0.6 % (ref 0–0.5)
LYMPHOCYTES # BLD AUTO: 1.4 K/UL (ref 1–4.8)
LYMPHOCYTES NFR BLD: 42.2 % (ref 18–48)
MAGNESIUM SERPL-MCNC: 2 MG/DL (ref 1.6–2.6)
MCH RBC QN AUTO: 29.7 PG (ref 27–31)
MCHC RBC AUTO-ENTMCNC: 33.1 G/DL (ref 32–36)
MCV RBC AUTO: 90 FL (ref 82–98)
MONOCYTES # BLD AUTO: 0.6 K/UL (ref 0.3–1)
MONOCYTES NFR BLD: 17.1 % (ref 4–15)
NEUTROPHILS # BLD AUTO: 1.3 K/UL (ref 1.8–7.7)
NEUTROPHILS NFR BLD: 38.6 % (ref 38–73)
NRBC BLD-RTO: 0 /100 WBC
PLATELET # BLD AUTO: 317 K/UL (ref 150–450)
PMV BLD AUTO: 10 FL (ref 9.2–12.9)
POTASSIUM SERPL-SCNC: 3.6 MMOL/L (ref 3.5–5.1)
PROT SERPL-MCNC: 7 G/DL (ref 6–8.4)
RBC # BLD AUTO: 4.34 M/UL (ref 4–5.4)
SODIUM SERPL-SCNC: 138 MMOL/L (ref 136–145)
WBC # BLD AUTO: 3.34 K/UL (ref 3.9–12.7)

## 2024-04-08 PROCEDURE — 85025 COMPLETE CBC W/AUTO DIFF WBC: CPT | Performed by: INTERNAL MEDICINE

## 2024-04-08 PROCEDURE — 83735 ASSAY OF MAGNESIUM: CPT | Performed by: INTERNAL MEDICINE

## 2024-04-08 PROCEDURE — 82550 ASSAY OF CK (CPK): CPT | Performed by: INTERNAL MEDICINE

## 2024-04-08 PROCEDURE — 36415 COLL VENOUS BLD VENIPUNCTURE: CPT | Mod: PN | Performed by: INTERNAL MEDICINE

## 2024-04-08 PROCEDURE — 80053 COMPREHEN METABOLIC PANEL: CPT | Performed by: INTERNAL MEDICINE

## 2024-04-12 ENCOUNTER — OFFICE VISIT (OUTPATIENT)
Dept: FAMILY MEDICINE | Facility: CLINIC | Age: 64
End: 2024-04-12
Payer: COMMERCIAL

## 2024-04-12 VITALS
BODY MASS INDEX: 29.24 KG/M2 | HEIGHT: 70 IN | HEART RATE: 88 BPM | WEIGHT: 204.25 LBS | DIASTOLIC BLOOD PRESSURE: 62 MMHG | SYSTOLIC BLOOD PRESSURE: 116 MMHG | OXYGEN SATURATION: 98 %

## 2024-04-12 DIAGNOSIS — Z95.828 PORT-A-CATH IN PLACE: ICD-10-CM

## 2024-04-12 DIAGNOSIS — R05.9 COUGH, UNSPECIFIED TYPE: ICD-10-CM

## 2024-04-12 DIAGNOSIS — C34.11 MALIGNANT NEOPLASM OF UPPER LOBE OF RIGHT LUNG: Primary | ICD-10-CM

## 2024-04-12 DIAGNOSIS — E11.9 TYPE 2 DIABETES MELLITUS WITHOUT COMPLICATION, WITHOUT LONG-TERM CURRENT USE OF INSULIN: ICD-10-CM

## 2024-04-12 DIAGNOSIS — J30.9 ALLERGIC RHINITIS, UNSPECIFIED SEASONALITY, UNSPECIFIED TRIGGER: Primary | ICD-10-CM

## 2024-04-12 DIAGNOSIS — D50.9 IRON DEFICIENCY ANEMIA, UNSPECIFIED: ICD-10-CM

## 2024-04-12 DIAGNOSIS — R53.83 FATIGUE: ICD-10-CM

## 2024-04-12 DIAGNOSIS — J44.9 CHRONIC OBSTRUCTIVE PULMONARY DISEASE, UNSPECIFIED COPD TYPE: ICD-10-CM

## 2024-04-12 PROCEDURE — 3078F DIAST BP <80 MM HG: CPT | Mod: CPTII,S$GLB,, | Performed by: FAMILY MEDICINE

## 2024-04-12 PROCEDURE — 3008F BODY MASS INDEX DOCD: CPT | Mod: CPTII,S$GLB,, | Performed by: FAMILY MEDICINE

## 2024-04-12 PROCEDURE — 1159F MED LIST DOCD IN RCRD: CPT | Mod: CPTII,S$GLB,, | Performed by: FAMILY MEDICINE

## 2024-04-12 PROCEDURE — 3074F SYST BP LT 130 MM HG: CPT | Mod: CPTII,S$GLB,, | Performed by: FAMILY MEDICINE

## 2024-04-12 PROCEDURE — 4010F ACE/ARB THERAPY RXD/TAKEN: CPT | Mod: CPTII,S$GLB,, | Performed by: FAMILY MEDICINE

## 2024-04-12 PROCEDURE — 3044F HG A1C LEVEL LT 7.0%: CPT | Mod: CPTII,S$GLB,, | Performed by: FAMILY MEDICINE

## 2024-04-12 PROCEDURE — 99214 OFFICE O/P EST MOD 30 MIN: CPT | Mod: S$GLB,,, | Performed by: FAMILY MEDICINE

## 2024-04-12 PROCEDURE — 99999 PR PBB SHADOW E&M-EST. PATIENT-LVL IV: CPT | Mod: PBBFAC,,, | Performed by: FAMILY MEDICINE

## 2024-04-12 PROCEDURE — 1160F RVW MEDS BY RX/DR IN RCRD: CPT | Mod: CPTII,S$GLB,, | Performed by: FAMILY MEDICINE

## 2024-04-12 RX ORDER — HYDROCODONE POLISTIREX AND CHLORPHENIRAMINE POLISTIREX 10; 8 MG/5ML; MG/5ML
5 SUSPENSION, EXTENDED RELEASE ORAL NIGHTLY PRN
Qty: 473 ML | Refills: 0 | Status: SHIPPED | OUTPATIENT
Start: 2024-04-12 | End: 2024-04-16 | Stop reason: SDUPTHER

## 2024-04-12 RX ORDER — IPRATROPIUM BROMIDE AND ALBUTEROL SULFATE 2.5; .5 MG/3ML; MG/3ML
3 SOLUTION RESPIRATORY (INHALATION) EVERY 6 HOURS PRN
Qty: 100 ML | Refills: 11 | Status: SHIPPED | OUTPATIENT
Start: 2024-04-12 | End: 2025-04-12

## 2024-04-12 RX ORDER — LIDOCAINE 50 MG/G
OINTMENT TOPICAL
Qty: 50 G | Refills: 5 | Status: SHIPPED | OUTPATIENT
Start: 2024-04-12

## 2024-04-12 RX ORDER — TIRZEPATIDE 5 MG/.5ML
5 INJECTION, SOLUTION SUBCUTANEOUS
Qty: 4 PEN | Refills: 4 | Status: SHIPPED | OUTPATIENT
Start: 2024-04-12 | End: 2024-05-24

## 2024-04-12 RX ORDER — ALBUTEROL SULFATE 90 UG/1
2 AEROSOL, METERED RESPIRATORY (INHALATION) EVERY 6 HOURS PRN
Qty: 18 G | Refills: 5 | Status: SHIPPED | OUTPATIENT
Start: 2024-04-12

## 2024-04-12 RX ORDER — MINERAL OIL
180 ENEMA (ML) RECTAL DAILY
Qty: 90 TABLET | Refills: 3 | Status: SHIPPED | OUTPATIENT
Start: 2024-04-12 | End: 2025-04-12

## 2024-04-12 NOTE — PROGRESS NOTES
"  Subjective:       Patient ID: Malathi Martinez is a 63 y.o. female.    Chief Complaint: Follow-up    Follow-up  Associated symptoms include arthralgias, coughing, fatigue, headaches, neck pain and weakness. Pertinent negatives include no chest pain, joint swelling or vomiting.       Review of Systems:  Review of Systems   Constitutional:  Positive for activity change, fatigue and unexpected weight change.   HENT:  Positive for hearing loss and rhinorrhea. Negative for trouble swallowing.    Eyes:  Positive for discharge and visual disturbance.   Respiratory:  Positive for cough and wheezing. Negative for chest tightness.    Cardiovascular:  Negative for chest pain and palpitations.   Gastrointestinal:  Negative for blood in stool, constipation, diarrhea and vomiting.   Endocrine: Negative for polydipsia and polyuria.   Genitourinary:  Negative for difficulty urinating, dysuria, hematuria and menstrual problem.   Musculoskeletal:  Positive for arthralgias and neck pain. Negative for joint swelling.   Neurological:  Positive for weakness and headaches.   Psychiatric/Behavioral:  Positive for dysphoric mood.        Objective:     Vitals:    04/12/24 1304   BP: 116/62   Pulse: 88   SpO2: 98%   Weight: 92.7 kg (204 lb 4.1 oz)   Height: 5' 10" (1.778 m)          Physical Exam  Vitals and nursing note reviewed.   Constitutional:       General: She is not in acute distress.     Appearance: Normal appearance. She is well-developed.   HENT:      Head: Normocephalic and atraumatic.   Eyes:      General: No scleral icterus.        Right eye: No discharge.         Left eye: No discharge.      Conjunctiva/sclera: Conjunctivae normal.   Pulmonary:      Effort: Pulmonary effort is normal. No respiratory distress.   Skin:     General: Skin is warm and dry.   Neurological:      General: No focal deficit present.      Mental Status: She is alert and oriented to person, place, and time.   Psychiatric:         Mood and Affect: " Mood normal.         Behavior: Behavior normal.           Assessment & Plan:  Allergic rhinitis, unspecified seasonality, unspecified trigger  -     fexofenadine (ALLEGRA) 180 MG tablet; Take 1 tablet (180 mg total) by mouth once daily.  Dispense: 90 tablet; Refill: 3    Type 2 diabetes mellitus without complication, without long-term current use of insulin  Comments:  cont 5mg weekly  well-controlled  Orders:  -     tirzepatide (MOUNJARO) 5 mg/0.5 mL PnIj; Inject 5 mg into the skin every 7 days.  Dispense: 4 Pen; Refill: 4    Port-A-Cath in place  Comments:  topical lidocaine as needed  Orders:  -     LIDOcaine (XYLOCAINE) 5 % Oint ointment; Apply topically as needed (before port use).  Dispense: 50 g; Refill: 5    Chronic obstructive pulmonary disease, unspecified COPD type  Comments:  trelegy daily/controller  albuterol prn  Orders:  -     albuterol (VENTOLIN HFA) 90 mcg/actuation inhaler; Inhale 2 puffs into the lungs every 6 (six) hours as needed for Wheezing or Shortness of Breath. Rescue  Dispense: 18 g; Refill: 5  -     albuterol-ipratropium (DUO-NEB) 2.5 mg-0.5 mg/3 mL nebulizer solution; Take 3 mLs by nebulization every 6 (six) hours as needed for Wheezing or Shortness of Breath. Rescue  Dispense: 100 mL; Refill: 11    Cough, unspecified type  Comments:  chronic, disturbing sleep  add tussionex nightly prn    Other orders  -     hydrocodone-chlorpheniramine (TUSSIONEX) 10-8 mg/5 mL suspension; Take 5 mLs by mouth nightly as needed for Cough.  Dispense: 473 mL; Refill: 0

## 2024-04-15 ENCOUNTER — LAB VISIT (OUTPATIENT)
Dept: LAB | Facility: HOSPITAL | Age: 64
End: 2024-04-15
Attending: FAMILY MEDICINE
Payer: COMMERCIAL

## 2024-04-15 DIAGNOSIS — R53.83 FATIGUE: ICD-10-CM

## 2024-04-15 DIAGNOSIS — D50.9 IRON DEFICIENCY ANEMIA, UNSPECIFIED: ICD-10-CM

## 2024-04-15 DIAGNOSIS — C34.11 MALIGNANT NEOPLASM OF UPPER LOBE OF RIGHT LUNG: ICD-10-CM

## 2024-04-15 LAB
BASOPHILS # BLD AUTO: 0.01 K/UL (ref 0–0.2)
BASOPHILS NFR BLD: 0.2 % (ref 0–1.9)
DIFFERENTIAL METHOD BLD: ABNORMAL
EOSINOPHIL # BLD AUTO: 0 K/UL (ref 0–0.5)
EOSINOPHIL NFR BLD: 0.6 % (ref 0–8)
ERYTHROCYTE [DISTWIDTH] IN BLOOD BY AUTOMATED COUNT: 13.9 % (ref 11.5–14.5)
HCT VFR BLD AUTO: 37.8 % (ref 37–48.5)
HGB BLD-MCNC: 12.7 G/DL (ref 12–16)
IMM GRANULOCYTES # BLD AUTO: 0.01 K/UL (ref 0–0.04)
IMM GRANULOCYTES NFR BLD AUTO: 0.2 % (ref 0–0.5)
LYMPHOCYTES # BLD AUTO: 1.9 K/UL (ref 1–4.8)
LYMPHOCYTES NFR BLD: 38.4 % (ref 18–48)
MCH RBC QN AUTO: 29.9 PG (ref 27–31)
MCHC RBC AUTO-ENTMCNC: 33.6 G/DL (ref 32–36)
MCV RBC AUTO: 89 FL (ref 82–98)
MONOCYTES # BLD AUTO: 0.2 K/UL (ref 0.3–1)
MONOCYTES NFR BLD: 4 % (ref 4–15)
NEUTROPHILS # BLD AUTO: 2.8 K/UL (ref 1.8–7.7)
NEUTROPHILS NFR BLD: 56.6 % (ref 38–73)
NRBC BLD-RTO: 0 /100 WBC
PLATELET # BLD AUTO: 243 K/UL (ref 150–450)
PMV BLD AUTO: 10.1 FL (ref 9.2–12.9)
RBC # BLD AUTO: 4.25 M/UL (ref 4–5.4)
WBC # BLD AUTO: 4.98 K/UL (ref 3.9–12.7)

## 2024-04-15 PROCEDURE — 36415 COLL VENOUS BLD VENIPUNCTURE: CPT | Mod: PN | Performed by: INTERNAL MEDICINE

## 2024-04-15 PROCEDURE — 80053 COMPREHEN METABOLIC PANEL: CPT | Performed by: INTERNAL MEDICINE

## 2024-04-15 PROCEDURE — 85025 COMPLETE CBC W/AUTO DIFF WBC: CPT | Performed by: INTERNAL MEDICINE

## 2024-04-16 LAB
ALBUMIN SERPL BCP-MCNC: 3.5 G/DL (ref 3.5–5.2)
ALP SERPL-CCNC: 79 U/L (ref 55–135)
ALT SERPL W/O P-5'-P-CCNC: 96 U/L (ref 10–44)
ANION GAP SERPL CALC-SCNC: 11 MMOL/L (ref 8–16)
AST SERPL-CCNC: 38 U/L (ref 10–40)
BILIRUB SERPL-MCNC: 0.4 MG/DL (ref 0.1–1)
BUN SERPL-MCNC: 9 MG/DL (ref 8–23)
CALCIUM SERPL-MCNC: 9.2 MG/DL (ref 8.7–10.5)
CHLORIDE SERPL-SCNC: 105 MMOL/L (ref 95–110)
CO2 SERPL-SCNC: 21 MMOL/L (ref 23–29)
CREAT SERPL-MCNC: 1 MG/DL (ref 0.5–1.4)
EST. GFR  (NO RACE VARIABLE): >60 ML/MIN/1.73 M^2
GLUCOSE SERPL-MCNC: 75 MG/DL (ref 70–110)
POTASSIUM SERPL-SCNC: 3.8 MMOL/L (ref 3.5–5.1)
PROT SERPL-MCNC: 6.7 G/DL (ref 6–8.4)
SODIUM SERPL-SCNC: 137 MMOL/L (ref 136–145)

## 2024-04-16 NOTE — TELEPHONE ENCOUNTER
No care due was identified.  [facility] Embedded Care Due Messages. Reference number: [id number].   4/16/2024 12:36:51 PM CDT

## 2024-04-16 NOTE — TELEPHONE ENCOUNTER
----- Message from Michelle Valencia sent at 4/16/2024 12:21 PM CDT -----  Type:  Pharmacy Calling to Clarify an RX      Name of Caller:arnaldo    Pharmacy Name:  [id number] [place] [place] [address] AT [email or web address]  [address] [address]  Phone: [phone] Fax: [phone]      Prescription Name:   hydrocodone-chlorpheniramine (TUSSIONEX) 10-8 mg/5 mL suspension      What do they need to clarify?:amount of the cough syrup - asking if its suppose to be 1 pint bc   insurance wont cover it. [facility] wont fill 1 pint    Best Call Back Number:[phone]    Additional Information:      Please call Back to advise. Thanks!

## 2024-04-16 NOTE — TELEPHONE ENCOUNTER
Please approve for hydrocodone-chlorpheniramine (TUSSIONEX) 10-8 mg/5 mL suspension     Last OV 04/12/24  Last refill date 04/12/24  Last labs 04/08/24  Next appt 05/10/24

## 2024-04-16 NOTE — TELEPHONE ENCOUNTER
Dr. Dave is not filling this for 90 day supply can only do 30 day please re send in.   Temperature instability in

## 2024-04-17 ENCOUNTER — TELEPHONE (OUTPATIENT)
Dept: FAMILY MEDICINE | Facility: CLINIC | Age: 64
End: 2024-04-17
Payer: COMMERCIAL

## 2024-04-17 NOTE — TELEPHONE ENCOUNTER
Spoke w/ Raeann at [facility]. Insurance will not pay for more than 30d supply. VORB given for 150ml x 2 add'l refills. Per conversation w/ Dr Dave yest. Please confirm

## 2024-04-17 NOTE — TELEPHONE ENCOUNTER
----- Message from Kecia Heredia sent at 4/17/2024  8:52 AM CDT -----  Contact: nena  Type:  Needs Medical Advice    Who Called: Threse from [facility]  Symptoms (please be specific): still waiting on call back about pts script, hydrocodone-chlorpheniramine (TUSSIONEX) 10-8 mg/5 mL suspension. Asking if its suppose to be 1 pint because that's a lot to give the pt. Just needs to clarify. Please call phar. Phar called yesterday as well.    [id number] [address] [place] [address] AT [facility]  [address] [address]  Phone: [phone] Fax: [phone]        Would the patient rather a call back or a response via [facility]? call  Best Call Back Number: [phone] (home)     Additional Information: please advise and thank you.

## 2024-04-18 ENCOUNTER — PATIENT MESSAGE (OUTPATIENT)
Dept: FAMILY MEDICINE | Facility: CLINIC | Age: 64
End: 2024-04-18
Payer: COMMERCIAL

## 2024-04-18 RX ORDER — HYDROCODONE POLISTIREX AND CHLORPHENIRAMINE POLISTIREX 10; 8 MG/5ML; MG/5ML
5 SUSPENSION, EXTENDED RELEASE ORAL NIGHTLY PRN
Qty: 150 ML | Refills: 0 | Status: SHIPPED | OUTPATIENT
Start: 2024-04-18 | End: 2024-05-10 | Stop reason: SDUPTHER

## 2024-04-22 ENCOUNTER — LAB VISIT (OUTPATIENT)
Dept: LAB | Facility: HOSPITAL | Age: 64
End: 2024-04-22
Attending: INTERNAL MEDICINE
Payer: COMMERCIAL

## 2024-04-22 DIAGNOSIS — D50.9 IRON DEFICIENCY ANEMIA, UNSPECIFIED: ICD-10-CM

## 2024-04-22 DIAGNOSIS — R53.83 FATIGUE: ICD-10-CM

## 2024-04-22 DIAGNOSIS — C34.11 MALIGNANT NEOPLASM OF UPPER LOBE OF RIGHT LUNG: ICD-10-CM

## 2024-04-22 LAB
ALBUMIN SERPL BCP-MCNC: 3.5 G/DL (ref 3.5–5.2)
ALP SERPL-CCNC: 103 U/L (ref 55–135)
ALT SERPL W/O P-5'-P-CCNC: 45 U/L (ref 10–44)
ANION GAP SERPL CALC-SCNC: 8 MMOL/L (ref 8–16)
AST SERPL-CCNC: 20 U/L (ref 10–40)
BASOPHILS # BLD AUTO: 0 K/UL (ref 0–0.2)
BASOPHILS NFR BLD: 0 % (ref 0–1.9)
BILIRUB SERPL-MCNC: 0.3 MG/DL (ref 0.1–1)
BUN SERPL-MCNC: 3 MG/DL (ref 8–23)
CALCIUM SERPL-MCNC: 9.4 MG/DL (ref 8.7–10.5)
CHLORIDE SERPL-SCNC: 105 MMOL/L (ref 95–110)
CO2 SERPL-SCNC: 24 MMOL/L (ref 23–29)
CREAT SERPL-MCNC: 0.9 MG/DL (ref 0.5–1.4)
DIFFERENTIAL METHOD BLD: ABNORMAL
EOSINOPHIL # BLD AUTO: 0 K/UL (ref 0–0.5)
EOSINOPHIL NFR BLD: 0.6 % (ref 0–8)
ERYTHROCYTE [DISTWIDTH] IN BLOOD BY AUTOMATED COUNT: 13.6 % (ref 11.5–14.5)
EST. GFR  (NO RACE VARIABLE): >60 ML/MIN/1.73 M^2
GLUCOSE SERPL-MCNC: 78 MG/DL (ref 70–110)
HCT VFR BLD AUTO: 38.6 % (ref 37–48.5)
HGB BLD-MCNC: 13 G/DL (ref 12–16)
IMM GRANULOCYTES # BLD AUTO: 0.01 K/UL (ref 0–0.04)
IMM GRANULOCYTES NFR BLD AUTO: 0.3 % (ref 0–0.5)
LYMPHOCYTES # BLD AUTO: 1.5 K/UL (ref 1–4.8)
LYMPHOCYTES NFR BLD: 46.3 % (ref 18–48)
MCH RBC QN AUTO: 29.4 PG (ref 27–31)
MCHC RBC AUTO-ENTMCNC: 33.7 G/DL (ref 32–36)
MCV RBC AUTO: 87 FL (ref 82–98)
MONOCYTES # BLD AUTO: 0.3 K/UL (ref 0.3–1)
MONOCYTES NFR BLD: 10.5 % (ref 4–15)
NEUTROPHILS # BLD AUTO: 1.3 K/UL (ref 1.8–7.7)
NEUTROPHILS NFR BLD: 42.3 % (ref 38–73)
NRBC BLD-RTO: 0 /100 WBC
PLATELET # BLD AUTO: 225 K/UL (ref 150–450)
PMV BLD AUTO: 10.3 FL (ref 9.2–12.9)
POTASSIUM SERPL-SCNC: 3.6 MMOL/L (ref 3.5–5.1)
PROT SERPL-MCNC: 7.1 G/DL (ref 6–8.4)
RBC # BLD AUTO: 4.42 M/UL (ref 4–5.4)
SODIUM SERPL-SCNC: 137 MMOL/L (ref 136–145)
WBC # BLD AUTO: 3.13 K/UL (ref 3.9–12.7)

## 2024-04-22 PROCEDURE — 80053 COMPREHEN METABOLIC PANEL: CPT | Performed by: INTERNAL MEDICINE

## 2024-04-22 PROCEDURE — 85025 COMPLETE CBC W/AUTO DIFF WBC: CPT | Performed by: INTERNAL MEDICINE

## 2024-04-22 PROCEDURE — 36415 COLL VENOUS BLD VENIPUNCTURE: CPT | Mod: PN | Performed by: INTERNAL MEDICINE

## 2024-04-30 ENCOUNTER — LAB VISIT (OUTPATIENT)
Dept: LAB | Facility: HOSPITAL | Age: 64
End: 2024-04-30
Attending: INTERNAL MEDICINE
Payer: COMMERCIAL

## 2024-04-30 DIAGNOSIS — C34.11 MALIGNANT NEOPLASM OF UPPER LOBE OF RIGHT LUNG: ICD-10-CM

## 2024-04-30 DIAGNOSIS — R53.83 FATIGUE: ICD-10-CM

## 2024-04-30 DIAGNOSIS — D50.9 IRON DEFICIENCY ANEMIA, UNSPECIFIED: ICD-10-CM

## 2024-04-30 LAB
ALBUMIN SERPL BCP-MCNC: 3.4 G/DL (ref 3.5–5.2)
ALP SERPL-CCNC: 96 U/L (ref 55–135)
ALT SERPL W/O P-5'-P-CCNC: 31 U/L (ref 10–44)
ANION GAP SERPL CALC-SCNC: 8 MMOL/L (ref 8–16)
AST SERPL-CCNC: 16 U/L (ref 10–40)
BASOPHILS # BLD AUTO: 0.01 K/UL (ref 0–0.2)
BASOPHILS NFR BLD: 0.2 % (ref 0–1.9)
BILIRUB SERPL-MCNC: 0.5 MG/DL (ref 0.1–1)
BUN SERPL-MCNC: 6 MG/DL (ref 8–23)
CALCIUM SERPL-MCNC: 9 MG/DL (ref 8.7–10.5)
CHLORIDE SERPL-SCNC: 105 MMOL/L (ref 95–110)
CO2 SERPL-SCNC: 26 MMOL/L (ref 23–29)
CREAT SERPL-MCNC: 0.9 MG/DL (ref 0.5–1.4)
DIFFERENTIAL METHOD BLD: ABNORMAL
EOSINOPHIL # BLD AUTO: 0 K/UL (ref 0–0.5)
EOSINOPHIL NFR BLD: 0.9 % (ref 0–8)
ERYTHROCYTE [DISTWIDTH] IN BLOOD BY AUTOMATED COUNT: 14.3 % (ref 11.5–14.5)
EST. GFR  (NO RACE VARIABLE): >60 ML/MIN/1.73 M^2
GLUCOSE SERPL-MCNC: 95 MG/DL (ref 70–110)
HCT VFR BLD AUTO: 37.9 % (ref 37–48.5)
HGB BLD-MCNC: 12.7 G/DL (ref 12–16)
IMM GRANULOCYTES # BLD AUTO: 0.01 K/UL (ref 0–0.04)
IMM GRANULOCYTES NFR BLD AUTO: 0.2 % (ref 0–0.5)
LYMPHOCYTES # BLD AUTO: 1.5 K/UL (ref 1–4.8)
LYMPHOCYTES NFR BLD: 33.9 % (ref 18–48)
MCH RBC QN AUTO: 30 PG (ref 27–31)
MCHC RBC AUTO-ENTMCNC: 33.5 G/DL (ref 32–36)
MCV RBC AUTO: 90 FL (ref 82–98)
MONOCYTES # BLD AUTO: 0.9 K/UL (ref 0.3–1)
MONOCYTES NFR BLD: 19.8 % (ref 4–15)
NEUTROPHILS # BLD AUTO: 2 K/UL (ref 1.8–7.7)
NEUTROPHILS NFR BLD: 45 % (ref 38–73)
NRBC BLD-RTO: 0 /100 WBC
PLATELET # BLD AUTO: 299 K/UL (ref 150–450)
PMV BLD AUTO: 9.9 FL (ref 9.2–12.9)
POTASSIUM SERPL-SCNC: 4 MMOL/L (ref 3.5–5.1)
PROT SERPL-MCNC: 6.3 G/DL (ref 6–8.4)
RBC # BLD AUTO: 4.23 M/UL (ref 4–5.4)
SODIUM SERPL-SCNC: 139 MMOL/L (ref 136–145)
WBC # BLD AUTO: 4.39 K/UL (ref 3.9–12.7)

## 2024-04-30 PROCEDURE — 36415 COLL VENOUS BLD VENIPUNCTURE: CPT | Mod: PN | Performed by: INTERNAL MEDICINE

## 2024-04-30 PROCEDURE — 80053 COMPREHEN METABOLIC PANEL: CPT | Performed by: INTERNAL MEDICINE

## 2024-04-30 PROCEDURE — 85025 COMPLETE CBC W/AUTO DIFF WBC: CPT | Performed by: INTERNAL MEDICINE

## 2024-05-07 ENCOUNTER — LAB VISIT (OUTPATIENT)
Dept: LAB | Facility: HOSPITAL | Age: 64
End: 2024-05-07
Attending: INTERNAL MEDICINE
Payer: COMMERCIAL

## 2024-05-07 DIAGNOSIS — D50.9 IRON DEFICIENCY ANEMIA, UNSPECIFIED: ICD-10-CM

## 2024-05-07 DIAGNOSIS — R53.83 FATIGUE: ICD-10-CM

## 2024-05-07 DIAGNOSIS — C34.11 MALIGNANT NEOPLASM OF UPPER LOBE OF RIGHT LUNG: ICD-10-CM

## 2024-05-07 LAB
ALBUMIN SERPL BCP-MCNC: 3.3 G/DL (ref 3.5–5.2)
ALP SERPL-CCNC: 86 U/L (ref 55–135)
ALT SERPL W/O P-5'-P-CCNC: 136 U/L (ref 10–44)
ANION GAP SERPL CALC-SCNC: 13 MMOL/L (ref 8–16)
AST SERPL-CCNC: 46 U/L (ref 10–40)
BASOPHILS # BLD AUTO: 0.01 K/UL (ref 0–0.2)
BASOPHILS NFR BLD: 0.3 % (ref 0–1.9)
BILIRUB SERPL-MCNC: 0.3 MG/DL (ref 0.1–1)
BUN SERPL-MCNC: 12 MG/DL (ref 8–23)
CALCIUM SERPL-MCNC: 8.5 MG/DL (ref 8.7–10.5)
CHLORIDE SERPL-SCNC: 103 MMOL/L (ref 95–110)
CO2 SERPL-SCNC: 20 MMOL/L (ref 23–29)
CREAT SERPL-MCNC: 1.1 MG/DL (ref 0.5–1.4)
DIFFERENTIAL METHOD BLD: ABNORMAL
EOSINOPHIL # BLD AUTO: 0 K/UL (ref 0–0.5)
EOSINOPHIL NFR BLD: 0.8 % (ref 0–8)
ERYTHROCYTE [DISTWIDTH] IN BLOOD BY AUTOMATED COUNT: 14.6 % (ref 11.5–14.5)
EST. GFR  (NO RACE VARIABLE): 56.1 ML/MIN/1.73 M^2
GLUCOSE SERPL-MCNC: 106 MG/DL (ref 70–110)
HCT VFR BLD AUTO: 35.7 % (ref 37–48.5)
HGB BLD-MCNC: 11.4 G/DL (ref 12–16)
IMM GRANULOCYTES # BLD AUTO: 0.01 K/UL (ref 0–0.04)
IMM GRANULOCYTES NFR BLD AUTO: 0.3 % (ref 0–0.5)
LYMPHOCYTES # BLD AUTO: 1.5 K/UL (ref 1–4.8)
LYMPHOCYTES NFR BLD: 40.3 % (ref 18–48)
MCH RBC QN AUTO: 29.2 PG (ref 27–31)
MCHC RBC AUTO-ENTMCNC: 31.9 G/DL (ref 32–36)
MCV RBC AUTO: 92 FL (ref 82–98)
MONOCYTES # BLD AUTO: 0.1 K/UL (ref 0.3–1)
MONOCYTES NFR BLD: 2.4 % (ref 4–15)
NEUTROPHILS # BLD AUTO: 2.1 K/UL (ref 1.8–7.7)
NEUTROPHILS NFR BLD: 55.9 % (ref 38–73)
NRBC BLD-RTO: 0 /100 WBC
PLATELET # BLD AUTO: 215 K/UL (ref 150–450)
PMV BLD AUTO: 10 FL (ref 9.2–12.9)
POTASSIUM SERPL-SCNC: 3.7 MMOL/L (ref 3.5–5.1)
PROT SERPL-MCNC: 6.1 G/DL (ref 6–8.4)
RBC # BLD AUTO: 3.9 M/UL (ref 4–5.4)
SODIUM SERPL-SCNC: 136 MMOL/L (ref 136–145)
WBC # BLD AUTO: 3.72 K/UL (ref 3.9–12.7)

## 2024-05-07 PROCEDURE — 85025 COMPLETE CBC W/AUTO DIFF WBC: CPT | Performed by: INTERNAL MEDICINE

## 2024-05-07 PROCEDURE — 80053 COMPREHEN METABOLIC PANEL: CPT | Performed by: INTERNAL MEDICINE

## 2024-05-07 PROCEDURE — 36415 COLL VENOUS BLD VENIPUNCTURE: CPT | Mod: PN | Performed by: INTERNAL MEDICINE

## 2024-05-10 ENCOUNTER — OFFICE VISIT (OUTPATIENT)
Dept: FAMILY MEDICINE | Facility: CLINIC | Age: 64
End: 2024-05-10
Payer: COMMERCIAL

## 2024-05-10 VITALS
BODY MASS INDEX: 24.57 KG/M2 | HEART RATE: 84 BPM | DIASTOLIC BLOOD PRESSURE: 76 MMHG | WEIGHT: 171.63 LBS | HEIGHT: 70 IN | SYSTOLIC BLOOD PRESSURE: 122 MMHG | OXYGEN SATURATION: 98 %

## 2024-05-10 DIAGNOSIS — H43.393 VITREOUS FLOATERS OF BOTH EYES: ICD-10-CM

## 2024-05-10 DIAGNOSIS — F33.41 RECURRENT MAJOR DEPRESSIVE DISORDER, IN PARTIAL REMISSION: ICD-10-CM

## 2024-05-10 DIAGNOSIS — E11.9 TYPE 2 DIABETES MELLITUS WITHOUT COMPLICATION, WITHOUT LONG-TERM CURRENT USE OF INSULIN: ICD-10-CM

## 2024-05-10 DIAGNOSIS — R74.01 TRANSAMINITIS: ICD-10-CM

## 2024-05-10 DIAGNOSIS — R53.83 FATIGUE: ICD-10-CM

## 2024-05-10 DIAGNOSIS — Z00.00 ROUTINE GENERAL MEDICAL EXAMINATION AT A HEALTH CARE FACILITY: ICD-10-CM

## 2024-05-10 DIAGNOSIS — C34.90 MALIGNANT NEOPLASM OF UNSPECIFIED PART OF UNSPECIFIED BRONCHUS OR LUNG: ICD-10-CM

## 2024-05-10 DIAGNOSIS — R30.0 DYSURIA: ICD-10-CM

## 2024-05-10 DIAGNOSIS — R05.3 CHRONIC COUGH: ICD-10-CM

## 2024-05-10 DIAGNOSIS — F41.1 GAD (GENERALIZED ANXIETY DISORDER): ICD-10-CM

## 2024-05-10 DIAGNOSIS — C34.81 SMALL CELL CARCINOMA OF OVERLAPPING SITES OF RIGHT LUNG: ICD-10-CM

## 2024-05-10 DIAGNOSIS — D50.9 IRON DEFICIENCY ANEMIA, UNSPECIFIED: ICD-10-CM

## 2024-05-10 DIAGNOSIS — Z79.899 HIGH RISK MEDICATIONS (NOT ANTICOAGULANTS) LONG-TERM USE: ICD-10-CM

## 2024-05-10 DIAGNOSIS — C34.11 MALIGNANT NEOPLASM OF UPPER LOBE OF RIGHT LUNG: Primary | ICD-10-CM

## 2024-05-10 DIAGNOSIS — C34.81 SMALL CELL CARCINOMA OF OVERLAPPING SITES OF RIGHT LUNG: Primary | ICD-10-CM

## 2024-05-10 PROCEDURE — 3008F BODY MASS INDEX DOCD: CPT | Mod: CPTII,S$GLB,, | Performed by: FAMILY MEDICINE

## 2024-05-10 PROCEDURE — 4010F ACE/ARB THERAPY RXD/TAKEN: CPT | Mod: CPTII,S$GLB,, | Performed by: FAMILY MEDICINE

## 2024-05-10 PROCEDURE — 3044F HG A1C LEVEL LT 7.0%: CPT | Mod: CPTII,S$GLB,, | Performed by: FAMILY MEDICINE

## 2024-05-10 PROCEDURE — 1159F MED LIST DOCD IN RCRD: CPT | Mod: CPTII,S$GLB,, | Performed by: FAMILY MEDICINE

## 2024-05-10 PROCEDURE — 3078F DIAST BP <80 MM HG: CPT | Mod: CPTII,S$GLB,, | Performed by: FAMILY MEDICINE

## 2024-05-10 PROCEDURE — 1160F RVW MEDS BY RX/DR IN RCRD: CPT | Mod: CPTII,S$GLB,, | Performed by: FAMILY MEDICINE

## 2024-05-10 PROCEDURE — 99999 PR PBB SHADOW E&M-EST. PATIENT-LVL V: CPT | Mod: PBBFAC,,, | Performed by: FAMILY MEDICINE

## 2024-05-10 PROCEDURE — 99214 OFFICE O/P EST MOD 30 MIN: CPT | Mod: S$GLB,,, | Performed by: FAMILY MEDICINE

## 2024-05-10 PROCEDURE — 3074F SYST BP LT 130 MM HG: CPT | Mod: CPTII,S$GLB,, | Performed by: FAMILY MEDICINE

## 2024-05-10 RX ORDER — MUPIROCIN 20 MG/G
OINTMENT TOPICAL 2 TIMES DAILY
Qty: 30 G | Refills: 1 | Status: SHIPPED | OUTPATIENT
Start: 2024-05-10

## 2024-05-10 RX ORDER — HYDROCODONE POLISTIREX AND CHLORPHENIRAMINE POLISTIREX 10; 8 MG/5ML; MG/5ML
5 SUSPENSION, EXTENDED RELEASE ORAL NIGHTLY PRN
Qty: 150 ML | Refills: 0 | Status: SHIPPED | OUTPATIENT
Start: 2024-05-10

## 2024-05-10 RX ORDER — CITALOPRAM 40 MG/1
40 TABLET, FILM COATED ORAL DAILY
Qty: 30 TABLET | Refills: 11 | Status: SHIPPED | OUTPATIENT
Start: 2024-05-10 | End: 2025-05-10

## 2024-05-10 NOTE — PROGRESS NOTES
Subjective:       Patient ID: Malathi Martinez is a 63 y.o. female.    Chief Complaint: Follow-up    Follow-up  Associated symptoms include arthralgias, coughing, fatigue, neck pain and weakness. Pertinent negatives include no chest pain, diaphoresis, fever, headaches, joint swelling or vomiting.     Patient seen for f/u.   She notes that she is increasingly struggling with the emotional implications of the recurrence noted on PET and EBUS. Currently, maintaining immunotx.    On 4/29, had a day where she did not get up for work. Per , she did not answer her phone, did not want to get up, and she was slurring her words. Later in the day, she felt better. Since, she's had an increase in floaters in her field of vision.   Saw onc NP and f/u MRI was negative for acute findings.   She acknowledges that she is having some bad days with depression. She's just not sure if she needs to get her affairs in order, also, dealing with her  who is struggling with the dx and the need for him to help more around the house. She does not usually take her xanax. Not sure if lexapro is still working.    She has been working with palliative care, and also working with the dieticians thru the cancer center.   She feels her sleep is ok. Going to bed early some days due to fatigue.   Cut her toe while trimming nails - tx with abx ointment.     Takes tylenol prn, estimates 1/week. +cough drops, more than usual bc of cough.     Review of Systems:  Review of Systems   Constitutional:  Positive for activity change, fatigue and unexpected weight change. Negative for diaphoresis and fever.   HENT:  Positive for hearing loss (slow decline over time, more noticeable L>R) and rhinorrhea (normal seasonal sx). Negative for trouble swallowing.    Eyes:  Positive for discharge and visual disturbance (increased floaters).   Respiratory:  Positive for cough. Negative for chest tightness and wheezing.    Cardiovascular:  Negative for  "chest pain and palpitations.   Gastrointestinal:  Negative for blood in stool, constipation, diarrhea and vomiting.   Endocrine: Negative for polydipsia and polyuria.   Genitourinary:  Negative for difficulty urinating, dysuria, hematuria and menstrual problem.   Musculoskeletal:  Positive for arthralgias and neck pain. Negative for joint swelling.   Neurological:  Positive for dizziness (some vestibular issues with head turning) and weakness. Negative for light-headedness and headaches.   Psychiatric/Behavioral:  Positive for confusion and dysphoric mood. Negative for sleep disturbance. The patient is nervous/anxious.        Objective:     Vitals:    05/10/24 1315   BP: 122/76   Pulse: 84   SpO2: 98%   Weight: 77.9 kg (171 lb 10.1 oz)   Height: 5' 10" (1.778 m)          Physical Exam  Vitals and nursing note reviewed.   Constitutional:       General: She is not in acute distress.     Appearance: Normal appearance. She is well-developed.   HENT:      Head: Normocephalic and atraumatic.      Right Ear: Tympanic membrane normal.      Left Ear: Tympanic membrane normal.   Eyes:      General: No scleral icterus.        Right eye: No discharge.         Left eye: No discharge.      Conjunctiva/sclera: Conjunctivae normal.   Cardiovascular:      Rate and Rhythm: Normal rate and regular rhythm.   Pulmonary:      Effort: Pulmonary effort is normal. No respiratory distress.      Breath sounds: Normal breath sounds.   Abdominal:      Palpations: Abdomen is soft.      Tenderness: There is no guarding.   Musculoskeletal:         General: No deformity or signs of injury.      Cervical back: Neck supple.   Skin:     General: Skin is warm and dry.      Findings: No rash.   Neurological:      General: No focal deficit present.      Mental Status: She is alert and oriented to person, place, and time.   Psychiatric:         Mood and Affect: Mood normal.         Behavior: Behavior normal.           Assessment & Plan:  Small cell " carcinoma of overlapping sites of right lung  Comments:  regimen per onc/carinder  Orders:  -     Ambulatory referral/consult to Psychology; Future; Expected date: 05/17/2024  -     hydrocodone-chlorpheniramine (TUSSIONEX) 10 mg-8 mg/5 mL suspension; Take 5 mLs by mouth nightly as needed for Cough.  Dispense: 150 mL; Refill: 0    Vitreous floaters of both eyes  -     Ambulatory referral/consult to Optometry; Future; Expected date: 05/17/2024    Recurrent major depressive disorder, in partial remission  Comments:  med adjustments as noted  Orders:  -     citalopram (CELEXA) 40 MG tablet; Take 1 tablet (40 mg total) by mouth once daily.  Dispense: 30 tablet; Refill: 11  -     Ambulatory referral/consult to Psychology; Future; Expected date: 05/17/2024    Routine general medical examination at a health care facility  -     mupirocin (BACTROBAN) 2 % ointment; Apply topically 2 (two) times daily.  Dispense: 30 g; Refill: 1    JEAN CLAUDE (generalized anxiety disorder)  Comments:  uncontrolled - switch lexapro to celexa daily    Type 2 diabetes mellitus without complication, without long-term current use of insulin  Comments:  ok to allow slightly higher fasting sugars while under current therapy    Dysuria  -     Urinalysis, Reflex to Urine Culture Urine, Clean Catch    Chronic cough  Comments:  ok to cont tussionex at night, skip xanax those nights  Orders:  -     hydrocodone-chlorpheniramine (TUSSIONEX) 10 mg-8 mg/5 mL suspension; Take 5 mLs by mouth nightly as needed for Cough.  Dispense: 150 mL; Refill: 0    Transaminitis  Comments:  elevation noted following initial immunotx  will cont monitoring with routine labs - currently weekly    High risk medications (not anticoagulants) long-term use  -     Magnesium; Future; Expected date: 05/10/2024  -     TSH; Future; Expected date: 05/10/2024

## 2024-05-10 NOTE — Clinical Note
Hey, I know you mentioned you had some openings, can you get Ms Servin on your schedule please? One of my favorite patients that I've known since I started with [facility]. She took care of her parents who were also my patients when they passed from cancer, heart stuff, etc. Currently, she's dealing with a recurrence of lung cancer,  not really supportive, etc. Thank you! TRAN

## 2024-05-13 ENCOUNTER — LAB VISIT (OUTPATIENT)
Dept: LAB | Facility: HOSPITAL | Age: 64
End: 2024-05-13
Attending: INTERNAL MEDICINE
Payer: COMMERCIAL

## 2024-05-13 DIAGNOSIS — Z79.899 HIGH RISK MEDICATIONS (NOT ANTICOAGULANTS) LONG-TERM USE: ICD-10-CM

## 2024-05-13 DIAGNOSIS — D50.9 IRON DEFICIENCY ANEMIA, UNSPECIFIED: ICD-10-CM

## 2024-05-13 DIAGNOSIS — R53.83 FATIGUE: ICD-10-CM

## 2024-05-13 DIAGNOSIS — C34.11 MALIGNANT NEOPLASM OF UPPER LOBE OF RIGHT LUNG: ICD-10-CM

## 2024-05-13 LAB
ALBUMIN SERPL BCP-MCNC: 3.5 G/DL (ref 3.5–5.2)
ALP SERPL-CCNC: 84 U/L (ref 55–135)
ALT SERPL W/O P-5'-P-CCNC: 48 U/L (ref 10–44)
ANION GAP SERPL CALC-SCNC: 11 MMOL/L (ref 8–16)
AST SERPL-CCNC: 20 U/L (ref 10–40)
BASOPHILS # BLD AUTO: 0 K/UL (ref 0–0.2)
BASOPHILS NFR BLD: 0 % (ref 0–1.9)
BILIRUB SERPL-MCNC: 0.4 MG/DL (ref 0.1–1)
BUN SERPL-MCNC: 5 MG/DL (ref 8–23)
CALCIUM SERPL-MCNC: 9.4 MG/DL (ref 8.7–10.5)
CHLORIDE SERPL-SCNC: 107 MMOL/L (ref 95–110)
CO2 SERPL-SCNC: 23 MMOL/L (ref 23–29)
CREAT SERPL-MCNC: 1.1 MG/DL (ref 0.5–1.4)
DIFFERENTIAL METHOD BLD: ABNORMAL
EOSINOPHIL # BLD AUTO: 0 K/UL (ref 0–0.5)
EOSINOPHIL NFR BLD: 0.5 % (ref 0–8)
ERYTHROCYTE [DISTWIDTH] IN BLOOD BY AUTOMATED COUNT: 14.6 % (ref 11.5–14.5)
EST. GFR  (NO RACE VARIABLE): 56.1 ML/MIN/1.73 M^2
GLUCOSE SERPL-MCNC: 70 MG/DL (ref 70–110)
HCT VFR BLD AUTO: 35.9 % (ref 37–48.5)
HGB BLD-MCNC: 11.7 G/DL (ref 12–16)
IMM GRANULOCYTES # BLD AUTO: 0.01 K/UL (ref 0–0.04)
IMM GRANULOCYTES NFR BLD AUTO: 0.5 % (ref 0–0.5)
LYMPHOCYTES # BLD AUTO: 0.9 K/UL (ref 1–4.8)
LYMPHOCYTES NFR BLD: 47 % (ref 18–48)
MAGNESIUM SERPL-MCNC: 1.9 MG/DL (ref 1.6–2.6)
MCH RBC QN AUTO: 29.5 PG (ref 27–31)
MCHC RBC AUTO-ENTMCNC: 32.6 G/DL (ref 32–36)
MCV RBC AUTO: 91 FL (ref 82–98)
MONOCYTES # BLD AUTO: 0.2 K/UL (ref 0.3–1)
MONOCYTES NFR BLD: 9.5 % (ref 4–15)
NEUTROPHILS # BLD AUTO: 0.9 K/UL (ref 1.8–7.7)
NEUTROPHILS NFR BLD: 42.5 % (ref 38–73)
NRBC BLD-RTO: 0 /100 WBC
PLATELET # BLD AUTO: 206 K/UL (ref 150–450)
PLATELET BLD QL SMEAR: ABNORMAL
PMV BLD AUTO: 10.2 FL (ref 9.2–12.9)
POTASSIUM SERPL-SCNC: 3.7 MMOL/L (ref 3.5–5.1)
PROT SERPL-MCNC: 6.8 G/DL (ref 6–8.4)
RBC # BLD AUTO: 3.96 M/UL (ref 4–5.4)
SODIUM SERPL-SCNC: 141 MMOL/L (ref 136–145)
TSH SERPL DL<=0.005 MIU/L-ACNC: 2.06 UIU/ML (ref 0.4–4)
WBC # BLD AUTO: 2 K/UL (ref 3.9–12.7)

## 2024-05-13 PROCEDURE — 84443 ASSAY THYROID STIM HORMONE: CPT | Performed by: FAMILY MEDICINE

## 2024-05-13 PROCEDURE — 83735 ASSAY OF MAGNESIUM: CPT | Performed by: FAMILY MEDICINE

## 2024-05-13 PROCEDURE — 85025 COMPLETE CBC W/AUTO DIFF WBC: CPT | Performed by: INTERNAL MEDICINE

## 2024-05-13 PROCEDURE — 80053 COMPREHEN METABOLIC PANEL: CPT | Performed by: INTERNAL MEDICINE

## 2024-05-13 PROCEDURE — 36415 COLL VENOUS BLD VENIPUNCTURE: CPT | Mod: PN | Performed by: INTERNAL MEDICINE

## 2024-05-16 ENCOUNTER — OFFICE VISIT (OUTPATIENT)
Dept: OPTOMETRY | Facility: CLINIC | Age: 64
End: 2024-05-16
Payer: COMMERCIAL

## 2024-05-16 DIAGNOSIS — H43.811 POSTERIOR VITREOUS DETACHMENT OF RIGHT EYE: Primary | ICD-10-CM

## 2024-05-16 DIAGNOSIS — H43.393 VITREOUS FLOATERS OF BOTH EYES: ICD-10-CM

## 2024-05-16 PROCEDURE — 1160F RVW MEDS BY RX/DR IN RCRD: CPT | Mod: CPTII,S$GLB,, | Performed by: OPTOMETRIST

## 2024-05-16 PROCEDURE — 92014 COMPRE OPH EXAM EST PT 1/>: CPT | Mod: S$GLB,,, | Performed by: OPTOMETRIST

## 2024-05-16 PROCEDURE — 1159F MED LIST DOCD IN RCRD: CPT | Mod: CPTII,S$GLB,, | Performed by: OPTOMETRIST

## 2024-05-16 PROCEDURE — 3044F HG A1C LEVEL LT 7.0%: CPT | Mod: CPTII,S$GLB,, | Performed by: OPTOMETRIST

## 2024-05-16 PROCEDURE — 4010F ACE/ARB THERAPY RXD/TAKEN: CPT | Mod: CPTII,S$GLB,, | Performed by: OPTOMETRIST

## 2024-05-16 PROCEDURE — 2023F DILAT RTA XM W/O RTNOPTHY: CPT | Mod: CPTII,S$GLB,, | Performed by: OPTOMETRIST

## 2024-05-16 PROCEDURE — 99999 PR PBB SHADOW E&M-EST. PATIENT-LVL III: CPT | Mod: PBBFAC,,, | Performed by: OPTOMETRIST

## 2024-05-16 NOTE — PROGRESS NOTES
HPI    Pt here for floaters DLS- 08/28/23    Pt states about 2 weeks ago she was sleeping and states that she had a   hard time waking him and when she did she saw a lot floaters in her vision   her speech was also slurred. Pt went to PCP and they did some imaging that   was normal.   Pt is still seeing floaters on occasion.   Denies FOL and vision changes.   Last edited by Olivia Tam on 5/16/2024 12:57 PM.            Assessment /Plan     For exam results, see Encounter Report.    Posterior vitreous detachment of right eye    Vitreous floaters of both eyes  -     Ambulatory referral/consult to Optometry      OU, No evidence of holes, tears or detachment of retina. Negative Shafers sign in vitreous. 90 diopter lens exam negative in all quadrants. Patient educated to signs and symptoms of retinal detachment and return to clinic immediately if signs or symptoms arise. RTC yearly eye exams

## 2024-05-17 ENCOUNTER — OFFICE VISIT (OUTPATIENT)
Dept: PODIATRY | Facility: CLINIC | Age: 64
End: 2024-05-17
Payer: COMMERCIAL

## 2024-05-17 VITALS — BODY MASS INDEX: 24.59 KG/M2 | HEIGHT: 70 IN | WEIGHT: 171.75 LBS

## 2024-05-17 DIAGNOSIS — E11.42 DIABETIC POLYNEUROPATHY ASSOCIATED WITH TYPE 2 DIABETES MELLITUS: ICD-10-CM

## 2024-05-17 DIAGNOSIS — L60.0 INGROWN NAIL: Primary | ICD-10-CM

## 2024-05-17 PROCEDURE — 3044F HG A1C LEVEL LT 7.0%: CPT | Mod: CPTII,S$GLB,, | Performed by: PODIATRIST

## 2024-05-17 PROCEDURE — 4010F ACE/ARB THERAPY RXD/TAKEN: CPT | Mod: CPTII,S$GLB,, | Performed by: PODIATRIST

## 2024-05-17 PROCEDURE — 99999 PR PBB SHADOW E&M-EST. PATIENT-LVL IV: CPT | Mod: PBBFAC,,, | Performed by: PODIATRIST

## 2024-05-17 PROCEDURE — 99213 OFFICE O/P EST LOW 20 MIN: CPT | Mod: S$GLB,,, | Performed by: PODIATRIST

## 2024-05-17 PROCEDURE — 3008F BODY MASS INDEX DOCD: CPT | Mod: CPTII,S$GLB,, | Performed by: PODIATRIST

## 2024-05-17 PROCEDURE — 1159F MED LIST DOCD IN RCRD: CPT | Mod: CPTII,S$GLB,, | Performed by: PODIATRIST

## 2024-05-17 NOTE — PROGRESS NOTES
Subjective:     Patient ID: Malathi Martinez is a 63 y.o. female.    Chief Complaint: Diabetes Mellitus and Ingrown Toenail (Right great toe ingrown toenail, while cutting nail, small skin tear)    Malathi is a 63 y.o. female with a past medical history of Abnormal stress test, Bronchitis, COPD (chronic obstructive pulmonary disease), Diabetes mellitus, type 2, GERD (gastroesophageal reflux disease), HLD (hyperlipidemia), endoscopy, Hypertension, Lung cancer (05/2023), Mental disorder, Pulmonary nodule (04/09/2018), SOB (shortness of breath), Thyroid disease, and Tobacco abuse (12/26/2012). Patient relates clipping an ingrown toenail too aggressively with subsequently bleeding and a possible wound along the lateral border of the Rt. Hallux toenail.  Notes being placed on mupirocin per her PCP.  She has been applying said medication to the area QD.  Notes sustaining a superficial abrasion upon removal of a band aid.  Seeking reassurance infection is not present.  Denies overt neuropathy pain with the most currently round of chemotherapy.  Denies any additional pedal complaints.      PCP: Comfort Dave MD    Date Last Seen by PCP: 5/24    Hemoglobin A1C   Date Value Ref Range Status   01/30/2024 5.2 4.0 - 5.6 % Final     Comment:     ADA Screening Guidelines:  5.7-6.4%  Consistent with prediabetes  >or=6.5%  Consistent with diabetes    High levels of fetal hemoglobin interfere with the HbA1C  assay. Heterozygous hemoglobin variants (HbS, HgC, etc)do  not significantly interfere with this assay.   However, presence of multiple variants may affect accuracy.     09/11/2023 5.3 4.0 - 5.6 % Final     Comment:     ADA Screening Guidelines:  5.7-6.4%  Consistent with prediabetes  >or=6.5%  Consistent with diabetes    High levels of fetal hemoglobin interfere with the HbA1C  assay. Heterozygous hemoglobin variants (HbS, HgC, etc)do  not significantly interfere with this assay.   However, presence of multiple variants  may affect accuracy.     05/01/2023 6.1 (H) 0.0 - 5.6 % Final     Comment:     Reference Interval:  5.0 - 5.6 Normal   5.7 - 6.4 High Risk   > 6.5 Diabetic      Hgb A1c results are standardized based on the (NGSP) National   Glycohemoglobin Standardization Program.      Hemoglobin A1C levels are related to mean serum/plasma glucose   during the preceding 2-3 months.              Review of Systems   Constitutional: Negative for chills and fever.   Skin:  Positive for nail changes. Negative for color change.   Musculoskeletal:  Negative for muscle cramps and muscle weakness.   Gastrointestinal:  Negative for nausea and vomiting.   Neurological:  Positive for paresthesias. Negative for numbness.   Psychiatric/Behavioral:  Negative for altered mental status.         Objective:     Physical Exam  Constitutional:       Appearance: Normal appearance. She is not ill-appearing.   Cardiovascular:      Pulses:           Dorsalis pedis pulses are 2+ on the right side and 2+ on the left side.        Posterior tibial pulses are 2+ on the right side and 2+ on the left side.      Comments: CFT is < 3 seconds bilateral.  Pedal hair growth is present bilateral.  Mild non pitting lower extremity edema noted bilateral.  Toes are warm to touch bilateral.    Musculoskeletal:         General: Tenderness present. No deformity or signs of injury.      Right lower leg: No edema.      Left lower leg: No edema.      Comments: Muscle strength 5/5 in all muscle groups bilateral.  No tenderness nor crepitation with ROM of foot/ankle joints bilateral.  Mild pain with palpation to the lateral border of the Rt. Hallux toenail.   Skin:     Capillary Refill: Capillary refill takes 2 to 3 seconds.      Findings: No bruising, ecchymosis, erythema, signs of injury, laceration, lesion, petechiae or rash.      Comments: Pedal skin has normal turgor, temperature, and texture bilateral.  Incurvation noted to the lateral border of the Rt. Hallux toenail.   Remaining toenails x 9 appear normotrophic.  Mostly healed abrasion to the dorsum of the Rt. Hallux IP joint.      Neurological:      Mental Status: She is alert.      Sensory: No sensory deficit.      Motor: No weakness or atrophy.      Comments: Light touch is intact bilateral.  Protective sensation is intact bilateral.             Assessment:      Encounter Diagnoses   Name Primary?    Ingrown nail Yes    Diabetic polyneuropathy associated with type 2 diabetes mellitus      Plan:     Malathi was seen today for diabetes mellitus and ingrown toenail.    Diagnoses and all orders for this visit:    Ingrown nail    Diabetic polyneuropathy associated with type 2 diabetes mellitus      I counseled the patient on her conditions, their implications and medical management.    - Being that she is undergoing chemotherapy, I advised to begin taking 1200mg alpha lipoic acid QD.    - Utilizing sterile toenail clippers I aggressively trimmed the offending lateral nail border of the Rt. hallux approximately 3 mm from its edge and carried the nail plate incision down at an angle in order to wedge out the offending cryptotic portion of the nail plate. The offending border was then removed in toto. This was performed without incident.  Patient tolerated the procedure well and related significant relief.     - Advised to continue applying mupirocin ointment to the site QD x 3 days.    - Shoe inspection. Diabetic Foot Education. Patient reminded of the importance of good nutrition and blood sugar control to help prevent podiatric complications of diabetes. Patient instructed on proper foot hygeine. We discussed wearing proper shoe gear, daily foot inspections, never walking without protective shoe gear, never putting sharp instruments to feet.    - Patient instructed to inspect her feet, wear protective shoe gear when ambulatory, moisturizer to maintain skin integrity and follow in this office p.r.n.    - RTC prn.    Tonio Melo,  THEODORE

## 2024-05-20 ENCOUNTER — LAB VISIT (OUTPATIENT)
Dept: LAB | Facility: HOSPITAL | Age: 64
End: 2024-05-20
Attending: INTERNAL MEDICINE
Payer: COMMERCIAL

## 2024-05-20 DIAGNOSIS — C34.11 MALIGNANT NEOPLASM OF UPPER LOBE OF RIGHT LUNG: ICD-10-CM

## 2024-05-20 DIAGNOSIS — D50.9 IRON DEFICIENCY ANEMIA, UNSPECIFIED: ICD-10-CM

## 2024-05-20 DIAGNOSIS — R53.83 FATIGUE: ICD-10-CM

## 2024-05-20 LAB
ALBUMIN SERPL BCP-MCNC: 3.7 G/DL (ref 3.5–5.2)
ALP SERPL-CCNC: 94 U/L (ref 55–135)
ALT SERPL W/O P-5'-P-CCNC: 38 U/L (ref 10–44)
ANION GAP SERPL CALC-SCNC: 8 MMOL/L (ref 8–16)
AST SERPL-CCNC: 23 U/L (ref 10–40)
BASOPHILS # BLD AUTO: 0.01 K/UL (ref 0–0.2)
BASOPHILS NFR BLD: 0.2 % (ref 0–1.9)
BILIRUB SERPL-MCNC: 0.3 MG/DL (ref 0.1–1)
BUN SERPL-MCNC: 4 MG/DL (ref 8–23)
CALCIUM SERPL-MCNC: 9.5 MG/DL (ref 8.7–10.5)
CHLORIDE SERPL-SCNC: 104 MMOL/L (ref 95–110)
CO2 SERPL-SCNC: 24 MMOL/L (ref 23–29)
CREAT SERPL-MCNC: 1 MG/DL (ref 0.5–1.4)
DIFFERENTIAL METHOD BLD: ABNORMAL
EOSINOPHIL # BLD AUTO: 0 K/UL (ref 0–0.5)
EOSINOPHIL NFR BLD: 1 % (ref 0–8)
ERYTHROCYTE [DISTWIDTH] IN BLOOD BY AUTOMATED COUNT: 15.7 % (ref 11.5–14.5)
EST. GFR  (NO RACE VARIABLE): >60 ML/MIN/1.73 M^2
GLUCOSE SERPL-MCNC: 89 MG/DL (ref 70–110)
HCT VFR BLD AUTO: 38.5 % (ref 37–48.5)
HGB BLD-MCNC: 12.3 G/DL (ref 12–16)
IMM GRANULOCYTES # BLD AUTO: 0.05 K/UL (ref 0–0.04)
IMM GRANULOCYTES NFR BLD AUTO: 1.2 % (ref 0–0.5)
LYMPHOCYTES # BLD AUTO: 1.8 K/UL (ref 1–4.8)
LYMPHOCYTES NFR BLD: 43.2 % (ref 18–48)
MCH RBC QN AUTO: 29.6 PG (ref 27–31)
MCHC RBC AUTO-ENTMCNC: 31.9 G/DL (ref 32–36)
MCV RBC AUTO: 93 FL (ref 82–98)
MONOCYTES # BLD AUTO: 0.9 K/UL (ref 0.3–1)
MONOCYTES NFR BLD: 21.3 % (ref 4–15)
NEUTROPHILS # BLD AUTO: 1.4 K/UL (ref 1.8–7.7)
NEUTROPHILS NFR BLD: 33.1 % (ref 38–73)
NRBC BLD-RTO: 0 /100 WBC
PLATELET # BLD AUTO: 362 K/UL (ref 150–450)
PMV BLD AUTO: 10 FL (ref 9.2–12.9)
POTASSIUM SERPL-SCNC: 3.5 MMOL/L (ref 3.5–5.1)
PROT SERPL-MCNC: 7.3 G/DL (ref 6–8.4)
RBC # BLD AUTO: 4.16 M/UL (ref 4–5.4)
SODIUM SERPL-SCNC: 136 MMOL/L (ref 136–145)
WBC # BLD AUTO: 4.17 K/UL (ref 3.9–12.7)

## 2024-05-20 PROCEDURE — 36415 COLL VENOUS BLD VENIPUNCTURE: CPT | Mod: PN | Performed by: INTERNAL MEDICINE

## 2024-05-20 PROCEDURE — 80053 COMPREHEN METABOLIC PANEL: CPT | Performed by: INTERNAL MEDICINE

## 2024-05-20 PROCEDURE — 85025 COMPLETE CBC W/AUTO DIFF WBC: CPT | Performed by: INTERNAL MEDICINE

## 2024-05-23 DIAGNOSIS — C34.11 MALIGNANT NEOPLASM OF UPPER LOBE OF RIGHT LUNG: Primary | ICD-10-CM

## 2024-05-23 DIAGNOSIS — R53.83 FATIGUE: ICD-10-CM

## 2024-05-23 DIAGNOSIS — D50.9 IRON DEFICIENCY ANEMIA, UNSPECIFIED: ICD-10-CM

## 2024-05-27 ENCOUNTER — LAB VISIT (OUTPATIENT)
Dept: LAB | Facility: HOSPITAL | Age: 64
End: 2024-05-27
Attending: INTERNAL MEDICINE
Payer: COMMERCIAL

## 2024-05-27 DIAGNOSIS — D50.9 IRON DEFICIENCY ANEMIA, UNSPECIFIED: ICD-10-CM

## 2024-05-27 DIAGNOSIS — R53.83 FATIGUE: ICD-10-CM

## 2024-05-27 DIAGNOSIS — C34.11 MALIGNANT NEOPLASM OF UPPER LOBE OF RIGHT LUNG: ICD-10-CM

## 2024-05-27 LAB
ALBUMIN SERPL BCP-MCNC: 3.5 G/DL (ref 3.5–5.2)
ALP SERPL-CCNC: 88 U/L (ref 55–135)
ALT SERPL W/O P-5'-P-CCNC: 142 U/L (ref 10–44)
ANION GAP SERPL CALC-SCNC: 8 MMOL/L (ref 8–16)
AST SERPL-CCNC: 45 U/L (ref 10–40)
BASOPHILS # BLD AUTO: 0 K/UL (ref 0–0.2)
BASOPHILS NFR BLD: 0 % (ref 0–1.9)
BILIRUB SERPL-MCNC: 0.5 MG/DL (ref 0.1–1)
BUN SERPL-MCNC: 10 MG/DL (ref 8–23)
CALCIUM SERPL-MCNC: 9.3 MG/DL (ref 8.7–10.5)
CHLORIDE SERPL-SCNC: 103 MMOL/L (ref 95–110)
CO2 SERPL-SCNC: 26 MMOL/L (ref 23–29)
CREAT SERPL-MCNC: 1 MG/DL (ref 0.5–1.4)
DIFFERENTIAL METHOD BLD: ABNORMAL
EOSINOPHIL # BLD AUTO: 0 K/UL (ref 0–0.5)
EOSINOPHIL NFR BLD: 0.5 % (ref 0–8)
ERYTHROCYTE [DISTWIDTH] IN BLOOD BY AUTOMATED COUNT: 15.5 % (ref 11.5–14.5)
EST. GFR  (NO RACE VARIABLE): >60 ML/MIN/1.73 M^2
GLUCOSE SERPL-MCNC: 92 MG/DL (ref 70–110)
HCT VFR BLD AUTO: 37.5 % (ref 37–48.5)
HGB BLD-MCNC: 12.3 G/DL (ref 12–16)
IMM GRANULOCYTES # BLD AUTO: 0.01 K/UL (ref 0–0.04)
IMM GRANULOCYTES NFR BLD AUTO: 0.2 % (ref 0–0.5)
LYMPHOCYTES # BLD AUTO: 1.5 K/UL (ref 1–4.8)
LYMPHOCYTES NFR BLD: 35.4 % (ref 18–48)
MCH RBC QN AUTO: 30.1 PG (ref 27–31)
MCHC RBC AUTO-ENTMCNC: 32.8 G/DL (ref 32–36)
MCV RBC AUTO: 92 FL (ref 82–98)
MONOCYTES # BLD AUTO: 0.1 K/UL (ref 0.3–1)
MONOCYTES NFR BLD: 2.7 % (ref 4–15)
NEUTROPHILS # BLD AUTO: 2.5 K/UL (ref 1.8–7.7)
NEUTROPHILS NFR BLD: 61.2 % (ref 38–73)
NRBC BLD-RTO: 0 /100 WBC
PLATELET # BLD AUTO: 253 K/UL (ref 150–450)
PMV BLD AUTO: 9.7 FL (ref 9.2–12.9)
POTASSIUM SERPL-SCNC: 3.7 MMOL/L (ref 3.5–5.1)
PROT SERPL-MCNC: 6.7 G/DL (ref 6–8.4)
RBC # BLD AUTO: 4.08 M/UL (ref 4–5.4)
SODIUM SERPL-SCNC: 137 MMOL/L (ref 136–145)
WBC # BLD AUTO: 4.13 K/UL (ref 3.9–12.7)

## 2024-05-27 PROCEDURE — 36415 COLL VENOUS BLD VENIPUNCTURE: CPT | Mod: PN | Performed by: INTERNAL MEDICINE

## 2024-05-27 PROCEDURE — 80053 COMPREHEN METABOLIC PANEL: CPT | Performed by: INTERNAL MEDICINE

## 2024-05-27 PROCEDURE — 85025 COMPLETE CBC W/AUTO DIFF WBC: CPT | Performed by: INTERNAL MEDICINE

## 2024-05-28 ENCOUNTER — OFFICE VISIT (OUTPATIENT)
Dept: PSYCHIATRY | Facility: CLINIC | Age: 64
End: 2024-05-28
Payer: COMMERCIAL

## 2024-05-28 DIAGNOSIS — E11.9 TYPE 2 DIABETES MELLITUS WITHOUT COMPLICATION, WITHOUT LONG-TERM CURRENT USE OF INSULIN: ICD-10-CM

## 2024-05-28 DIAGNOSIS — J44.9 CHRONIC OBSTRUCTIVE PULMONARY DISEASE, UNSPECIFIED COPD TYPE: ICD-10-CM

## 2024-05-28 DIAGNOSIS — F33.41 RECURRENT MAJOR DEPRESSIVE DISORDER, IN PARTIAL REMISSION: ICD-10-CM

## 2024-05-28 DIAGNOSIS — C34.81 SMALL CELL CARCINOMA OF OVERLAPPING SITES OF RIGHT LUNG: ICD-10-CM

## 2024-05-28 DIAGNOSIS — F43.23 ADJUSTMENT REACTION WITH ANXIETY AND DEPRESSION: ICD-10-CM

## 2024-05-28 DIAGNOSIS — F41.1 GAD (GENERALIZED ANXIETY DISORDER): Primary | ICD-10-CM

## 2024-05-28 PROCEDURE — 3044F HG A1C LEVEL LT 7.0%: CPT | Mod: CPTII,95,, | Performed by: SOCIAL WORKER

## 2024-05-28 PROCEDURE — 4010F ACE/ARB THERAPY RXD/TAKEN: CPT | Mod: CPTII,95,, | Performed by: SOCIAL WORKER

## 2024-05-28 PROCEDURE — 1159F MED LIST DOCD IN RCRD: CPT | Mod: CPTII,95,, | Performed by: SOCIAL WORKER

## 2024-05-28 PROCEDURE — 90791 PSYCH DIAGNOSTIC EVALUATION: CPT | Mod: 95,,, | Performed by: SOCIAL WORKER
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
## 2024-06-03 ENCOUNTER — LAB VISIT (OUTPATIENT)
Dept: LAB | Facility: HOSPITAL | Age: 64
End: 2024-06-03
Attending: INTERNAL MEDICINE
Payer: COMMERCIAL

## 2024-06-03 DIAGNOSIS — D50.9 IRON DEFICIENCY ANEMIA, UNSPECIFIED: ICD-10-CM

## 2024-06-03 DIAGNOSIS — R53.83 FATIGUE: ICD-10-CM

## 2024-06-03 DIAGNOSIS — C34.11 MALIGNANT NEOPLASM OF UPPER LOBE OF RIGHT LUNG: ICD-10-CM

## 2024-06-03 LAB
ALBUMIN SERPL BCP-MCNC: 3.6 G/DL (ref 3.5–5.2)
ALP SERPL-CCNC: 101 U/L (ref 55–135)
ALT SERPL W/O P-5'-P-CCNC: 47 U/L (ref 10–44)
ANION GAP SERPL CALC-SCNC: 9 MMOL/L (ref 8–16)
AST SERPL-CCNC: 19 U/L (ref 10–40)
BASOPHILS # BLD AUTO: 0 K/UL (ref 0–0.2)
BASOPHILS NFR BLD: 0 % (ref 0–1.9)
BILIRUB SERPL-MCNC: 0.4 MG/DL (ref 0.1–1)
BUN SERPL-MCNC: 6 MG/DL (ref 8–23)
CALCIUM SERPL-MCNC: 9.5 MG/DL (ref 8.7–10.5)
CHLORIDE SERPL-SCNC: 103 MMOL/L (ref 95–110)
CO2 SERPL-SCNC: 23 MMOL/L (ref 23–29)
CREAT SERPL-MCNC: 1.2 MG/DL (ref 0.5–1.4)
DIFFERENTIAL METHOD BLD: ABNORMAL
EOSINOPHIL # BLD AUTO: 0 K/UL (ref 0–0.5)
EOSINOPHIL NFR BLD: 0.8 % (ref 0–8)
ERYTHROCYTE [DISTWIDTH] IN BLOOD BY AUTOMATED COUNT: 15.2 % (ref 11.5–14.5)
EST. GFR  (NO RACE VARIABLE): 50.5 ML/MIN/1.73 M^2
GLUCOSE SERPL-MCNC: 94 MG/DL (ref 70–110)
HCT VFR BLD AUTO: 36.9 % (ref 37–48.5)
HGB BLD-MCNC: 12.2 G/DL (ref 12–16)
IMM GRANULOCYTES # BLD AUTO: 0 K/UL (ref 0–0.04)
IMM GRANULOCYTES NFR BLD AUTO: 0 % (ref 0–0.5)
LYMPHOCYTES # BLD AUTO: 1.2 K/UL (ref 1–4.8)
LYMPHOCYTES NFR BLD: 46.5 % (ref 18–48)
MCH RBC QN AUTO: 30.4 PG (ref 27–31)
MCHC RBC AUTO-ENTMCNC: 33.1 G/DL (ref 32–36)
MCV RBC AUTO: 92 FL (ref 82–98)
MONOCYTES # BLD AUTO: 0.3 K/UL (ref 0.3–1)
MONOCYTES NFR BLD: 10.9 % (ref 4–15)
NEUTROPHILS # BLD AUTO: 1.1 K/UL (ref 1.8–7.7)
NEUTROPHILS NFR BLD: 41.8 % (ref 38–73)
NRBC BLD-RTO: 0 /100 WBC
PLATELET # BLD AUTO: 200 K/UL (ref 150–450)
PMV BLD AUTO: 10.5 FL (ref 9.2–12.9)
POTASSIUM SERPL-SCNC: 4.1 MMOL/L (ref 3.5–5.1)
PROT SERPL-MCNC: 7 G/DL (ref 6–8.4)
RBC # BLD AUTO: 4.01 M/UL (ref 4–5.4)
SODIUM SERPL-SCNC: 135 MMOL/L (ref 136–145)
WBC # BLD AUTO: 2.58 K/UL (ref 3.9–12.7)

## 2024-06-03 PROCEDURE — 36415 COLL VENOUS BLD VENIPUNCTURE: CPT | Mod: PN | Performed by: INTERNAL MEDICINE

## 2024-06-03 PROCEDURE — 80053 COMPREHEN METABOLIC PANEL: CPT | Performed by: INTERNAL MEDICINE

## 2024-06-03 PROCEDURE — 85025 COMPLETE CBC W/AUTO DIFF WBC: CPT | Performed by: INTERNAL MEDICINE

## 2024-06-05 ENCOUNTER — HOSPITAL ENCOUNTER (OUTPATIENT)
Dept: RADIOLOGY | Facility: HOSPITAL | Age: 64
Discharge: HOME | End: 2024-06-05
Attending: INTERNAL MEDICINE
Payer: COMMERCIAL

## 2024-06-05 DIAGNOSIS — C34.90 MALIGNANT NEOPLASM OF UNSPECIFIED PART OF UNSPECIFIED BRONCHUS OR LUNG: ICD-10-CM

## 2024-06-05 LAB — GLUCOSE SERPL-MCNC: 91 MG/DL (ref 70–110)

## 2024-06-05 PROCEDURE — 78815 PET IMAGE W/CT SKULL-THIGH: CPT | Mod: 26,PS,, | Performed by: STUDENT IN AN ORGANIZED HEALTH CARE EDUCATION/TRAINING PROGRAM

## 2024-06-05 PROCEDURE — A9552 F18 FDG: HCPCS | Mod: PN | Performed by: INTERNAL MEDICINE

## 2024-06-05 PROCEDURE — 78815 PET IMAGE W/CT SKULL-THIGH: CPT | Mod: TC,PS,PN

## 2024-06-05 RX ORDER — FLUDEOXYGLUCOSE F18 500 MCI/ML
12.8 INJECTION INTRAVENOUS
Status: COMPLETED | OUTPATIENT
Start: 2024-06-05 | End: 2024-06-05

## 2024-06-05 RX ADMIN — FLUDEOXYGLUCOSE F-18 12.8 MILLICURIE: 500 INJECTION INTRAVENOUS at 10:06

## 2024-06-05 NOTE — PROGRESS NOTES
PET Imaging Questionnaire    Are you a Diabetic? Recent Blood Sugar level? Yes    Are you anemic? Bone Marrow Stimulation Meds? No    Have you had a CT Scan, if so when & where was your last one? Yes -     Have you had a PET Scan, if so when & where was your last one? Yes -     Chemotherapy or currently on Chemotherapy? Yes    Radiation therapy? No    Surgical History:   Past Surgical History:   Procedure Laterality Date    APPENDECTOMY      COLONOSCOPY N/A 01/18/2019    Procedure: COLONOSCOPY;  Surgeon: Lai Cardoso MD;  Location: [facility];  Service: Endoscopy;  Laterality: N/A;    COLONOSCOPY N/A 12/20/2023    Procedure: COLONOSCOPY;  Surgeon: Jose Alanis MD;  Location: [facility];  Service: Colon and Rectal;  Laterality: N/A;    ENDOBRONCHIAL ULTRASOUND N/A 05/03/2023    Procedure: ENDOBRONCHIAL ULTRASOUND (EBUS);  Surgeon: Nahun Nguyen MD;  Location: [facility] OR;  Service: Pulmonary;  Laterality: N/A;    ENDOBRONCHIAL ULTRASOUND Bilateral 02/27/2024    Procedure: ENDOBRONCHIAL ULTRASOUND (EBUS);  Surgeon: Nahun Nguyen MD;  Location: [facility] OR;  Service: Pulmonary;  Laterality: Bilateral;    ESOPHAGOGASTRODUODENOSCOPY N/A 02/19/2021    Procedure: ESOPHAGOGASTRODUODENOSCOPY (EGD);  Surgeon: Lai Cardoso MD;  Location: [facility];  Service: Endoscopy;  Laterality: N/A;    INSERTION OF TUNNELED CENTRAL VENOUS CATHETER (CVC) WITH SUBCUTANEOUS PORT Right 05/22/2023    Procedure: [id number]-Y-CATH;  Surgeon: Ayah Aldana MD;  Location: [facility];  Service: General;  Laterality: Right;    TONSILLECTOMY          Have you been fasting for at least 6 hours? Yes    Is there any chance you may be pregnant or breastfeeding? No    Assay: 13.5 MCi@:10:49   Injection Site:RT AC    Residual: 0.7 mCi@: 10:53   Technologist: [place] Injected:12.8 mCi

## 2024-06-07 ENCOUNTER — OFFICE VISIT (OUTPATIENT)
Dept: PSYCHIATRY | Facility: CLINIC | Age: 64
End: 2024-06-07
Payer: COMMERCIAL

## 2024-06-07 ENCOUNTER — OFFICE VISIT (OUTPATIENT)
Dept: FAMILY MEDICINE | Facility: CLINIC | Age: 64
End: 2024-06-07
Payer: COMMERCIAL

## 2024-06-07 VITALS
DIASTOLIC BLOOD PRESSURE: 60 MMHG | WEIGHT: 199.31 LBS | SYSTOLIC BLOOD PRESSURE: 120 MMHG | OXYGEN SATURATION: 98 % | BODY MASS INDEX: 28.53 KG/M2 | HEART RATE: 86 BPM | HEIGHT: 70 IN

## 2024-06-07 DIAGNOSIS — R00.2 PALPITATIONS: ICD-10-CM

## 2024-06-07 DIAGNOSIS — J44.9 CHRONIC OBSTRUCTIVE PULMONARY DISEASE, UNSPECIFIED COPD TYPE: ICD-10-CM

## 2024-06-07 DIAGNOSIS — E78.5 HYPERLIPIDEMIA, UNSPECIFIED HYPERLIPIDEMIA TYPE: ICD-10-CM

## 2024-06-07 DIAGNOSIS — R53.83 FATIGUE: ICD-10-CM

## 2024-06-07 DIAGNOSIS — E11.9 TYPE 2 DIABETES MELLITUS WITHOUT COMPLICATION, WITHOUT LONG-TERM CURRENT USE OF INSULIN: ICD-10-CM

## 2024-06-07 DIAGNOSIS — D50.9 IRON DEFICIENCY ANEMIA, UNSPECIFIED: ICD-10-CM

## 2024-06-07 DIAGNOSIS — Z79.899 HIGH RISK MEDICATION USE: ICD-10-CM

## 2024-06-07 DIAGNOSIS — E55.9 VITAMIN D DEFICIENCY: ICD-10-CM

## 2024-06-07 DIAGNOSIS — F43.21 ADJUSTMENT DISORDER WITH DEPRESSED MOOD: Primary | ICD-10-CM

## 2024-06-07 DIAGNOSIS — C34.11 MALIGNANT NEOPLASM OF UPPER LOBE OF RIGHT LUNG: Primary | ICD-10-CM

## 2024-06-07 DIAGNOSIS — I72.2 RENAL ARTERIAL ANEURYSM: Primary | ICD-10-CM

## 2024-06-07 PROCEDURE — 3074F SYST BP LT 130 MM HG: CPT | Mod: CPTII,S$GLB,, | Performed by: FAMILY MEDICINE

## 2024-06-07 PROCEDURE — 4010F ACE/ARB THERAPY RXD/TAKEN: CPT | Mod: CPTII,S$GLB,, | Performed by: FAMILY MEDICINE

## 2024-06-07 PROCEDURE — 99214 OFFICE O/P EST MOD 30 MIN: CPT | Mod: S$GLB,,, | Performed by: FAMILY MEDICINE

## 2024-06-07 PROCEDURE — 99999 PR PBB SHADOW E&M-EST. PATIENT-LVL V: CPT | Mod: PBBFAC,,, | Performed by: FAMILY MEDICINE

## 2024-06-07 PROCEDURE — 3044F HG A1C LEVEL LT 7.0%: CPT | Mod: CPTII,S$GLB,,

## 2024-06-07 PROCEDURE — 99999 PR PBB SHADOW E&M-EST. PATIENT-LVL I: CPT | Mod: PBBFAC,,,

## 2024-06-07 PROCEDURE — 3008F BODY MASS INDEX DOCD: CPT | Mod: CPTII,S$GLB,, | Performed by: FAMILY MEDICINE

## 2024-06-07 PROCEDURE — 4010F ACE/ARB THERAPY RXD/TAKEN: CPT | Mod: CPTII,S$GLB,,

## 2024-06-07 PROCEDURE — 3078F DIAST BP <80 MM HG: CPT | Mod: CPTII,S$GLB,, | Performed by: FAMILY MEDICINE

## 2024-06-07 PROCEDURE — 3044F HG A1C LEVEL LT 7.0%: CPT | Mod: CPTII,S$GLB,, | Performed by: FAMILY MEDICINE

## 2024-06-07 PROCEDURE — 90791 PSYCH DIAGNOSTIC EVALUATION: CPT | Mod: S$GLB,,,

## 2024-06-07 NOTE — PROGRESS NOTES
Subjective:       Patient ID: Malathi Martinez is a 63 y.o. female.    Chief Complaint: Follow-up    Follow-up  Associated symptoms include fatigue, headaches (not as frequent), neck pain and weakness. Pertinent negatives include no arthralgias, chest pain, joint swelling or vomiting.     Patient seen for /fu.  Doing well today. She did see psych/amilcar as well as psych/angel. She found the visit with Carlita helpful, and discussed with amilcar about a future visit to include her .   Based on improvement in recent pet scan, plan to continue current chemo regimen. Current chemo regimen has caused some side effects: fatigue, sweats, stomach upset, sob.   Due to concern re potential interaction with trazodone, had held off on celexa. Discussed resuming regimen.   Nebulizer qam (not as helpful). No audible wheeze. Trelegy appears to have fallen off of the med list after 2021. Discussed restart.   Labs 2024 rev.     Review of Systems:  Review of Systems   Constitutional:  Positive for activity change, fatigue and unexpected weight change.   HENT:  Positive for hearing loss. Negative for rhinorrhea and trouble swallowing.    Eyes:  Negative for discharge and visual disturbance.   Respiratory:  Positive for shortness of breath (constant, has appt with pulm/leelee to review). Negative for chest tightness and wheezing.    Cardiovascular:  Positive for palpitations. Negative for chest pain.   Gastrointestinal:  Negative for blood in stool, constipation, diarrhea and vomiting.   Endocrine: Negative for polydipsia and polyuria.   Genitourinary:  Negative for difficulty urinating, dysuria, hematuria and menstrual problem.   Musculoskeletal:  Positive for neck pain. Negative for arthralgias and joint swelling.   Neurological:  Positive for weakness and headaches (not as frequent).   Psychiatric/Behavioral:  Positive for dysphoric mood. Negative for confusion and sleep disturbance.        Objective:  "    Vitals:    06/07/24 1249   BP: 120/60   Pulse: 86   SpO2: 98%   Weight: 90.4 kg (199 lb 4.7 oz)   Height: 5' 10" (1.778 m)          Physical Exam  Vitals and nursing note reviewed.   Constitutional:       General: She is not in acute distress.     Appearance: Normal appearance. She is well-developed.   HENT:      Head: Normocephalic and atraumatic.   Eyes:      General: No scleral icterus.        Right eye: No discharge.         Left eye: No discharge.      Conjunctiva/sclera: Conjunctivae normal.   Cardiovascular:      Rate and Rhythm: Normal rate and regular rhythm.   Pulmonary:      Effort: Pulmonary effort is normal. No respiratory distress.      Breath sounds: Normal breath sounds.   Musculoskeletal:      Cervical back: Neck supple. No tenderness.   Skin:     General: Skin is warm and dry.   Neurological:      General: No focal deficit present.      Mental Status: She is alert and oriented to person, place, and time.   Psychiatric:         Mood and Affect: Mood normal.         Behavior: Behavior normal.           Assessment & Plan:  Renal arterial aneurysm  Comments:  stable, cont monitoring    Chronic obstructive pulmonary disease, unspecified COPD type  Comments:  restart trelegy, ok to cont duonebs as needed  Orders:  -     fluticasone-umeclidin-vilanter (TRELEGY ELLIPTA) 100-62.5-25 mcg DsDv; Inhale 1 puff into the lungs once daily.  Dispense: 60 each; Refill: 11    Palpitations  Comments:  holter if palpitations cont regardless of anxiety  Orders:  -     Holter monitor - 48 hour; Future    Vitamin D deficiency  -     Vitamin D; Future; Expected date: 06/07/2024    Hyperlipidemia, unspecified hyperlipidemia type  -     Lipid Panel; Future; Expected date: 06/07/2024    Type 2 diabetes mellitus without complication, without long-term current use of insulin  -     Hemoglobin A1C; Future; Expected date: 06/07/2024    High risk medication use  -     Vitamin B12; Future; Expected date: 06/07/2024  -     Iron; " Future; Expected date: 06/07/2024  -     Folate; Future; Expected date: 06/07/2024

## 2024-06-07 NOTE — PATIENT INSTRUCTIONS
Restart the celexa. If the anxiety and palpitations continue, let's plan to check the holter monitor.    Would do femara again starting tomorrow for 5 days Bebe Vale MD

## 2024-06-07 NOTE — PROGRESS NOTES
PSYCHO-ONCOLOGY INTAKE    Diagnostic Interview - CPT 67099    Date: 6/7/2024  Site: [place]    Evaluation Length (direct face-to-face time):  1 hour    This includes face to face time and non-face to face time preparing to see the patient, obtaining and/or reviewing separately obtained history, documenting clinical information in the electronic or other health record, independently interpreting results and communicating results to the patient/family/caregiver, or care coordinator.     Referral Source: Comfort Dave MD   Oncologist:   PCP: Comfort Dave MD    Clinical status of patient: Outpatient    Malathi Martinez, a 63 y.o. female, seen for initial evaluation visit.    Malathi Martinez reviewed and agreed to informed consent and the limits of confidentiality.    Chief complaint/reason for encounter: Patient reported that she would like to have joint sessions with her . She indicated that he is struggling considerably with her diagnosis, and she would like to support him and have a place to communicate openly about his fears regarding her prognosis.    Medical/Surgical History:    Patient Active Problem List   Diagnosis    Tobacco use    HLD (hyperlipidemia)    Type 2 diabetes mellitus without complication, without long-term current use of insulin    Overweight    JEAN CLAUDE (generalized anxiety disorder)    Chronic obstructive pulmonary disease, unspecified COPD type    Precordial pain    Abnormal stress test    Pulmonary nodule    Gastroesophageal reflux disease    Fibroid    Renal arterial aneurysm    Recurrent major depressive disorder, in partial remission    Neck pain    Chronic left shoulder pain    Bilateral pleural effusion    Mediastinal adenopathy    Mediastinal mass    Lung mass    Small cell carcinoma of overlapping sites of right lung    Diabetic polyneuropathy associated with type 2 diabetes mellitus    Port-A-Cath in place     Health Behaviors:       ETOH  Use: No        Tobacco Use: No   Illicit Drug Use:  No     Prescription Misuse:No   Caffeine: minimal   Exercise:The patient engages in little, if any physical activity.   Firearms:  No   Advanced directives:Yes     Family History:   Psychiatric illness: No     Alcohol/Drug Abuse: Yes (Patient reported a family history of alcohol abuse)    Suicide: No      Past Psychiatric History:   Inpatient treatment: No     Outpatient treatment: No     Prior substance abuse treatment: No     Suicide Attempts: No     Psychotropic Medications:        Current medications as per below, allergies reviewed in chart.    Current Outpatient Medications   Medication    MOUNJARO 5 mg/0.5 mL PnIj    albuterol (VENTOLIN HFA) 90 mcg/actuation inhaler    albuterol-ipratropium (DUO-NEB) 2.5 mg-0.5 mg/3 mL nebulizer solution    ALPRAZolam (XANAX) 0.25 MG tablet    atorvastatin (LIPITOR) 20 MG tablet    B-complex with vitamin C (Z-BEC OR EQUIV) tablet    buPROPion (WELLBUTRIN XL) 150 MG TB24 tablet    cholecalciferol, vitamin D3, 2,000 unit Cap    citalopram (CELEXA) 40 MG tablet    clotrimazole-betamethasone 1-0.05% (LOTRISONE) cream    cyclobenzaprine (FLEXERIL) 5 MG tablet    famotidine (PEPCID) 40 MG tablet    fexofenadine (ALLEGRA) 180 MG tablet    hydrocodone-chlorpheniramine (TUSSIONEX) 10 mg-8 mg/5 mL suspension    levothyroxine (SYNTHROID) 100 MCG tablet    LIDOcaine (XYLOCAINE) 5 % Oint ointment    lisinopriL 10 MG tablet    mupirocin (BACTROBAN) 2 % ointment    ondansetron (ZOFRAN-ODT) 8 MG TbDL    polyethylene glycol (GLYCOLAX) 17 gram PwPk    traZODone (DESYREL) 50 MG tablet     No current facility-administered medications for this visit.     Facility-Administered Medications Ordered in Other Visits   Medication Frequency    dextrose 50% injection 12.5 g PRN    dextrose 50% injection 25 g PRN    insulin regular injection 0-8 Units Continuous PRN      Social situation/Stressors: Malathi Martinez lives [place] her  in  [place].  She is a full-time  for [email or web address].  She has been in her job for 15 years.    Malathi Martinez has been  22 years and has no children.  Her partner is Iker.   The patient reports adequate social support, though she reported that she is worried how her  is dealing with her illness. She stated that she is very worried for his state of mind, and that her worry often leads to having "panic attacks."  Strengths:Housing stability, Able to vocalize needs, and Values and traditions  Liabilities: Complicated medical illness    Current Evaluation:     Mental Status Exam: Malathi Martinez arrived promptly for the assessment session.  The patient was fully cooperative throughout the interview and was an adequate historian   Appearance: age appropriate, appropriately  dressed, adequately  groomed  Behavior/Cooperation: friendly and cooperative  Speech: normal in rate, volume, and tone and appropriate quality, quantity and organization of sentences  Mood: euthymic  Affect: mood congruent  Thought Process: goal-directed, logical  Thought Content: normal, no suicidality, no homicidality, delusions, or paranoia;did not appear to be responding to internal stimuli during the interview.   Orientation: grossly intact  Memory: grossly intact  Attention Span/Concentration: Attends to interview without distraction; reports no difficulty  Fund of Knowledge: average  Estimate of Intelligence: average from verbal skills and history  Cognition: grossly intact  Insight: patient has awareness of illness; good insight into own behavior and behavior of others  Judgment: the patient's behavior is adequate to circumstances      Adjustment Assessment to Current Illness:    Oncology History   Small cell carcinoma of overlapping sites of right lung   5/3/2023 Initial Diagnosis    Small cell carcinoma of overlapping sites of right lung     5/3/2023 Cancer Staged    Staging form: Lung, AJCC " 8th Edition  - Clinical stage from 5/3/2023: Stage IVB (cT4, cN3, cM1c)     5/4/2023 - 5/26/2023 Chemotherapy    Treatment Summary   Plan Name: OP CISPLATIN 75MG/M2 ETOPOSIDE 100MG/M2 Q3W  Treatment Goal: Curative  Status: Inactive  Start Date: 5/4/2023  End Date: 5/26/2023  Provider: Iker Westfall DO  Chemotherapy: CISplatin (Platinol) 75 mg/m2 = 154 mg in sodium chloride 0.9% 719 mL chemo infusion, 75 mg/m2 = 154 mg, Intravenous, Clinic/HOD 1 time, 2 of 6 cycles  Administration: 154 mg (5/4/2023), 154 mg (5/24/2023)  etoposide (VEPESID) 100 mg/m2 = 206 mg in sodium chloride 0.9% 510.3 mL chemo infusion, 100 mg/m2 = 206 mg, Intravenous, Clinic/HOD 1 time, 2 of 6 cycles  Administration: 206 mg (5/4/2023), 206 mg (5/5/2023), 206 mg (5/6/2023), 206 mg (5/24/2023), 206 mg (5/25/2023), 206 mg (5/26/2023)     6/14/2023 - 6/14/2023 Chemotherapy    Treatment Summary   Plan Name: OP ATEZOLIZUMAB CARBOPLATIN ETOPOSIDE Q3W  Treatment Goal: Palliative  Status: Inactive  Start Date:   End Date:   Provider: Iker Westfall DO  Chemotherapy: CARBOplatin (PARAPLATIN) in sodium chloride 0.9% 250 mL chemo infusion, , Intravenous, Clinic/HOD 1 time, 0 of 4 cycles  etoposide (VEPESID) 100 mg/m2 = 206 mg in sodium chloride 0.9% 510.3 mL chemo infusion, 100 mg/m2, Intravenous, Clinic/HOD 1 time, 0 of 4 cycles       Malathi Martinez has adjusted to illness with difficulty primarily through active coping strategies. She has engaged in appropriate information gathering.  The patient has good family/friend support.  Her support system is coping marginally with the diagnosis/treatment/prognosis. Illness-related psychosocial stressors include absence from work and changes in ability to engage in leisure activities.  The patient has a good partnership with her [facility] treatment team.     NCCN Distress thermometer:       7/19/2023    11:27 AM 5/24/2023     9:32 AM   DISTRESS SCREENING   Distress Score 6 6    Practical Concerns None of these None of these   Social Concerns None of these None of these   Emotional Concerns Fears;Nervousness;Worry Worry;Fears;Nervousness   Retire Spiritual or Congregational Concerns No No   Physical Concerns None of these None of these   Other Problems  having to get chemo again        Symptoms:   Mood: depressed mood, diminished interest, fatigue, and poor concentration;  no prior; no SI/HI; PHQ-9: 12  Anxiety: Feeling nervous, anxious, or on edge, Difficulty relaxing, Restlessness, Irritability, and Fear of unknown; no prior;  JEAN CLAUDE-7=17  Substance abuse: denied  Cognitive functioning: denied  Health behaviors:   Sleep: Patient reported consistent restful sleep, but stated that she has occasions when she wakes up during the night. She reported feeling tired throughout the day.  Pain: Ms. Martinez reports no physical pain.     Assessment - Diagnosis - Goals:   Adjustment Disorder with Depressed Mood    Plan:individual psychotherapy    Summary and Recommendations  Malathi Martinez is a 63 y.o. female referred by Comfort Dave MD for psychological evaluation and treatment.  Ms. Martinez appears to be coping with her diagnosis and proposed treatment course with some difficulty. Patient indicated that she is seeking treatment jointly with her . She stated that he is very fearful of her prognosis, and has been withdrawing from his life and activities he previously enjoyed. She stated that she would like to support him and have a safe place where they can discuss their fears about her diagnosis together. It is recommended that she follow up for therapy regularly so that they can develop cohesion in their relationship in the face of this diagnosis.    Return to clinic: as scheduled    GOALS:   Develop insight into thoughts and behaviors  Adopt new coping skills as a couple  Improve communication as a couple    Comfort Cheema, PhD  Clinical Health Psychology Fellow

## 2024-06-10 ENCOUNTER — LAB VISIT (OUTPATIENT)
Dept: LAB | Facility: HOSPITAL | Age: 64
End: 2024-06-10
Attending: INTERNAL MEDICINE
Payer: COMMERCIAL

## 2024-06-10 DIAGNOSIS — D50.9 IRON DEFICIENCY ANEMIA, UNSPECIFIED: ICD-10-CM

## 2024-06-10 DIAGNOSIS — R53.83 FATIGUE: ICD-10-CM

## 2024-06-10 DIAGNOSIS — C34.11 MALIGNANT NEOPLASM OF UPPER LOBE OF RIGHT LUNG: ICD-10-CM

## 2024-06-10 LAB
ALBUMIN SERPL BCP-MCNC: 3.5 G/DL (ref 3.5–5.2)
ALP SERPL-CCNC: 91 U/L (ref 55–135)
ALT SERPL W/O P-5'-P-CCNC: 34 U/L (ref 10–44)
ANION GAP SERPL CALC-SCNC: 13 MMOL/L (ref 8–16)
AST SERPL-CCNC: 22 U/L (ref 10–40)
BASOPHILS # BLD AUTO: 0 K/UL (ref 0–0.2)
BASOPHILS NFR BLD: 0 % (ref 0–1.9)
BILIRUB SERPL-MCNC: 0.3 MG/DL (ref 0.1–1)
BUN SERPL-MCNC: 5 MG/DL (ref 8–23)
CALCIUM SERPL-MCNC: 9.3 MG/DL (ref 8.7–10.5)
CHLORIDE SERPL-SCNC: 107 MMOL/L (ref 95–110)
CO2 SERPL-SCNC: 20 MMOL/L (ref 23–29)
CREAT SERPL-MCNC: 0.9 MG/DL (ref 0.5–1.4)
DIFFERENTIAL METHOD BLD: ABNORMAL
EOSINOPHIL # BLD AUTO: 0 K/UL (ref 0–0.5)
EOSINOPHIL NFR BLD: 1.1 % (ref 0–8)
ERYTHROCYTE [DISTWIDTH] IN BLOOD BY AUTOMATED COUNT: 16.3 % (ref 11.5–14.5)
EST. GFR  (NO RACE VARIABLE): >60 ML/MIN/1.73 M^2
GLUCOSE SERPL-MCNC: 77 MG/DL (ref 70–110)
HCT VFR BLD AUTO: 34 % (ref 37–48.5)
HGB BLD-MCNC: 11.1 G/DL (ref 12–16)
IMM GRANULOCYTES # BLD AUTO: 0.02 K/UL (ref 0–0.04)
IMM GRANULOCYTES NFR BLD AUTO: 0.7 % (ref 0–0.5)
LYMPHOCYTES # BLD AUTO: 1.2 K/UL (ref 1–4.8)
LYMPHOCYTES NFR BLD: 41.3 % (ref 18–48)
MCH RBC QN AUTO: 30.3 PG (ref 27–31)
MCHC RBC AUTO-ENTMCNC: 32.6 G/DL (ref 32–36)
MCV RBC AUTO: 93 FL (ref 82–98)
MONOCYTES # BLD AUTO: 0.6 K/UL (ref 0.3–1)
MONOCYTES NFR BLD: 20.3 % (ref 4–15)
NEUTROPHILS # BLD AUTO: 1 K/UL (ref 1.8–7.7)
NEUTROPHILS NFR BLD: 36.6 % (ref 38–73)
NRBC BLD-RTO: 1 /100 WBC
PLATELET # BLD AUTO: 280 K/UL (ref 150–450)
PMV BLD AUTO: 9.8 FL (ref 9.2–12.9)
POTASSIUM SERPL-SCNC: 3.4 MMOL/L (ref 3.5–5.1)
PROT SERPL-MCNC: 6.5 G/DL (ref 6–8.4)
RBC # BLD AUTO: 3.66 M/UL (ref 4–5.4)
SODIUM SERPL-SCNC: 140 MMOL/L (ref 136–145)
WBC # BLD AUTO: 2.81 K/UL (ref 3.9–12.7)

## 2024-06-10 PROCEDURE — 85025 COMPLETE CBC W/AUTO DIFF WBC: CPT | Performed by: INTERNAL MEDICINE

## 2024-06-10 PROCEDURE — 80053 COMPREHEN METABOLIC PANEL: CPT | Performed by: INTERNAL MEDICINE

## 2024-06-10 PROCEDURE — 36415 COLL VENOUS BLD VENIPUNCTURE: CPT | Mod: PN | Performed by: INTERNAL MEDICINE

## 2024-06-11 ENCOUNTER — OFFICE VISIT (OUTPATIENT)
Dept: PSYCHIATRY | Facility: CLINIC | Age: 64
End: 2024-06-11
Payer: COMMERCIAL

## 2024-06-11 DIAGNOSIS — F43.21 ADJUSTMENT DISORDER WITH DEPRESSED MOOD: Primary | ICD-10-CM

## 2024-06-11 PROCEDURE — 4010F ACE/ARB THERAPY RXD/TAKEN: CPT | Mod: CPTII,S$GLB,,

## 2024-06-11 PROCEDURE — 3044F HG A1C LEVEL LT 7.0%: CPT | Mod: CPTII,S$GLB,,

## 2024-06-11 PROCEDURE — 90837 PSYTX W PT 60 MINUTES: CPT | Mod: S$GLB,,,

## 2024-06-11 NOTE — PROGRESS NOTES
PSYCHO-ONCOLOGY Follow Up    Date: 6/11/2024  Site: [place] [facility]    Evaluation Length (direct face-to-face time):  1 hour    This includes face to face time and non-face to face time preparing to see the patient, obtaining and/or reviewing separately obtained history, documenting clinical information in the electronic or other health record, independently interpreting results and communicating results to the patient/family/caregiver, or care coordinator.     Clinical status of patient: Outpatient    Malathi Martinez, a 63 y.o. female, seen for follow up visit.    Malathi Martinez reviewed and agreed to informed consent and the limits of confidentiality.    Chief complaint/reason for encounter: Patient reported that she would like to have joint sessions with her . She indicated that he is struggling considerably with her diagnosis, and she would like to support him and have a place to communicate openly about his fears regarding her prognosis.    Medical/Surgical History:    Patient Active Problem List   Diagnosis    Tobacco use    HLD (hyperlipidemia)    Type 2 diabetes mellitus without complication, without long-term current use of insulin    Overweight    JEAN CLAUDE (generalized anxiety disorder)    Chronic obstructive pulmonary disease, unspecified COPD type    Precordial pain    Abnormal stress test    Gastroesophageal reflux disease    Fibroid    Renal arterial aneurysm    Recurrent major depressive disorder, in partial remission    Neck pain    Chronic left shoulder pain    Bilateral pleural effusion    Mediastinal adenopathy    Mediastinal mass    Lung mass    Small cell carcinoma of overlapping sites of right lung    Diabetic polyneuropathy associated with type 2 diabetes mellitus    Port-A-Cath in place     Health Behaviors:       ETOH Use: No        Tobacco Use: No   Illicit Drug Use:  No     Prescription Misuse:No   Caffeine: minimal   Exercise:The patient engages in little, if any physical  activity.   Firearms:  No   Advanced directives:Yes     Family History:   Psychiatric illness: No     Alcohol/Drug Abuse: Yes (Patient reported a family history of alcohol abuse)    Suicide: No      Past Psychiatric History:   Inpatient treatment: No     Outpatient treatment: No     Prior substance abuse treatment: No     Suicide Attempts: No     Psychotropic Medications:        Current medications as per below, allergies reviewed in chart.    Current Outpatient Medications   Medication    albuterol (VENTOLIN HFA) 90 mcg/actuation inhaler    albuterol-ipratropium (DUO-NEB) 2.5 mg-0.5 mg/3 mL nebulizer solution    ALPRAZolam (XANAX) 0.25 MG tablet    atorvastatin (LIPITOR) 20 MG tablet    B-complex with vitamin C (Z-BEC OR EQUIV) tablet    buPROPion (WELLBUTRIN XL) 150 MG TB24 tablet    cholecalciferol, vitamin D3, 2,000 unit Cap    citalopram (CELEXA) 40 MG tablet    clotrimazole-betamethasone 1-0.05% (LOTRISONE) cream    cyclobenzaprine (FLEXERIL) 5 MG tablet    famotidine (PEPCID) 40 MG tablet    fexofenadine (ALLEGRA) 180 MG tablet    fluticasone-umeclidin-vilanter (TRELEGY ELLIPTA) 100-62.5-25 mcg DsDv    hydrocodone-chlorpheniramine (TUSSIONEX) 10 mg-8 mg/5 mL suspension    levothyroxine (SYNTHROID) 100 MCG tablet    LIDOcaine (XYLOCAINE) 5 % Oint ointment    lisinopriL 10 MG tablet    MOUNJARO 5 mg/0.5 mL PnIj    mupirocin (BACTROBAN) 2 % ointment    ondansetron (ZOFRAN-ODT) 8 MG TbDL    polyethylene glycol (GLYCOLAX) 17 gram PwPk    traZODone (DESYREL) 50 MG tablet     No current facility-administered medications for this visit.     Facility-Administered Medications Ordered in Other Visits   Medication Frequency    dextrose 50% injection 12.5 g PRN    dextrose 50% injection 25 g PRN    insulin regular injection 0-8 Units Continuous PRN      Social situation/Stressors: Malathi Martinez lives with her  in [place], [place].  She is a full-time  for [employer].  She has been in her  "job for 15 years.    Malathi Martinez has been  22 years and has no children.  Her partner is Iker.   The patient reports adequate social support, though she reported that she is worried how her  is dealing with her illness. She stated that she is very worried for his state of mind, and that her worry often leads to having "panic attacks."  Strengths:Housing stability, Able to vocalize needs, and Values and traditions  Liabilities: Complicated medical illness    Current Evaluation:     Mental Status Exam: Malathi Martinez arrived promptly for the assessment session.  The patient was fully cooperative throughout the interview and was an adequate historian   Appearance: age appropriate, appropriately  dressed, adequately  groomed  Behavior/Cooperation: friendly and cooperative  Speech: normal in rate, volume, and tone and appropriate quality, quantity and organization of sentences  Mood: euthymic  Affect: mood congruent  Thought Process: goal-directed, logical  Thought Content: normal, no suicidality, no homicidality, delusions, or paranoia;did not appear to be responding to internal stimuli during the interview.   Orientation: grossly intact  Memory: grossly intact  Attention Span/Concentration: Attends to interview without distraction; reports no difficulty  Fund of Knowledge: average  Estimate of Intelligence: average from verbal skills and history  Cognition: grossly intact  Insight: patient has awareness of illness; good insight into own behavior and behavior of others  Judgment: the patient's behavior is adequate to circumstances      Adjustment Assessment to Current Illness:    Oncology History   Small cell carcinoma of overlapping sites of right lung   5/3/2023 Initial Diagnosis    Small cell carcinoma of overlapping sites of right lung     5/3/2023 Cancer Staged    Staging form: Lung, AJCC 8th Edition  - Clinical stage from 5/3/2023: Stage IVB (cT4, cN3, cM1c)     5/4/2023 - " 5/26/2023 Chemotherapy    Treatment Summary   Plan Name: OP CISPLATIN 75MG/M2 ETOPOSIDE 100MG/M2 Q3W  Treatment Goal: Curative  Status: Inactive  Start Date: 5/4/2023  End Date: 5/26/2023  Provider: Iker Westfall DO  Chemotherapy: CISplatin (Platinol) 75 mg/m2 = 154 mg in sodium chloride 0.9% 719 mL chemo infusion, 75 mg/m2 = 154 mg, Intravenous, Clinic/HOD 1 time, 2 of 6 cycles  Administration: 154 mg (5/4/2023), 154 mg (5/24/2023)  etoposide (VEPESID) 100 mg/m2 = 206 mg in sodium chloride 0.9% 510.3 mL chemo infusion, 100 mg/m2 = 206 mg, Intravenous, Clinic/HOD 1 time, 2 of 6 cycles  Administration: 206 mg (5/4/2023), 206 mg (5/5/2023), 206 mg (5/6/2023), 206 mg (5/24/2023), 206 mg (5/25/2023), 206 mg (5/26/2023)     6/14/2023 - 6/14/2023 Chemotherapy    Treatment Summary   Plan Name: OP ATEZOLIZUMAB CARBOPLATIN ETOPOSIDE Q3W  Treatment Goal: Palliative  Status: Inactive  Start Date:   End Date:   Provider: Iker Westfall DO  Chemotherapy: CARBOplatin (PARAPLATIN) in sodium chloride 0.9% 250 mL chemo infusion, , Intravenous, Clinic/HOD 1 time, 0 of 4 cycles  etoposide (VEPESID) 100 mg/m2 = 206 mg in sodium chloride 0.9% 510.3 mL chemo infusion, 100 mg/m2, Intravenous, Clinic/HOD 1 time, 0 of 4 cycles       Malathi Martinez has adjusted to illness with difficulty primarily through active coping strategies. She has engaged in appropriate information gathering.  The patient has good family/friend support.  Her support system is coping marginally with the diagnosis/treatment/prognosis. Illness-related psychosocial stressors include absence from work and changes in ability to engage in leisure activities.  The patient has a good partnership with her [facility] treatment team.     NCCN Distress thermometer:       7/19/2023    11:27 AM 5/24/2023     9:32 AM   DISTRESS SCREENING   Distress Score 6 6   Practical Concerns None of these None of these   Social Concerns None of these None of these    Emotional Concerns Fears;Nervousness;Worry Worry;Fears;Nervousness   Retire Spiritual or Episcopalian Concerns No No   Physical Concerns None of these None of these   Other Problems  having to get chemo again        Symptoms:   Mood: depressed mood, diminished interest, fatigue, and poor concentration;  no prior; no SI/HI; PHQ-9: 12  Anxiety: Feeling nervous, anxious, or on edge, Difficulty relaxing, Restlessness, Irritability, and Fear of unknown; no prior;  JEAN CLAUDE-7=17  Substance abuse: denied  Cognitive functioning: denied  Health behaviors:   Sleep: Patient reported consistent restful sleep, but stated that she has occasions when she wakes up during the night. She reported feeling tired throughout the day.  Pain: Ms. Martinez reports no physical pain.     Assessment - Diagnosis - Goals:   Adjustment Disorder with Depressed Mood    Plan:individual psychotherapy    Summary and Recommendations  Ms. Martinez appears to be coping with her diagnosis and proposed treatment course with some difficulty. Patient brought her  to this session in an effort to support him in his fear of her cancer. This session consisted of gathering information about the history of their relationship, and what goals they would like to set for therapy. Malathi set a goal that she would like to improve their communication about her illness. Her  Iker requested more time to think about what his goals are. It is recommended that they get the book Hold Me Tight, and read it together to continue to make progress toward communication between sessions.    Return to clinic: as scheduled    GOALS:   Develop insight into thoughts and behaviors  Adopt new coping skills as a couple  Improve communication as a couple    Comfort Cheema, PhD  Clinical Health Psychology Fellow

## 2024-06-14 PROBLEM — F17.201 TOBACCO ABUSE, IN REMISSION: Status: ACTIVE | Noted: 2024-06-14

## 2024-06-17 ENCOUNTER — LAB VISIT (OUTPATIENT)
Dept: LAB | Facility: HOSPITAL | Age: 64
End: 2024-06-17
Attending: INTERNAL MEDICINE
Payer: COMMERCIAL

## 2024-06-17 DIAGNOSIS — R53.83 FATIGUE: ICD-10-CM

## 2024-06-17 DIAGNOSIS — C34.11 MALIGNANT NEOPLASM OF UPPER LOBE OF RIGHT LUNG: ICD-10-CM

## 2024-06-17 DIAGNOSIS — D50.9 IRON DEFICIENCY ANEMIA, UNSPECIFIED: ICD-10-CM

## 2024-06-17 DIAGNOSIS — Z79.899 HIGH RISK MEDICATION USE: ICD-10-CM

## 2024-06-17 DIAGNOSIS — E78.5 HYPERLIPIDEMIA, UNSPECIFIED HYPERLIPIDEMIA TYPE: ICD-10-CM

## 2024-06-17 DIAGNOSIS — E11.9 TYPE 2 DIABETES MELLITUS WITHOUT COMPLICATION, WITHOUT LONG-TERM CURRENT USE OF INSULIN: ICD-10-CM

## 2024-06-17 DIAGNOSIS — E55.9 VITAMIN D DEFICIENCY: ICD-10-CM

## 2024-06-17 LAB
25(OH)D3+25(OH)D2 SERPL-MCNC: 36 NG/ML (ref 30–96)
ALBUMIN SERPL BCP-MCNC: 3.3 G/DL (ref 3.5–5.2)
ALP SERPL-CCNC: 74 U/L (ref 55–135)
ALT SERPL W/O P-5'-P-CCNC: 133 U/L (ref 10–44)
ANION GAP SERPL CALC-SCNC: 8 MMOL/L (ref 8–16)
AST SERPL-CCNC: 56 U/L (ref 10–40)
BASOPHILS # BLD AUTO: 0 K/UL (ref 0–0.2)
BASOPHILS NFR BLD: 0 % (ref 0–1.9)
BILIRUB SERPL-MCNC: 0.4 MG/DL (ref 0.1–1)
BUN SERPL-MCNC: 10 MG/DL (ref 8–23)
CALCIUM SERPL-MCNC: 8.8 MG/DL (ref 8.7–10.5)
CHLORIDE SERPL-SCNC: 104 MMOL/L (ref 95–110)
CHOLEST SERPL-MCNC: 151 MG/DL (ref 120–199)
CHOLEST/HDLC SERPL: 2.8 {RATIO} (ref 2–5)
CO2 SERPL-SCNC: 24 MMOL/L (ref 23–29)
CREAT SERPL-MCNC: 1.1 MG/DL (ref 0.5–1.4)
DIFFERENTIAL METHOD BLD: ABNORMAL
EOSINOPHIL # BLD AUTO: 0 K/UL (ref 0–0.5)
EOSINOPHIL NFR BLD: 0.6 % (ref 0–8)
ERYTHROCYTE [DISTWIDTH] IN BLOOD BY AUTOMATED COUNT: 16.1 % (ref 11.5–14.5)
EST. GFR  (NO RACE VARIABLE): 56.1 ML/MIN/1.73 M^2
ESTIMATED AVG GLUCOSE: 103 MG/DL (ref 68–131)
FOLATE SERPL-MCNC: 15.9 NG/ML (ref 4–24)
GLUCOSE SERPL-MCNC: 92 MG/DL (ref 70–110)
HBA1C MFR BLD: 5.2 % (ref 4–5.6)
HCT VFR BLD AUTO: 33.6 % (ref 37–48.5)
HDLC SERPL-MCNC: 54 MG/DL (ref 40–75)
HDLC SERPL: 35.8 % (ref 20–50)
HGB BLD-MCNC: 11.1 G/DL (ref 12–16)
IMM GRANULOCYTES # BLD AUTO: 0 K/UL (ref 0–0.04)
IMM GRANULOCYTES NFR BLD AUTO: 0 % (ref 0–0.5)
IRON SERPL-MCNC: 257 UG/DL (ref 30–160)
LDLC SERPL CALC-MCNC: 69.8 MG/DL (ref 63–159)
LYMPHOCYTES # BLD AUTO: 1.2 K/UL (ref 1–4.8)
LYMPHOCYTES NFR BLD: 34.6 % (ref 18–48)
MCH RBC QN AUTO: 31.3 PG (ref 27–31)
MCHC RBC AUTO-ENTMCNC: 33 G/DL (ref 32–36)
MCV RBC AUTO: 95 FL (ref 82–98)
MONOCYTES # BLD AUTO: 0.2 K/UL (ref 0.3–1)
MONOCYTES NFR BLD: 4.2 % (ref 4–15)
NEUTROPHILS # BLD AUTO: 2.1 K/UL (ref 1.8–7.7)
NEUTROPHILS NFR BLD: 60.6 % (ref 38–73)
NONHDLC SERPL-MCNC: 97 MG/DL
NRBC BLD-RTO: 0 /100 WBC
PLATELET # BLD AUTO: 241 K/UL (ref 150–450)
PMV BLD AUTO: 9.9 FL (ref 9.2–12.9)
POTASSIUM SERPL-SCNC: 4.1 MMOL/L (ref 3.5–5.1)
PROT SERPL-MCNC: 6.4 G/DL (ref 6–8.4)
RBC # BLD AUTO: 3.55 M/UL (ref 4–5.4)
SODIUM SERPL-SCNC: 136 MMOL/L (ref 136–145)
TRIGL SERPL-MCNC: 136 MG/DL (ref 30–150)
VIT B12 SERPL-MCNC: 520 PG/ML (ref 210–950)
WBC # BLD AUTO: 3.53 K/UL (ref 3.9–12.7)

## 2024-06-17 PROCEDURE — 83540 ASSAY OF IRON: CPT | Performed by: FAMILY MEDICINE

## 2024-06-17 PROCEDURE — 83036 HEMOGLOBIN GLYCOSYLATED A1C: CPT | Performed by: FAMILY MEDICINE

## 2024-06-17 PROCEDURE — 80061 LIPID PANEL: CPT | Performed by: FAMILY MEDICINE

## 2024-06-17 PROCEDURE — 82746 ASSAY OF FOLIC ACID SERUM: CPT | Performed by: FAMILY MEDICINE

## 2024-06-17 PROCEDURE — 82306 VITAMIN D 25 HYDROXY: CPT | Performed by: FAMILY MEDICINE

## 2024-06-17 PROCEDURE — 82607 VITAMIN B-12: CPT | Performed by: FAMILY MEDICINE

## 2024-06-17 PROCEDURE — 80053 COMPREHEN METABOLIC PANEL: CPT | Performed by: INTERNAL MEDICINE

## 2024-06-17 PROCEDURE — 85025 COMPLETE CBC W/AUTO DIFF WBC: CPT | Performed by: INTERNAL MEDICINE

## 2024-06-17 PROCEDURE — 36415 COLL VENOUS BLD VENIPUNCTURE: CPT | Mod: PN | Performed by: FAMILY MEDICINE

## 2024-06-24 ENCOUNTER — LAB VISIT (OUTPATIENT)
Dept: LAB | Facility: HOSPITAL | Age: 64
End: 2024-06-24
Attending: INTERNAL MEDICINE
Payer: COMMERCIAL

## 2024-06-24 DIAGNOSIS — D50.9 IRON DEFICIENCY ANEMIA, UNSPECIFIED: ICD-10-CM

## 2024-06-24 DIAGNOSIS — C34.11 MALIGNANT NEOPLASM OF UPPER LOBE OF RIGHT LUNG: ICD-10-CM

## 2024-06-24 DIAGNOSIS — R53.83 FATIGUE: ICD-10-CM

## 2024-06-24 LAB
ALBUMIN SERPL BCP-MCNC: 3.6 G/DL (ref 3.5–5.2)
ALBUMIN SERPL BCP-MCNC: 3.6 G/DL (ref 3.5–5.2)
ALP SERPL-CCNC: 98 U/L (ref 55–135)
ALP SERPL-CCNC: 98 U/L (ref 55–135)
ALT SERPL W/O P-5'-P-CCNC: 52 U/L (ref 10–44)
ALT SERPL W/O P-5'-P-CCNC: 52 U/L (ref 10–44)
ANION GAP SERPL CALC-SCNC: 12 MMOL/L (ref 8–16)
ANION GAP SERPL CALC-SCNC: 12 MMOL/L (ref 8–16)
AST SERPL-CCNC: 25 U/L (ref 10–40)
AST SERPL-CCNC: 25 U/L (ref 10–40)
BASOPHILS # BLD AUTO: 0 K/UL (ref 0–0.2)
BASOPHILS # BLD AUTO: 0 K/UL (ref 0–0.2)
BASOPHILS NFR BLD: 0 % (ref 0–1.9)
BASOPHILS NFR BLD: 0 % (ref 0–1.9)
BILIRUB SERPL-MCNC: 0.3 MG/DL (ref 0.1–1)
BILIRUB SERPL-MCNC: 0.3 MG/DL (ref 0.1–1)
BUN SERPL-MCNC: 6 MG/DL (ref 8–23)
BUN SERPL-MCNC: 6 MG/DL (ref 8–23)
CALCIUM SERPL-MCNC: 9.9 MG/DL (ref 8.7–10.5)
CALCIUM SERPL-MCNC: 9.9 MG/DL (ref 8.7–10.5)
CHLORIDE SERPL-SCNC: 100 MMOL/L (ref 95–110)
CHLORIDE SERPL-SCNC: 100 MMOL/L (ref 95–110)
CO2 SERPL-SCNC: 22 MMOL/L (ref 23–29)
CO2 SERPL-SCNC: 22 MMOL/L (ref 23–29)
CREAT SERPL-MCNC: 1.1 MG/DL (ref 0.5–1.4)
CREAT SERPL-MCNC: 1.1 MG/DL (ref 0.5–1.4)
DIFFERENTIAL METHOD BLD: ABNORMAL
DIFFERENTIAL METHOD BLD: ABNORMAL
EOSINOPHIL # BLD AUTO: 0 K/UL (ref 0–0.5)
EOSINOPHIL # BLD AUTO: 0 K/UL (ref 0–0.5)
EOSINOPHIL NFR BLD: 0.9 % (ref 0–8)
EOSINOPHIL NFR BLD: 0.9 % (ref 0–8)
ERYTHROCYTE [DISTWIDTH] IN BLOOD BY AUTOMATED COUNT: 15.2 % (ref 11.5–14.5)
ERYTHROCYTE [DISTWIDTH] IN BLOOD BY AUTOMATED COUNT: 15.2 % (ref 11.5–14.5)
EST. GFR  (NO RACE VARIABLE): 56.1 ML/MIN/1.73 M^2
EST. GFR  (NO RACE VARIABLE): 56.1 ML/MIN/1.73 M^2
GLUCOSE SERPL-MCNC: 82 MG/DL (ref 70–110)
GLUCOSE SERPL-MCNC: 82 MG/DL (ref 70–110)
HCT VFR BLD AUTO: 33.7 % (ref 37–48.5)
HCT VFR BLD AUTO: 33.7 % (ref 37–48.5)
HGB BLD-MCNC: 11.1 G/DL (ref 12–16)
HGB BLD-MCNC: 11.1 G/DL (ref 12–16)
IMM GRANULOCYTES # BLD AUTO: 0 K/UL (ref 0–0.04)
IMM GRANULOCYTES # BLD AUTO: 0 K/UL (ref 0–0.04)
IMM GRANULOCYTES NFR BLD AUTO: 0 % (ref 0–0.5)
IMM GRANULOCYTES NFR BLD AUTO: 0 % (ref 0–0.5)
LYMPHOCYTES # BLD AUTO: 1.2 K/UL (ref 1–4.8)
LYMPHOCYTES # BLD AUTO: 1.2 K/UL (ref 1–4.8)
LYMPHOCYTES NFR BLD: 57.1 % (ref 18–48)
LYMPHOCYTES NFR BLD: 57.1 % (ref 18–48)
MCH RBC QN AUTO: 31 PG (ref 27–31)
MCH RBC QN AUTO: 31 PG (ref 27–31)
MCHC RBC AUTO-ENTMCNC: 32.9 G/DL (ref 32–36)
MCHC RBC AUTO-ENTMCNC: 32.9 G/DL (ref 32–36)
MCV RBC AUTO: 94 FL (ref 82–98)
MCV RBC AUTO: 94 FL (ref 82–98)
MONOCYTES # BLD AUTO: 0.3 K/UL (ref 0.3–1)
MONOCYTES # BLD AUTO: 0.3 K/UL (ref 0.3–1)
MONOCYTES NFR BLD: 11.8 % (ref 4–15)
MONOCYTES NFR BLD: 11.8 % (ref 4–15)
NEUTROPHILS # BLD AUTO: 0.6 K/UL (ref 1.8–7.7)
NEUTROPHILS # BLD AUTO: 0.6 K/UL (ref 1.8–7.7)
NEUTROPHILS NFR BLD: 30.2 % (ref 38–73)
NEUTROPHILS NFR BLD: 30.2 % (ref 38–73)
NRBC BLD-RTO: 0 /100 WBC
NRBC BLD-RTO: 0 /100 WBC
PLATELET # BLD AUTO: 174 K/UL (ref 150–450)
PLATELET # BLD AUTO: 174 K/UL (ref 150–450)
PLATELET BLD QL SMEAR: ABNORMAL
PLATELET BLD QL SMEAR: ABNORMAL
PMV BLD AUTO: 10.2 FL (ref 9.2–12.9)
PMV BLD AUTO: 10.2 FL (ref 9.2–12.9)
POTASSIUM SERPL-SCNC: 3.8 MMOL/L (ref 3.5–5.1)
POTASSIUM SERPL-SCNC: 3.8 MMOL/L (ref 3.5–5.1)
PROT SERPL-MCNC: 7 G/DL (ref 6–8.4)
PROT SERPL-MCNC: 7 G/DL (ref 6–8.4)
RBC # BLD AUTO: 3.58 M/UL (ref 4–5.4)
RBC # BLD AUTO: 3.58 M/UL (ref 4–5.4)
SODIUM SERPL-SCNC: 134 MMOL/L (ref 136–145)
SODIUM SERPL-SCNC: 134 MMOL/L (ref 136–145)
WBC # BLD AUTO: 2.12 K/UL (ref 3.9–12.7)
WBC # BLD AUTO: 2.12 K/UL (ref 3.9–12.7)

## 2024-06-24 PROCEDURE — 80053 COMPREHEN METABOLIC PANEL: CPT

## 2024-06-24 PROCEDURE — 36415 COLL VENOUS BLD VENIPUNCTURE: CPT | Mod: PN

## 2024-06-24 PROCEDURE — 85025 COMPLETE CBC W/AUTO DIFF WBC: CPT

## 2024-07-01 ENCOUNTER — LAB VISIT (OUTPATIENT)
Dept: LAB | Facility: HOSPITAL | Age: 64
End: 2024-07-01
Attending: FAMILY MEDICINE
Payer: COMMERCIAL

## 2024-07-01 DIAGNOSIS — R53.83 FATIGUE: ICD-10-CM

## 2024-07-01 DIAGNOSIS — D50.9 IRON DEFICIENCY ANEMIA, UNSPECIFIED: ICD-10-CM

## 2024-07-01 DIAGNOSIS — C34.11 MALIGNANT NEOPLASM OF UPPER LOBE OF RIGHT LUNG: ICD-10-CM

## 2024-07-01 LAB
ALBUMIN SERPL BCP-MCNC: 3.4 G/DL (ref 3.5–5.2)
ALP SERPL-CCNC: 88 U/L (ref 55–135)
ALT SERPL W/O P-5'-P-CCNC: 30 U/L (ref 10–44)
ANION GAP SERPL CALC-SCNC: 8 MMOL/L (ref 8–16)
ANISOCYTOSIS BLD QL SMEAR: SLIGHT
AST SERPL-CCNC: 19 U/L (ref 10–40)
BASOPHILS # BLD AUTO: 0 K/UL (ref 0–0.2)
BASOPHILS NFR BLD: 0 % (ref 0–1.9)
BILIRUB SERPL-MCNC: 0.3 MG/DL (ref 0.1–1)
BUN SERPL-MCNC: 5 MG/DL (ref 8–23)
CALCIUM SERPL-MCNC: 9 MG/DL (ref 8.7–10.5)
CHLORIDE SERPL-SCNC: 104 MMOL/L (ref 95–110)
CO2 SERPL-SCNC: 25 MMOL/L (ref 23–29)
CREAT SERPL-MCNC: 1 MG/DL (ref 0.5–1.4)
DACRYOCYTES BLD QL SMEAR: ABNORMAL
DIFFERENTIAL METHOD BLD: ABNORMAL
EOSINOPHIL # BLD AUTO: 0 K/UL (ref 0–0.5)
EOSINOPHIL NFR BLD: 1.1 % (ref 0–8)
ERYTHROCYTE [DISTWIDTH] IN BLOOD BY AUTOMATED COUNT: 15.8 % (ref 11.5–14.5)
EST. GFR  (NO RACE VARIABLE): >60 ML/MIN/1.73 M^2
GLUCOSE SERPL-MCNC: 70 MG/DL (ref 70–110)
HCT VFR BLD AUTO: 33.4 % (ref 37–48.5)
HGB BLD-MCNC: 11 G/DL (ref 12–16)
IMM GRANULOCYTES # BLD AUTO: 0.03 K/UL (ref 0–0.04)
IMM GRANULOCYTES NFR BLD AUTO: 1.1 % (ref 0–0.5)
LYMPHOCYTES # BLD AUTO: 1.1 K/UL (ref 1–4.8)
LYMPHOCYTES NFR BLD: 41.5 % (ref 18–48)
MCH RBC QN AUTO: 31.6 PG (ref 27–31)
MCHC RBC AUTO-ENTMCNC: 32.9 G/DL (ref 32–36)
MCV RBC AUTO: 96 FL (ref 82–98)
MONOCYTES # BLD AUTO: 0.7 K/UL (ref 0.3–1)
MONOCYTES NFR BLD: 24.1 % (ref 4–15)
NEUTROPHILS # BLD AUTO: 0.9 K/UL (ref 1.8–7.7)
NEUTROPHILS NFR BLD: 32.2 % (ref 38–73)
NRBC BLD-RTO: 0 /100 WBC
OVALOCYTES BLD QL SMEAR: ABNORMAL
PLATELET # BLD AUTO: 244 K/UL (ref 150–450)
PLATELET BLD QL SMEAR: ABNORMAL
PMV BLD AUTO: 9.4 FL (ref 9.2–12.9)
POIKILOCYTOSIS BLD QL SMEAR: SLIGHT
POTASSIUM SERPL-SCNC: 3.4 MMOL/L (ref 3.5–5.1)
PROT SERPL-MCNC: 6.5 G/DL (ref 6–8.4)
RBC # BLD AUTO: 3.48 M/UL (ref 4–5.4)
SODIUM SERPL-SCNC: 137 MMOL/L (ref 136–145)
SPHEROCYTES BLD QL SMEAR: ABNORMAL
WBC # BLD AUTO: 2.7 K/UL (ref 3.9–12.7)

## 2024-07-01 PROCEDURE — 36415 COLL VENOUS BLD VENIPUNCTURE: CPT | Mod: PN

## 2024-07-01 PROCEDURE — 80053 COMPREHEN METABOLIC PANEL: CPT

## 2024-07-01 PROCEDURE — 85025 COMPLETE CBC W/AUTO DIFF WBC: CPT

## 2024-07-06 DIAGNOSIS — F41.1 GAD (GENERALIZED ANXIETY DISORDER): ICD-10-CM

## 2024-07-06 NOTE — TELEPHONE ENCOUNTER
No care due was identified.  [facility] Embedded Care Due Messages. Reference number: [id number].   7/06/2024 6:01:38 AM CDT

## 2024-07-08 RX ORDER — ALPRAZOLAM 0.25 MG/1
0.25 TABLET ORAL 2 TIMES DAILY PRN
Qty: 60 TABLET | Refills: 3 | Status: SHIPPED | OUTPATIENT
Start: 2024-07-08

## 2024-07-09 ENCOUNTER — LAB VISIT (OUTPATIENT)
Dept: LAB | Facility: HOSPITAL | Age: 64
End: 2024-07-09
Attending: FAMILY MEDICINE
Payer: COMMERCIAL

## 2024-07-09 DIAGNOSIS — C34.11 MALIGNANT NEOPLASM OF UPPER LOBE OF RIGHT LUNG: ICD-10-CM

## 2024-07-09 DIAGNOSIS — D50.9 IRON DEFICIENCY ANEMIA, UNSPECIFIED: ICD-10-CM

## 2024-07-09 DIAGNOSIS — R53.83 FATIGUE: ICD-10-CM

## 2024-07-09 LAB
ALBUMIN SERPL BCP-MCNC: 3.2 G/DL (ref 3.5–5.2)
ALP SERPL-CCNC: 80 U/L (ref 55–135)
ALT SERPL W/O P-5'-P-CCNC: 227 U/L (ref 10–44)
ANION GAP SERPL CALC-SCNC: 10 MMOL/L (ref 8–16)
AST SERPL-CCNC: 89 U/L (ref 10–40)
BASOPHILS # BLD AUTO: 0 K/UL (ref 0–0.2)
BASOPHILS NFR BLD: 0 % (ref 0–1.9)
BILIRUB SERPL-MCNC: 0.5 MG/DL (ref 0.1–1)
BUN SERPL-MCNC: 13 MG/DL (ref 8–23)
CALCIUM SERPL-MCNC: 8.6 MG/DL (ref 8.7–10.5)
CHLORIDE SERPL-SCNC: 101 MMOL/L (ref 95–110)
CO2 SERPL-SCNC: 22 MMOL/L (ref 23–29)
CREAT SERPL-MCNC: 1.2 MG/DL (ref 0.5–1.4)
DIFFERENTIAL METHOD BLD: ABNORMAL
EOSINOPHIL # BLD AUTO: 0 K/UL (ref 0–0.5)
EOSINOPHIL NFR BLD: 0.5 % (ref 0–8)
ERYTHROCYTE [DISTWIDTH] IN BLOOD BY AUTOMATED COUNT: 15 % (ref 11.5–14.5)
EST. GFR  (NO RACE VARIABLE): 50.5 ML/MIN/1.73 M^2
GLUCOSE SERPL-MCNC: 109 MG/DL (ref 70–110)
HCT VFR BLD AUTO: 33.1 % (ref 37–48.5)
HGB BLD-MCNC: 10.7 G/DL (ref 12–16)
IMM GRANULOCYTES # BLD AUTO: 0 K/UL (ref 0–0.04)
IMM GRANULOCYTES NFR BLD AUTO: 0 % (ref 0–0.5)
LYMPHOCYTES # BLD AUTO: 1.3 K/UL (ref 1–4.8)
LYMPHOCYTES NFR BLD: 34 % (ref 18–48)
MCH RBC QN AUTO: 31 PG (ref 27–31)
MCHC RBC AUTO-ENTMCNC: 32.3 G/DL (ref 32–36)
MCV RBC AUTO: 96 FL (ref 82–98)
MONOCYTES # BLD AUTO: 0.2 K/UL (ref 0.3–1)
MONOCYTES NFR BLD: 4.6 % (ref 4–15)
NEUTROPHILS # BLD AUTO: 2.2 K/UL (ref 1.8–7.7)
NEUTROPHILS NFR BLD: 60.9 % (ref 38–73)
NRBC BLD-RTO: 0 /100 WBC
PLATELET # BLD AUTO: 198 K/UL (ref 150–450)
PMV BLD AUTO: 9.9 FL (ref 9.2–12.9)
POTASSIUM SERPL-SCNC: 3.8 MMOL/L (ref 3.5–5.1)
PROT SERPL-MCNC: 6.2 G/DL (ref 6–8.4)
RBC # BLD AUTO: 3.45 M/UL (ref 4–5.4)
SODIUM SERPL-SCNC: 133 MMOL/L (ref 136–145)
WBC # BLD AUTO: 3.68 K/UL (ref 3.9–12.7)

## 2024-07-09 PROCEDURE — 85025 COMPLETE CBC W/AUTO DIFF WBC: CPT | Performed by: INTERNAL MEDICINE

## 2024-07-09 PROCEDURE — 80053 COMPREHEN METABOLIC PANEL: CPT | Performed by: INTERNAL MEDICINE

## 2024-07-09 PROCEDURE — 36415 COLL VENOUS BLD VENIPUNCTURE: CPT | Mod: PN | Performed by: INTERNAL MEDICINE

## 2024-07-15 ENCOUNTER — LAB VISIT (OUTPATIENT)
Dept: LAB | Facility: HOSPITAL | Age: 64
End: 2024-07-15
Attending: FAMILY MEDICINE
Payer: COMMERCIAL

## 2024-07-15 DIAGNOSIS — R53.83 FATIGUE: ICD-10-CM

## 2024-07-15 DIAGNOSIS — D50.9 IRON DEFICIENCY ANEMIA, UNSPECIFIED: ICD-10-CM

## 2024-07-15 DIAGNOSIS — C34.11 MALIGNANT NEOPLASM OF UPPER LOBE OF RIGHT LUNG: ICD-10-CM

## 2024-07-15 LAB
ALBUMIN SERPL BCP-MCNC: 3.2 G/DL (ref 3.5–5.2)
ALP SERPL-CCNC: 87 U/L (ref 55–135)
ALT SERPL W/O P-5'-P-CCNC: 60 U/L (ref 10–44)
ANION GAP SERPL CALC-SCNC: 9 MMOL/L (ref 8–16)
AST SERPL-CCNC: 19 U/L (ref 10–40)
BASOPHILS # BLD AUTO: ABNORMAL K/UL (ref 0–0.2)
BASOPHILS NFR BLD: 0 % (ref 0–1.9)
BILIRUB SERPL-MCNC: 0.3 MG/DL (ref 0.1–1)
BUN SERPL-MCNC: 7 MG/DL (ref 8–23)
CALCIUM SERPL-MCNC: 9 MG/DL (ref 8.7–10.5)
CHLORIDE SERPL-SCNC: 105 MMOL/L (ref 95–110)
CO2 SERPL-SCNC: 23 MMOL/L (ref 23–29)
CREAT SERPL-MCNC: 1 MG/DL (ref 0.5–1.4)
DIFFERENTIAL METHOD BLD: ABNORMAL
EOSINOPHIL # BLD AUTO: ABNORMAL K/UL (ref 0–0.5)
EOSINOPHIL NFR BLD: 2 % (ref 0–8)
ERYTHROCYTE [DISTWIDTH] IN BLOOD BY AUTOMATED COUNT: 14.2 % (ref 11.5–14.5)
EST. GFR  (NO RACE VARIABLE): >60 ML/MIN/1.73 M^2
GLUCOSE SERPL-MCNC: 102 MG/DL (ref 70–110)
HCT VFR BLD AUTO: 30.2 % (ref 37–48.5)
HGB BLD-MCNC: 10.1 G/DL (ref 12–16)
IMM GRANULOCYTES # BLD AUTO: ABNORMAL K/UL (ref 0–0.04)
IMM GRANULOCYTES NFR BLD AUTO: ABNORMAL % (ref 0–0.5)
LYMPHOCYTES # BLD AUTO: ABNORMAL K/UL (ref 1–4.8)
LYMPHOCYTES NFR BLD: 80 % (ref 18–48)
MCH RBC QN AUTO: 31.5 PG (ref 27–31)
MCHC RBC AUTO-ENTMCNC: 33.4 G/DL (ref 32–36)
MCV RBC AUTO: 94 FL (ref 82–98)
MONOCYTES # BLD AUTO: ABNORMAL K/UL (ref 0.3–1)
MONOCYTES NFR BLD: 2 % (ref 4–15)
NEUTROPHILS NFR BLD: 16 % (ref 38–73)
NRBC BLD-RTO: 0 /100 WBC
PLATELET # BLD AUTO: 74 K/UL (ref 150–450)
PLATELET BLD QL SMEAR: ABNORMAL
PMV BLD AUTO: 10.5 FL (ref 9.2–12.9)
POTASSIUM SERPL-SCNC: 4.2 MMOL/L (ref 3.5–5.1)
PROT SERPL-MCNC: 6.4 G/DL (ref 6–8.4)
RBC # BLD AUTO: 3.21 M/UL (ref 4–5.4)
SODIUM SERPL-SCNC: 137 MMOL/L (ref 136–145)
WBC # BLD AUTO: 0.91 K/UL (ref 3.9–12.7)

## 2024-07-15 PROCEDURE — 85007 BL SMEAR W/DIFF WBC COUNT: CPT | Performed by: INTERNAL MEDICINE

## 2024-07-15 PROCEDURE — 80053 COMPREHEN METABOLIC PANEL: CPT | Performed by: INTERNAL MEDICINE

## 2024-07-15 PROCEDURE — 36415 COLL VENOUS BLD VENIPUNCTURE: CPT | Mod: PN | Performed by: INTERNAL MEDICINE

## 2024-07-15 PROCEDURE — 85027 COMPLETE CBC AUTOMATED: CPT | Performed by: INTERNAL MEDICINE

## 2024-07-18 ENCOUNTER — OFFICE VISIT (OUTPATIENT)
Dept: FAMILY MEDICINE | Facility: CLINIC | Age: 64
End: 2024-07-18
Payer: COMMERCIAL

## 2024-07-18 VITALS
HEART RATE: 91 BPM | WEIGHT: 206.38 LBS | BODY MASS INDEX: 29.54 KG/M2 | SYSTOLIC BLOOD PRESSURE: 104 MMHG | OXYGEN SATURATION: 98 % | HEIGHT: 70 IN | DIASTOLIC BLOOD PRESSURE: 60 MMHG

## 2024-07-18 DIAGNOSIS — F41.1 GAD (GENERALIZED ANXIETY DISORDER): ICD-10-CM

## 2024-07-18 DIAGNOSIS — C34.81 SMALL CELL CARCINOMA OF OVERLAPPING SITES OF RIGHT LUNG: ICD-10-CM

## 2024-07-18 DIAGNOSIS — J44.9 CHRONIC OBSTRUCTIVE PULMONARY DISEASE, UNSPECIFIED COPD TYPE: ICD-10-CM

## 2024-07-18 DIAGNOSIS — E11.42 DIABETIC POLYNEUROPATHY ASSOCIATED WITH TYPE 2 DIABETES MELLITUS: ICD-10-CM

## 2024-07-18 DIAGNOSIS — R60.0 LEG EDEMA: Primary | ICD-10-CM

## 2024-07-18 DIAGNOSIS — K21.9 GASTROESOPHAGEAL REFLUX DISEASE, UNSPECIFIED WHETHER ESOPHAGITIS PRESENT: ICD-10-CM

## 2024-07-18 PROCEDURE — 3074F SYST BP LT 130 MM HG: CPT | Mod: CPTII,S$GLB,, | Performed by: FAMILY MEDICINE

## 2024-07-18 PROCEDURE — 99999 PR PBB SHADOW E&M-EST. PATIENT-LVL V: CPT | Mod: PBBFAC,,, | Performed by: FAMILY MEDICINE

## 2024-07-18 PROCEDURE — 3044F HG A1C LEVEL LT 7.0%: CPT | Mod: CPTII,S$GLB,, | Performed by: FAMILY MEDICINE

## 2024-07-18 PROCEDURE — 3008F BODY MASS INDEX DOCD: CPT | Mod: CPTII,S$GLB,, | Performed by: FAMILY MEDICINE

## 2024-07-18 PROCEDURE — 3078F DIAST BP <80 MM HG: CPT | Mod: CPTII,S$GLB,, | Performed by: FAMILY MEDICINE

## 2024-07-18 PROCEDURE — 4010F ACE/ARB THERAPY RXD/TAKEN: CPT | Mod: CPTII,S$GLB,, | Performed by: FAMILY MEDICINE

## 2024-07-18 PROCEDURE — 99214 OFFICE O/P EST MOD 30 MIN: CPT | Mod: S$GLB,,, | Performed by: FAMILY MEDICINE

## 2024-07-18 RX ORDER — PROCHLORPERAZINE MALEATE 10 MG
10 TABLET ORAL
COMMUNITY
Start: 2024-07-08

## 2024-07-18 RX ORDER — HYDROCODONE BITARTRATE AND ACETAMINOPHEN 5; 325 MG/1; MG/1
TABLET ORAL
COMMUNITY
Start: 2024-07-08

## 2024-07-19 ENCOUNTER — HOSPITAL ENCOUNTER (OUTPATIENT)
Dept: RADIOLOGY | Facility: HOSPITAL | Age: 64
Discharge: HOME | End: 2024-07-19
Attending: FAMILY MEDICINE
Payer: COMMERCIAL

## 2024-07-19 DIAGNOSIS — R60.0 LEG EDEMA: ICD-10-CM

## 2024-07-19 PROCEDURE — 93970 EXTREMITY STUDY: CPT | Mod: TC,PO

## 2024-07-19 PROCEDURE — 93970 EXTREMITY STUDY: CPT | Mod: 26,,, | Performed by: RADIOLOGY

## 2024-07-22 ENCOUNTER — LAB VISIT (OUTPATIENT)
Dept: LAB | Facility: HOSPITAL | Age: 64
End: 2024-07-22
Attending: FAMILY MEDICINE
Payer: COMMERCIAL

## 2024-07-22 DIAGNOSIS — R53.83 FATIGUE: ICD-10-CM

## 2024-07-22 DIAGNOSIS — D50.9 IRON DEFICIENCY ANEMIA, UNSPECIFIED: ICD-10-CM

## 2024-07-22 DIAGNOSIS — C34.11 MALIGNANT NEOPLASM OF UPPER LOBE OF RIGHT LUNG: ICD-10-CM

## 2024-07-22 LAB
ALBUMIN SERPL BCP-MCNC: 3.2 G/DL (ref 3.5–5.2)
ALP SERPL-CCNC: 87 U/L (ref 55–135)
ALT SERPL W/O P-5'-P-CCNC: 27 U/L (ref 10–44)
ANION GAP SERPL CALC-SCNC: 9 MMOL/L (ref 8–16)
AST SERPL-CCNC: 18 U/L (ref 10–40)
BILIRUB SERPL-MCNC: 0.2 MG/DL (ref 0.1–1)
BUN SERPL-MCNC: 4 MG/DL (ref 8–23)
CALCIUM SERPL-MCNC: 8.9 MG/DL (ref 8.7–10.5)
CHLORIDE SERPL-SCNC: 107 MMOL/L (ref 95–110)
CO2 SERPL-SCNC: 23 MMOL/L (ref 23–29)
CREAT SERPL-MCNC: 1 MG/DL (ref 0.5–1.4)
EST. GFR  (NO RACE VARIABLE): >60 ML/MIN/1.73 M^2
GLUCOSE SERPL-MCNC: 80 MG/DL (ref 70–110)
POTASSIUM SERPL-SCNC: 3.5 MMOL/L (ref 3.5–5.1)
PROT SERPL-MCNC: 6.3 G/DL (ref 6–8.4)
SODIUM SERPL-SCNC: 139 MMOL/L (ref 136–145)

## 2024-07-22 PROCEDURE — 36415 COLL VENOUS BLD VENIPUNCTURE: CPT | Mod: PN | Performed by: INTERNAL MEDICINE

## 2024-07-22 PROCEDURE — 85027 COMPLETE CBC AUTOMATED: CPT | Performed by: INTERNAL MEDICINE

## 2024-07-22 PROCEDURE — 80053 COMPREHEN METABOLIC PANEL: CPT | Performed by: INTERNAL MEDICINE

## 2024-07-22 PROCEDURE — 85007 BL SMEAR W/DIFF WBC COUNT: CPT | Performed by: INTERNAL MEDICINE

## 2024-07-23 LAB
BASOPHILS # BLD AUTO: ABNORMAL K/UL (ref 0–0.2)
BASOPHILS NFR BLD: 0 % (ref 0–1.9)
DIFFERENTIAL METHOD BLD: ABNORMAL
EOSINOPHIL # BLD AUTO: ABNORMAL K/UL (ref 0–0.5)
EOSINOPHIL NFR BLD: 1 % (ref 0–8)
ERYTHROCYTE [DISTWIDTH] IN BLOOD BY AUTOMATED COUNT: 14.9 % (ref 11.5–14.5)
HCT VFR BLD AUTO: 29.1 % (ref 37–48.5)
HGB BLD-MCNC: 9.5 G/DL (ref 12–16)
IMM GRANULOCYTES # BLD AUTO: ABNORMAL K/UL (ref 0–0.04)
IMM GRANULOCYTES NFR BLD AUTO: ABNORMAL % (ref 0–0.5)
LYMPHOCYTES # BLD AUTO: ABNORMAL K/UL (ref 1–4.8)
LYMPHOCYTES NFR BLD: 27 % (ref 18–48)
MCH RBC QN AUTO: 31.1 PG (ref 27–31)
MCHC RBC AUTO-ENTMCNC: 32.6 G/DL (ref 32–36)
MCV RBC AUTO: 95 FL (ref 82–98)
MONOCYTES # BLD AUTO: ABNORMAL K/UL (ref 0.3–1)
MONOCYTES NFR BLD: 29 % (ref 4–15)
NEUTROPHILS NFR BLD: 41 % (ref 38–73)
NEUTS BAND NFR BLD MANUAL: 2 %
NRBC BLD-RTO: 1 /100 WBC
PLATELET # BLD AUTO: 213 K/UL (ref 150–450)
PLATELET BLD QL SMEAR: ABNORMAL
PMV BLD AUTO: 9.8 FL (ref 9.2–12.9)
RBC # BLD AUTO: 3.05 M/UL (ref 4–5.4)
SPHEROCYTES BLD QL SMEAR: ABNORMAL
WBC # BLD AUTO: 2.56 K/UL (ref 3.9–12.7)

## 2024-07-26 ENCOUNTER — TELEPHONE (OUTPATIENT)
Dept: PSYCHIATRY | Facility: CLINIC | Age: 64
End: 2024-07-26
Payer: COMMERCIAL

## 2024-07-26 NOTE — TELEPHONE ENCOUNTER
Called patient to schedule a follow up appointment with Dr Travis. Patient did not answer. Left a voice mail for patient to call back to schedule an appointment.

## 2024-07-29 ENCOUNTER — LAB VISIT (OUTPATIENT)
Dept: LAB | Facility: HOSPITAL | Age: 64
End: 2024-07-29
Attending: INTERNAL MEDICINE
Payer: COMMERCIAL

## 2024-07-29 DIAGNOSIS — R53.83 FATIGUE: ICD-10-CM

## 2024-07-29 DIAGNOSIS — D50.9 IRON DEFICIENCY ANEMIA, UNSPECIFIED: ICD-10-CM

## 2024-07-29 DIAGNOSIS — C34.11 MALIGNANT NEOPLASM OF UPPER LOBE OF RIGHT LUNG: ICD-10-CM

## 2024-07-29 LAB
ALBUMIN SERPL BCP-MCNC: 3.1 G/DL (ref 3.5–5.2)
ALP SERPL-CCNC: 79 U/L (ref 55–135)
ALT SERPL W/O P-5'-P-CCNC: 117 U/L (ref 10–44)
ANION GAP SERPL CALC-SCNC: 7 MMOL/L (ref 8–16)
AST SERPL-CCNC: 54 U/L (ref 10–40)
BASOPHILS # BLD AUTO: 0 K/UL (ref 0–0.2)
BASOPHILS NFR BLD: 0 % (ref 0–1.9)
BILIRUB SERPL-MCNC: 0.4 MG/DL (ref 0.1–1)
BUN SERPL-MCNC: 10 MG/DL (ref 8–23)
CALCIUM SERPL-MCNC: 8.5 MG/DL (ref 8.7–10.5)
CHLORIDE SERPL-SCNC: 107 MMOL/L (ref 95–110)
CO2 SERPL-SCNC: 23 MMOL/L (ref 23–29)
CREAT SERPL-MCNC: 1.2 MG/DL (ref 0.5–1.4)
DIFFERENTIAL METHOD BLD: ABNORMAL
EOSINOPHIL # BLD AUTO: 0 K/UL (ref 0–0.5)
EOSINOPHIL NFR BLD: 0 % (ref 0–8)
ERYTHROCYTE [DISTWIDTH] IN BLOOD BY AUTOMATED COUNT: 15.6 % (ref 11.5–14.5)
EST. GFR  (NO RACE VARIABLE): 50.5 ML/MIN/1.73 M^2
GLUCOSE SERPL-MCNC: 106 MG/DL (ref 70–110)
HCT VFR BLD AUTO: 30.4 % (ref 37–48.5)
HGB BLD-MCNC: 9.5 G/DL (ref 12–16)
IMM GRANULOCYTES # BLD AUTO: 0 K/UL (ref 0–0.04)
IMM GRANULOCYTES NFR BLD AUTO: 0 % (ref 0–0.5)
LYMPHOCYTES # BLD AUTO: 1 K/UL (ref 1–4.8)
LYMPHOCYTES NFR BLD: 34 % (ref 18–48)
MCH RBC QN AUTO: 30.7 PG (ref 27–31)
MCHC RBC AUTO-ENTMCNC: 31.3 G/DL (ref 32–36)
MCV RBC AUTO: 98 FL (ref 82–98)
MONOCYTES # BLD AUTO: 0.2 K/UL (ref 0.3–1)
MONOCYTES NFR BLD: 6.2 % (ref 4–15)
NEUTROPHILS # BLD AUTO: 1.7 K/UL (ref 1.8–7.7)
NEUTROPHILS NFR BLD: 59.8 % (ref 38–73)
NRBC BLD-RTO: 0 /100 WBC
PLATELET # BLD AUTO: 243 K/UL (ref 150–450)
PMV BLD AUTO: 9.4 FL (ref 9.2–12.9)
POTASSIUM SERPL-SCNC: 4.1 MMOL/L (ref 3.5–5.1)
PROT SERPL-MCNC: 6 G/DL (ref 6–8.4)
RBC # BLD AUTO: 3.09 M/UL (ref 4–5.4)
SODIUM SERPL-SCNC: 137 MMOL/L (ref 136–145)
WBC # BLD AUTO: 2.91 K/UL (ref 3.9–12.7)

## 2024-07-29 PROCEDURE — 80053 COMPREHEN METABOLIC PANEL: CPT | Performed by: INTERNAL MEDICINE

## 2024-07-29 PROCEDURE — 85025 COMPLETE CBC W/AUTO DIFF WBC: CPT | Performed by: INTERNAL MEDICINE

## 2024-07-29 PROCEDURE — 36415 COLL VENOUS BLD VENIPUNCTURE: CPT | Mod: PN | Performed by: INTERNAL MEDICINE

## 2024-08-04 RX ORDER — LEVOTHYROXINE SODIUM 100 UG/1
100 TABLET ORAL
Qty: 30 TABLET | Refills: 3 | Status: SHIPPED | OUTPATIENT
Start: 2024-08-04

## 2024-08-05 DIAGNOSIS — G47.9 TROUBLE IN SLEEPING: ICD-10-CM

## 2024-08-05 RX ORDER — TRAZODONE HYDROCHLORIDE 50 MG/1
50 TABLET ORAL NIGHTLY
Qty: 90 TABLET | Refills: 3 | Status: SHIPPED | OUTPATIENT
Start: 2024-08-05

## 2024-08-06 ENCOUNTER — OFFICE VISIT (OUTPATIENT)
Dept: FAMILY MEDICINE | Facility: CLINIC | Age: 64
End: 2024-08-06
Payer: COMMERCIAL

## 2024-08-06 ENCOUNTER — LAB VISIT (OUTPATIENT)
Dept: LAB | Facility: HOSPITAL | Age: 64
End: 2024-08-06
Attending: INTERNAL MEDICINE
Payer: COMMERCIAL

## 2024-08-06 VITALS — BODY MASS INDEX: 29.54 KG/M2 | HEIGHT: 70 IN | WEIGHT: 206.38 LBS

## 2024-08-06 DIAGNOSIS — M79.89 LEG SWELLING: Primary | ICD-10-CM

## 2024-08-06 DIAGNOSIS — D50.9 IRON DEFICIENCY ANEMIA, UNSPECIFIED: ICD-10-CM

## 2024-08-06 DIAGNOSIS — R53.83 FATIGUE: ICD-10-CM

## 2024-08-06 DIAGNOSIS — C34.11 MALIGNANT NEOPLASM OF UPPER LOBE OF RIGHT LUNG: ICD-10-CM

## 2024-08-06 LAB
ALBUMIN SERPL BCP-MCNC: 3.2 G/DL (ref 3.5–5.2)
ALP SERPL-CCNC: 91 U/L (ref 55–135)
ALT SERPL W/O P-5'-P-CCNC: 31 U/L (ref 10–44)
ANION GAP SERPL CALC-SCNC: 8 MMOL/L (ref 8–16)
AST SERPL-CCNC: 17 U/L (ref 10–40)
BASOPHILS NFR BLD: 0 % (ref 0–1.9)
BILIRUB SERPL-MCNC: 0.4 MG/DL (ref 0.1–1)
BUN SERPL-MCNC: 5 MG/DL (ref 8–23)
CALCIUM SERPL-MCNC: 9.4 MG/DL (ref 8.7–10.5)
CHLORIDE SERPL-SCNC: 104 MMOL/L (ref 95–110)
CO2 SERPL-SCNC: 26 MMOL/L (ref 23–29)
CREAT SERPL-MCNC: 1 MG/DL (ref 0.5–1.4)
DIFFERENTIAL METHOD BLD: ABNORMAL
EOSINOPHIL NFR BLD: 0 % (ref 0–8)
ERYTHROCYTE [DISTWIDTH] IN BLOOD BY AUTOMATED COUNT: 15 % (ref 11.5–14.5)
EST. GFR  (NO RACE VARIABLE): >60 ML/MIN/1.73 M^2
GLUCOSE SERPL-MCNC: 97 MG/DL (ref 70–110)
HCT VFR BLD AUTO: 29 % (ref 37–48.5)
HGB BLD-MCNC: 9.5 G/DL (ref 12–16)
IMM GRANULOCYTES # BLD AUTO: ABNORMAL K/UL (ref 0–0.04)
IMM GRANULOCYTES NFR BLD AUTO: ABNORMAL % (ref 0–0.5)
LYMPHOCYTES NFR BLD: 81.3 % (ref 18–48)
MCH RBC QN AUTO: 30.8 PG (ref 27–31)
MCHC RBC AUTO-ENTMCNC: 32.8 G/DL (ref 32–36)
MCV RBC AUTO: 94 FL (ref 82–98)
MONOCYTES NFR BLD: 5.1 % (ref 4–15)
NEUTROPHILS NFR BLD: 13.6 % (ref 38–73)
NRBC BLD-RTO: 0 /100 WBC
PLATELET # BLD AUTO: 129 K/UL (ref 150–450)
PLATELET BLD QL SMEAR: ABNORMAL
PMV BLD AUTO: 10.7 FL (ref 9.2–12.9)
POTASSIUM SERPL-SCNC: 3.9 MMOL/L (ref 3.5–5.1)
PROT SERPL-MCNC: 6.6 G/DL (ref 6–8.4)
RBC # BLD AUTO: 3.08 M/UL (ref 4–5.4)
SODIUM SERPL-SCNC: 138 MMOL/L (ref 136–145)
WBC # BLD AUTO: 1.33 K/UL (ref 3.9–12.7)

## 2024-08-06 PROCEDURE — 1159F MED LIST DOCD IN RCRD: CPT | Mod: CPTII,95,, | Performed by: FAMILY MEDICINE

## 2024-08-06 PROCEDURE — 99213 OFFICE O/P EST LOW 20 MIN: CPT | Mod: 95,,, | Performed by: FAMILY MEDICINE

## 2024-08-06 PROCEDURE — 36415 COLL VENOUS BLD VENIPUNCTURE: CPT | Mod: PN | Performed by: INTERNAL MEDICINE

## 2024-08-06 PROCEDURE — 4010F ACE/ARB THERAPY RXD/TAKEN: CPT | Mod: CPTII,95,, | Performed by: FAMILY MEDICINE

## 2024-08-06 PROCEDURE — 3008F BODY MASS INDEX DOCD: CPT | Mod: CPTII,95,, | Performed by: FAMILY MEDICINE

## 2024-08-06 PROCEDURE — 85027 COMPLETE CBC AUTOMATED: CPT | Performed by: INTERNAL MEDICINE

## 2024-08-06 PROCEDURE — 80053 COMPREHEN METABOLIC PANEL: CPT | Performed by: INTERNAL MEDICINE

## 2024-08-06 PROCEDURE — 3044F HG A1C LEVEL LT 7.0%: CPT | Mod: CPTII,95,, | Performed by: FAMILY MEDICINE

## 2024-08-06 PROCEDURE — 1160F RVW MEDS BY RX/DR IN RCRD: CPT | Mod: CPTII,95,, | Performed by: FAMILY MEDICINE

## 2024-08-06 PROCEDURE — 85007 BL SMEAR W/DIFF WBC COUNT: CPT | Performed by: INTERNAL MEDICINE

## 2024-08-06 RX ORDER — POTASSIUM CHLORIDE 20 MEQ/1
20 TABLET, EXTENDED RELEASE ORAL 2 TIMES DAILY
Qty: 60 TABLET | Refills: 11 | Status: SHIPPED | OUTPATIENT
Start: 2024-08-06

## 2024-08-06 RX ORDER — FUROSEMIDE 20 MG/1
20 TABLET ORAL 2 TIMES DAILY PRN
Qty: 60 TABLET | Refills: 11 | Status: SHIPPED | OUTPATIENT
Start: 2024-08-06 | End: 2025-08-06

## 2024-08-12 ENCOUNTER — LAB VISIT (OUTPATIENT)
Dept: LAB | Facility: HOSPITAL | Age: 64
End: 2024-08-12
Attending: FAMILY MEDICINE
Payer: COMMERCIAL

## 2024-08-12 DIAGNOSIS — C34.11 MALIGNANT NEOPLASM OF UPPER LOBE OF RIGHT LUNG: ICD-10-CM

## 2024-08-12 DIAGNOSIS — R53.83 FATIGUE: ICD-10-CM

## 2024-08-12 DIAGNOSIS — D50.9 IRON DEFICIENCY ANEMIA, UNSPECIFIED: ICD-10-CM

## 2024-08-12 LAB
ALBUMIN SERPL BCP-MCNC: 3.4 G/DL (ref 3.5–5.2)
ALP SERPL-CCNC: 88 U/L (ref 55–135)
ALT SERPL W/O P-5'-P-CCNC: 30 U/L (ref 10–44)
ANION GAP SERPL CALC-SCNC: 10 MMOL/L (ref 8–16)
AST SERPL-CCNC: 22 U/L (ref 10–40)
BASOPHILS # BLD AUTO: 0.01 K/UL (ref 0–0.2)
BASOPHILS NFR BLD: 0.3 % (ref 0–1.9)
BILIRUB SERPL-MCNC: 0.2 MG/DL (ref 0.1–1)
BUN SERPL-MCNC: 8 MG/DL (ref 8–23)
CALCIUM SERPL-MCNC: 9 MG/DL (ref 8.7–10.5)
CHLORIDE SERPL-SCNC: 104 MMOL/L (ref 95–110)
CO2 SERPL-SCNC: 24 MMOL/L (ref 23–29)
CREAT SERPL-MCNC: 1.1 MG/DL (ref 0.5–1.4)
DIFFERENTIAL METHOD BLD: ABNORMAL
EOSINOPHIL # BLD AUTO: 0 K/UL (ref 0–0.5)
EOSINOPHIL NFR BLD: 0.3 % (ref 0–8)
ERYTHROCYTE [DISTWIDTH] IN BLOOD BY AUTOMATED COUNT: 15.9 % (ref 11.5–14.5)
EST. GFR  (NO RACE VARIABLE): 56.1 ML/MIN/1.73 M^2
GLUCOSE SERPL-MCNC: 87 MG/DL (ref 70–110)
HCT VFR BLD AUTO: 32.7 % (ref 37–48.5)
HGB BLD-MCNC: 10.2 G/DL (ref 12–16)
IMM GRANULOCYTES # BLD AUTO: 0.06 K/UL (ref 0–0.04)
IMM GRANULOCYTES NFR BLD AUTO: 1.9 % (ref 0–0.5)
LYMPHOCYTES # BLD AUTO: 0.9 K/UL (ref 1–4.8)
LYMPHOCYTES NFR BLD: 29.1 % (ref 18–48)
MCH RBC QN AUTO: 30.8 PG (ref 27–31)
MCHC RBC AUTO-ENTMCNC: 31.2 G/DL (ref 32–36)
MCV RBC AUTO: 99 FL (ref 82–98)
MONOCYTES # BLD AUTO: 0.9 K/UL (ref 0.3–1)
MONOCYTES NFR BLD: 26.6 % (ref 4–15)
NEUTROPHILS # BLD AUTO: 1.3 K/UL (ref 1.8–7.7)
NEUTROPHILS NFR BLD: 41.8 % (ref 38–73)
NRBC BLD-RTO: 1 /100 WBC
PLATELET # BLD AUTO: 267 K/UL (ref 150–450)
PMV BLD AUTO: 9.9 FL (ref 9.2–12.9)
POTASSIUM SERPL-SCNC: 4 MMOL/L (ref 3.5–5.1)
PROT SERPL-MCNC: 6.7 G/DL (ref 6–8.4)
RBC # BLD AUTO: 3.31 M/UL (ref 4–5.4)
SODIUM SERPL-SCNC: 138 MMOL/L (ref 136–145)
WBC # BLD AUTO: 3.2 K/UL (ref 3.9–12.7)

## 2024-08-12 PROCEDURE — 80053 COMPREHEN METABOLIC PANEL: CPT | Performed by: INTERNAL MEDICINE

## 2024-08-12 PROCEDURE — 36415 COLL VENOUS BLD VENIPUNCTURE: CPT | Mod: PN | Performed by: INTERNAL MEDICINE

## 2024-08-12 PROCEDURE — 85025 COMPLETE CBC W/AUTO DIFF WBC: CPT | Performed by: INTERNAL MEDICINE

## 2024-08-19 ENCOUNTER — LAB VISIT (OUTPATIENT)
Dept: LAB | Facility: HOSPITAL | Age: 64
End: 2024-08-19
Attending: INTERNAL MEDICINE
Payer: COMMERCIAL

## 2024-08-19 DIAGNOSIS — C34.11 MALIGNANT NEOPLASM OF UPPER LOBE OF RIGHT LUNG: ICD-10-CM

## 2024-08-19 DIAGNOSIS — D50.9 IRON DEFICIENCY ANEMIA, UNSPECIFIED: ICD-10-CM

## 2024-08-19 DIAGNOSIS — T38.1X5D: ICD-10-CM

## 2024-08-19 DIAGNOSIS — R53.83 FATIGUE: ICD-10-CM

## 2024-08-19 DIAGNOSIS — C34.11 MALIGNANT NEOPLASM OF UPPER LOBE OF RIGHT LUNG: Primary | ICD-10-CM

## 2024-08-19 LAB
ALBUMIN SERPL BCP-MCNC: 3.4 G/DL (ref 3.5–5.2)
ALP SERPL-CCNC: 84 U/L (ref 55–135)
ALT SERPL W/O P-5'-P-CCNC: 115 U/L (ref 10–44)
ANION GAP SERPL CALC-SCNC: 9 MMOL/L (ref 8–16)
AST SERPL-CCNC: 49 U/L (ref 10–40)
BASOPHILS # BLD AUTO: 0.01 K/UL (ref 0–0.2)
BASOPHILS NFR BLD: 0.3 % (ref 0–1.9)
BILIRUB SERPL-MCNC: 0.4 MG/DL (ref 0.1–1)
BUN SERPL-MCNC: 12 MG/DL (ref 8–23)
CALCIUM SERPL-MCNC: 9.1 MG/DL (ref 8.7–10.5)
CHLORIDE SERPL-SCNC: 101 MMOL/L (ref 95–110)
CK SERPL-CCNC: 47 U/L (ref 20–180)
CO2 SERPL-SCNC: 22 MMOL/L (ref 23–29)
CREAT SERPL-MCNC: 1.3 MG/DL (ref 0.5–1.4)
DIFFERENTIAL METHOD BLD: ABNORMAL
EOSINOPHIL # BLD AUTO: 0 K/UL (ref 0–0.5)
EOSINOPHIL NFR BLD: 0.3 % (ref 0–8)
ERYTHROCYTE [DISTWIDTH] IN BLOOD BY AUTOMATED COUNT: 15.9 % (ref 11.5–14.5)
EST. GFR  (NO RACE VARIABLE): 45.6 ML/MIN/1.73 M^2
GLUCOSE SERPL-MCNC: 95 MG/DL (ref 70–110)
HCT VFR BLD AUTO: 31.7 % (ref 37–48.5)
HGB BLD-MCNC: 10.4 G/DL (ref 12–16)
IMM GRANULOCYTES # BLD AUTO: 0.01 K/UL (ref 0–0.04)
IMM GRANULOCYTES NFR BLD AUTO: 0.3 % (ref 0–0.5)
LYMPHOCYTES # BLD AUTO: 1.1 K/UL (ref 1–4.8)
LYMPHOCYTES NFR BLD: 27.9 % (ref 18–48)
MCH RBC QN AUTO: 32 PG (ref 27–31)
MCHC RBC AUTO-ENTMCNC: 32.8 G/DL (ref 32–36)
MCV RBC AUTO: 98 FL (ref 82–98)
MONOCYTES # BLD AUTO: 0.2 K/UL (ref 0.3–1)
MONOCYTES NFR BLD: 4.5 % (ref 4–15)
NEUTROPHILS # BLD AUTO: 2.5 K/UL (ref 1.8–7.7)
NEUTROPHILS NFR BLD: 66.7 % (ref 38–73)
NRBC BLD-RTO: 0 /100 WBC
PLATELET # BLD AUTO: 267 K/UL (ref 150–450)
PMV BLD AUTO: 9.8 FL (ref 9.2–12.9)
POTASSIUM SERPL-SCNC: 4.3 MMOL/L (ref 3.5–5.1)
PROT SERPL-MCNC: 6.6 G/DL (ref 6–8.4)
RBC # BLD AUTO: 3.25 M/UL (ref 4–5.4)
SODIUM SERPL-SCNC: 132 MMOL/L (ref 136–145)
WBC # BLD AUTO: 3.77 K/UL (ref 3.9–12.7)

## 2024-08-19 PROCEDURE — 85025 COMPLETE CBC W/AUTO DIFF WBC: CPT | Performed by: INTERNAL MEDICINE

## 2024-08-19 PROCEDURE — 82550 ASSAY OF CK (CPK): CPT | Performed by: INTERNAL MEDICINE

## 2024-08-19 PROCEDURE — 80053 COMPREHEN METABOLIC PANEL: CPT | Performed by: INTERNAL MEDICINE

## 2024-08-22 DIAGNOSIS — Z78.0 MENOPAUSE: ICD-10-CM

## 2024-08-26 ENCOUNTER — LAB VISIT (OUTPATIENT)
Dept: LAB | Facility: HOSPITAL | Age: 64
End: 2024-08-26
Attending: INTERNAL MEDICINE
Payer: COMMERCIAL

## 2024-08-26 DIAGNOSIS — D50.9 IRON DEFICIENCY ANEMIA, UNSPECIFIED: ICD-10-CM

## 2024-08-26 DIAGNOSIS — C34.11 MALIGNANT NEOPLASM OF UPPER LOBE OF RIGHT LUNG: ICD-10-CM

## 2024-08-26 DIAGNOSIS — R53.83 FATIGUE: ICD-10-CM

## 2024-08-26 LAB
ALBUMIN SERPL BCP-MCNC: 3.4 G/DL (ref 3.5–5.2)
ALP SERPL-CCNC: 95 U/L (ref 55–135)
ALT SERPL W/O P-5'-P-CCNC: 39 U/L (ref 10–44)
ANION GAP SERPL CALC-SCNC: 11 MMOL/L (ref 8–16)
ANISOCYTOSIS BLD QL SMEAR: SLIGHT
AST SERPL-CCNC: 19 U/L (ref 10–40)
BASOPHILS NFR BLD: 0 % (ref 0–1.9)
BILIRUB SERPL-MCNC: 0.3 MG/DL (ref 0.1–1)
BUN SERPL-MCNC: 10 MG/DL (ref 8–23)
CALCIUM SERPL-MCNC: 9.6 MG/DL (ref 8.7–10.5)
CHLORIDE SERPL-SCNC: 105 MMOL/L (ref 95–110)
CO2 SERPL-SCNC: 21 MMOL/L (ref 23–29)
CREAT SERPL-MCNC: 1.3 MG/DL (ref 0.5–1.4)
DIFFERENTIAL METHOD BLD: ABNORMAL
EOSINOPHIL NFR BLD: 0 % (ref 0–8)
ERYTHROCYTE [DISTWIDTH] IN BLOOD BY AUTOMATED COUNT: 15.9 % (ref 11.5–14.5)
EST. GFR  (NO RACE VARIABLE): 45.6 ML/MIN/1.73 M^2
GLUCOSE SERPL-MCNC: 88 MG/DL (ref 70–110)
HCT VFR BLD AUTO: 30.1 % (ref 37–48.5)
HGB BLD-MCNC: 9.5 G/DL (ref 12–16)
HYPOCHROMIA BLD QL SMEAR: ABNORMAL
IMM GRANULOCYTES # BLD AUTO: ABNORMAL K/UL (ref 0–0.04)
IMM GRANULOCYTES NFR BLD AUTO: ABNORMAL % (ref 0–0.5)
LYMPHOCYTES NFR BLD: 37 % (ref 18–48)
MCH RBC QN AUTO: 31.1 PG (ref 27–31)
MCHC RBC AUTO-ENTMCNC: 31.6 G/DL (ref 32–36)
MCV RBC AUTO: 99 FL (ref 82–98)
MONOCYTES NFR BLD: 27 % (ref 4–15)
MYELOCYTES NFR BLD MANUAL: 1 %
NEUTROPHILS NFR BLD: 35 % (ref 38–73)
NRBC BLD-RTO: 0 /100 WBC
OVALOCYTES BLD QL SMEAR: ABNORMAL
PLATELET # BLD AUTO: 143 K/UL (ref 150–450)
PLATELET BLD QL SMEAR: ABNORMAL
PMV BLD AUTO: 10.7 FL (ref 9.2–12.9)
POIKILOCYTOSIS BLD QL SMEAR: SLIGHT
POLYCHROMASIA BLD QL SMEAR: ABNORMAL
POTASSIUM SERPL-SCNC: 4.2 MMOL/L (ref 3.5–5.1)
PROT SERPL-MCNC: 6.7 G/DL (ref 6–8.4)
RBC # BLD AUTO: 3.05 M/UL (ref 4–5.4)
SODIUM SERPL-SCNC: 137 MMOL/L (ref 136–145)
WBC # BLD AUTO: 1.69 K/UL (ref 3.9–12.7)

## 2024-08-26 PROCEDURE — 85027 COMPLETE CBC AUTOMATED: CPT | Performed by: INTERNAL MEDICINE

## 2024-08-26 PROCEDURE — 36415 COLL VENOUS BLD VENIPUNCTURE: CPT | Mod: PN | Performed by: INTERNAL MEDICINE

## 2024-08-26 PROCEDURE — 85007 BL SMEAR W/DIFF WBC COUNT: CPT | Performed by: INTERNAL MEDICINE

## 2024-08-26 PROCEDURE — 80053 COMPREHEN METABOLIC PANEL: CPT | Performed by: INTERNAL MEDICINE

## 2024-08-28 ENCOUNTER — LAB VISIT (OUTPATIENT)
Dept: LAB | Facility: HOSPITAL | Age: 64
End: 2024-08-28
Attending: INTERNAL MEDICINE
Payer: COMMERCIAL

## 2024-08-28 DIAGNOSIS — T38.1X5D: ICD-10-CM

## 2024-08-28 DIAGNOSIS — C34.11 MALIGNANT NEOPLASM OF UPPER LOBE OF RIGHT LUNG: ICD-10-CM

## 2024-08-28 LAB
T4 FREE SERPL-MCNC: 0.79 NG/DL (ref 0.71–1.51)
TSH SERPL DL<=0.005 MIU/L-ACNC: 15.64 UIU/ML (ref 0.4–4)

## 2024-08-28 PROCEDURE — 84439 ASSAY OF FREE THYROXINE: CPT | Performed by: INTERNAL MEDICINE

## 2024-08-28 PROCEDURE — 84443 ASSAY THYROID STIM HORMONE: CPT | Performed by: INTERNAL MEDICINE

## 2024-08-28 PROCEDURE — 36415 COLL VENOUS BLD VENIPUNCTURE: CPT | Mod: PN | Performed by: INTERNAL MEDICINE

## 2024-08-30 ENCOUNTER — HOSPITAL ENCOUNTER (OUTPATIENT)
Dept: RADIOLOGY | Facility: HOSPITAL | Age: 64
Discharge: HOME | End: 2024-08-30
Attending: FAMILY MEDICINE
Payer: COMMERCIAL

## 2024-08-30 DIAGNOSIS — Z78.0 MENOPAUSE: ICD-10-CM

## 2024-08-30 PROCEDURE — 77092 TBS I&R FX RSK QHP: CPT | Mod: ,,, | Performed by: RADIOLOGY

## 2024-08-30 PROCEDURE — 77091 TBS TECHL CALCULATION ONLY: CPT | Mod: PO

## 2024-08-30 PROCEDURE — 77080 DXA BONE DENSITY AXIAL: CPT | Mod: 26,,, | Performed by: RADIOLOGY

## 2024-09-03 ENCOUNTER — LAB VISIT (OUTPATIENT)
Dept: LAB | Facility: HOSPITAL | Age: 64
End: 2024-09-03
Attending: INTERNAL MEDICINE
Payer: COMMERCIAL

## 2024-09-03 DIAGNOSIS — C34.90 MALIGNANT NEOPLASM OF BRONCHUS AND LUNG: ICD-10-CM

## 2024-09-03 DIAGNOSIS — R53.83 FATIGUE: ICD-10-CM

## 2024-09-03 DIAGNOSIS — T45.1X5A AGRANULOCYTOSIS SECONDARY TO CANCER CHEMOTHERAPY: ICD-10-CM

## 2024-09-03 DIAGNOSIS — T45.1X5A ANEMIA DUE TO CHEMOTHERAPY: ICD-10-CM

## 2024-09-03 DIAGNOSIS — C34.11 MALIGNANT NEOPLASM OF UPPER LOBE OF RIGHT LUNG: ICD-10-CM

## 2024-09-03 DIAGNOSIS — D50.9 IRON DEFICIENCY ANEMIA, UNSPECIFIED: ICD-10-CM

## 2024-09-03 DIAGNOSIS — D70.1 AGRANULOCYTOSIS SECONDARY TO CANCER CHEMOTHERAPY: ICD-10-CM

## 2024-09-03 DIAGNOSIS — D64.81 ANEMIA DUE TO CHEMOTHERAPY: ICD-10-CM

## 2024-09-03 LAB
ALBUMIN SERPL BCP-MCNC: 3.6 G/DL (ref 3.5–5.2)
ALP SERPL-CCNC: 105 U/L (ref 55–135)
ALT SERPL W/O P-5'-P-CCNC: 33 U/L (ref 10–44)
ANION GAP SERPL CALC-SCNC: 10 MMOL/L (ref 8–16)
AST SERPL-CCNC: 23 U/L (ref 10–40)
BASOPHILS # BLD AUTO: 0 K/UL (ref 0–0.2)
BASOPHILS NFR BLD: 0 % (ref 0–1.9)
BILIRUB SERPL-MCNC: 0.3 MG/DL (ref 0.1–1)
BUN SERPL-MCNC: 5 MG/DL (ref 8–23)
CALCIUM SERPL-MCNC: 9.7 MG/DL (ref 8.7–10.5)
CHLORIDE SERPL-SCNC: 105 MMOL/L (ref 95–110)
CO2 SERPL-SCNC: 22 MMOL/L (ref 23–29)
CREAT SERPL-MCNC: 1.2 MG/DL (ref 0.5–1.4)
DIFFERENTIAL METHOD BLD: ABNORMAL
EOSINOPHIL # BLD AUTO: 0 K/UL (ref 0–0.5)
EOSINOPHIL NFR BLD: 0.7 % (ref 0–8)
ERYTHROCYTE [DISTWIDTH] IN BLOOD BY AUTOMATED COUNT: 16.1 % (ref 11.5–14.5)
EST. GFR  (NO RACE VARIABLE): 50.2 ML/MIN/1.73 M^2
GLUCOSE SERPL-MCNC: 76 MG/DL (ref 70–110)
HCT VFR BLD AUTO: 31.8 % (ref 37–48.5)
HGB BLD-MCNC: 10.3 G/DL (ref 12–16)
IMM GRANULOCYTES # BLD AUTO: 0.01 K/UL (ref 0–0.04)
IMM GRANULOCYTES NFR BLD AUTO: 0.4 % (ref 0–0.5)
LYMPHOCYTES # BLD AUTO: 0.8 K/UL (ref 1–4.8)
LYMPHOCYTES NFR BLD: 30.7 % (ref 18–48)
MCH RBC QN AUTO: 31.7 PG (ref 27–31)
MCHC RBC AUTO-ENTMCNC: 32.4 G/DL (ref 32–36)
MCV RBC AUTO: 98 FL (ref 82–98)
MONOCYTES # BLD AUTO: 0.7 K/UL (ref 0.3–1)
MONOCYTES NFR BLD: 24.5 % (ref 4–15)
NEUTROPHILS # BLD AUTO: 1.2 K/UL (ref 1.8–7.7)
NEUTROPHILS NFR BLD: 43.7 % (ref 38–73)
NRBC BLD-RTO: 0 /100 WBC
PLATELET # BLD AUTO: 222 K/UL (ref 150–450)
PMV BLD AUTO: 9.7 FL (ref 9.2–12.9)
POTASSIUM SERPL-SCNC: 4.2 MMOL/L (ref 3.5–5.1)
PROT SERPL-MCNC: 7.1 G/DL (ref 6–8.4)
RBC # BLD AUTO: 3.25 M/UL (ref 4–5.4)
SODIUM SERPL-SCNC: 137 MMOL/L (ref 136–145)
WBC # BLD AUTO: 2.74 K/UL (ref 3.9–12.7)

## 2024-09-03 PROCEDURE — 36415 COLL VENOUS BLD VENIPUNCTURE: CPT | Mod: PN

## 2024-09-03 PROCEDURE — 85025 COMPLETE CBC W/AUTO DIFF WBC: CPT

## 2024-09-03 PROCEDURE — 80053 COMPREHEN METABOLIC PANEL: CPT

## 2024-09-09 ENCOUNTER — LAB VISIT (OUTPATIENT)
Dept: LAB | Facility: HOSPITAL | Age: 64
End: 2024-09-09
Attending: INTERNAL MEDICINE
Payer: COMMERCIAL

## 2024-09-09 DIAGNOSIS — R53.83 FATIGUE: ICD-10-CM

## 2024-09-09 DIAGNOSIS — C34.11 MALIGNANT NEOPLASM OF UPPER LOBE OF RIGHT LUNG: ICD-10-CM

## 2024-09-09 LAB
ALBUMIN SERPL BCP-MCNC: 3.4 G/DL (ref 3.5–5.2)
ALP SERPL-CCNC: 95 U/L (ref 55–135)
ALT SERPL W/O P-5'-P-CCNC: 22 U/L (ref 10–44)
ANION GAP SERPL CALC-SCNC: 10 MMOL/L (ref 8–16)
AST SERPL-CCNC: 20 U/L (ref 10–40)
BASOPHILS # BLD AUTO: 0.01 K/UL (ref 0–0.2)
BASOPHILS NFR BLD: 0.2 % (ref 0–1.9)
BILIRUB SERPL-MCNC: 0.4 MG/DL (ref 0.1–1)
BUN SERPL-MCNC: 6 MG/DL (ref 8–23)
CALCIUM SERPL-MCNC: 9.5 MG/DL (ref 8.7–10.5)
CHLORIDE SERPL-SCNC: 104 MMOL/L (ref 95–110)
CK SERPL-CCNC: 118 U/L (ref 20–180)
CO2 SERPL-SCNC: 24 MMOL/L (ref 23–29)
CREAT SERPL-MCNC: 1.1 MG/DL (ref 0.5–1.4)
DIFFERENTIAL METHOD BLD: ABNORMAL
EOSINOPHIL # BLD AUTO: 0 K/UL (ref 0–0.5)
EOSINOPHIL NFR BLD: 0.4 % (ref 0–8)
ERYTHROCYTE [DISTWIDTH] IN BLOOD BY AUTOMATED COUNT: 14.7 % (ref 11.5–14.5)
EST. GFR  (NO RACE VARIABLE): 55.8 ML/MIN/1.73 M^2
GLUCOSE SERPL-MCNC: 86 MG/DL (ref 70–110)
HCT VFR BLD AUTO: 31.7 % (ref 37–48.5)
HGB BLD-MCNC: 10.2 G/DL (ref 12–16)
IMM GRANULOCYTES # BLD AUTO: 0.02 K/UL (ref 0–0.04)
IMM GRANULOCYTES NFR BLD AUTO: 0.4 % (ref 0–0.5)
LYMPHOCYTES # BLD AUTO: 0.9 K/UL (ref 1–4.8)
LYMPHOCYTES NFR BLD: 20.9 % (ref 18–48)
MCH RBC QN AUTO: 31.1 PG (ref 27–31)
MCHC RBC AUTO-ENTMCNC: 32.2 G/DL (ref 32–36)
MCV RBC AUTO: 97 FL (ref 82–98)
MONOCYTES # BLD AUTO: 0.9 K/UL (ref 0.3–1)
MONOCYTES NFR BLD: 19.4 % (ref 4–15)
NEUTROPHILS # BLD AUTO: 2.6 K/UL (ref 1.8–7.7)
NEUTROPHILS NFR BLD: 58.7 % (ref 38–73)
NRBC BLD-RTO: 0 /100 WBC
PLATELET # BLD AUTO: 255 K/UL (ref 150–450)
PMV BLD AUTO: 9.7 FL (ref 9.2–12.9)
POTASSIUM SERPL-SCNC: 3.7 MMOL/L (ref 3.5–5.1)
PROT SERPL-MCNC: 6.9 G/DL (ref 6–8.4)
RBC # BLD AUTO: 3.28 M/UL (ref 4–5.4)
SODIUM SERPL-SCNC: 138 MMOL/L (ref 136–145)
WBC # BLD AUTO: 4.49 K/UL (ref 3.9–12.7)

## 2024-09-09 PROCEDURE — 80053 COMPREHEN METABOLIC PANEL: CPT | Performed by: INTERNAL MEDICINE

## 2024-09-09 PROCEDURE — 82550 ASSAY OF CK (CPK): CPT | Performed by: INTERNAL MEDICINE

## 2024-09-09 PROCEDURE — 85025 COMPLETE CBC W/AUTO DIFF WBC: CPT | Performed by: INTERNAL MEDICINE

## 2024-09-09 PROCEDURE — 36415 COLL VENOUS BLD VENIPUNCTURE: CPT | Mod: PN | Performed by: INTERNAL MEDICINE

## 2024-09-11 DIAGNOSIS — M50.30 DDD (DEGENERATIVE DISC DISEASE), CERVICAL: ICD-10-CM

## 2024-09-11 DIAGNOSIS — F33.41 RECURRENT MAJOR DEPRESSIVE DISORDER, IN PARTIAL REMISSION: ICD-10-CM

## 2024-09-11 DIAGNOSIS — M54.50 CHRONIC BILATERAL LOW BACK PAIN WITHOUT SCIATICA: ICD-10-CM

## 2024-09-11 DIAGNOSIS — E11.9 TYPE 2 DIABETES MELLITUS WITHOUT COMPLICATION: ICD-10-CM

## 2024-09-11 DIAGNOSIS — G89.29 CHRONIC BILATERAL LOW BACK PAIN WITHOUT SCIATICA: ICD-10-CM

## 2024-09-11 RX ORDER — BUPROPION HYDROCHLORIDE 150 MG/1
TABLET ORAL
Qty: 180 TABLET | Refills: 3 | Status: SHIPPED | OUTPATIENT
Start: 2024-09-11

## 2024-09-11 NOTE — TELEPHONE ENCOUNTER
Refill Routing Note   Medication(s) are not appropriate for processing by [facility] for the following reason(s):        Outside of protocol    [facility] action(s):  Route  Approve               Appointments  past 12m or future 3m with PCP    Date Provider   Last Visit   8/6/2024 Comfort Dave MD   Next Visit   9/13/2024 Comfort Dave MD   ED visits in past 90 days: 0        Note composed:7:30 AM 09/11/2024

## 2024-09-11 NOTE — TELEPHONE ENCOUNTER
No care due was identified.  [facility] Embedded Care Due Messages. Reference number: [id number].   9/11/2024 3:26:35 AM CDT

## 2024-09-12 RX ORDER — GABAPENTIN 300 MG/1
300 CAPSULE ORAL 3 TIMES DAILY
Qty: 90 CAPSULE | Refills: 11 | Status: SHIPPED | OUTPATIENT
Start: 2024-09-12

## 2024-09-13 ENCOUNTER — OFFICE VISIT (OUTPATIENT)
Dept: FAMILY MEDICINE | Facility: CLINIC | Age: 64
End: 2024-09-13
Payer: COMMERCIAL

## 2024-09-13 VITALS
WEIGHT: 205.13 LBS | HEIGHT: 70 IN | BODY MASS INDEX: 29.37 KG/M2 | DIASTOLIC BLOOD PRESSURE: 60 MMHG | SYSTOLIC BLOOD PRESSURE: 126 MMHG | OXYGEN SATURATION: 98 % | HEART RATE: 87 BPM

## 2024-09-13 DIAGNOSIS — C34.81 SMALL CELL CARCINOMA OF OVERLAPPING SITES OF RIGHT LUNG: ICD-10-CM

## 2024-09-13 DIAGNOSIS — E11.9 TYPE 2 DIABETES MELLITUS WITHOUT COMPLICATION, WITHOUT LONG-TERM CURRENT USE OF INSULIN: ICD-10-CM

## 2024-09-13 DIAGNOSIS — F33.41 RECURRENT MAJOR DEPRESSIVE DISORDER, IN PARTIAL REMISSION: ICD-10-CM

## 2024-09-13 DIAGNOSIS — J44.9 CHRONIC OBSTRUCTIVE PULMONARY DISEASE, UNSPECIFIED COPD TYPE: ICD-10-CM

## 2024-09-13 DIAGNOSIS — F41.1 GAD (GENERALIZED ANXIETY DISORDER): Primary | ICD-10-CM

## 2024-09-13 PROCEDURE — 99999 PR PBB SHADOW E&M-EST. PATIENT-LVL V: CPT | Mod: PBBFAC,,, | Performed by: FAMILY MEDICINE

## 2024-09-13 RX ORDER — ONDANSETRON 8 MG/1
8 TABLET, ORALLY DISINTEGRATING ORAL 2 TIMES DAILY
Qty: 20 TABLET | Refills: 2 | Status: SHIPPED | OUTPATIENT
Start: 2024-09-13

## 2024-09-13 RX ORDER — CITALOPRAM 40 MG/1
40 TABLET, FILM COATED ORAL DAILY
Qty: 90 TABLET | Refills: 3 | Status: SHIPPED | OUTPATIENT
Start: 2024-09-13 | End: 2025-09-13

## 2024-09-13 RX ORDER — ATORVASTATIN CALCIUM 20 MG/1
20 TABLET, FILM COATED ORAL DAILY
Qty: 90 TABLET | Refills: 1 | Status: SHIPPED | OUTPATIENT
Start: 2024-09-13

## 2024-09-13 NOTE — PROGRESS NOTES
"  Subjective:       Patient ID: Malathi Martinez is a 64 y.o. female.    Chief Complaint: Follow-up    Follow-up  Associated symptoms include arthralgias, headaches (intermittent, not constant), neck pain and weakness. Pertinent negatives include no chest pain, fatigue, joint swelling or vomiting (resolved).     Patient seen for f/u.  Reviewed recent pet scan - reassuring findings.  Working part-time which is much more manageable as it allows her time for rest/nap.   Diarrhea - has meds to use prn. Feels this particular chemo is just harder on her system.     Review of Systems:  Review of Systems   Constitutional:  Negative for activity change, fatigue and unexpected weight change.   HENT:  Positive for hearing loss. Negative for rhinorrhea and trouble swallowing.    Eyes:  Negative for discharge and visual disturbance.   Respiratory:  Positive for shortness of breath (intermittent, can even occur with light activities - planning to restart inhalers). Negative for chest tightness and wheezing.    Cardiovascular:  Negative for chest pain and palpitations.   Gastrointestinal:  Positive for diarrhea. Negative for blood in stool, constipation and vomiting (resolved).   Endocrine: Negative for polydipsia and polyuria.   Genitourinary:  Negative for difficulty urinating, dysuria, hematuria and menstrual problem.   Musculoskeletal:  Positive for arthralgias and neck pain. Negative for joint swelling.        Pain currently managed with gabapentin   Neurological:  Positive for weakness and headaches (intermittent, not constant).   Psychiatric/Behavioral:  Positive for dysphoric mood. Negative for confusion and sleep disturbance.        Objective:     Vitals:    09/13/24 1254   BP: 126/60   Pulse: 87   SpO2: 98%   Weight: 93.1 kg (205 lb 2.2 oz)   Height: 5' 10" (1.778 m)          Physical Exam  Vitals and nursing note reviewed.   Constitutional:       General: She is not in acute distress.     Appearance: Normal " appearance. She is well-developed.   HENT:      Head: Normocephalic and atraumatic.   Eyes:      General: No scleral icterus.        Right eye: No discharge.         Left eye: No discharge.      Conjunctiva/sclera: Conjunctivae normal.   Cardiovascular:      Rate and Rhythm: Normal rate.   Pulmonary:      Effort: Pulmonary effort is normal. No respiratory distress.   Musculoskeletal:      Cervical back: Neck supple.   Skin:     General: Skin is warm and dry.   Neurological:      General: No focal deficit present.      Mental Status: She is alert and oriented to person, place, and time.   Psychiatric:         Mood and Affect: Mood normal.         Behavior: Behavior normal.           Assessment & Plan:  JEAN CLAUDE (generalized anxiety disorder)  Comments:  doing ok of late  Orders:  -     TSH; Future; Expected date: 09/13/2024    Type 2 diabetes mellitus without complication, without long-term current use of insulin  Comments:  well-controlled  currently off glp1 (too expensive oop) but BS controlled and not nec needed at this time  Orders:  -     atorvastatin (LIPITOR) 20 MG tablet; Take 1 tablet (20 mg total) by mouth once daily.  Dispense: 90 tablet; Refill: 1    Chronic obstructive pulmonary disease, unspecified COPD type  Comments:  resume trelegy daily, cont neb prn    Small cell carcinoma of overlapping sites of right lung    Type 2 diabetes mellitus without complication, without long-term current use of insulin  Comments:  well-controlled, cont regimen  Orders:  -     atorvastatin (LIPITOR) 20 MG tablet; Take 1 tablet (20 mg total) by mouth once daily.  Dispense: 90 tablet; Refill: 1    Recurrent major depressive disorder, in partial remission  Comments:  med adjustments as noted  Orders:  -     citalopram (CELEXA) 40 MG tablet; Take 1 tablet (40 mg total) by mouth once daily.  Dispense: 90 tablet; Refill: 3    Other orders  -     ondansetron (ZOFRAN-ODT) 8 MG TbDL; Take 1 tablet (8 mg total) by mouth 2 (two) times  daily.  Dispense: 20 tablet; Refill: 2

## 2024-09-14 ENCOUNTER — HOSPITAL ENCOUNTER (OUTPATIENT)
Dept: RADIOLOGY | Facility: HOSPITAL | Age: 64
Discharge: HOME | End: 2024-09-14
Attending: FAMILY MEDICINE
Payer: MEDICARE

## 2024-09-14 DIAGNOSIS — Z12.31 ENCOUNTER FOR SCREENING MAMMOGRAM FOR BREAST CANCER: ICD-10-CM

## 2024-09-14 PROCEDURE — 77067 SCR MAMMO BI INCL CAD: CPT | Mod: TC,PO

## 2024-09-14 PROCEDURE — 77063 BREAST TOMOSYNTHESIS BI: CPT | Mod: 26,,, | Performed by: RADIOLOGY

## 2024-09-14 PROCEDURE — 77067 SCR MAMMO BI INCL CAD: CPT | Mod: 26,,, | Performed by: RADIOLOGY

## 2024-09-16 ENCOUNTER — PATIENT MESSAGE (OUTPATIENT)
Dept: FAMILY MEDICINE | Facility: CLINIC | Age: 64
End: 2024-09-16
Payer: COMMERCIAL

## 2024-09-16 ENCOUNTER — LAB VISIT (OUTPATIENT)
Dept: LAB | Facility: HOSPITAL | Age: 64
End: 2024-09-16
Attending: INTERNAL MEDICINE
Payer: MEDICARE

## 2024-09-16 DIAGNOSIS — C34.11 MALIGNANT NEOPLASM OF UPPER LOBE OF RIGHT LUNG: ICD-10-CM

## 2024-09-16 DIAGNOSIS — R53.83 FATIGUE: ICD-10-CM

## 2024-09-16 LAB
ALBUMIN SERPL BCP-MCNC: 3.1 G/DL (ref 3.5–5.2)
ALP SERPL-CCNC: 83 U/L (ref 55–135)
ALT SERPL W/O P-5'-P-CCNC: 102 U/L (ref 10–44)
ANION GAP SERPL CALC-SCNC: 10 MMOL/L (ref 8–16)
AST SERPL-CCNC: 46 U/L (ref 10–40)
BASOPHILS # BLD AUTO: 0 K/UL (ref 0–0.2)
BASOPHILS NFR BLD: 0 % (ref 0–1.9)
BILIRUB SERPL-MCNC: 0.4 MG/DL (ref 0.1–1)
BUN SERPL-MCNC: 12 MG/DL (ref 8–23)
CALCIUM SERPL-MCNC: 8.5 MG/DL (ref 8.7–10.5)
CHLORIDE SERPL-SCNC: 105 MMOL/L (ref 95–110)
CO2 SERPL-SCNC: 22 MMOL/L (ref 23–29)
CREAT SERPL-MCNC: 1.2 MG/DL (ref 0.5–1.4)
DIFFERENTIAL METHOD BLD: ABNORMAL
EOSINOPHIL # BLD AUTO: 0 K/UL (ref 0–0.5)
EOSINOPHIL NFR BLD: 0.4 % (ref 0–8)
ERYTHROCYTE [DISTWIDTH] IN BLOOD BY AUTOMATED COUNT: 15 % (ref 11.5–14.5)
EST. GFR  (NO RACE VARIABLE): 50.2 ML/MIN/1.73 M^2
GLUCOSE SERPL-MCNC: 130 MG/DL (ref 70–110)
HCT VFR BLD AUTO: 29.5 % (ref 37–48.5)
HGB BLD-MCNC: 9.4 G/DL (ref 12–16)
IMM GRANULOCYTES # BLD AUTO: 0.01 K/UL (ref 0–0.04)
IMM GRANULOCYTES NFR BLD AUTO: 0.4 % (ref 0–0.5)
LYMPHOCYTES # BLD AUTO: 0.7 K/UL (ref 1–4.8)
LYMPHOCYTES NFR BLD: 26.5 % (ref 18–48)
MCH RBC QN AUTO: 31.6 PG (ref 27–31)
MCHC RBC AUTO-ENTMCNC: 31.9 G/DL (ref 32–36)
MCV RBC AUTO: 99 FL (ref 82–98)
MONOCYTES # BLD AUTO: 0.1 K/UL (ref 0.3–1)
MONOCYTES NFR BLD: 5.1 % (ref 4–15)
NEUTROPHILS # BLD AUTO: 1.8 K/UL (ref 1.8–7.7)
NEUTROPHILS NFR BLD: 67.6 % (ref 38–73)
NRBC BLD-RTO: 0 /100 WBC
PLATELET # BLD AUTO: 216 K/UL (ref 150–450)
PMV BLD AUTO: 10.1 FL (ref 9.2–12.9)
POTASSIUM SERPL-SCNC: 4.2 MMOL/L (ref 3.5–5.1)
PROT SERPL-MCNC: 6.2 G/DL (ref 6–8.4)
RBC # BLD AUTO: 2.97 M/UL (ref 4–5.4)
SODIUM SERPL-SCNC: 137 MMOL/L (ref 136–145)
WBC # BLD AUTO: 2.72 K/UL (ref 3.9–12.7)

## 2024-09-16 PROCEDURE — 85025 COMPLETE CBC W/AUTO DIFF WBC: CPT | Performed by: INTERNAL MEDICINE

## 2024-09-16 PROCEDURE — 36415 COLL VENOUS BLD VENIPUNCTURE: CPT | Mod: PN | Performed by: INTERNAL MEDICINE

## 2024-09-16 PROCEDURE — 80053 COMPREHEN METABOLIC PANEL: CPT | Performed by: INTERNAL MEDICINE

## 2024-09-23 ENCOUNTER — IMMUNIZATION (OUTPATIENT)
Dept: FAMILY MEDICINE | Facility: CLINIC | Age: 64
End: 2024-09-23
Payer: MEDICARE

## 2024-09-23 ENCOUNTER — LAB VISIT (OUTPATIENT)
Dept: LAB | Facility: HOSPITAL | Age: 64
End: 2024-09-23
Attending: FAMILY MEDICINE
Payer: MEDICARE

## 2024-09-23 DIAGNOSIS — E11.9 TYPE 2 DIABETES MELLITUS WITHOUT COMPLICATION: ICD-10-CM

## 2024-09-23 DIAGNOSIS — R53.83 FATIGUE: ICD-10-CM

## 2024-09-23 DIAGNOSIS — T45.1X5A AGRANULOCYTOSIS SECONDARY TO CANCER CHEMOTHERAPY: ICD-10-CM

## 2024-09-23 DIAGNOSIS — D70.1 AGRANULOCYTOSIS SECONDARY TO CANCER CHEMOTHERAPY: ICD-10-CM

## 2024-09-23 DIAGNOSIS — C34.11 MALIGNANT NEOPLASM OF UPPER LOBE OF RIGHT LUNG: ICD-10-CM

## 2024-09-23 DIAGNOSIS — Z23 NEED FOR VACCINATION: Primary | ICD-10-CM

## 2024-09-23 DIAGNOSIS — D64.81 ANEMIA DUE TO ANTINEOPLASTIC CHEMOTHERAPY: ICD-10-CM

## 2024-09-23 DIAGNOSIS — D50.9 IRON DEFICIENCY ANEMIA, UNSPECIFIED: ICD-10-CM

## 2024-09-23 DIAGNOSIS — F41.1 GAD (GENERALIZED ANXIETY DISORDER): ICD-10-CM

## 2024-09-23 DIAGNOSIS — T45.1X5A ANEMIA DUE TO ANTINEOPLASTIC CHEMOTHERAPY: ICD-10-CM

## 2024-09-23 LAB
ALBUMIN SERPL BCP-MCNC: 3 G/DL (ref 3.5–5.2)
ALBUMIN/CREAT UR: NORMAL UG/MG (ref 0–30)
ALP SERPL-CCNC: 85 U/L (ref 55–135)
ALT SERPL W/O P-5'-P-CCNC: 31 U/L (ref 10–44)
ANION GAP SERPL CALC-SCNC: 9 MMOL/L (ref 8–16)
ANISOCYTOSIS BLD QL SMEAR: SLIGHT
AST SERPL-CCNC: 20 U/L (ref 10–40)
BASOPHILS NFR BLD: 0 % (ref 0–1.9)
BILIRUB SERPL-MCNC: 0.3 MG/DL (ref 0.1–1)
BUN SERPL-MCNC: 7 MG/DL (ref 8–23)
CALCIUM SERPL-MCNC: 8.7 MG/DL (ref 8.7–10.5)
CHLORIDE SERPL-SCNC: 104 MMOL/L (ref 95–110)
CO2 SERPL-SCNC: 23 MMOL/L (ref 23–29)
CREAT SERPL-MCNC: 1.1 MG/DL (ref 0.5–1.4)
CREAT UR-MCNC: 28 MG/DL (ref 15–325)
DIFFERENTIAL METHOD BLD: ABNORMAL
EOSINOPHIL NFR BLD: 0 % (ref 0–8)
ERYTHROCYTE [DISTWIDTH] IN BLOOD BY AUTOMATED COUNT: 14.2 % (ref 11.5–14.5)
EST. GFR  (NO RACE VARIABLE): 55.8 ML/MIN/1.73 M^2
GLUCOSE SERPL-MCNC: 116 MG/DL (ref 70–110)
HCT VFR BLD AUTO: 27.3 % (ref 37–48.5)
HGB BLD-MCNC: 8.9 G/DL (ref 12–16)
IMM GRANULOCYTES # BLD AUTO: ABNORMAL K/UL (ref 0–0.04)
IMM GRANULOCYTES NFR BLD AUTO: ABNORMAL % (ref 0–0.5)
LYMPHOCYTES NFR BLD: 70 % (ref 18–48)
MAGNESIUM SERPL-MCNC: 2 MG/DL (ref 1.6–2.6)
MCH RBC QN AUTO: 31.1 PG (ref 27–31)
MCHC RBC AUTO-ENTMCNC: 32.6 G/DL (ref 32–36)
MCV RBC AUTO: 96 FL (ref 82–98)
MICROALBUMIN UR DL<=1MG/L-MCNC: <5 UG/ML
MONOCYTES NFR BLD: 9 % (ref 4–15)
NEUTROPHILS NFR BLD: 21 % (ref 38–73)
NRBC BLD-RTO: 0 /100 WBC
OVALOCYTES BLD QL SMEAR: ABNORMAL
PLATELET # BLD AUTO: 128 K/UL (ref 150–450)
PLATELET BLD QL SMEAR: ABNORMAL
PMV BLD AUTO: 10.1 FL (ref 9.2–12.9)
POIKILOCYTOSIS BLD QL SMEAR: SLIGHT
POTASSIUM SERPL-SCNC: 4.6 MMOL/L (ref 3.5–5.1)
PROT SERPL-MCNC: 6.5 G/DL (ref 6–8.4)
RBC # BLD AUTO: 2.86 M/UL (ref 4–5.4)
SCHISTOCYTES BLD QL SMEAR: ABNORMAL
SODIUM SERPL-SCNC: 136 MMOL/L (ref 136–145)
TSH SERPL DL<=0.005 MIU/L-ACNC: 3.79 UIU/ML (ref 0.4–4)
WBC # BLD AUTO: 1.23 K/UL (ref 3.9–12.7)

## 2024-09-23 PROCEDURE — 99999PBSHW FLU VACCINE - ADJUVANTED: Mod: PBBFAC,,,

## 2024-09-23 PROCEDURE — 80053 COMPREHEN METABOLIC PANEL: CPT | Performed by: INTERNAL MEDICINE

## 2024-09-23 PROCEDURE — 85007 BL SMEAR W/DIFF WBC COUNT: CPT | Performed by: INTERNAL MEDICINE

## 2024-09-23 PROCEDURE — 82043 UR ALBUMIN QUANTITATIVE: CPT | Performed by: FAMILY MEDICINE

## 2024-09-23 PROCEDURE — 83735 ASSAY OF MAGNESIUM: CPT | Performed by: INTERNAL MEDICINE

## 2024-09-23 PROCEDURE — 90653 IIV ADJUVANT VACCINE IM: CPT | Mod: PBBFAC,PN

## 2024-09-23 PROCEDURE — 84443 ASSAY THYROID STIM HORMONE: CPT | Performed by: FAMILY MEDICINE

## 2024-09-23 PROCEDURE — 82570 ASSAY OF URINE CREATININE: CPT | Performed by: FAMILY MEDICINE

## 2024-09-23 PROCEDURE — 36415 COLL VENOUS BLD VENIPUNCTURE: CPT | Mod: PN | Performed by: FAMILY MEDICINE

## 2024-09-23 PROCEDURE — 85027 COMPLETE CBC AUTOMATED: CPT | Performed by: INTERNAL MEDICINE

## 2024-09-23 PROCEDURE — G0008 ADMIN INFLUENZA VIRUS VAC: HCPCS | Mod: PBBFAC,PN

## 2024-09-30 ENCOUNTER — LAB VISIT (OUTPATIENT)
Dept: LAB | Facility: HOSPITAL | Age: 64
End: 2024-09-30
Attending: INTERNAL MEDICINE
Payer: MEDICARE

## 2024-09-30 DIAGNOSIS — D64.81 ANEMIA DUE TO ANTINEOPLASTIC CHEMOTHERAPY: ICD-10-CM

## 2024-09-30 DIAGNOSIS — T45.1X5A ANEMIA DUE TO ANTINEOPLASTIC CHEMOTHERAPY: ICD-10-CM

## 2024-09-30 DIAGNOSIS — T45.1X5A AGRANULOCYTOSIS SECONDARY TO CANCER CHEMOTHERAPY: ICD-10-CM

## 2024-09-30 DIAGNOSIS — C34.11 MALIGNANT NEOPLASM OF UPPER LOBE OF RIGHT LUNG: ICD-10-CM

## 2024-09-30 DIAGNOSIS — D70.1 AGRANULOCYTOSIS SECONDARY TO CANCER CHEMOTHERAPY: ICD-10-CM

## 2024-09-30 DIAGNOSIS — D50.9 IRON DEFICIENCY ANEMIA, UNSPECIFIED: ICD-10-CM

## 2024-09-30 DIAGNOSIS — R53.83 FATIGUE: ICD-10-CM

## 2024-09-30 PROCEDURE — 85025 COMPLETE CBC W/AUTO DIFF WBC: CPT | Performed by: INTERNAL MEDICINE

## 2024-09-30 PROCEDURE — 80053 COMPREHEN METABOLIC PANEL: CPT | Performed by: INTERNAL MEDICINE

## 2024-09-30 PROCEDURE — 36415 COLL VENOUS BLD VENIPUNCTURE: CPT | Mod: PN | Performed by: INTERNAL MEDICINE

## 2024-10-01 LAB
ALBUMIN SERPL BCP-MCNC: 3.2 G/DL (ref 3.5–5.2)
ALP SERPL-CCNC: 91 U/L (ref 55–135)
ALT SERPL W/O P-5'-P-CCNC: 23 U/L (ref 10–44)
ANION GAP SERPL CALC-SCNC: 8 MMOL/L (ref 8–16)
ANISOCYTOSIS BLD QL SMEAR: SLIGHT
AST SERPL-CCNC: 18 U/L (ref 10–40)
BASOPHILS # BLD AUTO: 0.01 K/UL (ref 0–0.2)
BASOPHILS NFR BLD: 0.4 % (ref 0–1.9)
BILIRUB SERPL-MCNC: 0.3 MG/DL (ref 0.1–1)
BUN SERPL-MCNC: 6 MG/DL (ref 8–23)
CALCIUM SERPL-MCNC: 9.2 MG/DL (ref 8.7–10.5)
CHLORIDE SERPL-SCNC: 104 MMOL/L (ref 95–110)
CO2 SERPL-SCNC: 24 MMOL/L (ref 23–29)
CREAT SERPL-MCNC: 1.3 MG/DL (ref 0.5–1.4)
DIFFERENTIAL METHOD BLD: ABNORMAL
EOSINOPHIL # BLD AUTO: 0 K/UL (ref 0–0.5)
EOSINOPHIL NFR BLD: 1.1 % (ref 0–8)
ERYTHROCYTE [DISTWIDTH] IN BLOOD BY AUTOMATED COUNT: 14.9 % (ref 11.5–14.5)
EST. GFR  (NO RACE VARIABLE): 45.6 ML/MIN/1.73 M^2
GLUCOSE SERPL-MCNC: 82 MG/DL (ref 70–110)
HCT VFR BLD AUTO: 30.7 % (ref 37–48.5)
HGB BLD-MCNC: 9.4 G/DL (ref 12–16)
IMM GRANULOCYTES # BLD AUTO: 0.02 K/UL (ref 0–0.04)
IMM GRANULOCYTES NFR BLD AUTO: 0.7 % (ref 0–0.5)
LYMPHOCYTES # BLD AUTO: 0.9 K/UL (ref 1–4.8)
LYMPHOCYTES NFR BLD: 32.7 % (ref 18–48)
MCH RBC QN AUTO: 30.3 PG (ref 27–31)
MCHC RBC AUTO-ENTMCNC: 30.6 G/DL (ref 32–36)
MCV RBC AUTO: 99 FL (ref 82–98)
MONOCYTES # BLD AUTO: 0.8 K/UL (ref 0.3–1)
MONOCYTES NFR BLD: 29.2 % (ref 4–15)
NEUTROPHILS # BLD AUTO: 1 K/UL (ref 1.8–7.7)
NEUTROPHILS NFR BLD: 35.9 % (ref 38–73)
NRBC BLD-RTO: 0 /100 WBC
OVALOCYTES BLD QL SMEAR: ABNORMAL
PLATELET # BLD AUTO: 224 K/UL (ref 150–450)
PLATELET BLD QL SMEAR: ABNORMAL
PMV BLD AUTO: 9.9 FL (ref 9.2–12.9)
POIKILOCYTOSIS BLD QL SMEAR: SLIGHT
POTASSIUM SERPL-SCNC: 4 MMOL/L (ref 3.5–5.1)
PROT SERPL-MCNC: 6.7 G/DL (ref 6–8.4)
RBC # BLD AUTO: 3.1 M/UL (ref 4–5.4)
SODIUM SERPL-SCNC: 136 MMOL/L (ref 136–145)
WBC # BLD AUTO: 2.81 K/UL (ref 3.9–12.7)

## 2024-10-03 ENCOUNTER — TELEPHONE (OUTPATIENT)
Facility: CLINIC | Age: 64
End: 2024-10-03
Payer: COMMERCIAL

## 2024-10-07 ENCOUNTER — OFFICE VISIT (OUTPATIENT)
Facility: CLINIC | Age: 64
End: 2024-10-07
Payer: MEDICARE

## 2024-10-07 DIAGNOSIS — D23.30 FIBROUS PAPULE OF FACE: ICD-10-CM

## 2024-10-07 DIAGNOSIS — L81.4 LENTIGINES: ICD-10-CM

## 2024-10-07 DIAGNOSIS — L82.1 SK (SEBORRHEIC KERATOSIS): ICD-10-CM

## 2024-10-07 DIAGNOSIS — D18.01 CHERRY ANGIOMA: ICD-10-CM

## 2024-10-07 DIAGNOSIS — L91.8 ACROCHORDON: ICD-10-CM

## 2024-10-07 DIAGNOSIS — Z12.83 SCREENING FOR SKIN CANCER: ICD-10-CM

## 2024-10-07 DIAGNOSIS — D22.9 MULTIPLE BENIGN NEVI: Primary | ICD-10-CM

## 2024-10-07 PROCEDURE — 99212 OFFICE O/P EST SF 10 MIN: CPT | Mod: PBBFAC,PN | Performed by: DERMATOLOGY

## 2024-10-07 PROCEDURE — 99999 PR PBB SHADOW E&M-EST. PATIENT-LVL II: CPT | Mod: PBBFAC,,, | Performed by: DERMATOLOGY

## 2024-10-07 PROCEDURE — 99203 OFFICE O/P NEW LOW 30 MIN: CPT | Mod: S$PBB,,, | Performed by: DERMATOLOGY

## 2024-10-07 NOTE — PROGRESS NOTES
Subjective:      Patient ID:  Malathi Martinez is a 64 y.o. female who presents for   Chief Complaint   Patient presents with    Skin Check     Patient is a new patient today.    Mole     Patient is here today for a mole check and different growth of concern     HPI    Re-establishing patient.   Here today for total body skin exam.   No personal or known family hx skin cancer.    C/o few spots at breasts, back.     Of note, currently undergoing chemotherapy for small cell lung cancer (Aislinn Marc Saunderss).       Review of Systems    Objective:   Physical Exam   Constitutional: She appears well-developed and well-nourished. She is cooperative.   HENT:   Head: Normocephalic and atraumatic.   Eyes: Lids are normal. Lids are normal.  Right conjunctiva is not injected. Left conjunctiva is not injected. No conjunctival no injection.   Pulmonary/Chest: No respiratory distress.   Musculoskeletal:      Right lower leg: No edema.      Left lower leg: No edema.   Neurological: She is alert and oriented to person, place, and time.   Psychiatric: She has a normal mood and affect. Her speech is normal and behavior is normal. Mood, affect, judgment and thought content normal.   Skin:   Areas Examined (abnormalities noted in diagram):   Scalp / Hair Palpated and Inspected  Head / Face Inspection Performed  Neck Inspection Performed  Chest / Axilla Inspection Performed  Abdomen Inspection Performed  Genitals / Buttocks / Groin Inspection Performed  Back Inspection Performed  RUE Inspected  LUE Inspection Performed  RLE Inspected  LLE Inspection Performed  Nails and Digits Inspection Performed                 Diagram Legend     Erythematous scaling macule/papule c/w actinic keratosis       Vascular papule c/w angioma      Pigmented verrucoid papule/plaque c/w seborrheic keratosis      Yellow umbilicated papule c/w sebaceous hyperplasia      Irregularly shaped tan macule c/w lentigo     1-2 mm smooth white papules consistent  with Milia      Movable subcutaneous cyst with punctum c/w epidermal inclusion cyst      Subcutaneous movable cyst c/w pilar cyst      Firm pink to brown papule c/w dermatofibroma      Pedunculated fleshy papule(s) c/w skin tag(s)      Evenly pigmented macule c/w junctional nevus     Mildly variegated pigmented, slightly irregular-bordered macule c/w mildly atypical nevus      Flesh colored to evenly pigmented papule c/w intradermal nevus       Pink pearly papule/plaque c/w basal cell carcinoma      Erythematous hyperkeratotic cursted plaque c/w SCC      Surgical scar with no sign of skin cancer recurrence      Open and closed comedones      Inflammatory papules and pustules      Verrucoid papule consistent consistent with wart     Erythematous eczematous patches and plaques     Dystrophic onycholytic nail with subungual debris c/w onychomycosis     Umbilicated papule    Erythematous-base heme-crusted tan verrucoid plaque consistent with inflamed seborrheic keratosis     Erythematous Silvery Scaling Plaque c/w Psoriasis     See annotation      Assessment / Plan:        Multiple benign nevi  Lentigines  - Discussed diagnosis, etiology, and benign-nature of condition.  - Reassured; no lesions suspicious for malignancy noted on exam today.   - Recommended routine self examination of skin. Discussed the ABCDEs of melanoma and ugly duckling sign.   - Recommended daily sun protection, including the use of OTC broad-spectrum sunscreen (SPF 30 or greater) and sun-protective clothing.      SK (seborrheic keratosis)  Acrochordon  Cherry angioma  Fibrous papule of face  - Benign; reassured treatment not necessary.      Screening for skin cancer  - Total body skin examination performed today.  - Findings listed above.   - Recommended routine self examination of skin.    - Recommended daily sun protection, including the use of OTC broad-spectrum sunscreen (SPF 30 or greater) and sun-protective clothing.            Follow up for  periodic skin checks.

## 2024-10-07 NOTE — PATIENT INSTRUCTIONS
What Are the Symptoms of Skin Cancer?  A change in your skin is the most common sign of skin cancer. This could be a new growth, a sore that doesnt heal, or a change in a mole. Not all skin cancers look the same.    For melanoma specifically, a simple way to remember the warning signs is to remember the A-B-C-D-Es of melanoma--    A stands for asymmetrical. Does the mole or spot have an irregular shape with two parts that look very different?  B stands for border. Is the border irregular or jagged?  C is for color. Is the color uneven?  D is for diameter. Is the mole or spot larger than the size of a pea?  E is for evolving. Has the mole or spot changed during the past few weeks or months?    Talk to your doctor if you notice changes in your skin such as a new growth, a sore that doesnt heal, a change in an old growth, or any of the A-B-C-D-Es of melanoma    What Can I Do to Reduce My Risk of Skin Cancer?  Protection from ultraviolet (UV) radiation is important all year, not just during the summer or at the beach. UV rays from the sun can reach you on cloudy and hazy days, not just on bright and hilary days. UV rays also reflect off of surfaces like water, cement, sand, and snow. Indoor tanning (using a tanning bed, josue, or sunlamp to get tan) exposes users to UV radiation.    The hours between 10 a.m. and 4 p.m. Daylight Saving Time (9 a.m. to 3 p.m. standard time) are the most hazardous for UV exposure outdoors in the continental [place]. UV rays from sunlight are the greatest during the late spring and early summer in [place].    CDC recommends easy options for protection from UV radiation--    Stay in the shade or indoors, especially during midday hours.  Wear clothing that covers your arms and legs.  Wear a hat with a wide brim to shade your face, head, ears, and neck.  Wear sunglasses that wrap around and block both UVA and UVB rays.  Use sunscreen with a sun protection factor (SPF) of  30 or higher, and both UVA and UVB (broad spectrum) protection.  Avoid indoor tanning.    Adapted from https://www.cdc.gov/cancer/skin/basic_info/

## 2024-10-09 ENCOUNTER — LAB VISIT (OUTPATIENT)
Dept: LAB | Facility: HOSPITAL | Age: 64
End: 2024-10-09
Attending: INTERNAL MEDICINE
Payer: MEDICARE

## 2024-10-09 DIAGNOSIS — C34.90 MALIGNANT NEOPLASM OF BRONCHUS AND LUNG: ICD-10-CM

## 2024-10-09 DIAGNOSIS — R53.83 FATIGUE: ICD-10-CM

## 2024-10-09 DIAGNOSIS — T45.1X5S NEUTROPENIA ASSOCIATED WITH MUCOSITIS DUE TO ANTINEOPLASTIC THERAPY: ICD-10-CM

## 2024-10-09 DIAGNOSIS — C34.11 MALIGNANT NEOPLASM OF UPPER LOBE OF RIGHT LUNG: Primary | ICD-10-CM

## 2024-10-09 DIAGNOSIS — K12.31 NEUTROPENIA ASSOCIATED WITH MUCOSITIS DUE TO ANTINEOPLASTIC THERAPY: ICD-10-CM

## 2024-10-09 DIAGNOSIS — D64.81 ANEMIA DUE TO CHEMOTHERAPY: ICD-10-CM

## 2024-10-09 DIAGNOSIS — D50.9 IRON DEFICIENCY ANEMIA, UNSPECIFIED: ICD-10-CM

## 2024-10-09 DIAGNOSIS — T45.1X5A ANEMIA DUE TO CHEMOTHERAPY: ICD-10-CM

## 2024-10-09 DIAGNOSIS — D70.1 NEUTROPENIA ASSOCIATED WITH MUCOSITIS DUE TO ANTINEOPLASTIC THERAPY: ICD-10-CM

## 2024-10-09 DIAGNOSIS — C34.11 MALIGNANT NEOPLASM OF UPPER LOBE OF RIGHT LUNG: ICD-10-CM

## 2024-10-09 LAB
ALBUMIN SERPL BCP-MCNC: 3.3 G/DL (ref 3.5–5.2)
ALP SERPL-CCNC: 97 U/L (ref 55–135)
ALT SERPL W/O P-5'-P-CCNC: 82 U/L (ref 10–44)
ANION GAP SERPL CALC-SCNC: 7 MMOL/L (ref 8–16)
AST SERPL-CCNC: 37 U/L (ref 10–40)
BASOPHILS # BLD AUTO: 0 K/UL (ref 0–0.2)
BASOPHILS NFR BLD: 0 % (ref 0–1.9)
BILIRUB SERPL-MCNC: 0.5 MG/DL (ref 0.1–1)
BUN SERPL-MCNC: 9 MG/DL (ref 8–23)
CALCIUM SERPL-MCNC: 9 MG/DL (ref 8.7–10.5)
CHLORIDE SERPL-SCNC: 102 MMOL/L (ref 95–110)
CO2 SERPL-SCNC: 24 MMOL/L (ref 23–29)
CREAT SERPL-MCNC: 1.4 MG/DL (ref 0.5–1.4)
DIFFERENTIAL METHOD BLD: ABNORMAL
EOSINOPHIL # BLD AUTO: 0 K/UL (ref 0–0.5)
EOSINOPHIL NFR BLD: 0.4 % (ref 0–8)
ERYTHROCYTE [DISTWIDTH] IN BLOOD BY AUTOMATED COUNT: 14.4 % (ref 11.5–14.5)
EST. GFR  (NO RACE VARIABLE): 41.8 ML/MIN/1.73 M^2
GLUCOSE SERPL-MCNC: 90 MG/DL (ref 70–110)
HCT VFR BLD AUTO: 30.9 % (ref 37–48.5)
HGB BLD-MCNC: 9.9 G/DL (ref 12–16)
IMM GRANULOCYTES # BLD AUTO: 0.01 K/UL (ref 0–0.04)
IMM GRANULOCYTES NFR BLD AUTO: 0.4 % (ref 0–0.5)
LYMPHOCYTES # BLD AUTO: 0.6 K/UL (ref 1–4.8)
LYMPHOCYTES NFR BLD: 25.4 % (ref 18–48)
MAGNESIUM SERPL-MCNC: 2 MG/DL (ref 1.6–2.6)
MCH RBC QN AUTO: 30.7 PG (ref 27–31)
MCHC RBC AUTO-ENTMCNC: 32 G/DL (ref 32–36)
MCV RBC AUTO: 96 FL (ref 82–98)
MONOCYTES # BLD AUTO: 0.1 K/UL (ref 0.3–1)
MONOCYTES NFR BLD: 3.9 % (ref 4–15)
NEUTROPHILS # BLD AUTO: 1.6 K/UL (ref 1.8–7.7)
NEUTROPHILS NFR BLD: 69.9 % (ref 38–73)
NRBC BLD-RTO: 0 /100 WBC
PLATELET # BLD AUTO: 164 K/UL (ref 150–450)
PMV BLD AUTO: 9.7 FL (ref 9.2–12.9)
POTASSIUM SERPL-SCNC: 4.3 MMOL/L (ref 3.5–5.1)
PROT SERPL-MCNC: 6.7 G/DL (ref 6–8.4)
RBC # BLD AUTO: 3.23 M/UL (ref 4–5.4)
SODIUM SERPL-SCNC: 133 MMOL/L (ref 136–145)
WBC # BLD AUTO: 2.28 K/UL (ref 3.9–12.7)

## 2024-10-09 PROCEDURE — 36415 COLL VENOUS BLD VENIPUNCTURE: CPT | Mod: PN | Performed by: INTERNAL MEDICINE

## 2024-10-09 PROCEDURE — 80053 COMPREHEN METABOLIC PANEL: CPT | Performed by: INTERNAL MEDICINE

## 2024-10-09 PROCEDURE — 83735 ASSAY OF MAGNESIUM: CPT | Performed by: INTERNAL MEDICINE

## 2024-10-09 PROCEDURE — 85025 COMPLETE CBC W/AUTO DIFF WBC: CPT | Performed by: INTERNAL MEDICINE

## 2024-10-14 ENCOUNTER — LAB VISIT (OUTPATIENT)
Dept: LAB | Facility: HOSPITAL | Age: 64
End: 2024-10-14
Attending: INTERNAL MEDICINE
Payer: MEDICARE

## 2024-10-14 DIAGNOSIS — T45.1X5S NEUTROPENIA ASSOCIATED WITH MUCOSITIS DUE TO ANTINEOPLASTIC THERAPY: ICD-10-CM

## 2024-10-14 DIAGNOSIS — K12.31 NEUTROPENIA ASSOCIATED WITH MUCOSITIS DUE TO ANTINEOPLASTIC THERAPY: ICD-10-CM

## 2024-10-14 DIAGNOSIS — C34.90 MALIGNANT NEOPLASM OF BRONCHUS AND LUNG: ICD-10-CM

## 2024-10-14 DIAGNOSIS — T45.1X5A ANEMIA DUE TO CHEMOTHERAPY: ICD-10-CM

## 2024-10-14 DIAGNOSIS — C34.11 MALIGNANT NEOPLASM OF UPPER LOBE OF RIGHT LUNG: ICD-10-CM

## 2024-10-14 DIAGNOSIS — D70.1 NEUTROPENIA ASSOCIATED WITH MUCOSITIS DUE TO ANTINEOPLASTIC THERAPY: ICD-10-CM

## 2024-10-14 DIAGNOSIS — D64.81 ANEMIA DUE TO CHEMOTHERAPY: ICD-10-CM

## 2024-10-14 DIAGNOSIS — D50.9 IRON DEFICIENCY ANEMIA, UNSPECIFIED: ICD-10-CM

## 2024-10-14 DIAGNOSIS — R53.83 FATIGUE: ICD-10-CM

## 2024-10-14 LAB
ALBUMIN SERPL BCP-MCNC: 3.1 G/DL (ref 3.5–5.2)
ALP SERPL-CCNC: 97 U/L (ref 55–135)
ALT SERPL W/O P-5'-P-CCNC: 36 U/L (ref 10–44)
ANION GAP SERPL CALC-SCNC: 8 MMOL/L (ref 8–16)
AST SERPL-CCNC: 16 U/L (ref 10–40)
BASOPHILS # BLD AUTO: ABNORMAL K/UL (ref 0–0.2)
BASOPHILS NFR BLD: 0 % (ref 0–1.9)
BILIRUB SERPL-MCNC: 0.3 MG/DL (ref 0.1–1)
BUN SERPL-MCNC: 7 MG/DL (ref 8–23)
CALCIUM SERPL-MCNC: 9.1 MG/DL (ref 8.7–10.5)
CHLORIDE SERPL-SCNC: 106 MMOL/L (ref 95–110)
CO2 SERPL-SCNC: 23 MMOL/L (ref 23–29)
CREAT SERPL-MCNC: 1.4 MG/DL (ref 0.5–1.4)
DIFFERENTIAL METHOD BLD: ABNORMAL
EOSINOPHIL # BLD AUTO: ABNORMAL K/UL (ref 0–0.5)
EOSINOPHIL NFR BLD: 0 % (ref 0–8)
ERYTHROCYTE [DISTWIDTH] IN BLOOD BY AUTOMATED COUNT: 14.3 % (ref 11.5–14.5)
EST. GFR  (NO RACE VARIABLE): 41.8 ML/MIN/1.73 M^2
GLUCOSE SERPL-MCNC: 93 MG/DL (ref 70–110)
HCT VFR BLD AUTO: 26.8 % (ref 37–48.5)
HGB BLD-MCNC: 8.5 G/DL (ref 12–16)
IMM GRANULOCYTES # BLD AUTO: ABNORMAL K/UL (ref 0–0.04)
IMM GRANULOCYTES NFR BLD AUTO: ABNORMAL % (ref 0–0.5)
LYMPHOCYTES # BLD AUTO: ABNORMAL K/UL (ref 1–4.8)
LYMPHOCYTES NFR BLD: 53 % (ref 18–48)
MAGNESIUM SERPL-MCNC: 1.8 MG/DL (ref 1.6–2.6)
MCH RBC QN AUTO: 30.2 PG (ref 27–31)
MCHC RBC AUTO-ENTMCNC: 31.7 G/DL (ref 32–36)
MCV RBC AUTO: 95 FL (ref 82–98)
MONOCYTES # BLD AUTO: ABNORMAL K/UL (ref 0.3–1)
MONOCYTES NFR BLD: 13 % (ref 4–15)
NEUTROPHILS NFR BLD: 34 % (ref 38–73)
NRBC BLD-RTO: 0 /100 WBC
OVALOCYTES BLD QL SMEAR: ABNORMAL
PLATELET # BLD AUTO: 108 K/UL (ref 150–450)
PLATELET BLD QL SMEAR: ABNORMAL
PMV BLD AUTO: 10.4 FL (ref 9.2–12.9)
POTASSIUM SERPL-SCNC: 4.2 MMOL/L (ref 3.5–5.1)
PROT SERPL-MCNC: 6.5 G/DL (ref 6–8.4)
RBC # BLD AUTO: 2.81 M/UL (ref 4–5.4)
SODIUM SERPL-SCNC: 137 MMOL/L (ref 136–145)
WBC # BLD AUTO: 0.95 K/UL (ref 3.9–12.7)

## 2024-10-14 PROCEDURE — 85007 BL SMEAR W/DIFF WBC COUNT: CPT | Performed by: INTERNAL MEDICINE

## 2024-10-14 PROCEDURE — 80053 COMPREHEN METABOLIC PANEL: CPT | Performed by: INTERNAL MEDICINE

## 2024-10-14 PROCEDURE — 36415 COLL VENOUS BLD VENIPUNCTURE: CPT | Mod: PN | Performed by: INTERNAL MEDICINE

## 2024-10-14 PROCEDURE — 85027 COMPLETE CBC AUTOMATED: CPT | Performed by: INTERNAL MEDICINE

## 2024-10-14 PROCEDURE — 83735 ASSAY OF MAGNESIUM: CPT | Performed by: INTERNAL MEDICINE

## 2024-10-17 ENCOUNTER — LAB VISIT (OUTPATIENT)
Dept: LAB | Facility: HOSPITAL | Age: 64
End: 2024-10-17
Payer: MEDICARE

## 2024-10-17 DIAGNOSIS — C34.11 MALIGNANT NEOPLASM OF UPPER LOBE OF RIGHT LUNG: ICD-10-CM

## 2024-10-17 DIAGNOSIS — D50.9 IRON DEFICIENCY ANEMIA, UNSPECIFIED: ICD-10-CM

## 2024-10-17 DIAGNOSIS — R53.83 FATIGUE: ICD-10-CM

## 2024-10-17 LAB
ANISOCYTOSIS BLD QL SMEAR: SLIGHT
BASOPHILS # BLD AUTO: 0 K/UL (ref 0–0.2)
BASOPHILS NFR BLD: 0 % (ref 0–1.9)
DACRYOCYTES BLD QL SMEAR: ABNORMAL
DIFFERENTIAL METHOD BLD: ABNORMAL
EOSINOPHIL # BLD AUTO: 0 K/UL (ref 0–0.5)
EOSINOPHIL NFR BLD: 1.8 % (ref 0–8)
ERYTHROCYTE [DISTWIDTH] IN BLOOD BY AUTOMATED COUNT: 14.6 % (ref 11.5–14.5)
HCT VFR BLD AUTO: 26.8 % (ref 37–48.5)
HGB BLD-MCNC: 8.8 G/DL (ref 12–16)
IMM GRANULOCYTES # BLD AUTO: 0 K/UL (ref 0–0.04)
IMM GRANULOCYTES NFR BLD AUTO: 0 % (ref 0–0.5)
LYMPHOCYTES # BLD AUTO: 0.5 K/UL (ref 1–4.8)
LYMPHOCYTES NFR BLD: 44 % (ref 18–48)
MCH RBC QN AUTO: 30.8 PG (ref 27–31)
MCHC RBC AUTO-ENTMCNC: 32.8 G/DL (ref 32–36)
MCV RBC AUTO: 94 FL (ref 82–98)
MONOCYTES # BLD AUTO: 0.4 K/UL (ref 0.3–1)
MONOCYTES NFR BLD: 39.4 % (ref 4–15)
NEUTROPHILS # BLD AUTO: 0.2 K/UL (ref 1.8–7.7)
NEUTROPHILS NFR BLD: 14.8 % (ref 38–73)
NRBC BLD-RTO: 0 /100 WBC
OVALOCYTES BLD QL SMEAR: ABNORMAL
PLATELET # BLD AUTO: 109 K/UL (ref 150–450)
PLATELET BLD QL SMEAR: ABNORMAL
PMV BLD AUTO: 10.4 FL (ref 9.2–12.9)
POIKILOCYTOSIS BLD QL SMEAR: SLIGHT
POLYCHROMASIA BLD QL SMEAR: ABNORMAL
RBC # BLD AUTO: 2.86 M/UL (ref 4–5.4)
WBC # BLD AUTO: 1.09 K/UL (ref 3.9–12.7)

## 2024-10-17 PROCEDURE — 36415 COLL VENOUS BLD VENIPUNCTURE: CPT | Mod: PN

## 2024-10-17 PROCEDURE — 85025 COMPLETE CBC W/AUTO DIFF WBC: CPT

## 2024-10-21 ENCOUNTER — LAB VISIT (OUTPATIENT)
Dept: LAB | Facility: HOSPITAL | Age: 64
End: 2024-10-21
Payer: MEDICARE

## 2024-10-21 DIAGNOSIS — R53.83 FATIGUE: ICD-10-CM

## 2024-10-21 DIAGNOSIS — C34.11 MALIGNANT NEOPLASM OF UPPER LOBE OF RIGHT LUNG: ICD-10-CM

## 2024-10-21 DIAGNOSIS — D50.9 IRON DEFICIENCY ANEMIA, UNSPECIFIED: ICD-10-CM

## 2024-10-21 LAB
ALBUMIN SERPL BCP-MCNC: 3.1 G/DL (ref 3.5–5.2)
ALP SERPL-CCNC: 97 U/L (ref 40–150)
ALT SERPL W/O P-5'-P-CCNC: 20 U/L (ref 10–44)
ANION GAP SERPL CALC-SCNC: 9 MMOL/L (ref 8–16)
AST SERPL-CCNC: 18 U/L (ref 10–40)
BASOPHILS # BLD AUTO: 0 K/UL (ref 0–0.2)
BASOPHILS NFR BLD: 0 % (ref 0–1.9)
BILIRUB SERPL-MCNC: 0.2 MG/DL (ref 0.1–1)
BUN SERPL-MCNC: 6 MG/DL (ref 8–23)
CALCIUM SERPL-MCNC: 9 MG/DL (ref 8.7–10.5)
CHLORIDE SERPL-SCNC: 107 MMOL/L (ref 95–110)
CO2 SERPL-SCNC: 23 MMOL/L (ref 23–29)
CREAT SERPL-MCNC: 1 MG/DL (ref 0.5–1.4)
DIFFERENTIAL METHOD BLD: ABNORMAL
EOSINOPHIL # BLD AUTO: 0 K/UL (ref 0–0.5)
EOSINOPHIL NFR BLD: 0.3 % (ref 0–8)
ERYTHROCYTE [DISTWIDTH] IN BLOOD BY AUTOMATED COUNT: 15.2 % (ref 11.5–14.5)
EST. GFR  (NO RACE VARIABLE): >60 ML/MIN/1.73 M^2
GLUCOSE SERPL-MCNC: 110 MG/DL (ref 70–110)
HCT VFR BLD AUTO: 27 % (ref 37–48.5)
HGB BLD-MCNC: 8.8 G/DL (ref 12–16)
IMM GRANULOCYTES # BLD AUTO: 0.14 K/UL (ref 0–0.04)
IMM GRANULOCYTES NFR BLD AUTO: 4.2 % (ref 0–0.5)
LYMPHOCYTES # BLD AUTO: 0.8 K/UL (ref 1–4.8)
LYMPHOCYTES NFR BLD: 24.5 % (ref 18–48)
MCH RBC QN AUTO: 31 PG (ref 27–31)
MCHC RBC AUTO-ENTMCNC: 32.6 G/DL (ref 32–36)
MCV RBC AUTO: 95 FL (ref 82–98)
MONOCYTES # BLD AUTO: 1 K/UL (ref 0.3–1)
MONOCYTES NFR BLD: 29.6 % (ref 4–15)
NEUTROPHILS # BLD AUTO: 1.4 K/UL (ref 1.8–7.7)
NEUTROPHILS NFR BLD: 41.4 % (ref 38–73)
NRBC BLD-RTO: 1 /100 WBC
PLATELET # BLD AUTO: 164 K/UL (ref 150–450)
PMV BLD AUTO: 10.4 FL (ref 9.2–12.9)
POTASSIUM SERPL-SCNC: 3.4 MMOL/L (ref 3.5–5.1)
PROT SERPL-MCNC: 6.4 G/DL (ref 6–8.4)
RBC # BLD AUTO: 2.84 M/UL (ref 4–5.4)
SODIUM SERPL-SCNC: 139 MMOL/L (ref 136–145)
WBC # BLD AUTO: 3.31 K/UL (ref 3.9–12.7)

## 2024-10-21 PROCEDURE — 80053 COMPREHEN METABOLIC PANEL: CPT

## 2024-10-21 PROCEDURE — 85025 COMPLETE CBC W/AUTO DIFF WBC: CPT

## 2024-10-21 PROCEDURE — 36415 COLL VENOUS BLD VENIPUNCTURE: CPT | Mod: PN

## 2024-10-23 ENCOUNTER — LAB VISIT (OUTPATIENT)
Dept: LAB | Facility: HOSPITAL | Age: 64
End: 2024-10-23
Attending: INTERNAL MEDICINE
Payer: MEDICARE

## 2024-10-23 DIAGNOSIS — T45.1X5S NEUTROPENIA ASSOCIATED WITH MUCOSITIS DUE TO ANTINEOPLASTIC THERAPY: ICD-10-CM

## 2024-10-23 DIAGNOSIS — D64.81 ANEMIA DUE TO CHEMOTHERAPY: ICD-10-CM

## 2024-10-23 DIAGNOSIS — T45.1X5A ANEMIA DUE TO CHEMOTHERAPY: ICD-10-CM

## 2024-10-23 DIAGNOSIS — D70.1 NEUTROPENIA ASSOCIATED WITH MUCOSITIS DUE TO ANTINEOPLASTIC THERAPY: ICD-10-CM

## 2024-10-23 DIAGNOSIS — C34.11 MALIGNANT NEOPLASM OF UPPER LOBE OF RIGHT LUNG: ICD-10-CM

## 2024-10-23 DIAGNOSIS — C34.90 MALIGNANT NEOPLASM OF BRONCHUS AND LUNG: ICD-10-CM

## 2024-10-23 DIAGNOSIS — K12.31 NEUTROPENIA ASSOCIATED WITH MUCOSITIS DUE TO ANTINEOPLASTIC THERAPY: ICD-10-CM

## 2024-10-23 DIAGNOSIS — R53.83 FATIGUE: ICD-10-CM

## 2024-10-23 LAB
ALBUMIN SERPL BCP-MCNC: 3.3 G/DL (ref 3.5–5.2)
ALP SERPL-CCNC: 99 U/L (ref 40–150)
ALT SERPL W/O P-5'-P-CCNC: 18 U/L (ref 10–44)
ANION GAP SERPL CALC-SCNC: 10 MMOL/L (ref 8–16)
AST SERPL-CCNC: 15 U/L (ref 10–40)
BASOPHILS # BLD AUTO: 0.01 K/UL (ref 0–0.2)
BASOPHILS NFR BLD: 0.3 % (ref 0–1.9)
BILIRUB SERPL-MCNC: 0.2 MG/DL (ref 0.1–1)
BUN SERPL-MCNC: 7 MG/DL (ref 8–23)
CALCIUM SERPL-MCNC: 8.9 MG/DL (ref 8.7–10.5)
CHLORIDE SERPL-SCNC: 107 MMOL/L (ref 95–110)
CO2 SERPL-SCNC: 25 MMOL/L (ref 23–29)
CREAT SERPL-MCNC: 1.1 MG/DL (ref 0.5–1.4)
DIFFERENTIAL METHOD BLD: ABNORMAL
EOSINOPHIL # BLD AUTO: 0 K/UL (ref 0–0.5)
EOSINOPHIL NFR BLD: 0.3 % (ref 0–8)
ERYTHROCYTE [DISTWIDTH] IN BLOOD BY AUTOMATED COUNT: 15.4 % (ref 11.5–14.5)
EST. GFR  (NO RACE VARIABLE): 55.8 ML/MIN/1.73 M^2
GLUCOSE SERPL-MCNC: 86 MG/DL (ref 70–110)
HCT VFR BLD AUTO: 29.7 % (ref 37–48.5)
HGB BLD-MCNC: 9.3 G/DL (ref 12–16)
IMM GRANULOCYTES # BLD AUTO: 0.09 K/UL (ref 0–0.04)
IMM GRANULOCYTES NFR BLD AUTO: 2.6 % (ref 0–0.5)
LYMPHOCYTES # BLD AUTO: 0.9 K/UL (ref 1–4.8)
LYMPHOCYTES NFR BLD: 27.4 % (ref 18–48)
MAGNESIUM SERPL-MCNC: 1.7 MG/DL (ref 1.6–2.6)
MCH RBC QN AUTO: 30.7 PG (ref 27–31)
MCHC RBC AUTO-ENTMCNC: 31.3 G/DL (ref 32–36)
MCV RBC AUTO: 98 FL (ref 82–98)
MONOCYTES # BLD AUTO: 1 K/UL (ref 0.3–1)
MONOCYTES NFR BLD: 30.3 % (ref 4–15)
NEUTROPHILS # BLD AUTO: 1.3 K/UL (ref 1.8–7.7)
NEUTROPHILS NFR BLD: 39.1 % (ref 38–73)
NRBC BLD-RTO: 1 /100 WBC
PLATELET # BLD AUTO: 200 K/UL (ref 150–450)
PMV BLD AUTO: 9.9 FL (ref 9.2–12.9)
POTASSIUM SERPL-SCNC: 3.2 MMOL/L (ref 3.5–5.1)
PROT SERPL-MCNC: 6.8 G/DL (ref 6–8.4)
RBC # BLD AUTO: 3.03 M/UL (ref 4–5.4)
SODIUM SERPL-SCNC: 142 MMOL/L (ref 136–145)
WBC # BLD AUTO: 3.43 K/UL (ref 3.9–12.7)

## 2024-10-23 PROCEDURE — 80053 COMPREHEN METABOLIC PANEL: CPT | Performed by: INTERNAL MEDICINE

## 2024-10-23 PROCEDURE — 83735 ASSAY OF MAGNESIUM: CPT | Performed by: INTERNAL MEDICINE

## 2024-10-23 PROCEDURE — 85025 COMPLETE CBC W/AUTO DIFF WBC: CPT | Performed by: INTERNAL MEDICINE

## 2024-10-23 PROCEDURE — 36415 COLL VENOUS BLD VENIPUNCTURE: CPT | Mod: PN | Performed by: INTERNAL MEDICINE

## 2024-10-25 ENCOUNTER — OFFICE VISIT (OUTPATIENT)
Dept: FAMILY MEDICINE | Facility: CLINIC | Age: 64
End: 2024-10-25
Payer: MEDICARE

## 2024-10-25 VITALS
HEART RATE: 79 BPM | DIASTOLIC BLOOD PRESSURE: 68 MMHG | SYSTOLIC BLOOD PRESSURE: 110 MMHG | BODY MASS INDEX: 29.38 KG/M2 | HEIGHT: 70 IN | OXYGEN SATURATION: 97 % | WEIGHT: 205.25 LBS

## 2024-10-25 DIAGNOSIS — C34.90 MALIGNANT NEOPLASM OF UNSPECIFIED PART OF UNSPECIFIED BRONCHUS OR LUNG: Primary | ICD-10-CM

## 2024-10-25 DIAGNOSIS — E11.9 TYPE 2 DIABETES MELLITUS WITHOUT COMPLICATION, WITHOUT LONG-TERM CURRENT USE OF INSULIN: ICD-10-CM

## 2024-10-25 DIAGNOSIS — C34.81 SMALL CELL CARCINOMA OF OVERLAPPING SITES OF RIGHT LUNG: ICD-10-CM

## 2024-10-25 DIAGNOSIS — Z79.899 HIGH RISK MEDICATIONS (NOT ANTICOAGULANTS) LONG-TERM USE: ICD-10-CM

## 2024-10-25 DIAGNOSIS — F41.1 GAD (GENERALIZED ANXIETY DISORDER): ICD-10-CM

## 2024-10-25 PROCEDURE — 99215 OFFICE O/P EST HI 40 MIN: CPT | Mod: PBBFAC,PN | Performed by: FAMILY MEDICINE

## 2024-10-25 PROCEDURE — 99999 PR PBB SHADOW E&M-EST. PATIENT-LVL V: CPT | Mod: PBBFAC,,, | Performed by: FAMILY MEDICINE

## 2024-10-25 PROCEDURE — G2211 COMPLEX E/M VISIT ADD ON: HCPCS | Mod: S$PBB,,, | Performed by: FAMILY MEDICINE

## 2024-10-25 PROCEDURE — 99214 OFFICE O/P EST MOD 30 MIN: CPT | Mod: S$PBB,,, | Performed by: FAMILY MEDICINE

## 2024-10-25 RX ORDER — ALPRAZOLAM 0.25 MG/1
0.25 TABLET ORAL 2 TIMES DAILY PRN
Qty: 60 TABLET | Refills: 3 | Status: SHIPPED | OUTPATIENT
Start: 2024-10-25

## 2024-10-25 RX ORDER — LEVOTHYROXINE SODIUM 100 UG/1
100 TABLET ORAL
Qty: 90 TABLET | Refills: 3 | Status: SHIPPED | OUTPATIENT
Start: 2024-10-25

## 2024-10-25 RX ORDER — CYCLOBENZAPRINE HCL 5 MG
5 TABLET ORAL 3 TIMES DAILY PRN
Qty: 90 TABLET | Refills: 1 | Status: SHIPPED | OUTPATIENT
Start: 2024-10-25

## 2024-10-28 ENCOUNTER — LAB VISIT (OUTPATIENT)
Dept: LAB | Facility: HOSPITAL | Age: 64
End: 2024-10-28
Attending: INTERNAL MEDICINE
Payer: MEDICARE

## 2024-10-28 DIAGNOSIS — C34.81 SMALL CELL CARCINOMA OF OVERLAPPING SITES OF RIGHT LUNG: ICD-10-CM

## 2024-10-28 DIAGNOSIS — C34.11 MALIGNANT NEOPLASM OF UPPER LOBE OF RIGHT LUNG: ICD-10-CM

## 2024-10-28 DIAGNOSIS — D64.81 ANEMIA DUE TO CHEMOTHERAPY: ICD-10-CM

## 2024-10-28 DIAGNOSIS — C34.90 MALIGNANT NEOPLASM OF UNSPECIFIED PART OF UNSPECIFIED BRONCHUS OR LUNG: ICD-10-CM

## 2024-10-28 DIAGNOSIS — R53.83 FATIGUE: ICD-10-CM

## 2024-10-28 DIAGNOSIS — C34.90 MALIGNANT NEOPLASM OF BRONCHUS AND LUNG: ICD-10-CM

## 2024-10-28 DIAGNOSIS — D70.1 NEUTROPENIA ASSOCIATED WITH MUCOSITIS DUE TO ANTINEOPLASTIC THERAPY: ICD-10-CM

## 2024-10-28 DIAGNOSIS — Z79.899 HIGH RISK MEDICATIONS (NOT ANTICOAGULANTS) LONG-TERM USE: ICD-10-CM

## 2024-10-28 DIAGNOSIS — T45.1X5S NEUTROPENIA ASSOCIATED WITH MUCOSITIS DUE TO ANTINEOPLASTIC THERAPY: ICD-10-CM

## 2024-10-28 DIAGNOSIS — K12.31 NEUTROPENIA ASSOCIATED WITH MUCOSITIS DUE TO ANTINEOPLASTIC THERAPY: ICD-10-CM

## 2024-10-28 DIAGNOSIS — T45.1X5A ANEMIA DUE TO CHEMOTHERAPY: ICD-10-CM

## 2024-10-28 LAB
ALBUMIN SERPL BCP-MCNC: 3.1 G/DL (ref 3.5–5.2)
ALP SERPL-CCNC: 97 U/L (ref 40–150)
ALT SERPL W/O P-5'-P-CCNC: 89 U/L (ref 10–44)
ANION GAP SERPL CALC-SCNC: 8 MMOL/L (ref 8–16)
AST SERPL-CCNC: 41 U/L (ref 10–40)
BASOPHILS # BLD AUTO: 0 K/UL (ref 0–0.2)
BASOPHILS NFR BLD: 0 % (ref 0–1.9)
BILIRUB SERPL-MCNC: 0.3 MG/DL (ref 0.1–1)
BUN SERPL-MCNC: 10 MG/DL (ref 8–23)
CALCIUM SERPL-MCNC: 8.5 MG/DL (ref 8.7–10.5)
CHLORIDE SERPL-SCNC: 108 MMOL/L (ref 95–110)
CO2 SERPL-SCNC: 24 MMOL/L (ref 23–29)
CREAT SERPL-MCNC: 1.1 MG/DL (ref 0.5–1.4)
DIFFERENTIAL METHOD BLD: ABNORMAL
EOSINOPHIL # BLD AUTO: 0 K/UL (ref 0–0.5)
EOSINOPHIL NFR BLD: 0.3 % (ref 0–8)
ERYTHROCYTE [DISTWIDTH] IN BLOOD BY AUTOMATED COUNT: 16 % (ref 11.5–14.5)
EST. GFR  (NO RACE VARIABLE): 55.8 ML/MIN/1.73 M^2
GLUCOSE SERPL-MCNC: 98 MG/DL (ref 70–110)
HCT VFR BLD AUTO: 28.9 % (ref 37–48.5)
HGB BLD-MCNC: 9.1 G/DL (ref 12–16)
IMM GRANULOCYTES # BLD AUTO: 0.01 K/UL (ref 0–0.04)
IMM GRANULOCYTES NFR BLD AUTO: 0.3 % (ref 0–0.5)
LYMPHOCYTES # BLD AUTO: 0.8 K/UL (ref 1–4.8)
LYMPHOCYTES NFR BLD: 22.4 % (ref 18–48)
MAGNESIUM SERPL-MCNC: 2 MG/DL (ref 1.6–2.6)
MCH RBC QN AUTO: 30.8 PG (ref 27–31)
MCHC RBC AUTO-ENTMCNC: 31.5 G/DL (ref 32–36)
MCV RBC AUTO: 98 FL (ref 82–98)
MONOCYTES # BLD AUTO: 0.1 K/UL (ref 0.3–1)
MONOCYTES NFR BLD: 3.1 % (ref 4–15)
NEUTROPHILS # BLD AUTO: 2.6 K/UL (ref 1.8–7.7)
NEUTROPHILS NFR BLD: 73.9 % (ref 38–73)
NRBC BLD-RTO: 0 /100 WBC
PLATELET # BLD AUTO: 220 K/UL (ref 150–450)
PMV BLD AUTO: 10.2 FL (ref 9.2–12.9)
POTASSIUM SERPL-SCNC: 4 MMOL/L (ref 3.5–5.1)
PROT SERPL-MCNC: 6.3 G/DL (ref 6–8.4)
RBC # BLD AUTO: 2.95 M/UL (ref 4–5.4)
SODIUM SERPL-SCNC: 140 MMOL/L (ref 136–145)
VIT B12 SERPL-MCNC: 562 PG/ML (ref 210–950)
WBC # BLD AUTO: 3.57 K/UL (ref 3.9–12.7)

## 2024-10-28 PROCEDURE — 80053 COMPREHEN METABOLIC PANEL: CPT | Performed by: INTERNAL MEDICINE

## 2024-10-28 PROCEDURE — 85025 COMPLETE CBC W/AUTO DIFF WBC: CPT | Performed by: INTERNAL MEDICINE

## 2024-10-28 PROCEDURE — 83735 ASSAY OF MAGNESIUM: CPT | Performed by: INTERNAL MEDICINE

## 2024-10-28 PROCEDURE — 36415 COLL VENOUS BLD VENIPUNCTURE: CPT | Mod: PN | Performed by: FAMILY MEDICINE

## 2024-10-28 PROCEDURE — 82607 VITAMIN B-12: CPT | Performed by: FAMILY MEDICINE

## 2024-11-04 ENCOUNTER — TELEPHONE (OUTPATIENT)
Dept: FAMILY MEDICINE | Facility: CLINIC | Age: 64
End: 2024-11-04
Payer: COMMERCIAL

## 2024-11-04 ENCOUNTER — LAB VISIT (OUTPATIENT)
Dept: LAB | Facility: HOSPITAL | Age: 64
End: 2024-11-04
Attending: FAMILY MEDICINE
Payer: MEDICARE

## 2024-11-04 DIAGNOSIS — K12.31 NEUTROPENIA ASSOCIATED WITH MUCOSITIS DUE TO ANTINEOPLASTIC THERAPY: ICD-10-CM

## 2024-11-04 DIAGNOSIS — C34.90 MALIGNANT NEOPLASM OF BRONCHUS AND LUNG: ICD-10-CM

## 2024-11-04 DIAGNOSIS — E11.9 TYPE 2 DIABETES MELLITUS WITHOUT COMPLICATION, WITHOUT LONG-TERM CURRENT USE OF INSULIN: ICD-10-CM

## 2024-11-04 DIAGNOSIS — D70.1 NEUTROPENIA ASSOCIATED WITH MUCOSITIS DUE TO ANTINEOPLASTIC THERAPY: ICD-10-CM

## 2024-11-04 DIAGNOSIS — T45.1X5A ANEMIA DUE TO CHEMOTHERAPY: ICD-10-CM

## 2024-11-04 DIAGNOSIS — Z79.899 HIGH RISK MEDICATIONS (NOT ANTICOAGULANTS) LONG-TERM USE: ICD-10-CM

## 2024-11-04 DIAGNOSIS — C34.11 MALIGNANT NEOPLASM OF UPPER LOBE OF RIGHT LUNG: ICD-10-CM

## 2024-11-04 DIAGNOSIS — R53.83 FATIGUE: ICD-10-CM

## 2024-11-04 DIAGNOSIS — T45.1X5S NEUTROPENIA ASSOCIATED WITH MUCOSITIS DUE TO ANTINEOPLASTIC THERAPY: ICD-10-CM

## 2024-11-04 DIAGNOSIS — D64.81 ANEMIA DUE TO CHEMOTHERAPY: ICD-10-CM

## 2024-11-04 LAB
ALBUMIN SERPL BCP-MCNC: 3.5 G/DL (ref 3.5–5.2)
ALBUMIN SERPL BCP-MCNC: 3.5 G/DL (ref 3.5–5.2)
ALP SERPL-CCNC: 96 U/L (ref 40–150)
ALP SERPL-CCNC: 96 U/L (ref 40–150)
ALT SERPL W/O P-5'-P-CCNC: 30 U/L (ref 10–44)
ALT SERPL W/O P-5'-P-CCNC: 31 U/L (ref 10–44)
ANION GAP SERPL CALC-SCNC: 12 MMOL/L (ref 8–16)
ANION GAP SERPL CALC-SCNC: 12 MMOL/L (ref 8–16)
ANISOCYTOSIS BLD QL SMEAR: SLIGHT
AST SERPL-CCNC: 17 U/L (ref 10–40)
AST SERPL-CCNC: 17 U/L (ref 10–40)
BASOPHILS NFR BLD: 0 % (ref 0–1.9)
BILIRUB SERPL-MCNC: 0.3 MG/DL (ref 0.1–1)
BILIRUB SERPL-MCNC: 0.3 MG/DL (ref 0.1–1)
BUN SERPL-MCNC: 9 MG/DL (ref 8–23)
BUN SERPL-MCNC: 9 MG/DL (ref 8–23)
CALCIUM SERPL-MCNC: 9.4 MG/DL (ref 8.7–10.5)
CALCIUM SERPL-MCNC: 9.6 MG/DL (ref 8.7–10.5)
CHLORIDE SERPL-SCNC: 103 MMOL/L (ref 95–110)
CHLORIDE SERPL-SCNC: 104 MMOL/L (ref 95–110)
CHOLEST SERPL-MCNC: 176 MG/DL (ref 120–199)
CHOLEST/HDLC SERPL: 3.1 {RATIO} (ref 2–5)
CO2 SERPL-SCNC: 22 MMOL/L (ref 23–29)
CO2 SERPL-SCNC: 23 MMOL/L (ref 23–29)
CREAT SERPL-MCNC: 1.2 MG/DL (ref 0.5–1.4)
CREAT SERPL-MCNC: 1.2 MG/DL (ref 0.5–1.4)
DACRYOCYTES BLD QL SMEAR: ABNORMAL
DIFFERENTIAL METHOD BLD: ABNORMAL
EOSINOPHIL NFR BLD: 0 % (ref 0–8)
ERYTHROCYTE [DISTWIDTH] IN BLOOD BY AUTOMATED COUNT: 15.8 % (ref 11.5–14.5)
EST. GFR  (NO RACE VARIABLE): 50.2 ML/MIN/1.73 M^2
EST. GFR  (NO RACE VARIABLE): 50.2 ML/MIN/1.73 M^2
ESTIMATED AVG GLUCOSE: 108 MG/DL (ref 68–131)
GLUCOSE SERPL-MCNC: 87 MG/DL (ref 70–110)
GLUCOSE SERPL-MCNC: 90 MG/DL (ref 70–110)
HBA1C MFR BLD: 5.4 % (ref 4–5.6)
HCT VFR BLD AUTO: 29.7 % (ref 37–48.5)
HDLC SERPL-MCNC: 57 MG/DL (ref 40–75)
HDLC SERPL: 32.4 % (ref 20–50)
HGB BLD-MCNC: 9.4 G/DL (ref 12–16)
IMM GRANULOCYTES # BLD AUTO: ABNORMAL K/UL (ref 0–0.04)
IMM GRANULOCYTES NFR BLD AUTO: ABNORMAL % (ref 0–0.5)
LDLC SERPL CALC-MCNC: 95.8 MG/DL (ref 63–159)
LYMPHOCYTES NFR BLD: 45 % (ref 18–48)
MAGNESIUM SERPL-MCNC: 1.8 MG/DL (ref 1.6–2.6)
MCH RBC QN AUTO: 30.8 PG (ref 27–31)
MCHC RBC AUTO-ENTMCNC: 31.6 G/DL (ref 32–36)
MCV RBC AUTO: 97 FL (ref 82–98)
MONOCYTES NFR BLD: 22 % (ref 4–15)
NEUTROPHILS NFR BLD: 33 % (ref 38–73)
NONHDLC SERPL-MCNC: 119 MG/DL
NRBC BLD-RTO: 0 /100 WBC
OVALOCYTES BLD QL SMEAR: ABNORMAL
PLATELET # BLD AUTO: 142 K/UL (ref 150–450)
PLATELET BLD QL SMEAR: ABNORMAL
PMV BLD AUTO: 10.4 FL (ref 9.2–12.9)
POIKILOCYTOSIS BLD QL SMEAR: SLIGHT
POTASSIUM SERPL-SCNC: 3.8 MMOL/L (ref 3.5–5.1)
POTASSIUM SERPL-SCNC: 4.1 MMOL/L (ref 3.5–5.1)
PROT SERPL-MCNC: 7.1 G/DL (ref 6–8.4)
PROT SERPL-MCNC: 7.1 G/DL (ref 6–8.4)
RBC # BLD AUTO: 3.05 M/UL (ref 4–5.4)
SODIUM SERPL-SCNC: 138 MMOL/L (ref 136–145)
SODIUM SERPL-SCNC: 138 MMOL/L (ref 136–145)
T4 FREE SERPL-MCNC: 0.93 NG/DL (ref 0.71–1.51)
TRIGL SERPL-MCNC: 116 MG/DL (ref 30–150)
TSH SERPL DL<=0.005 MIU/L-ACNC: 4.43 UIU/ML (ref 0.4–4)
WBC # BLD AUTO: 1.55 K/UL (ref 3.9–12.7)

## 2024-11-04 PROCEDURE — 80061 LIPID PANEL: CPT | Performed by: FAMILY MEDICINE

## 2024-11-04 PROCEDURE — 80053 COMPREHEN METABOLIC PANEL: CPT | Mod: 91 | Performed by: FAMILY MEDICINE

## 2024-11-04 PROCEDURE — 36415 COLL VENOUS BLD VENIPUNCTURE: CPT | Mod: PN | Performed by: INTERNAL MEDICINE

## 2024-11-04 PROCEDURE — 83036 HEMOGLOBIN GLYCOSYLATED A1C: CPT | Performed by: FAMILY MEDICINE

## 2024-11-04 PROCEDURE — 83735 ASSAY OF MAGNESIUM: CPT | Performed by: INTERNAL MEDICINE

## 2024-11-04 PROCEDURE — 80053 COMPREHEN METABOLIC PANEL: CPT | Performed by: INTERNAL MEDICINE

## 2024-11-04 PROCEDURE — 85027 COMPLETE CBC AUTOMATED: CPT | Performed by: INTERNAL MEDICINE

## 2024-11-04 PROCEDURE — 85007 BL SMEAR W/DIFF WBC COUNT: CPT | Performed by: INTERNAL MEDICINE

## 2024-11-04 PROCEDURE — 84443 ASSAY THYROID STIM HORMONE: CPT | Performed by: FAMILY MEDICINE

## 2024-11-04 PROCEDURE — 84439 ASSAY OF FREE THYROXINE: CPT | Performed by: FAMILY MEDICINE

## 2024-11-05 NOTE — TELEPHONE ENCOUNTER
Received a critical lab for the patient, has leukopenia white blood cell count was 1.5, went down from 3.5 from previous encounter.  The patient did not receive the injection to increase her white blood cell count that usually receive, she will contact the oncologist tomorrow for a follow-up on this at [facility].  The patient denies any fevers, chills, or any sign of infection.  Will message her PCP about this result.  Thank you.

## 2024-11-11 ENCOUNTER — LAB VISIT (OUTPATIENT)
Dept: LAB | Facility: HOSPITAL | Age: 64
End: 2024-11-11
Attending: INTERNAL MEDICINE
Payer: MEDICARE

## 2024-11-11 DIAGNOSIS — T45.1X5A ANEMIA DUE TO CHEMOTHERAPY: ICD-10-CM

## 2024-11-11 DIAGNOSIS — C34.90 MALIGNANT NEOPLASM OF BRONCHUS AND LUNG: ICD-10-CM

## 2024-11-11 DIAGNOSIS — K12.31 NEUTROPENIA ASSOCIATED WITH MUCOSITIS DUE TO ANTINEOPLASTIC THERAPY: ICD-10-CM

## 2024-11-11 DIAGNOSIS — T45.1X5S NEUTROPENIA ASSOCIATED WITH MUCOSITIS DUE TO ANTINEOPLASTIC THERAPY: ICD-10-CM

## 2024-11-11 DIAGNOSIS — D70.1 NEUTROPENIA ASSOCIATED WITH MUCOSITIS DUE TO ANTINEOPLASTIC THERAPY: ICD-10-CM

## 2024-11-11 DIAGNOSIS — D64.81 ANEMIA DUE TO CHEMOTHERAPY: ICD-10-CM

## 2024-11-11 DIAGNOSIS — C34.11 MALIGNANT NEOPLASM OF UPPER LOBE OF RIGHT LUNG: ICD-10-CM

## 2024-11-11 DIAGNOSIS — R53.83 FATIGUE: ICD-10-CM

## 2024-11-11 LAB
ALBUMIN SERPL BCP-MCNC: 3.6 G/DL (ref 3.5–5.2)
ALP SERPL-CCNC: 109 U/L (ref 40–150)
ALT SERPL W/O P-5'-P-CCNC: 31 U/L (ref 10–44)
ANION GAP SERPL CALC-SCNC: 10 MMOL/L (ref 8–16)
ANISOCYTOSIS BLD QL SMEAR: SLIGHT
AST SERPL-CCNC: 23 U/L (ref 10–40)
BASOPHILS # BLD AUTO: 0.01 K/UL (ref 0–0.2)
BASOPHILS NFR BLD: 0.3 % (ref 0–1.9)
BILIRUB SERPL-MCNC: 0.2 MG/DL (ref 0.1–1)
BUN SERPL-MCNC: 7 MG/DL (ref 8–23)
CALCIUM SERPL-MCNC: 9.4 MG/DL (ref 8.7–10.5)
CHLORIDE SERPL-SCNC: 104 MMOL/L (ref 95–110)
CO2 SERPL-SCNC: 23 MMOL/L (ref 23–29)
CREAT SERPL-MCNC: 1.2 MG/DL (ref 0.5–1.4)
DIFFERENTIAL METHOD BLD: ABNORMAL
EOSINOPHIL # BLD AUTO: 0 K/UL (ref 0–0.5)
EOSINOPHIL NFR BLD: 1 % (ref 0–8)
ERYTHROCYTE [DISTWIDTH] IN BLOOD BY AUTOMATED COUNT: 16.2 % (ref 11.5–14.5)
EST. GFR  (NO RACE VARIABLE): 50.2 ML/MIN/1.73 M^2
GLUCOSE SERPL-MCNC: 69 MG/DL (ref 70–110)
HCT VFR BLD AUTO: 33.3 % (ref 37–48.5)
HGB BLD-MCNC: 10.4 G/DL (ref 12–16)
HYPOCHROMIA BLD QL SMEAR: ABNORMAL
IMM GRANULOCYTES # BLD AUTO: 0.05 K/UL (ref 0–0.04)
IMM GRANULOCYTES NFR BLD AUTO: 1.6 % (ref 0–0.5)
LYMPHOCYTES # BLD AUTO: 1 K/UL (ref 1–4.8)
LYMPHOCYTES NFR BLD: 30.8 % (ref 18–48)
MAGNESIUM SERPL-MCNC: 1.9 MG/DL (ref 1.6–2.6)
MCH RBC QN AUTO: 30.8 PG (ref 27–31)
MCHC RBC AUTO-ENTMCNC: 31.2 G/DL (ref 32–36)
MCV RBC AUTO: 99 FL (ref 82–98)
MONOCYTES # BLD AUTO: 0.9 K/UL (ref 0.3–1)
MONOCYTES NFR BLD: 28.2 % (ref 4–15)
NEUTROPHILS # BLD AUTO: 1.2 K/UL (ref 1.8–7.7)
NEUTROPHILS NFR BLD: 38.1 % (ref 38–73)
NRBC BLD-RTO: 1 /100 WBC
PLATELET # BLD AUTO: 222 K/UL (ref 150–450)
PLATELET BLD QL SMEAR: ABNORMAL
PMV BLD AUTO: 9.9 FL (ref 9.2–12.9)
POTASSIUM SERPL-SCNC: 4.3 MMOL/L (ref 3.5–5.1)
PROT SERPL-MCNC: 7.1 G/DL (ref 6–8.4)
RBC # BLD AUTO: 3.38 M/UL (ref 4–5.4)
SODIUM SERPL-SCNC: 137 MMOL/L (ref 136–145)
WBC # BLD AUTO: 3.12 K/UL (ref 3.9–12.7)

## 2024-11-11 PROCEDURE — 83735 ASSAY OF MAGNESIUM: CPT | Performed by: INTERNAL MEDICINE

## 2024-11-11 PROCEDURE — 36415 COLL VENOUS BLD VENIPUNCTURE: CPT | Mod: PN | Performed by: INTERNAL MEDICINE

## 2024-11-11 PROCEDURE — 80053 COMPREHEN METABOLIC PANEL: CPT | Performed by: INTERNAL MEDICINE

## 2024-11-11 PROCEDURE — 85025 COMPLETE CBC W/AUTO DIFF WBC: CPT | Performed by: INTERNAL MEDICINE

## 2024-11-12 ENCOUNTER — HOSPITAL ENCOUNTER (OUTPATIENT)
Dept: RADIOLOGY | Facility: HOSPITAL | Age: 64
Discharge: HOME | End: 2024-11-12
Attending: FAMILY MEDICINE
Payer: MEDICARE

## 2024-11-12 DIAGNOSIS — C34.90 MALIGNANT NEOPLASM OF UNSPECIFIED PART OF UNSPECIFIED BRONCHUS OR LUNG: ICD-10-CM

## 2024-11-12 PROCEDURE — A9585 GADOBUTROL INJECTION: HCPCS | Mod: PO | Performed by: FAMILY MEDICINE

## 2024-11-12 PROCEDURE — 25500020 PHARM REV CODE 255: Mod: PO | Performed by: FAMILY MEDICINE

## 2024-11-12 PROCEDURE — 70553 MRI BRAIN STEM W/O & W/DYE: CPT | Mod: TC,PO

## 2024-11-12 PROCEDURE — 70553 MRI BRAIN STEM W/O & W/DYE: CPT | Mod: 26,,, | Performed by: RADIOLOGY

## 2024-11-12 RX ORDER — GADOBUTROL 604.72 MG/ML
9 INJECTION INTRAVENOUS
Status: COMPLETED | OUTPATIENT
Start: 2024-11-12 | End: 2024-11-12

## 2024-11-12 RX ADMIN — GADOBUTROL 9 ML: 604.72 INJECTION INTRAVENOUS at 02:11

## 2024-11-18 ENCOUNTER — LAB VISIT (OUTPATIENT)
Dept: LAB | Facility: HOSPITAL | Age: 64
End: 2024-11-18
Attending: INTERNAL MEDICINE
Payer: MEDICARE

## 2024-11-18 DIAGNOSIS — T45.1X5A ANEMIA DUE TO ANTINEOPLASTIC CHEMOTHERAPY: ICD-10-CM

## 2024-11-18 DIAGNOSIS — R53.83 FATIGUE: ICD-10-CM

## 2024-11-18 DIAGNOSIS — C34.90 MALIGNANT NEOPLASM OF BRONCHUS AND LUNG: ICD-10-CM

## 2024-11-18 DIAGNOSIS — D64.81 ANEMIA DUE TO ANTINEOPLASTIC CHEMOTHERAPY: ICD-10-CM

## 2024-11-18 DIAGNOSIS — T45.1X5A AGRANULOCYTOSIS SECONDARY TO CANCER CHEMOTHERAPY: ICD-10-CM

## 2024-11-18 DIAGNOSIS — D70.1 AGRANULOCYTOSIS SECONDARY TO CANCER CHEMOTHERAPY: ICD-10-CM

## 2024-11-18 DIAGNOSIS — C34.11 MALIGNANT NEOPLASM OF UPPER LOBE OF RIGHT LUNG: Primary | ICD-10-CM

## 2024-11-18 DIAGNOSIS — D50.9 IRON DEFICIENCY ANEMIA, UNSPECIFIED: ICD-10-CM

## 2024-11-18 LAB
ALBUMIN SERPL BCP-MCNC: 3.8 G/DL (ref 3.5–5.2)
ALP SERPL-CCNC: 107 U/L (ref 40–150)
ALT SERPL W/O P-5'-P-CCNC: 44 U/L (ref 10–44)
ANION GAP SERPL CALC-SCNC: 9 MMOL/L (ref 8–16)
AST SERPL-CCNC: 31 U/L (ref 10–40)
BASOPHILS # BLD AUTO: 0 K/UL (ref 0–0.2)
BASOPHILS NFR BLD: 0 % (ref 0–1.9)
BILIRUB SERPL-MCNC: 0.3 MG/DL (ref 0.1–1)
BUN SERPL-MCNC: 6 MG/DL (ref 8–23)
CALCIUM SERPL-MCNC: 9.6 MG/DL (ref 8.7–10.5)
CHLORIDE SERPL-SCNC: 104 MMOL/L (ref 95–110)
CO2 SERPL-SCNC: 24 MMOL/L (ref 23–29)
CREAT SERPL-MCNC: 1.1 MG/DL (ref 0.5–1.4)
DIFFERENTIAL METHOD BLD: ABNORMAL
EOSINOPHIL # BLD AUTO: 0 K/UL (ref 0–0.5)
EOSINOPHIL NFR BLD: 0.4 % (ref 0–8)
ERYTHROCYTE [DISTWIDTH] IN BLOOD BY AUTOMATED COUNT: 16.3 % (ref 11.5–14.5)
EST. GFR  (NO RACE VARIABLE): 55.8 ML/MIN/1.73 M^2
GLUCOSE SERPL-MCNC: 92 MG/DL (ref 70–110)
HCT VFR BLD AUTO: 36.4 % (ref 37–48.5)
HGB BLD-MCNC: 11.6 G/DL (ref 12–16)
IMM GRANULOCYTES # BLD AUTO: 0.01 K/UL (ref 0–0.04)
IMM GRANULOCYTES NFR BLD AUTO: 0.2 % (ref 0–0.5)
LYMPHOCYTES # BLD AUTO: 1.1 K/UL (ref 1–4.8)
LYMPHOCYTES NFR BLD: 24.7 % (ref 18–48)
MAGNESIUM SERPL-MCNC: 2.1 MG/DL (ref 1.6–2.6)
MCH RBC QN AUTO: 31.4 PG (ref 27–31)
MCHC RBC AUTO-ENTMCNC: 31.9 G/DL (ref 32–36)
MCV RBC AUTO: 98 FL (ref 82–98)
MONOCYTES # BLD AUTO: 0.8 K/UL (ref 0.3–1)
MONOCYTES NFR BLD: 18.9 % (ref 4–15)
NEUTROPHILS # BLD AUTO: 2.5 K/UL (ref 1.8–7.7)
NEUTROPHILS NFR BLD: 55.8 % (ref 38–73)
NRBC BLD-RTO: 0 /100 WBC
PLATELET # BLD AUTO: 266 K/UL (ref 150–450)
PMV BLD AUTO: 10 FL (ref 9.2–12.9)
POTASSIUM SERPL-SCNC: 4.4 MMOL/L (ref 3.5–5.1)
PROT SERPL-MCNC: 7.5 G/DL (ref 6–8.4)
RBC # BLD AUTO: 3.7 M/UL (ref 4–5.4)
SODIUM SERPL-SCNC: 137 MMOL/L (ref 136–145)
WBC # BLD AUTO: 4.45 K/UL (ref 3.9–12.7)

## 2024-11-18 PROCEDURE — 85025 COMPLETE CBC W/AUTO DIFF WBC: CPT | Performed by: INTERNAL MEDICINE

## 2024-11-18 PROCEDURE — 83735 ASSAY OF MAGNESIUM: CPT | Performed by: INTERNAL MEDICINE

## 2024-11-18 PROCEDURE — 80053 COMPREHEN METABOLIC PANEL: CPT | Performed by: INTERNAL MEDICINE

## 2024-11-18 PROCEDURE — 36415 COLL VENOUS BLD VENIPUNCTURE: CPT | Mod: PN | Performed by: INTERNAL MEDICINE

## 2024-11-22 DIAGNOSIS — C34.90 MALIGNANT NEOPLASM OF UNSPECIFIED PART OF UNSPECIFIED BRONCHUS OR LUNG: ICD-10-CM

## 2024-11-22 DIAGNOSIS — T45.1X5A ANEMIA DUE TO ANTINEOPLASTIC CHEMOTHERAPY: ICD-10-CM

## 2024-11-22 DIAGNOSIS — R53.83 OTHER FATIGUE: ICD-10-CM

## 2024-11-22 DIAGNOSIS — C34.11 MALIGNANT NEOPLASM OF UPPER LOBE, RIGHT BRONCHUS OR LUNG: Primary | ICD-10-CM

## 2024-11-22 DIAGNOSIS — D64.81 ANEMIA DUE TO ANTINEOPLASTIC CHEMOTHERAPY: ICD-10-CM

## 2024-11-22 DIAGNOSIS — D50.9 IRON DEFICIENCY ANEMIA, UNSPECIFIED: ICD-10-CM

## 2024-11-22 DIAGNOSIS — D70.1 AGRANULOCYTOSIS SECONDARY TO CANCER CHEMOTHERAPY (CODE): ICD-10-CM

## 2024-11-25 ENCOUNTER — OFFICE VISIT (OUTPATIENT)
Dept: FAMILY MEDICINE | Facility: CLINIC | Age: 64
End: 2024-11-25
Payer: MEDICARE

## 2024-11-25 ENCOUNTER — LAB VISIT (OUTPATIENT)
Dept: LAB | Facility: HOSPITAL | Age: 64
End: 2024-11-25
Attending: INTERNAL MEDICINE
Payer: MEDICARE

## 2024-11-25 VITALS
WEIGHT: 207.44 LBS | OXYGEN SATURATION: 98 % | HEART RATE: 88 BPM | SYSTOLIC BLOOD PRESSURE: 116 MMHG | HEIGHT: 70 IN | BODY MASS INDEX: 29.7 KG/M2 | DIASTOLIC BLOOD PRESSURE: 72 MMHG

## 2024-11-25 DIAGNOSIS — D50.9 IRON DEFICIENCY ANEMIA, UNSPECIFIED: ICD-10-CM

## 2024-11-25 DIAGNOSIS — D70.1 AGRANULOCYTOSIS SECONDARY TO CANCER CHEMOTHERAPY (CODE): ICD-10-CM

## 2024-11-25 DIAGNOSIS — C34.90 MALIGNANT NEOPLASM OF UNSPECIFIED PART OF UNSPECIFIED BRONCHUS OR LUNG: ICD-10-CM

## 2024-11-25 DIAGNOSIS — T45.1X5A ANEMIA DUE TO ANTINEOPLASTIC CHEMOTHERAPY: ICD-10-CM

## 2024-11-25 DIAGNOSIS — Z79.899 HIGH RISK MEDICATIONS (NOT ANTICOAGULANTS) LONG-TERM USE: ICD-10-CM

## 2024-11-25 DIAGNOSIS — C34.11 MALIGNANT NEOPLASM OF UPPER LOBE, RIGHT BRONCHUS OR LUNG: ICD-10-CM

## 2024-11-25 DIAGNOSIS — E55.9 VITAMIN D DEFICIENCY: ICD-10-CM

## 2024-11-25 DIAGNOSIS — R53.83 OTHER FATIGUE: ICD-10-CM

## 2024-11-25 DIAGNOSIS — D64.9 ANEMIA, UNSPECIFIED TYPE: ICD-10-CM

## 2024-11-25 DIAGNOSIS — F33.41 RECURRENT MAJOR DEPRESSIVE DISORDER, IN PARTIAL REMISSION: ICD-10-CM

## 2024-11-25 DIAGNOSIS — D64.81 ANEMIA DUE TO ANTINEOPLASTIC CHEMOTHERAPY: ICD-10-CM

## 2024-11-25 DIAGNOSIS — E11.42 DIABETIC POLYNEUROPATHY ASSOCIATED WITH TYPE 2 DIABETES MELLITUS: Primary | ICD-10-CM

## 2024-11-25 DIAGNOSIS — F41.1 GAD (GENERALIZED ANXIETY DISORDER): ICD-10-CM

## 2024-11-25 DIAGNOSIS — J44.9 CHRONIC OBSTRUCTIVE PULMONARY DISEASE, UNSPECIFIED COPD TYPE: ICD-10-CM

## 2024-11-25 LAB
ALBUMIN SERPL BCP-MCNC: 3.3 G/DL (ref 3.5–5.2)
ALP SERPL-CCNC: 106 U/L (ref 40–150)
ALT SERPL W/O P-5'-P-CCNC: 90 U/L (ref 10–44)
ANION GAP SERPL CALC-SCNC: 8 MMOL/L (ref 8–16)
ANISOCYTOSIS BLD QL SMEAR: SLIGHT
AST SERPL-CCNC: 38 U/L (ref 10–40)
BASOPHILS NFR BLD: 0 % (ref 0–1.9)
BILIRUB SERPL-MCNC: 0.4 MG/DL (ref 0.1–1)
BILIRUB UR QL STRIP: NEGATIVE
BUN SERPL-MCNC: 9 MG/DL (ref 8–23)
CALCIUM SERPL-MCNC: 8.7 MG/DL (ref 8.7–10.5)
CHLORIDE SERPL-SCNC: 108 MMOL/L (ref 95–110)
CLARITY UR REFRACT.AUTO: CLEAR
CO2 SERPL-SCNC: 22 MMOL/L (ref 23–29)
COLOR UR AUTO: COLORLESS
CREAT SERPL-MCNC: 1 MG/DL (ref 0.5–1.4)
DIFFERENTIAL METHOD BLD: ABNORMAL
EOSINOPHIL NFR BLD: 0 % (ref 0–8)
ERYTHROCYTE [DISTWIDTH] IN BLOOD BY AUTOMATED COUNT: 16.2 % (ref 11.5–14.5)
EST. GFR  (NO RACE VARIABLE): >60 ML/MIN/1.73 M^2
GLUCOSE SERPL-MCNC: 120 MG/DL (ref 70–110)
GLUCOSE UR QL STRIP: NEGATIVE
HCT VFR BLD AUTO: 33.3 % (ref 37–48.5)
HGB BLD-MCNC: 10.2 G/DL (ref 12–16)
HGB UR QL STRIP: NEGATIVE
HYPOCHROMIA BLD QL SMEAR: ABNORMAL
IMM GRANULOCYTES # BLD AUTO: ABNORMAL K/UL (ref 0–0.04)
IMM GRANULOCYTES NFR BLD AUTO: ABNORMAL % (ref 0–0.5)
KETONES UR QL STRIP: NEGATIVE
LEUKOCYTE ESTERASE UR QL STRIP: NEGATIVE
LYMPHOCYTES NFR BLD: 12 % (ref 18–48)
MAGNESIUM SERPL-MCNC: 2 MG/DL (ref 1.6–2.6)
MCH RBC QN AUTO: 30.4 PG (ref 27–31)
MCHC RBC AUTO-ENTMCNC: 30.6 G/DL (ref 32–36)
MCV RBC AUTO: 99 FL (ref 82–98)
MONOCYTES NFR BLD: 5 % (ref 4–15)
NEUTROPHILS NFR BLD: 83 % (ref 38–73)
NITRITE UR QL STRIP: NEGATIVE
NRBC BLD-RTO: 0 /100 WBC
PH UR STRIP: 5 [PH] (ref 5–8)
PLATELET # BLD AUTO: 172 K/UL (ref 150–450)
PLATELET BLD QL SMEAR: ABNORMAL
PMV BLD AUTO: 10.3 FL (ref 9.2–12.9)
POTASSIUM SERPL-SCNC: 4.5 MMOL/L (ref 3.5–5.1)
PROT SERPL-MCNC: 6.2 G/DL (ref 6–8.4)
PROT UR QL STRIP: NEGATIVE
RBC # BLD AUTO: 3.35 M/UL (ref 4–5.4)
SODIUM SERPL-SCNC: 138 MMOL/L (ref 136–145)
SP GR UR STRIP: 1 (ref 1–1.03)
SPHEROCYTES BLD QL SMEAR: ABNORMAL
URN SPEC COLLECT METH UR: ABNORMAL
WBC # BLD AUTO: 10.07 K/UL (ref 3.9–12.7)

## 2024-11-25 PROCEDURE — G2211 COMPLEX E/M VISIT ADD ON: HCPCS | Mod: S$PBB,,, | Performed by: FAMILY MEDICINE

## 2024-11-25 PROCEDURE — 36415 COLL VENOUS BLD VENIPUNCTURE: CPT | Mod: PN

## 2024-11-25 PROCEDURE — 83735 ASSAY OF MAGNESIUM: CPT

## 2024-11-25 PROCEDURE — 81003 URINALYSIS AUTO W/O SCOPE: CPT | Performed by: FAMILY MEDICINE

## 2024-11-25 PROCEDURE — 80053 COMPREHEN METABOLIC PANEL: CPT

## 2024-11-25 PROCEDURE — 85007 BL SMEAR W/DIFF WBC COUNT: CPT

## 2024-11-25 PROCEDURE — 99215 OFFICE O/P EST HI 40 MIN: CPT | Mod: PBBFAC,PN | Performed by: FAMILY MEDICINE

## 2024-11-25 PROCEDURE — 99214 OFFICE O/P EST MOD 30 MIN: CPT | Mod: S$PBB,,, | Performed by: FAMILY MEDICINE

## 2024-11-25 PROCEDURE — 99999 PR PBB SHADOW E&M-EST. PATIENT-LVL V: CPT | Mod: PBBFAC,,, | Performed by: FAMILY MEDICINE

## 2024-11-25 PROCEDURE — 85027 COMPLETE CBC AUTOMATED: CPT

## 2024-11-25 NOTE — PROGRESS NOTES
Patient ID:   Malathi Martinez is a 64 y.o. female.    Chief Complaint: Follow-up      History of Present Illness    CHIEF COMPLAINT:  Patient presents today for follow-up after recent chemotherapy treatment.    CHEMOTHERAPY SIDE EFFECTS:  She reports significant fatigue and exhaustion as a primary side effect of chemotherapy, with a notable decrease in energy levels following treatments. She experiences bone pain from post-chemotherapy shots, affecting various large bones such as the hip. She also reports cognitive issues, including extreme confusion on some days, describing 'chemo brain' with fogginess and cognitive difficulties. Gastrointestinal symptoms such as diarrhea have been successfully controlled. She denies headaches, dizziness, lightheadedness, bladder function issues, or blood sugar problems.    ANXIETY AND PALPITATIONS:  She reports recent episodes of racing heart, with two significant incidents in the past month. These episodes have sudden onset without warning, lasting a full day and causing exhaustion the following day. She describes them as an accumulation of stress that 'explodes' at once, often resulting in crying. She uses Xanax approximately 4 times per week, typically one dose at night, but notes it now only helps 'a little bit' compared to previous relief. She denies specific triggers for these episodes.    SLEEP ISSUES:  She reports difficulty falling asleep, feeling less comfortable and more restless at bedtime.    MEDICATIONS:  Current medications include Xanax, Lipitor, Wellbutrin, Celexa, Flexeril (advised to increase dosage to two tablets), Allegra, thyroid medication, lisinopril, Zofran, and Trazodone. She used Lasix once recently due to mild puffiness, which resolved after one dose and has not recurred.      ROS:  General: -fever, -chills, +fatigue, -weight gain, -weight loss  Eyes: -vision changes, -redness, -discharge  ENT: -ear pain, -nasal congestion, -sore  throat  Cardiovascular: -chest pain, +palpitations, -lower extremity edema  Respiratory: -cough, -shortness of breath  Gastrointestinal: -abdominal pain, -nausea, -vomiting, -diarrhea, -constipation, -blood in stool  Genitourinary: -dysuria, -hematuria, -frequency  Musculoskeletal: -joint pain, -muscle pain, +bone pain  Skin: -rash, -lesion  Neurological: -headache, -dizziness, -numbness, -tingling, -lightheadedness  Psychiatric: -anxiety, -depression, +sleep difficulty       Answers submitted by the patient for this visit:  Review of Systems Questionnaire (Submitted on 11/21/2024)  activity change: No  unexpected weight change: No  neck pain: Yes  hearing loss: Yes  rhinorrhea: No  trouble swallowing: No  eye discharge: No  visual disturbance: Yes  chest tightness: No  wheezing: No  chest pain: No  palpitations: No  blood in stool: No  constipation: No  vomiting: No  diarrhea: Yes  polydipsia: No  polyuria: No  difficulty urinating: No  hematuria: No  menstrual problem: No  dysuria: No  joint swelling: No  arthralgias: Yes  headaches: Yes  weakness: Yes  confusion: Yes  dysphoric mood: Yes      The 10-year ASCVD risk score (Andrew DK, et al., 2019) is: 8.1%    Values used to calculate the score:      Age: 64 years      Sex: Female      Is Non- : No      Diabetic: Yes      Tobacco smoker: No      Systolic Blood Pressure: 116 mmHg      Is BP treated: No      HDL Cholesterol: 57 mg/dL      Total Cholesterol: 176 mg/dL    Exam:  Physical Exam  Vitals and nursing note reviewed.   Constitutional:       General: She is not in acute distress.     Appearance: Normal appearance. She is well-developed.   HENT:      Head: Normocephalic and atraumatic.   Eyes:      General: No scleral icterus.        Right eye: No discharge.         Left eye: No discharge.      Conjunctiva/sclera: Conjunctivae normal.   Cardiovascular:      Rate and Rhythm: Normal rate and regular rhythm.   Pulmonary:      Effort:  Pulmonary effort is normal. No respiratory distress.      Breath sounds: Normal breath sounds.   Abdominal:      Palpations: Abdomen is soft.      Tenderness: There is no guarding.   Musculoskeletal:         General: No deformity or signs of injury.      Cervical back: Neck supple.      Right lower leg: No edema.      Left lower leg: No edema.   Skin:     General: Skin is warm and dry.      Coloration: Skin is pale.      Findings: No rash.   Neurological:      General: No focal deficit present.      Mental Status: She is alert and oriented to person, place, and time.   Psychiatric:         Mood and Affect: Mood normal.         Behavior: Behavior normal.         Assessment/Plan:  Diabetic polyneuropathy associated with type 2 diabetes mellitus    Chronic obstructive pulmonary disease, unspecified COPD type    Recurrent major depressive disorder, in partial remission    High risk medications (not anticoagulants) long-term use  -     FERRITIN; Future; Expected date: 11/25/2024  -     Iron and TIBC; Future; Expected date: 11/25/2024  -     Vitamin B12; Future; Expected date: 11/25/2024  -     Folate; Future; Expected date: 11/25/2024  -     TSH; Future; Expected date: 11/25/2024  -     Urinalysis, Reflex to Urine Culture Urine, Clean Catch  -     T4, Free; Future; Expected date: 11/25/2024    Vitamin D deficiency    Anemia, unspecified type  -     FERRITIN; Future; Expected date: 11/25/2024  -     Iron and TIBC; Future; Expected date: 11/25/2024    JEAN CLAUDE (generalized anxiety disorder)         Assessment & Plan    Assessed post-chemotherapy status, noting fatigue and confusion  Evaluated for potential anemia given pale appearance  Considered chemo brain as contributing factor to cognitive symptoms  Reviewed cardiac symptoms, distinguishing between anxiety-driven episodes and potential arrhythmias  Assessed fluid status and weight, noting stability    CANCER FOLLOW-UP:  - Explained importance of maintaining weight and  muscle mass during chemotherapy.  - Discussed importance of protein intake during treatment.  - Patient to ensure adequate protein intake in diet.    COGNITIVE ISSUES (CHEMO BRAIN):  - Discussed strategies for managing chemo brain and cognitive difficulties.    ANXIETY AND MENTAL HEALTH:  - Educated on differentiating between anxiety-driven episodes and cardiac issues.  - Recommend using distraction techniques (e.g., watching comedians or movies) during anxiety episodes.  - Recommend increasing outdoor time, especially in the earlier part of the day.  - Continued Xanax (alprazolam) at current dose.  - Continued other medications: Wellbutrin, Celexa.  - Contact the office if anxiety symptoms worsen.    INSOMNIA:  - Increased Flexeril (cyclobenzaprine) from 5 mg to 10 mg at bedtime as needed for sleep and muscle aches.  - Continued other medications: trazodone.    PALPITATIONS:  - Patient to check blood pressure and heart rate during episodes of racing heart.  - Patient to maintain adequate hydration during episodes of racing heart.  - Contact the office if cardiac episodes become more frequent or severe.    HYPERTENSION:  - Continued other medications: lisinopril.    HYPERLIPIDEMIA:  - Continued other medications: Lipitor.    OTHER MEDICATIONS AND TESTS:  - Continued Lasix (furosemide) as needed for fluid retention.  - Continued other medications: Pepcid, Allegra, Trelegy, Zofran.  - Urinalysis ordered to be performed today.  - Ordered future labs: ferritin, iron, B12, and standard panel for next blood draw.    FOLLOW-UP:  - Follow up in December or early January.          Visit today included increased complexity associated with the care of the episodic problem anxiety, fatigue addressed and managing the longitudinal care of the patient due to the serious and/or complex managed problem(s) MDD, DM2, hld, copd, lung ca.     Follow up in about 6 weeks (around 1/6/2025), or if symptoms worsen or fail to improve, for  recheck.    This note was generated with the assistance of ambient listening technology. Verbal consent was obtained by the patient and accompanying visitor(s) for the recording of patient appointment to facilitate this note. I attest to having reviewed and edited the generated note for accuracy, though some syntax or spelling errors may persist. Please contact the author of this note for any clarification.

## 2024-12-02 ENCOUNTER — LAB VISIT (OUTPATIENT)
Dept: LAB | Facility: HOSPITAL | Age: 64
End: 2024-12-02
Attending: INTERNAL MEDICINE
Payer: MEDICARE

## 2024-12-02 DIAGNOSIS — C34.11 MALIGNANT NEOPLASM OF UPPER LOBE OF RIGHT LUNG: Primary | ICD-10-CM

## 2024-12-02 DIAGNOSIS — R53.83 FATIGUE: ICD-10-CM

## 2024-12-02 DIAGNOSIS — D50.9 IRON DEFICIENCY ANEMIA, UNSPECIFIED: ICD-10-CM

## 2024-12-02 DIAGNOSIS — C34.11 MALIGNANT NEOPLASM OF UPPER LOBE OF RIGHT LUNG: ICD-10-CM

## 2024-12-02 LAB
ALBUMIN SERPL BCP-MCNC: 3.5 G/DL (ref 3.5–5.2)
ALP SERPL-CCNC: 125 U/L (ref 40–150)
ALT SERPL W/O P-5'-P-CCNC: 45 U/L (ref 10–44)
ANION GAP SERPL CALC-SCNC: 9 MMOL/L (ref 8–16)
ANISOCYTOSIS BLD QL SMEAR: SLIGHT
AST SERPL-CCNC: 20 U/L (ref 10–40)
BASOPHILS # BLD AUTO: ABNORMAL K/UL (ref 0–0.2)
BASOPHILS NFR BLD: 0 % (ref 0–1.9)
BILIRUB SERPL-MCNC: 0.2 MG/DL (ref 0.1–1)
BUN SERPL-MCNC: 6 MG/DL (ref 8–23)
CALCIUM SERPL-MCNC: 9.3 MG/DL (ref 8.7–10.5)
CHLORIDE SERPL-SCNC: 104 MMOL/L (ref 95–110)
CO2 SERPL-SCNC: 24 MMOL/L (ref 23–29)
CREAT SERPL-MCNC: 1.2 MG/DL (ref 0.5–1.4)
DIFFERENTIAL METHOD BLD: ABNORMAL
DOHLE BOD BLD QL SMEAR: PRESENT
EOSINOPHIL # BLD AUTO: ABNORMAL K/UL (ref 0–0.5)
EOSINOPHIL NFR BLD: 0 % (ref 0–8)
ERYTHROCYTE [DISTWIDTH] IN BLOOD BY AUTOMATED COUNT: 16.3 % (ref 11.5–14.5)
EST. GFR  (NO RACE VARIABLE): 50.2 ML/MIN/1.73 M^2
GLUCOSE SERPL-MCNC: 87 MG/DL (ref 70–110)
HCT VFR BLD AUTO: 33.8 % (ref 37–48.5)
HGB BLD-MCNC: 10.4 G/DL (ref 12–16)
IMM GRANULOCYTES # BLD AUTO: ABNORMAL K/UL (ref 0–0.04)
IMM GRANULOCYTES NFR BLD AUTO: ABNORMAL % (ref 0–0.5)
LYMPHOCYTES # BLD AUTO: ABNORMAL K/UL (ref 1–4.8)
LYMPHOCYTES NFR BLD: 11 % (ref 18–48)
MCH RBC QN AUTO: 30.6 PG (ref 27–31)
MCHC RBC AUTO-ENTMCNC: 30.8 G/DL (ref 32–36)
MCV RBC AUTO: 99 FL (ref 82–98)
METAMYELOCYTES NFR BLD MANUAL: 3 %
MONOCYTES # BLD AUTO: ABNORMAL K/UL (ref 0.3–1)
MONOCYTES NFR BLD: 18 % (ref 4–15)
MYELOCYTES NFR BLD MANUAL: 2 %
NEUTROPHILS NFR BLD: 61 % (ref 38–73)
NEUTS BAND NFR BLD MANUAL: 3 %
NRBC BLD-RTO: 1 /100 WBC
OVALOCYTES BLD QL SMEAR: ABNORMAL
PLATELET # BLD AUTO: 89 K/UL (ref 150–450)
PMV BLD AUTO: 11.8 FL (ref 9.2–12.9)
POIKILOCYTOSIS BLD QL SMEAR: SLIGHT
POTASSIUM SERPL-SCNC: 3.9 MMOL/L (ref 3.5–5.1)
PROMYELOCYTES NFR BLD MANUAL: 2 %
PROT SERPL-MCNC: 6.8 G/DL (ref 6–8.4)
RBC # BLD AUTO: 3.4 M/UL (ref 4–5.4)
SODIUM SERPL-SCNC: 137 MMOL/L (ref 136–145)
TOXIC GRANULES BLD QL SMEAR: PRESENT
WBC # BLD AUTO: 9.54 K/UL (ref 3.9–12.7)

## 2024-12-02 PROCEDURE — 85027 COMPLETE CBC AUTOMATED: CPT | Performed by: INTERNAL MEDICINE

## 2024-12-02 PROCEDURE — 85007 BL SMEAR W/DIFF WBC COUNT: CPT | Performed by: INTERNAL MEDICINE

## 2024-12-02 PROCEDURE — 80053 COMPREHEN METABOLIC PANEL: CPT | Performed by: INTERNAL MEDICINE

## 2024-12-02 PROCEDURE — 36415 COLL VENOUS BLD VENIPUNCTURE: CPT | Mod: PN | Performed by: INTERNAL MEDICINE

## 2024-12-03 ENCOUNTER — TELEPHONE (OUTPATIENT)
Dept: FAMILY MEDICINE | Facility: CLINIC | Age: 64
End: 2024-12-03
Payer: COMMERCIAL

## 2024-12-03 NOTE — TELEPHONE ENCOUNTER
----- Message from Felisha sent at 12/2/2024  9:41 AM CST -----  Contact: Pharmacy  Type:  Needs Medical Advice    Who Called: Pharmacy       Pharmacy name and phone #:    [id number] - [place], [place] - [address]  [address] [id number]  Phone: [phone] Fax: [phone]        Would the patient rather a call back or a response via [facility]? Fax     Best Call Back Number: See above     Additional Information: Forms has been faxed to the office. Please fill out form and fax back to the pharmacy

## 2024-12-05 ENCOUNTER — TELEPHONE (OUTPATIENT)
Dept: FAMILY MEDICINE | Facility: CLINIC | Age: 64
End: 2024-12-05
Payer: COMMERCIAL

## 2024-12-05 NOTE — TELEPHONE ENCOUNTER
----- Message from Abeba sent at 12/5/2024 11:24 AM CST -----  Contact: arnaldo w/Na  Arnaldo is req a call back to ck the status of the form that was sent over on 12/2/24 and I can see [facility].  This form needs to be sent back to the following pharm and not to  the fax number on the actual form.  Please get this signed and sent back today to the following   [id number] [address]  [address] [address]  Phone: [phone] Fax: [phone]

## 2024-12-09 ENCOUNTER — LAB VISIT (OUTPATIENT)
Dept: LAB | Facility: HOSPITAL | Age: 64
End: 2024-12-09
Attending: INTERNAL MEDICINE
Payer: MEDICARE

## 2024-12-09 DIAGNOSIS — C34.11 MALIGNANT NEOPLASM OF UPPER LOBE OF RIGHT LUNG: ICD-10-CM

## 2024-12-09 LAB
ALBUMIN SERPL BCP-MCNC: 3.5 G/DL (ref 3.5–5.2)
ALP SERPL-CCNC: 114 U/L (ref 40–150)
ALT SERPL W/O P-5'-P-CCNC: 37 U/L (ref 10–44)
ANION GAP SERPL CALC-SCNC: 12 MMOL/L (ref 8–16)
AST SERPL-CCNC: 18 U/L (ref 10–40)
BASOPHILS # BLD AUTO: 0.01 K/UL (ref 0–0.2)
BASOPHILS NFR BLD: 0.1 % (ref 0–1.9)
BILIRUB SERPL-MCNC: 0.4 MG/DL (ref 0.1–1)
BUN SERPL-MCNC: 7 MG/DL (ref 8–23)
CALCIUM SERPL-MCNC: 9 MG/DL (ref 8.7–10.5)
CHLORIDE SERPL-SCNC: 104 MMOL/L (ref 95–110)
CO2 SERPL-SCNC: 24 MMOL/L (ref 23–29)
CREAT SERPL-MCNC: 1.1 MG/DL (ref 0.5–1.4)
DIFFERENTIAL METHOD BLD: ABNORMAL
EOSINOPHIL # BLD AUTO: 0 K/UL (ref 0–0.5)
EOSINOPHIL NFR BLD: 0.1 % (ref 0–8)
ERYTHROCYTE [DISTWIDTH] IN BLOOD BY AUTOMATED COUNT: 16.8 % (ref 11.5–14.5)
EST. GFR  (NO RACE VARIABLE): 55.8 ML/MIN/1.73 M^2
GLUCOSE SERPL-MCNC: 138 MG/DL (ref 70–110)
HCT VFR BLD AUTO: 33.9 % (ref 37–48.5)
HGB BLD-MCNC: 10.7 G/DL (ref 12–16)
IMM GRANULOCYTES # BLD AUTO: 0.04 K/UL (ref 0–0.04)
IMM GRANULOCYTES NFR BLD AUTO: 0.5 % (ref 0–0.5)
LYMPHOCYTES # BLD AUTO: 1.4 K/UL (ref 1–4.8)
LYMPHOCYTES NFR BLD: 16.2 % (ref 18–48)
MCH RBC QN AUTO: 30.9 PG (ref 27–31)
MCHC RBC AUTO-ENTMCNC: 31.6 G/DL (ref 32–36)
MCV RBC AUTO: 98 FL (ref 82–98)
MONOCYTES # BLD AUTO: 1.2 K/UL (ref 0.3–1)
MONOCYTES NFR BLD: 14.4 % (ref 4–15)
NEUTROPHILS # BLD AUTO: 5.8 K/UL (ref 1.8–7.7)
NEUTROPHILS NFR BLD: 68.7 % (ref 38–73)
NRBC BLD-RTO: 0 /100 WBC
PLATELET # BLD AUTO: 154 K/UL (ref 150–450)
PMV BLD AUTO: 10.8 FL (ref 9.2–12.9)
POTASSIUM SERPL-SCNC: 3.7 MMOL/L (ref 3.5–5.1)
PROT SERPL-MCNC: 6.7 G/DL (ref 6–8.4)
RBC # BLD AUTO: 3.46 M/UL (ref 4–5.4)
SODIUM SERPL-SCNC: 140 MMOL/L (ref 136–145)
WBC # BLD AUTO: 8.38 K/UL (ref 3.9–12.7)

## 2024-12-09 PROCEDURE — 36415 COLL VENOUS BLD VENIPUNCTURE: CPT | Mod: PN | Performed by: INTERNAL MEDICINE

## 2024-12-09 PROCEDURE — 80053 COMPREHEN METABOLIC PANEL: CPT | Performed by: INTERNAL MEDICINE

## 2024-12-09 PROCEDURE — 85025 COMPLETE CBC W/AUTO DIFF WBC: CPT | Performed by: INTERNAL MEDICINE

## 2024-12-11 ENCOUNTER — HOSPITAL ENCOUNTER (OUTPATIENT)
Dept: RADIOLOGY | Facility: HOSPITAL | Age: 64
Discharge: HOME | End: 2024-12-11
Attending: INTERNAL MEDICINE
Payer: MEDICARE

## 2024-12-11 DIAGNOSIS — C34.90 MALIGNANT NEOPLASM OF UNSPECIFIED PART OF UNSPECIFIED BRONCHUS OR LUNG: ICD-10-CM

## 2024-12-11 LAB — GLUCOSE SERPL-MCNC: 117 MG/DL (ref 70–110)

## 2024-12-11 PROCEDURE — 78815 PET IMAGE W/CT SKULL-THIGH: CPT | Mod: TC,PN

## 2024-12-11 PROCEDURE — 78815 PET IMAGE W/CT SKULL-THIGH: CPT | Mod: 26,PS,, | Performed by: RADIOLOGY

## 2024-12-11 PROCEDURE — A9552 F18 FDG: HCPCS | Mod: PN | Performed by: INTERNAL MEDICINE

## 2024-12-11 RX ORDER — FLUDEOXYGLUCOSE F18 500 MCI/ML
12 INJECTION INTRAVENOUS
Status: COMPLETED | OUTPATIENT
Start: 2024-12-11 | End: 2024-12-11

## 2024-12-11 RX ADMIN — FLUDEOXYGLUCOSE F-18 10.9 MILLICURIE: 500 INJECTION INTRAVENOUS at 07:12

## 2024-12-11 NOTE — PROGRESS NOTES
PET Imaging Questionnaire    Are you a Diabetic? Recent Blood Sugar level? Yes    Are you anemic? Bone Marrow Stimulation Meds? No    Have you had a CT Scan, if so when & where was your last one? Yes -     Have you had a PET Scan, if so when & where was your last one? Yes -     Chemotherapy or currently on Chemotherapy? Yes    Radiation therapy? No    Surgical History:   Past Surgical History:   Procedure Laterality Date    APPENDECTOMY      COLONOSCOPY N/A 01/18/2019    Procedure: COLONOSCOPY;  Surgeon: Lai Cardoso MD;  Location: [facility];  Service: Endoscopy;  Laterality: N/A;    COLONOSCOPY N/A 12/20/2023    Procedure: COLONOSCOPY;  Surgeon: Jose Alanis MD;  Location: [facility];  Service: Colon and Rectal;  Laterality: N/A;    ENDOBRONCHIAL ULTRASOUND N/A 05/03/2023    Procedure: ENDOBRONCHIAL ULTRASOUND (EBUS);  Surgeon: Nahun Nguyen MD;  Location: [facility] OR;  Service: Pulmonary;  Laterality: N/A;    ENDOBRONCHIAL ULTRASOUND Bilateral 02/27/2024    Procedure: ENDOBRONCHIAL ULTRASOUND (EBUS);  Surgeon: Nahun Nguyen MD;  Location: [facility] OR;  Service: Pulmonary;  Laterality: Bilateral;    ESOPHAGOGASTRODUODENOSCOPY N/A 02/19/2021    Procedure: ESOPHAGOGASTRODUODENOSCOPY (EGD);  Surgeon: Lai Cardoso MD;  Location: [facility];  Service: Endoscopy;  Laterality: N/A;    INSERTION OF TUNNELED CENTRAL VENOUS CATHETER (CVC) WITH SUBCUTANEOUS PORT Right 05/22/2023    Procedure: [id number];  Surgeon: Ayah Aldana MD;  Location: [facility];  Service: General;  Laterality: Right;    TONSILLECTOMY          Have you been fasting for at least 6 hours? Yes    Is there any chance you may be pregnant or breastfeeding? No    Assay: 11.94 MCi@:735   Injection Site:rt ac     Residual: 1 mCi@: 737   Technologist: Elba Stone Injected:10.9mCi

## 2024-12-17 ENCOUNTER — LAB VISIT (OUTPATIENT)
Dept: LAB | Facility: HOSPITAL | Age: 64
End: 2024-12-17
Attending: INTERNAL MEDICINE
Payer: MEDICARE

## 2024-12-17 DIAGNOSIS — C34.11 MALIGNANT NEOPLASM OF UPPER LOBE OF RIGHT LUNG: ICD-10-CM

## 2024-12-17 DIAGNOSIS — Z79.899 HIGH RISK MEDICATIONS (NOT ANTICOAGULANTS) LONG-TERM USE: ICD-10-CM

## 2024-12-17 DIAGNOSIS — D64.9 ANEMIA, UNSPECIFIED TYPE: ICD-10-CM

## 2024-12-17 LAB
ALBUMIN SERPL BCP-MCNC: 3.5 G/DL (ref 3.5–5.2)
ALP SERPL-CCNC: 102 U/L (ref 40–150)
ALT SERPL W/O P-5'-P-CCNC: 33 U/L (ref 10–44)
ANION GAP SERPL CALC-SCNC: 8 MMOL/L (ref 8–16)
AST SERPL-CCNC: 22 U/L (ref 10–40)
BASOPHILS # BLD AUTO: 0.01 K/UL (ref 0–0.2)
BASOPHILS NFR BLD: 0.2 % (ref 0–1.9)
BILIRUB SERPL-MCNC: 0.3 MG/DL (ref 0.1–1)
BUN SERPL-MCNC: 7 MG/DL (ref 8–23)
CALCIUM SERPL-MCNC: 9.1 MG/DL (ref 8.7–10.5)
CHLORIDE SERPL-SCNC: 107 MMOL/L (ref 95–110)
CO2 SERPL-SCNC: 25 MMOL/L (ref 23–29)
CREAT SERPL-MCNC: 0.9 MG/DL (ref 0.5–1.4)
DIFFERENTIAL METHOD BLD: ABNORMAL
EOSINOPHIL # BLD AUTO: 0 K/UL (ref 0–0.5)
EOSINOPHIL NFR BLD: 0.2 % (ref 0–8)
ERYTHROCYTE [DISTWIDTH] IN BLOOD BY AUTOMATED COUNT: 16.9 % (ref 11.5–14.5)
EST. GFR  (NO RACE VARIABLE): >60 ML/MIN/1.73 M^2
FERRITIN SERPL-MCNC: 305 NG/ML (ref 20–300)
FOLATE SERPL-MCNC: 14.6 NG/ML (ref 4–24)
GLUCOSE SERPL-MCNC: 90 MG/DL (ref 70–110)
HCT VFR BLD AUTO: 33.5 % (ref 37–48.5)
HGB BLD-MCNC: 10.3 G/DL (ref 12–16)
IMM GRANULOCYTES # BLD AUTO: 0.02 K/UL (ref 0–0.04)
IMM GRANULOCYTES NFR BLD AUTO: 0.4 % (ref 0–0.5)
IRON SERPL-MCNC: 52 UG/DL (ref 30–160)
LYMPHOCYTES # BLD AUTO: 0.9 K/UL (ref 1–4.8)
LYMPHOCYTES NFR BLD: 16.6 % (ref 18–48)
MAGNESIUM SERPL-MCNC: 2 MG/DL (ref 1.6–2.6)
MCH RBC QN AUTO: 30.2 PG (ref 27–31)
MCHC RBC AUTO-ENTMCNC: 30.7 G/DL (ref 32–36)
MCV RBC AUTO: 98 FL (ref 82–98)
MONOCYTES # BLD AUTO: 0.8 K/UL (ref 0.3–1)
MONOCYTES NFR BLD: 14.6 % (ref 4–15)
NEUTROPHILS # BLD AUTO: 3.6 K/UL (ref 1.8–7.7)
NEUTROPHILS NFR BLD: 68 % (ref 38–73)
NRBC BLD-RTO: 0 /100 WBC
PLATELET # BLD AUTO: 214 K/UL (ref 150–450)
PMV BLD AUTO: 9.8 FL (ref 9.2–12.9)
POTASSIUM SERPL-SCNC: 4 MMOL/L (ref 3.5–5.1)
PROT SERPL-MCNC: 6.7 G/DL (ref 6–8.4)
RBC # BLD AUTO: 3.41 M/UL (ref 4–5.4)
SATURATED IRON: 13 % (ref 20–50)
SODIUM SERPL-SCNC: 140 MMOL/L (ref 136–145)
T4 FREE SERPL-MCNC: 0.9 NG/DL (ref 0.71–1.51)
TOTAL IRON BINDING CAPACITY: 395 UG/DL (ref 250–450)
TRANSFERRIN SERPL-MCNC: 267 MG/DL (ref 200–375)
TSH SERPL DL<=0.005 MIU/L-ACNC: 3.4 UIU/ML (ref 0.4–4)
VIT B12 SERPL-MCNC: >2000 PG/ML (ref 210–950)
WBC # BLD AUTO: 5.29 K/UL (ref 3.9–12.7)

## 2024-12-17 PROCEDURE — 82728 ASSAY OF FERRITIN: CPT | Performed by: FAMILY MEDICINE

## 2024-12-17 PROCEDURE — 83735 ASSAY OF MAGNESIUM: CPT | Performed by: INTERNAL MEDICINE

## 2024-12-17 PROCEDURE — 84443 ASSAY THYROID STIM HORMONE: CPT | Performed by: FAMILY MEDICINE

## 2024-12-17 PROCEDURE — 85025 COMPLETE CBC W/AUTO DIFF WBC: CPT | Performed by: INTERNAL MEDICINE

## 2024-12-17 PROCEDURE — 82607 VITAMIN B-12: CPT | Performed by: FAMILY MEDICINE

## 2024-12-17 PROCEDURE — 82746 ASSAY OF FOLIC ACID SERUM: CPT | Performed by: FAMILY MEDICINE

## 2024-12-17 PROCEDURE — 84439 ASSAY OF FREE THYROXINE: CPT | Performed by: FAMILY MEDICINE

## 2024-12-17 PROCEDURE — 80053 COMPREHEN METABOLIC PANEL: CPT | Performed by: INTERNAL MEDICINE

## 2024-12-17 PROCEDURE — 84466 ASSAY OF TRANSFERRIN: CPT | Performed by: FAMILY MEDICINE

## 2024-12-17 PROCEDURE — 36415 COLL VENOUS BLD VENIPUNCTURE: CPT | Mod: PN | Performed by: FAMILY MEDICINE

## 2024-12-26 PROBLEM — Z71.89 ACP (ADVANCE CARE PLANNING): Status: ACTIVE | Noted: 2024-12-26

## 2024-12-26 NOTE — TELEPHONE ENCOUNTER
Refill Routing Note   Medication(s) are not appropriate for processing by [facility] for the following reason(s):        Outside of protocol    [facility] action(s):  Route               Appointments  past 12m or future 3m with PCP    Date Provider   Last Visit   11/25/2024 Comfort Dave MD   Next Visit   12/30/2024 Comfort Dave MD   ED visits in past 90 days: 0        Note composed:9:53 AM 12/26/2024

## 2024-12-26 NOTE — TELEPHONE ENCOUNTER
No care due was identified.  [facility] Embedded Care Due Messages. Reference number: [id number].   12/26/2024 3:24:55 AM CST

## 2024-12-27 RX ORDER — ONDANSETRON 8 MG/1
TABLET, ORALLY DISINTEGRATING ORAL
Qty: 20 TABLET | Refills: 2 | Status: SHIPPED | OUTPATIENT
Start: 2024-12-27

## 2024-12-30 ENCOUNTER — LAB VISIT (OUTPATIENT)
Dept: LAB | Facility: HOSPITAL | Age: 64
End: 2024-12-30
Payer: MEDICARE

## 2024-12-30 ENCOUNTER — OFFICE VISIT (OUTPATIENT)
Dept: FAMILY MEDICINE | Facility: CLINIC | Age: 64
End: 2024-12-30
Payer: MEDICARE

## 2024-12-30 VITALS — WEIGHT: 210.31 LBS | BODY MASS INDEX: 30.11 KG/M2 | HEIGHT: 70 IN

## 2024-12-30 DIAGNOSIS — T45.1X5A ANEMIA DUE TO CHEMOTHERAPY: ICD-10-CM

## 2024-12-30 DIAGNOSIS — D64.81 ANEMIA DUE TO CHEMOTHERAPY: ICD-10-CM

## 2024-12-30 DIAGNOSIS — G47.00 INSOMNIA, UNSPECIFIED TYPE: Primary | ICD-10-CM

## 2024-12-30 DIAGNOSIS — C34.11 MALIGNANT NEOPLASM OF UPPER LOBE OF RIGHT LUNG: ICD-10-CM

## 2024-12-30 DIAGNOSIS — C34.81 SMALL CELL CARCINOMA OF OVERLAPPING SITES OF RIGHT LUNG: ICD-10-CM

## 2024-12-30 DIAGNOSIS — R53.83 FATIGUE: ICD-10-CM

## 2024-12-30 DIAGNOSIS — G89.3 CHRONIC PAIN DUE TO NEOPLASM: ICD-10-CM

## 2024-12-30 LAB
ALBUMIN SERPL BCP-MCNC: 3.5 G/DL (ref 3.5–5.2)
ALP SERPL-CCNC: 95 U/L (ref 40–150)
ALT SERPL W/O P-5'-P-CCNC: 36 U/L (ref 10–44)
ANION GAP SERPL CALC-SCNC: 9 MMOL/L (ref 8–16)
AST SERPL-CCNC: 21 U/L (ref 10–40)
BASOPHILS # BLD AUTO: 0.01 K/UL (ref 0–0.2)
BASOPHILS # BLD AUTO: 0.01 K/UL (ref 0–0.2)
BASOPHILS NFR BLD: 0.2 % (ref 0–1.9)
BASOPHILS NFR BLD: 0.2 % (ref 0–1.9)
BILIRUB SERPL-MCNC: 0.3 MG/DL (ref 0.1–1)
BUN SERPL-MCNC: 8 MG/DL (ref 8–23)
CALCIUM SERPL-MCNC: 8.8 MG/DL (ref 8.7–10.5)
CHLORIDE SERPL-SCNC: 107 MMOL/L (ref 95–110)
CO2 SERPL-SCNC: 23 MMOL/L (ref 23–29)
CREAT SERPL-MCNC: 0.9 MG/DL (ref 0.5–1.4)
DIFFERENTIAL METHOD BLD: ABNORMAL
DIFFERENTIAL METHOD BLD: ABNORMAL
EOSINOPHIL # BLD AUTO: 0.1 K/UL (ref 0–0.5)
EOSINOPHIL # BLD AUTO: 0.1 K/UL (ref 0–0.5)
EOSINOPHIL NFR BLD: 1.2 % (ref 0–8)
EOSINOPHIL NFR BLD: 1.2 % (ref 0–8)
ERYTHROCYTE [DISTWIDTH] IN BLOOD BY AUTOMATED COUNT: 15.3 % (ref 11.5–14.5)
ERYTHROCYTE [DISTWIDTH] IN BLOOD BY AUTOMATED COUNT: 15.3 % (ref 11.5–14.5)
EST. GFR  (NO RACE VARIABLE): >60 ML/MIN/1.73 M^2
GLUCOSE SERPL-MCNC: 115 MG/DL (ref 70–110)
HCT VFR BLD AUTO: 37.1 % (ref 37–48.5)
HCT VFR BLD AUTO: 37.1 % (ref 37–48.5)
HGB BLD-MCNC: 11.2 G/DL (ref 12–16)
HGB BLD-MCNC: 11.2 G/DL (ref 12–16)
IMM GRANULOCYTES # BLD AUTO: 0.07 K/UL (ref 0–0.04)
IMM GRANULOCYTES # BLD AUTO: 0.07 K/UL (ref 0–0.04)
IMM GRANULOCYTES NFR BLD AUTO: 1.4 % (ref 0–0.5)
IMM GRANULOCYTES NFR BLD AUTO: 1.4 % (ref 0–0.5)
LYMPHOCYTES # BLD AUTO: 0.9 K/UL (ref 1–4.8)
LYMPHOCYTES # BLD AUTO: 0.9 K/UL (ref 1–4.8)
LYMPHOCYTES NFR BLD: 17.4 % (ref 18–48)
LYMPHOCYTES NFR BLD: 17.4 % (ref 18–48)
MAGNESIUM SERPL-MCNC: 2 MG/DL (ref 1.6–2.6)
MCH RBC QN AUTO: 30.3 PG (ref 27–31)
MCH RBC QN AUTO: 30.3 PG (ref 27–31)
MCHC RBC AUTO-ENTMCNC: 30.2 G/DL (ref 32–36)
MCHC RBC AUTO-ENTMCNC: 30.2 G/DL (ref 32–36)
MCV RBC AUTO: 100 FL (ref 82–98)
MCV RBC AUTO: 100 FL (ref 82–98)
MONOCYTES # BLD AUTO: 0.7 K/UL (ref 0.3–1)
MONOCYTES # BLD AUTO: 0.7 K/UL (ref 0.3–1)
MONOCYTES NFR BLD: 12.6 % (ref 4–15)
MONOCYTES NFR BLD: 12.6 % (ref 4–15)
NEUTROPHILS # BLD AUTO: 3.5 K/UL (ref 1.8–7.7)
NEUTROPHILS # BLD AUTO: 3.5 K/UL (ref 1.8–7.7)
NEUTROPHILS NFR BLD: 67.2 % (ref 38–73)
NEUTROPHILS NFR BLD: 67.2 % (ref 38–73)
NRBC BLD-RTO: 0 /100 WBC
NRBC BLD-RTO: 0 /100 WBC
PLATELET # BLD AUTO: 286 K/UL (ref 150–450)
PLATELET # BLD AUTO: 286 K/UL (ref 150–450)
PMV BLD AUTO: 9.4 FL (ref 9.2–12.9)
PMV BLD AUTO: 9.4 FL (ref 9.2–12.9)
POTASSIUM SERPL-SCNC: 3.9 MMOL/L (ref 3.5–5.1)
PROT SERPL-MCNC: 6.9 G/DL (ref 6–8.4)
RBC # BLD AUTO: 3.7 M/UL (ref 4–5.4)
RBC # BLD AUTO: 3.7 M/UL (ref 4–5.4)
SODIUM SERPL-SCNC: 139 MMOL/L (ref 136–145)
WBC # BLD AUTO: 5.16 K/UL (ref 3.9–12.7)
WBC # BLD AUTO: 5.16 K/UL (ref 3.9–12.7)

## 2024-12-30 PROCEDURE — 83735 ASSAY OF MAGNESIUM: CPT

## 2024-12-30 PROCEDURE — G2211 COMPLEX E/M VISIT ADD ON: HCPCS | Mod: 95,,, | Performed by: FAMILY MEDICINE

## 2024-12-30 PROCEDURE — 99213 OFFICE O/P EST LOW 20 MIN: CPT | Mod: 95,,, | Performed by: FAMILY MEDICINE

## 2024-12-30 PROCEDURE — 80053 COMPREHEN METABOLIC PANEL: CPT

## 2024-12-30 PROCEDURE — 85025 COMPLETE CBC W/AUTO DIFF WBC: CPT

## 2024-12-30 PROCEDURE — 36415 COLL VENOUS BLD VENIPUNCTURE: CPT | Mod: PN

## 2024-12-30 RX ORDER — ESZOPICLONE 1 MG/1
1 TABLET, FILM COATED ORAL NIGHTLY
Qty: 30 TABLET | Refills: 0 | Status: SHIPPED | OUTPATIENT
Start: 2024-12-30 | End: 2025-01-29

## 2024-12-30 NOTE — PROGRESS NOTES
The patient location is: [place], [place]  The chief complaint leading to consultation is: med review    Visit type: audiovisual    Face to Face time with patient:  10 minutes of total time spent on the encounter, which includes face to face time and non-face to face time preparing to see the patient (eg, review of tests), Obtaining and/or reviewing separately obtained history, Documenting clinical information in the electronic or other health record, Independently interpreting results (not separately reported) and communicating results to the patient/family/caregiver, or Care coordination (not separately reported).         Each patient to whom he or she provides medical services by telemedicine is:  (1) informed of the relationship between the physician and patient and the respective role of any other health care provider with respect to management of the patient; and (2) notified that he or she may decline to receive medical services by telemedicine and may withdraw from such care at any time.    Notes:      Chief Complaint:  Chief Complaint   Patient presents with    Follow-up        HPI:  Malathi is a 64 y.o. year old     History of Present Illness    CHIEF COMPLAINT:  Malathi presents today for follow-up regarding cancer treatment.    CANCER TREATMENT AND SIDE EFFECTS:  Previous chemotherapy was ineffective. She is currently on Taltz immunotherapy, which requires an initial hospital stay. She experiences a metallic taste affecting her ability to eat and drink, but denies nausea or appetite loss. She reports temporary bone pain lasting about an hour that responds to Tylenol.    SLEEP:  She reports sleep difficulties that began approximately 2 weeks ago. Trazodone has not been effective for sleep management.      ROS:  General: no loss of appetite  Gastrointestinal: no nausea  Musculoskeletal: +bone pain  Psychiatric: +sleep difficulty           Review of Systems   Constitutional:  Negative for activity change and unexpected  "weight change.   HENT:  Positive for hearing loss. Negative for rhinorrhea and trouble swallowing.    Eyes:  Positive for discharge. Negative for visual disturbance.   Respiratory:  Negative for chest tightness and wheezing.    Cardiovascular:  Positive for palpitations. Negative for chest pain.   Gastrointestinal:  Negative for blood in stool, constipation, diarrhea and vomiting.   Endocrine: Negative for polydipsia and polyuria.   Genitourinary:  Negative for difficulty urinating, dysuria, hematuria and menstrual problem.   Musculoskeletal:  Positive for arthralgias. Negative for joint swelling and neck pain.   Neurological:  Positive for headaches. Negative for weakness.   Psychiatric/Behavioral:  Positive for dysphoric mood. Negative for confusion.          Past Medical History:  Past Medical History:   Diagnosis Date    Abnormal stress test     Bronchitis     COPD (chronic obstructive pulmonary disease)     Diabetes mellitus, type 2     GERD (gastroesophageal reflux disease)     HLD (hyperlipidemia)     Hx of endoscopy     Hypertension     Lung cancer 05/2023    Mental disorder     Pulmonary nodule 04/09/2018    SOB (shortness of breath)     resolved    Thyroid disease     Tobacco abuse 12/26/2012    Tobacco use 12/26/2012         Vitals:  Vitals:    12/30/24 1258   Weight: 95.4 kg (210 lb 5.1 oz)   Height: 5' 10" (1.778 m)       BP Readings from Last 5 Encounters:   12/27/24 (!) 131/50   11/25/24 116/72   10/25/24 110/68   09/13/24 126/60   07/18/24 104/60       The 10-year ASCVD risk score (Andrew ONEAL, et al., 2019) is: 10.1%    Values used to calculate the score:      Age: 64 years      Sex: Female      Is Non- : No      Diabetic: Yes      Tobacco smoker: No      Systolic Blood Pressure: 131 mmHg      Is BP treated: No      HDL Cholesterol: 57 mg/dL      Total Cholesterol: 176 mg/dL      Physical Exam:  Physical Exam  Vitals and nursing note reviewed.   Constitutional:       General: " She is not in acute distress.     Appearance: Normal appearance. She is well-developed.   HENT:      Head: Normocephalic and atraumatic.   Eyes:      General: No scleral icterus.        Right eye: No discharge.         Left eye: No discharge.      Conjunctiva/sclera: Conjunctivae normal.   Pulmonary:      Effort: Pulmonary effort is normal. No respiratory distress.   Skin:     General: Skin is warm and dry.   Neurological:      General: No focal deficit present.      Mental Status: She is alert and oriented to person, place, and time.   Psychiatric:         Mood and Affect: Mood normal.         Behavior: Behavior normal.             Assessment & Plan:  Insomnia, unspecified type  -     eszopiclone (LUNESTA) 1 MG Tab; Take 1 tablet (1 mg total) by mouth nightly.  Dispense: 30 tablet; Refill: 0    Chronic pain due to neoplasm  Comments:  pain is responding to tylenol currently, discussed tramadol or hydrocodone prn    Small cell carcinoma of overlapping sites of right lung  Comments:  new therapy, cont monitoring         Assessment & Plan    ORDERS:   MRI ordered to evaluate treatment efficacy in 3 months    IMPRESSION:  Transitioned patient from chemotherapy to Taltz (immunotherapy) due to ineffectiveness of previous treatment  Noted new therapy offers approximately 40% chance of minimizing or eliminating cancer  Considered bone pain as potentially positive sign of increased bone marrow activity  Planned to address sleep issues with medication adjustment    PLAN SUMMARY:   Follow up in 3 months with MRI to evaluate treatment efficacy   Started new immunotherapy (Taltz)   Continued Trazodone at current dosage   Started Lunesta at lowest dose for sleep issues   Continued Tylenol for bone pain   Contact office after trying Lunesta for a few nights to update on effectiveness   Contact office if any new symptoms or changes occur    PATIENT EDUCATION:   Explained that bone pain may indicate increased bone marrow activity,  which can be viewed positively in context of treatment   Provided brief overview of how new immunotherapy (Taltz) works, describing it as T cells attaching to and killing cancer cells    ACTION ITEMS/LIFESTYLE:   Malathi to reframe bone pain as a potentially positive sign of treatment efficacy    MEDICATIONS:   Started Lunesta at lowest dose for sleep issues. Take early enough to allow 8 hours in system before waking. Can break tablet in half if effect is too strong. Option to increase dose if needed.   Continued Tylenol for bone pain   Continued Trazodone (no change in dosage)    FOLLOW UP:   Follow up in 3 months   Contact the office if any new symptoms or changes occur   Contact the office after trying Lunesta for a few nights to update on its effectiveness         Visit today included increased complexity associated with the care of the episodic problem insomnia addressed and managing the longitudinal care of the patient due to the serious and/or complex managed problem(s) cancer-related pain, immunotherapy.    This note was generated with the assistance of ambient listening technology. Verbal consent was obtained by the patient and accompanying visitor(s) for the recording of patient appointment to facilitate this note. I attest to having reviewed and edited the generated note for accuracy, though some syntax or spelling errors may persist. Please contact the author of this note for any clarification.

## 2025-01-02 PROBLEM — I10 PRIMARY HYPERTENSION: Status: ACTIVE | Noted: 2025-01-02

## 2025-01-06 ENCOUNTER — LAB VISIT (OUTPATIENT)
Dept: LAB | Facility: HOSPITAL | Age: 65
End: 2025-01-06
Attending: INTERNAL MEDICINE
Payer: MEDICARE

## 2025-01-06 DIAGNOSIS — C34.11 MALIGNANT NEOPLASM OF UPPER LOBE OF RIGHT LUNG: ICD-10-CM

## 2025-01-06 LAB
ALBUMIN SERPL BCP-MCNC: 3.6 G/DL (ref 3.5–5.2)
ALP SERPL-CCNC: 86 U/L (ref 40–150)
ALT SERPL W/O P-5'-P-CCNC: 36 U/L (ref 10–44)
ANION GAP SERPL CALC-SCNC: 9 MMOL/L (ref 8–16)
AST SERPL-CCNC: 18 U/L (ref 10–40)
BASOPHILS # BLD AUTO: 0.01 K/UL (ref 0–0.2)
BASOPHILS NFR BLD: 0.2 % (ref 0–1.9)
BILIRUB SERPL-MCNC: 0.5 MG/DL (ref 0.1–1)
BUN SERPL-MCNC: 12 MG/DL (ref 8–23)
CALCIUM SERPL-MCNC: 9.1 MG/DL (ref 8.7–10.5)
CHLORIDE SERPL-SCNC: 102 MMOL/L (ref 95–110)
CO2 SERPL-SCNC: 25 MMOL/L (ref 23–29)
CREAT SERPL-MCNC: 1 MG/DL (ref 0.5–1.4)
DIFFERENTIAL METHOD BLD: ABNORMAL
EOSINOPHIL # BLD AUTO: 0.1 K/UL (ref 0–0.5)
EOSINOPHIL NFR BLD: 2.4 % (ref 0–8)
ERYTHROCYTE [DISTWIDTH] IN BLOOD BY AUTOMATED COUNT: 13.8 % (ref 11.5–14.5)
EST. GFR  (NO RACE VARIABLE): >60 ML/MIN/1.73 M^2
GLUCOSE SERPL-MCNC: 93 MG/DL (ref 70–110)
HCT VFR BLD AUTO: 39.8 % (ref 37–48.5)
HGB BLD-MCNC: 12.6 G/DL (ref 12–16)
IMM GRANULOCYTES # BLD AUTO: 0.02 K/UL (ref 0–0.04)
IMM GRANULOCYTES NFR BLD AUTO: 0.3 % (ref 0–0.5)
LYMPHOCYTES # BLD AUTO: 1.1 K/UL (ref 1–4.8)
LYMPHOCYTES NFR BLD: 18.5 % (ref 18–48)
MCH RBC QN AUTO: 29.9 PG (ref 27–31)
MCHC RBC AUTO-ENTMCNC: 31.7 G/DL (ref 32–36)
MCV RBC AUTO: 95 FL (ref 82–98)
MONOCYTES # BLD AUTO: 1 K/UL (ref 0.3–1)
MONOCYTES NFR BLD: 17.7 % (ref 4–15)
NEUTROPHILS # BLD AUTO: 3.6 K/UL (ref 1.8–7.7)
NEUTROPHILS NFR BLD: 60.9 % (ref 38–73)
NRBC BLD-RTO: 0 /100 WBC
PLATELET # BLD AUTO: 276 K/UL (ref 150–450)
PMV BLD AUTO: 9.6 FL (ref 9.2–12.9)
POTASSIUM SERPL-SCNC: 4.1 MMOL/L (ref 3.5–5.1)
PROT SERPL-MCNC: 7.3 G/DL (ref 6–8.4)
RBC # BLD AUTO: 4.21 M/UL (ref 4–5.4)
SODIUM SERPL-SCNC: 136 MMOL/L (ref 136–145)
WBC # BLD AUTO: 5.88 K/UL (ref 3.9–12.7)

## 2025-01-06 PROCEDURE — 85025 COMPLETE CBC W/AUTO DIFF WBC: CPT | Performed by: INTERNAL MEDICINE

## 2025-01-06 PROCEDURE — 36415 COLL VENOUS BLD VENIPUNCTURE: CPT | Mod: PN | Performed by: INTERNAL MEDICINE

## 2025-01-06 PROCEDURE — 80053 COMPREHEN METABOLIC PANEL: CPT | Performed by: INTERNAL MEDICINE

## 2025-01-13 ENCOUNTER — TELEPHONE (OUTPATIENT)
Dept: FAMILY MEDICINE | Facility: CLINIC | Age: 65
End: 2025-01-13
Payer: COMMERCIAL

## 2025-01-13 ENCOUNTER — LAB VISIT (OUTPATIENT)
Dept: LAB | Facility: HOSPITAL | Age: 65
End: 2025-01-13
Payer: MEDICARE

## 2025-01-13 DIAGNOSIS — R53.83 FATIGUE: ICD-10-CM

## 2025-01-13 DIAGNOSIS — D64.81 ANEMIA DUE TO CHEMOTHERAPY: ICD-10-CM

## 2025-01-13 DIAGNOSIS — C34.11 MALIGNANT NEOPLASM OF UPPER LOBE OF RIGHT LUNG: ICD-10-CM

## 2025-01-13 DIAGNOSIS — T45.1X5A ANEMIA DUE TO CHEMOTHERAPY: ICD-10-CM

## 2025-01-13 LAB
BASOPHILS # BLD AUTO: 0.01 K/UL (ref 0–0.2)
BASOPHILS NFR BLD: 0.2 % (ref 0–1.9)
DIFFERENTIAL METHOD BLD: ABNORMAL
EOSINOPHIL # BLD AUTO: 0.1 K/UL (ref 0–0.5)
EOSINOPHIL NFR BLD: 1.5 % (ref 0–8)
ERYTHROCYTE [DISTWIDTH] IN BLOOD BY AUTOMATED COUNT: 13.2 % (ref 11.5–14.5)
HCT VFR BLD AUTO: 36.2 % (ref 37–48.5)
HGB BLD-MCNC: 11.9 G/DL (ref 12–16)
IMM GRANULOCYTES # BLD AUTO: 0.02 K/UL (ref 0–0.04)
IMM GRANULOCYTES NFR BLD AUTO: 0.3 % (ref 0–0.5)
LYMPHOCYTES # BLD AUTO: 1.3 K/UL (ref 1–4.8)
LYMPHOCYTES NFR BLD: 19.7 % (ref 18–48)
MCH RBC QN AUTO: 30.9 PG (ref 27–31)
MCHC RBC AUTO-ENTMCNC: 32.9 G/DL (ref 32–36)
MCV RBC AUTO: 94 FL (ref 82–98)
MONOCYTES # BLD AUTO: 0.9 K/UL (ref 0.3–1)
MONOCYTES NFR BLD: 14.4 % (ref 4–15)
NEUTROPHILS # BLD AUTO: 4.1 K/UL (ref 1.8–7.7)
NEUTROPHILS NFR BLD: 63.9 % (ref 38–73)
NRBC BLD-RTO: 0 /100 WBC
PLATELET # BLD AUTO: 272 K/UL (ref 150–450)
PMV BLD AUTO: 10 FL (ref 9.2–12.9)
RBC # BLD AUTO: 3.85 M/UL (ref 4–5.4)
WBC # BLD AUTO: 6.46 K/UL (ref 3.9–12.7)

## 2025-01-13 PROCEDURE — 36415 COLL VENOUS BLD VENIPUNCTURE: CPT | Mod: PN

## 2025-01-13 PROCEDURE — 85025 COMPLETE CBC W/AUTO DIFF WBC: CPT

## 2025-01-13 PROCEDURE — 80053 COMPREHEN METABOLIC PANEL: CPT

## 2025-01-13 PROCEDURE — 83735 ASSAY OF MAGNESIUM: CPT

## 2025-01-13 PROCEDURE — 84436 ASSAY OF TOTAL THYROXINE: CPT

## 2025-01-13 PROCEDURE — 84443 ASSAY THYROID STIM HORMONE: CPT

## 2025-01-14 LAB
ALBUMIN SERPL BCP-MCNC: 3.5 G/DL (ref 3.5–5.2)
ALP SERPL-CCNC: 94 U/L (ref 40–150)
ALT SERPL W/O P-5'-P-CCNC: 29 U/L (ref 10–44)
ANION GAP SERPL CALC-SCNC: 13 MMOL/L (ref 8–16)
AST SERPL-CCNC: 15 U/L (ref 10–40)
BILIRUB SERPL-MCNC: 0.3 MG/DL (ref 0.1–1)
BUN SERPL-MCNC: 7 MG/DL (ref 8–23)
CALCIUM SERPL-MCNC: 8.8 MG/DL (ref 8.7–10.5)
CHLORIDE SERPL-SCNC: 104 MMOL/L (ref 95–110)
CO2 SERPL-SCNC: 21 MMOL/L (ref 23–29)
CREAT SERPL-MCNC: 0.9 MG/DL (ref 0.5–1.4)
EST. GFR  (NO RACE VARIABLE): >60 ML/MIN/1.73 M^2
GLUCOSE SERPL-MCNC: 50 MG/DL (ref 70–110)
MAGNESIUM SERPL-MCNC: 1.9 MG/DL (ref 1.6–2.6)
POTASSIUM SERPL-SCNC: 3.5 MMOL/L (ref 3.5–5.1)
PROT SERPL-MCNC: 7 G/DL (ref 6–8.4)
SODIUM SERPL-SCNC: 138 MMOL/L (ref 136–145)
T4 SERPL-MCNC: 9.2 UG/DL (ref 4.5–11.5)
TSH SERPL DL<=0.005 MIU/L-ACNC: 0.58 UIU/ML (ref 0.4–4)

## 2025-01-23 RX ORDER — DESLORATADINE 5 MG/1
5 TABLET ORAL
Qty: 90 TABLET | Refills: 3 | Status: SHIPPED | OUTPATIENT
Start: 2025-01-23

## 2025-01-23 NOTE — TELEPHONE ENCOUNTER
No care due was identified.  [facility] Embedded Care Due Messages. Reference number: [id number].   1/23/2025 3:28:26 AM CST

## 2025-01-23 NOTE — TELEPHONE ENCOUNTER
Refill Routing Note   Medication(s) are not appropriate for processing by [facility] for the following reason(s):        No active prescription written by provider    [facility] action(s):  Defer               Appointments  past 12m or future 3m with PCP    Date Provider   Last Visit   12/30/2024 Comfort Dave MD   Next Visit   1/31/2025 Comfort Dave MD   ED visits in past 90 days: 0        Note composed:8:39 AM 01/23/2025

## 2025-01-24 ENCOUNTER — LAB VISIT (OUTPATIENT)
Dept: LAB | Facility: HOSPITAL | Age: 65
End: 2025-01-24
Payer: MEDICARE

## 2025-01-24 DIAGNOSIS — R53.83 FATIGUE: ICD-10-CM

## 2025-01-24 DIAGNOSIS — T45.1X5S NEUTROPENIA ASSOCIATED WITH MUCOSITIS DUE TO ANTINEOPLASTIC THERAPY: ICD-10-CM

## 2025-01-24 DIAGNOSIS — D70.1 NEUTROPENIA ASSOCIATED WITH MUCOSITIS DUE TO ANTINEOPLASTIC THERAPY: ICD-10-CM

## 2025-01-24 DIAGNOSIS — T45.1X5A ANEMIA DUE TO CHEMOTHERAPY: ICD-10-CM

## 2025-01-24 DIAGNOSIS — C34.11 MALIGNANT NEOPLASM OF UPPER LOBE OF RIGHT LUNG: ICD-10-CM

## 2025-01-24 DIAGNOSIS — K12.31 NEUTROPENIA ASSOCIATED WITH MUCOSITIS DUE TO ANTINEOPLASTIC THERAPY: ICD-10-CM

## 2025-01-24 DIAGNOSIS — D64.81 ANEMIA DUE TO CHEMOTHERAPY: ICD-10-CM

## 2025-01-24 LAB
BASOPHILS # BLD AUTO: 0 K/UL (ref 0–0.2)
BASOPHILS NFR BLD: 0 % (ref 0–1.9)
DIFFERENTIAL METHOD BLD: ABNORMAL
EOSINOPHIL # BLD AUTO: 0.1 K/UL (ref 0–0.5)
EOSINOPHIL NFR BLD: 1 % (ref 0–8)
ERYTHROCYTE [DISTWIDTH] IN BLOOD BY AUTOMATED COUNT: 13 % (ref 11.5–14.5)
HCT VFR BLD AUTO: 40.8 % (ref 37–48.5)
HGB BLD-MCNC: 12.7 G/DL (ref 12–16)
IMM GRANULOCYTES # BLD AUTO: 0.02 K/UL (ref 0–0.04)
IMM GRANULOCYTES NFR BLD AUTO: 0.4 % (ref 0–0.5)
LYMPHOCYTES # BLD AUTO: 1.1 K/UL (ref 1–4.8)
LYMPHOCYTES NFR BLD: 21.4 % (ref 18–48)
MCH RBC QN AUTO: 29.4 PG (ref 27–31)
MCHC RBC AUTO-ENTMCNC: 31.1 G/DL (ref 32–36)
MCV RBC AUTO: 94 FL (ref 82–98)
MONOCYTES # BLD AUTO: 0.5 K/UL (ref 0.3–1)
MONOCYTES NFR BLD: 9.5 % (ref 4–15)
NEUTROPHILS # BLD AUTO: 3.5 K/UL (ref 1.8–7.7)
NEUTROPHILS NFR BLD: 67.7 % (ref 38–73)
NRBC BLD-RTO: 0 /100 WBC
PLATELET # BLD AUTO: 226 K/UL (ref 150–450)
PMV BLD AUTO: 9.7 FL (ref 9.2–12.9)
RBC # BLD AUTO: 4.32 M/UL (ref 4–5.4)
WBC # BLD AUTO: 5.18 K/UL (ref 3.9–12.7)

## 2025-01-24 PROCEDURE — 84439 ASSAY OF FREE THYROXINE: CPT

## 2025-01-24 PROCEDURE — 36415 COLL VENOUS BLD VENIPUNCTURE: CPT | Mod: PN

## 2025-01-24 PROCEDURE — 83735 ASSAY OF MAGNESIUM: CPT

## 2025-01-24 PROCEDURE — 80053 COMPREHEN METABOLIC PANEL: CPT

## 2025-01-24 PROCEDURE — 85025 COMPLETE CBC W/AUTO DIFF WBC: CPT

## 2025-01-24 PROCEDURE — 84443 ASSAY THYROID STIM HORMONE: CPT

## 2025-01-25 LAB
ALBUMIN SERPL BCP-MCNC: 3.6 G/DL (ref 3.5–5.2)
ALP SERPL-CCNC: 83 U/L (ref 40–150)
ALT SERPL W/O P-5'-P-CCNC: 44 U/L (ref 10–44)
ANION GAP SERPL CALC-SCNC: 10 MMOL/L (ref 8–16)
AST SERPL-CCNC: 23 U/L (ref 10–40)
BILIRUB SERPL-MCNC: 0.3 MG/DL (ref 0.1–1)
BUN SERPL-MCNC: 10 MG/DL (ref 8–23)
CALCIUM SERPL-MCNC: 9.7 MG/DL (ref 8.7–10.5)
CHLORIDE SERPL-SCNC: 103 MMOL/L (ref 95–110)
CO2 SERPL-SCNC: 25 MMOL/L (ref 23–29)
CREAT SERPL-MCNC: 1.1 MG/DL (ref 0.5–1.4)
EST. GFR  (NO RACE VARIABLE): 55.8 ML/MIN/1.73 M^2
GLUCOSE SERPL-MCNC: 138 MG/DL (ref 70–110)
MAGNESIUM SERPL-MCNC: 1.9 MG/DL (ref 1.6–2.6)
POTASSIUM SERPL-SCNC: 4.5 MMOL/L (ref 3.5–5.1)
PROT SERPL-MCNC: 7.4 G/DL (ref 6–8.4)
SODIUM SERPL-SCNC: 138 MMOL/L (ref 136–145)
T4 FREE SERPL-MCNC: 1.05 NG/DL (ref 0.71–1.51)
TSH SERPL DL<=0.005 MIU/L-ACNC: 0.21 UIU/ML (ref 0.4–4)

## 2025-02-07 ENCOUNTER — LAB VISIT (OUTPATIENT)
Dept: LAB | Facility: HOSPITAL | Age: 65
End: 2025-02-07
Attending: FAMILY MEDICINE
Payer: MEDICARE

## 2025-02-07 DIAGNOSIS — T45.1X5A ANEMIA DUE TO CHEMOTHERAPY: ICD-10-CM

## 2025-02-07 DIAGNOSIS — D70.1 NEUTROPENIA ASSOCIATED WITH MUCOSITIS DUE TO ANTINEOPLASTIC THERAPY: ICD-10-CM

## 2025-02-07 DIAGNOSIS — C34.90 MALIGNANT NEOPLASM OF BRONCHUS AND LUNG: ICD-10-CM

## 2025-02-07 DIAGNOSIS — D50.9 IRON DEFICIENCY ANEMIA, UNSPECIFIED: ICD-10-CM

## 2025-02-07 DIAGNOSIS — D64.81 ANEMIA DUE TO CHEMOTHERAPY: ICD-10-CM

## 2025-02-07 DIAGNOSIS — T45.1X5S NEUTROPENIA ASSOCIATED WITH MUCOSITIS DUE TO ANTINEOPLASTIC THERAPY: ICD-10-CM

## 2025-02-07 DIAGNOSIS — K12.31 NEUTROPENIA ASSOCIATED WITH MUCOSITIS DUE TO ANTINEOPLASTIC THERAPY: ICD-10-CM

## 2025-02-07 DIAGNOSIS — C34.11 MALIGNANT NEOPLASM OF UPPER LOBE OF RIGHT LUNG: ICD-10-CM

## 2025-02-07 DIAGNOSIS — R53.83 FATIGUE: ICD-10-CM

## 2025-02-07 LAB
ALBUMIN SERPL BCP-MCNC: 3.5 G/DL (ref 3.5–5.2)
ALP SERPL-CCNC: 92 U/L (ref 40–150)
ALT SERPL W/O P-5'-P-CCNC: 37 U/L (ref 10–44)
ANION GAP SERPL CALC-SCNC: 10 MMOL/L (ref 8–16)
AST SERPL-CCNC: 22 U/L (ref 10–40)
BASOPHILS # BLD AUTO: 0 K/UL (ref 0–0.2)
BASOPHILS NFR BLD: 0 % (ref 0–1.9)
BILIRUB SERPL-MCNC: 0.3 MG/DL (ref 0.1–1)
BUN SERPL-MCNC: 8 MG/DL (ref 8–23)
CALCIUM SERPL-MCNC: 9.2 MG/DL (ref 8.7–10.5)
CHLORIDE SERPL-SCNC: 104 MMOL/L (ref 95–110)
CO2 SERPL-SCNC: 21 MMOL/L (ref 23–29)
CREAT SERPL-MCNC: 1.1 MG/DL (ref 0.5–1.4)
DIFFERENTIAL METHOD BLD: NORMAL
EOSINOPHIL # BLD AUTO: 0.1 K/UL (ref 0–0.5)
EOSINOPHIL NFR BLD: 1.3 % (ref 0–8)
ERYTHROCYTE [DISTWIDTH] IN BLOOD BY AUTOMATED COUNT: 12.6 % (ref 11.5–14.5)
EST. GFR  (NO RACE VARIABLE): 55.8 ML/MIN/1.73 M^2
GLUCOSE SERPL-MCNC: 128 MG/DL (ref 70–110)
HCT VFR BLD AUTO: 38.5 % (ref 37–48.5)
HGB BLD-MCNC: 12.5 G/DL (ref 12–16)
IMM GRANULOCYTES # BLD AUTO: 0.02 K/UL (ref 0–0.04)
IMM GRANULOCYTES NFR BLD AUTO: 0.4 % (ref 0–0.5)
LYMPHOCYTES # BLD AUTO: 1.3 K/UL (ref 1–4.8)
LYMPHOCYTES NFR BLD: 27.5 % (ref 18–48)
MCH RBC QN AUTO: 29.8 PG (ref 27–31)
MCHC RBC AUTO-ENTMCNC: 32.5 G/DL (ref 32–36)
MCV RBC AUTO: 92 FL (ref 82–98)
MONOCYTES # BLD AUTO: 0.5 K/UL (ref 0.3–1)
MONOCYTES NFR BLD: 9.6 % (ref 4–15)
NEUTROPHILS # BLD AUTO: 2.9 K/UL (ref 1.8–7.7)
NEUTROPHILS NFR BLD: 61.2 % (ref 38–73)
NRBC BLD-RTO: 0 /100 WBC
PLATELET # BLD AUTO: 213 K/UL (ref 150–450)
PMV BLD AUTO: 10.3 FL (ref 9.2–12.9)
POTASSIUM SERPL-SCNC: 4.4 MMOL/L (ref 3.5–5.1)
PROT SERPL-MCNC: 6.9 G/DL (ref 6–8.4)
RBC # BLD AUTO: 4.2 M/UL (ref 4–5.4)
SODIUM SERPL-SCNC: 135 MMOL/L (ref 136–145)
WBC # BLD AUTO: 4.8 K/UL (ref 3.9–12.7)

## 2025-02-07 PROCEDURE — 80053 COMPREHEN METABOLIC PANEL: CPT

## 2025-02-07 PROCEDURE — 36415 COLL VENOUS BLD VENIPUNCTURE: CPT | Mod: PN

## 2025-02-07 PROCEDURE — 85025 COMPLETE CBC W/AUTO DIFF WBC: CPT

## 2025-02-18 ENCOUNTER — LAB VISIT (OUTPATIENT)
Dept: LAB | Facility: HOSPITAL | Age: 65
End: 2025-02-18
Payer: MEDICARE

## 2025-02-18 DIAGNOSIS — K12.31 NEUTROPENIA ASSOCIATED WITH MUCOSITIS DUE TO ANTINEOPLASTIC THERAPY: ICD-10-CM

## 2025-02-18 DIAGNOSIS — T45.1X5A ANEMIA DUE TO CHEMOTHERAPY: ICD-10-CM

## 2025-02-18 DIAGNOSIS — D64.81 ANEMIA DUE TO CHEMOTHERAPY: ICD-10-CM

## 2025-02-18 DIAGNOSIS — C34.11 MALIGNANT NEOPLASM OF UPPER LOBE OF RIGHT LUNG: ICD-10-CM

## 2025-02-18 DIAGNOSIS — T45.1X5S NEUTROPENIA ASSOCIATED WITH MUCOSITIS DUE TO ANTINEOPLASTIC THERAPY: ICD-10-CM

## 2025-02-18 DIAGNOSIS — R53.83 FATIGUE: ICD-10-CM

## 2025-02-18 DIAGNOSIS — D70.1 NEUTROPENIA ASSOCIATED WITH MUCOSITIS DUE TO ANTINEOPLASTIC THERAPY: ICD-10-CM

## 2025-02-18 DIAGNOSIS — C34.90 MALIGNANT NEOPLASM OF BRONCHUS AND LUNG: ICD-10-CM

## 2025-02-18 DIAGNOSIS — D50.9 IRON DEFICIENCY ANEMIA, UNSPECIFIED: ICD-10-CM

## 2025-02-18 LAB
ALBUMIN SERPL BCP-MCNC: 3.7 G/DL (ref 3.5–5.2)
ALP SERPL-CCNC: 92 U/L (ref 40–150)
ALT SERPL W/O P-5'-P-CCNC: 39 U/L (ref 10–44)
ANION GAP SERPL CALC-SCNC: 11 MMOL/L (ref 8–16)
AST SERPL-CCNC: 25 U/L (ref 10–40)
BASOPHILS # BLD AUTO: 0.01 K/UL (ref 0–0.2)
BASOPHILS NFR BLD: 0.2 % (ref 0–1.9)
BILIRUB SERPL-MCNC: 0.3 MG/DL (ref 0.1–1)
BUN SERPL-MCNC: 10 MG/DL (ref 8–23)
CALCIUM SERPL-MCNC: 9.5 MG/DL (ref 8.7–10.5)
CHLORIDE SERPL-SCNC: 105 MMOL/L (ref 95–110)
CO2 SERPL-SCNC: 23 MMOL/L (ref 23–29)
CREAT SERPL-MCNC: 1.2 MG/DL (ref 0.5–1.4)
DIFFERENTIAL METHOD BLD: ABNORMAL
EOSINOPHIL # BLD AUTO: 0.1 K/UL (ref 0–0.5)
EOSINOPHIL NFR BLD: 1.7 % (ref 0–8)
ERYTHROCYTE [DISTWIDTH] IN BLOOD BY AUTOMATED COUNT: 12.6 % (ref 11.5–14.5)
EST. GFR  (NO RACE VARIABLE): 50.2 ML/MIN/1.73 M^2
GLUCOSE SERPL-MCNC: 88 MG/DL (ref 70–110)
HCT VFR BLD AUTO: 41.5 % (ref 37–48.5)
HGB BLD-MCNC: 13.2 G/DL (ref 12–16)
IMM GRANULOCYTES # BLD AUTO: 0.02 K/UL (ref 0–0.04)
IMM GRANULOCYTES NFR BLD AUTO: 0.4 % (ref 0–0.5)
LYMPHOCYTES # BLD AUTO: 1.5 K/UL (ref 1–4.8)
LYMPHOCYTES NFR BLD: 31.5 % (ref 18–48)
MCH RBC QN AUTO: 29.4 PG (ref 27–31)
MCHC RBC AUTO-ENTMCNC: 31.8 G/DL (ref 32–36)
MCV RBC AUTO: 92 FL (ref 82–98)
MONOCYTES # BLD AUTO: 0.7 K/UL (ref 0.3–1)
MONOCYTES NFR BLD: 14.4 % (ref 4–15)
NEUTROPHILS # BLD AUTO: 2.5 K/UL (ref 1.8–7.7)
NEUTROPHILS NFR BLD: 51.8 % (ref 38–73)
NRBC BLD-RTO: 0 /100 WBC
PLATELET # BLD AUTO: 224 K/UL (ref 150–450)
PMV BLD AUTO: 9.6 FL (ref 9.2–12.9)
POTASSIUM SERPL-SCNC: 4.5 MMOL/L (ref 3.5–5.1)
PROT SERPL-MCNC: 7.1 G/DL (ref 6–8.4)
RBC # BLD AUTO: 4.49 M/UL (ref 4–5.4)
SODIUM SERPL-SCNC: 139 MMOL/L (ref 136–145)
WBC # BLD AUTO: 4.73 K/UL (ref 3.9–12.7)

## 2025-02-18 PROCEDURE — 80053 COMPREHEN METABOLIC PANEL: CPT

## 2025-02-18 PROCEDURE — 85025 COMPLETE CBC W/AUTO DIFF WBC: CPT

## 2025-02-18 PROCEDURE — 36415 COLL VENOUS BLD VENIPUNCTURE: CPT | Mod: PN

## 2025-02-24 DIAGNOSIS — G47.00 INSOMNIA, UNSPECIFIED TYPE: ICD-10-CM

## 2025-02-24 DIAGNOSIS — Z00.00 ENCOUNTER FOR MEDICARE ANNUAL WELLNESS EXAM: ICD-10-CM

## 2025-02-24 NOTE — TELEPHONE ENCOUNTER
No care due was identified.  [facility] Embedded Care Due Messages. Reference number: [id number].   2/24/2025 1:03:04 PM CST

## 2025-02-25 RX ORDER — ESZOPICLONE 1 MG/1
1 TABLET, FILM COATED ORAL NIGHTLY
Qty: 30 TABLET | Refills: 3 | Status: SHIPPED | OUTPATIENT
Start: 2025-02-25

## 2025-02-25 NOTE — TELEPHONE ENCOUNTER
Refill Routing Note   Medication(s) are not appropriate for processing by [facility] for the following reason(s):        Outside of protocol    [facility] action(s):  Route             Appointments  past 12m or future 3m with PCP    Date Provider   Last Visit   12/30/2024 Comfort Dave MD   Next Visit   2/28/2025 Comfort Dave MD   ED visits in past 90 days: 0        Note composed:9:52 AM 02/25/2025

## 2025-02-26 ENCOUNTER — LAB VISIT (OUTPATIENT)
Dept: LAB | Facility: HOSPITAL | Age: 65
End: 2025-02-26
Payer: MEDICARE

## 2025-02-26 DIAGNOSIS — D50.9 IRON DEFICIENCY ANEMIA, UNSPECIFIED: ICD-10-CM

## 2025-02-26 DIAGNOSIS — D64.81 ANEMIA DUE TO CHEMOTHERAPY: ICD-10-CM

## 2025-02-26 DIAGNOSIS — C34.11 MALIGNANT NEOPLASM OF UPPER LOBE OF RIGHT LUNG: ICD-10-CM

## 2025-02-26 DIAGNOSIS — K12.31 NEUTROPENIA ASSOCIATED WITH MUCOSITIS DUE TO ANTINEOPLASTIC THERAPY: ICD-10-CM

## 2025-02-26 DIAGNOSIS — C34.90 MALIGNANT NEOPLASM OF BRONCHUS AND LUNG: ICD-10-CM

## 2025-02-26 DIAGNOSIS — R53.83 FATIGUE: ICD-10-CM

## 2025-02-26 DIAGNOSIS — T45.1X5S NEUTROPENIA ASSOCIATED WITH MUCOSITIS DUE TO ANTINEOPLASTIC THERAPY: ICD-10-CM

## 2025-02-26 DIAGNOSIS — D70.1 NEUTROPENIA ASSOCIATED WITH MUCOSITIS DUE TO ANTINEOPLASTIC THERAPY: ICD-10-CM

## 2025-02-26 DIAGNOSIS — T45.1X5A ANEMIA DUE TO CHEMOTHERAPY: ICD-10-CM

## 2025-02-26 LAB
ALBUMIN SERPL BCP-MCNC: 3.6 G/DL (ref 3.5–5.2)
ALP SERPL-CCNC: 79 U/L (ref 40–150)
ALT SERPL W/O P-5'-P-CCNC: 45 U/L (ref 10–44)
ANION GAP SERPL CALC-SCNC: 9 MMOL/L (ref 8–16)
AST SERPL-CCNC: 39 U/L (ref 10–40)
BASOPHILS # BLD AUTO: 0 K/UL (ref 0–0.2)
BASOPHILS NFR BLD: 0 % (ref 0–1.9)
BILIRUB SERPL-MCNC: 0.4 MG/DL (ref 0.1–1)
BUN SERPL-MCNC: 9 MG/DL (ref 8–23)
CALCIUM SERPL-MCNC: 9.3 MG/DL (ref 8.7–10.5)
CHLORIDE SERPL-SCNC: 106 MMOL/L (ref 95–110)
CO2 SERPL-SCNC: 24 MMOL/L (ref 23–29)
CREAT SERPL-MCNC: 1 MG/DL (ref 0.5–1.4)
DIFFERENTIAL METHOD BLD: ABNORMAL
EOSINOPHIL # BLD AUTO: 0 K/UL (ref 0–0.5)
EOSINOPHIL NFR BLD: 1.1 % (ref 0–8)
ERYTHROCYTE [DISTWIDTH] IN BLOOD BY AUTOMATED COUNT: 12.7 % (ref 11.5–14.5)
EST. GFR  (NO RACE VARIABLE): >60 ML/MIN/1.73 M^2
GLUCOSE SERPL-MCNC: 119 MG/DL (ref 70–110)
HCT VFR BLD AUTO: 40.8 % (ref 37–48.5)
HGB BLD-MCNC: 12.5 G/DL (ref 12–16)
IMM GRANULOCYTES # BLD AUTO: 0.01 K/UL (ref 0–0.04)
IMM GRANULOCYTES NFR BLD AUTO: 0.3 % (ref 0–0.5)
LYMPHOCYTES # BLD AUTO: 1 K/UL (ref 1–4.8)
LYMPHOCYTES NFR BLD: 26.6 % (ref 18–48)
MAGNESIUM SERPL-MCNC: 1.8 MG/DL (ref 1.6–2.6)
MCH RBC QN AUTO: 28.6 PG (ref 27–31)
MCHC RBC AUTO-ENTMCNC: 30.6 G/DL (ref 32–36)
MCV RBC AUTO: 93 FL (ref 82–98)
MONOCYTES # BLD AUTO: 0.5 K/UL (ref 0.3–1)
MONOCYTES NFR BLD: 12.6 % (ref 4–15)
NEUTROPHILS # BLD AUTO: 2.2 K/UL (ref 1.8–7.7)
NEUTROPHILS NFR BLD: 59.4 % (ref 38–73)
NRBC BLD-RTO: 0 /100 WBC
PLATELET # BLD AUTO: 220 K/UL (ref 150–450)
PMV BLD AUTO: 9.6 FL (ref 9.2–12.9)
POTASSIUM SERPL-SCNC: 4.4 MMOL/L (ref 3.5–5.1)
PROT SERPL-MCNC: 6.8 G/DL (ref 6–8.4)
RBC # BLD AUTO: 4.37 M/UL (ref 4–5.4)
SODIUM SERPL-SCNC: 139 MMOL/L (ref 136–145)
T4 FREE SERPL-MCNC: 0.92 NG/DL (ref 0.71–1.51)
T4 SERPL-MCNC: 7.6 UG/DL (ref 4.5–11.5)
TSH SERPL DL<=0.005 MIU/L-ACNC: 0.29 UIU/ML (ref 0.4–4)
WBC # BLD AUTO: 3.72 K/UL (ref 3.9–12.7)

## 2025-02-26 PROCEDURE — 84436 ASSAY OF TOTAL THYROXINE: CPT

## 2025-02-26 PROCEDURE — 83735 ASSAY OF MAGNESIUM: CPT

## 2025-02-26 PROCEDURE — 80053 COMPREHEN METABOLIC PANEL: CPT

## 2025-02-26 PROCEDURE — 85025 COMPLETE CBC W/AUTO DIFF WBC: CPT

## 2025-02-26 PROCEDURE — 84439 ASSAY OF FREE THYROXINE: CPT

## 2025-02-26 PROCEDURE — 36415 COLL VENOUS BLD VENIPUNCTURE: CPT | Mod: PN

## 2025-02-26 PROCEDURE — 84443 ASSAY THYROID STIM HORMONE: CPT

## 2025-02-28 ENCOUNTER — OFFICE VISIT (OUTPATIENT)
Dept: FAMILY MEDICINE | Facility: CLINIC | Age: 65
End: 2025-02-28
Payer: MEDICARE

## 2025-02-28 VITALS
OXYGEN SATURATION: 98 % | HEART RATE: 93 BPM | DIASTOLIC BLOOD PRESSURE: 80 MMHG | WEIGHT: 218.06 LBS | BODY MASS INDEX: 31.22 KG/M2 | SYSTOLIC BLOOD PRESSURE: 126 MMHG | HEIGHT: 70 IN

## 2025-02-28 DIAGNOSIS — D70.1 AGRANULOCYTOSIS SECONDARY TO CANCER CHEMOTHERAPY (CODE): ICD-10-CM

## 2025-02-28 DIAGNOSIS — R60.9 FLUID RETENTION: Primary | ICD-10-CM

## 2025-02-28 DIAGNOSIS — I72.2 RENAL ARTERIAL ANEURYSM: ICD-10-CM

## 2025-02-28 DIAGNOSIS — E11.9 TYPE 2 DIABETES MELLITUS WITHOUT COMPLICATION, WITHOUT LONG-TERM CURRENT USE OF INSULIN: ICD-10-CM

## 2025-02-28 DIAGNOSIS — G47.00 INSOMNIA, UNSPECIFIED TYPE: ICD-10-CM

## 2025-02-28 DIAGNOSIS — E11.42 DIABETIC POLYNEUROPATHY ASSOCIATED WITH TYPE 2 DIABETES MELLITUS: ICD-10-CM

## 2025-02-28 DIAGNOSIS — F41.1 GAD (GENERALIZED ANXIETY DISORDER): ICD-10-CM

## 2025-02-28 PROCEDURE — G2211 COMPLEX E/M VISIT ADD ON: HCPCS | Mod: S$PBB,,, | Performed by: FAMILY MEDICINE

## 2025-02-28 PROCEDURE — 99214 OFFICE O/P EST MOD 30 MIN: CPT | Mod: S$PBB,,, | Performed by: FAMILY MEDICINE

## 2025-02-28 PROCEDURE — 99213 OFFICE O/P EST LOW 20 MIN: CPT | Mod: PBBFAC,PN | Performed by: FAMILY MEDICINE

## 2025-02-28 PROCEDURE — 99999 PR PBB SHADOW E&M-EST. PATIENT-LVL III: CPT | Mod: PBBFAC,,, | Performed by: FAMILY MEDICINE

## 2025-02-28 RX ORDER — ATORVASTATIN CALCIUM 20 MG/1
20 TABLET, FILM COATED ORAL DAILY
Qty: 90 TABLET | Refills: 3 | Status: SHIPPED | OUTPATIENT
Start: 2025-02-28

## 2025-02-28 RX ORDER — LISINOPRIL 10 MG/1
10 TABLET ORAL NIGHTLY
Qty: 90 TABLET | Refills: 3 | Status: SHIPPED | OUTPATIENT
Start: 2025-02-28 | End: 2026-02-28

## 2025-02-28 RX ORDER — ALPRAZOLAM 0.25 MG/1
0.25 TABLET ORAL 2 TIMES DAILY PRN
Qty: 60 TABLET | Refills: 3 | Status: SHIPPED | OUTPATIENT
Start: 2025-02-28

## 2025-02-28 RX ORDER — FAMOTIDINE 40 MG/1
40 TABLET, FILM COATED ORAL 2 TIMES DAILY PRN
Qty: 180 TABLET | Refills: 3 | Status: SHIPPED | OUTPATIENT
Start: 2025-02-28

## 2025-02-28 NOTE — PROGRESS NOTES
Chief Complaint:  Chief Complaint   Patient presents with    Follow-up        HPI:  Malathi is a 64 y.o. year old     History of Present Illness    CHIEF COMPLAINT:  Malathi presents today for follow up of fluid retention    FLUID RETENTION AND URINARY SYMPTOMS:  She takes Lasix daily in the morning to avoid frequent nighttime urination. She reports getting up once at night to urinate.    BLOOD GLUCOSE:  Home glucose readings range from 50 to 195 mg/dL, with current readings consistently between 100-130 mg/dL. She has purchased a new glucose monitoring machine to ensure accurate measurements.    MUSCULOSKELETAL:  She reports back muscle cramps for the past 2 weeks, localized to specific quadrants of the back that resolve with positional changes.    GASTROINTESTINAL:  She takes antacids almost daily as needed based on stomach symptoms. She is experiencing altered taste sensation that has progressed from metallic to salty, describing water as tasting like salt water. She denies any impact on appetite or food intake.    SLEEP:  She continues to experience difficulty initiating sleep. Lunesta provides partial effectiveness but sleep issues persist at current dose.    CURRENT MEDICATIONS:  She takes Lasix, potassium, Xanax, Celexa (substituted for previous Lexapro), Lunesta, and antacids as needed.      ROS:  Genitourinary: +frequency, +nocturia  Musculoskeletal: +muscle cramps  Psychiatric: +sleep difficulty           Review of Systems   Constitutional:  Negative for activity change and unexpected weight change.   HENT:  Positive for hearing loss. Negative for rhinorrhea and trouble swallowing.    Eyes:  Positive for visual disturbance. Negative for discharge.   Respiratory:  Negative for chest tightness and wheezing.    Cardiovascular:  Negative for chest pain and palpitations.   Gastrointestinal:  Negative for blood in stool, constipation, diarrhea and vomiting.   Endocrine: Negative for polydipsia and polyuria.  "  Genitourinary:  Negative for difficulty urinating, dysuria, hematuria and menstrual problem.   Musculoskeletal:  Positive for arthralgias and neck pain. Negative for joint swelling.   Neurological:  Positive for weakness and headaches.   Psychiatric/Behavioral:  Positive for confusion and dysphoric mood.          Past Medical History:  Past Medical History:   Diagnosis Date    Abnormal stress test     Bronchitis     COPD (chronic obstructive pulmonary disease)     Diabetes mellitus, type 2     GERD (gastroesophageal reflux disease)     HLD (hyperlipidemia)     Hx of endoscopy     Hypertension     Lung cancer 05/2023    Mental disorder     Pulmonary nodule 04/09/2018    SOB (shortness of breath)     resolved    Thyroid disease     Tobacco abuse 12/26/2012    Tobacco use 12/26/2012         Vitals:  Vitals:    02/28/25 1053   BP: 126/80   Pulse: 93   SpO2: 98%   Weight: 98.9 kg (218 lb 0.6 oz)   Height: 5' 10" (1.778 m)   PainSc: 0-No pain       BP Readings from Last 5 Encounters:   02/28/25 126/80   01/07/25 126/72   01/03/25 (!) 135/97   12/27/24 (!) 131/50   11/25/24 116/72       The 10-year ASCVD risk score (Andrew ONEAL, et al., 2019) is: 12.5%    Values used to calculate the score:      Age: 64 years      Sex: Female      Is Non- : No      Diabetic: Yes      Tobacco smoker: No      Systolic Blood Pressure: 126 mmHg      Is BP treated: Yes      HDL Cholesterol: 57 mg/dL      Total Cholesterol: 176 mg/dL      Physical Exam:  Physical Exam  Vitals and nursing note reviewed.   Constitutional:       General: She is not in acute distress.     Appearance: Normal appearance. She is well-developed.   HENT:      Head: Normocephalic and atraumatic.   Eyes:      General: No scleral icterus.        Right eye: No discharge.         Left eye: No discharge.      Conjunctiva/sclera: Conjunctivae normal.   Cardiovascular:      Rate and Rhythm: Normal rate.   Pulmonary:      Effort: Pulmonary effort is " normal. No respiratory distress.   Abdominal:      Palpations: Abdomen is soft.      Tenderness: There is no guarding.   Musculoskeletal:         General: No deformity or signs of injury.      Cervical back: Neck supple.   Skin:     General: Skin is warm and dry.      Findings: No rash.   Neurological:      General: No focal deficit present.      Mental Status: She is alert and oriented to person, place, and time.   Psychiatric:         Mood and Affect: Mood normal.         Behavior: Behavior normal.             Assessment & Plan:  Fluid retention  Comments:  restarting lasix, 20mg once daily with addl potassium  Orders:  -     NT-Pro Natriuretic Peptide; Future; Expected date: 02/28/2025  -     Echo; Future    Type 2 diabetes mellitus without complication, without long-term current use of insulin  -     atorvastatin (LIPITOR) 20 MG tablet; Take 1 tablet (20 mg total) by mouth once daily.  Dispense: 90 tablet; Refill: 3  -     Hemoglobin A1C; Future; Expected date: 02/28/2025    Insomnia, unspecified type    JEAN CLAUDE (generalized anxiety disorder)  -     ALPRAZolam (XANAX) 0.25 MG tablet; Take 1 tablet (0.25 mg total) by mouth 2 (two) times daily as needed for Anxiety.  Dispense: 60 tablet; Refill: 3    Type 2 diabetes mellitus without complication, without long-term current use of insulin  Comments:  well-controlled, cont regimen  Orders:  -     atorvastatin (LIPITOR) 20 MG tablet; Take 1 tablet (20 mg total) by mouth once daily.  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  Dispense: 180 tablet; Refill: 3  -     lisinopriL 10 MG tablet; Take 1 tablet (10 mg total) by mouth every evening.  Dispense: 90 tablet; Refill: 3         Assessment & Plan    ORDERS:   MRI of brain and body ordered (to be scheduled)   Echocardiogram ordered at patient's convenience   A1c ordered with next lab draw    IMPRESSION:  Assessed fluid retention, initiating Lasix and potassium  Evaluated TSH levels, adjusting thyroid medication dosage due to elevated results  Considered impact of TSH on anxiety, sleep, and palpitation risk  Reviewed blood pressure readings, noting anxiety-driven elevations during office visits  Assessed glucose fluctuations, acknowledging potential medication influence and unreliability of A1c due to recent variability  Evaluated back pain, considering positional factors and muscle relaxant use    PLAN SUMMARY:   Refilled Lipitor   MRI of brain and body ordered   Follow up in 1 month for lab work   Continued Lasix 40 mg daily in morning   Increased Lunesta to 1.5 mg at bedtime, with option to increase to 2 mg if needed   Advised to use Flexeril 5 mg at bedtime for back pain, with daytime option of 2.5 mg if needed   A1c ordered with next lab draw   Echocardiogram ordered at patient's convenience   Refilled Xanax   Refilled Pepcid for as-needed use   Continued potassium supplement as prescribed    PATIENT EDUCATION:   Explained sodium content in lobster and its impact on fluid retention   Discussed daily sodium intake limits (1,500-2,000 mg) and considerations for incorporating higher sodium foods   Advised on potential inaccuracy of upcoming A1c test due to recent glucose fluctuations    ACTION ITEMS/LIFESTYLE:   Malathi to obtain lumbar support cushion for chair to alleviate back pain   Recommend performing stretches after bath or shower to address muscle stiffness   Malathi to monitor home blood pressure readings and bring  log to future appointments   Malathi should be cautious of salt intake, especially in processed meats, canned goods, and shellfish   Malathi to continue monitoring glucose levels at home with new machine    MEDICATIONS:   Increased Lunesta to 1.5 mg at bedtime, with option to increase to 2 mg if needed (max 3 mg)   Continued Lasix 40 mg daily in the morning   Continued potassium supplement as prescribed   Refilled Pepcid for as-needed use   Refilled Xanax   Refilled Lipitor   Advised to use Flexeril 5 mg (half tablet) at bedtime for back pain, with option to use 2.5 mg (quarter tablet) during the day if needed    FOLLOW UP:   Follow up in 1 month for lab work   Contact office if Lunesta dose needs adjustment         Visit today included increased complexity associated with the care of the episodic problem fluid retention addressed and managing the longitudinal care of the patient due to the serious and/or complex managed problem(s) dm2, gerd, miky, insomnia.    This note was generated with the assistance of ambient listening technology. Verbal consent was obtained by the patient and accompanying visitor(s) for the recording of patient appointment to facilitate this note. I attest to having reviewed and edited the generated note for accuracy, though some syntax or spelling errors may persist. Please contact the author of this note for any clarification.

## 2025-03-05 ENCOUNTER — LAB VISIT (OUTPATIENT)
Dept: LAB | Facility: HOSPITAL | Age: 65
End: 2025-03-05
Attending: FAMILY MEDICINE
Payer: MEDICARE

## 2025-03-05 DIAGNOSIS — R60.9 FLUID RETENTION: ICD-10-CM

## 2025-03-05 DIAGNOSIS — D50.9 IRON DEFICIENCY ANEMIA, UNSPECIFIED: ICD-10-CM

## 2025-03-05 DIAGNOSIS — D64.81 ANEMIA DUE TO CHEMOTHERAPY: ICD-10-CM

## 2025-03-05 DIAGNOSIS — T45.1X5A AGRANULOCYTOSIS SECONDARY TO CANCER CHEMOTHERAPY: ICD-10-CM

## 2025-03-05 DIAGNOSIS — C34.11 MALIGNANT NEOPLASM OF UPPER LOBE OF RIGHT LUNG: Primary | ICD-10-CM

## 2025-03-05 DIAGNOSIS — D70.1 AGRANULOCYTOSIS SECONDARY TO CANCER CHEMOTHERAPY: ICD-10-CM

## 2025-03-05 DIAGNOSIS — T45.1X5A ANEMIA DUE TO CHEMOTHERAPY: ICD-10-CM

## 2025-03-05 DIAGNOSIS — E11.9 TYPE 2 DIABETES MELLITUS WITHOUT COMPLICATION, WITHOUT LONG-TERM CURRENT USE OF INSULIN: ICD-10-CM

## 2025-03-05 DIAGNOSIS — C34.90 MALIGNANT NEOPLASM OF BRONCHUS AND LUNG: ICD-10-CM

## 2025-03-05 DIAGNOSIS — C34.11 MALIGNANT NEOPLASM OF UPPER LOBE OF RIGHT LUNG: ICD-10-CM

## 2025-03-05 DIAGNOSIS — R53.83 FATIGUE: ICD-10-CM

## 2025-03-05 LAB
ALBUMIN SERPL BCP-MCNC: 3.8 G/DL (ref 3.5–5.2)
ALP SERPL-CCNC: 94 U/L (ref 40–150)
ALT SERPL W/O P-5'-P-CCNC: 44 U/L (ref 10–44)
ANION GAP SERPL CALC-SCNC: 10 MMOL/L (ref 8–16)
AST SERPL-CCNC: 26 U/L (ref 10–40)
BASOPHILS # BLD AUTO: 0.01 K/UL (ref 0–0.2)
BASOPHILS NFR BLD: 0.2 % (ref 0–1.9)
BILIRUB SERPL-MCNC: 0.4 MG/DL (ref 0.1–1)
BUN SERPL-MCNC: 10 MG/DL (ref 8–23)
CALCIUM SERPL-MCNC: 9.3 MG/DL (ref 8.7–10.5)
CHLORIDE SERPL-SCNC: 101 MMOL/L (ref 95–110)
CO2 SERPL-SCNC: 24 MMOL/L (ref 23–29)
CREAT SERPL-MCNC: 1 MG/DL (ref 0.5–1.4)
DIFFERENTIAL METHOD BLD: ABNORMAL
EOSINOPHIL # BLD AUTO: 0.1 K/UL (ref 0–0.5)
EOSINOPHIL NFR BLD: 1.1 % (ref 0–8)
ERYTHROCYTE [DISTWIDTH] IN BLOOD BY AUTOMATED COUNT: 12.8 % (ref 11.5–14.5)
EST. GFR  (NO RACE VARIABLE): >60 ML/MIN/1.73 M^2
ESTIMATED AVG GLUCOSE: 140 MG/DL (ref 68–131)
GLUCOSE SERPL-MCNC: 71 MG/DL (ref 70–110)
HBA1C MFR BLD: 6.5 % (ref 4–5.6)
HCT VFR BLD AUTO: 43.2 % (ref 37–48.5)
HGB BLD-MCNC: 13.8 G/DL (ref 12–16)
IMM GRANULOCYTES # BLD AUTO: 0.02 K/UL (ref 0–0.04)
IMM GRANULOCYTES NFR BLD AUTO: 0.4 % (ref 0–0.5)
LYMPHOCYTES # BLD AUTO: 1.4 K/UL (ref 1–4.8)
LYMPHOCYTES NFR BLD: 24.8 % (ref 18–48)
MCH RBC QN AUTO: 28.9 PG (ref 27–31)
MCHC RBC AUTO-ENTMCNC: 31.9 G/DL (ref 32–36)
MCV RBC AUTO: 90 FL (ref 82–98)
MONOCYTES # BLD AUTO: 0.9 K/UL (ref 0.3–1)
MONOCYTES NFR BLD: 15.4 % (ref 4–15)
NEUTROPHILS # BLD AUTO: 3.2 K/UL (ref 1.8–7.7)
NEUTROPHILS NFR BLD: 58.1 % (ref 38–73)
NRBC BLD-RTO: 0 /100 WBC
PLATELET # BLD AUTO: 260 K/UL (ref 150–450)
PMV BLD AUTO: 9.7 FL (ref 9.2–12.9)
POTASSIUM SERPL-SCNC: 4 MMOL/L (ref 3.5–5.1)
PROT SERPL-MCNC: 7.4 G/DL (ref 6–8.4)
RBC # BLD AUTO: 4.78 M/UL (ref 4–5.4)
SODIUM SERPL-SCNC: 135 MMOL/L (ref 136–145)
WBC # BLD AUTO: 5.57 K/UL (ref 3.9–12.7)

## 2025-03-05 PROCEDURE — 80053 COMPREHEN METABOLIC PANEL: CPT

## 2025-03-05 PROCEDURE — 85025 COMPLETE CBC W/AUTO DIFF WBC: CPT

## 2025-03-05 PROCEDURE — 83036 HEMOGLOBIN GLYCOSYLATED A1C: CPT | Performed by: FAMILY MEDICINE

## 2025-03-05 PROCEDURE — 36415 COLL VENOUS BLD VENIPUNCTURE: CPT | Mod: PN | Performed by: FAMILY MEDICINE

## 2025-03-06 DIAGNOSIS — C34.81 SMALL CELL CARCINOMA OF OVERLAPPING SITES OF RIGHT LUNG: ICD-10-CM

## 2025-03-06 DIAGNOSIS — C79.31 METASTASIS TO BRAIN: ICD-10-CM

## 2025-03-06 DIAGNOSIS — D50.9 IRON DEFICIENCY ANEMIA, UNSPECIFIED: ICD-10-CM

## 2025-03-06 DIAGNOSIS — G47.9 TROUBLE IN SLEEPING: ICD-10-CM

## 2025-03-06 DIAGNOSIS — R53.83 FATIGUE: Primary | ICD-10-CM

## 2025-03-06 NOTE — TELEPHONE ENCOUNTER
Refill Routing Note   Medication(s) are not appropriate for processing by [facility] for the following reason(s):        No active prescription written by provider    [facility] action(s):  Defer        Medication Therapy Plan: ENDED  01/03/25; DEFER TO YOU FOR REVIEW      Appointments  past 12m or future 3m with PCP    Date Provider   Last Visit   2/28/2025 Comfort Dave MD   Next Visit   3/31/2025 Comfort Dave MD   ED visits in past 90 days: 0        Note composed:7:47 AM 03/06/2025

## 2025-03-06 NOTE — TELEPHONE ENCOUNTER
No care due was identified.  [facility] Embedded Care Due Messages. Reference number: [id number].   3/06/2025 3:25:59 AM CST

## 2025-03-07 LAB — NT-PROBNP SERPL IA-MCNC: <36 PG/ML

## 2025-03-07 RX ORDER — TRAZODONE HYDROCHLORIDE 50 MG/1
TABLET ORAL
Qty: 180 TABLET | Refills: 3 | Status: SHIPPED | OUTPATIENT
Start: 2025-03-07

## 2025-03-09 ENCOUNTER — PATIENT MESSAGE (OUTPATIENT)
Dept: FAMILY MEDICINE | Facility: CLINIC | Age: 65
End: 2025-03-09
Payer: MEDICARE

## 2025-03-09 DIAGNOSIS — G47.00 INSOMNIA, UNSPECIFIED TYPE: ICD-10-CM

## 2025-03-10 ENCOUNTER — RESULTS FOLLOW-UP (OUTPATIENT)
Dept: FAMILY MEDICINE | Facility: CLINIC | Age: 65
End: 2025-03-10

## 2025-03-10 PROBLEM — D70.1 AGRANULOCYTOSIS SECONDARY TO CANCER CHEMOTHERAPY (CODE): Status: ACTIVE | Noted: 2025-03-10

## 2025-03-10 RX ORDER — ESZOPICLONE 2 MG/1
2 TABLET, FILM COATED ORAL NIGHTLY
Qty: 30 TABLET | Refills: 5 | Status: SHIPPED | OUTPATIENT
Start: 2025-03-10

## 2025-03-12 ENCOUNTER — LAB VISIT (OUTPATIENT)
Dept: LAB | Facility: HOSPITAL | Age: 65
End: 2025-03-12
Attending: INTERNAL MEDICINE
Payer: MEDICARE

## 2025-03-12 DIAGNOSIS — T45.1X5S NEUTROPENIA ASSOCIATED WITH MUCOSITIS DUE TO ANTINEOPLASTIC THERAPY: ICD-10-CM

## 2025-03-12 DIAGNOSIS — T45.1X5A ANEMIA DUE TO CHEMOTHERAPY: ICD-10-CM

## 2025-03-12 DIAGNOSIS — D50.9 IRON DEFICIENCY ANEMIA, UNSPECIFIED: ICD-10-CM

## 2025-03-12 DIAGNOSIS — R53.83 FATIGUE: ICD-10-CM

## 2025-03-12 DIAGNOSIS — D70.1 NEUTROPENIA ASSOCIATED WITH MUCOSITIS DUE TO ANTINEOPLASTIC THERAPY: ICD-10-CM

## 2025-03-12 DIAGNOSIS — D64.81 ANEMIA DUE TO CHEMOTHERAPY: ICD-10-CM

## 2025-03-12 DIAGNOSIS — C34.90 MALIGNANT NEOPLASM OF BRONCHUS AND LUNG: ICD-10-CM

## 2025-03-12 DIAGNOSIS — C34.11 MALIGNANT NEOPLASM OF UPPER LOBE OF RIGHT LUNG: ICD-10-CM

## 2025-03-12 DIAGNOSIS — K12.31 NEUTROPENIA ASSOCIATED WITH MUCOSITIS DUE TO ANTINEOPLASTIC THERAPY: ICD-10-CM

## 2025-03-12 LAB
ALBUMIN SERPL BCP-MCNC: 3.5 G/DL (ref 3.5–5.2)
ALP SERPL-CCNC: 85 U/L (ref 40–150)
ALT SERPL W/O P-5'-P-CCNC: 32 U/L (ref 10–44)
ANION GAP SERPL CALC-SCNC: 8 MMOL/L (ref 8–16)
AST SERPL-CCNC: 20 U/L (ref 10–40)
BASOPHILS # BLD AUTO: 0.01 K/UL (ref 0–0.2)
BASOPHILS NFR BLD: 0.2 % (ref 0–1.9)
BILIRUB SERPL-MCNC: 0.3 MG/DL (ref 0.1–1)
BUN SERPL-MCNC: 10 MG/DL (ref 8–23)
CALCIUM SERPL-MCNC: 9.4 MG/DL (ref 8.7–10.5)
CHLORIDE SERPL-SCNC: 103 MMOL/L (ref 95–110)
CO2 SERPL-SCNC: 25 MMOL/L (ref 23–29)
CREAT SERPL-MCNC: 1.1 MG/DL (ref 0.5–1.4)
DIFFERENTIAL METHOD BLD: NORMAL
EOSINOPHIL # BLD AUTO: 0.1 K/UL (ref 0–0.5)
EOSINOPHIL NFR BLD: 1.1 % (ref 0–8)
ERYTHROCYTE [DISTWIDTH] IN BLOOD BY AUTOMATED COUNT: 13 % (ref 11.5–14.5)
EST. GFR  (NO RACE VARIABLE): 55.8 ML/MIN/1.73 M^2
GLUCOSE SERPL-MCNC: 70 MG/DL (ref 70–110)
HCT VFR BLD AUTO: 39.9 % (ref 37–48.5)
HGB BLD-MCNC: 13.1 G/DL (ref 12–16)
IMM GRANULOCYTES # BLD AUTO: 0.01 K/UL (ref 0–0.04)
IMM GRANULOCYTES NFR BLD AUTO: 0.2 % (ref 0–0.5)
LYMPHOCYTES # BLD AUTO: 1.2 K/UL (ref 1–4.8)
LYMPHOCYTES NFR BLD: 21.7 % (ref 18–48)
MCH RBC QN AUTO: 29.4 PG (ref 27–31)
MCHC RBC AUTO-ENTMCNC: 32.8 G/DL (ref 32–36)
MCV RBC AUTO: 90 FL (ref 82–98)
MONOCYTES # BLD AUTO: 0.6 K/UL (ref 0.3–1)
MONOCYTES NFR BLD: 11.9 % (ref 4–15)
NEUTROPHILS # BLD AUTO: 3.5 K/UL (ref 1.8–7.7)
NEUTROPHILS NFR BLD: 64.9 % (ref 38–73)
NRBC BLD-RTO: 0 /100 WBC
PLATELET # BLD AUTO: 234 K/UL (ref 150–450)
PMV BLD AUTO: 10 FL (ref 9.2–12.9)
POTASSIUM SERPL-SCNC: 4.2 MMOL/L (ref 3.5–5.1)
PROT SERPL-MCNC: 7 G/DL (ref 6–8.4)
RBC # BLD AUTO: 4.46 M/UL (ref 4–5.4)
SODIUM SERPL-SCNC: 136 MMOL/L (ref 136–145)
WBC # BLD AUTO: 5.31 K/UL (ref 3.9–12.7)

## 2025-03-12 PROCEDURE — 36415 COLL VENOUS BLD VENIPUNCTURE: CPT | Mod: PN

## 2025-03-12 PROCEDURE — 80053 COMPREHEN METABOLIC PANEL: CPT

## 2025-03-12 PROCEDURE — 85025 COMPLETE CBC W/AUTO DIFF WBC: CPT

## 2025-03-14 ENCOUNTER — HOSPITAL ENCOUNTER (OUTPATIENT)
Dept: CARDIOLOGY | Facility: HOSPITAL | Age: 65
Discharge: HOME | End: 2025-03-14
Attending: FAMILY MEDICINE
Payer: MEDICARE

## 2025-03-14 VITALS — WEIGHT: 218 LBS | BODY MASS INDEX: 31.21 KG/M2 | HEIGHT: 70 IN | HEART RATE: 87 BPM

## 2025-03-14 DIAGNOSIS — R60.9 FLUID RETENTION: ICD-10-CM

## 2025-03-14 PROCEDURE — 93306 TTE W/DOPPLER COMPLETE: CPT | Mod: 26,,, | Performed by: INTERNAL MEDICINE

## 2025-03-14 PROCEDURE — 93306 TTE W/DOPPLER COMPLETE: CPT | Mod: PO

## 2025-03-15 LAB
ASCENDING AORTA: 2.7 CM
AV INDEX (PROSTH): 0.63
AV MEAN GRADIENT: 5 MMHG
AV PEAK GRADIENT: 10 MMHG
AV VALVE AREA BY VELOCITY RATIO: 2.4 CM²
AV VALVE AREA: 2 CM²
AV VELOCITY RATIO: 0.75
BSA FOR ECHO PROCEDURE: 2.21 M2
CV ECHO LV RWT: 0.45 CM
DOP CALC AO PEAK VEL: 1.6 M/S
DOP CALC AO VTI: 32.8 CM
DOP CALC LVOT AREA: 3.1 CM2
DOP CALC LVOT DIAMETER: 2 CM
DOP CALC LVOT PEAK VEL: 1.2 M/S
DOP CALC LVOT STROKE VOLUME: 65 CM3
DOP CALCLVOT PEAK VEL VTI: 20.7 CM
E WAVE DECELERATION TIME: 161 MSEC
E/A RATIO: 0.86
E/E' RATIO: 10 M/S
ECHO LV POSTERIOR WALL: 0.9 CM (ref 0.6–1.1)
FRACTIONAL SHORTENING: 22.5 % (ref 28–44)
INTERVENTRICULAR SEPTUM: 0.9 CM (ref 0.6–1.1)
IVRT: 106 MSEC
LEFT ATRIUM AREA SYSTOLIC (APICAL 2 CHAMBER): 13.85 CM2
LEFT ATRIUM AREA SYSTOLIC (APICAL 4 CHAMBER): 8.07 CM2
LEFT ATRIUM SIZE: 2.9 CM
LEFT ATRIUM VOLUME INDEX MOD: 10 ML/M2
LEFT ATRIUM VOLUME MOD: 22 ML
LEFT INTERNAL DIMENSION IN SYSTOLE: 3.1 CM (ref 2.1–4)
LEFT VENTRICLE DIASTOLIC VOLUME INDEX: 32.72 ML/M2
LEFT VENTRICLE DIASTOLIC VOLUME: 71 ML
LEFT VENTRICLE END SYSTOLIC VOLUME APICAL 2 CHAMBER: 32.76 ML
LEFT VENTRICLE END SYSTOLIC VOLUME APICAL 4 CHAMBER: 12.33 ML
LEFT VENTRICLE MASS INDEX: 50.5 G/M2
LEFT VENTRICLE SYSTOLIC VOLUME INDEX: 18 ML/M2
LEFT VENTRICLE SYSTOLIC VOLUME: 39 ML
LEFT VENTRICULAR INTERNAL DIMENSION IN DIASTOLE: 4 CM (ref 3.5–6)
LEFT VENTRICULAR MASS: 109.7 G
LV LATERAL E/E' RATIO: 9.7 M/S
LV SEPTAL E/E' RATIO: 11.3 M/S
LVED V (TEICH): 71.39 ML
LVES V (TEICH): 38.83 ML
LVOT MG: 3.1 MMHG
LVOT MV: 0.82 CM/S
MV PEAK A VEL: 0.79 M/S
MV PEAK E VEL: 0.68 M/S
MV STENOSIS PRESSURE HALF TIME: 46.73 MS
MV VALVE AREA P 1/2 METHOD: 4.71 CM2
OHS CV RV/LV RATIO: 0.83 CM
PISA TR MAX VEL: 2.6 M/S
PULM VEIN S/D RATIO: 2.1
PV PEAK D VEL: 0.29 M/S
PV PEAK S VEL: 0.61 M/S
RA PRESSURE ESTIMATED: 3 MMHG
RA VOL SYS: 15.24 ML
RIGHT ATRIAL AREA: 7.2 CM2
RIGHT ATRIUM VOLUME AREA LENGTH APICAL 4 CHAMBER: 13.73 ML
RIGHT VENTRICLE DIASTOLIC BASEL DIMENSION: 3.3 CM
RIGHT VENTRICLE DIASTOLIC LENGTH: 3.3 CM
RIGHT VENTRICLE DIASTOLIC MID DIMENSION: 3.2 CM
RIGHT VENTRICULAR END-DIASTOLIC DIMENSION: 3.27 CM
RIGHT VENTRICULAR LENGTH IN DIASTOLE (APICAL 4-CHAMBER VIEW): 3.27 CM
RV MID DIAMA: 3.21 CM
RV TB RVSP: 6 MMHG
RV TISSUE DOPPLER FREE WALL SYSTOLIC VELOCITY 1 (APICAL 4 CHAMBER VIEW): 10.64 CM/S
SINUS: 2.63 CM
STJ: 2.09 CM
TDI LATERAL: 0.07 M/S
TDI SEPTAL: 0.06 M/S
TDI: 0.07 M/S
TR MAX PG: 27 MMHG
TRICUSPID ANNULAR PLANE SYSTOLIC EXCURSION: 2.05 CM
TV REST PULMONARY ARTERY PRESSURE: 30 MMHG
Z-SCORE OF LEFT VENTRICULAR DIMENSION IN END DIASTOLE: -5.85
Z-SCORE OF LEFT VENTRICULAR DIMENSION IN END SYSTOLE: -2.72

## 2025-03-18 ENCOUNTER — HOSPITAL ENCOUNTER (OUTPATIENT)
Dept: RADIOLOGY | Facility: HOSPITAL | Age: 65
Discharge: HOME | End: 2025-03-18
Payer: MEDICARE

## 2025-03-18 DIAGNOSIS — C34.11 MALIGNANT NEOPLASM OF UPPER LOBE OF RIGHT LUNG: ICD-10-CM

## 2025-03-18 PROCEDURE — 70553 MRI BRAIN STEM W/O & W/DYE: CPT | Mod: 26,,, | Performed by: RADIOLOGY

## 2025-03-18 PROCEDURE — 70553 MRI BRAIN STEM W/O & W/DYE: CPT | Mod: TC,PO

## 2025-03-18 PROCEDURE — A9585 GADOBUTROL INJECTION: HCPCS | Mod: PO

## 2025-03-18 PROCEDURE — 25500020 PHARM REV CODE 255: Mod: PO

## 2025-03-18 RX ORDER — GADOBUTROL 604.72 MG/ML
9 INJECTION INTRAVENOUS
Status: COMPLETED | OUTPATIENT
Start: 2025-03-18 | End: 2025-03-18

## 2025-03-18 RX ADMIN — GADOBUTROL 9 ML: 604.72 INJECTION INTRAVENOUS at 02:03

## 2025-03-19 PROBLEM — C79.31 METASTASIS TO BRAIN: Status: ACTIVE | Noted: 2025-03-19

## 2025-03-21 ENCOUNTER — HOSPITAL ENCOUNTER (OUTPATIENT)
Dept: RADIOLOGY | Facility: HOSPITAL | Age: 65
Discharge: HOME | End: 2025-03-21
Payer: MEDICARE

## 2025-03-21 DIAGNOSIS — C34.11 MALIGNANT NEOPLASM OF UPPER LOBE OF RIGHT LUNG: ICD-10-CM

## 2025-03-21 LAB — GLUCOSE SERPL-MCNC: 117 MG/DL (ref 70–110)

## 2025-03-21 PROCEDURE — 78815 PET IMAGE W/CT SKULL-THIGH: CPT | Mod: 26,PS,, | Performed by: RADIOLOGY

## 2025-03-21 PROCEDURE — 78815 PET IMAGE W/CT SKULL-THIGH: CPT | Mod: TC,PN

## 2025-03-21 PROCEDURE — A9552 F18 FDG: HCPCS | Mod: PN

## 2025-03-21 RX ORDER — FLUDEOXYGLUCOSE F18 500 MCI/ML
12 INJECTION INTRAVENOUS
Status: COMPLETED | OUTPATIENT
Start: 2025-03-21 | End: 2025-03-21

## 2025-03-21 RX ADMIN — FLUDEOXYGLUCOSE F-18 13.7 MILLICURIE: 500 INJECTION INTRAVENOUS at 08:03

## 2025-03-21 NOTE — PROGRESS NOTES
PET Imaging Questionnaire    Are you a Diabetic? Recent Blood Sugar level? Yes    Are you anemic? Bone Marrow Stimulation Meds? No    Have you had a CT Scan, if so when & where was your last one? Yes -     Have you had a PET Scan, if so when & where was your last one? Yes -     Chemotherapy or currently on Chemotherapy? Yes    Radiation therapy? No    Surgical History:   Past Surgical History:   Procedure Laterality Date    APPENDECTOMY      COLONOSCOPY N/A 01/18/2019    Procedure: COLONOSCOPY;  Surgeon: Lai Cardoso MD;  Location: [facility];  Service: Endoscopy;  Laterality: N/A;    COLONOSCOPY N/A 12/20/2023    Procedure: COLONOSCOPY;  Surgeon: Jose Alanis MD;  Location: [facility];  Service: Colon and Rectal;  Laterality: N/A;    ENDOBRONCHIAL ULTRASOUND N/A 05/03/2023    Procedure: ENDOBRONCHIAL ULTRASOUND (EBUS);  Surgeon: Nahun Nguyen MD;  Location: [facility] OR;  Service: Pulmonary;  Laterality: N/A;    ENDOBRONCHIAL ULTRASOUND Bilateral 02/27/2024    Procedure: ENDOBRONCHIAL ULTRASOUND (EBUS);  Surgeon: Nahun Nguyen MD;  Location: [facility] OR;  Service: Pulmonary;  Laterality: Bilateral;    ESOPHAGOGASTRODUODENOSCOPY N/A 02/19/2021    Procedure: ESOPHAGOGASTRODUODENOSCOPY (EGD);  Surgeon: Lai Cardoso MD;  Location: [facility];  Service: Endoscopy;  Laterality: N/A;    INSERTION OF TUNNELED CENTRAL VENOUS CATHETER (CVC) WITH SUBCUTANEOUS PORT Right 05/22/2023    Procedure: [id number];  Surgeon: Ayah Aldana MD;  Location: [facility];  Service: General;  Laterality: Right;    TONSILLECTOMY          Have you been fasting for at least 6 hours? Yes    Is there any chance you may be pregnant or breastfeeding? No    Assay: 15.6 MCi@:833   Injection Site:lt ac     Residual: 1.94 mCi@: 835   Technologist: Elba Stone Injected:13.7mCi

## 2025-03-26 ENCOUNTER — LAB VISIT (OUTPATIENT)
Dept: LAB | Facility: HOSPITAL | Age: 65
End: 2025-03-26
Payer: MEDICARE

## 2025-03-26 DIAGNOSIS — C34.11 MALIGNANT NEOPLASM OF UPPER LOBE OF RIGHT LUNG: Primary | ICD-10-CM

## 2025-03-26 DIAGNOSIS — T45.1X5A ANEMIA DUE TO CHEMOTHERAPY: ICD-10-CM

## 2025-03-26 DIAGNOSIS — R53.83 FATIGUE, UNSPECIFIED TYPE: ICD-10-CM

## 2025-03-26 DIAGNOSIS — T45.1X5A AGRANULOCYTOSIS SECONDARY TO CANCER CHEMOTHERAPY: ICD-10-CM

## 2025-03-26 DIAGNOSIS — D50.9 IRON DEFICIENCY ANEMIA, UNSPECIFIED: ICD-10-CM

## 2025-03-26 DIAGNOSIS — C34.90 MALIGNANT NEOPLASM OF BRONCHUS AND LUNG: ICD-10-CM

## 2025-03-26 DIAGNOSIS — D70.1 AGRANULOCYTOSIS SECONDARY TO CANCER CHEMOTHERAPY: ICD-10-CM

## 2025-03-26 DIAGNOSIS — D64.81 ANEMIA DUE TO CHEMOTHERAPY: ICD-10-CM

## 2025-03-26 LAB
ABSOLUTE EOSINOPHIL (OHS): 0.04 K/UL
ABSOLUTE MONOCYTE (OHS): 0.45 K/UL (ref 0.3–1)
ABSOLUTE NEUTROPHIL COUNT (OHS): 3.02 K/UL (ref 1.8–7.7)
ALBUMIN SERPL BCP-MCNC: 3.5 G/DL (ref 3.5–5.2)
ALP SERPL-CCNC: 91 UNIT/L (ref 40–150)
ALT SERPL W/O P-5'-P-CCNC: 36 UNIT/L (ref 10–44)
ANION GAP (OHS): 12 MMOL/L (ref 8–16)
AST SERPL-CCNC: 22 UNIT/L (ref 11–45)
BASOPHILS # BLD AUTO: 0.01 K/UL
BASOPHILS NFR BLD AUTO: 0.2 %
BILIRUB SERPL-MCNC: 0.3 MG/DL (ref 0.1–1)
BUN SERPL-MCNC: 9 MG/DL (ref 8–23)
CALCIUM SERPL-MCNC: 9.2 MG/DL (ref 8.7–10.5)
CHLORIDE SERPL-SCNC: 102 MMOL/L (ref 95–110)
CO2 SERPL-SCNC: 21 MMOL/L (ref 23–29)
CREAT SERPL-MCNC: 0.8 MG/DL (ref 0.5–1.4)
ERYTHROCYTE [DISTWIDTH] IN BLOOD BY AUTOMATED COUNT: 13.3 % (ref 11.5–14.5)
GFR SERPLBLD CREATININE-BSD FMLA CKD-EPI: >60 ML/MIN/1.73/M2
GLUCOSE SERPL-MCNC: 88 MG/DL (ref 70–110)
HCT VFR BLD AUTO: 40.4 % (ref 37–48.5)
HGB BLD-MCNC: 12.9 GM/DL (ref 12–16)
IMM GRANULOCYTES # BLD AUTO: 0.01 K/UL (ref 0–0.04)
IMM GRANULOCYTES NFR BLD AUTO: 0.2 % (ref 0–0.5)
LYMPHOCYTES # BLD AUTO: 1.11 K/UL (ref 1–4.8)
MAGNESIUM SERPL-MCNC: 2 MG/DL (ref 1.6–2.6)
MCH RBC QN AUTO: 28.8 PG (ref 27–50)
MCHC RBC AUTO-ENTMCNC: 31.9 G/DL (ref 32–36)
MCV RBC AUTO: 90 FL (ref 82–98)
NUCLEATED RBC (/100WBC) (OHS): 0 /100 WBC
PLATELET # BLD AUTO: 254 K/UL (ref 150–450)
PMV BLD AUTO: 9.6 FL (ref 9.2–12.9)
POTASSIUM SERPL-SCNC: 3.8 MMOL/L (ref 3.5–5.1)
PROT SERPL-MCNC: 7.1 GM/DL (ref 6–8.4)
RBC # BLD AUTO: 4.48 M/UL (ref 4–5.4)
RELATIVE EOSINOPHIL (OHS): 0.9 %
RELATIVE LYMPHOCYTE (OHS): 23.9 % (ref 18–48)
RELATIVE MONOCYTE (OHS): 9.7 % (ref 4–15)
RELATIVE NEUTROPHIL (OHS): 65.1 % (ref 38–73)
SODIUM SERPL-SCNC: 135 MMOL/L (ref 136–145)
T4 SERPL-MCNC: 7.5 UG/DL (ref 4.5–11.5)
TSH SERPL-ACNC: 0.41 UIU/ML (ref 0.4–4)
WBC # BLD AUTO: 4.64 K/UL (ref 3.9–12.7)

## 2025-03-26 PROCEDURE — 80053 COMPREHEN METABOLIC PANEL: CPT

## 2025-03-26 PROCEDURE — 36415 COLL VENOUS BLD VENIPUNCTURE: CPT | Mod: PN

## 2025-03-26 PROCEDURE — 85025 COMPLETE CBC W/AUTO DIFF WBC: CPT

## 2025-03-26 PROCEDURE — 84443 ASSAY THYROID STIM HORMONE: CPT

## 2025-03-26 PROCEDURE — 84436 ASSAY OF TOTAL THYROXINE: CPT

## 2025-03-26 PROCEDURE — 83735 ASSAY OF MAGNESIUM: CPT

## 2025-03-31 ENCOUNTER — OFFICE VISIT (OUTPATIENT)
Dept: FAMILY MEDICINE | Facility: CLINIC | Age: 65
End: 2025-03-31
Payer: MEDICARE

## 2025-03-31 VITALS
HEART RATE: 94 BPM | SYSTOLIC BLOOD PRESSURE: 118 MMHG | WEIGHT: 219.13 LBS | BODY MASS INDEX: 31.37 KG/M2 | OXYGEN SATURATION: 96 % | HEIGHT: 70 IN | DIASTOLIC BLOOD PRESSURE: 70 MMHG

## 2025-03-31 DIAGNOSIS — Z00.00 ROUTINE GENERAL MEDICAL EXAMINATION AT A HEALTH CARE FACILITY: ICD-10-CM

## 2025-03-31 DIAGNOSIS — C79.31 METASTASIS TO BRAIN: ICD-10-CM

## 2025-03-31 DIAGNOSIS — R53.83 FATIGUE, UNSPECIFIED TYPE: ICD-10-CM

## 2025-03-31 DIAGNOSIS — J30.9 ALLERGIC RHINITIS, UNSPECIFIED SEASONALITY, UNSPECIFIED TRIGGER: ICD-10-CM

## 2025-03-31 DIAGNOSIS — F41.1 GAD (GENERALIZED ANXIETY DISORDER): ICD-10-CM

## 2025-03-31 DIAGNOSIS — Z95.828 PORT-A-CATH IN PLACE: ICD-10-CM

## 2025-03-31 DIAGNOSIS — E11.9 TYPE 2 DIABETES MELLITUS WITHOUT COMPLICATION, WITHOUT LONG-TERM CURRENT USE OF INSULIN: ICD-10-CM

## 2025-03-31 DIAGNOSIS — R63.5 WEIGHT GAIN: Primary | ICD-10-CM

## 2025-03-31 PROCEDURE — 99999 PR PBB SHADOW E&M-EST. PATIENT-LVL V: CPT | Mod: PBBFAC,,, | Performed by: FAMILY MEDICINE

## 2025-03-31 PROCEDURE — G2211 COMPLEX E/M VISIT ADD ON: HCPCS | Mod: S$PBB,,, | Performed by: FAMILY MEDICINE

## 2025-03-31 PROCEDURE — 99215 OFFICE O/P EST HI 40 MIN: CPT | Mod: PBBFAC,PN | Performed by: FAMILY MEDICINE

## 2025-03-31 PROCEDURE — 99214 OFFICE O/P EST MOD 30 MIN: CPT | Mod: S$PBB,,, | Performed by: FAMILY MEDICINE

## 2025-03-31 RX ORDER — LIDOCAINE 50 MG/G
OINTMENT TOPICAL
Qty: 50 G | Refills: 5 | Status: SHIPPED | OUTPATIENT
Start: 2025-03-31

## 2025-03-31 RX ORDER — DULOXETIN HYDROCHLORIDE 30 MG/1
30 CAPSULE, DELAYED RELEASE ORAL DAILY
Qty: 30 CAPSULE | Refills: 11 | Status: SHIPPED | OUTPATIENT
Start: 2025-03-31 | End: 2026-03-31

## 2025-03-31 RX ORDER — MUPIROCIN 20 MG/G
OINTMENT TOPICAL 2 TIMES DAILY
Qty: 30 G | Refills: 1 | Status: SHIPPED | OUTPATIENT
Start: 2025-03-31

## 2025-03-31 RX ORDER — MINERAL OIL
180 ENEMA (ML) RECTAL DAILY
Qty: 90 TABLET | Refills: 3 | Status: SHIPPED | OUTPATIENT
Start: 2025-03-31 | End: 2026-03-31

## 2025-03-31 NOTE — PROGRESS NOTES
"    Chief Complaint:  Chief Complaint   Patient presents with    Follow-up        HPI:  Malathi is a 64 y.o. year old     History of Present Illness    CHIEF COMPLAINT:  Malathi presents today for follow up of cancer treatment    ONCOLOGY:  She has a 5mm brain lesion, described as small as a pencil eraser. The rest of her body is clear of cancer. She experiences headaches and word-finding difficulties. She notes a slight film over her right eye but denies significant vision impairment.    NEUROPATHY:  She experiences neuropathy in her feet, describing it as a "cushioning" sensation rather than loss of feeling. She is prescribed gabapentin 3 times daily but reports incomplete adherence due to fatigue side effects.    MENTAL HEALTH:  She reports emotional distress with frequent crying episodes at home due to stress, though not throughout the entire day. She currently takes Wellbutrin and Trazodone, and has previous history of Celexa use.    CARDIAC:  Echocardiogram showed normal ventricular size, pressures, and EF. Valves appear normal with trace regurgitation.    LABS:  Chemistry panel, liver function tests, and thyroid studies were normal. B12 and iron studies from December were within normal limits.      ROS:  General: +fatigue  Eyes: +vision changes, +blurry vision  ENT: +nasal discharge  Neurological: +headache, +numbness, +tingling, +memory loss  Head: +head pain           Review of Systems   Constitutional:  Positive for fatigue and unexpected weight change. Negative for activity change.   HENT:  Positive for hearing loss. Negative for rhinorrhea and trouble swallowing.    Eyes:  Positive for visual disturbance. Negative for discharge.   Respiratory:  Negative for chest tightness and wheezing.    Cardiovascular:  Negative for chest pain and palpitations.   Gastrointestinal:  Negative for blood in stool, constipation, diarrhea and vomiting.   Endocrine: Negative for polydipsia and polyuria.   Genitourinary:  " "Negative for difficulty urinating, dysuria, hematuria and menstrual problem.   Musculoskeletal:  Positive for neck pain. Negative for arthralgias and joint swelling.   Neurological:  Positive for headaches. Negative for weakness.   Psychiatric/Behavioral:  Positive for confusion and dysphoric mood.          Past Medical History:  Past Medical History:   Diagnosis Date    Abnormal stress test     Bronchitis     COPD (chronic obstructive pulmonary disease)     Diabetes mellitus, type 2     GERD (gastroesophageal reflux disease)     HLD (hyperlipidemia)     Hx of endoscopy     Hypertension     Lung cancer 05/2023    Mental disorder     Pulmonary nodule 04/09/2018    SOB (shortness of breath)     resolved    Thyroid disease     Tobacco abuse 12/26/2012    Tobacco use 12/26/2012         Vitals:  Vitals:    03/31/25 1132   BP: 118/70   Pulse: 94   SpO2: 96%   Weight: 99.4 kg (219 lb 2.2 oz)   Height: 5' 10" (1.778 m)   PainSc:   5       BP Readings from Last 5 Encounters:   03/31/25 118/70   02/28/25 126/80   01/07/25 126/72   01/03/25 (!) 135/97   12/27/24 (!) 131/50       The 10-year ASCVD risk score (Andrew ONEAL, et al., 2019) is: 11%    Values used to calculate the score:      Age: 64 years      Sex: Female      Is Non- : No      Diabetic: Yes      Tobacco smoker: No      Systolic Blood Pressure: 118 mmHg      Is BP treated: Yes      HDL Cholesterol: 57 mg/dL      Total Cholesterol: 176 mg/dL      Physical Exam:  Physical Exam  Vitals and nursing note reviewed.   Constitutional:       General: She is not in acute distress.     Appearance: Normal appearance. She is well-developed.   HENT:      Head: Normocephalic and atraumatic.   Eyes:      General: No scleral icterus.        Right eye: No discharge.         Left eye: No discharge.      Conjunctiva/sclera: Conjunctivae normal.   Cardiovascular:      Rate and Rhythm: Normal rate.   Pulmonary:      Effort: Pulmonary effort is normal. No respiratory " distress.   Abdominal:      Palpations: Abdomen is soft.      Tenderness: There is no guarding.   Musculoskeletal:         General: No deformity or signs of injury.      Cervical back: Neck supple.   Skin:     General: Skin is warm and dry.      Findings: No rash.   Neurological:      General: No focal deficit present.      Mental Status: She is alert and oriented to person, place, and time.   Psychiatric:         Mood and Affect: Mood normal.         Behavior: Behavior normal.             Assessment & Plan:  Weight gain  Comments:  try slow increase to 16 or 18hr fasts during the daytime, no change to current carb/protein ratios  ensure adequate water intake, avoid hypoglycemia  Orders:  -     Hemoglobin A1C; Future; Expected date: 03/31/2025  -     Insulin, Random; Future; Expected date: 03/31/2025    Metastasis to brain  Comments:  radtx per oncology    Type 2 diabetes mellitus without complication, without long-term current use of insulin  -     Hemoglobin A1C; Future; Expected date: 03/31/2025    JEAN CLAUDE (generalized anxiety disorder)  -     DULoxetine (CYMBALTA) 30 MG capsule; Take 1 capsule (30 mg total) by mouth once daily.  Dispense: 30 capsule; Refill: 11    Allergic rhinitis, unspecified seasonality, unspecified trigger  -     fexofenadine (ALLEGRA) 180 MG tablet; Take 1 tablet (180 mg total) by mouth once daily.  Dispense: 90 tablet; Refill: 3    Port-A-Cath in place  Comments:  topical lidocaine as needed  Orders:  -     LIDOcaine (XYLOCAINE) 5 % Oint ointment; Apply topically as needed (before port use).  Dispense: 50 g; Refill: 5    Routine general medical examination at a health care facility  -     mupirocin (BACTROBAN) 2 % ointment; Apply topically 2 (two) times daily.  Dispense: 30 g; Refill: 1    Fatigue, unspecified type         Assessment & Plan    ORDERS:   Ordered insulin level with next set of labs.    IMPRESSION:  Assessed weight management challenges, considering impact of steroids and fluids  from cancer treatments.  Evaluated recent lab results, noting normal glucose levels despite previous fluctuations.  Reviewed echocardiogram results, confirming normal cardiac function.  Considered intermittent fasting as an initial approach for weight management, given current eating schedule.  Plan to adjust steroid and fluid dosages during next cancer treatment: decreased steroid dose to half and decreased IV fluid volume to half to mitigate side effects while maintaining therapeutic benefit.  Evaluated B12 and iron levels, determining no current supplementation needed.    PLAN SUMMARY:   Order insulin level with next labs   Continue lidocaine ointment   Start Cymbalta at low dose, replacing Celexa   Increase gabapentin to 3 times daily dosing   Continue Wellbutrin for mood management   Continue Trazodone and/or Lunesta for sleep   Follow up in 2-3 months    PATIENT EDUCATION:   Explained concept of intermittent fasting, including how sleeping hours count towards fasting time and potential benefits of reducing sugar intake for cancer patients.   Explained the relationship between stress and overall health in the context of cancer treatment.    ACTION ITEMS/LIFESTYLE:   Malathi to gradually increase fasting period to 16-18 hours, starting by pushing breakfast time back in small increments.   Malathi to maintain adequate water intake during fasting periods.   Malathi to monitor for signs of low glucose (dizziness, lightheadedness) when extending fasting period and adjust as needed.    MEDICATIONS:   Started Cymbalta at a low dose, replacing previous antidepressant (Celexa), to potentially address weight gain and neuropathic pain.   Increased gabapentin to 3 times daily dosing.   Continued Wellbutrin for mood management.   Continued Trazodone and/or Lunesta for sleep.   Continued lidocaine ointment.    FOLLOW UP:   Follow up in 2-3 months.         Visit today included increased complexity associated with the care of the  episodic problem lung ca addressed and managing the longitudinal care of the patient due to the serious and/or complex managed problem(s) fatigue, neuropathy, weight gain.    This note was generated with the assistance of ambient listening technology. Verbal consent was obtained by the patient and accompanying visitor(s) for the recording of patient appointment to facilitate this note. I attest to having reviewed and edited the generated note for accuracy, though some syntax or spelling errors may persist. Please contact the author of this note for any clarification.

## 2025-04-09 ENCOUNTER — LAB VISIT (OUTPATIENT)
Dept: LAB | Facility: HOSPITAL | Age: 65
End: 2025-04-09
Payer: MEDICARE

## 2025-04-09 DIAGNOSIS — D50.9 IRON DEFICIENCY ANEMIA, UNSPECIFIED: ICD-10-CM

## 2025-04-09 DIAGNOSIS — R53.83 FATIGUE: ICD-10-CM

## 2025-04-09 LAB
ABSOLUTE EOSINOPHIL (OHS): 0.05 K/UL
ABSOLUTE MONOCYTE (OHS): 0.56 K/UL (ref 0.3–1)
ABSOLUTE NEUTROPHIL COUNT (OHS): 2.6 K/UL (ref 1.8–7.7)
ALBUMIN SERPL BCP-MCNC: 3.5 G/DL (ref 3.5–5.2)
ALP SERPL-CCNC: 90 UNIT/L (ref 40–150)
ALT SERPL W/O P-5'-P-CCNC: 33 UNIT/L (ref 10–44)
ANION GAP (OHS): 8 MMOL/L (ref 8–16)
AST SERPL-CCNC: 18 UNIT/L (ref 11–45)
BASOPHILS # BLD AUTO: 0.01 K/UL
BASOPHILS NFR BLD AUTO: 0.2 %
BILIRUB SERPL-MCNC: 0.3 MG/DL (ref 0.1–1)
BUN SERPL-MCNC: 10 MG/DL (ref 8–23)
CALCIUM SERPL-MCNC: 9.1 MG/DL (ref 8.7–10.5)
CHLORIDE SERPL-SCNC: 104 MMOL/L (ref 95–110)
CO2 SERPL-SCNC: 27 MMOL/L (ref 23–29)
CREAT SERPL-MCNC: 0.9 MG/DL (ref 0.5–1.4)
ERYTHROCYTE [DISTWIDTH] IN BLOOD BY AUTOMATED COUNT: 13.5 % (ref 11.5–14.5)
GFR SERPLBLD CREATININE-BSD FMLA CKD-EPI: >60 ML/MIN/1.73/M2
GLUCOSE SERPL-MCNC: 87 MG/DL (ref 70–110)
HCT VFR BLD AUTO: 42.2 % (ref 37–48.5)
HGB BLD-MCNC: 13 GM/DL (ref 12–16)
IMM GRANULOCYTES # BLD AUTO: 0.02 K/UL (ref 0–0.04)
IMM GRANULOCYTES NFR BLD AUTO: 0.5 % (ref 0–0.5)
LYMPHOCYTES # BLD AUTO: 1.16 K/UL (ref 1–4.8)
MCH RBC QN AUTO: 27.8 PG (ref 27–31)
MCHC RBC AUTO-ENTMCNC: 30.8 G/DL (ref 32–36)
MCV RBC AUTO: 90 FL (ref 82–98)
NUCLEATED RBC (/100WBC) (OHS): 0 /100 WBC
PLATELET # BLD AUTO: 258 K/UL (ref 150–450)
PMV BLD AUTO: 9.6 FL (ref 9.2–12.9)
POTASSIUM SERPL-SCNC: 3.7 MMOL/L (ref 3.5–5.1)
PROT SERPL-MCNC: 7 GM/DL (ref 6–8.4)
RBC # BLD AUTO: 4.67 M/UL (ref 4–5.4)
RELATIVE EOSINOPHIL (OHS): 1.1 %
RELATIVE LYMPHOCYTE (OHS): 26.4 % (ref 18–48)
RELATIVE MONOCYTE (OHS): 12.7 % (ref 4–15)
RELATIVE NEUTROPHIL (OHS): 59.1 % (ref 38–73)
SODIUM SERPL-SCNC: 139 MMOL/L (ref 136–145)
WBC # BLD AUTO: 4.4 K/UL (ref 3.9–12.7)

## 2025-04-09 PROCEDURE — 80053 COMPREHEN METABOLIC PANEL: CPT

## 2025-04-09 PROCEDURE — 85025 COMPLETE CBC W/AUTO DIFF WBC: CPT

## 2025-04-09 PROCEDURE — 36415 COLL VENOUS BLD VENIPUNCTURE: CPT | Mod: PN

## 2025-04-16 ENCOUNTER — OFFICE VISIT (OUTPATIENT)
Dept: NEUROSURGERY | Facility: CLINIC | Age: 65
End: 2025-04-16
Payer: MEDICARE

## 2025-04-16 ENCOUNTER — LAB VISIT (OUTPATIENT)
Dept: LAB | Facility: HOSPITAL | Age: 65
End: 2025-04-16
Payer: MEDICARE

## 2025-04-16 VITALS
WEIGHT: 219.13 LBS | SYSTOLIC BLOOD PRESSURE: 124 MMHG | HEART RATE: 91 BPM | DIASTOLIC BLOOD PRESSURE: 77 MMHG | BODY MASS INDEX: 31.37 KG/M2 | RESPIRATION RATE: 18 BRPM | HEIGHT: 70 IN

## 2025-04-16 DIAGNOSIS — R53.83 OTHER FATIGUE: ICD-10-CM

## 2025-04-16 DIAGNOSIS — T45.1X5A ANEMIA DUE TO ANTINEOPLASTIC CHEMOTHERAPY: ICD-10-CM

## 2025-04-16 DIAGNOSIS — D64.81 ANEMIA DUE TO ANTINEOPLASTIC CHEMOTHERAPY: ICD-10-CM

## 2025-04-16 DIAGNOSIS — D50.9 IRON DEFICIENCY ANEMIA, UNSPECIFIED: ICD-10-CM

## 2025-04-16 DIAGNOSIS — C34.11 MALIGNANT NEOPLASM OF UPPER LOBE, RIGHT BRONCHUS OR LUNG: Primary | ICD-10-CM

## 2025-04-16 DIAGNOSIS — Q28.2 ARTERIOVENOUS MALFORMATION OF CEREBRAL VESSELS: Primary | ICD-10-CM

## 2025-04-16 DIAGNOSIS — D70.1 AGRANULOCYTOSIS SECONDARY TO CANCER CHEMOTHERAPY: ICD-10-CM

## 2025-04-16 DIAGNOSIS — C34.90 MALIGNANT NEOPLASM OF BRONCHUS AND LUNG: ICD-10-CM

## 2025-04-16 DIAGNOSIS — T45.1X5A AGRANULOCYTOSIS SECONDARY TO CANCER CHEMOTHERAPY: ICD-10-CM

## 2025-04-16 LAB
ABSOLUTE EOSINOPHIL (OHS): 0.05 K/UL
ABSOLUTE MONOCYTE (OHS): 0.41 K/UL (ref 0.3–1)
ABSOLUTE NEUTROPHIL COUNT (OHS): 2.45 K/UL (ref 1.8–7.7)
ALBUMIN SERPL BCP-MCNC: 3.6 G/DL (ref 3.5–5.2)
ALP SERPL-CCNC: 97 UNIT/L (ref 40–150)
ALT SERPL W/O P-5'-P-CCNC: 37 UNIT/L (ref 10–44)
ANION GAP (OHS): 11 MMOL/L (ref 8–16)
AST SERPL-CCNC: 22 UNIT/L (ref 11–45)
BASOPHILS # BLD AUTO: 0.01 K/UL
BASOPHILS NFR BLD AUTO: 0.2 %
BILIRUB SERPL-MCNC: 0.4 MG/DL (ref 0.1–1)
BUN SERPL-MCNC: 7 MG/DL (ref 8–23)
CALCIUM SERPL-MCNC: 9.3 MG/DL (ref 8.7–10.5)
CHLORIDE SERPL-SCNC: 104 MMOL/L (ref 95–110)
CO2 SERPL-SCNC: 25 MMOL/L (ref 23–29)
CREAT SERPL-MCNC: 1 MG/DL (ref 0.5–1.4)
ERYTHROCYTE [DISTWIDTH] IN BLOOD BY AUTOMATED COUNT: 13.4 % (ref 11.5–14.5)
GFR SERPLBLD CREATININE-BSD FMLA CKD-EPI: >60 ML/MIN/1.73/M2
GLUCOSE SERPL-MCNC: 120 MG/DL (ref 70–110)
HCT VFR BLD AUTO: 41.1 % (ref 37–48.5)
HGB BLD-MCNC: 12.7 GM/DL (ref 12–16)
IMM GRANULOCYTES # BLD AUTO: 0.01 K/UL (ref 0–0.04)
IMM GRANULOCYTES NFR BLD AUTO: 0.2 % (ref 0–0.5)
LYMPHOCYTES # BLD AUTO: 1.19 K/UL (ref 1–4.8)
MCH RBC QN AUTO: 27.9 PG (ref 27–31)
MCHC RBC AUTO-ENTMCNC: 30.9 G/DL (ref 32–36)
MCV RBC AUTO: 90 FL (ref 82–98)
NUCLEATED RBC (/100WBC) (OHS): 0 /100 WBC
PLATELET # BLD AUTO: 253 K/UL (ref 150–450)
PMV BLD AUTO: 10 FL (ref 9.2–12.9)
POTASSIUM SERPL-SCNC: 3.9 MMOL/L (ref 3.5–5.1)
PROT SERPL-MCNC: 7.1 GM/DL (ref 6–8.4)
RBC # BLD AUTO: 4.55 M/UL (ref 4–5.4)
RELATIVE EOSINOPHIL (OHS): 1.2 %
RELATIVE LYMPHOCYTE (OHS): 28.9 % (ref 18–48)
RELATIVE MONOCYTE (OHS): 10 % (ref 4–15)
RELATIVE NEUTROPHIL (OHS): 59.5 % (ref 38–73)
SODIUM SERPL-SCNC: 140 MMOL/L (ref 136–145)
WBC # BLD AUTO: 4.12 K/UL (ref 3.9–12.7)

## 2025-04-16 PROCEDURE — 80053 COMPREHEN METABOLIC PANEL: CPT

## 2025-04-16 PROCEDURE — 36415 COLL VENOUS BLD VENIPUNCTURE: CPT | Mod: PN

## 2025-04-16 PROCEDURE — 85025 COMPLETE CBC W/AUTO DIFF WBC: CPT

## 2025-04-16 PROCEDURE — 99215 OFFICE O/P EST HI 40 MIN: CPT | Mod: S$PBB,,, | Performed by: NEUROLOGICAL SURGERY

## 2025-04-16 NOTE — PROGRESS NOTES
"Neurosurgery History & Physical    Patient ID: Malathi Martinez is a 64 y.o. female.    Chief Complaint   Patient presents with    Headache     Patient present to clinic with an aneurysm.  Frequent headaches.  Reports of having a "film" over right eye.       HPI:  Ms. Martinez is a 63yo female with a history of lung cancer diagnosed in 5/2023 who was recently found to have an enhancing lesion in the left parietal lobe.  She underwent Gamma Knife radiation at [facility] in [place].  During workup for her radiation she was told she had a brain aneurysm.  The Nurse Practitioner with [facility] sent her here for aneurysm evaluation.      Review of Systems   Constitutional:  Negative for activity change and fever.   Eyes:  Negative for visual disturbance.   Respiratory:  Negative for shortness of breath.    Cardiovascular:  Negative for chest pain.   Gastrointestinal:  Negative for nausea and vomiting.   Endocrine: Negative for cold intolerance, heat intolerance, polydipsia, polyphagia and polyuria.   Genitourinary:  Negative for decreased urine volume, difficulty urinating and frequency.   Musculoskeletal:  Negative for back pain, gait problem and neck pain.   Neurological:  Negative for dizziness, tremors, seizures, syncope, facial asymmetry, speech difficulty, weakness, light-headedness, numbness and headaches.   Psychiatric/Behavioral:  Negative for confusion.        Past Medical History:   Diagnosis Date    Abnormal stress test     Bronchitis     COPD (chronic obstructive pulmonary disease)     Diabetes mellitus, type 2     GERD (gastroesophageal reflux disease)     HLD (hyperlipidemia)     Hx of endoscopy     Hypertension     Lung cancer 05/2023    Mental disorder     Pulmonary nodule 04/09/2018    SOB (shortness of breath)     resolved    Thyroid disease     Tobacco abuse 12/26/2012    Tobacco use 12/26/2012     Social History[1]  Family History   Problem Relation Name Age of Onset    Heart disease Mother          " "MVP    Cataracts Mother      Heart disease Father  65        MI and PVD    Throat cancer Father      Heart disease Sister          mvp    No Known Problems Sister      No Known Problems Sister      Esophageal cancer Brother      Hyperlipidemia Paternal Grandmother      Glaucoma Neg Hx      Macular degeneration Neg Hx       Review of patient's allergies indicates:   Allergen Reactions    Penicillins Hives     Current Medications[2]  Blood pressure 124/77, pulse 91, resp. rate 18, height 5' 10" (1.778 m), weight 99.4 kg (219 lb 2.2 oz), last menstrual period 09/10/2012.      Neurological Exam  Mental Status   Oriented to person, place, and time. Speech is normal.    Cranial Nerves  CN II: Visual fields full to confrontation.  CN III, IV, VI: Extraocular movements intact bilaterally. Pupils equal round and reactive to light bilaterally.  CN V: Facial sensation is normal.    Motor   Normal muscle tone. Strength is 5/5 throughout all four extremities.    Sensory  Light touch is normal in upper and lower extremities.     Gait   Normal gait.      Physical Exam  Eyes:      Extraocular Movements: EOM normal.      Pupils: Pupils are equal, round, and reactive to light.   Neurological:      Mental Status: She is oriented to person, place, and time.      Motor: Motor strength is normal.     Gait: Gait is intact.   Psychiatric:         Speech: Speech normal.         Imaging:  MRI brain with and without contrast is personally reviewed and discussed with the patient.  Possible MCA aneurysm is noted.      Assessment/Plan:   Ms. Martinez is a 64 year old female with possible incidental MCA aneurysm    We will obtain dedicated vascular imaging and bring her back to review.    Discussed natural history of unruptured intracranial aneurysm as well as overview of indications for treatment and treatment jerry    I spent a total of 45 minutes on the day of the visit.This includes face to face time and non-face to face time preparing to " see the patient (eg, review of tests), obtaining and/or reviewing separately obtained history, documenting clinical information in the electronic or other health record, independently interpreting results and communicating results to the patient/family/caregiver, or care coordinator.           [1]   Social History  Socioeconomic History    Marital status:    Tobacco Use    Smoking status: Former     Current packs/day: 0.00     Average packs/day: 0.8 packs/day for 41.0 years (30.8 ttl pk-yrs)     Types: Cigarettes     Start date: 1982     Quit date: 2023     Years since quittin.9    Smokeless tobacco: Never   Substance and Sexual Activity    Alcohol use: No    Drug use: No    Sexual activity: Yes     Partners: Male     Birth control/protection: Post-menopausal     Social Drivers of Health     Financial Resource Strain: Low Risk  (2025)    Overall Financial Resource Strain (CARDIA)     Difficulty of Paying Living Expenses: Not very hard   Food Insecurity: No Food Insecurity (2025)    Hunger Vital Sign     Worried About Running Out of Food in the Last Year: Never true     Ran Out of Food in the Last Year: Never true   Transportation Needs: No Transportation Needs (2025)    TRANSPORTATION NEEDS     Transportation : No   Physical Activity: Inactive (2024)    Exercise Vital Sign     Days of Exercise per Week: 0 days     Minutes of Exercise per Session: 10 min   Stress: Stress Concern Present (2025)    [employer] - Occupational Stress Questionnaire     Feeling of Stress : To some extent   Housing Stability: Unknown (2025)    Housing Stability Vital Sign     Unable to Pay for Housing in the Last Year: No     Homeless in the Last Year: No   [2]   Current Outpatient Medications:     albuterol (VENTOLIN HFA) 90 mcg/actuation inhaler, Inhale 2 puffs into the lungs every 6 (six) hours as needed for Wheezing or Shortness of Breath. Rescue, Disp: 18 g, Rfl:  5    ALPRAZolam (XANAX) 0.25 MG tablet, Take 1 tablet (0.25 mg total) by mouth 2 (two) times daily as needed for Anxiety., Disp: 60 tablet, Rfl: 3    atorvastatin (LIPITOR) 20 MG tablet, Take 1 tablet (20 mg total) by mouth once daily., Disp: 90 tablet, Rfl: 3    B-complex with vitamin C (Z-BEC OR EQUIV) tablet, Take 1 tablet by mouth once daily., Disp: , Rfl:     buPROPion (WELLBUTRIN XL) 150 MG TB24 tablet, TAKE 2 TABLETS(300 MG) BY MOUTH EVERY DAY, Disp: 180 tablet, Rfl: 3    cholecalciferol, vitamin D3, 2,000 unit Cap, Take 1 capsule by mouth once daily., Disp: , Rfl:     clotrimazole-betamethasone 1-0.05% (LOTRISONE) cream, Apply topically to affected area 2 (two) times daily., Disp: 45 g, Rfl: 1    cyclobenzaprine (FLEXERIL) 5 MG tablet, Take 1 tablet (5 mg total) by mouth 3 (three) times daily as needed for Muscle spasms., Disp: 90 tablet, Rfl: 1    desloratadine (CLARINEX) 5 mg tablet, TAKE 1 TABLET(5 MG) BY MOUTH EVERY DAY, Disp: 90 tablet, Rfl: 3    DULoxetine (CYMBALTA) 30 MG capsule, Take 1 capsule (30 mg total) by mouth once daily., Disp: 30 capsule, Rfl: 11    eszopiclone (LUNESTA) 2 MG Tab, Take 1 tablet (2 mg total) by mouth every evening., Disp: 30 tablet, Rfl: 5    famotidine (PEPCID) 40 MG tablet, Take 1 tablet (40 mg total) by mouth 2 (two) times daily as needed for Heartburn., Disp: 180 tablet, Rfl: 3    fexofenadine (ALLEGRA) 180 MG tablet, Take 1 tablet (180 mg total) by mouth once daily., Disp: 90 tablet, Rfl: 3    fluticasone-umeclidin-vilanter (TRELEGY ELLIPTA) 100-62.5-25 mcg DsDv, Inhale 1 puff into the lungs once daily., Disp: 60 each, Rfl: 11    furosemide (LASIX) 20 MG tablet, Take 1 tablet (20 mg total) by mouth 2 (two) times daily as needed (swelling)., Disp: 60 tablet, Rfl: 11    gabapentin (NEURONTIN) 300 MG capsule, TAKE 1 CAPSULE(300 MG) BY MOUTH THREE TIMES DAILY, Disp: 90 capsule, Rfl: 11    HYDROcodone-acetaminophen (NORCO) 5-325 mg per tablet, TAKE 1 TABLET BY MOUTH EVERY 4  TO 6 HOURS AS NEEDED FOR FOR PAIN, Disp: , Rfl:     hydrocodone-chlorpheniramine (TUSSIONEX) 10 mg-8 mg/5 mL suspension, Take 5 mLs by mouth nightly as needed for Cough., Disp: 150 mL, Rfl: 0    levothyroxine (SYNTHROID) 100 MCG tablet, Take 1 tablet (100 mcg total) by mouth before breakfast., Disp: 90 tablet, Rfl: 3    LIDOcaine (XYLOCAINE) 5 % Oint ointment, Apply topically as needed (before port use)., Disp: 50 g, Rfl: 5    lisinopriL 10 MG tablet, Take 1 tablet (10 mg total) by mouth every evening., Disp: 90 tablet, Rfl: 3    mupirocin (BACTROBAN) 2 % ointment, Apply topically 2 (two) times daily., Disp: 30 g, Rfl: 1    ondansetron (ZOFRAN-ODT) 8 MG TbDL, DISSOLVE 1 TABLET(8 MG) ON THE TONGUE TWICE DAILY, Disp: 20 tablet, Rfl: 2    polyethylene glycol (GLYCOLAX) 17 gram PwPk, Take 17 g by mouth once daily., Disp: 60 packet, Rfl: 0    potassium chloride SA (K-DUR,KLOR-CON) 20 MEQ tablet, Take 1 tablet (20 mEq total) by mouth 2 (two) times daily., Disp: 60 tablet, Rfl: 11    prochlorperazine (COMPAZINE) 10 MG tablet, Take 10 mg by mouth., Disp: , Rfl:     traZODone (DESYREL) 50 MG tablet, TAKE 2 TABLETS(100 MG) BY MOUTH EVERY EVENING, Disp: 180 tablet, Rfl: 3    albuterol-ipratropium (DUO-NEB) 2.5 mg-0.5 mg/3 mL nebulizer solution, Take 3 mLs by nebulization every 6 (six) hours as needed for Wheezing or Shortness of Breath. Rescue, Disp: 100 mL, Rfl: 11  No current facility-administered medications for this visit.    Facility-Administered Medications Ordered in Other Visits:     dextrose 50% injection 12.5 g, 12.5 g, Intravenous, PRN, Tima Khan MD    dextrose 50% injection 25 g, 25 g, Intravenous, PRN, Tima Khan MD    insulin regular injection 0-8 Units, 0-8 Units, Intravenous, Continuous PRN, Tima Khan MD

## 2025-04-23 ENCOUNTER — LAB VISIT (OUTPATIENT)
Dept: LAB | Facility: HOSPITAL | Age: 65
End: 2025-04-23
Payer: MEDICARE

## 2025-04-23 DIAGNOSIS — T45.1X5A ANEMIA DUE TO ANTINEOPLASTIC CHEMOTHERAPY: ICD-10-CM

## 2025-04-23 DIAGNOSIS — D70.1 AGRANULOCYTOSIS SECONDARY TO CANCER CHEMOTHERAPY (CODE): ICD-10-CM

## 2025-04-23 DIAGNOSIS — C34.90 MALIGNANT NEOPLASM OF BRONCHUS AND LUNG: ICD-10-CM

## 2025-04-23 DIAGNOSIS — R53.83 FATIGUE, UNSPECIFIED TYPE: ICD-10-CM

## 2025-04-23 DIAGNOSIS — C34.11 MALIGNANT NEOPLASM OF UPPER LOBE OF RIGHT LUNG: Primary | ICD-10-CM

## 2025-04-23 DIAGNOSIS — D64.81 ANEMIA DUE TO ANTINEOPLASTIC CHEMOTHERAPY: ICD-10-CM

## 2025-04-23 DIAGNOSIS — D50.9 IRON DEFICIENCY ANEMIA, UNSPECIFIED: ICD-10-CM

## 2025-04-23 LAB
ABSOLUTE EOSINOPHIL (OHS): 0.03 K/UL
ABSOLUTE MONOCYTE (OHS): 0.41 K/UL (ref 0.3–1)
ABSOLUTE NEUTROPHIL COUNT (OHS): 2.07 K/UL (ref 1.8–7.7)
ALBUMIN SERPL BCP-MCNC: 3.4 G/DL (ref 3.5–5.2)
ALP SERPL-CCNC: 92 UNIT/L (ref 40–150)
ALT SERPL W/O P-5'-P-CCNC: 43 UNIT/L (ref 10–44)
ANION GAP (OHS): 10 MMOL/L (ref 8–16)
AST SERPL-CCNC: 25 UNIT/L (ref 11–45)
BASOPHILS # BLD AUTO: 0 K/UL
BASOPHILS NFR BLD AUTO: 0 %
BILIRUB SERPL-MCNC: 0.3 MG/DL (ref 0.1–1)
BUN SERPL-MCNC: 10 MG/DL (ref 8–23)
CALCIUM SERPL-MCNC: 9.2 MG/DL (ref 8.7–10.5)
CHLORIDE SERPL-SCNC: 104 MMOL/L (ref 95–110)
CO2 SERPL-SCNC: 25 MMOL/L (ref 23–29)
CREAT SERPL-MCNC: 0.9 MG/DL (ref 0.5–1.4)
ERYTHROCYTE [DISTWIDTH] IN BLOOD BY AUTOMATED COUNT: 13.6 % (ref 11.5–14.5)
FERRITIN SERPL-MCNC: 171 NG/ML (ref 20–300)
FOLATE SERPL-MCNC: 8.9 NG/ML (ref 4–24)
GFR SERPLBLD CREATININE-BSD FMLA CKD-EPI: >60 ML/MIN/1.73/M2
GLUCOSE SERPL-MCNC: 94 MG/DL (ref 70–110)
HCT VFR BLD AUTO: 39 % (ref 37–48.5)
HGB BLD-MCNC: 12.2 GM/DL (ref 12–16)
IMM GRANULOCYTES # BLD AUTO: 0.01 K/UL (ref 0–0.04)
IMM GRANULOCYTES NFR BLD AUTO: 0.3 % (ref 0–0.5)
IRON SATN MFR SERPL: 18 % (ref 20–50)
IRON SERPL-MCNC: 74 UG/DL (ref 30–160)
LYMPHOCYTES # BLD AUTO: 0.89 K/UL (ref 1–4.8)
MAGNESIUM SERPL-MCNC: 1.8 MG/DL (ref 1.6–2.6)
MCH RBC QN AUTO: 28.5 PG (ref 27–31)
MCHC RBC AUTO-ENTMCNC: 31.3 G/DL (ref 32–36)
MCV RBC AUTO: 91 FL (ref 82–98)
NUCLEATED RBC (/100WBC) (OHS): 0 /100 WBC
PLATELET # BLD AUTO: 216 K/UL (ref 150–450)
PMV BLD AUTO: 9.7 FL (ref 9.2–12.9)
POTASSIUM SERPL-SCNC: 3.7 MMOL/L (ref 3.5–5.1)
PROT SERPL-MCNC: 7 GM/DL (ref 6–8.4)
RBC # BLD AUTO: 4.28 M/UL (ref 4–5.4)
RELATIVE EOSINOPHIL (OHS): 0.9 %
RELATIVE LYMPHOCYTE (OHS): 26.1 % (ref 18–48)
RELATIVE MONOCYTE (OHS): 12 % (ref 4–15)
RELATIVE NEUTROPHIL (OHS): 60.7 % (ref 38–73)
SODIUM SERPL-SCNC: 139 MMOL/L (ref 136–145)
T4 FREE SERPL-MCNC: 1.04 NG/DL (ref 0.71–1.51)
TIBC SERPL-MCNC: 420 UG/DL (ref 250–450)
TRANSFERRIN SERPL-MCNC: 284 MG/DL (ref 200–375)
TSH SERPL-ACNC: 0.44 UIU/ML (ref 0.4–4)
VIT B12 SERPL-MCNC: 368 PG/ML (ref 210–950)
WBC # BLD AUTO: 3.41 K/UL (ref 3.9–12.7)

## 2025-04-23 PROCEDURE — 36415 COLL VENOUS BLD VENIPUNCTURE: CPT | Mod: PN

## 2025-04-23 PROCEDURE — 84466 ASSAY OF TRANSFERRIN: CPT

## 2025-04-23 PROCEDURE — 80053 COMPREHEN METABOLIC PANEL: CPT

## 2025-04-23 PROCEDURE — 84439 ASSAY OF FREE THYROXINE: CPT

## 2025-04-23 PROCEDURE — 82607 VITAMIN B-12: CPT | Mod: GA

## 2025-04-23 PROCEDURE — 85025 COMPLETE CBC W/AUTO DIFF WBC: CPT

## 2025-04-23 PROCEDURE — 83735 ASSAY OF MAGNESIUM: CPT

## 2025-04-23 PROCEDURE — 84443 ASSAY THYROID STIM HORMONE: CPT

## 2025-04-23 PROCEDURE — 82746 ASSAY OF FOLIC ACID SERUM: CPT | Mod: GA

## 2025-04-23 PROCEDURE — 82728 ASSAY OF FERRITIN: CPT

## 2025-04-29 ENCOUNTER — OFFICE VISIT (OUTPATIENT)
Dept: FAMILY MEDICINE | Facility: CLINIC | Age: 65
End: 2025-04-29
Payer: MEDICARE

## 2025-04-29 VITALS
SYSTOLIC BLOOD PRESSURE: 116 MMHG | HEIGHT: 70 IN | HEART RATE: 94 BPM | OXYGEN SATURATION: 98 % | WEIGHT: 223.19 LBS | BODY MASS INDEX: 31.95 KG/M2 | DIASTOLIC BLOOD PRESSURE: 78 MMHG

## 2025-04-29 DIAGNOSIS — K12.1 MOUTH ULCER: ICD-10-CM

## 2025-04-29 DIAGNOSIS — Q28.2 AVM (ARTERIOVENOUS MALFORMATION) BRAIN: ICD-10-CM

## 2025-04-29 DIAGNOSIS — K13.0 ANGULAR CHEILITIS: Primary | ICD-10-CM

## 2025-04-29 DIAGNOSIS — F41.1 GAD (GENERALIZED ANXIETY DISORDER): ICD-10-CM

## 2025-04-29 PROCEDURE — G2211 COMPLEX E/M VISIT ADD ON: HCPCS | Mod: S$PBB,,, | Performed by: FAMILY MEDICINE

## 2025-04-29 PROCEDURE — 99999 PR PBB SHADOW E&M-EST. PATIENT-LVL V: CPT | Mod: PBBFAC,,, | Performed by: FAMILY MEDICINE

## 2025-04-29 PROCEDURE — 99214 OFFICE O/P EST MOD 30 MIN: CPT | Mod: S$PBB,,, | Performed by: FAMILY MEDICINE

## 2025-04-29 PROCEDURE — 99215 OFFICE O/P EST HI 40 MIN: CPT | Mod: PBBFAC,PN | Performed by: FAMILY MEDICINE

## 2025-04-29 RX ORDER — DULOXETIN HYDROCHLORIDE 60 MG/1
60 CAPSULE, DELAYED RELEASE ORAL DAILY
Qty: 90 CAPSULE | Refills: 3 | Status: SHIPPED | OUTPATIENT
Start: 2025-04-29 | End: 2026-04-29

## 2025-04-29 NOTE — PROGRESS NOTES
Chief Complaint:  Chief Complaint   Patient presents with    Follow-up        HPI:  Malathi is a 64 y.o. year old     History of Present Illness    CHIEF COMPLAINT:  Malathi presents today with generalized body aches and pains.    MUSCULOSKELETAL PAIN:  She reports variable musculoskeletal pain that changes in intensity and location throughout the day, including intermittent self-resolving hip pain. She developed acute shoulder pain this morning after an unspecified movement.    NEUROLOGICAL:  A brain aneurysm was discovered during recent workup. CT is scheduled for May 13th to determine exact size. She expresses reluctance towards surgical intervention unless the aneurysm is found to be significantly large.    ORAL HEALTH:  She has angular cheilitis and a persistent oral ulcer present for several months without changes. The ulcer is located in an area without teeth, separate from the problematic teeth being monitored by her dentist. Following consultation between her dentist and oncologist, extraction has been determined as the preferred treatment option for any future dental issues rather than fillings.    GI AND TASTE CHANGES:  She experiences acid reflux requiring medication approximately twice weekly for management. She reports altered taste perception where everything tastes salty, affecting food enjoyment.    SLEEP:  She reports variable sleep quality, with poor sleep the night prior to appointment.    CURRENT MEDICATIONS:  She takes Cymbalta 30mg and vitamin B12+ C supplements.      ROS:  General: +sleep disturbances  Eyes: +eye pain  ENT: +mouth lesions  Respiratory: +shortness of breath, +wheezing  Gastrointestinal: +heartburn  Musculoskeletal: +body aches  Neurological: +headache           Review of Systems   Constitutional:  Positive for unexpected weight change. Negative for activity change.   HENT:  Positive for hearing loss and trouble swallowing. Negative for rhinorrhea.    Eyes:  Positive for visual  "disturbance. Negative for discharge.   Respiratory:  Negative for chest tightness and wheezing.    Cardiovascular:  Negative for chest pain and palpitations.   Gastrointestinal:  Negative for blood in stool, constipation, diarrhea and vomiting.   Endocrine: Negative for polydipsia and polyuria.   Genitourinary:  Negative for difficulty urinating, dysuria, hematuria and menstrual problem.   Musculoskeletal:  Positive for arthralgias and neck pain. Negative for joint swelling.   Neurological:  Positive for weakness and headaches.   Psychiatric/Behavioral:  Positive for dysphoric mood.          Past Medical History:  Past Medical History:   Diagnosis Date    Abnormal stress test     Bronchitis     COPD (chronic obstructive pulmonary disease)     Diabetes mellitus, type 2     GERD (gastroesophageal reflux disease)     HLD (hyperlipidemia)     Hx of endoscopy     Hypertension     Lung cancer 05/2023    Mental disorder     Pulmonary nodule 04/09/2018    SOB (shortness of breath)     resolved    Thyroid disease     Tobacco abuse 12/26/2012    Tobacco use 12/26/2012         Vitals:  Vitals:    04/29/25 1054   BP: 116/78   Pulse: 94   SpO2: 98%   Weight: 101.2 kg (223 lb 3.5 oz)   Height: 5' 10" (1.778 m)   PainSc:   4       BP Readings from Last 5 Encounters:   05/13/25 123/71   04/29/25 116/78   04/16/25 124/77   03/31/25 118/70   02/28/25 126/80       The 10-year ASCVD risk score (Andrew ONEAL, et al., 2019) is: 11.9%    Values used to calculate the score:      Age: 64 years      Sex: Female      Is Non- : No      Diabetic: Yes      Tobacco smoker: No      Systolic Blood Pressure: 123 mmHg      Is BP treated: Yes      HDL Cholesterol: 57 mg/dL      Total Cholesterol: 176 mg/dL      Physical Exam:  Physical Exam  Vitals and nursing note reviewed.   Constitutional:       General: She is not in acute distress.     Appearance: Normal appearance. She is well-developed. She is obese.   HENT:      Head: " Normocephalic and atraumatic.      Mouth/Throat:      Comments: +oral ulcer, L post tongue  Eyes:      General: No scleral icterus.        Right eye: No discharge.         Left eye: No discharge.      Conjunctiva/sclera: Conjunctivae normal.   Cardiovascular:      Rate and Rhythm: Normal rate.   Pulmonary:      Effort: Pulmonary effort is normal. No respiratory distress.   Abdominal:      Palpations: Abdomen is soft.      Tenderness: There is no guarding.   Musculoskeletal:         General: No deformity or signs of injury.      Cervical back: Neck supple.   Skin:     General: Skin is warm and dry.      Findings: No rash.   Neurological:      General: No focal deficit present.      Mental Status: She is alert and oriented to person, place, and time.   Psychiatric:         Mood and Affect: Mood normal.         Behavior: Behavior normal.             Assessment & Plan:  Angular cheilitis  Comments:  restart vit B/C    Mouth ulcer  Comments:  dentist aware, present for several months; discussed ENT consult for bx  Orders:  -     Ambulatory referral/consult to ENT; Future; Expected date: 05/06/2025    AVM (arteriovenous malformation) brain  Comments:  upcoming CT with fracisco/brown, agree with holding off on coil unless needed; discussed importance of BP control/monitoring for new or worsening sx    JEAN CLAUDE (generalized anxiety disorder)  -     DULoxetine (CYMBALTA) 60 MG capsule; Take 1 capsule (60 mg total) by mouth once daily.  Dispense: 90 capsule; Refill: 3         Assessment & Plan    IMPRESSION:  Assessed angular cheilitis, likely due to moisture accumulation or potential Vitamin C deficiency.  Evaluated persistent oral ulcer present for months, recommending ENT referral for biopsy due to duration and location.  Recently discovered brain aneurysm, agreeing with conservative management unless symptoms worsen.  Various aches and pains likely related to ongoing treatment, advising vigilance for significant or persistent  "symptoms.  Determined current as-needed medication regimen for acid reflux symptoms is appropriate.  Increased Cymbalta to 30 mg daily with 90-day supply for mood and pain management.    PLAN SUMMARY:   Increase Cymbalta to 30 mg daily, 90-day supply   Continue muscle relaxer as needed for pain   Continue Vitamin B12+ C supplement   Continue Pepcid (famotidine) as needed, up to twice weekly   Refer to ENT for evaluation and possible biopsy of oral ulcer   Apply Vaseline to corner of mouth at bedtime   Use alcohol-free mouthwash for gingivitis   Increase water intake   Use heating pad for muscle pain as needed   Follow up in 3-4 months unless new issues arise    PATIENT EDUCATION:   Explained the concept of "new normal" in the context of ongoing health challenges.    ACTION ITEMS/LIFESTYLE:   Advised applying a dab of Vaseline to the corner of the mouth at bedtime to protect from moisture.   Malathi to avoid chewing gum on the side with the oral ulcer.   Malathi can use heating pad for muscle pain as needed.   Malathi to continue using alcohol-free mouthwash for gingivitis.   Recommend increasing water intake, considering adding lemon or flavoring if taste is affected.    MEDICATIONS:   Increased Cymbalta to 30 mg daily with 90-day supply for mood and pain management.   Continued Vitamin B12+ C supplement.   Continued Pepcid (famotidine) as needed, up to twice weekly for acid reflux.   Continued muscle relaxer as needed for muscle pain.    REFERRALS:   Referred to ENT for evaluation and possible biopsy of persistent oral ulcer.    FOLLOW UP:   Follow up in 3-4 months unless new issues arise.   Contact the office for medication refills as needed.         Visit today included increased complexity associated with the care of the episodic problem ulcer addressed and managing the longitudinal care of the patient due to the serious and/or complex managed problem(s) miky, avm, lung ca.    This note was generated with the " assistance of ambient listening technology. Verbal consent was obtained by the patient and accompanying visitor(s) for the recording of patient appointment to facilitate this note. I attest to having reviewed and edited the generated note for accuracy, though some syntax or spelling errors may persist. Please contact the author of this note for any clarification.

## 2025-04-30 ENCOUNTER — LAB VISIT (OUTPATIENT)
Dept: LAB | Facility: HOSPITAL | Age: 65
End: 2025-04-30
Payer: MEDICARE

## 2025-04-30 DIAGNOSIS — C34.11 MALIGNANT NEOPLASM OF UPPER LOBE OF RIGHT LUNG: Primary | ICD-10-CM

## 2025-04-30 LAB
ABSOLUTE EOSINOPHIL (OHS): 0.04 K/UL
ABSOLUTE MONOCYTE (OHS): 0.53 K/UL (ref 0.3–1)
ABSOLUTE NEUTROPHIL COUNT (OHS): 2.73 K/UL (ref 1.8–7.7)
ALBUMIN SERPL BCP-MCNC: 3.5 G/DL (ref 3.5–5.2)
ALP SERPL-CCNC: 94 UNIT/L (ref 40–150)
ALT SERPL W/O P-5'-P-CCNC: 44 UNIT/L (ref 10–44)
ANION GAP (OHS): 10 MMOL/L (ref 8–16)
AST SERPL-CCNC: 27 UNIT/L (ref 11–45)
BASOPHILS # BLD AUTO: 0.01 K/UL
BASOPHILS NFR BLD AUTO: 0.2 %
BILIRUB SERPL-MCNC: 0.4 MG/DL (ref 0.1–1)
BUN SERPL-MCNC: 8 MG/DL (ref 8–23)
CALCIUM SERPL-MCNC: 9.6 MG/DL (ref 8.7–10.5)
CHLORIDE SERPL-SCNC: 102 MMOL/L (ref 95–110)
CO2 SERPL-SCNC: 26 MMOL/L (ref 23–29)
CREAT SERPL-MCNC: 1 MG/DL (ref 0.5–1.4)
ERYTHROCYTE [DISTWIDTH] IN BLOOD BY AUTOMATED COUNT: 13.5 % (ref 11.5–14.5)
GFR SERPLBLD CREATININE-BSD FMLA CKD-EPI: >60 ML/MIN/1.73/M2
GLUCOSE SERPL-MCNC: 68 MG/DL (ref 70–110)
HCT VFR BLD AUTO: 39.1 % (ref 37–48.5)
HGB BLD-MCNC: 12.4 GM/DL (ref 12–16)
IMM GRANULOCYTES # BLD AUTO: 0.01 K/UL (ref 0–0.04)
IMM GRANULOCYTES NFR BLD AUTO: 0.2 % (ref 0–0.5)
LYMPHOCYTES # BLD AUTO: 1.05 K/UL (ref 1–4.8)
MCH RBC QN AUTO: 27.8 PG (ref 27–31)
MCHC RBC AUTO-ENTMCNC: 31.7 G/DL (ref 32–36)
MCV RBC AUTO: 88 FL (ref 82–98)
NUCLEATED RBC (/100WBC) (OHS): 0 /100 WBC
PLATELET # BLD AUTO: 237 K/UL (ref 150–450)
PMV BLD AUTO: 10.1 FL (ref 9.2–12.9)
POTASSIUM SERPL-SCNC: 4.1 MMOL/L (ref 3.5–5.1)
PROT SERPL-MCNC: 7.1 GM/DL (ref 6–8.4)
RBC # BLD AUTO: 4.46 M/UL (ref 4–5.4)
RELATIVE EOSINOPHIL (OHS): 0.9 %
RELATIVE LYMPHOCYTE (OHS): 24 % (ref 18–48)
RELATIVE MONOCYTE (OHS): 12.1 % (ref 4–15)
RELATIVE NEUTROPHIL (OHS): 62.6 % (ref 38–73)
SODIUM SERPL-SCNC: 138 MMOL/L (ref 136–145)
WBC # BLD AUTO: 4.37 K/UL (ref 3.9–12.7)

## 2025-04-30 PROCEDURE — 85025 COMPLETE CBC W/AUTO DIFF WBC: CPT

## 2025-04-30 PROCEDURE — 80053 COMPREHEN METABOLIC PANEL: CPT

## 2025-04-30 PROCEDURE — 36415 COLL VENOUS BLD VENIPUNCTURE: CPT | Mod: PN

## 2025-05-05 DIAGNOSIS — C34.11 MALIGNANT NEOPLASM OF UPPER LOBE OF RIGHT LUNG: Primary | ICD-10-CM

## 2025-05-07 ENCOUNTER — LAB VISIT (OUTPATIENT)
Dept: LAB | Facility: HOSPITAL | Age: 65
End: 2025-05-07
Payer: MEDICARE

## 2025-05-07 DIAGNOSIS — C34.11 MALIGNANT NEOPLASM OF UPPER LOBE OF RIGHT LUNG: ICD-10-CM

## 2025-05-07 LAB
ABSOLUTE EOSINOPHIL (OHS): 0.05 K/UL
ABSOLUTE MONOCYTE (OHS): 0.52 K/UL (ref 0.3–1)
ABSOLUTE NEUTROPHIL COUNT (OHS): 2.91 K/UL (ref 1.8–7.7)
ALBUMIN SERPL BCP-MCNC: 3.4 G/DL (ref 3.5–5.2)
ALP SERPL-CCNC: 88 UNIT/L (ref 40–150)
ALT SERPL W/O P-5'-P-CCNC: 47 UNIT/L (ref 10–44)
ANION GAP (OHS): 9 MMOL/L (ref 8–16)
AST SERPL-CCNC: 30 UNIT/L (ref 11–45)
BASOPHILS # BLD AUTO: 0 K/UL
BASOPHILS NFR BLD AUTO: 0 %
BILIRUB SERPL-MCNC: 0.4 MG/DL (ref 0.1–1)
BUN SERPL-MCNC: 7 MG/DL (ref 8–23)
CALCIUM SERPL-MCNC: 9.1 MG/DL (ref 8.7–10.5)
CHLORIDE SERPL-SCNC: 102 MMOL/L (ref 95–110)
CO2 SERPL-SCNC: 25 MMOL/L (ref 23–29)
CREAT SERPL-MCNC: 1 MG/DL (ref 0.5–1.4)
ERYTHROCYTE [DISTWIDTH] IN BLOOD BY AUTOMATED COUNT: 13.6 % (ref 11.5–14.5)
GFR SERPLBLD CREATININE-BSD FMLA CKD-EPI: >60 ML/MIN/1.73/M2
GLUCOSE SERPL-MCNC: 98 MG/DL (ref 70–110)
HCT VFR BLD AUTO: 40.5 % (ref 37–48.5)
HGB BLD-MCNC: 12.5 GM/DL (ref 12–16)
IMM GRANULOCYTES # BLD AUTO: 0.02 K/UL (ref 0–0.04)
IMM GRANULOCYTES NFR BLD AUTO: 0.4 % (ref 0–0.5)
LYMPHOCYTES # BLD AUTO: 1.06 K/UL (ref 1–4.8)
MCH RBC QN AUTO: 28 PG (ref 27–31)
MCHC RBC AUTO-ENTMCNC: 30.9 G/DL (ref 32–36)
MCV RBC AUTO: 91 FL (ref 82–98)
NUCLEATED RBC (/100WBC) (OHS): 0 /100 WBC
PLATELET # BLD AUTO: 234 K/UL (ref 150–450)
PMV BLD AUTO: 10 FL (ref 9.2–12.9)
POTASSIUM SERPL-SCNC: 4.1 MMOL/L (ref 3.5–5.1)
PROT SERPL-MCNC: 6.7 GM/DL (ref 6–8.4)
RBC # BLD AUTO: 4.46 M/UL (ref 4–5.4)
RELATIVE EOSINOPHIL (OHS): 1.1 %
RELATIVE LYMPHOCYTE (OHS): 23.2 % (ref 18–48)
RELATIVE MONOCYTE (OHS): 11.4 % (ref 4–15)
RELATIVE NEUTROPHIL (OHS): 63.9 % (ref 38–73)
SODIUM SERPL-SCNC: 136 MMOL/L (ref 136–145)
WBC # BLD AUTO: 4.56 K/UL (ref 3.9–12.7)

## 2025-05-07 PROCEDURE — 85025 COMPLETE CBC W/AUTO DIFF WBC: CPT

## 2025-05-07 PROCEDURE — 36415 COLL VENOUS BLD VENIPUNCTURE: CPT | Mod: PN

## 2025-05-07 PROCEDURE — 80053 COMPREHEN METABOLIC PANEL: CPT

## 2025-05-13 ENCOUNTER — HOSPITAL ENCOUNTER (OUTPATIENT)
Dept: RADIOLOGY | Facility: HOSPITAL | Age: 65
Discharge: HOME | End: 2025-05-13
Attending: NEUROLOGICAL SURGERY
Payer: MEDICARE

## 2025-05-13 ENCOUNTER — OFFICE VISIT (OUTPATIENT)
Dept: NEUROSURGERY | Facility: CLINIC | Age: 65
End: 2025-05-13
Attending: NEUROLOGICAL SURGERY
Payer: MEDICARE

## 2025-05-13 VITALS
WEIGHT: 223.13 LBS | SYSTOLIC BLOOD PRESSURE: 123 MMHG | DIASTOLIC BLOOD PRESSURE: 71 MMHG | HEART RATE: 92 BPM | HEIGHT: 70 IN | RESPIRATION RATE: 18 BRPM | BODY MASS INDEX: 31.94 KG/M2

## 2025-05-13 DIAGNOSIS — I67.1 CEREBRAL ANEURYSM, NONRUPTURED: Primary | ICD-10-CM

## 2025-05-13 DIAGNOSIS — Q28.2 ARTERIOVENOUS MALFORMATION OF CEREBRAL VESSELS: ICD-10-CM

## 2025-05-13 PROCEDURE — 99215 OFFICE O/P EST HI 40 MIN: CPT | Mod: S$PBB,,, | Performed by: NEUROLOGICAL SURGERY

## 2025-05-13 PROCEDURE — 70496 CT ANGIOGRAPHY HEAD: CPT | Mod: TC,PO

## 2025-05-13 PROCEDURE — 70496 CT ANGIOGRAPHY HEAD: CPT | Mod: 26,,, | Performed by: RADIOLOGY

## 2025-05-13 PROCEDURE — 25500020 PHARM REV CODE 255: Mod: PO | Performed by: NEUROLOGICAL SURGERY

## 2025-05-13 RX ADMIN — IOHEXOL 100 ML: 350 INJECTION, SOLUTION INTRAVENOUS at 08:05

## 2025-05-13 NOTE — PROGRESS NOTES
Neurosurgery History & Physical    Patient ID: Malathi Martinez is a 64 y.o. female.    Chief Complaint   Patient presents with    Follow-up     Imaging f/u     Interval HPI 5/13/2025:  History of Present Illness    Ms. 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 She receives weekly cancer treatments and undergoes brain MRIs every three months for monitoring, with her next MRI scheduled for Friday. She recently underwent Gamma Knife treatment for a newly identified lesion. She reports mild headaches following the procedure but denies nausea or other post-treatment complications. She quit smoking 2 years ago. She takes medication for blood pressure management and reports her blood pressure has never been significantly elevated.        HPI 4/16/2025:  Ms. Martinez is a 65yo female with a history of lung cancer diagnosed in 5/2023 who was recently found to have an enhancing lesion in the left parietal lobe.  She underwent Gamma Knife radiation at [facility] in [place].  During workup for her radiation she was told she had a brain aneurysm.  The Nurse Practitioner with [facility] sent her here for aneurysm evaluation.     ROS:  Gastrointestinal: -nausea  Neurological: +headache           Past Medical History:   Diagnosis Date    Abnormal stress test     Bronchitis     COPD (chronic obstructive pulmonary disease)     Diabetes mellitus, type 2     GERD (gastroesophageal reflux disease)     HLD (hyperlipidemia)     Hx of endoscopy     Hypertension     Lung cancer 05/2023    Mental disorder     Pulmonary nodule 04/09/2018    SOB (shortness of breath)     resolved    Thyroid disease     Tobacco abuse 12/26/2012    Tobacco use 12/26/2012     Social History[1]  Family History   Problem Relation Name Age of Onset    Heart disease Mother          MVP    Cataracts Mother      Heart disease Father  65        MI and PVD    Throat cancer  "Father      Heart disease Sister          mvp    No Known Problems Sister      No Known Problems Sister      Esophageal cancer Brother      Hyperlipidemia Paternal Grandmother      Glaucoma Neg Hx      Macular degeneration Neg Hx       Review of patient's allergies indicates:   Allergen Reactions    Penicillins Hives     Current Medications[2]  Blood pressure 123/71, pulse 92, resp. rate 18, height 5' 10" (1.778 m), weight 101.2 kg (223 lb 1.7 oz), last menstrual period 09/10/2012.      Neurological Exam  Mental Status   Oriented to person, place, and time. Speech is normal.    Cranial Nerves  CN III, IV, VI: Extraocular movements intact bilaterally.    Motor   Normal muscle tone. Strength is 5/5 throughout all four extremities.    Sensory  Light touch is normal in upper and lower extremities.     Gait   Normal gait.      Physical Exam  Eyes:      Extraocular Movements: EOM normal.   Neurological:      Mental Status: She is oriented to person, place, and time.      Motor: Motor strength is normal.     Gait: Gait is intact.   Psychiatric:         Speech: Speech normal.         Imaging:    CT angiogram of the head dated 05/13/2025 is personally reviewed and discussed with the patient.  There is a anteriorly and inferiorly directed outpouching at the left MCA bifurcation that measures proximally 3 mm in diameter.  There are some small vessels faintly enhancing in the region but no definite continuity is noted with the  dilatation.  It is possible this represents a true saccular aneurysm but a infundibulum can not be ruled out.    Assessment/Plan:   Assessment & Plan    IMPRESSION:  - Reviewed CTA angiogram images, identifying a small aneurysm (~3-4 mm) at the bifurcation of the left middle cerebral artery.  - Assessed aneurysm risk based on size and location, determining less than 0.5% annual risk of bleeding.  - Compared current imaging with previous MRI from May 2023, noting possible presence of aneurysm at that " time.  - Recommend conservative management with observation due to low bleeding risk.  - Considered history of small cell lung cancer with bone metastases and recent brain radiation.    PLAN SUMMARY:  - CTA Head ordered for 6-month follow-up  - Maintain blood pressure control (target systolic <130 mmHg)  - Smoking cessation recommended  - Follow-up in 6 months with CTA Head results    CEREBRAL ANEURYSM, NONRUPTURED:  - Explained the nature and location of the aneurysm using imaging.  - Discussed the low risk of bleeding associated with aneurysms of this size and location.  - Emphasized importance of BP control (target systolic <130 mmHg) and smoking cessation in preventing aneurysm growth.  - Will follow up with CTA Head in 6 months.           This note was generated with the assistance of ambient listening technology. Verbal consent was obtained by the patient and accompanying visitor(s) for the recording of patient appointment to facilitate this note. I attest to having reviewed and edited the generated note for accuracy, though some syntax or spelling errors may persist. Please contact the author of this note for any clarification.    I spent a total of 40 minutes on the day of the visit.This includes face to face time and non-face to face time preparing to see the patient (eg, review of tests), obtaining and/or reviewing separately obtained history, documenting clinical information in the electronic or other health record, independently interpreting results and communicating results to the patient/family/caregiver, or care coordinator.           [1]   Social History  Socioeconomic History    Marital status:    Tobacco Use    Smoking status: Former     Current packs/day: 0.00     Average packs/day: 0.8 packs/day for 41.0 years (30.8 ttl pk-yrs)     Types: Cigarettes     Start date: 1982     Quit date: 2023     Years since quittin.0    Smokeless tobacco: Never   Substance and Sexual Activity     Alcohol use: No    Drug use: No    Sexual activity: Yes     Partners: Male     Birth control/protection: Post-menopausal     Social Drivers of Health     Financial Resource Strain: Low Risk  (1/2/2025)    Overall Financial Resource Strain (CARDIA)     Difficulty of Paying Living Expenses: Not very hard   Food Insecurity: No Food Insecurity (1/2/2025)    Hunger Vital Sign     Worried About Running Out of Food in the Last Year: Never true     Ran Out of Food in the Last Year: Never true   Transportation Needs: No Transportation Needs (1/2/2025)    TRANSPORTATION NEEDS     Transportation : No   Physical Activity: Inactive (11/21/2024)    Exercise Vital Sign     Days of Exercise per Week: 0 days     Minutes of Exercise per Session: 10 min   Stress: Stress Concern Present (1/2/2025)    [employer] - Occupational Stress Questionnaire     Feeling of Stress : To some extent   Housing Stability: Unknown (1/2/2025)    Housing Stability Vital Sign     Unable to Pay for Housing in the Last Year: No     Homeless in the Last Year: No   [2]   Current Outpatient Medications:     albuterol (VENTOLIN HFA) 90 mcg/actuation inhaler, Inhale 2 puffs into the lungs every 6 (six) hours as needed for Wheezing or Shortness of Breath. Rescue, Disp: 18 g, Rfl: 5    ALPRAZolam (XANAX) 0.25 MG tablet, Take 1 tablet (0.25 mg total) by mouth 2 (two) times daily as needed for Anxiety., Disp: 60 tablet, Rfl: 3    atorvastatin (LIPITOR) 20 MG tablet, Take 1 tablet (20 mg total) by mouth once daily., Disp: 90 tablet, Rfl: 3    B-complex with vitamin C (Z-BEC OR EQUIV) tablet, Take 1 tablet by mouth once daily., Disp: , Rfl:     buPROPion (WELLBUTRIN XL) 150 MG TB24 tablet, TAKE 2 TABLETS(300 MG) BY MOUTH EVERY DAY, Disp: 180 tablet, Rfl: 3    cholecalciferol, vitamin D3, 2,000 unit Cap, Take 1 capsule by mouth once daily., Disp: , Rfl:     clotrimazole-betamethasone 1-0.05% (LOTRISONE) cream, Apply topically to affected  area 2 (two) times daily., Disp: 45 g, Rfl: 1    cyclobenzaprine (FLEXERIL) 5 MG tablet, Take 1 tablet (5 mg total) by mouth 3 (three) times daily as needed for Muscle spasms., Disp: 90 tablet, Rfl: 1    desloratadine (CLARINEX) 5 mg tablet, TAKE 1 TABLET(5 MG) BY MOUTH EVERY DAY, Disp: 90 tablet, Rfl: 3    DULoxetine (CYMBALTA) 60 MG capsule, Take 1 capsule (60 mg total) by mouth once daily., Disp: 90 capsule, Rfl: 3    eszopiclone (LUNESTA) 2 MG Tab, Take 1 tablet (2 mg total) by mouth every evening., Disp: 30 tablet, Rfl: 5    famotidine (PEPCID) 40 MG tablet, Take 1 tablet (40 mg total) by mouth 2 (two) times daily as needed for Heartburn., Disp: 180 tablet, Rfl: 3    fexofenadine (ALLEGRA) 180 MG tablet, Take 1 tablet (180 mg total) by mouth once daily., Disp: 90 tablet, Rfl: 3    fluticasone-umeclidin-vilanter (TRELEGY ELLIPTA) 100-62.5-25 mcg DsDv, Inhale 1 puff into the lungs once daily., Disp: 60 each, Rfl: 11    furosemide (LASIX) 20 MG tablet, Take 1 tablet (20 mg total) by mouth 2 (two) times daily as needed (swelling)., Disp: 60 tablet, Rfl: 11    gabapentin (NEURONTIN) 300 MG capsule, TAKE 1 CAPSULE(300 MG) BY MOUTH THREE TIMES DAILY, Disp: 90 capsule, Rfl: 11    HYDROcodone-acetaminophen (NORCO) 5-325 mg per tablet, TAKE 1 TABLET BY MOUTH EVERY 4 TO 6 HOURS AS NEEDED FOR FOR PAIN, Disp: , Rfl:     hydrocodone-chlorpheniramine (TUSSIONEX) 10 mg-8 mg/5 mL suspension, Take 5 mLs by mouth nightly as needed for Cough., Disp: 150 mL, Rfl: 0    levothyroxine (SYNTHROID) 100 MCG tablet, Take 1 tablet (100 mcg total) by mouth before breakfast., Disp: 90 tablet, Rfl: 3    LIDOcaine (XYLOCAINE) 5 % Oint ointment, Apply topically as needed (before port use)., Disp: 50 g, Rfl: 5    lisinopriL 10 MG tablet, Take 1 tablet (10 mg total) by mouth every evening., Disp: 90 tablet, Rfl: 3    mupirocin (BACTROBAN) 2 % ointment, Apply topically 2 (two) times daily., Disp: 30 g, Rfl: 1    ondansetron (ZOFRAN-ODT) 8 MG [facility],  DISSOLVE 1 TABLET(8 MG) ON THE TONGUE TWICE DAILY, Disp: 20 tablet, Rfl: 2    polyethylene glycol (GLYCOLAX) 17 gram PwPk, Take 17 g by mouth once daily., Disp: 60 packet, Rfl: 0    potassium chloride SA (K-DUR,KLOR-CON) 20 MEQ tablet, Take 1 tablet (20 mEq total) by mouth 2 (two) times daily., Disp: 60 tablet, Rfl: 11    prochlorperazine (COMPAZINE) 10 MG tablet, Take 10 mg by mouth., Disp: , Rfl:     traZODone (DESYREL) 50 MG tablet, TAKE 2 TABLETS(100 MG) BY MOUTH EVERY EVENING, Disp: 180 tablet, Rfl: 3    albuterol-ipratropium (DUO-NEB) 2.5 mg-0.5 mg/3 mL nebulizer solution, Take 3 mLs by nebulization every 6 (six) hours as needed for Wheezing or Shortness of Breath. Rescue, Disp: 100 mL, Rfl: 11  No current facility-administered medications for this visit.    Facility-Administered Medications Ordered in Other Visits:     dextrose 50% injection 12.5 g, 12.5 g, Intravenous, PRN, Tima Khan MD    dextrose 50% injection 25 g, 25 g, Intravenous, PRN, Tima Khan MD    insulin regular injection 0-8 Units, 0-8 Units, Intravenous, Continuous PRErin LOMELI Thomas A., MD

## 2025-05-14 ENCOUNTER — LAB VISIT (OUTPATIENT)
Dept: LAB | Facility: HOSPITAL | Age: 65
End: 2025-05-14
Attending: INTERNAL MEDICINE
Payer: MEDICARE

## 2025-05-14 DIAGNOSIS — K12.31 NEUTROPENIA ASSOCIATED WITH MUCOSITIS DUE TO ANTINEOPLASTIC THERAPY: ICD-10-CM

## 2025-05-14 DIAGNOSIS — C34.90 MALIGNANT NEOPLASM OF BRONCHUS AND LUNG: ICD-10-CM

## 2025-05-14 DIAGNOSIS — R53.83 FATIGUE, UNSPECIFIED TYPE: ICD-10-CM

## 2025-05-14 DIAGNOSIS — C34.11 MALIGNANT NEOPLASM OF UPPER LOBE OF RIGHT LUNG: Primary | ICD-10-CM

## 2025-05-14 DIAGNOSIS — T45.1X5A ANEMIA DUE TO CHEMOTHERAPY: ICD-10-CM

## 2025-05-14 DIAGNOSIS — D70.1 NEUTROPENIA ASSOCIATED WITH MUCOSITIS DUE TO ANTINEOPLASTIC THERAPY: ICD-10-CM

## 2025-05-14 DIAGNOSIS — D50.9 IRON DEFICIENCY ANEMIA, UNSPECIFIED: ICD-10-CM

## 2025-05-14 DIAGNOSIS — D64.81 ANEMIA DUE TO CHEMOTHERAPY: ICD-10-CM

## 2025-05-14 DIAGNOSIS — T45.1X5S NEUTROPENIA ASSOCIATED WITH MUCOSITIS DUE TO ANTINEOPLASTIC THERAPY: ICD-10-CM

## 2025-05-14 LAB
ABSOLUTE EOSINOPHIL (OHS): 0.05 K/UL
ABSOLUTE MONOCYTE (OHS): 0.47 K/UL (ref 0.3–1)
ABSOLUTE NEUTROPHIL COUNT (OHS): 2.59 K/UL (ref 1.8–7.7)
ALBUMIN SERPL BCP-MCNC: 3.5 G/DL (ref 3.5–5.2)
ALP SERPL-CCNC: 97 UNIT/L (ref 40–150)
ALT SERPL W/O P-5'-P-CCNC: 40 UNIT/L (ref 10–44)
ANION GAP (OHS): 10 MMOL/L (ref 8–16)
AST SERPL-CCNC: 22 UNIT/L (ref 11–45)
BASOPHILS # BLD AUTO: 0 K/UL
BASOPHILS NFR BLD AUTO: 0 %
BILIRUB SERPL-MCNC: 0.4 MG/DL (ref 0.1–1)
BUN SERPL-MCNC: 5 MG/DL (ref 8–23)
CALCIUM SERPL-MCNC: 9.4 MG/DL (ref 8.7–10.5)
CHLORIDE SERPL-SCNC: 103 MMOL/L (ref 95–110)
CO2 SERPL-SCNC: 25 MMOL/L (ref 23–29)
CREAT SERPL-MCNC: 1 MG/DL (ref 0.5–1.4)
ERYTHROCYTE [DISTWIDTH] IN BLOOD BY AUTOMATED COUNT: 13.7 % (ref 11.5–14.5)
GFR SERPLBLD CREATININE-BSD FMLA CKD-EPI: >60 ML/MIN/1.73/M2
GLUCOSE SERPL-MCNC: 142 MG/DL (ref 70–110)
HCT VFR BLD AUTO: 40.7 % (ref 37–48.5)
HGB BLD-MCNC: 12.9 GM/DL (ref 12–16)
IMM GRANULOCYTES # BLD AUTO: 0.01 K/UL (ref 0–0.04)
IMM GRANULOCYTES NFR BLD AUTO: 0.2 % (ref 0–0.5)
LYMPHOCYTES # BLD AUTO: 1.07 K/UL (ref 1–4.8)
MCH RBC QN AUTO: 28.5 PG (ref 27–31)
MCHC RBC AUTO-ENTMCNC: 31.7 G/DL (ref 32–36)
MCV RBC AUTO: 90 FL (ref 82–98)
NUCLEATED RBC (/100WBC) (OHS): 0 /100 WBC
PLATELET # BLD AUTO: 263 K/UL (ref 150–450)
PMV BLD AUTO: 10.1 FL (ref 9.2–12.9)
POTASSIUM SERPL-SCNC: 3.7 MMOL/L (ref 3.5–5.1)
PROT SERPL-MCNC: 6.9 GM/DL (ref 6–8.4)
RBC # BLD AUTO: 4.52 M/UL (ref 4–5.4)
RELATIVE EOSINOPHIL (OHS): 1.2 %
RELATIVE LYMPHOCYTE (OHS): 25.5 % (ref 18–48)
RELATIVE MONOCYTE (OHS): 11.2 % (ref 4–15)
RELATIVE NEUTROPHIL (OHS): 61.9 % (ref 38–73)
SODIUM SERPL-SCNC: 138 MMOL/L (ref 136–145)
WBC # BLD AUTO: 4.19 K/UL (ref 3.9–12.7)

## 2025-05-14 PROCEDURE — 85025 COMPLETE CBC W/AUTO DIFF WBC: CPT

## 2025-05-14 PROCEDURE — 36415 COLL VENOUS BLD VENIPUNCTURE: CPT | Mod: PN

## 2025-05-14 PROCEDURE — 80053 COMPREHEN METABOLIC PANEL: CPT

## 2025-05-16 ENCOUNTER — HOSPITAL ENCOUNTER (OUTPATIENT)
Dept: RADIOLOGY | Facility: HOSPITAL | Age: 65
Discharge: HOME | End: 2025-05-16
Payer: MEDICARE

## 2025-05-16 DIAGNOSIS — C34.11 MALIGNANT NEOPLASM OF UPPER LOBE, RIGHT BRONCHUS OR LUNG: ICD-10-CM

## 2025-05-16 PROCEDURE — 70553 MRI BRAIN STEM W/O & W/DYE: CPT | Mod: TC,PO

## 2025-05-16 PROCEDURE — A9585 GADOBUTROL INJECTION: HCPCS | Mod: PO

## 2025-05-16 PROCEDURE — 70553 MRI BRAIN STEM W/O & W/DYE: CPT | Mod: 26,,, | Performed by: RADIOLOGY

## 2025-05-16 PROCEDURE — 25500020 PHARM REV CODE 255: Mod: PO

## 2025-05-16 RX ORDER — GADOBUTROL 604.72 MG/ML
10 INJECTION INTRAVENOUS
Status: COMPLETED | OUTPATIENT
Start: 2025-05-16 | End: 2025-05-16

## 2025-05-16 RX ADMIN — GADOBUTROL 10 ML: 604.72 INJECTION INTRAVENOUS at 10:05

## 2025-05-21 ENCOUNTER — LAB VISIT (OUTPATIENT)
Dept: LAB | Facility: HOSPITAL | Age: 65
End: 2025-05-21
Attending: INTERNAL MEDICINE
Payer: MEDICARE

## 2025-05-21 DIAGNOSIS — C34.11 MALIGNANT NEOPLASM OF UPPER LOBE OF RIGHT LUNG: Primary | ICD-10-CM

## 2025-05-21 DIAGNOSIS — D64.81 ANEMIA DUE TO CHEMOTHERAPY: ICD-10-CM

## 2025-05-21 DIAGNOSIS — C34.90 MALIGNANT NEOPLASM OF BRONCHUS AND LUNG: ICD-10-CM

## 2025-05-21 DIAGNOSIS — T45.1X5A ANEMIA DUE TO CHEMOTHERAPY: ICD-10-CM

## 2025-05-21 DIAGNOSIS — R53.83 FATIGUE, UNSPECIFIED TYPE: ICD-10-CM

## 2025-05-21 DIAGNOSIS — D70.1 AGRANULOCYTOSIS SECONDARY TO CANCER CHEMOTHERAPY (CODE): ICD-10-CM

## 2025-05-21 DIAGNOSIS — D50.9 IRON DEFICIENCY ANEMIA, UNSPECIFIED: ICD-10-CM

## 2025-05-21 LAB
ABSOLUTE EOSINOPHIL (OHS): 0.08 K/UL
ABSOLUTE MONOCYTE (OHS): 0.64 K/UL (ref 0.3–1)
ABSOLUTE NEUTROPHIL COUNT (OHS): 2.9 K/UL (ref 1.8–7.7)
ALBUMIN SERPL BCP-MCNC: 3.4 G/DL (ref 3.5–5.2)
ALP SERPL-CCNC: 92 UNIT/L (ref 40–150)
ALT SERPL W/O P-5'-P-CCNC: 43 UNIT/L (ref 10–44)
ANION GAP (OHS): 11 MMOL/L (ref 8–16)
AST SERPL-CCNC: 26 UNIT/L (ref 11–45)
BASOPHILS # BLD AUTO: 0 K/UL
BASOPHILS NFR BLD AUTO: 0 %
BILIRUB SERPL-MCNC: 0.3 MG/DL (ref 0.1–1)
BUN SERPL-MCNC: 9 MG/DL (ref 8–23)
CALCIUM SERPL-MCNC: 9 MG/DL (ref 8.7–10.5)
CHLORIDE SERPL-SCNC: 103 MMOL/L (ref 95–110)
CO2 SERPL-SCNC: 26 MMOL/L (ref 23–29)
CREAT SERPL-MCNC: 0.9 MG/DL (ref 0.5–1.4)
ERYTHROCYTE [DISTWIDTH] IN BLOOD BY AUTOMATED COUNT: 13.9 % (ref 11.5–14.5)
GFR SERPLBLD CREATININE-BSD FMLA CKD-EPI: >60 ML/MIN/1.73/M2
GLUCOSE SERPL-MCNC: 78 MG/DL (ref 70–110)
HCT VFR BLD AUTO: 40.1 % (ref 37–48.5)
HGB BLD-MCNC: 12.3 GM/DL (ref 12–16)
IMM GRANULOCYTES # BLD AUTO: 0.01 K/UL (ref 0–0.04)
IMM GRANULOCYTES NFR BLD AUTO: 0.2 % (ref 0–0.5)
LYMPHOCYTES # BLD AUTO: 1.25 K/UL (ref 1–4.8)
MCH RBC QN AUTO: 28 PG (ref 27–31)
MCHC RBC AUTO-ENTMCNC: 30.7 G/DL (ref 32–36)
MCV RBC AUTO: 91 FL (ref 82–98)
NUCLEATED RBC (/100WBC) (OHS): 0 /100 WBC
PLATELET # BLD AUTO: 247 K/UL (ref 150–450)
PMV BLD AUTO: 9.8 FL (ref 9.2–12.9)
POTASSIUM SERPL-SCNC: 3.6 MMOL/L (ref 3.5–5.1)
PROT SERPL-MCNC: 6.9 GM/DL (ref 6–8.4)
RBC # BLD AUTO: 4.4 M/UL (ref 4–5.4)
RELATIVE EOSINOPHIL (OHS): 1.6 %
RELATIVE LYMPHOCYTE (OHS): 25.6 % (ref 18–48)
RELATIVE MONOCYTE (OHS): 13.1 % (ref 4–15)
RELATIVE NEUTROPHIL (OHS): 59.5 % (ref 38–73)
SODIUM SERPL-SCNC: 140 MMOL/L (ref 136–145)
WBC # BLD AUTO: 4.88 K/UL (ref 3.9–12.7)

## 2025-05-21 PROCEDURE — 82565 ASSAY OF CREATININE: CPT

## 2025-05-21 PROCEDURE — 85025 COMPLETE CBC W/AUTO DIFF WBC: CPT

## 2025-05-21 PROCEDURE — 36415 COLL VENOUS BLD VENIPUNCTURE: CPT | Mod: PN

## 2025-05-28 ENCOUNTER — LAB VISIT (OUTPATIENT)
Dept: LAB | Facility: HOSPITAL | Age: 65
End: 2025-05-28
Attending: INTERNAL MEDICINE
Payer: MEDICARE

## 2025-05-28 DIAGNOSIS — C34.11 MALIGNANT NEOPLASM OF UPPER LOBE OF RIGHT LUNG: Primary | ICD-10-CM

## 2025-05-28 DIAGNOSIS — R53.83 FATIGUE, UNSPECIFIED TYPE: ICD-10-CM

## 2025-05-28 DIAGNOSIS — D50.9 IRON DEFICIENCY ANEMIA, UNSPECIFIED: ICD-10-CM

## 2025-05-28 LAB
ABSOLUTE EOSINOPHIL (OHS): 0.04 K/UL
ABSOLUTE MONOCYTE (OHS): 0.61 K/UL (ref 0.3–1)
ABSOLUTE NEUTROPHIL COUNT (OHS): 3.03 K/UL (ref 1.8–7.7)
ALBUMIN SERPL BCP-MCNC: 3.8 G/DL (ref 3.5–5.2)
ALP SERPL-CCNC: 101 UNIT/L (ref 40–150)
ALT SERPL W/O P-5'-P-CCNC: 43 UNIT/L (ref 10–44)
ANION GAP (OHS): 9 MMOL/L (ref 8–16)
AST SERPL-CCNC: 23 UNIT/L (ref 11–45)
BASOPHILS # BLD AUTO: 0.02 K/UL
BASOPHILS NFR BLD AUTO: 0.4 %
BILIRUB SERPL-MCNC: 0.3 MG/DL (ref 0.1–1)
BUN SERPL-MCNC: 7 MG/DL (ref 8–23)
CALCIUM SERPL-MCNC: 9 MG/DL (ref 8.7–10.5)
CHLORIDE SERPL-SCNC: 106 MMOL/L (ref 95–110)
CO2 SERPL-SCNC: 24 MMOL/L (ref 23–29)
CREAT SERPL-MCNC: 1 MG/DL (ref 0.5–1.4)
ERYTHROCYTE [DISTWIDTH] IN BLOOD BY AUTOMATED COUNT: 13.7 % (ref 11.5–14.5)
GFR SERPLBLD CREATININE-BSD FMLA CKD-EPI: >60 ML/MIN/1.73/M2
GLUCOSE SERPL-MCNC: 101 MG/DL (ref 70–110)
HCT VFR BLD AUTO: 39.9 % (ref 37–48.5)
HGB BLD-MCNC: 12.5 GM/DL (ref 12–16)
IMM GRANULOCYTES # BLD AUTO: 0.01 K/UL (ref 0–0.04)
IMM GRANULOCYTES NFR BLD AUTO: 0.2 % (ref 0–0.5)
LYMPHOCYTES # BLD AUTO: 1.07 K/UL (ref 1–4.8)
MCH RBC QN AUTO: 28.5 PG (ref 27–31)
MCHC RBC AUTO-ENTMCNC: 31.3 G/DL (ref 32–36)
MCV RBC AUTO: 91 FL (ref 82–98)
NUCLEATED RBC (/100WBC) (OHS): 0 /100 WBC
PLATELET # BLD AUTO: 258 K/UL (ref 150–450)
PMV BLD AUTO: 10 FL (ref 9.2–12.9)
POTASSIUM SERPL-SCNC: 3.7 MMOL/L (ref 3.5–5.1)
PROT SERPL-MCNC: 7.1 GM/DL (ref 6–8.4)
RBC # BLD AUTO: 4.39 M/UL (ref 4–5.4)
RELATIVE EOSINOPHIL (OHS): 0.8 %
RELATIVE LYMPHOCYTE (OHS): 22.4 % (ref 18–48)
RELATIVE MONOCYTE (OHS): 12.8 % (ref 4–15)
RELATIVE NEUTROPHIL (OHS): 63.4 % (ref 38–73)
SODIUM SERPL-SCNC: 139 MMOL/L (ref 136–145)
WBC # BLD AUTO: 4.78 K/UL (ref 3.9–12.7)

## 2025-05-28 PROCEDURE — 85025 COMPLETE CBC W/AUTO DIFF WBC: CPT

## 2025-05-28 PROCEDURE — 80053 COMPREHEN METABOLIC PANEL: CPT

## 2025-05-28 PROCEDURE — 36415 COLL VENOUS BLD VENIPUNCTURE: CPT | Mod: PN

## 2025-05-30 ENCOUNTER — PATIENT MESSAGE (OUTPATIENT)
Dept: FAMILY MEDICINE | Facility: CLINIC | Age: 65
End: 2025-05-30
Payer: MEDICARE

## 2025-05-30 DIAGNOSIS — R53.83 FATIGUE, UNSPECIFIED TYPE: ICD-10-CM

## 2025-05-30 DIAGNOSIS — T45.1X5A ANEMIA DUE TO CHEMOTHERAPY: ICD-10-CM

## 2025-05-30 DIAGNOSIS — C34.11 MALIGNANT NEOPLASM OF UPPER LOBE OF RIGHT LUNG: ICD-10-CM

## 2025-05-30 DIAGNOSIS — C34.90 MALIGNANT NEOPLASM OF BRONCHUS AND LUNG: ICD-10-CM

## 2025-05-30 DIAGNOSIS — F41.1 GAD (GENERALIZED ANXIETY DISORDER): ICD-10-CM

## 2025-05-30 DIAGNOSIS — T45.1X5S NEUTROPENIA ASSOCIATED WITH MUCOSITIS DUE TO ANTINEOPLASTIC THERAPY: ICD-10-CM

## 2025-05-30 DIAGNOSIS — D50.9 IRON DEFICIENCY ANEMIA, UNSPECIFIED: ICD-10-CM

## 2025-05-30 DIAGNOSIS — D64.81 ANEMIA DUE TO CHEMOTHERAPY: ICD-10-CM

## 2025-05-30 DIAGNOSIS — D70.1 AGRANULOCYTOSIS SECONDARY TO CANCER CHEMOTHERAPY (CODE): ICD-10-CM

## 2025-05-30 DIAGNOSIS — K12.31 NEUTROPENIA ASSOCIATED WITH MUCOSITIS DUE TO ANTINEOPLASTIC THERAPY: ICD-10-CM

## 2025-05-30 DIAGNOSIS — D70.1 NEUTROPENIA ASSOCIATED WITH MUCOSITIS DUE TO ANTINEOPLASTIC THERAPY: ICD-10-CM

## 2025-05-30 RX ORDER — CITALOPRAM 40 MG/1
40 TABLET ORAL DAILY
Qty: 90 TABLET | Refills: 3 | Status: SHIPPED | OUTPATIENT
Start: 2025-05-30

## 2025-06-02 RX ORDER — CYCLOBENZAPRINE HCL 5 MG
5 TABLET ORAL 3 TIMES DAILY PRN
Qty: 90 TABLET | Refills: 1 | Status: SHIPPED | OUTPATIENT
Start: 2025-06-02

## 2025-06-04 ENCOUNTER — LAB VISIT (OUTPATIENT)
Dept: LAB | Facility: HOSPITAL | Age: 65
End: 2025-06-04
Attending: INTERNAL MEDICINE
Payer: MEDICARE

## 2025-06-04 DIAGNOSIS — D64.81 ANEMIA DUE TO CHEMOTHERAPY: ICD-10-CM

## 2025-06-04 DIAGNOSIS — C34.11 MALIGNANT NEOPLASM OF UPPER LOBE OF RIGHT LUNG: Primary | ICD-10-CM

## 2025-06-04 DIAGNOSIS — T45.1X5S NEUTROPENIA ASSOCIATED WITH MUCOSITIS DUE TO ANTINEOPLASTIC THERAPY: ICD-10-CM

## 2025-06-04 DIAGNOSIS — D70.1 NEUTROPENIA ASSOCIATED WITH MUCOSITIS DUE TO ANTINEOPLASTIC THERAPY: ICD-10-CM

## 2025-06-04 DIAGNOSIS — D50.9 IRON DEFICIENCY ANEMIA, UNSPECIFIED: ICD-10-CM

## 2025-06-04 DIAGNOSIS — K12.31 NEUTROPENIA ASSOCIATED WITH MUCOSITIS DUE TO ANTINEOPLASTIC THERAPY: ICD-10-CM

## 2025-06-04 DIAGNOSIS — T45.1X5A ANEMIA DUE TO CHEMOTHERAPY: ICD-10-CM

## 2025-06-04 DIAGNOSIS — R53.83 FATIGUE, UNSPECIFIED TYPE: ICD-10-CM

## 2025-06-04 DIAGNOSIS — C34.90 MALIGNANT NEOPLASM OF BRONCHUS AND LUNG: ICD-10-CM

## 2025-06-04 LAB
ABSOLUTE EOSINOPHIL (OHS): 0.05 K/UL
ABSOLUTE MONOCYTE (OHS): 0.63 K/UL (ref 0.3–1)
ABSOLUTE NEUTROPHIL COUNT (OHS): 3.02 K/UL (ref 1.8–7.7)
ALBUMIN SERPL BCP-MCNC: 3.8 G/DL (ref 3.5–5.2)
ALP SERPL-CCNC: 94 UNIT/L (ref 40–150)
ALT SERPL W/O P-5'-P-CCNC: 46 UNIT/L (ref 10–44)
ANION GAP (OHS): 13 MMOL/L (ref 8–16)
AST SERPL-CCNC: 30 UNIT/L (ref 11–45)
BASOPHILS # BLD AUTO: 0.01 K/UL
BASOPHILS NFR BLD AUTO: 0.2 %
BILIRUB SERPL-MCNC: 0.3 MG/DL (ref 0.1–1)
BUN SERPL-MCNC: 6 MG/DL (ref 8–23)
CALCIUM SERPL-MCNC: 9.1 MG/DL (ref 8.7–10.5)
CHLORIDE SERPL-SCNC: 108 MMOL/L (ref 95–110)
CO2 SERPL-SCNC: 22 MMOL/L (ref 23–29)
CREAT SERPL-MCNC: 1 MG/DL (ref 0.5–1.4)
ERYTHROCYTE [DISTWIDTH] IN BLOOD BY AUTOMATED COUNT: 13.9 % (ref 11.5–14.5)
GFR SERPLBLD CREATININE-BSD FMLA CKD-EPI: >60 ML/MIN/1.73/M2
GLUCOSE SERPL-MCNC: 90 MG/DL (ref 70–110)
HCT VFR BLD AUTO: 40.9 % (ref 37–48.5)
HGB BLD-MCNC: 12.6 GM/DL (ref 12–16)
IMM GRANULOCYTES # BLD AUTO: 0.02 K/UL (ref 0–0.04)
IMM GRANULOCYTES NFR BLD AUTO: 0.4 % (ref 0–0.5)
LYMPHOCYTES # BLD AUTO: 1.27 K/UL (ref 1–4.8)
MCH RBC QN AUTO: 28.1 PG (ref 27–31)
MCHC RBC AUTO-ENTMCNC: 30.8 G/DL (ref 32–36)
MCV RBC AUTO: 91 FL (ref 82–98)
NUCLEATED RBC (/100WBC) (OHS): 0 /100 WBC
PLATELET # BLD AUTO: 230 K/UL (ref 150–450)
PMV BLD AUTO: 10.1 FL (ref 9.2–12.9)
POTASSIUM SERPL-SCNC: 3.8 MMOL/L (ref 3.5–5.1)
PROT SERPL-MCNC: 7.3 GM/DL (ref 6–8.4)
RBC # BLD AUTO: 4.49 M/UL (ref 4–5.4)
RELATIVE EOSINOPHIL (OHS): 1 %
RELATIVE LYMPHOCYTE (OHS): 25.4 % (ref 18–48)
RELATIVE MONOCYTE (OHS): 12.6 % (ref 4–15)
RELATIVE NEUTROPHIL (OHS): 60.4 % (ref 38–73)
SODIUM SERPL-SCNC: 143 MMOL/L (ref 136–145)
WBC # BLD AUTO: 5 K/UL (ref 3.9–12.7)

## 2025-06-04 PROCEDURE — 80053 COMPREHEN METABOLIC PANEL: CPT

## 2025-06-04 PROCEDURE — 85025 COMPLETE CBC W/AUTO DIFF WBC: CPT

## 2025-06-04 PROCEDURE — 36415 COLL VENOUS BLD VENIPUNCTURE: CPT | Mod: PN

## 2025-06-06 ENCOUNTER — OFFICE VISIT (OUTPATIENT)
Dept: FAMILY MEDICINE | Facility: CLINIC | Age: 65
End: 2025-06-06
Payer: MEDICARE

## 2025-06-06 VITALS
DIASTOLIC BLOOD PRESSURE: 70 MMHG | BODY MASS INDEX: 32.71 KG/M2 | HEIGHT: 70 IN | HEART RATE: 89 BPM | WEIGHT: 228.5 LBS | SYSTOLIC BLOOD PRESSURE: 120 MMHG | OXYGEN SATURATION: 97 %

## 2025-06-06 DIAGNOSIS — J44.9 CHRONIC OBSTRUCTIVE PULMONARY DISEASE, UNSPECIFIED COPD TYPE: ICD-10-CM

## 2025-06-06 DIAGNOSIS — R63.5 WEIGHT GAIN: ICD-10-CM

## 2025-06-06 DIAGNOSIS — R53.83 FATIGUE, UNSPECIFIED TYPE: ICD-10-CM

## 2025-06-06 DIAGNOSIS — D70.1 AGRANULOCYTOSIS SECONDARY TO CANCER CHEMOTHERAPY (CODE): ICD-10-CM

## 2025-06-06 DIAGNOSIS — E11.9 TYPE 2 DIABETES MELLITUS WITHOUT COMPLICATION, WITHOUT LONG-TERM CURRENT USE OF INSULIN: ICD-10-CM

## 2025-06-06 DIAGNOSIS — Z51.11 ENCOUNTER FOR ANTINEOPLASTIC CHEMOTHERAPY: ICD-10-CM

## 2025-06-06 DIAGNOSIS — C34.90 MALIGNANT NEOPLASM OF BRONCHUS AND LUNG: ICD-10-CM

## 2025-06-06 DIAGNOSIS — R74.8 ABNORMAL LEVELS OF OTHER SERUM ENZYMES: ICD-10-CM

## 2025-06-06 DIAGNOSIS — C34.11 MALIGNANT NEOPLASM OF UPPER LOBE OF RIGHT LUNG: ICD-10-CM

## 2025-06-06 DIAGNOSIS — D64.81 ANEMIA DUE TO ANTINEOPLASTIC CHEMOTHERAPY: ICD-10-CM

## 2025-06-06 DIAGNOSIS — D50.9 IRON DEFICIENCY ANEMIA, UNSPECIFIED IRON DEFICIENCY ANEMIA TYPE: ICD-10-CM

## 2025-06-06 DIAGNOSIS — E66.3 OVERWEIGHT: ICD-10-CM

## 2025-06-06 DIAGNOSIS — T45.1X5A ANEMIA DUE TO ANTINEOPLASTIC CHEMOTHERAPY: ICD-10-CM

## 2025-06-06 DIAGNOSIS — E53.8 B12 DEFICIENCY: Primary | ICD-10-CM

## 2025-06-06 PROBLEM — M25.512 CHRONIC LEFT SHOULDER PAIN: Status: RESOLVED | Noted: 2023-01-09 | Resolved: 2025-06-06

## 2025-06-06 PROBLEM — G89.29 CHRONIC LEFT SHOULDER PAIN: Status: RESOLVED | Noted: 2023-01-09 | Resolved: 2025-06-06

## 2025-06-06 PROBLEM — M54.2 NECK PAIN: Status: RESOLVED | Noted: 2023-01-09 | Resolved: 2025-06-06

## 2025-06-06 PROCEDURE — 99213 OFFICE O/P EST LOW 20 MIN: CPT | Mod: PBBFAC,PN | Performed by: FAMILY MEDICINE

## 2025-06-06 PROCEDURE — 99999 PR PBB SHADOW E&M-EST. PATIENT-LVL III: CPT | Mod: PBBFAC,,, | Performed by: FAMILY MEDICINE

## 2025-06-06 RX ORDER — CYANOCOBALAMIN 1000 UG/ML
1000 INJECTION, SOLUTION INTRAMUSCULAR; SUBCUTANEOUS
Status: SHIPPED | OUTPATIENT
Start: 2025-06-06 | End: 2026-06-01

## 2025-06-06 RX ADMIN — CYANOCOBALAMIN 1000 MCG: 1000 INJECTION, SOLUTION INTRAMUSCULAR; SUBCUTANEOUS at 11:06

## 2025-06-11 ENCOUNTER — LAB VISIT (OUTPATIENT)
Dept: LAB | Facility: HOSPITAL | Age: 65
End: 2025-06-11
Payer: MEDICARE

## 2025-06-11 DIAGNOSIS — D64.81 ANEMIA DUE TO ANTINEOPLASTIC CHEMOTHERAPY: ICD-10-CM

## 2025-06-11 DIAGNOSIS — T45.1X5A ANEMIA DUE TO CHEMOTHERAPY: ICD-10-CM

## 2025-06-11 DIAGNOSIS — K12.31 NEUTROPENIA ASSOCIATED WITH MUCOSITIS DUE TO ANTINEOPLASTIC THERAPY: ICD-10-CM

## 2025-06-11 DIAGNOSIS — C34.90 MALIGNANT NEOPLASM OF BRONCHUS AND LUNG: ICD-10-CM

## 2025-06-11 DIAGNOSIS — R63.5 WEIGHT GAIN: ICD-10-CM

## 2025-06-11 DIAGNOSIS — R53.83 FATIGUE, UNSPECIFIED TYPE: ICD-10-CM

## 2025-06-11 DIAGNOSIS — Z51.11 ENCOUNTER FOR ANTINEOPLASTIC CHEMOTHERAPY: ICD-10-CM

## 2025-06-11 DIAGNOSIS — D70.1 NEUTROPENIA ASSOCIATED WITH MUCOSITIS DUE TO ANTINEOPLASTIC THERAPY: ICD-10-CM

## 2025-06-11 DIAGNOSIS — D50.9 IRON DEFICIENCY ANEMIA, UNSPECIFIED: ICD-10-CM

## 2025-06-11 DIAGNOSIS — E53.8 B12 DEFICIENCY: ICD-10-CM

## 2025-06-11 DIAGNOSIS — D50.9 IRON DEFICIENCY ANEMIA, UNSPECIFIED IRON DEFICIENCY ANEMIA TYPE: ICD-10-CM

## 2025-06-11 DIAGNOSIS — D70.1 AGRANULOCYTOSIS SECONDARY TO CANCER CHEMOTHERAPY (CODE): ICD-10-CM

## 2025-06-11 DIAGNOSIS — R74.8 ABNORMAL LEVELS OF OTHER SERUM ENZYMES: ICD-10-CM

## 2025-06-11 DIAGNOSIS — D64.81 ANEMIA DUE TO CHEMOTHERAPY: ICD-10-CM

## 2025-06-11 DIAGNOSIS — C34.11 MALIGNANT NEOPLASM OF UPPER LOBE OF RIGHT LUNG: Primary | ICD-10-CM

## 2025-06-11 DIAGNOSIS — T45.1X5A ANEMIA DUE TO ANTINEOPLASTIC CHEMOTHERAPY: ICD-10-CM

## 2025-06-11 DIAGNOSIS — E11.9 TYPE 2 DIABETES MELLITUS WITHOUT COMPLICATION, WITHOUT LONG-TERM CURRENT USE OF INSULIN: ICD-10-CM

## 2025-06-11 DIAGNOSIS — T45.1X5S NEUTROPENIA ASSOCIATED WITH MUCOSITIS DUE TO ANTINEOPLASTIC THERAPY: ICD-10-CM

## 2025-06-11 LAB
ABSOLUTE EOSINOPHIL (OHS): 0.03 K/UL
ABSOLUTE MONOCYTE (OHS): 0.54 K/UL (ref 0.3–1)
ABSOLUTE NEUTROPHIL COUNT (OHS): 2.8 K/UL (ref 1.8–7.7)
ALBUMIN SERPL BCP-MCNC: 3.8 G/DL (ref 3.5–5.2)
ALP SERPL-CCNC: 99 UNIT/L (ref 40–150)
ALT SERPL W/O P-5'-P-CCNC: 38 UNIT/L (ref 10–44)
ANION GAP (OHS): 10 MMOL/L (ref 8–16)
AST SERPL-CCNC: 24 UNIT/L (ref 11–45)
BASOPHILS # BLD AUTO: 0 K/UL
BASOPHILS NFR BLD AUTO: 0 %
BILIRUB SERPL-MCNC: 0.3 MG/DL (ref 0.1–1)
BUN SERPL-MCNC: 7 MG/DL (ref 8–23)
CALCIUM SERPL-MCNC: 9.1 MG/DL (ref 8.7–10.5)
CHLORIDE SERPL-SCNC: 106 MMOL/L (ref 95–110)
CO2 SERPL-SCNC: 23 MMOL/L (ref 23–29)
CREAT SERPL-MCNC: 1 MG/DL (ref 0.5–1.4)
EAG (OHS): 131 MG/DL (ref 68–131)
ERYTHROCYTE [DISTWIDTH] IN BLOOD BY AUTOMATED COUNT: 13.6 % (ref 11.5–14.5)
FERRITIN SERPL-MCNC: 129 NG/ML (ref 20–300)
FOLATE SERPL-MCNC: 15.5 NG/ML (ref 4–24)
GFR SERPLBLD CREATININE-BSD FMLA CKD-EPI: >60 ML/MIN/1.73/M2
GLUCOSE SERPL-MCNC: 91 MG/DL (ref 70–110)
HBA1C MFR BLD: 6.2 % (ref 4–5.6)
HCT VFR BLD AUTO: 38.5 % (ref 37–48.5)
HGB BLD-MCNC: 12.1 GM/DL (ref 12–16)
IMM GRANULOCYTES # BLD AUTO: 0.01 K/UL (ref 0–0.04)
IMM GRANULOCYTES NFR BLD AUTO: 0.2 % (ref 0–0.5)
INSULIN SERPL-ACNC: 41.5 UU/ML
IRON SATN MFR SERPL: 16 % (ref 20–50)
IRON SERPL-MCNC: 64 UG/DL (ref 30–160)
LIPASE SERPL-CCNC: 10 U/L (ref 4–60)
LYMPHOCYTES # BLD AUTO: 1.14 K/UL (ref 1–4.8)
MCH RBC QN AUTO: 28.4 PG (ref 27–31)
MCHC RBC AUTO-ENTMCNC: 31.4 G/DL (ref 32–36)
MCV RBC AUTO: 90 FL (ref 82–98)
NUCLEATED RBC (/100WBC) (OHS): 0 /100 WBC
PLATELET # BLD AUTO: 232 K/UL (ref 150–450)
PMV BLD AUTO: 10.1 FL (ref 9.2–12.9)
POTASSIUM SERPL-SCNC: 3.5 MMOL/L (ref 3.5–5.1)
PROT SERPL-MCNC: 6.9 GM/DL (ref 6–8.4)
RBC # BLD AUTO: 4.26 M/UL (ref 4–5.4)
RELATIVE EOSINOPHIL (OHS): 0.7 %
RELATIVE LYMPHOCYTE (OHS): 25.2 % (ref 18–48)
RELATIVE MONOCYTE (OHS): 11.9 % (ref 4–15)
RELATIVE NEUTROPHIL (OHS): 62 % (ref 38–73)
SODIUM SERPL-SCNC: 139 MMOL/L (ref 136–145)
T4 FREE SERPL-MCNC: 0.97 NG/DL (ref 0.71–1.51)
TIBC SERPL-MCNC: 401 UG/DL (ref 250–450)
TRANSFERRIN SERPL-MCNC: 271 MG/DL (ref 200–375)
TSH SERPL-ACNC: 0.68 UIU/ML (ref 0.4–4)
VIT B12 SERPL-MCNC: 673 PG/ML (ref 210–950)
WBC # BLD AUTO: 4.52 K/UL (ref 3.9–12.7)

## 2025-06-11 PROCEDURE — 36415 COLL VENOUS BLD VENIPUNCTURE: CPT | Mod: PN

## 2025-06-11 PROCEDURE — 85025 COMPLETE CBC W/AUTO DIFF WBC: CPT

## 2025-06-11 PROCEDURE — 82746 ASSAY OF FOLIC ACID SERUM: CPT

## 2025-06-11 PROCEDURE — 83540 ASSAY OF IRON: CPT

## 2025-06-11 PROCEDURE — 84207 ASSAY OF VITAMIN B-6: CPT

## 2025-06-11 PROCEDURE — 82728 ASSAY OF FERRITIN: CPT

## 2025-06-11 PROCEDURE — 82607 VITAMIN B-12: CPT

## 2025-06-11 PROCEDURE — 83690 ASSAY OF LIPASE: CPT

## 2025-06-11 PROCEDURE — 83036 HEMOGLOBIN GLYCOSYLATED A1C: CPT

## 2025-06-11 PROCEDURE — 83525 ASSAY OF INSULIN: CPT

## 2025-06-11 PROCEDURE — 84443 ASSAY THYROID STIM HORMONE: CPT

## 2025-06-11 PROCEDURE — 84425 ASSAY OF VITAMIN B-1: CPT

## 2025-06-11 PROCEDURE — 80053 COMPREHEN METABOLIC PANEL: CPT

## 2025-06-11 PROCEDURE — 84439 ASSAY OF FREE THYROXINE: CPT

## 2025-06-17 ENCOUNTER — RESULTS FOLLOW-UP (OUTPATIENT)
Dept: FAMILY MEDICINE | Facility: CLINIC | Age: 65
End: 2025-06-17

## 2025-06-17 LAB
W VITAMIN B1: 91 UG/L
W VITAMIN B6: 10 UG/L

## 2025-06-18 ENCOUNTER — LAB VISIT (OUTPATIENT)
Dept: LAB | Facility: HOSPITAL | Age: 65
End: 2025-06-18
Attending: INTERNAL MEDICINE
Payer: MEDICARE

## 2025-06-18 DIAGNOSIS — K12.31 NEUTROPENIA ASSOCIATED WITH MUCOSITIS DUE TO ANTINEOPLASTIC THERAPY: ICD-10-CM

## 2025-06-18 DIAGNOSIS — T45.1X5S NEUTROPENIA ASSOCIATED WITH MUCOSITIS DUE TO ANTINEOPLASTIC THERAPY: ICD-10-CM

## 2025-06-18 DIAGNOSIS — T45.1X5A ANEMIA DUE TO CHEMOTHERAPY: ICD-10-CM

## 2025-06-18 DIAGNOSIS — D64.81 ANEMIA DUE TO CHEMOTHERAPY: ICD-10-CM

## 2025-06-18 DIAGNOSIS — C34.90 MALIGNANT NEOPLASM OF BRONCHUS AND LUNG: ICD-10-CM

## 2025-06-18 DIAGNOSIS — C34.11 MALIGNANT NEOPLASM OF UPPER LOBE OF RIGHT LUNG: Primary | ICD-10-CM

## 2025-06-18 DIAGNOSIS — D50.9 IRON DEFICIENCY ANEMIA, UNSPECIFIED: ICD-10-CM

## 2025-06-18 DIAGNOSIS — D70.1 NEUTROPENIA ASSOCIATED WITH MUCOSITIS DUE TO ANTINEOPLASTIC THERAPY: ICD-10-CM

## 2025-06-18 DIAGNOSIS — R53.83 FATIGUE, UNSPECIFIED TYPE: ICD-10-CM

## 2025-06-18 LAB
ABSOLUTE EOSINOPHIL (OHS): 0.05 K/UL
ABSOLUTE MONOCYTE (OHS): 0.51 K/UL (ref 0.3–1)
ABSOLUTE NEUTROPHIL COUNT (OHS): 2.99 K/UL (ref 1.8–7.7)
ALBUMIN SERPL BCP-MCNC: 3.7 G/DL (ref 3.5–5.2)
ALP SERPL-CCNC: 103 UNIT/L (ref 40–150)
ALT SERPL W/O P-5'-P-CCNC: 41 UNIT/L (ref 10–44)
ANION GAP (OHS): 12 MMOL/L (ref 8–16)
AST SERPL-CCNC: 25 UNIT/L (ref 11–45)
BASOPHILS # BLD AUTO: 0.01 K/UL
BASOPHILS NFR BLD AUTO: 0.2 %
BILIRUB SERPL-MCNC: 0.3 MG/DL (ref 0.1–1)
BUN SERPL-MCNC: 7 MG/DL (ref 8–23)
CALCIUM SERPL-MCNC: 9 MG/DL (ref 8.7–10.5)
CHLORIDE SERPL-SCNC: 106 MMOL/L (ref 95–110)
CO2 SERPL-SCNC: 23 MMOL/L (ref 23–29)
CREAT SERPL-MCNC: 1 MG/DL (ref 0.5–1.4)
ERYTHROCYTE [DISTWIDTH] IN BLOOD BY AUTOMATED COUNT: 13.6 % (ref 11.5–14.5)
GFR SERPLBLD CREATININE-BSD FMLA CKD-EPI: >60 ML/MIN/1.73/M2
GLUCOSE SERPL-MCNC: 81 MG/DL (ref 70–110)
HCT VFR BLD AUTO: 40.1 % (ref 37–48.5)
HGB BLD-MCNC: 12.6 GM/DL (ref 12–16)
IMM GRANULOCYTES # BLD AUTO: 0.02 K/UL (ref 0–0.04)
IMM GRANULOCYTES NFR BLD AUTO: 0.4 % (ref 0–0.5)
LYMPHOCYTES # BLD AUTO: 1.3 K/UL (ref 1–4.8)
MCH RBC QN AUTO: 28.4 PG (ref 27–31)
MCHC RBC AUTO-ENTMCNC: 31.4 G/DL (ref 32–36)
MCV RBC AUTO: 91 FL (ref 82–98)
NUCLEATED RBC (/100WBC) (OHS): 0 /100 WBC
PLATELET # BLD AUTO: 245 K/UL (ref 150–450)
PMV BLD AUTO: 10.2 FL (ref 9.2–12.9)
POTASSIUM SERPL-SCNC: 3.1 MMOL/L (ref 3.5–5.1)
PROT SERPL-MCNC: 7 GM/DL (ref 6–8.4)
RBC # BLD AUTO: 4.43 M/UL (ref 4–5.4)
RELATIVE EOSINOPHIL (OHS): 1 %
RELATIVE LYMPHOCYTE (OHS): 26.6 % (ref 18–48)
RELATIVE MONOCYTE (OHS): 10.5 % (ref 4–15)
RELATIVE NEUTROPHIL (OHS): 61.3 % (ref 38–73)
SODIUM SERPL-SCNC: 141 MMOL/L (ref 136–145)
WBC # BLD AUTO: 4.88 K/UL (ref 3.9–12.7)

## 2025-06-18 PROCEDURE — 85025 COMPLETE CBC W/AUTO DIFF WBC: CPT

## 2025-06-18 PROCEDURE — 36415 COLL VENOUS BLD VENIPUNCTURE: CPT | Mod: PN

## 2025-06-18 PROCEDURE — 80053 COMPREHEN METABOLIC PANEL: CPT

## 2025-06-24 ENCOUNTER — HOSPITAL ENCOUNTER (OUTPATIENT)
Dept: RADIOLOGY | Facility: HOSPITAL | Age: 65
Discharge: HOME | End: 2025-06-24
Attending: INTERNAL MEDICINE
Payer: MEDICARE

## 2025-06-24 LAB — GLUCOSE SERPL-MCNC: 120 MG/DL (ref 70–110)

## 2025-06-24 PROCEDURE — 78815 PET IMAGE W/CT SKULL-THIGH: CPT | Mod: TC,PN

## 2025-06-24 PROCEDURE — A9552 F18 FDG: HCPCS | Mod: PN | Performed by: INTERNAL MEDICINE

## 2025-06-24 PROCEDURE — 78815 PET IMAGE W/CT SKULL-THIGH: CPT | Mod: 26,PS,, | Performed by: RADIOLOGY

## 2025-06-24 RX ORDER — FLUDEOXYGLUCOSE F18 500 MCI/ML
13 INJECTION INTRAVENOUS
Status: COMPLETED | OUTPATIENT
Start: 2025-06-24 | End: 2025-06-24

## 2025-06-24 RX ADMIN — FLUDEOXYGLUCOSE F-18 13 MILLICURIE: 500 INJECTION INTRAVENOUS at 07:06

## 2025-06-24 NOTE — PROGRESS NOTES
PET Imaging Questionnaire    Are you a Diabetic? Recent Blood Sugar level? No    Are you anemic? Bone Marrow Stimulation Meds? Yes    Have you had a CT Scan, if so when & where was your last one? Yes -     Have you had a PET Scan, if so when & where was your last one? Yes -     Chemotherapy or currently on Chemotherapy? Yes    Radiation therapy? Yes    Surgical History:   Past Surgical History:   Procedure Laterality Date    APPENDECTOMY      COLONOSCOPY N/A 01/18/2019    Procedure: COLONOSCOPY;  Surgeon: Lai Cardoso MD;  Location: [facility];  Service: Endoscopy;  Laterality: N/A;    COLONOSCOPY N/A 12/20/2023    Procedure: COLONOSCOPY;  Surgeon: Jose Alanis MD;  Location: [facility];  Service: Colon and Rectal;  Laterality: N/A;    ENDOBRONCHIAL ULTRASOUND N/A 05/03/2023    Procedure: ENDOBRONCHIAL ULTRASOUND (EBUS);  Surgeon: Nahun Nguyen MD;  Location: [facility] OR;  Service: Pulmonary;  Laterality: N/A;    ENDOBRONCHIAL ULTRASOUND Bilateral 02/27/2024    Procedure: ENDOBRONCHIAL ULTRASOUND (EBUS);  Surgeon: Nahun Nguyen MD;  Location: [facility] OR;  Service: Pulmonary;  Laterality: Bilateral;    ESOPHAGOGASTRODUODENOSCOPY N/A 02/19/2021    Procedure: ESOPHAGOGASTRODUODENOSCOPY (EGD);  Surgeon: Lai Cardoso MD;  Location: [facility];  Service: Endoscopy;  Laterality: N/A;    INSERTION OF TUNNELED CENTRAL VENOUS CATHETER (CVC) WITH SUBCUTANEOUS PORT Right 05/22/2023    Procedure: [id number];  Surgeon: Ayah Aldana MD;  Location[facility];  Service: General;  Laterality: Right;    TONSILLECTOMY          Have you been fasting for at least 6 hours? Yes    Is there any chance you may be pregnant or breastfeeding? No    Assay: 13.42 MCi@:728   Injection Site:lt ac     Residual: .46 mCi@: 730   Technologist: Elba Stone Injected:13mCi

## 2025-06-25 ENCOUNTER — LAB VISIT (OUTPATIENT)
Dept: LAB | Facility: HOSPITAL | Age: 65
End: 2025-06-25
Attending: INTERNAL MEDICINE
Payer: MEDICARE

## 2025-06-25 DIAGNOSIS — D64.81 ANEMIA DUE TO CHEMOTHERAPY: ICD-10-CM

## 2025-06-25 DIAGNOSIS — D70.1 NEUTROPENIA ASSOCIATED WITH MUCOSITIS DUE TO ANTINEOPLASTIC THERAPY: ICD-10-CM

## 2025-06-25 DIAGNOSIS — R53.83 FATIGUE, UNSPECIFIED TYPE: ICD-10-CM

## 2025-06-25 DIAGNOSIS — C34.90 MALIGNANT NEOPLASM OF BRONCHUS AND LUNG: ICD-10-CM

## 2025-06-25 DIAGNOSIS — T45.1X5A ANEMIA DUE TO CHEMOTHERAPY: ICD-10-CM

## 2025-06-25 DIAGNOSIS — D50.9 IRON DEFICIENCY ANEMIA, UNSPECIFIED: ICD-10-CM

## 2025-06-25 DIAGNOSIS — T45.1X5S NEUTROPENIA ASSOCIATED WITH MUCOSITIS DUE TO ANTINEOPLASTIC THERAPY: ICD-10-CM

## 2025-06-25 DIAGNOSIS — K12.31 NEUTROPENIA ASSOCIATED WITH MUCOSITIS DUE TO ANTINEOPLASTIC THERAPY: ICD-10-CM

## 2025-06-25 DIAGNOSIS — C34.11 MALIGNANT NEOPLASM OF UPPER LOBE OF RIGHT LUNG: Primary | ICD-10-CM

## 2025-06-25 LAB
ABSOLUTE EOSINOPHIL (OHS): 0.05 K/UL
ABSOLUTE MONOCYTE (OHS): 0.6 K/UL (ref 0.3–1)
ABSOLUTE NEUTROPHIL COUNT (OHS): 2.82 K/UL (ref 1.8–7.7)
ALBUMIN SERPL BCP-MCNC: 3.7 G/DL (ref 3.5–5.2)
ALP SERPL-CCNC: 90 UNIT/L (ref 40–150)
ALT SERPL W/O P-5'-P-CCNC: 40 UNIT/L (ref 10–44)
ANION GAP (OHS): 11 MMOL/L (ref 8–16)
AST SERPL-CCNC: 25 UNIT/L (ref 11–45)
BASOPHILS # BLD AUTO: 0 K/UL
BASOPHILS NFR BLD AUTO: 0 %
BILIRUB SERPL-MCNC: 0.3 MG/DL (ref 0.1–1)
BUN SERPL-MCNC: 6 MG/DL (ref 8–23)
CALCIUM SERPL-MCNC: 9 MG/DL (ref 8.7–10.5)
CHLORIDE SERPL-SCNC: 105 MMOL/L (ref 95–110)
CO2 SERPL-SCNC: 25 MMOL/L (ref 23–29)
CREAT SERPL-MCNC: 0.9 MG/DL (ref 0.5–1.4)
ERYTHROCYTE [DISTWIDTH] IN BLOOD BY AUTOMATED COUNT: 13.5 % (ref 11.5–14.5)
GFR SERPLBLD CREATININE-BSD FMLA CKD-EPI: >60 ML/MIN/1.73/M2
GLUCOSE SERPL-MCNC: 96 MG/DL (ref 70–110)
HCT VFR BLD AUTO: 38.2 % (ref 37–48.5)
HGB BLD-MCNC: 12 GM/DL (ref 12–16)
IMM GRANULOCYTES # BLD AUTO: 0.02 K/UL (ref 0–0.04)
IMM GRANULOCYTES NFR BLD AUTO: 0.4 % (ref 0–0.5)
LYMPHOCYTES # BLD AUTO: 1.3 K/UL (ref 1–4.8)
MCH RBC QN AUTO: 28.5 PG (ref 27–31)
MCHC RBC AUTO-ENTMCNC: 31.4 G/DL (ref 32–36)
MCV RBC AUTO: 91 FL (ref 82–98)
NUCLEATED RBC (/100WBC) (OHS): 0 /100 WBC
PLATELET # BLD AUTO: 227 K/UL (ref 150–450)
PMV BLD AUTO: 10.1 FL (ref 9.2–12.9)
POTASSIUM SERPL-SCNC: 3.3 MMOL/L (ref 3.5–5.1)
PROT SERPL-MCNC: 6.8 GM/DL (ref 6–8.4)
RBC # BLD AUTO: 4.21 M/UL (ref 4–5.4)
RELATIVE EOSINOPHIL (OHS): 1 %
RELATIVE LYMPHOCYTE (OHS): 27.1 % (ref 18–48)
RELATIVE MONOCYTE (OHS): 12.5 % (ref 4–15)
RELATIVE NEUTROPHIL (OHS): 59 % (ref 38–73)
SODIUM SERPL-SCNC: 141 MMOL/L (ref 136–145)
WBC # BLD AUTO: 4.79 K/UL (ref 3.9–12.7)

## 2025-06-25 PROCEDURE — 80053 COMPREHEN METABOLIC PANEL: CPT

## 2025-06-25 PROCEDURE — 36415 COLL VENOUS BLD VENIPUNCTURE: CPT | Mod: PN

## 2025-06-25 PROCEDURE — 85025 COMPLETE CBC W/AUTO DIFF WBC: CPT

## 2025-06-27 ENCOUNTER — OFFICE VISIT (OUTPATIENT)
Dept: OTOLARYNGOLOGY | Facility: CLINIC | Age: 65
End: 2025-06-27
Payer: MEDICARE

## 2025-06-27 VITALS — BODY MASS INDEX: 32.42 KG/M2 | HEIGHT: 70 IN | WEIGHT: 226.44 LBS

## 2025-06-27 DIAGNOSIS — K12.1 MOUTH ULCER: ICD-10-CM

## 2025-06-27 DIAGNOSIS — K21.9 GASTROESOPHAGEAL REFLUX DISEASE, UNSPECIFIED WHETHER ESOPHAGITIS PRESENT: ICD-10-CM

## 2025-06-27 DIAGNOSIS — K13.4: Primary | ICD-10-CM

## 2025-06-27 PROCEDURE — 99212 OFFICE O/P EST SF 10 MIN: CPT | Mod: PBBFAC,PO | Performed by: STUDENT IN AN ORGANIZED HEALTH CARE EDUCATION/TRAINING PROGRAM

## 2025-06-27 PROCEDURE — 99999 PR PBB SHADOW E&M-EST. PATIENT-LVL II: CPT | Mod: PBBFAC,,, | Performed by: STUDENT IN AN ORGANIZED HEALTH CARE EDUCATION/TRAINING PROGRAM

## 2025-06-27 PROCEDURE — 17250 CHEM CAUT OF GRANLTJ TISSUE: CPT | Mod: PBBFAC,PO | Performed by: STUDENT IN AN ORGANIZED HEALTH CARE EDUCATION/TRAINING PROGRAM

## 2025-06-27 PROCEDURE — 17250 CHEM CAUT OF GRANLTJ TISSUE: CPT | Mod: S$PBB,,, | Performed by: STUDENT IN AN ORGANIZED HEALTH CARE EDUCATION/TRAINING PROGRAM

## 2025-06-27 PROCEDURE — 99203 OFFICE O/P NEW LOW 30 MIN: CPT | Mod: 25,S$PBB,, | Performed by: STUDENT IN AN ORGANIZED HEALTH CARE EDUCATION/TRAINING PROGRAM

## 2025-06-27 NOTE — PROGRESS NOTES
Otolaryngology Clinic Note    Subjective:       Patient ID: Malathi Martinez is a 64 y.o. female.    Chief Complaint: Mass (Left gum)      History of Present Illness: Malathi Martinez is a 64 y.o. female with hx lung cancer as below, gerd presenting with sore on left side of mouth during 2nd round of chemo 2024. Has not changed. Sore. Always there. Has not changed. Gums bleed but not this area. Has bad breathe from meds and gerd from meds. regular reflux and burning. Taking omeprazole 40 mg, rx BID, only taking it when burning starts. Comes and goes. Bad today. Pepcid was not helping so not taking. Not much caffeine, greasy, spicy. Gaining weight on meds as well.     Cancer hx: Diagnosis in 5/2023. Stage IV at the time of diagnosis.  PET scan 12/2024 with progression of disease on previous chemo regimen.   Admitted 12/2024 for immunotherapy initiation.   PET March 2025 with resolution of lung cancer however brain met found now s/p gamma knife with resolution of brain met on recent MRI.  On immunotherapy.      Past Surgical History:   Procedure Laterality Date    APPENDECTOMY      COLONOSCOPY N/A 01/18/2019    Procedure: COLONOSCOPY;  Surgeon: Lai Cardoso MD;  Location: [facility];  Service: Endoscopy;  Laterality: N/A;    COLONOSCOPY N/A 12/20/2023    Procedure: COLONOSCOPY;  Surgeon: Jose Alanis MD;  Location: [facility];  Service: Colon and Rectal;  Laterality: N/A;    ENDOBRONCHIAL ULTRASOUND N/A 05/03/2023    Procedure: ENDOBRONCHIAL ULTRASOUND (EBUS);  Surgeon: Nahun Nguyen MD;  Location: [facility] OR;  Service: Pulmonary;  Laterality: N/A;    ENDOBRONCHIAL ULTRASOUND Bilateral 02/27/2024    Procedure: ENDOBRONCHIAL ULTRASOUND (EBUS);  Surgeon: Nahun Nguyen MD;  Location: [facility] OR;  Service: Pulmonary;  Laterality: Bilateral;    ESOPHAGOGASTRODUODENOSCOPY N/A 02/19/2021    Procedure: ESOPHAGOGASTRODUODENOSCOPY (EGD);  Surgeon: Lai Cardoso MD;  Location: [facility];  Service:  Endoscopy;  Laterality: N/A;    INSERTION OF TUNNELED CENTRAL VENOUS CATHETER (CVC) WITH SUBCUTANEOUS PORT Right 05/22/2023    Procedure: ROWATVWOT-[id number]-B-CATH;  Surgeon: Ayah Aldana MD;  Location: [facility];  Service: General;  Laterality: Right;    TONSILLECTOMY       Past Medical History:   Diagnosis Date    Abnormal stress test     Bronchitis     COPD (chronic obstructive pulmonary disease)     Diabetes mellitus, type 2     GERD (gastroesophageal reflux disease)     HLD (hyperlipidemia)     Hx of endoscopy     Hypertension     Lung cancer 05/2023    Mental disorder     Pulmonary nodule 04/09/2018    SOB (shortness of breath)     resolved    Thyroid disease     Tobacco abuse 12/26/2012    Tobacco use 12/26/2012     Social Drivers of Health     Tobacco Use: Medium Risk (6/6/2025)    Patient History     Smoking Tobacco Use: Former     Smokeless Tobacco Use: Never     Passive Exposure: Not on file   Alcohol Use: Not At Risk (11/21/2024)    AUDIT-C     Frequency of Alcohol Consumption: Never     Average Number of Drinks: Patient does not drink     Frequency of Binge Drinking: Never   Financial Resource Strain: Low Risk  (1/2/2025)    Overall Financial Resource Strain (CARDIA)     Difficulty of Paying Living Expenses: Not very hard   Food Insecurity: No Food Insecurity (1/2/2025)    Hunger Vital Sign     Worried About Running Out of Food in the Last Year: Never true     Ran Out of Food in the Last Year: Never true   Transportation Needs: No Transportation Needs (1/2/2025)    TRANSPORTATION NEEDS     Transportation : No   Physical Activity: Inactive (11/21/2024)    Exercise Vital Sign     Days of Exercise per Week: 0 days     Minutes of Exercise per Session: 10 min   Stress: Stress Concern Present (1/2/2025)    [employer] - Occupational Stress Questionnaire     Feeling of Stress : To some extent   Housing Stability: Unknown (1/2/2025)    Housing Stability Vital Sign     Unable to  Pay for Housing in the Last Year: No     Number of Times Moved in the Last Year: Not on file     Homeless in the Last Year: No   Depression: Low Risk  (2/28/2025)    Depression     Last PHQ-4: Flowsheet Data: 0   Utilities: Not At Risk (1/2/2025)    [facility] Utilities     Threatened with loss of utilities: No   Health Literacy: Adequate Health Literacy (1/2/2025)     Health Literacy     Frequency of need for help with medical instructions: Never   Social Isolation: Socially Integrated (1/2/2025)    Social Isolation     Social Isolation: 1     Review of patient's allergies indicates:   Allergen Reactions    Penicillins Hives     Current Outpatient Medications   Medication Instructions    albuterol (VENTOLIN HFA) 90 mcg/actuation inhaler 2 puffs, Inhalation, Every 6 hours PRN, Rescue    albuterol-ipratropium (DUO-NEB) 2.5 mg-0.5 mg/3 mL nebulizer solution 3 mLs, Nebulization, Every 6 hours PRN, Rescue    ALPRAZolam (XANAX) 0.25 mg, Oral, 2 times daily PRN    atorvastatin (LIPITOR) 20 mg, Oral, Daily    B-complex with vitamin C (Z-BEC OR EQUIV) tablet 1 tablet, Daily    buPROPion (WELLBUTRIN XL) 150 MG TB24 tablet TAKE 2 TABLETS(300 MG) BY MOUTH EVERY DAY    cholecalciferol, vitamin D3, 2,000 unit Cap 1 capsule, Daily    citalopram (CELEXA) 40 mg, Oral, Daily    clotrimazole-betamethasone 1-0.05% (LOTRISONE) cream Apply topically to affected area 2 (two) times daily.    cyclobenzaprine (FLEXERIL) 5 mg, Oral, 3 times daily PRN    desloratadine (CLARINEX) 5 mg, Oral    eszopiclone (LUNESTA) 2 mg, Oral, Nightly    famotidine (PEPCID) 40 mg, Oral, 2 times daily PRN    fexofenadine (ALLEGRA) 180 mg, Oral, Daily    fluticasone-umeclidin-vilanter (TRELEGY ELLIPTA) 100-62.5-25 mcg DsDv 1 puff, Inhalation, Daily    furosemide (LASIX) 20 mg, Oral, 2 times daily PRN    gabapentin (NEURONTIN) 300 mg, Oral, 3 times daily    HYDROcodone-acetaminophen (NORCO) 5-325 mg per tablet TAKE 1 TABLET BY MOUTH EVERY 4 TO 6 HOURS AS NEEDED FOR  FOR PAIN    hydrocodone-chlorpheniramine (TUSSIONEX) 10 mg-8 mg/5 mL suspension 5 mLs, Oral, Nightly PRN    levothyroxine (SYNTHROID) 100 mcg, Oral, Before breakfast    LIDOcaine (XYLOCAINE) 5 % Oint ointment Topical (Top), As needed (PRN)    lisinopriL 10 mg, Oral, Nightly    mupirocin (BACTROBAN) 2 % ointment Topical (Top), 2 times daily    ondansetron (ZOFRAN-ODT) 8 MG TbDL DISSOLVE 1 TABLET(8 MG) ON THE TONGUE TWICE DAILY    polyethylene glycol (GLYCOLAX) 17 g, Oral, Daily    potassium chloride SA (K-DUR,KLOR-CON) 20 MEQ tablet 20 mEq, Oral, 2 times daily    prochlorperazine (COMPAZINE) 10 mg    traZODone (DESYREL) 50 MG tablet TAKE 2 TABLETS(100 MG) BY MOUTH EVERY EVENING         ENT ROS negative except as stated above.     Patient answers are not available for this visit.            Objective:      There were no vitals filed for this visit.    General: NAD, well appearing  Eyes: Normal conjunctiva and lids  Face: symmetric, nerve intact  Nose: The nose is without any evidence of any deformity. The nasal mucosa is moist. The septum is midline. There is no evidence of septal hematoma. The turbinates are without abnormality.   Ears: The ears are with normal-appearing pinna. Examination of the canals is normal appearing bilaterally. There is no drainage or erythema noted. The tympanic membranes are normal appearing with pearly color, normal-appearing landmarks and normal light reflex. Hearing is grossly intact.  Mouth: No obvious abnormalities to the lips. The teeth are unremarkable. Some dental work intact,. Has bad pre-canine below this area.  What appears to be pyogenic granuloma on left upper gingiva, beefy granular 5 mm tissue- silver nitrate applied today. The oral mucosa is moist and pink. There are no obvious masses to the hard or soft palate.   Oropharynx: The uvula is midline.  The tongue is midline. The posterior pharynx is without erythema or exudate. The tonsils are normal appearing.  Salivary glands:  The salivary glands are symmetric and not enlarged, no masses  Neck: No lymphadenopathy, trachea midline, thryoid not enlarged.  Psych: Normal mood and affect.   Neuro: Grossly intact  Speech: fluent    Procedure: chemical cautery oral pyogenic granuloma. Verbal consent obtained.   Topical lidocaine applied with cotton ball. Silver nitrate applied until granuloma noted to be gone with mild depression in this tissue. Bleeding controlled.       Assessment and Plan:       1. Pyogenic granuloma oral mucosa    2. Mouth ulcer    3. Gastroesophageal reflux disease, unspecified whether esophagitis present          Silver nitrate applied to PG on gums. If recurs, will cut off and cauterize. Orajel as needed.     Advised her to take reflux regimen consistently. Get reflux gourmet to use as needed.   Has omeprazole and pepcid at home.       RTC: PRN    Plan of care was discussed in detail with the patient, who agreed with the plan as above. All questions were answered in detail.     Daisy Ruelas MD  Otolaryngology

## 2025-06-30 RX ORDER — POTASSIUM CHLORIDE 20 MEQ/1
20 TABLET, EXTENDED RELEASE ORAL 2 TIMES DAILY
Qty: 180 TABLET | Refills: 3 | Status: SHIPPED | OUTPATIENT
Start: 2025-06-30

## 2025-06-30 NOTE — TELEPHONE ENCOUNTER
Refill Decision Note   Malathi Martinez  is requesting a refill authorization.  Brief Assessment and Rationale for Refill:  Approve     Medication Therapy Plan:         Comments:     Note composed:12:43 PM 06/30/2025            
No care due was identified.  [facility] Embedded Care Due Messages. Reference number: [id number].   6/29/2025 2:07:56 PM CDT  
Detail Level: Detailed
Show Applicator Variable?: Yes
Consent: The patient's consent was obtained including but not limited to risks of crusting, scabbing, blistering, scarring, darker or lighter pigmentary change, recurrence, incomplete removal and infection.
Render Note In Bullet Format When Appropriate: No
Number Of Freeze-Thaw Cycles: 1 freeze-thaw cycle
Duration Of Freeze Thaw-Cycle (Seconds): 0
Post-Care Instructions: I reviewed with the patient in detail post-care instructions. Patient is to wear sunprotection, and avoid picking at any of the treated lesions. Pt may apply Vaseline to crusted or scabbing areas.

## 2025-07-02 ENCOUNTER — LAB VISIT (OUTPATIENT)
Dept: LAB | Facility: HOSPITAL | Age: 65
End: 2025-07-02
Attending: INTERNAL MEDICINE
Payer: MEDICARE

## 2025-07-02 DIAGNOSIS — D50.9 IRON DEFICIENCY ANEMIA, UNSPECIFIED: ICD-10-CM

## 2025-07-02 DIAGNOSIS — K12.31 NEUTROPENIA ASSOCIATED WITH MUCOSITIS DUE TO ANTINEOPLASTIC THERAPY: ICD-10-CM

## 2025-07-02 DIAGNOSIS — C34.11 MALIGNANT NEOPLASM OF UPPER LOBE OF RIGHT LUNG: Primary | ICD-10-CM

## 2025-07-02 DIAGNOSIS — D70.1 NEUTROPENIA ASSOCIATED WITH MUCOSITIS DUE TO ANTINEOPLASTIC THERAPY: ICD-10-CM

## 2025-07-02 DIAGNOSIS — R53.83 FATIGUE, UNSPECIFIED TYPE: ICD-10-CM

## 2025-07-02 DIAGNOSIS — C34.90 MALIGNANT NEOPLASM OF BRONCHUS AND LUNG: ICD-10-CM

## 2025-07-02 DIAGNOSIS — T45.1X5S NEUTROPENIA ASSOCIATED WITH MUCOSITIS DUE TO ANTINEOPLASTIC THERAPY: ICD-10-CM

## 2025-07-02 DIAGNOSIS — T45.1X5A ANEMIA DUE TO CHEMOTHERAPY: ICD-10-CM

## 2025-07-02 DIAGNOSIS — D64.81 ANEMIA DUE TO CHEMOTHERAPY: ICD-10-CM

## 2025-07-02 LAB
ABSOLUTE EOSINOPHIL (OHS): 0.06 K/UL
ABSOLUTE MONOCYTE (OHS): 0.5 K/UL (ref 0.3–1)
ABSOLUTE NEUTROPHIL COUNT (OHS): 2.66 K/UL (ref 1.8–7.7)
ALBUMIN SERPL BCP-MCNC: 3.6 G/DL (ref 3.5–5.2)
ALP SERPL-CCNC: 102 UNIT/L (ref 40–150)
ALT SERPL W/O P-5'-P-CCNC: 39 UNIT/L (ref 10–44)
ANION GAP (OHS): 8 MMOL/L (ref 8–16)
AST SERPL-CCNC: 24 UNIT/L (ref 11–45)
BASOPHILS # BLD AUTO: 0 K/UL
BASOPHILS NFR BLD AUTO: 0 %
BILIRUB SERPL-MCNC: 0.3 MG/DL (ref 0.1–1)
BUN SERPL-MCNC: 6 MG/DL (ref 8–23)
CALCIUM SERPL-MCNC: 8.8 MG/DL (ref 8.7–10.5)
CHLORIDE SERPL-SCNC: 106 MMOL/L (ref 95–110)
CO2 SERPL-SCNC: 26 MMOL/L (ref 23–29)
CREAT SERPL-MCNC: 0.9 MG/DL (ref 0.5–1.4)
ERYTHROCYTE [DISTWIDTH] IN BLOOD BY AUTOMATED COUNT: 13.6 % (ref 11.5–14.5)
GFR SERPLBLD CREATININE-BSD FMLA CKD-EPI: >60 ML/MIN/1.73/M2
GLUCOSE SERPL-MCNC: 114 MG/DL (ref 70–110)
HCT VFR BLD AUTO: 38.5 % (ref 37–48.5)
HGB BLD-MCNC: 12.2 GM/DL (ref 12–16)
IMM GRANULOCYTES # BLD AUTO: 0.01 K/UL (ref 0–0.04)
IMM GRANULOCYTES NFR BLD AUTO: 0.2 % (ref 0–0.5)
LYMPHOCYTES # BLD AUTO: 1.29 K/UL (ref 1–4.8)
MCH RBC QN AUTO: 28.8 PG (ref 27–31)
MCHC RBC AUTO-ENTMCNC: 31.7 G/DL (ref 32–36)
MCV RBC AUTO: 91 FL (ref 82–98)
NUCLEATED RBC (/100WBC) (OHS): 0 /100 WBC
PLATELET # BLD AUTO: 239 K/UL (ref 150–450)
PMV BLD AUTO: 10.1 FL (ref 9.2–12.9)
POTASSIUM SERPL-SCNC: 3.8 MMOL/L (ref 3.5–5.1)
PROT SERPL-MCNC: 6.8 GM/DL (ref 6–8.4)
RBC # BLD AUTO: 4.24 M/UL (ref 4–5.4)
RELATIVE EOSINOPHIL (OHS): 1.3 %
RELATIVE LYMPHOCYTE (OHS): 28.5 % (ref 18–48)
RELATIVE MONOCYTE (OHS): 11.1 % (ref 4–15)
RELATIVE NEUTROPHIL (OHS): 58.9 % (ref 38–73)
SODIUM SERPL-SCNC: 140 MMOL/L (ref 136–145)
WBC # BLD AUTO: 4.52 K/UL (ref 3.9–12.7)

## 2025-07-02 PROCEDURE — 36415 COLL VENOUS BLD VENIPUNCTURE: CPT | Mod: PN

## 2025-07-02 PROCEDURE — 85025 COMPLETE CBC W/AUTO DIFF WBC: CPT

## 2025-07-02 PROCEDURE — 80053 COMPREHEN METABOLIC PANEL: CPT

## 2025-07-05 DIAGNOSIS — F41.1 GAD (GENERALIZED ANXIETY DISORDER): ICD-10-CM

## 2025-07-05 NOTE — TELEPHONE ENCOUNTER
No care due was identified.  [facility] Embedded Care Due Messages. Reference number: [id number].   7/05/2025 11:52:23 AM CDT

## 2025-07-07 ENCOUNTER — CLINICAL SUPPORT (OUTPATIENT)
Dept: FAMILY MEDICINE | Facility: CLINIC | Age: 65
End: 2025-07-07
Payer: MEDICARE

## 2025-07-07 DIAGNOSIS — E53.8 B12 DEFICIENCY: Primary | ICD-10-CM

## 2025-07-07 PROCEDURE — 99999PBSHW PR PBB SHADOW TECHNICAL ONLY FILED TO HB: Mod: PBBFAC,,,

## 2025-07-07 PROCEDURE — 96372 THER/PROPH/DIAG INJ SC/IM: CPT | Mod: PBBFAC,PN

## 2025-07-07 RX ORDER — ALPRAZOLAM 0.25 MG/1
0.25 TABLET ORAL 2 TIMES DAILY
Qty: 60 TABLET | Refills: 0 | Status: SHIPPED | OUTPATIENT
Start: 2025-07-07

## 2025-07-07 RX ADMIN — CYANOCOBALAMIN 1000 MCG: 1000 INJECTION, SOLUTION INTRAMUSCULAR at 10:07

## 2025-07-09 ENCOUNTER — LAB VISIT (OUTPATIENT)
Dept: LAB | Facility: HOSPITAL | Age: 65
End: 2025-07-09
Attending: INTERNAL MEDICINE
Payer: MEDICARE

## 2025-07-09 DIAGNOSIS — T45.1X5S NEUTROPENIA ASSOCIATED WITH MUCOSITIS DUE TO ANTINEOPLASTIC THERAPY: ICD-10-CM

## 2025-07-09 DIAGNOSIS — D64.81 ANEMIA DUE TO CHEMOTHERAPY: ICD-10-CM

## 2025-07-09 DIAGNOSIS — T45.1X5A ANEMIA DUE TO CHEMOTHERAPY: ICD-10-CM

## 2025-07-09 DIAGNOSIS — R53.83 FATIGUE, UNSPECIFIED TYPE: ICD-10-CM

## 2025-07-09 DIAGNOSIS — C34.11 MALIGNANT NEOPLASM OF UPPER LOBE OF RIGHT LUNG: Primary | ICD-10-CM

## 2025-07-09 DIAGNOSIS — D70.1 NEUTROPENIA ASSOCIATED WITH MUCOSITIS DUE TO ANTINEOPLASTIC THERAPY: ICD-10-CM

## 2025-07-09 DIAGNOSIS — K12.31 NEUTROPENIA ASSOCIATED WITH MUCOSITIS DUE TO ANTINEOPLASTIC THERAPY: ICD-10-CM

## 2025-07-09 DIAGNOSIS — C34.90 MALIGNANT NEOPLASM OF BRONCHUS AND LUNG: ICD-10-CM

## 2025-07-09 DIAGNOSIS — D50.9 IRON DEFICIENCY ANEMIA, UNSPECIFIED: ICD-10-CM

## 2025-07-09 LAB
ABSOLUTE EOSINOPHIL (OHS): 0.06 K/UL
ABSOLUTE MONOCYTE (OHS): 0.71 K/UL (ref 0.3–1)
ABSOLUTE NEUTROPHIL COUNT (OHS): 2.83 K/UL (ref 1.8–7.7)
ALBUMIN SERPL BCP-MCNC: 3.8 G/DL (ref 3.5–5.2)
ALP SERPL-CCNC: 108 UNIT/L (ref 40–150)
ALT SERPL W/O P-5'-P-CCNC: 42 UNIT/L (ref 10–44)
ANION GAP (OHS): 10 MMOL/L (ref 8–16)
AST SERPL-CCNC: 23 UNIT/L (ref 11–45)
BASOPHILS # BLD AUTO: 0 K/UL
BASOPHILS NFR BLD AUTO: 0 %
BILIRUB SERPL-MCNC: 0.4 MG/DL (ref 0.1–1)
BUN SERPL-MCNC: 9 MG/DL (ref 8–23)
CALCIUM SERPL-MCNC: 9.3 MG/DL (ref 8.7–10.5)
CHLORIDE SERPL-SCNC: 105 MMOL/L (ref 95–110)
CO2 SERPL-SCNC: 23 MMOL/L (ref 23–29)
CREAT SERPL-MCNC: 0.9 MG/DL (ref 0.5–1.4)
ERYTHROCYTE [DISTWIDTH] IN BLOOD BY AUTOMATED COUNT: 13.2 % (ref 11.5–14.5)
GFR SERPLBLD CREATININE-BSD FMLA CKD-EPI: >60 ML/MIN/1.73/M2
GLUCOSE SERPL-MCNC: 101 MG/DL (ref 70–110)
HCT VFR BLD AUTO: 40.9 % (ref 37–48.5)
HGB BLD-MCNC: 12.8 GM/DL (ref 12–16)
IMM GRANULOCYTES # BLD AUTO: 0.02 K/UL (ref 0–0.04)
IMM GRANULOCYTES NFR BLD AUTO: 0.4 % (ref 0–0.5)
LYMPHOCYTES # BLD AUTO: 1.3 K/UL (ref 1–4.8)
MCH RBC QN AUTO: 28.2 PG (ref 27–31)
MCHC RBC AUTO-ENTMCNC: 31.3 G/DL (ref 32–36)
MCV RBC AUTO: 90 FL (ref 82–98)
NUCLEATED RBC (/100WBC) (OHS): 0 /100 WBC
PLATELET # BLD AUTO: 259 K/UL (ref 150–450)
PMV BLD AUTO: 10.1 FL (ref 9.2–12.9)
POTASSIUM SERPL-SCNC: 3.6 MMOL/L (ref 3.5–5.1)
PROT SERPL-MCNC: 7.4 GM/DL (ref 6–8.4)
RBC # BLD AUTO: 4.54 M/UL (ref 4–5.4)
RELATIVE EOSINOPHIL (OHS): 1.2 %
RELATIVE LYMPHOCYTE (OHS): 26.4 % (ref 18–48)
RELATIVE MONOCYTE (OHS): 14.4 % (ref 4–15)
RELATIVE NEUTROPHIL (OHS): 57.6 % (ref 38–73)
SODIUM SERPL-SCNC: 138 MMOL/L (ref 136–145)
T4 SERPL-MCNC: 9.4 UG/DL (ref 4.5–11.5)
TSH SERPL-ACNC: 0.47 UIU/ML (ref 0.4–4)
WBC # BLD AUTO: 4.92 K/UL (ref 3.9–12.7)

## 2025-07-09 PROCEDURE — 80053 COMPREHEN METABOLIC PANEL: CPT

## 2025-07-09 PROCEDURE — 85025 COMPLETE CBC W/AUTO DIFF WBC: CPT

## 2025-07-09 PROCEDURE — 84443 ASSAY THYROID STIM HORMONE: CPT

## 2025-07-09 PROCEDURE — 36415 COLL VENOUS BLD VENIPUNCTURE: CPT | Mod: PN

## 2025-07-09 PROCEDURE — 84436 ASSAY OF TOTAL THYROXINE: CPT

## 2025-07-16 ENCOUNTER — LAB VISIT (OUTPATIENT)
Dept: LAB | Facility: HOSPITAL | Age: 65
End: 2025-07-16
Payer: MEDICARE

## 2025-07-16 DIAGNOSIS — D50.9 IRON DEFICIENCY ANEMIA, UNSPECIFIED: ICD-10-CM

## 2025-07-16 DIAGNOSIS — D64.81 ANEMIA DUE TO CHEMOTHERAPY: ICD-10-CM

## 2025-07-16 DIAGNOSIS — R53.83 FATIGUE, UNSPECIFIED TYPE: ICD-10-CM

## 2025-07-16 DIAGNOSIS — C34.11 MALIGNANT NEOPLASM OF UPPER LOBE OF RIGHT LUNG: Primary | ICD-10-CM

## 2025-07-16 DIAGNOSIS — T45.1X5A ANEMIA DUE TO CHEMOTHERAPY: ICD-10-CM

## 2025-07-16 DIAGNOSIS — C34.90 MALIGNANT NEOPLASM OF BRONCHUS AND LUNG: ICD-10-CM

## 2025-07-16 DIAGNOSIS — D70.1 NEUTROPENIA ASSOCIATED WITH MUCOSITIS DUE TO ANTINEOPLASTIC THERAPY: ICD-10-CM

## 2025-07-16 DIAGNOSIS — K12.31 NEUTROPENIA ASSOCIATED WITH MUCOSITIS DUE TO ANTINEOPLASTIC THERAPY: ICD-10-CM

## 2025-07-16 DIAGNOSIS — T45.1X5S NEUTROPENIA ASSOCIATED WITH MUCOSITIS DUE TO ANTINEOPLASTIC THERAPY: ICD-10-CM

## 2025-07-16 LAB
ABSOLUTE EOSINOPHIL (OHS): 0.06 K/UL
ABSOLUTE MONOCYTE (OHS): 0.8 K/UL (ref 0.3–1)
ABSOLUTE NEUTROPHIL COUNT (OHS): 3.25 K/UL (ref 1.8–7.7)
ALBUMIN SERPL BCP-MCNC: 3.8 G/DL (ref 3.5–5.2)
ALP SERPL-CCNC: 107 UNIT/L (ref 40–150)
ALT SERPL W/O P-5'-P-CCNC: 37 UNIT/L (ref 10–44)
ANION GAP (OHS): 10 MMOL/L (ref 8–16)
AST SERPL-CCNC: 24 UNIT/L (ref 11–45)
BASOPHILS # BLD AUTO: 0.01 K/UL
BASOPHILS NFR BLD AUTO: 0.2 %
BILIRUB SERPL-MCNC: 0.3 MG/DL (ref 0.1–1)
BUN SERPL-MCNC: 10 MG/DL (ref 8–23)
CALCIUM SERPL-MCNC: 9.1 MG/DL (ref 8.7–10.5)
CHLORIDE SERPL-SCNC: 105 MMOL/L (ref 95–110)
CO2 SERPL-SCNC: 24 MMOL/L (ref 23–29)
CREAT SERPL-MCNC: 1 MG/DL (ref 0.5–1.4)
ERYTHROCYTE [DISTWIDTH] IN BLOOD BY AUTOMATED COUNT: 13.2 % (ref 11.5–14.5)
GFR SERPLBLD CREATININE-BSD FMLA CKD-EPI: >60 ML/MIN/1.73/M2
GLUCOSE SERPL-MCNC: 96 MG/DL (ref 70–110)
HCT VFR BLD AUTO: 40.4 % (ref 37–48.5)
HGB BLD-MCNC: 13.1 GM/DL (ref 12–16)
IMM GRANULOCYTES # BLD AUTO: 0.01 K/UL (ref 0–0.04)
IMM GRANULOCYTES NFR BLD AUTO: 0.2 % (ref 0–0.5)
LYMPHOCYTES # BLD AUTO: 1.37 K/UL (ref 1–4.8)
MCH RBC QN AUTO: 28.9 PG (ref 27–31)
MCHC RBC AUTO-ENTMCNC: 32.4 G/DL (ref 32–36)
MCV RBC AUTO: 89 FL (ref 82–98)
NUCLEATED RBC (/100WBC) (OHS): 0 /100 WBC
PLATELET # BLD AUTO: 250 K/UL (ref 150–450)
PMV BLD AUTO: 10.2 FL (ref 9.2–12.9)
POTASSIUM SERPL-SCNC: 4.1 MMOL/L (ref 3.5–5.1)
PROT SERPL-MCNC: 7.4 GM/DL (ref 6–8.4)
RBC # BLD AUTO: 4.53 M/UL (ref 4–5.4)
RELATIVE EOSINOPHIL (OHS): 1.1 %
RELATIVE LYMPHOCYTE (OHS): 24.9 % (ref 18–48)
RELATIVE MONOCYTE (OHS): 14.5 % (ref 4–15)
RELATIVE NEUTROPHIL (OHS): 59.1 % (ref 38–73)
SODIUM SERPL-SCNC: 139 MMOL/L (ref 136–145)
WBC # BLD AUTO: 5.5 K/UL (ref 3.9–12.7)

## 2025-07-16 PROCEDURE — 36415 COLL VENOUS BLD VENIPUNCTURE: CPT | Mod: PN

## 2025-07-16 PROCEDURE — 80053 COMPREHEN METABOLIC PANEL: CPT

## 2025-07-16 PROCEDURE — 85025 COMPLETE CBC W/AUTO DIFF WBC: CPT

## 2025-07-18 ENCOUNTER — OFFICE VISIT (OUTPATIENT)
Dept: FAMILY MEDICINE | Facility: CLINIC | Age: 65
End: 2025-07-18
Payer: MEDICARE

## 2025-07-18 VITALS
SYSTOLIC BLOOD PRESSURE: 130 MMHG | WEIGHT: 224.88 LBS | DIASTOLIC BLOOD PRESSURE: 80 MMHG | HEART RATE: 82 BPM | BODY MASS INDEX: 32.19 KG/M2 | HEIGHT: 70 IN

## 2025-07-18 DIAGNOSIS — R53.83 FATIGUE, UNSPECIFIED TYPE: ICD-10-CM

## 2025-07-18 DIAGNOSIS — F41.1 GAD (GENERALIZED ANXIETY DISORDER): ICD-10-CM

## 2025-07-18 DIAGNOSIS — D50.9 IRON DEFICIENCY ANEMIA, UNSPECIFIED IRON DEFICIENCY ANEMIA TYPE: ICD-10-CM

## 2025-07-18 DIAGNOSIS — F33.41 RECURRENT MAJOR DEPRESSIVE DISORDER, IN PARTIAL REMISSION: ICD-10-CM

## 2025-07-18 DIAGNOSIS — D70.1 AGRANULOCYTOSIS SECONDARY TO CANCER CHEMOTHERAPY (CODE): ICD-10-CM

## 2025-07-18 DIAGNOSIS — I10 PRIMARY HYPERTENSION: ICD-10-CM

## 2025-07-18 DIAGNOSIS — C34.11 MALIGNANT NEOPLASM OF UPPER LOBE OF RIGHT LUNG: ICD-10-CM

## 2025-07-18 DIAGNOSIS — D70.1 NEUTROPENIA ASSOCIATED WITH MUCOSITIS DUE TO ANTINEOPLASTIC THERAPY: ICD-10-CM

## 2025-07-18 DIAGNOSIS — E11.9 TYPE 2 DIABETES MELLITUS WITHOUT COMPLICATION, WITHOUT LONG-TERM CURRENT USE OF INSULIN: Primary | ICD-10-CM

## 2025-07-18 DIAGNOSIS — T45.1X5A ANEMIA DUE TO CHEMOTHERAPY: ICD-10-CM

## 2025-07-18 DIAGNOSIS — R63.5 WEIGHT GAIN: ICD-10-CM

## 2025-07-18 DIAGNOSIS — T45.1X5S NEUTROPENIA ASSOCIATED WITH MUCOSITIS DUE TO ANTINEOPLASTIC THERAPY: ICD-10-CM

## 2025-07-18 DIAGNOSIS — G47.00 INSOMNIA, UNSPECIFIED TYPE: ICD-10-CM

## 2025-07-18 DIAGNOSIS — D64.81 ANEMIA DUE TO ANTINEOPLASTIC CHEMOTHERAPY: ICD-10-CM

## 2025-07-18 DIAGNOSIS — T45.1X5A ANEMIA DUE TO ANTINEOPLASTIC CHEMOTHERAPY: ICD-10-CM

## 2025-07-18 DIAGNOSIS — D64.81 ANEMIA DUE TO CHEMOTHERAPY: ICD-10-CM

## 2025-07-18 DIAGNOSIS — C34.90 MALIGNANT NEOPLASM OF BRONCHUS AND LUNG: ICD-10-CM

## 2025-07-18 DIAGNOSIS — J44.9 CHRONIC OBSTRUCTIVE PULMONARY DISEASE, UNSPECIFIED COPD TYPE: ICD-10-CM

## 2025-07-18 DIAGNOSIS — K12.31 NEUTROPENIA ASSOCIATED WITH MUCOSITIS DUE TO ANTINEOPLASTIC THERAPY: ICD-10-CM

## 2025-07-18 PROCEDURE — 99999 PR PBB SHADOW E&M-EST. PATIENT-LVL V: CPT | Mod: PBBFAC,,, | Performed by: FAMILY MEDICINE

## 2025-07-18 PROCEDURE — 99214 OFFICE O/P EST MOD 30 MIN: CPT | Mod: S$PBB,,, | Performed by: FAMILY MEDICINE

## 2025-07-18 PROCEDURE — 99215 OFFICE O/P EST HI 40 MIN: CPT | Mod: PBBFAC,PN | Performed by: FAMILY MEDICINE

## 2025-07-18 PROCEDURE — G2211 COMPLEX E/M VISIT ADD ON: HCPCS | Mod: ,,, | Performed by: FAMILY MEDICINE

## 2025-07-18 RX ORDER — LEVOTHYROXINE SODIUM 100 UG/1
100 TABLET ORAL
Qty: 90 TABLET | Refills: 3 | Status: SHIPPED | OUTPATIENT
Start: 2025-07-18

## 2025-07-18 RX ORDER — BUPROPION HYDROCHLORIDE 150 MG/1
300 TABLET ORAL DAILY
Qty: 180 TABLET | Refills: 3 | Status: SHIPPED | OUTPATIENT
Start: 2025-07-18

## 2025-07-18 RX ORDER — ESZOPICLONE 2 MG/1
2 TABLET, FILM COATED ORAL NIGHTLY
Qty: 30 TABLET | Refills: 5 | Status: SHIPPED | OUTPATIENT
Start: 2025-07-18

## 2025-07-18 RX ORDER — ALPRAZOLAM 0.25 MG/1
0.25 TABLET ORAL 2 TIMES DAILY
Qty: 60 TABLET | Refills: 5 | Status: SHIPPED | OUTPATIENT
Start: 2025-07-18

## 2025-07-18 NOTE — PROGRESS NOTES
"    Chief Complaint:  Chief Complaint   Patient presents with    Follow-up        HPI:  Malathi is a 64 y.o. year old     History of Present Illness    CHIEF COMPLAINT:  Malathi presents today for follow-up    CANCER TREATMENT SIDE EFFECTS:  She reports experiencing side effects from recent cancer treatment, describing feeling tired and "unwell" following treatment yesterday. She developed diarrhea today, which is a new symptom not previously experienced with her current treatment regimen. She notes a typical pattern of exhaustion occurring on Fridays, Saturdays, and Sundays following treatment, with gradual recovery thereafter.    INFECTION:  She reports an ongoing infection currently being treated with antibiotics for approximately 2 weeks of a total 6-week treatment course. She experiences fatigue and diarrhea associated with recent treatment.    NEUROLOGIC:  She reports persistent right-sided paresthesia affecting her right arm and leg. She attributes these symptoms to a previous fall and believes the issue originates from her neck. She has not fully recovered from the fall and continues to experience recurring episodes of her right body parts "falling asleep".    SLEEP:  She reports sleep is currently very good. Over the past few days, she describes sleeping exceptionally well, stating she has been "out" immediately upon going to bed.    WEIGHT MANAGEMENT:  She reports recent intentional changes in eating habits, specifically moving dinner earlier. She notes weight loss of 3 lbs the week prior, followed by a 1-pound gain. She describes feeling overly full after lunch, sometimes to the point of not wanting additional food. She is managing hunger by consuming fruit if needed after meals and appears motivated to make small dietary adjustments to support weight management.    MEDICATIONS:  Her current medications are mostly stable. Omeprazole needs refill. Trazodone is good until next year. Carafate is currently good. " "Potassium is currently fine. Lisinopril is currently good. Thyroid medication is coming due this fall. Gabapentin is currently good. Lunesta has 5 refills remaining. Flexeril has refills. Celexa is good until next year. Wellbutrin is coming up due. Lipitor is currently good. Alprazolam is due for refill. Albuterol inhaler and nebulizer supplies are adequate.              Review of Systems   Constitutional:  Positive for fatigue and unexpected weight change (gaining).   HENT:  Positive for hearing loss. Negative for rhinorrhea and trouble swallowing.    Eyes:  Positive for visual disturbance.   Respiratory:  Negative for chest tightness and wheezing.    Cardiovascular:  Negative for chest pain and palpitations.   Gastrointestinal:  Negative for blood in stool, constipation and diarrhea.   Genitourinary:  Positive for urgency. Negative for difficulty urinating and dysuria.   Musculoskeletal:  Positive for arthralgias and neck pain. Negative for joint swelling.   Neurological:  Positive for weakness and headaches.   Psychiatric/Behavioral:  Positive for dysphoric mood.          Past Medical History:  Past Medical History:   Diagnosis Date    Abnormal stress test     Bronchitis     COPD (chronic obstructive pulmonary disease)     Diabetes mellitus, type 2     GERD (gastroesophageal reflux disease)     HLD (hyperlipidemia)     Hx of endoscopy     Hypertension     Lung cancer 05/2023    Mental disorder     Pulmonary nodule 04/09/2018    SOB (shortness of breath)     resolved    Thyroid disease     Tobacco abuse 12/26/2012    Tobacco use 12/26/2012         Vitals:  Vitals:    07/18/25 1335   BP: 130/80   Pulse: 82   Weight: 102 kg (224 lb 13.9 oz)   Height: 5' 10" (1.778 m)   PainSc: 0-No pain       BP Readings from Last 5 Encounters:   07/18/25 130/80   06/06/25 120/70   05/13/25 123/71   04/29/25 116/78   04/16/25 124/77       The 10-year ASCVD risk score (Andrew ONEAL, et al., 2019) is: 13.3%    Values used to calculate " the score:      Age: 64 years      Sex: Female      Is Non- : No      Diabetic: Yes      Tobacco smoker: No      Systolic Blood Pressure: 130 mmHg      Is BP treated: Yes      HDL Cholesterol: 57 mg/dL      Total Cholesterol: 176 mg/dL      Physical Exam:  Physical Exam  Vitals and nursing note reviewed.   Constitutional:       General: She is not in acute distress.     Appearance: Normal appearance. She is well-developed.   HENT:      Head: Normocephalic and atraumatic.   Eyes:      General: No scleral icterus.        Right eye: No discharge.         Left eye: No discharge.      Conjunctiva/sclera: Conjunctivae normal.   Cardiovascular:      Rate and Rhythm: Normal rate.   Pulmonary:      Effort: Pulmonary effort is normal. No respiratory distress.   Abdominal:      Palpations: Abdomen is soft.      Tenderness: There is no guarding.   Musculoskeletal:         General: No deformity or signs of injury.      Cervical back: Neck supple.   Skin:     General: Skin is warm and dry.      Findings: No rash.   Neurological:      General: No focal deficit present.      Mental Status: She is alert and oriented to person, place, and time.   Psychiatric:         Mood and Affect: Mood normal.         Behavior: Behavior normal.             Assessment & Plan:  Type 2 diabetes mellitus without complication, without long-term current use of insulin  -     Cancel: Hemoglobin A1C; Future; Expected date: 07/18/2025  -     Hemoglobin A1C; Future; Expected date: 10/18/2025    Primary hypertension    JEAN CLAUDE (generalized anxiety disorder)  -     ALPRAZolam (XANAX) 0.25 MG tablet; Take 1 tablet (0.25 mg total) by mouth 2 (two) times daily. as needed for anxiety.  Dispense: 60 tablet; Refill: 5    Chronic obstructive pulmonary disease, unspecified COPD type    Malignant neoplasm of upper lobe of right lung  -     Cancel: CBC Auto Differential; Future; Expected date: 07/18/2025  -     Cancel: Comprehensive Metabolic Panel;  Future; Expected date: 07/18/2025  -     Cancel: Ferritin; Future; Expected date: 07/18/2025  -     Cancel: Iron and TIBC; Future; Expected date: 07/18/2025  -     Cancel: TSH; Future; Expected date: 07/18/2025  -     CBC Auto Differential; Future; Expected date: 10/18/2025  -     Comprehensive Metabolic Panel; Future; Expected date: 10/18/2025  -     Ferritin; Future; Expected date: 10/18/2025  -     Iron and TIBC; Future; Expected date: 10/18/2025  -     TSH; Future; Expected date: 10/18/2025    Fatigue, unspecified type  -     Cancel: CBC Auto Differential; Future; Expected date: 07/18/2025  -     Cancel: Comprehensive Metabolic Panel; Future; Expected date: 07/18/2025  -     Cancel: Ferritin; Future; Expected date: 07/18/2025  -     Cancel: Iron and TIBC; Future; Expected date: 07/18/2025  -     Cancel: TSH; Future; Expected date: 07/18/2025  -     CBC Auto Differential; Future; Expected date: 10/18/2025  -     Comprehensive Metabolic Panel; Future; Expected date: 10/18/2025  -     Ferritin; Future; Expected date: 10/18/2025  -     Iron and TIBC; Future; Expected date: 10/18/2025  -     TSH; Future; Expected date: 10/18/2025    Iron deficiency anemia, unspecified  -     Cancel: CBC Auto Differential; Future; Expected date: 07/18/2025  -     Cancel: Comprehensive Metabolic Panel; Future; Expected date: 07/18/2025  -     CBC Auto Differential; Future; Expected date: 10/18/2025  -     Comprehensive Metabolic Panel; Future; Expected date: 10/18/2025    Malignant neoplasm of bronchus and lung  -     Cancel: CBC Auto Differential; Future; Expected date: 07/18/2025  -     Cancel: Comprehensive Metabolic Panel; Future; Expected date: 07/18/2025  -     Cancel: Ferritin; Future; Expected date: 07/18/2025  -     Cancel: Iron and TIBC; Future; Expected date: 07/18/2025  -     Cancel: TSH; Future; Expected date: 07/18/2025  -     CBC Auto Differential; Future; Expected date: 10/18/2025  -     Comprehensive Metabolic Panel;  Future; Expected date: 10/18/2025  -     Ferritin; Future; Expected date: 10/18/2025  -     Iron and TIBC; Future; Expected date: 10/18/2025  -     TSH; Future; Expected date: 10/18/2025    Anemia due to chemotherapy  -     Cancel: CBC Auto Differential; Future; Expected date: 07/18/2025  -     Cancel: Comprehensive Metabolic Panel; Future; Expected date: 07/18/2025  -     CBC Auto Differential; Future; Expected date: 10/18/2025  -     Comprehensive Metabolic Panel; Future; Expected date: 10/18/2025    Neutropenia associated with mucositis due to antineoplastic therapy  -     Cancel: CBC Auto Differential; Future; Expected date: 07/18/2025  -     Cancel: Comprehensive Metabolic Panel; Future; Expected date: 07/18/2025  -     CBC Auto Differential; Future; Expected date: 10/18/2025  -     Comprehensive Metabolic Panel; Future; Expected date: 10/18/2025    Iron deficiency anemia, unspecified iron deficiency anemia type  -     Cancel: Ferritin; Future; Expected date: 07/18/2025  -     Cancel: Iron and TIBC; Future; Expected date: 07/18/2025  -     Cancel: TSH; Future; Expected date: 07/18/2025  -     Ferritin; Future; Expected date: 10/18/2025  -     Iron and TIBC; Future; Expected date: 10/18/2025  -     TSH; Future; Expected date: 10/18/2025    Anemia due to antineoplastic chemotherapy  -     Cancel: Ferritin; Future; Expected date: 07/18/2025  -     Cancel: Iron and TIBC; Future; Expected date: 07/18/2025  -     Cancel: TSH; Future; Expected date: 07/18/2025  -     Ferritin; Future; Expected date: 10/18/2025  -     Iron and TIBC; Future; Expected date: 10/18/2025  -     TSH; Future; Expected date: 10/18/2025    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)  -     Cancel: Ferritin; Future; Expected date: 07/18/2025  -     Cancel: Iron and TIBC; Future; Expected date: 07/18/2025  -     Cancel: TSH; Future; Expected date: 07/18/2025  -     Ferritin; Future; Expected date: 10/18/2025  -     Iron and TIBC;  Future; Expected date: 10/18/2025  -     TSH; Future; Expected date: 10/18/2025    Weight gain  Comments:  try slow increase to 16 or 18hr fasts during the daytime, no change to current carb/protein ratios  ensure adequate water intake, avoid hypoglycemia  Orders:  -     Cancel: Hemoglobin A1C; Future; Expected date: 07/18/2025  -     Hemoglobin A1C; Future; Expected date: 10/18/2025    Recurrent major depressive disorder, in partial remission  Comments:  wellbutrin to 300mg daily for now  lexapro 20mg  Orders:  -     buPROPion (WELLBUTRIN XL) 150 MG TB24 tablet; Take 2 tablets (300 mg total) by mouth once daily.  Dispense: 180 tablet; Refill: 3    Insomnia, unspecified type  -     eszopiclone (LUNESTA) 2 MG Tab; Take 1 tablet (2 mg total) by mouth every evening.  Dispense: 30 tablet; Refill: 5    Other orders  -     levothyroxine (SYNTHROID) 100 MCG tablet; Take 1 tablet (100 mcg total) by mouth before breakfast.  Dispense: 90 tablet; Refill: 3         Assessment & Plan    ORDERS:   Ordered labs for follow up in approximately 3 months, including A1C and iron counts.    IMPRESSION:  Reviewed recent lab results, noting blood counts and other values are WNL, indicating good tolerance of current treatment regimen.  Right-sided numbness in arm and leg likely related to previous neck injury and possible nerve impingement.  Weight loss progress shows 3-pound reduction followed by 1-pound gain, attributed to earlier dinner times and dietary adjustments.  Fatigue likely expected side effect of recent treatment.    PLAN SUMMARY:   Continue omeprazole   Continue alprazolam (Xanax)   Continue thyroid medication   Continue Lunesta   Continue Wellbutrin   Maintain current weight management approach   Ordered labs for 3-month follow-up: A1C and iron counts   Follow-up in 3 months for lab review, or sooner if problems arise    ACTION ITEMS/LIFESTYLE:   Malathi to continue current approach to weight management, including earlier  dinner times and having fruit as a snack if hungry later.    MEDICATIONS:   Continued omeprazole.   Continued Lunesta.   Continued thyroid medication.   Continued Wellbutrin.   Continued alprazolam (Xanax).    FOLLOW UP:   Follow up in approximately 3 months for lab review, or sooner if problems arise.         Visit today included increased complexity associated with the care of the episodic problem dm2 addressed and managing the longitudinal care of the patient due to the serious and/or complex managed problem(s) miky, gerd, hypothyroid, insomnia.    This note was generated with the assistance of ambient listening technology. Verbal consent was obtained by the patient and accompanying visitor(s) for the recording of patient appointment to facilitate this note. I attest to having reviewed and edited the generated note for accuracy, though some syntax or spelling errors may persist. Please contact the author of this note for any clarification.

## 2025-07-23 ENCOUNTER — LAB VISIT (OUTPATIENT)
Dept: LAB | Facility: HOSPITAL | Age: 65
End: 2025-07-23
Attending: INTERNAL MEDICINE
Payer: MEDICARE

## 2025-07-23 DIAGNOSIS — D64.81 ANEMIA DUE TO CHEMOTHERAPY: ICD-10-CM

## 2025-07-23 DIAGNOSIS — C34.11 MALIGNANT NEOPLASM OF UPPER LOBE OF RIGHT LUNG: Primary | ICD-10-CM

## 2025-07-23 DIAGNOSIS — R53.83 FATIGUE, UNSPECIFIED TYPE: ICD-10-CM

## 2025-07-23 DIAGNOSIS — T45.1X5S NEUTROPENIA ASSOCIATED WITH MUCOSITIS DUE TO ANTINEOPLASTIC THERAPY: ICD-10-CM

## 2025-07-23 DIAGNOSIS — D50.9 IRON DEFICIENCY ANEMIA, UNSPECIFIED: ICD-10-CM

## 2025-07-23 DIAGNOSIS — D70.1 NEUTROPENIA ASSOCIATED WITH MUCOSITIS DUE TO ANTINEOPLASTIC THERAPY: ICD-10-CM

## 2025-07-23 DIAGNOSIS — K12.31 NEUTROPENIA ASSOCIATED WITH MUCOSITIS DUE TO ANTINEOPLASTIC THERAPY: ICD-10-CM

## 2025-07-23 DIAGNOSIS — C34.90 MALIGNANT NEOPLASM OF BRONCHUS AND LUNG: ICD-10-CM

## 2025-07-23 DIAGNOSIS — T45.1X5A ANEMIA DUE TO CHEMOTHERAPY: ICD-10-CM

## 2025-07-23 LAB
ABSOLUTE EOSINOPHIL (OHS): 0.06 K/UL
ABSOLUTE MONOCYTE (OHS): 0.78 K/UL (ref 0.3–1)
ABSOLUTE NEUTROPHIL COUNT (OHS): 3.39 K/UL (ref 1.8–7.7)
ALBUMIN SERPL BCP-MCNC: 3.8 G/DL (ref 3.5–5.2)
ALP SERPL-CCNC: 112 UNIT/L (ref 40–150)
ALT SERPL W/O P-5'-P-CCNC: 45 UNIT/L (ref 10–44)
ANION GAP (OHS): 9 MMOL/L (ref 8–16)
AST SERPL-CCNC: 26 UNIT/L (ref 11–45)
BASOPHILS # BLD AUTO: 0.01 K/UL
BASOPHILS NFR BLD AUTO: 0.2 %
BILIRUB SERPL-MCNC: 0.3 MG/DL (ref 0.1–1)
BUN SERPL-MCNC: 6 MG/DL (ref 8–23)
CALCIUM SERPL-MCNC: 9.3 MG/DL (ref 8.7–10.5)
CHLORIDE SERPL-SCNC: 106 MMOL/L (ref 95–110)
CO2 SERPL-SCNC: 25 MMOL/L (ref 23–29)
CREAT SERPL-MCNC: 1 MG/DL (ref 0.5–1.4)
ERYTHROCYTE [DISTWIDTH] IN BLOOD BY AUTOMATED COUNT: 13.2 % (ref 11.5–14.5)
GFR SERPLBLD CREATININE-BSD FMLA CKD-EPI: >60 ML/MIN/1.73/M2
GLUCOSE SERPL-MCNC: 89 MG/DL (ref 70–110)
HCT VFR BLD AUTO: 41.8 % (ref 37–48.5)
HGB BLD-MCNC: 13 GM/DL (ref 12–16)
IMM GRANULOCYTES # BLD AUTO: 0.01 K/UL (ref 0–0.04)
IMM GRANULOCYTES NFR BLD AUTO: 0.2 % (ref 0–0.5)
LYMPHOCYTES # BLD AUTO: 1.4 K/UL (ref 1–4.8)
MCH RBC QN AUTO: 28 PG (ref 27–31)
MCHC RBC AUTO-ENTMCNC: 31.1 G/DL (ref 32–36)
MCV RBC AUTO: 90 FL (ref 82–98)
NUCLEATED RBC (/100WBC) (OHS): 0 /100 WBC
PLATELET # BLD AUTO: 253 K/UL (ref 150–450)
PMV BLD AUTO: 10.1 FL (ref 9.2–12.9)
POTASSIUM SERPL-SCNC: 3.8 MMOL/L (ref 3.5–5.1)
PROT SERPL-MCNC: 7.3 GM/DL (ref 6–8.4)
RBC # BLD AUTO: 4.64 M/UL (ref 4–5.4)
RELATIVE EOSINOPHIL (OHS): 1.1 %
RELATIVE LYMPHOCYTE (OHS): 24.8 % (ref 18–48)
RELATIVE MONOCYTE (OHS): 13.8 % (ref 4–15)
RELATIVE NEUTROPHIL (OHS): 59.9 % (ref 38–73)
SODIUM SERPL-SCNC: 140 MMOL/L (ref 136–145)
WBC # BLD AUTO: 5.65 K/UL (ref 3.9–12.7)

## 2025-07-23 PROCEDURE — 80053 COMPREHEN METABOLIC PANEL: CPT

## 2025-07-23 PROCEDURE — 85025 COMPLETE CBC W/AUTO DIFF WBC: CPT

## 2025-07-23 PROCEDURE — 36415 COLL VENOUS BLD VENIPUNCTURE: CPT | Mod: PN

## 2025-07-30 ENCOUNTER — LAB VISIT (OUTPATIENT)
Dept: LAB | Facility: HOSPITAL | Age: 65
End: 2025-07-30
Attending: INTERNAL MEDICINE
Payer: MEDICARE

## 2025-07-30 DIAGNOSIS — D70.1 AGRANULOCYTOSIS SECONDARY TO CANCER CHEMOTHERAPY (CODE): ICD-10-CM

## 2025-07-30 DIAGNOSIS — R53.83 FATIGUE, UNSPECIFIED TYPE: ICD-10-CM

## 2025-07-30 DIAGNOSIS — D64.81 ANEMIA DUE TO CHEMOTHERAPY: ICD-10-CM

## 2025-07-30 DIAGNOSIS — C34.11 MALIGNANT NEOPLASM OF UPPER LOBE OF RIGHT LUNG: Primary | ICD-10-CM

## 2025-07-30 DIAGNOSIS — D50.9 IRON DEFICIENCY ANEMIA, UNSPECIFIED: ICD-10-CM

## 2025-07-30 DIAGNOSIS — T45.1X5A ANEMIA DUE TO CHEMOTHERAPY: ICD-10-CM

## 2025-07-30 DIAGNOSIS — C34.90 MALIGNANT NEOPLASM OF BRONCHUS AND LUNG: ICD-10-CM

## 2025-07-30 LAB
ABSOLUTE EOSINOPHIL (OHS): 0.07 K/UL
ABSOLUTE MONOCYTE (OHS): 0.64 K/UL (ref 0.3–1)
ABSOLUTE NEUTROPHIL COUNT (OHS): 3.3 K/UL (ref 1.8–7.7)
ALBUMIN SERPL BCP-MCNC: 3.6 G/DL (ref 3.5–5.2)
ALP SERPL-CCNC: 109 UNIT/L (ref 40–150)
ALT SERPL W/O P-5'-P-CCNC: 61 UNIT/L (ref 0–55)
ANION GAP (OHS): 10 MMOL/L (ref 8–16)
AST SERPL-CCNC: 34 UNIT/L (ref 0–50)
BASOPHILS # BLD AUTO: 0 K/UL
BASOPHILS NFR BLD AUTO: 0 %
BILIRUB SERPL-MCNC: 0.3 MG/DL (ref 0.1–1)
BUN SERPL-MCNC: 7 MG/DL (ref 8–23)
CALCIUM SERPL-MCNC: 9.5 MG/DL (ref 8.7–10.5)
CHLORIDE SERPL-SCNC: 105 MMOL/L (ref 95–110)
CO2 SERPL-SCNC: 24 MMOL/L (ref 23–29)
CREAT SERPL-MCNC: 1 MG/DL (ref 0.5–1.4)
ERYTHROCYTE [DISTWIDTH] IN BLOOD BY AUTOMATED COUNT: 13.2 % (ref 11.5–14.5)
GFR SERPLBLD CREATININE-BSD FMLA CKD-EPI: >60 ML/MIN/1.73/M2
GLUCOSE SERPL-MCNC: 78 MG/DL (ref 70–110)
HCT VFR BLD AUTO: 40.1 % (ref 37–48.5)
HGB BLD-MCNC: 12.7 GM/DL (ref 12–16)
IMM GRANULOCYTES # BLD AUTO: 0.02 K/UL (ref 0–0.04)
IMM GRANULOCYTES NFR BLD AUTO: 0.4 % (ref 0–0.5)
LYMPHOCYTES # BLD AUTO: 1.22 K/UL (ref 1–4.8)
MCH RBC QN AUTO: 28.3 PG (ref 27–31)
MCHC RBC AUTO-ENTMCNC: 31.7 G/DL (ref 32–36)
MCV RBC AUTO: 89 FL (ref 82–98)
NUCLEATED RBC (/100WBC) (OHS): 0 /100 WBC
PLATELET # BLD AUTO: 252 K/UL (ref 150–450)
PMV BLD AUTO: 10 FL (ref 9.2–12.9)
POTASSIUM SERPL-SCNC: 3.8 MMOL/L (ref 3.5–5.1)
PROT SERPL-MCNC: 7 GM/DL (ref 6–8.4)
RBC # BLD AUTO: 4.49 M/UL (ref 4–5.4)
RELATIVE EOSINOPHIL (OHS): 1.3 %
RELATIVE LYMPHOCYTE (OHS): 23.2 % (ref 18–48)
RELATIVE MONOCYTE (OHS): 12.2 % (ref 4–15)
RELATIVE NEUTROPHIL (OHS): 62.9 % (ref 38–73)
SODIUM SERPL-SCNC: 139 MMOL/L (ref 136–145)
WBC # BLD AUTO: 5.25 K/UL (ref 3.9–12.7)

## 2025-07-30 PROCEDURE — 36415 COLL VENOUS BLD VENIPUNCTURE: CPT | Mod: PN

## 2025-07-30 PROCEDURE — 85025 COMPLETE CBC W/AUTO DIFF WBC: CPT

## 2025-07-30 PROCEDURE — 82040 ASSAY OF SERUM ALBUMIN: CPT

## 2025-08-06 ENCOUNTER — CLINICAL SUPPORT (OUTPATIENT)
Dept: FAMILY MEDICINE | Facility: CLINIC | Age: 65
End: 2025-08-06
Payer: MEDICARE

## 2025-08-06 ENCOUNTER — LAB VISIT (OUTPATIENT)
Dept: LAB | Facility: HOSPITAL | Age: 65
End: 2025-08-06
Attending: INTERNAL MEDICINE
Payer: MEDICARE

## 2025-08-06 DIAGNOSIS — D70.1 AGRANULOCYTOSIS SECONDARY TO CANCER CHEMOTHERAPY (CODE): ICD-10-CM

## 2025-08-06 DIAGNOSIS — R53.83 FATIGUE, UNSPECIFIED TYPE: ICD-10-CM

## 2025-08-06 DIAGNOSIS — T45.1X5A ANEMIA DUE TO CHEMOTHERAPY: ICD-10-CM

## 2025-08-06 DIAGNOSIS — E53.8 B12 DEFICIENCY: Primary | ICD-10-CM

## 2025-08-06 DIAGNOSIS — C34.90 MALIGNANT NEOPLASM OF BRONCHUS AND LUNG: ICD-10-CM

## 2025-08-06 DIAGNOSIS — C34.11 MALIGNANT NEOPLASM OF UPPER LOBE OF RIGHT LUNG: Primary | ICD-10-CM

## 2025-08-06 DIAGNOSIS — D64.81 ANEMIA DUE TO CHEMOTHERAPY: ICD-10-CM

## 2025-08-06 DIAGNOSIS — D50.9 IRON DEFICIENCY ANEMIA, UNSPECIFIED: ICD-10-CM

## 2025-08-06 LAB
ABSOLUTE EOSINOPHIL (OHS): 0.07 K/UL
ABSOLUTE MONOCYTE (OHS): 0.57 K/UL (ref 0.3–1)
ABSOLUTE NEUTROPHIL COUNT (OHS): 3.35 K/UL (ref 1.8–7.7)
ALBUMIN SERPL BCP-MCNC: 3.7 G/DL (ref 3.5–5.2)
ALP SERPL-CCNC: 110 UNIT/L (ref 40–150)
ALT SERPL W/O P-5'-P-CCNC: 55 UNIT/L (ref 0–55)
ANION GAP (OHS): 15 MMOL/L (ref 8–16)
AST SERPL-CCNC: 32 UNIT/L (ref 0–50)
BASOPHILS # BLD AUTO: 0.01 K/UL
BASOPHILS NFR BLD AUTO: 0.2 %
BILIRUB SERPL-MCNC: 0.3 MG/DL (ref 0.1–1)
BUN SERPL-MCNC: 8 MG/DL (ref 8–23)
CALCIUM SERPL-MCNC: 9.1 MG/DL (ref 8.7–10.5)
CHLORIDE SERPL-SCNC: 102 MMOL/L (ref 95–110)
CO2 SERPL-SCNC: 27 MMOL/L (ref 23–29)
CREAT SERPL-MCNC: 1 MG/DL (ref 0.5–1.4)
ERYTHROCYTE [DISTWIDTH] IN BLOOD BY AUTOMATED COUNT: 13.6 % (ref 11.5–14.5)
GFR SERPLBLD CREATININE-BSD FMLA CKD-EPI: >60 ML/MIN/1.73/M2
GLUCOSE SERPL-MCNC: 106 MG/DL (ref 70–110)
HCT VFR BLD AUTO: 41.4 % (ref 37–48.5)
HGB BLD-MCNC: 12.8 GM/DL (ref 12–16)
IMM GRANULOCYTES # BLD AUTO: 0.01 K/UL (ref 0–0.04)
IMM GRANULOCYTES NFR BLD AUTO: 0.2 % (ref 0–0.5)
LYMPHOCYTES # BLD AUTO: 1.02 K/UL (ref 1–4.8)
MCH RBC QN AUTO: 28.2 PG (ref 27–31)
MCHC RBC AUTO-ENTMCNC: 30.9 G/DL (ref 32–36)
MCV RBC AUTO: 91 FL (ref 82–98)
NUCLEATED RBC (/100WBC) (OHS): 0 /100 WBC
PLATELET # BLD AUTO: 247 K/UL (ref 150–450)
PMV BLD AUTO: 9.9 FL (ref 9.2–12.9)
POTASSIUM SERPL-SCNC: 3.6 MMOL/L (ref 3.5–5.1)
PROT SERPL-MCNC: 7.2 GM/DL (ref 6–8.4)
RBC # BLD AUTO: 4.54 M/UL (ref 4–5.4)
RELATIVE EOSINOPHIL (OHS): 1.4 %
RELATIVE LYMPHOCYTE (OHS): 20.3 % (ref 18–48)
RELATIVE MONOCYTE (OHS): 11.3 % (ref 4–15)
RELATIVE NEUTROPHIL (OHS): 66.6 % (ref 38–73)
SODIUM SERPL-SCNC: 144 MMOL/L (ref 136–145)
WBC # BLD AUTO: 5.03 K/UL (ref 3.9–12.7)

## 2025-08-06 PROCEDURE — 99999PBSHW PR PBB SHADOW TECHNICAL ONLY FILED TO HB: Mod: PBBFAC,,,

## 2025-08-06 PROCEDURE — 85025 COMPLETE CBC W/AUTO DIFF WBC: CPT

## 2025-08-06 PROCEDURE — 82040 ASSAY OF SERUM ALBUMIN: CPT

## 2025-08-06 PROCEDURE — 36415 COLL VENOUS BLD VENIPUNCTURE: CPT | Mod: PN

## 2025-08-06 PROCEDURE — 96372 THER/PROPH/DIAG INJ SC/IM: CPT | Mod: PBBFAC,PN

## 2025-08-06 PROCEDURE — 99499 UNLISTED E&M SERVICE: CPT | Mod: S$PBB,,, | Performed by: FAMILY MEDICINE

## 2025-08-06 RX ADMIN — CYANOCOBALAMIN 1000 MCG: 1000 INJECTION, SOLUTION INTRAMUSCULAR at 10:08

## 2025-08-06 NOTE — PROGRESS NOTES
Confirmed name and  and allergies.  Right deltoid B12 injection IM.  Pt tolerated well and exited exam room.

## 2025-08-13 ENCOUNTER — LAB VISIT (OUTPATIENT)
Dept: LAB | Facility: HOSPITAL | Age: 65
End: 2025-08-13
Payer: MEDICARE

## 2025-08-13 ENCOUNTER — TELEPHONE (OUTPATIENT)
Dept: FAMILY MEDICINE | Facility: CLINIC | Age: 65
End: 2025-08-13
Payer: MEDICARE

## 2025-08-13 DIAGNOSIS — C34.11 MALIGNANT NEOPLASM OF UPPER LOBE OF RIGHT LUNG: Primary | ICD-10-CM

## 2025-08-13 DIAGNOSIS — T45.1X5A ANEMIA DUE TO CHEMOTHERAPY: ICD-10-CM

## 2025-08-13 DIAGNOSIS — K12.31 NEUTROPENIA ASSOCIATED WITH MUCOSITIS DUE TO ANTINEOPLASTIC THERAPY: ICD-10-CM

## 2025-08-13 DIAGNOSIS — D70.1 NEUTROPENIA ASSOCIATED WITH MUCOSITIS DUE TO ANTINEOPLASTIC THERAPY: ICD-10-CM

## 2025-08-13 DIAGNOSIS — D64.81 ANEMIA DUE TO CHEMOTHERAPY: ICD-10-CM

## 2025-08-13 DIAGNOSIS — D50.9 IRON DEFICIENCY ANEMIA, UNSPECIFIED: ICD-10-CM

## 2025-08-13 DIAGNOSIS — R53.83 FATIGUE, UNSPECIFIED TYPE: ICD-10-CM

## 2025-08-13 DIAGNOSIS — C34.90 MALIGNANT NEOPLASM OF BRONCHUS AND LUNG: ICD-10-CM

## 2025-08-13 DIAGNOSIS — T45.1X5S NEUTROPENIA ASSOCIATED WITH MUCOSITIS DUE TO ANTINEOPLASTIC THERAPY: ICD-10-CM

## 2025-08-13 LAB
ABSOLUTE EOSINOPHIL (OHS): 0.06 K/UL
ABSOLUTE MONOCYTE (OHS): 0.66 K/UL (ref 0.3–1)
ABSOLUTE NEUTROPHIL COUNT (OHS): 3.28 K/UL (ref 1.8–7.7)
ALBUMIN SERPL BCP-MCNC: 3.5 G/DL (ref 3.5–5.2)
ALP SERPL-CCNC: 97 UNIT/L (ref 40–150)
ALT SERPL W/O P-5'-P-CCNC: 54 UNIT/L (ref 0–55)
ANION GAP (OHS): 9 MMOL/L (ref 8–16)
AST SERPL-CCNC: 34 UNIT/L (ref 0–50)
BASOPHILS # BLD AUTO: 0.01 K/UL
BASOPHILS NFR BLD AUTO: 0.2 %
BILIRUB SERPL-MCNC: 0.3 MG/DL (ref 0.1–1)
BUN SERPL-MCNC: 9 MG/DL (ref 8–23)
CALCIUM SERPL-MCNC: 9.3 MG/DL (ref 8.7–10.5)
CHLORIDE SERPL-SCNC: 104 MMOL/L (ref 95–110)
CO2 SERPL-SCNC: 24 MMOL/L (ref 23–29)
CREAT SERPL-MCNC: 1.1 MG/DL (ref 0.5–1.4)
ERYTHROCYTE [DISTWIDTH] IN BLOOD BY AUTOMATED COUNT: 13.8 % (ref 11.5–14.5)
GFR SERPLBLD CREATININE-BSD FMLA CKD-EPI: 56 ML/MIN/1.73/M2
GLUCOSE SERPL-MCNC: 88 MG/DL (ref 70–110)
HCT VFR BLD AUTO: 39.1 % (ref 37–48.5)
HGB BLD-MCNC: 12.2 GM/DL (ref 12–16)
IMM GRANULOCYTES # BLD AUTO: 0.01 K/UL (ref 0–0.04)
IMM GRANULOCYTES NFR BLD AUTO: 0.2 % (ref 0–0.5)
LYMPHOCYTES # BLD AUTO: 1.13 K/UL (ref 1–4.8)
MCH RBC QN AUTO: 28.4 PG (ref 27–31)
MCHC RBC AUTO-ENTMCNC: 31.2 G/DL (ref 32–36)
MCV RBC AUTO: 91 FL (ref 82–98)
NUCLEATED RBC (/100WBC) (OHS): 0 /100 WBC
PLATELET # BLD AUTO: 236 K/UL (ref 150–450)
PMV BLD AUTO: 10.3 FL (ref 9.2–12.9)
POTASSIUM SERPL-SCNC: 3.7 MMOL/L (ref 3.5–5.1)
PROT SERPL-MCNC: 6.8 GM/DL (ref 6–8.4)
RBC # BLD AUTO: 4.3 M/UL (ref 4–5.4)
RELATIVE EOSINOPHIL (OHS): 1.2 %
RELATIVE LYMPHOCYTE (OHS): 21.9 % (ref 18–48)
RELATIVE MONOCYTE (OHS): 12.8 % (ref 4–15)
RELATIVE NEUTROPHIL (OHS): 63.7 % (ref 38–73)
SODIUM SERPL-SCNC: 137 MMOL/L (ref 136–145)
WBC # BLD AUTO: 5.15 K/UL (ref 3.9–12.7)

## 2025-08-13 PROCEDURE — 80053 COMPREHEN METABOLIC PANEL: CPT

## 2025-08-13 PROCEDURE — 85025 COMPLETE CBC W/AUTO DIFF WBC: CPT

## 2025-08-13 PROCEDURE — 36415 COLL VENOUS BLD VENIPUNCTURE: CPT | Mod: PN

## 2025-08-20 ENCOUNTER — LAB VISIT (OUTPATIENT)
Dept: LAB | Facility: HOSPITAL | Age: 65
End: 2025-08-20
Payer: MEDICARE

## 2025-08-20 DIAGNOSIS — D50.9 IRON DEFICIENCY ANEMIA, UNSPECIFIED: ICD-10-CM

## 2025-08-20 DIAGNOSIS — T45.1X5A ANEMIA DUE TO CHEMOTHERAPY: ICD-10-CM

## 2025-08-20 DIAGNOSIS — T45.1X5S NEUTROPENIA ASSOCIATED WITH MUCOSITIS DUE TO ANTINEOPLASTIC THERAPY: ICD-10-CM

## 2025-08-20 DIAGNOSIS — K12.31 NEUTROPENIA ASSOCIATED WITH MUCOSITIS DUE TO ANTINEOPLASTIC THERAPY: ICD-10-CM

## 2025-08-20 DIAGNOSIS — C34.90 MALIGNANT NEOPLASM OF BRONCHUS AND LUNG: ICD-10-CM

## 2025-08-20 DIAGNOSIS — D70.1 NEUTROPENIA ASSOCIATED WITH MUCOSITIS DUE TO ANTINEOPLASTIC THERAPY: ICD-10-CM

## 2025-08-20 DIAGNOSIS — R53.83 FATIGUE, UNSPECIFIED TYPE: ICD-10-CM

## 2025-08-20 DIAGNOSIS — C34.11 MALIGNANT NEOPLASM OF UPPER LOBE OF RIGHT LUNG: Primary | ICD-10-CM

## 2025-08-20 DIAGNOSIS — D64.81 ANEMIA DUE TO CHEMOTHERAPY: ICD-10-CM

## 2025-08-20 LAB
ABSOLUTE EOSINOPHIL (OHS): 0.06 K/UL
ABSOLUTE MONOCYTE (OHS): 0.72 K/UL (ref 0.3–1)
ABSOLUTE NEUTROPHIL COUNT (OHS): 3.4 K/UL (ref 1.8–7.7)
ALBUMIN SERPL BCP-MCNC: 3.7 G/DL (ref 3.5–5.2)
ALP SERPL-CCNC: 110 UNIT/L (ref 40–150)
ALT SERPL W/O P-5'-P-CCNC: 48 UNIT/L (ref 0–55)
ANION GAP (OHS): 10 MMOL/L (ref 8–16)
AST SERPL-CCNC: 33 UNIT/L (ref 0–50)
BASOPHILS # BLD AUTO: 0.01 K/UL
BASOPHILS NFR BLD AUTO: 0.2 %
BILIRUB SERPL-MCNC: 0.4 MG/DL (ref 0.1–1)
BUN SERPL-MCNC: 7 MG/DL (ref 8–23)
CALCIUM SERPL-MCNC: 9 MG/DL (ref 8.7–10.5)
CHLORIDE SERPL-SCNC: 106 MMOL/L (ref 95–110)
CO2 SERPL-SCNC: 23 MMOL/L (ref 23–29)
CREAT SERPL-MCNC: 1 MG/DL (ref 0.5–1.4)
ERYTHROCYTE [DISTWIDTH] IN BLOOD BY AUTOMATED COUNT: 13.5 % (ref 11.5–14.5)
GFR SERPLBLD CREATININE-BSD FMLA CKD-EPI: >60 ML/MIN/1.73/M2
GLUCOSE SERPL-MCNC: 91 MG/DL (ref 70–110)
HCT VFR BLD AUTO: 39.1 % (ref 37–48.5)
HGB BLD-MCNC: 12.4 GM/DL (ref 12–16)
IMM GRANULOCYTES # BLD AUTO: 0.02 K/UL (ref 0–0.04)
IMM GRANULOCYTES NFR BLD AUTO: 0.4 % (ref 0–0.5)
LYMPHOCYTES # BLD AUTO: 1.42 K/UL (ref 1–4.8)
MCH RBC QN AUTO: 28.3 PG (ref 27–31)
MCHC RBC AUTO-ENTMCNC: 31.7 G/DL (ref 32–36)
MCV RBC AUTO: 89 FL (ref 82–98)
NUCLEATED RBC (/100WBC) (OHS): 0 /100 WBC
PLATELET # BLD AUTO: 250 K/UL (ref 150–450)
PMV BLD AUTO: 10 FL (ref 9.2–12.9)
POTASSIUM SERPL-SCNC: 3.7 MMOL/L (ref 3.5–5.1)
PROT SERPL-MCNC: 7.1 GM/DL (ref 6–8.4)
RBC # BLD AUTO: 4.38 M/UL (ref 4–5.4)
RELATIVE EOSINOPHIL (OHS): 1.1 %
RELATIVE LYMPHOCYTE (OHS): 25.2 % (ref 18–48)
RELATIVE MONOCYTE (OHS): 12.8 % (ref 4–15)
RELATIVE NEUTROPHIL (OHS): 60.3 % (ref 38–73)
SODIUM SERPL-SCNC: 139 MMOL/L (ref 136–145)
WBC # BLD AUTO: 5.63 K/UL (ref 3.9–12.7)

## 2025-08-20 PROCEDURE — 82040 ASSAY OF SERUM ALBUMIN: CPT

## 2025-08-20 PROCEDURE — 85025 COMPLETE CBC W/AUTO DIFF WBC: CPT

## 2025-08-20 PROCEDURE — 36415 COLL VENOUS BLD VENIPUNCTURE: CPT | Mod: PN

## 2025-08-25 PROBLEM — J43.2 CENTRIACINAR EMPHYSEMA: Status: ACTIVE | Noted: 2025-08-25

## 2025-08-25 PROBLEM — R06.02 SHORTNESS OF BREATH: Status: ACTIVE | Noted: 2025-08-25

## 2025-08-27 ENCOUNTER — LAB VISIT (OUTPATIENT)
Dept: LAB | Facility: HOSPITAL | Age: 65
End: 2025-08-27
Payer: MEDICARE

## 2025-08-27 DIAGNOSIS — K12.31 NEUTROPENIA ASSOCIATED WITH MUCOSITIS DUE TO ANTINEOPLASTIC THERAPY: ICD-10-CM

## 2025-08-27 DIAGNOSIS — T45.1X5A ANEMIA DUE TO CHEMOTHERAPY: ICD-10-CM

## 2025-08-27 DIAGNOSIS — C34.90 MALIGNANT NEOPLASM OF UNSPECIFIED PART OF UNSPECIFIED BRONCHUS OR LUNG: ICD-10-CM

## 2025-08-27 DIAGNOSIS — D64.81 ANEMIA DUE TO ANTINEOPLASTIC CHEMOTHERAPY: ICD-10-CM

## 2025-08-27 DIAGNOSIS — D70.1 NEUTROPENIA ASSOCIATED WITH MUCOSITIS DUE TO ANTINEOPLASTIC THERAPY: ICD-10-CM

## 2025-08-27 DIAGNOSIS — R53.83 FATIGUE: ICD-10-CM

## 2025-08-27 DIAGNOSIS — C34.90 MALIGNANT NEOPLASM OF BRONCHUS AND LUNG: ICD-10-CM

## 2025-08-27 DIAGNOSIS — T45.1X5A ANEMIA DUE TO ANTINEOPLASTIC CHEMOTHERAPY: ICD-10-CM

## 2025-08-27 DIAGNOSIS — T45.1X5S NEUTROPENIA ASSOCIATED WITH MUCOSITIS DUE TO ANTINEOPLASTIC THERAPY: ICD-10-CM

## 2025-08-27 DIAGNOSIS — D50.9 IRON DEFICIENCY ANEMIA, UNSPECIFIED: ICD-10-CM

## 2025-08-27 DIAGNOSIS — D64.81 ANEMIA DUE TO CHEMOTHERAPY: ICD-10-CM

## 2025-08-27 DIAGNOSIS — C34.11 MALIGNANT NEOPLASM OF UPPER LOBE OF RIGHT LUNG: Primary | ICD-10-CM

## 2025-08-27 DIAGNOSIS — D70.1 AGRANULOCYTOSIS SECONDARY TO CANCER CHEMOTHERAPY: ICD-10-CM

## 2025-08-27 DIAGNOSIS — C34.11 MALIGNANT NEOPLASM OF UPPER LOBE, RIGHT BRONCHUS OR LUNG: ICD-10-CM

## 2025-08-27 DIAGNOSIS — T45.1X5A AGRANULOCYTOSIS SECONDARY TO CANCER CHEMOTHERAPY: ICD-10-CM

## 2025-08-27 DIAGNOSIS — R53.83 FATIGUE, UNSPECIFIED TYPE: ICD-10-CM

## 2025-08-27 LAB
ABSOLUTE EOSINOPHIL (OHS): 0.04 K/UL
ABSOLUTE MONOCYTE (OHS): 0.46 K/UL (ref 0.3–1)
ABSOLUTE NEUTROPHIL COUNT (OHS): 2.55 K/UL (ref 1.8–7.7)
ALBUMIN SERPL BCP-MCNC: 3.7 G/DL (ref 3.5–5.2)
ALP SERPL-CCNC: 95 UNIT/L (ref 40–150)
ALT SERPL W/O P-5'-P-CCNC: 58 UNIT/L (ref 0–55)
ANION GAP (OHS): 13 MMOL/L (ref 8–16)
AST SERPL-CCNC: 41 UNIT/L (ref 0–50)
BASOPHILS # BLD AUTO: 0.01 K/UL
BASOPHILS NFR BLD AUTO: 0.2 %
BILIRUB SERPL-MCNC: 0.3 MG/DL (ref 0.1–1)
BUN SERPL-MCNC: 8 MG/DL (ref 8–23)
CALCIUM SERPL-MCNC: 9.3 MG/DL (ref 8.7–10.5)
CHLORIDE SERPL-SCNC: 106 MMOL/L (ref 95–110)
CO2 SERPL-SCNC: 22 MMOL/L (ref 23–29)
CREAT SERPL-MCNC: 1 MG/DL (ref 0.5–1.4)
ERYTHROCYTE [DISTWIDTH] IN BLOOD BY AUTOMATED COUNT: 13.7 % (ref 11.5–14.5)
GFR SERPLBLD CREATININE-BSD FMLA CKD-EPI: >60 ML/MIN/1.73/M2
GLUCOSE SERPL-MCNC: 87 MG/DL (ref 70–110)
HCT VFR BLD AUTO: 39.3 % (ref 37–48.5)
HGB BLD-MCNC: 12.3 GM/DL (ref 12–16)
IMM GRANULOCYTES # BLD AUTO: 0.01 K/UL (ref 0–0.04)
IMM GRANULOCYTES NFR BLD AUTO: 0.2 % (ref 0–0.5)
LYMPHOCYTES # BLD AUTO: 1.05 K/UL (ref 1–4.8)
MCH RBC QN AUTO: 28 PG (ref 27–31)
MCHC RBC AUTO-ENTMCNC: 31.3 G/DL (ref 32–36)
MCV RBC AUTO: 90 FL (ref 82–98)
NUCLEATED RBC (/100WBC) (OHS): 0 /100 WBC
PLATELET # BLD AUTO: 233 K/UL (ref 150–450)
PMV BLD AUTO: 10.2 FL (ref 9.2–12.9)
POTASSIUM SERPL-SCNC: 3.6 MMOL/L (ref 3.5–5.1)
PROT SERPL-MCNC: 7 GM/DL (ref 6–8.4)
RBC # BLD AUTO: 4.39 M/UL (ref 4–5.4)
RELATIVE EOSINOPHIL (OHS): 1 %
RELATIVE LYMPHOCYTE (OHS): 25.5 % (ref 18–48)
RELATIVE MONOCYTE (OHS): 11.2 % (ref 4–15)
RELATIVE NEUTROPHIL (OHS): 61.9 % (ref 38–73)
SODIUM SERPL-SCNC: 141 MMOL/L (ref 136–145)
WBC # BLD AUTO: 4.12 K/UL (ref 3.9–12.7)

## 2025-08-27 PROCEDURE — 80053 COMPREHEN METABOLIC PANEL: CPT

## 2025-08-27 PROCEDURE — 36415 COLL VENOUS BLD VENIPUNCTURE: CPT | Mod: PN

## 2025-08-27 PROCEDURE — 85025 COMPLETE CBC W/AUTO DIFF WBC: CPT
